# Patient Record
Sex: FEMALE | Race: WHITE | NOT HISPANIC OR LATINO | Employment: FULL TIME | URBAN - METROPOLITAN AREA
[De-identification: names, ages, dates, MRNs, and addresses within clinical notes are randomized per-mention and may not be internally consistent; named-entity substitution may affect disease eponyms.]

---

## 2019-02-06 ENCOUNTER — OFFICE VISIT (OUTPATIENT)
Dept: FAMILY MEDICINE | Facility: CLINIC | Age: 54
End: 2019-02-06
Payer: COMMERCIAL

## 2019-02-06 VITALS
TEMPERATURE: 98 F | OXYGEN SATURATION: 98 % | BODY MASS INDEX: 28.43 KG/M2 | HEIGHT: 65 IN | HEART RATE: 86 BPM | WEIGHT: 170.63 LBS | DIASTOLIC BLOOD PRESSURE: 78 MMHG | SYSTOLIC BLOOD PRESSURE: 142 MMHG | RESPIRATION RATE: 18 BRPM

## 2019-02-06 DIAGNOSIS — T14.8XXA BRUISING: ICD-10-CM

## 2019-02-06 DIAGNOSIS — J00 COMMON COLD: Primary | ICD-10-CM

## 2019-02-06 LAB
CTP QC/QA: YES
FLUAV AG NPH QL: NEGATIVE
FLUBV AG NPH QL: NEGATIVE

## 2019-02-06 PROCEDURE — 99203 PR OFFICE/OUTPT VISIT, NEW, LEVL III, 30-44 MIN: ICD-10-PCS | Mod: S$GLB,,, | Performed by: FAMILY MEDICINE

## 2019-02-06 PROCEDURE — 87804 POCT INFLUENZA A/B: ICD-10-PCS | Mod: 59,QW,S$GLB, | Performed by: FAMILY MEDICINE

## 2019-02-06 PROCEDURE — 99999 PR PBB SHADOW E&M-NEW PATIENT-LVL III: CPT | Mod: PBBFAC,,, | Performed by: FAMILY MEDICINE

## 2019-02-06 PROCEDURE — 99999 PR PBB SHADOW E&M-NEW PATIENT-LVL III: ICD-10-PCS | Mod: PBBFAC,,, | Performed by: FAMILY MEDICINE

## 2019-02-06 PROCEDURE — 99203 OFFICE O/P NEW LOW 30 MIN: CPT | Mod: S$GLB,,, | Performed by: FAMILY MEDICINE

## 2019-02-06 PROCEDURE — 87804 INFLUENZA ASSAY W/OPTIC: CPT | Mod: QW,S$GLB,, | Performed by: FAMILY MEDICINE

## 2019-02-06 RX ORDER — TOPIRAMATE 50 MG/1
50 CAPSULE, EXTENDED RELEASE ORAL DAILY PRN
Status: ON HOLD | COMMUNITY
End: 2019-12-24 | Stop reason: HOSPADM

## 2019-02-06 RX ORDER — LOPERAMIDE HYDROCHLORIDE 2 MG/1
2 CAPSULE ORAL 4 TIMES DAILY PRN
Status: ON HOLD | COMMUNITY
End: 2019-12-24 | Stop reason: HOSPADM

## 2019-02-06 RX ORDER — LISINOPRIL 20 MG/1
20 TABLET ORAL DAILY
Status: ON HOLD | COMMUNITY
End: 2019-12-24 | Stop reason: SDUPTHER

## 2019-02-06 RX ORDER — AMLODIPINE BESYLATE 5 MG/1
5 TABLET ORAL DAILY
Status: ON HOLD | COMMUNITY
End: 2019-12-24 | Stop reason: SDUPTHER

## 2019-02-06 RX ORDER — IPRATROPIUM BROMIDE 42 UG/1
2 SPRAY, METERED NASAL 4 TIMES DAILY
Qty: 15 ML | Refills: 0 | Status: SHIPPED | OUTPATIENT
Start: 2019-02-06 | End: 2019-08-08

## 2019-02-06 RX ORDER — MULTIVITAMIN
1 TABLET ORAL DAILY
Status: ON HOLD | COMMUNITY
End: 2019-12-24 | Stop reason: HOSPADM
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
## 2019-02-06 NOTE — LETTER
February 6, 2019      [facility]  [address]  [place] [address]  Phone: [phone]       Patient: Naomy Armstrong   YOB: 1965  Date of Visit: 02/06/2019    To Whom It May Concern:    Naomy Armstrong  was at [facility] on 02/06/2019. She may return to work/school on 2/09/2019. Please also excuse on 2/4/19 and 2/5/19.    If you have any questions or concerns, or if I can be of further assistance, please do not hesitate to contact me.      Sincerely,      Eric Hernandes Jr., MD

## 2019-02-08 ENCOUNTER — TELEPHONE (OUTPATIENT)
Dept: FAMILY MEDICINE | Facility: CLINIC | Age: 54
End: 2019-02-08

## 2019-02-08 NOTE — TELEPHONE ENCOUNTER
Bro[place]nn will be talking to Dr. Hernandes regarding [place] paperwork and will be getting back with Pt.

## 2019-02-08 NOTE — TELEPHONE ENCOUNTER
Voicemail left for patient to inform her that we received FMLA papers from [employer] and Dr Hernandes does not fill out any paperwork for FMLA

## 2019-06-19 DIAGNOSIS — M25.561 PAIN IN BOTH KNEES, UNSPECIFIED CHRONICITY: Primary | ICD-10-CM

## 2019-06-19 DIAGNOSIS — M25.562 PAIN IN BOTH KNEES, UNSPECIFIED CHRONICITY: Primary | ICD-10-CM

## 2019-06-20 ENCOUNTER — OFFICE VISIT (OUTPATIENT)
Dept: ORTHOPEDICS | Facility: CLINIC | Age: 54
End: 2019-06-20
Attending: ORTHOPAEDIC SURGERY
Payer: COMMERCIAL

## 2019-06-20 VITALS
SYSTOLIC BLOOD PRESSURE: 122 MMHG | WEIGHT: 168 LBS | HEART RATE: 74 BPM | DIASTOLIC BLOOD PRESSURE: 80 MMHG | HEIGHT: 65 IN | BODY MASS INDEX: 27.99 KG/M2

## 2019-06-20 DIAGNOSIS — M54.2 NECK PAIN: ICD-10-CM

## 2019-06-20 DIAGNOSIS — M17.11 PRIMARY OSTEOARTHRITIS OF RIGHT KNEE: Primary | ICD-10-CM

## 2019-06-20 PROCEDURE — 99999 PR PBB SHADOW E&M-EST. PATIENT-LVL IV: ICD-10-PCS | Mod: PBBFAC,,, | Performed by: ORTHOPAEDIC SURGERY

## 2019-06-20 PROCEDURE — 99999 PR PBB SHADOW E&M-EST. PATIENT-LVL IV: CPT | Mod: PBBFAC,,, | Performed by: ORTHOPAEDIC SURGERY

## 2019-06-20 PROCEDURE — 99204 PR OFFICE/OUTPT VISIT, NEW, LEVL IV, 45-59 MIN: ICD-10-PCS | Mod: 25,S$GLB,, | Performed by: ORTHOPAEDIC SURGERY

## 2019-06-20 PROCEDURE — 20610 LARGE JOINT ASPIRATION/INJECTION: R KNEE: ICD-10-PCS | Mod: RT,S$GLB,, | Performed by: ORTHOPAEDIC SURGERY

## 2019-06-20 PROCEDURE — 20610 DRAIN/INJ JOINT/BURSA W/O US: CPT | Mod: RT,S$GLB,, | Performed by: ORTHOPAEDIC SURGERY

## 2019-06-20 PROCEDURE — 99204 OFFICE O/P NEW MOD 45 MIN: CPT | Mod: 25,S$GLB,, | Performed by: ORTHOPAEDIC SURGERY

## 2019-06-20 RX ORDER — NAPROXEN 500 MG/1
TABLET ORAL
Refills: 1 | Status: ON HOLD | COMMUNITY
Start: 2019-04-05 | End: 2019-12-24 | Stop reason: HOSPADM

## 2019-06-20 RX ADMIN — TRIAMCINOLONE ACETONIDE 40 MG: 40 INJECTION, SUSPENSION INTRA-ARTICULAR; INTRAMUSCULAR at 11:06

## 2019-06-20 NOTE — PROGRESS NOTES
Chief Complaint   Patient presents with    Left Knee - Pain    Right Knee - Pain       This patient was seen in consultation at the request of Dr. Jessica Shay     HPI: Naomy Armstrong is a 54 y.o. female who presents today complaining of bilateral knee pain R>L    Duration of symptoms:   Several months   Trauma or new activity: no  Pain is intermittent  Aggravating factors: squatting, getting up from squatting, walking, any pressure on the knee/bumping it, deep flexion  Relieving factors: rest   Radicular symptoms: no back pain, numbness, paresthesias   Associated symptoms:  None  Prior treatment:  OTC NSAIDs  without improvement in pain.   Knee pads at work do not help. No PT or injections.  Pain does interfere with sleep and activities of daily living .    Has a history of neck pain -- underwent multiple procedures with Dr Barnes in 2013 with good relief of pain, has returned. She is interested in seeing pain management again    This is the extent of the patient's complaints at this time.     Occupation:  at [employer] - requires a lot of kneeling, lifting etc     Review of Systems   Constitutional: Negative.    HENT: Negative.    Eyes: Negative.    Respiratory: Negative.    Cardiovascular: Negative.    Gastrointestinal: Negative.    Genitourinary: Negative.    Skin: Negative.    Neurological: Negative.    Endo/Heme/Allergies: Negative.    Psychiatric/Behavioral: Negative.          Review of patient's allergies indicates:   Allergen Reactions    Effexor [venlafaxine]     Zoloft [sertraline]          Current Outpatient Medications:     amLODIPine (NORVASC) 5 MG tablet, Take 5 mg by mouth once daily., Disp: , Rfl:     dicyclomine (BENTYL) 20 mg tablet, Take 20 mg by mouth every 6 (six) hours., Disp: , Rfl:     hydrocodone-acetaminophen (VICODIN ES) 7.5-750 mg per tablet, Take 1 tablet by mouth every 6 (six) hours as needed., Disp: , Rfl:     ipratropium (ATROVENT) 42 mcg (0.06 %)  nasal spray, 2 sprays by Nasal route 4 (four) times daily., Disp: 15 mL, Rfl: 0    LIPOPEN PLUS Crea, , Disp: , Rfl:     lisinopril (PRINIVIL,ZESTRIL) 20 MG tablet, Take 20 mg by mouth once daily., Disp: , Rfl:     loperamide (IMODIUM) 2 mg capsule, Take 2 mg by mouth 4 (four) times daily as needed for Diarrhea., Disp: , Rfl:     methocarbamol (ROBAXIN) 750 MG Tab, , Disp: , Rfl:     multivitamin (ONE DAILY MULTIVITAMIN) per tablet, Take 1 tablet by mouth once daily., Disp: , Rfl:     naproxen (NAPROSYN) 500 MG tablet, , Disp: , Rfl: 1    omeprazole (PRILOSEC) 20 MG capsule, Take 20 mg by mouth once daily. , Disp: , Rfl:     ondansetron (ZOFRAN) 4 MG tablet, , Disp: , Rfl:     topiramate (TROKENDI XR) 50 mg Cp24, Take 50 mg by mouth daily as needed., Disp: , Rfl:     zolpidem (AMBIEN) 10 mg Tab, Take 5 mg by mouth nightly as needed., Disp: , Rfl:     Past Medical History:   Diagnosis Date    Anxiety     Depression        Patient Active Problem List   Diagnosis    Cervical radiculopathy    Disc disease, degenerative, cervical    Generalized headaches       Past Surgical History:   Procedure Laterality Date    BLOCK, NERVE, FACET JOINT, CERVICAL, MEDIAL BRANCH Left 9/10/2013    [facility]    INJECTION, STEROID, SPINE, CERVICAL, EPIDURAL N/A 2013    [facility] by [facility]    RADIOFREQUENCY THERMAL COAGULATION, NERVE, SPINAL, CERVICAL, POSTERIOR RAMUS, MEDIAL BRANCH Left 10/8/2013    Performed by Lis [facility] at [facility]       Social History     Tobacco Use    Smoking status: Former Smoker     Last attempt to quit: 2006     Years since quittin.1   Substance Use Topics    Alcohol use: No    Drug use: No       No family history on file.    Physical Exam:     Vitals:    19 1022   BP: 122/80   Pulse: 74           General: Weight: 76.2 kg (167 lb 15.9 oz) Body mass index is 27.96 kg/m².  Patient is  alert, awake and oriented to time, place and person. Mood and affect are appropriate.  Patient does not appear to be in any distress, denies any constitutional symptoms and appears stated age.   HEENT: Pupils are equal and round, sclera are not injected. External examination of ears and nose reveals no abnormalities. Cranial nerves II-X are grossly intact  Skin: no rashes, abrasions or open wounds on the affected extremity   Resp: No respiratory distress or audible wheezing   CV: 2+  pulses, all extremities warm and well perfused    Bilateral  Knees  Localizes pain over anterior joint line  No swelling , effusion, or erythema   Active ROM: 0 - 130  Passive ROM: 0 - 135  No varus/valgus deformity   Tender to palpation over patella and medial joint line   good  quadricep muscle tone and bulk; no atrophy compared to contralateral extremity   normal (0 - 2 mm) Anterior drawer   normal (0 - 2 mm) posterior drawer   negative valgus instability   negative varus instability   Normal patellar tracking   + pain with patellar compression   ltsi s/s/sp/dp/t   + ehl/fhl/ta/gs  2 + DP     Imaging:  3 views bilateral knees:  mild arthritic changes with joint space narrowing, osteophyte formation.  The changes are most pronounced at the patellofemoral joint     Assessment: 54 y.o. female with mild bilateral knee OA, neck pain    I explained my diagnostic impression and the reasoning behind it in detail, using layman's terms.  Models and/or pictures were used to help in the explanation.    Plan:   - Pain management referral placed   - injection to bilateral knees, please see procedure note for more details  - Home exercise program  - return to clinic as needed    All questions were answered in detail. The patient is in full agreement with the treatment plan and will proceed accordingly.    A note notifying Dr. Jessica Shay of my findings was sent via the electronic medical record     This note was created by combination of  typed  and M-Modal dictation. Transcription and phonetic errors may be present.  If there are any questions, please contact me.

## 2019-06-20 NOTE — PROCEDURES
Large Joint Aspiration/Injection: R knee  Date/Time: 6/20/2019 11:09 AM  Performed by: Jessica Shay MD  Authorized by: Jessica Shay MD     Indications:  Pain  Timeout: Prior to procedure the correct patient, procedure, and site was verified      Location:  Knee  Site:  R knee  Prep: Patient was prepped and draped in usual sterile fashion    Needle size:  22 G  Approach:  Anteromedial  Medications:  40 mg triamcinolone acetonide 40 mg/mL  Patient tolerance:  Patient tolerated the procedure well with no immediate complications

## 2019-06-24 PROBLEM — M54.2 NECK PAIN: Status: ACTIVE | Noted: 2019-06-24

## 2019-06-24 PROBLEM — M17.11 PRIMARY OSTEOARTHRITIS OF RIGHT KNEE: Status: ACTIVE | Noted: 2019-06-24

## 2019-06-24 RX ORDER — TRIAMCINOLONE ACETONIDE 40 MG/ML
40 INJECTION, SUSPENSION INTRA-ARTICULAR; INTRAMUSCULAR
Status: DISCONTINUED | OUTPATIENT
Start: 2019-06-20 | End: 2019-06-24 | Stop reason: HOSPADM

## 2019-07-09 ENCOUNTER — OFFICE VISIT (OUTPATIENT)
Dept: PAIN MEDICINE | Facility: CLINIC | Age: 54
End: 2019-07-09
Payer: COMMERCIAL

## 2019-07-09 VITALS
DIASTOLIC BLOOD PRESSURE: 56 MMHG | WEIGHT: 164.88 LBS | BODY MASS INDEX: 27.47 KG/M2 | SYSTOLIC BLOOD PRESSURE: 120 MMHG | HEART RATE: 99 BPM | OXYGEN SATURATION: 98 % | HEIGHT: 65 IN

## 2019-07-09 DIAGNOSIS — M47.812 SPONDYLOSIS OF CERVICAL REGION WITHOUT MYELOPATHY OR RADICULOPATHY: ICD-10-CM

## 2019-07-09 DIAGNOSIS — M50.30 DDD (DEGENERATIVE DISC DISEASE), CERVICAL: Primary | ICD-10-CM

## 2019-07-09 PROCEDURE — 99999 PR PBB SHADOW E&M-EST. PATIENT-LVL IV: ICD-10-PCS | Mod: PBBFAC,,, | Performed by: PAIN MEDICINE

## 2019-07-09 PROCEDURE — 99244 OFF/OP CNSLTJ NEW/EST MOD 40: CPT | Mod: S$GLB,,, | Performed by: PAIN MEDICINE

## 2019-07-09 PROCEDURE — 99244 PR OFFICE CONSULTATION,LEVEL IV: ICD-10-PCS | Mod: S$GLB,,, | Performed by: PAIN MEDICINE

## 2019-07-09 PROCEDURE — 99999 PR PBB SHADOW E&M-EST. PATIENT-LVL IV: CPT | Mod: PBBFAC,,, | Performed by: PAIN MEDICINE

## 2019-07-09 RX ORDER — PROMETHAZINE HYDROCHLORIDE 25 MG/1
TABLET ORAL
Refills: 1 | Status: ON HOLD | COMMUNITY
Start: 2019-04-25 | End: 2019-12-24 | Stop reason: HOSPADM

## 2019-07-09 RX ORDER — TIZANIDINE 4 MG/1
TABLET ORAL
Refills: 1 | Status: ON HOLD | COMMUNITY
Start: 2019-04-05 | End: 2019-12-24 | Stop reason: HOSPADM

## 2019-07-09 RX ORDER — ERGOCALCIFEROL 1.25 MG/1
50000 CAPSULE ORAL
Status: ON HOLD | COMMUNITY
End: 2019-12-24 | Stop reason: HOSPADM

## 2019-07-09 RX ORDER — SULFAMETHOXAZOLE AND TRIMETHOPRIM 800; 160 MG/1; MG/1
TABLET ORAL
Refills: 0 | COMMUNITY
Start: 2019-05-09 | End: 2019-08-08 | Stop reason: ALTCHOICE

## 2019-07-09 RX ORDER — AMITRIPTYLINE HYDROCHLORIDE 25 MG/1
25 TABLET, FILM COATED ORAL
COMMUNITY
Start: 2018-02-09 | End: 2019-08-08

## 2019-07-09 RX ORDER — SIMETHICONE 125 MG
125 TABLET,CHEWABLE ORAL
Status: ON HOLD | COMMUNITY
End: 2019-12-24 | Stop reason: HOSPADM

## 2019-07-09 RX ORDER — GABAPENTIN 600 MG/1
600 TABLET ORAL
COMMUNITY
End: 2019-08-08 | Stop reason: ALTCHOICE

## 2019-07-09 RX ORDER — PHENAZOPYRIDINE HYDROCHLORIDE 200 MG/1
TABLET, FILM COATED ORAL
Refills: 0 | COMMUNITY
Start: 2019-05-09 | End: 2019-08-08

## 2019-07-09 RX ORDER — NAPROXEN AND ESOMEPRAZOLE MAGNESIUM 20; 500 MG/1; MG/1
TABLET, DELAYED RELEASE ORAL
Status: ON HOLD | COMMUNITY
End: 2019-12-24 | Stop reason: HOSPADM

## 2019-07-09 NOTE — PROGRESS NOTES
Subjective:     Patient ID: Naomy Armstrong is a 54 y.o. female    Chief Complaint: Neck Pain (pt has taken medication in the past  , Pt and surgey in the past . pt states having multi head injuries in the past  )      Referred by: Jessica Shay MD      HPI:    Initial Encounter (7/9/19):  Naomy Armstrong is a 54 y.o. female who presents today with chronic neck pain. This pain has been present for years.  No specific inciting event or injury noted. She localizes the pain to the midline cervical region.  The pain does not radiate.  She denies any associated numbness, tingling, weakness, bowel bladder dysfunction.  She has had some radicular pain on the left side in the past this has resolved.  The pain is constant and worsened with activity.  She specifically states that the pain is worse when looking at screens.  She has been treated in the past by Dr. Bhavya Oneill at [facility].   This pain is described in detail below.    Physical Therapy:  Yes.  Tries to continue regular home exercise program.    Non-pharmacologic Treatment:  Rest helps         · TENS?  No    Pain Medications:         · Currently taking:  Tizanidine, naproxen, amitriptyline    · Has tried in the past:  Opioids, muscle relaxers, Tylenol, gabapentin    · Has not tried:  SNRIs, topical creams    Blood thinners:  None    Interventional Therapies:   10/8/13- Left RFA C5, C6, C7  9/10/13- Left C5, C6, C7 MBB   8/13/13- C7-T1 IL EMMA    Relevant Surgeries:  None    Affecting sleep?  Yes    Affecting daily activities? yes    Depressive symptoms? yes          · SI/HI? No    Work status: Employment     Pain Scores:    Best:       2/10  Worst:   9/10  Usually:   5/10  Today:   6/10    Review of Systems   Constitutional: Negative for activity change, appetite change, chills, fatigue, fever and unexpected weight change.   HENT: Negative for hearing loss.    Eyes: Negative for visual disturbance.   Respiratory: Negative for chest  tightness and shortness of breath.    Cardiovascular: Negative for chest pain.   Gastrointestinal: Negative for abdominal pain, constipation, diarrhea, nausea and vomiting.   Genitourinary: Negative for difficulty urinating.   Musculoskeletal: Positive for back pain, myalgias, neck pain and neck stiffness. Negative for gait problem.   Skin: Negative for rash.   Neurological: Negative for dizziness, weakness, light-headedness, numbness and headaches.   Psychiatric/Behavioral: Positive for sleep disturbance. Negative for hallucinations and suicidal ideas. The patient is not nervous/anxious.        Past Medical History:   Diagnosis Date    Anxiety     Depression        Past Surgical History:   Procedure Laterality Date    BLOCK, NERVE, FACET JOINT, CERVICAL, MEDIAL BRANCH Left 9/10/2013    Performed by [facility] at [facility]    INJECTION, STEROID, SPINE, CERVICAL, EPIDURAL N/A 2013    Performed by [facility] at [facility]    RADIOFREQUENCY THERMAL COAGULATION, NERVE, SPINAL, CERVICAL, POSTERIOR RAMUS, MEDIAL BRANCH Left 10/8/2013    Performed by [facility] at [facility]       Social History     Socioeconomic History    Marital status: Single     Spouse name: Not on file    Number of children: Not on file    Years of education: Not on file    Highest education level: Not on file   Occupational History    Not on file   Social Needs    Financial resource strain: Not on file    Food insecurity:     Worry: Not on file     Inability: Not on file    Transportation needs:     Medical: Not on file     Non-medical: Not on file   Tobacco Use    Smoking status: Former Smoker     Last attempt to quit: 2006     Years since quittin.2   Substance and Sexual Activity    Alcohol use: No    Drug use: No    Sexual activity: Not on file   Lifestyle    Physical activity:     Days per week: Not on file     Minutes per session: Not on file    Stress: Not on file    Relationships    Social connections:     Talks on phone: Not on file     Gets together: Not on file     Attends Religion service: Not on file     Active member of club or organization: Not on file     Attends meetings of clubs or organizations: Not on file     Relationship status: Not on file   Other Topics Concern    Not on file   Social History Narrative    Not on file       Review of patient's allergies indicates:   Allergen Reactions    Effexor [venlafaxine]     Zoloft [sertraline]     Paroxetine hcl        Current Outpatient Medications on File Prior to Visit   Medication Sig Dispense Refill    amitriptyline (ELAVIL) 25 MG tablet Take 25 mg by mouth.      amLODIPine (NORVASC) 5 MG tablet Take 5 mg by mouth once daily.      dicyclomine (BENTYL) 20 mg tablet Take 20 mg by mouth every 6 (six) hours.      ergocalciferol (ERGOCALCIFEROL) 50,000 unit Cap Take 50,000 Units by mouth.      gabapentin (NEURONTIN) 600 MG tablet Take 600 mg by mouth.      ipratropium (ATROVENT) 42 mcg (0.06 %) nasal spray 2 sprays by Nasal route 4 (four) times daily. 15 mL 0    LIPOPEN PLUS Crea       lisinopril (PRINIVIL,ZESTRIL) 20 MG tablet Take 20 mg by mouth once daily.      loperamide (IMODIUM) 2 mg capsule Take 2 mg by mouth 4 (four) times daily as needed for Diarrhea.      methocarbamol (ROBAXIN) 750 MG Tab       multivitamin (ONE DAILY MULTIVITAMIN) per tablet Take 1 tablet by mouth once daily.      naproxen (NAPROSYN) 500 MG tablet   1    naproxen-esomeprazole (VIMOVO) 500-20 mg TbID Take by mouth.      omeprazole (PRILOSEC) 20 MG capsule Take 20 mg by mouth once daily.       ondansetron (ZOFRAN) 4 MG tablet       phenazopyridine (PYRIDIUM) 200 MG tablet   0    promethazine (PHENERGAN) 25 MG tablet   1    simethicone (MYLICON) 125 MG chewable tablet Take 125 mg by mouth.      sulfamethoxazole-trimethoprim 800-160mg (BACTRIM DS) 800-160 mg Tab   0    tiZANidine (ZANAFLEX) 4 MG tablet   1    topiramate  "(TROKENDI XR) 50 mg Cp24 Take 50 mg by mouth daily as needed.      zolpidem (AMBIEN) 10 mg Tab Take 5 mg by mouth nightly as needed.      [DISCONTINUED] hydrocodone-acetaminophen (VICODIN ES) 7.5-750 mg per tablet Take 1 tablet by mouth every 6 (six) hours as needed.       No current facility-administered medications on file prior to visit.        Objective:      BP (!) 120/56   Pulse 99   Ht 5' 5" (1.651 m)   Wt 74.8 kg (164 lb 14.4 oz)   SpO2 98%   BMI 27.44 kg/m²     Exam:  GEN:  Well developed, well nourished.  No acute distress.  Normal pain behavior.  HEENT:  No trauma.  Mucous membranes moist.  Nares patent bilaterally.  PSYCH: Normal affect. Thought content appropriate.  CHEST:  Breathing symmetric.  No audible wheezing.  ABD: Soft, non-distended.  SKIN:  Warm, pink, dry.  No rash on exposed areas.    EXT:  No cyanosis, clubbing, or edema.  No color change or changes in nail or hair growth.  NEURO/MUSCULOSKELETAL:  Fully alert, oriented, and appropriate. Speech normal indio. No cranial nerve deficits.   Gait:   normal.  5/5 motor strength throughout upper extremities.   Sensory:   decreased sensation light touch in the left upper extremity in a nondermatomal pattern    Reflexes:   2 + and symmetric throughout.   absent  Matos's bilaterally.  C-Spine:   slightly limited  ROM without pain on  flexion or extension.  negative  facet loading bilaterally.   negative  Spurling's bilaterally.    No  TTP over cervical facet joints, cervical paraspinal muscles, shoulders, elbows or hands.          Imaging:  Narrative     No significant alignment abnormality.  Vertebral body heights are normally maintained, without compression deformity at any level.  There is disc narrowing at C5-6 and C6-7, and each of these levels demonstrates posterior marginal vertebral endplate   spurring and accompanying disc bulging.  The spurring is more pronounced to the right of midline at C5-6 and to the left of midline at C6-7. "  All other cervical and visualized upper thoracic levels have normal disc contour, and there is no evidence of a   significant focal disc herniation at any level.  AP diameter of the spinal canal is normally maintained at all levels with no canal stenosis/extrinsic cord compression observed.  The spinal cord is well delineated from the cervicomedullary junction to   the T3 level, and appears normal in size and configuration without focal enlargement or irregularity.  No Chiari malformation or cord cyst or syrinx.  No abnormal signal from the substance of the cord on any of the pulse sequences generated.  There is   some prominent neural foraminal stenosis at C5-6 on the right and C6-7 on the left.  No significant signal abnormality referable to the osseous structures.   Impression      Cervical spine MRI examination demonstrates degenerative changes as discussed above involving the C5-6 and C6-7 levels, with right-sided foraminal stenosis at C5-6 and left-sided foraminal stenosis at C6-7.  No evidence of a significant focal soft is   herniation, canal stenosis/extrinsic cord compression, or intramedullary cord lesion seen at any level.      Electronically signed by: Kaiser Preston MD  Date: 08/01/13  Time: 06:40          Assessment:       Encounter Diagnoses   Name Primary?    DDD (degenerative disc disease), cervical Yes    Spondylosis of cervical region without myelopathy or radiculopathy          Plan:       Naomy was seen today for neck pain.    Diagnoses and all orders for this visit:    DDD (degenerative disc disease), cervical  -     Ambulatory Referral to Physical/Occupational Therapy  -     X-Ray Cervical Spine AP And Lateral; Future    Spondylosis of cervical region without myelopathy or radiculopathy  -     Ambulatory Referral to Physical/Occupational Therapy  -     X-Ray Cervical Spine AP And Lateral; Future        Naomy Armstrong is a 54 y.o. female with chronic neck pain.  Exact etiology unclear  at this time but is likely multifactorial.  May have some facet mediated pain.  Low suspicion for radiculopathy at this time.    1.  Cervical x-rays to evaluate for spondylosis.  2.  Refer to PT for ROM, strengthening, stretching and HEP.  Patient states that her work schedule and financial resources may be a barrier to frequent therapy visits.  We discussed that she should attend is often as she can manage and that the goal therapies to establish home exercise program that she can continue on her own.  Patient expressed understanding.  3.  Patient was shown videos on Youtube that may help her establish a home exercise program in the event that she cannot attend physical therapy.  4.  We discussed that I do not feel opioid medications are appropriate for the management of this problem.  Patient expressed understanding in agreement.  5.  Return to clinic in 8 weeks or sooner if needed.  At that time we will discuss efficacy of physical therapy/home exercise program and may consider interventional procedures if appropriate.

## 2019-07-09 NOTE — LETTER
July 9, 2019      Jessica Shay MD  [address]  [place] [address]           [facility]  [address], [place] [address]  Phone: [phone]  Fax: [phone]          Patient: Naomy Armstrong   MR Number: [id number]   YOB: 1965   Date of Visit: 7/9/2019       Dear Dr. Jessica Shay:    Thank you for referring Naomy Armstrong to me for evaluation. Attached you will find relevant portions of my assessment and plan of care.    If you have questions, please do not hesitate to call me. I look forward to following Naomy Armstrong along with you.    Sincerely,    Jeremy Powell Jr., MD    Enclosure  CC:  No Recipients    If you would like to receive this communication electronically, please contact [email or web address] or [phone] to request more information on "[employer]" Link access.    For providers and/or their staff who would like to refer a patient to [facility], please contact us through our one-stop-shop provider referral line, [facility] [place] operated [facility], at [phone].    If you feel you have received this communication in error or would no longer like to receive these types of communications, please e-mail [email or web address]

## 2019-08-08 ENCOUNTER — LAB VISIT (OUTPATIENT)
Dept: LAB | Facility: HOSPITAL | Age: 54
End: 2019-08-08
Attending: NURSE PRACTITIONER
Payer: COMMERCIAL

## 2019-08-08 ENCOUNTER — OFFICE VISIT (OUTPATIENT)
Dept: PSYCHIATRY | Facility: CLINIC | Age: 54
End: 2019-08-08
Payer: COMMERCIAL

## 2019-08-08 VITALS
HEART RATE: 94 BPM | WEIGHT: 162.56 LBS | SYSTOLIC BLOOD PRESSURE: 106 MMHG | DIASTOLIC BLOOD PRESSURE: 62 MMHG | HEIGHT: 65 IN | BODY MASS INDEX: 27.09 KG/M2

## 2019-08-08 DIAGNOSIS — F33.3 SEVERE RECURRENT MAJOR DEPRESSIVE DISORDER WITH PSYCHOTIC FEATURES WITH ANXIOUS DISTRESS: ICD-10-CM

## 2019-08-08 DIAGNOSIS — F33.3 SEVERE RECURRENT MAJOR DEPRESSIVE DISORDER WITH PSYCHOTIC FEATURES WITH ANXIOUS DISTRESS: Primary | ICD-10-CM

## 2019-08-08 DIAGNOSIS — F39 MOOD INSOMNIA: ICD-10-CM

## 2019-08-08 DIAGNOSIS — Z79.899 PHARMACOLOGIC THERAPY: ICD-10-CM

## 2019-08-08 DIAGNOSIS — F51.05 MOOD INSOMNIA: ICD-10-CM

## 2019-08-08 LAB
ALBUMIN SERPL BCP-MCNC: 3.7 G/DL (ref 3.5–5.2)
ALP SERPL-CCNC: 122 U/L (ref 55–135)
ALT SERPL W/O P-5'-P-CCNC: 18 U/L (ref 10–44)
ANION GAP SERPL CALC-SCNC: 10 MMOL/L (ref 8–16)
AST SERPL-CCNC: 15 U/L (ref 10–40)
BASOPHILS # BLD AUTO: 0.01 K/UL (ref 0–0.2)
BASOPHILS NFR BLD: 0.2 % (ref 0–1.9)
BILIRUB SERPL-MCNC: 0.2 MG/DL (ref 0.1–1)
BUN SERPL-MCNC: 12 MG/DL (ref 6–20)
CALCIUM SERPL-MCNC: 10.2 MG/DL (ref 8.7–10.5)
CHLORIDE SERPL-SCNC: 108 MMOL/L (ref 95–110)
CHOLEST SERPL-MCNC: 241 MG/DL (ref 120–199)
CHOLEST/HDLC SERPL: 2.8 {RATIO} (ref 2–5)
CO2 SERPL-SCNC: 25 MMOL/L (ref 23–29)
CREAT SERPL-MCNC: 1 MG/DL (ref 0.5–1.4)
DIFFERENTIAL METHOD: ABNORMAL
EOSINOPHIL # BLD AUTO: 0 K/UL (ref 0–0.5)
EOSINOPHIL NFR BLD: 0.7 % (ref 0–8)
ERYTHROCYTE [DISTWIDTH] IN BLOOD BY AUTOMATED COUNT: 16 % (ref 11.5–14.5)
EST. GFR  (AFRICAN AMERICAN): >60 ML/MIN/1.73 M^2
EST. GFR  (NON AFRICAN AMERICAN): >60 ML/MIN/1.73 M^2
ESTIMATED AVG GLUCOSE: 108 MG/DL (ref 68–131)
GLUCOSE SERPL-MCNC: 109 MG/DL (ref 70–110)
HBA1C MFR BLD HPLC: 5.4 % (ref 4–5.6)
HCT VFR BLD AUTO: 41 % (ref 37–48.5)
HDLC SERPL-MCNC: 87 MG/DL (ref 40–75)
HDLC SERPL: 36.1 % (ref 20–50)
HGB BLD-MCNC: 12.6 G/DL (ref 12–16)
LDLC SERPL CALC-MCNC: 106.6 MG/DL (ref 63–159)
LYMPHOCYTES # BLD AUTO: 1.3 K/UL (ref 1–4.8)
LYMPHOCYTES NFR BLD: 22.7 % (ref 18–48)
MCH RBC QN AUTO: 26 PG (ref 27–31)
MCHC RBC AUTO-ENTMCNC: 30.7 G/DL (ref 32–36)
MCV RBC AUTO: 85 FL (ref 82–98)
MONOCYTES # BLD AUTO: 0.4 K/UL (ref 0.3–1)
MONOCYTES NFR BLD: 6.3 % (ref 4–15)
NEUTROPHILS # BLD AUTO: 4 K/UL (ref 1.8–7.7)
NEUTROPHILS NFR BLD: 70.3 % (ref 38–73)
NONHDLC SERPL-MCNC: 154 MG/DL
PLATELET # BLD AUTO: 334 K/UL (ref 150–350)
PMV BLD AUTO: 10 FL (ref 9.2–12.9)
POTASSIUM SERPL-SCNC: 3.9 MMOL/L (ref 3.5–5.1)
PROLACTIN SERPL IA-MCNC: 15.9 NG/ML (ref 5.2–26.5)
PROT SERPL-MCNC: 7.1 G/DL (ref 6–8.4)
RBC # BLD AUTO: 4.84 M/UL (ref 4–5.4)
SODIUM SERPL-SCNC: 143 MMOL/L (ref 136–145)
T3FREE SERPL-MCNC: 2.2 PG/ML (ref 2.3–4.2)
T4 FREE SERPL-MCNC: 0.78 NG/DL (ref 0.71–1.51)
TRIGL SERPL-MCNC: 237 MG/DL (ref 30–150)
TSH SERPL DL<=0.005 MIU/L-ACNC: 1.3 UIU/ML (ref 0.4–4)
WBC # BLD AUTO: 5.72 K/UL (ref 3.9–12.7)

## 2019-08-08 PROCEDURE — 84481 FREE ASSAY (FT-3): CPT

## 2019-08-08 PROCEDURE — 83036 HEMOGLOBIN GLYCOSYLATED A1C: CPT

## 2019-08-08 PROCEDURE — 80053 COMPREHEN METABOLIC PANEL: CPT

## 2019-08-08 PROCEDURE — 84443 ASSAY THYROID STIM HORMONE: CPT

## 2019-08-08 PROCEDURE — 84439 ASSAY OF FREE THYROXINE: CPT

## 2019-08-08 PROCEDURE — 99204 PR OFFICE/OUTPT VISIT, NEW, LEVL IV, 45-59 MIN: ICD-10-PCS | Mod: S$GLB,,, | Performed by: NURSE PRACTITIONER

## 2019-08-08 PROCEDURE — 80061 LIPID PANEL: CPT

## 2019-08-08 PROCEDURE — 36415 COLL VENOUS BLD VENIPUNCTURE: CPT

## 2019-08-08 PROCEDURE — 99999 PR PBB SHADOW E&M-EST. PATIENT-LVL III: CPT | Mod: PBBFAC,,, | Performed by: NURSE PRACTITIONER

## 2019-08-08 PROCEDURE — 84146 ASSAY OF PROLACTIN: CPT

## 2019-08-08 PROCEDURE — 99204 OFFICE O/P NEW MOD 45 MIN: CPT | Mod: S$GLB,,, | Performed by: NURSE PRACTITIONER

## 2019-08-08 PROCEDURE — 85025 COMPLETE CBC W/AUTO DIFF WBC: CPT

## 2019-08-08 PROCEDURE — 99999 PR PBB SHADOW E&M-EST. PATIENT-LVL III: ICD-10-PCS | Mod: PBBFAC,,, | Performed by: NURSE PRACTITIONER

## 2019-08-08 RX ORDER — HYDROCODONE BITARTRATE AND ACETAMINOPHEN 7.5; 325 MG/1; MG/1
1 TABLET ORAL EVERY 6 HOURS PRN
Status: ON HOLD | COMMUNITY
End: 2019-12-24 | Stop reason: HOSPADM

## 2019-08-08 RX ORDER — QUETIAPINE FUMARATE 50 MG/1
50 TABLET, FILM COATED ORAL NIGHTLY
COMMUNITY
End: 2019-09-06 | Stop reason: ALTCHOICE

## 2019-08-08 RX ORDER — FLUOXETINE HYDROCHLORIDE 20 MG/1
20 CAPSULE ORAL DAILY
COMMUNITY
End: 2019-09-06 | Stop reason: SDUPTHER

## 2019-08-08 RX ORDER — NITROFURANTOIN 25; 75 MG/1; MG/1
100 CAPSULE ORAL
Status: ON HOLD | COMMUNITY
End: 2019-12-24 | Stop reason: HOSPADM

## 2019-08-08 RX ORDER — CAFFEINE 200 MG
200 TABLET ORAL EVERY 4 HOURS PRN
COMMUNITY
End: 2019-08-08

## 2019-08-08 RX ORDER — CLONAZEPAM 0.5 MG/1
0.5 TABLET ORAL 2 TIMES DAILY PRN
COMMUNITY
End: 2019-08-08 | Stop reason: ALTCHOICE

## 2019-08-08 NOTE — PROGRESS NOTES
"Outpatient Psychiatry Initial Visit (MD/NP)    8/8/2019    Naomy Armstrong, a 54 y.o. female, presenting for initial evaluation visit. Met with patient.    Reason for Encounter: self-referral. Patient complains of   Chief Complaint   Patient presents with    New Patient     depression and anxiety      History of Present Illness: Naomy Armstrong is a 54 y.o. female that presents for an initial psychiatric evaluation. Naomy Armstrong states that she has depression and anxiety and her thoughts are often disorganized. Her memory is poor in the short-term. She has trouble studying and retaining things. She states that she is in school now and has one semester left and she has great grades. Her GPA is 3.6.   She also states that the concentration problems have been since high school. She does have an Associate's degree in criminal justice. She states that she has been treated for depression in the past and does not feel that her concentration improved when she was being treated. She states she was having a difficult time a couple of weeks ago and her general practitioner, Dr. Lee, put her back on Prozac 20 mgs qd, Seroquel 50 mg qhs and Klonopin.  She states that her mood was all over the place and she seems to be a little more stable now. She is asked if she brought in all of her medications and she denies this. When asked the dose of Seroquel being taken, she pulls out a bag that is in her purse and states, "they told me to bring them all, so I did." She has her medication bottles with her today. She endorses visual hallucinations. She becomes easily frustrated when she can't accomplish things in the amount of time that she thinks she should accomplish them. She denies ever having any drug addiction but the chart review shows a past history of alcohol and cocaine addiction. She brings up needing something for concentration and memory multiple times during the interview.     She has a bottle " labeled Ambien 10 mgs dated 2018. She states she does not take the ambien with her klonopin. She states she last took it a few weeks ago. She admits that it was last prescribed in 2018. She is advised that she should dispose of these tablets as they are . She verbalizes understanding. She then states that she does not take the Seroquel with the ambien and misspoke. She states that Dr. Lee gave her the Prozac, Klonopin and Seroquel at the same time. She states she does not take the Klonopin often. She has the klonopin bottle and has about 15 tablets left. Her Seroquel bottle has about 15 tablets left also.     A review of the [place]  shows that on 2019, Naomy Armstrong filled a prescription for clonazepam .5 mgs #30 for 15 days as prescribed by Dr. Miguel Lee. It further shows that Ambien was last prescribed on 2018 and was last filled on 2018.     A review of the chart shows that on 10/23/2013 she saw Dr. Kathrin Boo in Psychiatry with complaint of depression. On that visit she reported that she had previously been treated with individual therapy and medication without any side effects. She also reported a past history of smoking and alcohol problems on that visit, but stated that she had stopped about 10 years prior to that visit. She also reported that she was addicted to cocaine when she was in her 20's until she turned 38. She reported having taken Prozac, Wellbutrin, Effexor, Zoloft and Paxil.  She reported that when she was on Effexor she had developed hypertension and that she was allergic to Paxil. She was also on opioids for pain and given her addiction history there was some concern about this by Dr. Boo. She was continued on her current medications and started on Cymbalta 30 mgs qd. At that time she was diagnosed as follows:    Axis I: MOOD DISORDERS; Major Depressive Disorder, Recurrent: Mild (296.31).  Axis II: DIAGNOSIS DEFRRED ON AXIS II  (799.9)  Axis III: chronic pain.  Axis IV: other psychosocial or environmental problems.  Axis V: 71-80 If symptoms are present, they are transient and expectable reactions to psychosocial stressors (e.g. difficulty concentrating after family argument); no more than slight impairment in social, occupational or school functioning (e.g. temporarily falling behind in schoolwork).    The chart further shows that on 02/07/2018 she presented to [facility] [place], [place] for a medical clearance for psychiatric admission and was diagnosed with Persistent depressive disorder (Primary Dx);Depression with suicidal ideation;Medical clearance for psychiatric admission;Somatic symptom disorder, persistent, severe, with predominant pain;Dependent personality disorder;Posttraumatic stress disorder;Partner relationship problem.     In the past she has taken: Prozac which she stopped because she had regressed on it, Wellbutrin which she took to help stop smoking, which she did, Effexor, Zoloft and Paxil which gave her negative effects and , Cymbalta which she stopped because of the price, and Elavil which she states was not effective.     Her overall symptom complex includes: mood swings, feels depressed, feels anxious, disorganized thoughts reported, anhedonia, low energy level, poor sleep without Seroquel, easily irritated, easily frustrated, visual hallucinations, poor short term memory reported       Review Of Systems:     GENERAL:  Well developed   SKIN:  No rashes or lacerations  HEAD:  No headache  EYES:  No exophthalmos, jaundice or blindness  EARS:  No hearing loss  MOUTH & THROAT:  No dyskinetic movements or obvious goiter  CHEST:  No shortness of breath  CARDIOVASCULAR:  No chest pain  ABDOMEN:  No nausea, vomiting, pain, constipation or diarrhea  URINARY:  No dysuria or sexual dysfunction  MUSCULOSKELETAL:  No tremor, no tic  NEUROLOGIC:  No abnormal movements    Current Evaluation:     Nutritional Screening: Considering the patient's height  "and weight, medications, medical history and preferences, should a referral be made to the dietitian? no    Constitutional  Vitals:  Most recent vital signs, dated less than 90 days prior to this appointment, were reviewed.    Vitals:    08/08/19 0851   BP: 106/62   Pulse: 94   Weight: 73.8 kg (162 lb 9.4 oz)   Height: 5' 5" (1.651 m)        General:  unremarkable, age appropriate     Musculoskeletal  Muscle Strength/Tone:  no tremor, no tic   Gait & Station:  non-ataxic     Psychiatric  Speech:  no latency; no press   Mood & Affect:  "apthetic."  shallow   Thought Process:  normal and logical; appropriately abstract   Associations:  intact   Thought Content:  normal, no suicidality, no homicidality, delusions, or paranoia; endorses visual hallucinations   Insight:  has awareness of illness   Judgement: behavior is adequate to circumstances   Orientation:  grossly intact, person, place, situation   Memory: intact for content of interview; immediate memory is 3/3 objects, after 5 minutes is 3/3 objects   Language: grossly intact; able to repeat no ifs ands or buts   Attention Span & Concentration:  able to focus; able to spell WORLD forward and backward   Fund of Knowledge:  intact and appropriate to age and level of education; adequate (President, Obama, Quinton, current event: mass shootings).       Relevant Elements of Neurological Exam: normal gait    Functioning in Relationships:  Spouse/partner: stressful  Peers: good  Employers: good    Laboratory Data  No visits with results within 1 Month(s) from this visit.   Latest known visit with results is:   Office Visit on 02/06/2019   Component Date Value Ref Range Status    Rapid Influenza A Ag 02/06/2019 Negative  Negative Final    Rapid Influenza B Ag 02/06/2019 Negative  Negative Final     Acceptable 02/06/2019 Yes   Final         Medications  Outpatient Encounter Medications as of 8/8/2019   Medication Sig Dispense Refill    amitriptyline " (ELAVIL) 25 MG tablet Take 25 mg by mouth.      amLODIPine (NORVASC) 5 MG tablet Take 5 mg by mouth once daily.      dicyclomine (BENTYL) 20 mg tablet Take 20 mg by mouth every 6 (six) hours.      ergocalciferol (ERGOCALCIFEROL) 50,000 unit Cap Take 50,000 Units by mouth.      gabapentin (NEURONTIN) 600 MG tablet Take 600 mg by mouth.      ipratropium (ATROVENT) 42 mcg (0.06 %) nasal spray 2 sprays by Nasal route 4 (four) times daily. 15 mL 0    LIPOPEN PLUS Crea       lisinopril (PRINIVIL,ZESTRIL) 20 MG tablet Take 20 mg by mouth once daily.      loperamide (IMODIUM) 2 mg capsule Take 2 mg by mouth 4 (four) times daily as needed for Diarrhea.      methocarbamol (ROBAXIN) 750 MG Tab       multivitamin (ONE DAILY MULTIVITAMIN) per tablet Take 1 tablet by mouth once daily.      naproxen (NAPROSYN) 500 MG tablet   1    naproxen-esomeprazole (VIMOVO) 500-20 mg TbID Take by mouth.      omeprazole (PRILOSEC) 20 MG capsule Take 20 mg by mouth once daily.       ondansetron (ZOFRAN) 4 MG tablet       phenazopyridine (PYRIDIUM) 200 MG tablet   0    promethazine (PHENERGAN) 25 MG tablet   1    simethicone (MYLICON) 125 MG chewable tablet Take 125 mg by mouth.      sulfamethoxazole-trimethoprim 800-160mg (BACTRIM DS) 800-160 mg Tab   0    tiZANidine (ZANAFLEX) 4 MG tablet   1    topiramate (TROKENDI XR) 50 mg Cp24 Take 50 mg by mouth daily as needed.      zolpidem (AMBIEN) 10 mg Tab Take 5 mg by mouth nightly as needed.       No facility-administered encounter medications on file as of 8/8/2019.            Assessment - Diagnosis - Goals:     Impression: severe recurrent MDD with psychotic features with anxious distress, mood insomnia      ICD-10-CM ICD-9-CM   1. Severe recurrent major depressive disorder with psychotic features with anxious distress F33.3 296.34   2. Mood insomnia F51.05 [id number]    F39    3. Pharmacologic therapy Z79.899 V58.69       Strengths and Liabilities: Strength: Patient accepts  guidance/feedback, Liability: Patient lacks coping skills.    Treatment Goals:  Specify outcomes written in observable, behavioral terms:   Safety: Will call 911 or Crisis Line or go to ER for suicidal ideation, adverse effects of medication or any other emergency  Anxiety: reducing negative automatic thoughts, reducing physical symptoms of anxiety and reducing time spent worrying (<30 minutes/day)  Depression: increasing energy, increasing interest in usual activities, increasing motivation and reducing negative automatic thoughts   Insomnia: Will get a minimum of 7 restful hours of sleep a night  Psychosis: Will no longer have psychosis.    Treatment Plan/Recommendations:   · Medication Management: The risks and benefits of medication were discussed with the patient.  · The treatment plan and follow up plan were reviewed with the patient.   1. Safety: Call 911 or Crisis Line or go to ER for suicidal ideation, adverse effects of medication or any other emergency  2. Taper off caffeine (ice tea) slowly.  3. Continue Prozac 20 mg po qd.  4. Continue Seroquel 50 mg po qhs prn sleep.  5. D/C Klonopin.  6. D/C Ambien.  7. Labs: CMP, CBC, Lipid profile, TSH, Free T3, Free T4, Hemoglobin A1C, Prolactin level, Urine Drug Screening.  8. RTC in one month or sooner prn.     Patient agrees with POC.    INSTRUCTIONS  Instructed to call 911 or Crisis Line or go to ER for suicidal ideation, adverse effects of medication or any other emergency. Verbalizes understanding and plan to comply.    Instructed that tea is high in caffeine and contributes greatly to anxiety and should taper off. Verbalizes understanding and plan to comply.    Instructed on uses, effects, side effects, adverse reactions and benefits vs risks of Prozac with emphasis on risks for suicidality, alba, serotonin syndrome and delay in feeling effects of medication and instructed on when to seek 911/ER. Verbalizes understanding and plans to comply.    Instructed  on uses, effects, side effects, adverse reactions and benefits vs risks of Seroquel with emphasis on risks for NMS, movement problems (EPS), increase in appetite, and risk for metabolic syndrome and instructed on when to seek 911/ER. Verbalizes understanding and plans to comply    Return to Clinic: 1 month, as needed    Counseling time: 35 minutes  Total time: 65 minutes    Consulting clinician was informed of the encounter and consult note.

## 2019-08-08 NOTE — PATIENT INSTRUCTIONS
[facility] - DEPARTMENT OF PSYCHIATRY   NEW PATIENT ORIENTATION INFORMATION  OUTPATIENT SERVICES COUNSELING CONTRACT    We appreciate the opportunity to participate in your medical care and hope the following protocols will make it easier for you to receive quality treatment in our department.    1. PUNCTUALITY: Your appointment is scheduled for a fixed amount of time reserved especially for you.  To get the benefit of your appointment, please arrive early enough to allow time for parking and registration.  If you are late for your appointment, your clinician is not able to offer additional time.  Please make every effort to be on time.      2. PAYMENT FOR SERVICES:   Payments are expected at the time of service.  Please contact [phone] if you need to resolve issues involving your account at [facility] or to set up a payment plan.    3. CANCELLATION / MISSED APPOINTMENTS:   In order to receive quality care, all appointments must be kept.  Appointment may be cancelled, ONLY by talking with an  at phone number [phone], between 8:00 a.m. and 5:00 p.m., Monday through Friday, at least 24 hours before your appointment time.  Your clinician reserves this time specifically for you, and if you will be unable to use it, it is necessary that you cancel in a timely manner.  If you do not give at least 24-hour notice of cancellation a full fee will be assessed.  Please note that insurance does not cover no-show charges, so you will be billed directly.  If you are consistently late, cancel, or do not show for your appointments, our department reserves the right to terminate treatment    4. CALLING THE DEPARTMENT:   MESSAGES, SCHEDULE OR CANCEL APPOINTMENTS- In general you can reach the department by calling [phone], between 8:00 a.m. and 5:00 p.m., Monday through Friday, to schedule or cancel appointments or leave a message for your clinician.  It is advisable to schedule your visits  far in advance to obtain the most convenient times for your appointments.   AFTER HOURS, WEEKEND OR HOLIDAYS- For urgent questions after hours, weekends and holidays, calling the department number [phone] will connect you to the nursing staff on the Psychiatry Inpatient Unit.  The nursing staff will speak with you and contact the On Call psychiatrist if necessary.   EMERGENCY-  In case of a crisis when there is a concern of harm to self or others, call 911 or the office [phone] between 8:00 a.m. and 5:00 p.m., Monday through Friday.  After hours, weekends or holidays, please call 911 or go to the Emergency Department where you can be thoroughly evaluated by the On Call psychiatrist.  If possible, contact the psychiatrist on call at [phone] prior to leaving for the [facility] Emergency Department.    5. TEAM APPROACH:  Most patients receive therapy through our team system.  In the team system, your primary therapist will be a , psychologist, psychiatry resident or psychiatrist.  If your therapist is a , psychologist or psychiatry resident, please contact your primary therapist first in matters other than medications or acute medical problems.    6. PRESCRIPTION REFILLS:  Prescription refills must be done at your physician office visit.  You will be given a sufficient number of refills to last one extra month beyond your next appointment.  No additional refills will be approved beyond the original treatment plan.  After hours, sufficient medication may be approved to last until the next scheduled appointment. After hours requests for refills on controlled substances may be declined by the psychiatrist on call, as he or she may not be familiar with your case  Please work closely with your doctor so that you have sufficient medication until your next appointment.  Again, please note that no additional prescriptions will be approved per patient request over the phone.   No  "additional refills will be approved beyond the original treatment plan.   In certain exceptional situations, a phone consult appointment may be arranged, with appropriate charge, for the review and approval of prescription refills to last until the next scheduled appointment.    7. FOLLOW UP APPOINTMENTS:  Follow-up appointments can be made in person at the Psychiatry Appointment Desk, [place] [address], or by calling [phone], from 8am to 5pm, between Monday and Friday.  It is advisable to schedule your office visits far enough in advance to obtain the most convenient times for your appointments.    Revised Feb. 23, 2010 - F/OA1/Newpatient    You have been provided with a certain amount of medication with a specified number of refills.  Please follow up within an adequate time before you run out of medications.    REFILLS FOR CONTROLLED SUBSTANCES WILL NOT BE GIVEN WITHOUT AN APPOINTMENT.  I will not honor or fill automated refill requests from pharmacies.  You must come in for an appointment to get refills.    Please book your next appointment for myself or therapist by phone by calling our office at [phone].      PLEASE BE AT LEAST 15 MINUTES EARLY FOR YOUR NEXT APPOINTMENT.  PLEASE, DO NOT BE LATE OR YOU WILL BE TURNED AWAY AND ASKED TO RESCHEDULE.  YOU MUST COME EARLY TO ALLOW TIME FOR CHECK-IN AS WELL AS GET YOUR VITAL SIGNS AND GO OVER YOUR MEDICATIONS.  T[employer]iness is not fair to the patients who present after you and are on time for their appointments.  It causes a delay in the appointments for patients and staff.  IF YOU ARE LATE, THERE IS A POSSIBILITY THAT YOU WILL BE CHARGED FOR THE APPOINTMENT TIME PERSONALLY AND IT WILL NOT GO TO YOUR INSURANCE.  YOU MAY ALSO BE DISCHARGED FROM CLINIC with multiple "No Show" appointments.  -----------------------------------------------------------------------------------------------------------------  IF YOU FEEL SUICIDAL OR HAVING THOUGHTS OR PLANS TO HURT " YOURSELF OR OTHERS, CALL 911 OR REPORT TO THE NEAREST EMERGENCY ROOM.  YOU CAN ALSO ACCESS THE FOLLOWING HOTLINE(S):    National Suicide Hotline Number 8-172-187-TALK (7000)     ([facility] Mobile Crisis, 884.897.7679'   [facility], [phone]) 956-9826; [place], 24 hours / 7 days, (793) 634-DVNO (4532), 1-909-355-MWHT (1418))     TIPS FOR GETTING YOUR PRESCRIPTIONS:    You can always ask your pharmacist the cost of your medications without the use of your insurance. Sometimes the medication will be cheaper if you do not use your insurance.     If you decide you want to have your prescriptions filled at a different pharmacy, you can always go to the new pharmacy of your choice and have them call the pharmacy where your prescription was sent and they can have your prescription transferred to the new pharmacy.

## 2019-08-26 ENCOUNTER — OFFICE VISIT (OUTPATIENT)
Dept: FAMILY MEDICINE | Facility: CLINIC | Age: 54
End: 2019-08-26
Payer: COMMERCIAL

## 2019-08-26 ENCOUNTER — TELEPHONE (OUTPATIENT)
Dept: FAMILY MEDICINE | Facility: CLINIC | Age: 54
End: 2019-08-26

## 2019-08-26 VITALS
TEMPERATURE: 98 F | DIASTOLIC BLOOD PRESSURE: 84 MMHG | OXYGEN SATURATION: 97 % | HEART RATE: 90 BPM | SYSTOLIC BLOOD PRESSURE: 140 MMHG
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
## 2019-08-26 DIAGNOSIS — M79.671 RIGHT FOOT PAIN: Primary | ICD-10-CM

## 2019-08-26 PROCEDURE — 99214 PR OFFICE/OUTPT VISIT, EST, LEVL IV, 30-39 MIN: ICD-10-PCS | Mod: S$GLB,,, | Performed by: FAMILY MEDICINE

## 2019-08-26 PROCEDURE — 99214 OFFICE O/P EST MOD 30 MIN: CPT | Mod: S$GLB,,, | Performed by: FAMILY MEDICINE

## 2019-08-26 PROCEDURE — 99999 PR PBB SHADOW E&M-EST. PATIENT-LVL III: CPT | Mod: PBBFAC,,, | Performed by: FAMILY MEDICINE

## 2019-08-26 PROCEDURE — 99999 PR PBB SHADOW E&M-EST. PATIENT-LVL III: ICD-10-PCS | Mod: PBBFAC,,, | Performed by: FAMILY MEDICINE

## 2019-08-26 NOTE — TELEPHONE ENCOUNTER
----- Message from Dian bobmartin sent at 8/26/2019  4:33 PM CDT -----  Type:  Patient Returning Call    Who Called: pt     Who Left Message for Patient: unknown    Does the patient know what this is regarding?: xray results     Would the patient rather a call back or a response via [facility]? Call back     Best Call Back Number: [phone]    Additional Information:

## 2019-08-26 NOTE — LETTER
August 26, 2019      [facility]  [address], [address]  [place] [address]  Phone: [phone]       Patient: Naomy Armstrong   YOB: 1965  Date of Visit: 08/26/2019    To Whom It May Concern:    Ronald Armstrong  was at [facility] on 08/26/2019. She may return to work/school on 08/27/2019. If you have any questions or concerns, or if I can be of further assistance, please do not hesitate to contact me.        Sincerely,      Eric Hernandes Jr., MD

## 2019-08-26 NOTE — PROGRESS NOTES
Routine Office Visit    Patient Name: Naomy Armstrong    : 1965  MRN: [id number]    Subjective:  Naomy is a 54 y.o. female who presents today for:   Chief Complaint   Patient presents with    hurt right foot     54-year-old female comes in for evaluation of right foot pain. She states that yesterday, home, she fell down her steps.  She was walking down the steps and slipped forward.  Since then, she has been pain with bearing weight on her right foot.  She states that she has pain on the top outer part of her right foot.  She reports mild swelling.  She reports no redness.  She states that there was no bleeding.  She did not hear breaking sound.  She does report previous ankle fracture same side.    Past Medical History  Past Medical History:   Diagnosis Date    Alcohol abuse     per chart, but patient denies today and cocaine per chart    Anxiety     Depression     Hallucination     last couple of months started seeing someone standing in her room, saw boyfriend's reflection in glass    Headache     History of psychiatric hospitalization     age 13 [facility] for 2 months for acting out; 2018 for SI    Hx of psychiatric care     Hypertension     Psychiatric problem     Sleep difficulties     Suicide attempt     with knife by cutting stomach    Therapy        Past Surgical History  Past Surgical History:   Procedure Laterality Date    BLOCK, NERVE, FACET JOINT, CERVICAL, MEDIAL BRANCH Left 9/10/2013    Per[facility]    INJECTION, STEROID, SPINE, CERVICAL, EPIDURAL N/A 2013    [facility] at [facility]    RADIOFREQUENCY THERMAL COAGULATION, NERVE, SPINAL, CERVICAL, POSTERIOR RAMUS, MEDIAL BRANCH Left 10/8/2013    Performed by [facility] at [facility]        Family History  Family History   Problem Relation Age of Onset    Anxiety disorder Sister     Depression Sister     Alcohol abuse Maternal Uncle      Alcohol abuse Maternal Grandfather        Social History  Social History     Socioeconomic History    Marital status:      Spouse name: Not on file    Number of children: 0    Years of education: Not on file    Highest education level: Not on file   Occupational History    Occupation:      Employer: [employer]   Social Needs    Financial resource strain: Not on file    Food insecurity:     Worry: Not on file     Inability: Not on file    Transportation needs:     Medical: Not on file     Non-medical: Not on file   Tobacco Use    Smoking status: Former Smoker     Last attempt to quit: 2006     Years since quittin.3    Smokeless tobacco: Never Used   Substance and Sexual Activity    Alcohol use: Yes     Comment: occasional    Drug use: Not Currently     Types: Marijuana     Comment: tried at age 14    Sexual activity: Yes     Partners: Male     Comment: going through menopause   Lifestyle    Physical activity:     Days per week: Not on file     Minutes per session: Not on file    Stress: Not on file   Relationships    Social connections:     Talks on phone: Not on file     Gets together: Not on file     Attends [employer]ism service: Not on file     Active member of club or organization: Not on file     Attends meetings of clubs or organizations: Not on file     Relationship status: Not on file   Other Topics Concern    Patient feels they ought to cut down on drinking/drug use No    Patient annoyed by others criticizing their drinking/drug use No    Patient has felt bad or guilty about drinking/drug use No    Patient has had a drink/used drugs as an eye opener in the AM No   Social History Narrative     x 2.    Lives with boyfriend.    Works at [employer] as an .    Presently working on Bachelor's degree.    Has associates degree in criminal justice.       Current Medications  Current Outpatient Medications on File Prior to Visit   Medication Sig  Dispense Refill    amLODIPine (NORVASC) 5 MG tablet Take 5 mg by mouth once daily.      aspirin-acetaminophen-caffeine 250-250-65 mg (EXCEDRIN MIGRAINE) 250-250-65 mg per tablet Take 1 tablet by mouth every 6 (six) hours as needed for Pain.      dicyclomine (BENTYL) 20 mg tablet Take 20 mg by mouth every 6 (six) hours.      ergocalciferol (ERGOCALCIFEROL) 50,000 unit Cap Take 50,000 Units by mouth.      FLUoxetine 20 MG capsule Take 20 mg by mouth once daily.      HYDROcodone-acetaminophen (NORCO) 7.5-325 mg per tablet Take 1 tablet by mouth every 6 (six) hours as needed for Pain.      lisinopril (PRINIVIL,ZESTRIL) 20 MG tablet Take 20 mg by mouth once daily.      loperamide (IMODIUM) 2 mg capsule Take 2 mg by mouth 4 (four) times daily as needed for Diarrhea.      multivitamin (ONE DAILY MULTIVITAMIN) per tablet Take 1 tablet by mouth once daily.      naproxen (NAPROSYN) 500 MG tablet   1    naproxen-esomeprazole (VIMOVO) 500-20 mg TbID Take by mouth.      nitrofurantoin, macrocrystal-monohydrate, (MACROBID) 100 MG capsule Take 100 mg by mouth as needed.      omeprazole (PRILOSEC) 20 MG capsule Take 20 mg by mouth once daily.       ondansetron (ZOFRAN) 4 MG tablet       promethazine (PHENERGAN) 25 MG tablet   1    QUEtiapine (SEROQUEL) 50 MG tablet Take 50 mg by mouth every evening.      simethicone (MYLICON) 125 MG chewable tablet Take 125 mg by mouth.      tiZANidine (ZANAFLEX) 4 MG tablet   1    topiramate (TROKENDI XR) 50 mg Cp24 Take 50 mg by mouth daily as needed.       No current facility-administered medications on file prior to visit.        Allergies   Review of patient's allergies indicates:   Allergen Reactions    Effexor [venlafaxine]      Elevated her blood pressure    Zoloft [sertraline]     Paroxetine hcl      Made her feel drunk     Review of Systems   Respiratory: Negative for shortness of breath.    Cardiovascular: Negative for chest pain.   Musculoskeletal: Positive for  arthralgias and joint swelling.   Skin: Negative for color change and wound.       BP (!) 140/84 (BP Location: Right arm)   Pulse 90   Temp 98.4 °F (36.9 °C)   SpO2 97%     Physical Exam   Constitutional: She appears well-developed. No distress.   HENT:   Head: Normocephalic and atraumatic.   Eyes: Pupils are equal, round, and reactive to light. EOM are normal.   Musculoskeletal:        Feet:    Vitals reviewed.        Assessment/Plan:  Naomy was seen today for hurt right foot.    Diagnoses and all orders for this visit:    Right foot pain  -     X-Ray Foot Complete Right; Future    Fall down stairs, initial encounter  -     X-Ray Foot Complete Right; Future      Will get a foot x-ray based on Seeley foot rules- patient with difficulty ambulating, and pain at the base the fifth metatarsal.  Will call patient with results.  Advised patient to ice the area multiple times a day.          -Eric Hernandes Jr., MD, AAHIVS          This office note has been dictated.  This dictation has been generated using M-Modal Fluency Direct dictation; some phonetic errors may occur.

## 2019-09-01 ENCOUNTER — HOSPITAL ENCOUNTER (EMERGENCY)
Facility: HOSPITAL | Age: 54
Discharge: PSYCHIATRIC OR SUBSTANCE USE FACILITY | End: 2019-09-02
Attending: EMERGENCY MEDICINE
Payer: COMMERCIAL

## 2019-09-01 DIAGNOSIS — T50.901A DRUG OVERDOSE: Primary | ICD-10-CM

## 2019-09-01 DIAGNOSIS — T43.501A: ICD-10-CM

## 2019-09-01 DIAGNOSIS — R55 SYNCOPE: ICD-10-CM

## 2019-09-01 DIAGNOSIS — F10.920 ALCOHOLIC INTOXICATION WITHOUT COMPLICATION: ICD-10-CM

## 2019-09-01 LAB
ALBUMIN SERPL BCP-MCNC: 3.7 G/DL (ref 3.5–5.2)
ALP SERPL-CCNC: 122 U/L (ref 55–135)
ALT SERPL W/O P-5'-P-CCNC: 17 U/L (ref 10–44)
AMPHET+METHAMPHET UR QL: NORMAL
ANION GAP SERPL CALC-SCNC: 13 MMOL/L (ref 8–16)
APAP SERPL-MCNC: <3 UG/ML (ref 10–20)
AST SERPL-CCNC: 21 U/L (ref 10–40)
B-HCG UR QL: NEGATIVE
BARBITURATES UR QL SCN>200 NG/ML: NEGATIVE
BASOPHILS # BLD AUTO: 0.03 K/UL (ref 0–0.2)
BASOPHILS NFR BLD: 0.6 % (ref 0–1.9)
BENZODIAZ UR QL SCN>200 NG/ML: NEGATIVE
BILIRUB SERPL-MCNC: 0.3 MG/DL (ref 0.1–1)
BILIRUB UR QL STRIP: NEGATIVE
BUN SERPL-MCNC: 20 MG/DL (ref 6–20)
BZE UR QL SCN: NEGATIVE
CALCIUM SERPL-MCNC: 9.1 MG/DL (ref 8.7–10.5)
CANNABINOIDS UR QL SCN: NEGATIVE
CHLORIDE SERPL-SCNC: 108 MMOL/L (ref 95–110)
CLARITY UR: CLEAR
CO2 SERPL-SCNC: 20 MMOL/L (ref 23–29)
COLOR UR: COLORLESS
CREAT SERPL-MCNC: 1.2 MG/DL (ref 0.5–1.4)
CREAT UR-MCNC: 22.8 MG/DL (ref 15–325)
CTP QC/QA: YES
DIFFERENTIAL METHOD: ABNORMAL
EOSINOPHIL # BLD AUTO: 0.1 K/UL (ref 0–0.5)
EOSINOPHIL NFR BLD: 1.3 % (ref 0–8)
ERYTHROCYTE [DISTWIDTH] IN BLOOD BY AUTOMATED COUNT: 16 % (ref 11.5–14.5)
EST. GFR  (AFRICAN AMERICAN): 58 ML/MIN/1.73 M^2
EST. GFR  (NON AFRICAN AMERICAN): 51 ML/MIN/1.73 M^2
ETHANOL SERPL-MCNC: 163 MG/DL
ETHANOL SERPL-MCNC: <10 MG/DL
GLUCOSE SERPL-MCNC: 103 MG/DL (ref 70–110)
GLUCOSE UR QL STRIP: NEGATIVE
HCT VFR BLD AUTO: 35.9 % (ref 37–48.5)
HGB BLD-MCNC: 11.1 G/DL (ref 12–16)
HGB UR QL STRIP: ABNORMAL
KETONES UR QL STRIP: NEGATIVE
LEUKOCYTE ESTERASE UR QL STRIP: NEGATIVE
LITHIUM SERPL-SCNC: <0.1 MMOL/L (ref 0.6–1.2)
LYMPHOCYTES # BLD AUTO: 1.6 K/UL (ref 1–4.8)
LYMPHOCYTES NFR BLD: 28.6 % (ref 18–48)
MAGNESIUM SERPL-MCNC: 2.2 MG/DL (ref 1.6–2.6)
MCH RBC QN AUTO: 25.7 PG (ref 27–31)
MCHC RBC AUTO-ENTMCNC: 30.9 G/DL (ref 32–36)
MCV RBC AUTO: 83 FL (ref 82–98)
METHADONE UR QL SCN>300 NG/ML: NEGATIVE
MICROSCOPIC COMMENT: NORMAL
MONOCYTES # BLD AUTO: 0.3 K/UL (ref 0.3–1)
MONOCYTES NFR BLD: 6.1 % (ref 4–15)
NEUTROPHILS # BLD AUTO: 3.5 K/UL (ref 1.8–7.7)
NEUTROPHILS NFR BLD: 63.4 % (ref 38–73)
NITRITE UR QL STRIP: NEGATIVE
OPIATES UR QL SCN: NEGATIVE
PCP UR QL SCN>25 NG/ML: NEGATIVE
PH UR STRIP: 5 [PH] (ref 5–8)
PLATELET # BLD AUTO: 314 K/UL (ref 150–350)
PMV BLD AUTO: 9.3 FL (ref 9.2–12.9)
POCT GLUCOSE: 106 MG/DL (ref 70–110)
POTASSIUM SERPL-SCNC: 3.4 MMOL/L (ref 3.5–5.1)
PROT SERPL-MCNC: 6.6 G/DL (ref 6–8.4)
PROT UR QL STRIP: NEGATIVE
RBC # BLD AUTO: 4.32 M/UL (ref 4–5.4)
RBC #/AREA URNS HPF: 2 /HPF (ref 0–4)
SALICYLATES SERPL-MCNC: <5 MG/DL (ref 15–30)
SODIUM SERPL-SCNC: 141 MMOL/L (ref 136–145)
SP GR UR STRIP: 1 (ref 1–1.03)
SQUAMOUS #/AREA URNS HPF: 2 /HPF
TOXICOLOGY INFORMATION: NORMAL
TSH SERPL DL<=0.005 MIU/L-ACNC: 1.22 UIU/ML (ref 0.4–4)
URN SPEC COLLECT METH UR: ABNORMAL
UROBILINOGEN UR STRIP-ACNC: NEGATIVE EU/DL
VALPROATE SERPL-MCNC: <12.5 UG/ML (ref 50–100)
WBC # BLD AUTO: 5.45 K/UL (ref 3.9–12.7)

## 2019-09-01 PROCEDURE — 83735 ASSAY OF MAGNESIUM: CPT

## 2019-09-01 PROCEDURE — 96360 HYDRATION IV INFUSION INIT: CPT

## 2019-09-01 PROCEDURE — 80178 ASSAY OF LITHIUM: CPT

## 2019-09-01 PROCEDURE — 93010 EKG 12-LEAD: ICD-10-PCS | Mod: 76,,, | Performed by: INTERNAL MEDICINE

## 2019-09-01 PROCEDURE — 93005 ELECTROCARDIOGRAM TRACING: CPT

## 2019-09-01 PROCEDURE — 80164 ASSAY DIPROPYLACETIC ACD TOT: CPT

## 2019-09-01 PROCEDURE — 96361 HYDRATE IV INFUSION ADD-ON: CPT

## 2019-09-01 PROCEDURE — 84443 ASSAY THYROID STIM HORMONE: CPT

## 2019-09-01 PROCEDURE — 25000003 PHARM REV CODE 250: Performed by: EMERGENCY MEDICINE

## 2019-09-01 PROCEDURE — 85025 COMPLETE CBC W/AUTO DIFF WBC: CPT

## 2019-09-01 PROCEDURE — 81000 URINALYSIS NONAUTO W/SCOPE: CPT | Mod: 59

## 2019-09-01 PROCEDURE — 80320 DRUG SCREEN QUANTALCOHOLS: CPT | Mod: 91

## 2019-09-01 PROCEDURE — 81025 URINE PREGNANCY TEST: CPT | Performed by: EMERGENCY MEDICINE

## 2019-09-01 PROCEDURE — 80307 DRUG TEST PRSMV CHEM ANLYZR: CPT

## 2019-09-01 PROCEDURE — 80329 ANALGESICS NON-OPIOID 1 OR 2: CPT

## 2019-09-01 PROCEDURE — 80320 DRUG SCREEN QUANTALCOHOLS: CPT

## 2019-09-01 PROCEDURE — 80053 COMPREHEN METABOLIC PANEL: CPT

## 2019-09-01 PROCEDURE — 80337 TRICYCLIC & CYCLICALS 6/MORE: CPT

## 2019-09-01 PROCEDURE — 99285 EMERGENCY DEPT VISIT HI MDM: CPT | Mod: 25

## 2019-09-01 PROCEDURE — 82962 GLUCOSE BLOOD TEST: CPT

## 2019-09-01 PROCEDURE — 93010 ELECTROCARDIOGRAM REPORT: CPT | Mod: 76,,, | Performed by: INTERNAL MEDICINE

## 2019-09-01 PROCEDURE — 63600175 PHARM REV CODE 636 W HCPCS: Performed by: EMERGENCY MEDICINE

## 2019-09-01 RX ORDER — POTASSIUM CHLORIDE 20 MEQ/15ML
20 SOLUTION ORAL
Status: COMPLETED | OUTPATIENT
Start: 2019-09-01 | End: 2019-09-01

## 2019-09-01 RX ADMIN — SODIUM CHLORIDE 1000 ML: 0.9 INJECTION, SOLUTION INTRAVENOUS at 03:09

## 2019-09-01 RX ADMIN — POTASSIUM CHLORIDE 20 MEQ: 20 SOLUTION ORAL at 07:09

## 2019-09-01 NOTE — ED TRIAGE NOTES
"Pt arrived to the ED via EMS from home with c/o overdose. Per EMS staff, pt took 15-20 Seroquel and drank alcohol. On admit to ED bed, pt drowsy, awake and oriented x3. Pt states "I was tired" when asked reason for taking amount of medication. When ask if experiencing SI, pt states " I'm tired". Denies HI. Pt placed on cardiac monitor, pulse ox and BP cuff. /55, all other VSS. Denies previous SI attempt.  "

## 2019-09-01 NOTE — ED PROVIDER NOTES
"Encounter Date: 9/1/2019    SCRIBE #1 NOTE: I, Sheridan Arechiga, am scribing for, and in the presence of,  Sheridan Ramirez MD. I have scribed the following portions of the note - Other sections scribed: HPI, ROS, PE.       History     Chief Complaint   Patient presents with    Drug Overdose     Pt took 20-15, 50 mg seroquel with ETOH use. On scene pt hypotensive with systolic pressure of 70. On arrival she is awake and alert. Pt non responsive when questioned about suicidal ideation     CC: Drug overdose    HPI: This is a 54 y.o. female with a PMHx of HTN, anxiety, depression, and a suicide attempt who presents to the Emergency Department via EMS s/p drug overdose today. Patient was found by her boyfriend after taking 15- 20 mg of Seroquel with alcohol. When questioned about her motive she states she "is tired" and "fights everyday to get nowhere." Patient is a former smoker, drinks alcohol occasionally, and does not do drugs.           Review of patient's allergies indicates:   Allergen Reactions    Effexor [venlafaxine]      Elevated her blood pressure    Zoloft [sertraline]     Paroxetine hcl      Made her feel drunk     Past Medical History:   Diagnosis Date    Alcohol abuse     per chart, but patient denies today and cocaine per chart    Anxiety     Depression     Hallucination     last couple of months started seeing someone standing in her room, saw boyfriend's reflection in glass    Headache     History of psychiatric hospitalization     age 13 [facility] for 2 months for acting out; 2018 for SI    Hx of psychiatric care     Hypertension     Psychiatric problem     Sleep difficulties     Suicide attempt     with knife by cutting stomach    Therapy      Past Surgical History:   Procedure Laterality Date    BLOCK, NERVE, FACET JOINT, CERVICAL, MEDIAL BRANCH Left 9/10/2013    [facility]    INJECTION, STEROID, SPINE, CERVICAL, EPIDURAL N/A 8/13/2013    " Performed by [facility] at [facility]    RADIOFREQUENCY THERMAL COAGULATION, NERVE, SPINAL, CERVICAL, POSTERIOR RAMUS, MEDIAL BRANCH Left 10/8/2013    Performed by [facility] at [facility]     Family History   Problem Relation Age of Onset    Anxiety disorder Sister     Depression Sister     Alcohol abuse Maternal Uncle     Alcohol abuse Maternal Grandfather      Social History     Tobacco Use    Smoking status: Former Smoker     Last attempt to quit: 2006     Years since quittin.3    Smokeless tobacco: Never Used   Substance Use Topics    Alcohol use: Yes     Comment: occasional    Drug use: Not Currently     Types: Marijuana     Comment: tried at age 14     Review of Systems   Constitutional: Negative for fever.   HENT: Negative for sore throat.    Respiratory: Negative for shortness of breath.    Cardiovascular: Negative for chest pain.   Gastrointestinal: Negative for nausea.   Genitourinary: Negative for dysuria.   Musculoskeletal: Negative for back pain.   Skin: Negative for rash.   Neurological: Negative for weakness.   Hematological: Does not bruise/bleed easily.   Psychiatric/Behavioral: Positive for suicidal ideas.       Physical Exam     Initial Vitals [19 1427]   BP Pulse Resp Temp SpO2   (!) 89/54 85 15 -- 99 %      MAP       --         Physical Exam    Nursing note and vitals reviewed.  Constitutional: She appears well-developed.   HENT:   Head: Normocephalic.   Eyes: Conjunctivae are normal. No scleral icterus.   Neck: Normal range of motion. Neck supple.   Cardiovascular: Normal heart sounds.   No murmur heard.  Pulmonary/Chest: Breath sounds normal. No respiratory distress.   Abdominal: Soft. She exhibits no distension.   Musculoskeletal: Normal range of motion. She exhibits no edema.   Skin: Skin is warm and dry. Capillary refill takes less than 2 seconds.   Psychiatric:   Patient is depressed and slow. She arouses to voice. She is sleepy but  easy to stimulate.         ED Course   Procedures  Labs Reviewed - No data to display  EKG Readings: (Independently Interpreted)   14:35- Normal sinus rhythm, HR 89, QTC 84, no ST elevation or depression, normal MS interval, normal qrs morphology w/ good progression    15:25- Normal sinus rhythm, HR 80, , normal MS interval, normal qrs morphology w/ good progression    16:36- Normal sinus rhythm, ventricular rate 83, , normal MS interval, normal qrs morphology w/ good progression    18:12- Normal sinus rhythm, , normal MS interval, normal qrs morphology w/ good progression             Imaging Results    None         54-year-old female with a previous psychiatric history and hypertension presents with suicide attempt.  Patient is hesitant to talk and answer questions but states over and over that she is too tired.  On further probing she says that she is tired of life and she works really hard to get a head but can't seem to do so.  Differential includes intentional versus accidental overdose versus polypharmacy versus intoxication with alcohol and/or other.  She does not have any head trauma and does not state that she fell at any point in time.  She was found sitting up.  Patient was PEC'd and labs were sent per usual PEC protocol.  Labs were sent and patient was continued on the monitor with serial EKGs to watch her QTC.  As the day has gone on the QTC peaked at 495 and has been coming down slowly.  Last EKG had a QTC of 465.  Patient is waking up appropriately.  Her labs are reassuring.  I spoke with poison Control who recommended her Mag as well. Mag has been ordered and pending.  Will continue to monitor as we are waiting for the final labs to result  .PATIENCE Ramirez MD  6:59 PM        patient was able to sit up and drink a cup of orange juice with potassium in it.  She is still sleepy but able to arouse easily.  At this time I think she is medically cleared for transfer to a psychiatric  facility where she can continue her treatment.  PATIENCE Ramirez MD  8:13 PM              Scribe Attestation:   Scribe #1: I performed the above scribed service and the documentation accurately describes the services I performed. I attest to the accuracy of the note.               Clinical Impression:   No diagnosis found.           I, Sheridan Ramirez, personally performed the services described in this documentation. All medical record entries made by the scribe were at my direction and in my presence.  I have reviewed the chart and agree that the record reflects my personal performance and is accurate and complete.                    Sheridan Ramirez MD  09/01/19 2018

## 2019-09-02 VITALS
RESPIRATION RATE: 39 BRPM | BODY MASS INDEX: 27.31 KG/M2 | WEIGHT: 160 LBS | HEART RATE: 83 BPM | SYSTOLIC BLOOD PRESSURE: 139 MMHG | HEIGHT: 64 IN | TEMPERATURE: 98 F | DIASTOLIC BLOOD PRESSURE: 64 MMHG | OXYGEN SATURATION: 100 %

## 2019-09-02 NOTE — ED NOTES
Past Medical History:   Diagnosis Date    Alcohol abuse     per chart, but patient denies today and cocaine per chart    Anxiety     Depression     Hallucination     last couple of months started seeing someone standing in her room, saw boyfriend's reflection in glass    Headache     History of psychiatric hospitalization     age 13 [facility] for 2 months for acting out; 2018 for SI    Hx of psychiatric care     Hypertension     Psychiatric problem     Sleep difficulties     Suicide attempt     with knife by cutting stomach    Therapy

## 2019-09-02 NOTE — NURSING
Pt is aaox4 non labored breathing with physician at the bedside with iv to her left ac sl physical assessment done and documented with cm , c po in place, lung cta.  Pt denies pain, or discomfort at this time.  Pt report obtained from Johanny KEM[facility] .

## 2019-09-05 LAB
AMITRIP SERPL-MCNC: <10 NG/ML
AMITRIP+NOR SERPL-MCNC: ABNORMAL NG/ML (ref 95–250)
CLOMIPRAMINE SERPL-MCNC: <20 NG/ML
CLOMIPRAMINE+NOR SERPL-MCNC: ABNORMAL NG/ML (ref 220–500)
DESIPRAMINE SERPL-MCNC: <10 NG/ML (ref 100–300)
DOXEPIN SERPL-MCNC: <10 NG/ML
DOXEPIN+NOR SERPL-MCNC: ABNORMAL NG/ML (ref 100–300)
IMIPRAMINE SERPL-MCNC: <10 NG/ML
IMIPRAMINE+DESIPR SERPL-MCNC: ABNORMAL NG/ML (ref 150–300)
NORCLOMIPRAMINE SERPL-MCNC: <20 NG/ML
NORDOXEPIN SERPL-MCNC: <10 NG/ML
NORTRIP SERPL-MCNC: <10 NG/ML (ref 50–150)
PROTRIP SERPL-MCNC: <10 NG/ML (ref 70–240)

## 2019-09-06 ENCOUNTER — LAB VISIT (OUTPATIENT)
Dept: LAB | Facility: HOSPITAL | Age: 54
End: 2019-09-06
Attending: NURSE PRACTITIONER
Payer: COMMERCIAL

## 2019-09-06 ENCOUNTER — OFFICE VISIT (OUTPATIENT)
Dept: PSYCHIATRY | Facility: CLINIC | Age: 54
End: 2019-09-06
Payer: COMMERCIAL

## 2019-09-06 VITALS
DIASTOLIC BLOOD PRESSURE: 70 MMHG | WEIGHT: 163.13 LBS | HEIGHT: 64 IN | HEART RATE: 74 BPM | BODY MASS INDEX: 27.85 KG/M2 | SYSTOLIC BLOOD PRESSURE: 116 MMHG

## 2019-09-06 DIAGNOSIS — F51.05 MOOD INSOMNIA: ICD-10-CM

## 2019-09-06 DIAGNOSIS — F39 MOOD INSOMNIA: ICD-10-CM

## 2019-09-06 DIAGNOSIS — F19.10 POLYSUBSTANCE ABUSE: ICD-10-CM

## 2019-09-06 DIAGNOSIS — F33.1 MAJOR DEPRESSIVE DISORDER, RECURRENT EPISODE, MODERATE WITH ANXIOUS DISTRESS: ICD-10-CM

## 2019-09-06 DIAGNOSIS — F33.1 MAJOR DEPRESSIVE DISORDER, RECURRENT EPISODE, MODERATE WITH ANXIOUS DISTRESS: Primary | ICD-10-CM

## 2019-09-06 LAB
AMPHET+METHAMPHET UR QL: NEGATIVE
BARBITURATES UR QL SCN>200 NG/ML: NEGATIVE
BENZODIAZ UR QL SCN>200 NG/ML: NEGATIVE
BZE UR QL SCN: NEGATIVE
CANNABINOIDS UR QL SCN: NEGATIVE
CREAT UR-MCNC: 60 MG/DL (ref 15–325)
ETHANOL SERPL-MCNC: <10 MG/DL
ETHANOL UR-MCNC: <10 MG/DL
METHADONE UR QL SCN>300 NG/ML: NEGATIVE
OPIATES UR QL SCN: NEGATIVE
PCP UR QL SCN>25 NG/ML: NEGATIVE
TOXICOLOGY INFORMATION: NORMAL

## 2019-09-06 PROCEDURE — 36415 COLL VENOUS BLD VENIPUNCTURE: CPT

## 2019-09-06 PROCEDURE — 80320 DRUG SCREEN QUANTALCOHOLS: CPT

## 2019-09-06 PROCEDURE — 99214 OFFICE O/P EST MOD 30 MIN: CPT | Mod: S$GLB,,, | Performed by: NURSE PRACTITIONER

## 2019-09-06 PROCEDURE — 99999 PR PBB SHADOW E&M-EST. PATIENT-LVL III: ICD-10-PCS | Mod: PBBFAC,,, | Performed by: NURSE PRACTITIONER

## 2019-09-06 PROCEDURE — 99214 PR OFFICE/OUTPT VISIT, EST, LEVL IV, 30-39 MIN: ICD-10-PCS | Mod: S$GLB,,, | Performed by: NURSE PRACTITIONER

## 2019-09-06 PROCEDURE — 99999 PR PBB SHADOW E&M-EST. PATIENT-LVL III: CPT | Mod: PBBFAC,,, | Performed by: NURSE PRACTITIONER

## 2019-09-06 PROCEDURE — 80307 DRUG TEST PRSMV CHEM ANLYZR: CPT

## 2019-09-06 RX ORDER — HYDROXYZINE HYDROCHLORIDE 25 MG/1
25 TABLET, FILM COATED ORAL DAILY PRN
Qty: 30 TABLET | Refills: 0 | Status: SHIPPED | OUTPATIENT
Start: 2019-09-06 | End: 2019-09-17 | Stop reason: SDUPTHER

## 2019-09-06 RX ORDER — FLUOXETINE HYDROCHLORIDE 20 MG/1
20 CAPSULE ORAL DAILY
Qty: 30 CAPSULE | Refills: 0 | Status: SHIPPED | OUTPATIENT
Start: 2019-09-06 | End: 2019-09-17 | Stop reason: SDUPTHER

## 2019-09-06 RX ORDER — FLUOXETINE 10 MG/1
10 CAPSULE ORAL DAILY
Qty: 30 CAPSULE | Refills: 0 | Status: SHIPPED | OUTPATIENT
Start: 2019-09-06 | End: 2019-09-17 | Stop reason: SDUPTHER

## 2019-09-06 NOTE — LETTER
September 6, 2019    [address], [address]  [place] [address]  Phone: [phone]  Fax: [phone]         [facility]  [address] [place] [address]        [facility]    Was treated here from 09/01/2019 to 09/06/2019    May Return to work/school on 09/09/2019.    No Restrictions        Sincerely,          Virginia Dickson NP

## 2019-09-06 NOTE — PATIENT INSTRUCTIONS
"You have been provided with a certain amount of medication with a specified number of refills.  Please follow up within an adequate time before you run out of medications.    REFILLS FOR CONTROLLED SUBSTANCES WILL NOT BE GIVEN WITHOUT AN APPOINTMENT.  I will not honor or fill automated refill requests from pharmacies.  You must come in for an appointment to get refills.    Please book your next appointment for myself or therapist by phone by calling our office at [phone].      PLEASE BE AT LEAST 15 MINUTES EARLY FOR YOUR NEXT APPOINTMENT.  PLEASE, DO NOT BE LATE OR YOU WILL BE TURNED AWAY AND ASKED TO RESCHEDULE.  YOU MUST COME EARLY TO ALLOW TIME FOR CHECK-IN AS WELL AS GET YOUR VITAL SIGNS AND GO OVER YOUR MEDICATIONS.  [facility] is not fair to the patients who present after you and are on time for their appointments.  It causes a delay in the appointments for patients and staff.  IF YOU ARE LATE, THERE IS A POSSIBILITY THAT YOU WILL BE CHARGED FOR THE APPOINTMENT TIME PERSONALLY AND IT WILL NOT GO TO YOUR INSURANCE.  YOU MAY ALSO BE DISCHARGED FROM CLINIC with multiple "No Show" appointments.  -----------------------------------------------------------------------------------------------------------------  IF YOU FEEL SUICIDAL OR HAVING THOUGHTS OR PLANS TO HURT YOURSELF OR OTHERS, CALL 911 OR REPORT TO THE NEAREST EMERGENCY ROOM.  YOU CAN ALSO ACCESS THE FOLLOWING HOTLINE(S):    National Suicide Hotline Number 5-879-888-TALK (3532)     ([facility] Mobile Crisis, 626.396.8464'   [place] [facility], (601) 393-8466; [place], 24 hours / 7 days, (499) 793-COPE (2568), 7-175-015-COPE (6324))     TIPS FOR GETTING YOUR PRESCRIPTIONS:    You can always ask your pharmacist the cost of your medications without the use of your insurance. Sometimes the medication will be cheaper if you do not use your insurance.     If you decide you want to have your prescriptions filled at " a different pharmacy, you can always go to the new pharmacy of your choice and have them call the pharmacy where your prescription was sent and they can have your prescription transferred to the new pharmacy.

## 2019-09-06 NOTE — PROGRESS NOTES
"Outpatient Psychiatry Follow-Up Visit (MD/NP)    9/6/2019    Clinical Status of Patient:  Outpatient (Ambulatory)    Chief Complaint:  Naomy Armstrong is a 54 y.o. female who presents today for follow-up of post hospitalization, post-suicide attempt,  depression, anxiety, psychosis and poor sleep.  Met with patient.      Interval History and Content of Current Session:  Interim Events/Subjective Report/Content of Current Session: Naomy  was last seen by me on 08/08/2019 for an initial psychiatric evaluation. 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 Safety: Call 911 or Crisis Line or go to ER for suicidal ideation, adverse effects of medication or any other emergency  2. Taper off caffeine (ice tea) slowly.  3. Continue Prozac 20 mg po qd.  4. Continue Seroquel 50 mg po qhs prn sleep.  5. D/C Klonopin.  6. D/C Ambien.  7. Labs: CMP, CBC, Lipid profile, TSH, Free T3, Free T4, Hemoglobin A1C, Prolactin level, Urine Drug Screening.  8. RTC in one month or sooner prn.    Today Naomy is seen face to face. A review of the chart shows that since her last visit (09/01/2019) she went to the ED at [facility] stating that she had taken 15 - 20 tablets of Seroquel because she was "tired". She eventually admitted to this being a suicide attempt. She was sent impatient at [facility]. She was treated for depression and discharged on yesterday, 09/05/2019. She was discharged on Seroquel 50 mg po qhs prn and Fluoxetine 30 mg po qd. She states that she became suicidal because she was drinking and had been in an argument with her live-in boyfriend. Her alcohol level in the ED was noted to be 163 mg/dl and she had a presumptive positive for amphetamines. She admits that she had taken one of her boyfriend's Adderall tablets. She states that she has not drank since she returned " from the inpatient stay last night. She minimizes her drug and alcohol use. She states that she only drinks one day a week but does admit that she drinks excessively on that one day. She then states that she doesn't drink every week. She states that she takes her boyfriend's Adderall medication infrequently to help her stay awake and to study.     Today she also reports that she just started the Fluoxetine 30 mg dose last night. However, at the end of the interview, she hands the paper script for Fluoxetine 10 mg capsules that are needed to make 30 mgs. She also has a paper script for Quetiapine and she is instructed that this will not be further prescribed by this provider and it is being discontinued in light of it being her drug of choice for the suicide attempt. She voluntarily gives this script back to this provider. She is advised that an electronic script can be sent for the Fluoxetine 10 mgs if she would like in place of the paper script as it can be sent with her script for hydroxyzine hcl that is being sent today and she would prefer this. The paper scripts are obtained and placed in the shredder box by this provider.     Today, she states that her depression continues. She doesn't feel like there has been an improvement. Her psychotic features are however gone. Her anxiety is uncomfortable. Her sleep is poor. Her appetite is okay and her energy level is not as good as she would like. Her labs were done on 08/08/2019 as ordered by this practitioner with the exception of the urine drug screening. Those results are reviewed with her today. Additionally, on 09/01/2019 multiple labs were done at the hospital when she presented to the ED with a drug overdose, overdose of antipsychotic, alcohol intoxication and syncope. Of particular concern in the ED were the positive amphetamine results and serum alcohol level of 163 mg/dl. Her discharged papers are scanned into the chart today.     Admission on 09/01/2019,  Discharged on 09/02/2019   Component Date Value Ref Range Status    POCT Glucose 09/01/2019 106  70 - 110 mg/dL Final    WBC 09/01/2019 5.45  3.90 - 12.70 K/uL Final    RBC 09/01/2019 4.32  4.00 - 5.40 M/uL Final    Hemoglobin 09/01/2019 11.1* 12.0 - 16.0 g/dL Final    Hematocrit 09/01/2019 35.9* 37.0 - 48.5 % Final    Mean Corpuscular Volume 09/01/2019 83  82 - 98 fL Final    Mean Corpuscular Hemoglobin 09/01/2019 25.7* 27.0 - 31.0 pg Final    Mean Corpuscular Hemoglobin Conc 09/01/2019 30.9* 32.0 - 36.0 g/dL Final    RDW 09/01/2019 16.0* 11.5 - 14.5 % Final    Platelets 09/01/2019 314  150 - 350 K/uL Final    MPV 09/01/2019 9.3  9.2 - 12.9 fL Final    Gran # (ANC) 09/01/2019 3.5  1.8 - 7.7 K/uL Final    Lymph # 09/01/2019 1.6  1.0 - 4.8 K/uL Final    Mono # 09/01/2019 0.3  0.3 - 1.0 K/uL Final    Eos # 09/01/2019 0.1  0.0 - 0.5 K/uL Final    Baso # 09/01/2019 0.03  0.00 - 0.20 K/uL Final    Gran% 09/01/2019 63.4  38.0 - 73.0 % Final    Lymph% 09/01/2019 28.6  18.0 - 48.0 % Final    Mono% 09/01/2019 6.1  4.0 - 15.0 % Final    Eosinophil% 09/01/2019 1.3  0.0 - 8.0 % Final    Basophil% 09/01/2019 0.6  0.0 - 1.9 % Final    Differential Method 09/01/2019 Automated   Final    Sodium 09/01/2019 141  136 - 145 mmol/L Final    Potassium 09/01/2019 3.4* 3.5 - 5.1 mmol/L Final    Chloride 09/01/2019 108  95 - 110 mmol/L Final    CO2 09/01/2019 20* 23 - 29 mmol/L Final    Glucose 09/01/2019 103  70 - 110 mg/dL Final    BUN, Bld 09/01/2019 20  6 - 20 mg/dL Final    Creatinine 09/01/2019 1.2  0.5 - 1.4 mg/dL Final    Calcium 09/01/2019 9.1  8.7 - 10.5 mg/dL Final    Total Protein 09/01/2019 6.6  6.0 - 8.4 g/dL Final    Albumin 09/01/2019 3.7  3.5 - 5.2 g/dL Final    Total Bilirubin 09/01/2019 0.3  0.1 - 1.0 mg/dL Final    Comment: For infants and newborns, interpretation of results should be based  on gestational age, weight and in agreement with clinical  observations.  Premature Infant  recommended reference ranges:  Up to 24 hours.............<8.0 mg/dL  Up to 48 hours............<12.0 mg/dL  3-5 days..................<15.0 mg/dL  6-29 days.................<15.0 mg/dL      Alkaline Phosphatase 09/01/2019 122  55 - 135 U/L Final    AST 09/01/2019 21  10 - 40 U/L Final    ALT 09/01/2019 17  10 - 44 U/L Final    Anion Gap 09/01/2019 13  8 - 16 mmol/L Final    eGFR if African American 09/01/2019 58* >60 mL/min/1.73 m^2 Final    eGFR if non African American 09/01/2019 51* >60 mL/min/1.73 m^2 Final    Comment: Calculation used to obtain the estimated glomerular filtration  rate (eGFR) is the CKD-EPI equation.       TSH 09/01/2019 1.222  0.400 - 4.000 uIU/mL Final    Specimen UA 09/01/2019 Urine, Catheterized   Final    Color, UA 09/01/2019 Colorless* Yellow, Straw, Anna Final    Appearance, UA 09/01/2019 Clear  Clear Final    pH, UA 09/01/2019 5.0  5.0 - 8.0 Final    Specific Gravity, UA 09/01/2019 1.005  1.005 - 1.030 Final    Protein, UA 09/01/2019 Negative  Negative Final    Comment: Recommend a 24 hour urine protein or a urine   protein/creatinine ratio if globulin induced proteinuria is  clinically suspected.      Glucose, UA 09/01/2019 Negative  Negative Final    Ketones, UA 09/01/2019 Negative  Negative Final    Bilirubin (UA) 09/01/2019 Negative  Negative Final    Occult Blood UA 09/01/2019 1+* Negative Final    Nitrite, UA 09/01/2019 Negative  Negative Final    Urobilinogen, UA 09/01/2019 Negative  <2.0 EU/dL Final    Leukocytes, UA 09/01/2019 Negative  Negative Final    Benzodiazepines 09/01/2019 Negative   Final    Methadone metabolites 09/01/2019 Negative   Final    Cocaine (Metab.) 09/01/2019 Negative   Final    Opiate Scrn, Ur 09/01/2019 Negative   Final    Barbiturate Screen, Ur 09/01/2019 Negative   Final    Amphetamine Screen, Ur 09/01/2019 Presumptive Positive   Final    THC 09/01/2019 Negative   Final    Phencyclidine 09/01/2019 Negative   Final     Creatinine, Random Ur 09/01/2019 22.8  15.0 - 325.0 mg/dL Final    Comment: The random urine reference ranges provided were established   for 24 hour urine collections.  No reference ranges exist for  random urine specimens.  Correlate clinically.      Toxicology Information 09/01/2019 SEE COMMENT   Final    Comment: This screen includes the following classes of drugs at the   listed cut-off:  Benzodiazepines                  200 ng/ml  Methadone                        300 ng/ml  Cocaine metabolite               300 ng/ml  Opiates                          300 ng/ml  Barbiturates                     200 ng/ml  Amphetamines                    1000 ng/ml  Marijuana metabs (THC)            50 ng/ml  Phencyclidine (PCP)               25 ng/ml  High concentrations of Diphenhydramine may cross-react with  Phencyclidine PCP screening immunoassay giving a false   positive result.  High concentrations of Methylenedioxymethamphetamine (MDMA aka  Ectasy) and other structurally similar compounds may cross-   react with the Amphetamine/Methamphetamine screening   immunoassay giving a false positive result.  A metabolite of the anti-HIV drug Sustiva () may cause  false positive results in the Marijuana metabolite (THC)   screening assay.  Note: This exception list includes only more common   interferants i                           n toxicology screen testing.  Because of many   cross-reactantspositive results on toxicology drug screens   should be confirmed whenever results do not correlate with   clinical presentation.  This report is intended for use in clinical monitoring and  management of patients. It is not intended for use in   employment related drug testing.  Because of any cross-reactants, positive results on toxicology  drug screens should be confirmed whenever results do not  correlate with clinical presentation.  Presumptive positive results are unconfirmed and may be used   only for medical purposes.  Assay  Intended Use: This asasy provides only a preliminary analytical  test result. A more specific alternate chemical method must be used  to obtain a confirmed analytical result. Gas chromatography/mass  spectrometry (GS/MS)is the preferred confirmatory method. Clinical  consideration and professional judgement should be applied to any   drug of abuse test result, particularly when preliminary resul                           ts  are used.      Alcohol, Medical, Serum 09/01/2019 163* <10 mg/dL Final    Acetaminophen (Tylenol), Serum 09/01/2019 <3.0* 10.0 - 20.0 ug/mL Final    Comment: Toxic Levels:  Adults (4 hr post-ingestion).........>150 ug/mL  Adults (12 hr post-ingestion)........>40 ug/mL  Peds (2 hr post-ingestion, liquid)...>225 ug/mL      POC Preg Test, Ur 09/01/2019 Negative  Negative Final     Acceptable 09/01/2019 Yes   Final    Salicylate Lvl 09/01/2019 <5.0* 15.0 - 30.0 mg/dL Final    Comment: Toxic:  30.0 - 70.0 mg/dl  Lethal: >70.0 mg/dl      Lithium Level 09/01/2019 <0.1* 0.6 - 1.2 mmol/L Final    Amitriptyline 09/01/2019 <10  ng/mL Final    Nortriptyline Lvl 09/01/2019 <10* 50 - 150 ng/mL Final    Amitrip+Nortrip 09/01/2019 Not Applicable  95 - 250 ng/mL Final    Comment: INTERPRETIVE INFORMATION: Amitriptyline/Nortriptyline,   Total, SP  Therapeutic Range:  Total drug (Amitriptyline and Nortriptyline):  ng/mL  Toxic: Greater than 500 ng/mL  Toxic concentrations may cause anticholinergic effects,   cardiac abnormalities and seizures.  See Compliance Statement B: [place]      Imipramine Lvl 09/01/2019 <10  ng/mL Final    Desipramine 09/01/2019 <10* 100 - 300 ng/mL Final    Total (Imipramine and Desipramine) 09/01/2019 Not Applicable  150 - 300 ng/mL Final    Comment: INTERPRETIVE INFORMATION: Imipramine + Desipramine  Therapeutic Range:  Total (Imipramine and Desipramine): 150-300 ng/mL  Toxic: Greater than 500 ng/mL  Toxic concentrations may cause anticholinergic  effects,   drowsiness and cardiac abnormalities.  See Compliance Statement B: [email or web address]      Doxepin Lvl 09/01/2019 <10  ng/mL Final    Nordoxepin Lvl 09/01/2019 <10  ng/mL Final    Doxepin+Nordoxepin Lvl 09/01/2019 Not Applicable  100 - 300 ng/mL Final    Comment: INTERPRETIVE INFORMATION: Doxepin/Nordoxepin Total, SP  Therapeutic Range:  Total (Doxepin and Nordoxepin): 100-300 ng/mL  Toxic: Greater than 500 ng/mL  Toxic concentrations may cause anticholinergic effects and   cardiac abnormalities.  See Compliance Statement B: [email or web address]      Clomipramine Lvl 09/01/2019 <20  ng/mL Final    Clomipramine Metabolite 09/01/2019 <20  ng/mL Final    Clomipramine & Metabolites, Total 09/01/2019 Not Applicable  220 - 500 ng/mL Final    Comment: INTERPRETIVE INFORMATION:  Clomipramine/Norclomipramine,   Total, SP  Therapeutic Range:  Total (clomipramine and norclomipramine): 220-500 ng/mL  Toxic: Greater than 900 ng/mL  The therapeutic range listed relates to the antidepressant   characteristics of the drug. A therapeutic range for   treating obsessive compulsive disorder is not well   established. Toxic concentrations may cause anticholinergic   effects, CNS depression, cardiac abnormalities, seizures   and hypotension.  See Compliance Statement B: [employer]      Protriptyline (Vivactil) 09/01/2019 <10* 70 - 240 ng/mL Final    Comment: INTERPRETIVE INFORMATION: Protriptyline, SP  Therapeutic Range:  ng/mL  Toxic: Greater than 400 ng/mL  Toxic concentrations may cause hypotension, cardiac   abnormalities, seizures and coma.   See Compliance Statement B: [employer]  Performed by [facility],                              [address] [place] [id number] [phone]                    [email or web address], Po Robison MD - Lab. Director      Valproic Acid Level 09/01/2019 <12.5* 50.0 - 100.0 ug/mL Final    Comment: Valproic Acid (ug/ml)  Toxic:   >100.0 ug/ml      RBC, UA 09/01/2019 2   0 - 4 /hpf Final    Squam Epithel, UA 09/01/2019 2  /hpf Final    Microscopic Comment 09/01/2019 SEE COMMENT   Final    Comment: Other formed elements not mentioned in the report are not   present in the microscopic examination.       Magnesium 09/01/2019 2.2  1.6 - 2.6 mg/dL Final    Alcohol, Medical, Serum 09/01/2019 <10  <10 mg/dL Final   Lab Visit on 08/08/2019   Component Date Value Ref Range Status    Sodium 08/08/2019 143  136 - 145 mmol/L Final    Potassium 08/08/2019 3.9  3.5 - 5.1 mmol/L Final    Chloride 08/08/2019 108  95 - 110 mmol/L Final    CO2 08/08/2019 25  23 - 29 mmol/L Final    Glucose 08/08/2019 109  70 - 110 mg/dL Final    BUN, Bld 08/08/2019 12  6 - 20 mg/dL Final    Creatinine 08/08/2019 1.0  0.5 - 1.4 mg/dL Final    Calcium 08/08/2019 10.2  8.7 - 10.5 mg/dL Final    Total Protein 08/08/2019 7.1  6.0 - 8.4 g/dL Final    Albumin 08/08/2019 3.7  3.5 - 5.2 g/dL Final    Total Bilirubin 08/08/2019 0.2  0.1 - 1.0 mg/dL Final    Comment: For infants and newborns, interpretation of results should be based  on gestational age, weight and in agreement with clinical  observations.  Premature Infant recommended reference ranges:  Up to 24 hours.............<8.0 mg/dL  Up to 48 hours............<12.0 mg/dL  3-5 days..................<15.0 mg/dL  6-29 days.................<15.0 mg/dL      Alkaline Phosphatase 08/08/2019 122  55 - 135 U/L Final    AST 08/08/2019 15  10 - 40 U/L Final    ALT 08/08/2019 18  10 - 44 U/L Final    Anion Gap 08/08/2019 10  8 - 16 mmol/L Final    eGFR if  08/08/2019 >60  >60 mL/min/1.73 m^2 Final    eGFR if non African American 08/08/2019 >60  >60 mL/min/1.73 m^2 Final    Comment: Calculation used to obtain the estimated glomerular filtration  rate (eGFR) is the CKD-EPI equation.       WBC 08/08/2019 5.72  3.90 - 12.70 K/uL Final    RBC 08/08/2019 4.84  4.00 - 5.40 M/uL Final    Hemoglobin 08/08/2019 12.6  12.0 - 16.0 g/dL Final     Hematocrit 08/08/2019 41.0  37.0 - 48.5 % Final    Mean Corpuscular Volume 08/08/2019 85  82 - 98 fL Final    Mean Corpuscular Hemoglobin 08/08/2019 26.0* 27.0 - 31.0 pg Final    Mean Corpuscular Hemoglobin Conc 08/08/2019 30.7* 32.0 - 36.0 g/dL Final    RDW 08/08/2019 16.0* 11.5 - 14.5 % Final    Platelets 08/08/2019 334  150 - 350 K/uL Final    MPV 08/08/2019 10.0  9.2 - 12.9 fL Final    Gran # (ANC) 08/08/2019 4.0  1.8 - 7.7 K/uL Final    Lymph # 08/08/2019 1.3  1.0 - 4.8 K/uL Final    Mono # 08/08/2019 0.4  0.3 - 1.0 K/uL Final    Eos # 08/08/2019 0.0  0.0 - 0.5 K/uL Final    Baso # 08/08/2019 0.01  0.00 - 0.20 K/uL Final    Gran% 08/08/2019 70.3  38.0 - 73.0 % Final    Lymph% 08/08/2019 22.7  18.0 - 48.0 % Final    Mono% 08/08/2019 6.3  4.0 - 15.0 % Final    Eosinophil% 08/08/2019 0.7  0.0 - 8.0 % Final    Basophil% 08/08/2019 0.2  0.0 - 1.9 % Final    Differential Method 08/08/2019 Automated   Final    TSH 08/08/2019 1.304  0.400 - 4.000 uIU/mL Final    T3, Free 08/08/2019 2.2* 2.3 - 4.2 pg/mL Final    Free T4 08/08/2019 0.78  0.71 - 1.51 ng/dL Final    Hemoglobin A1C 08/08/2019 5.4  4.0 - 5.6 % Final    Comment: ADA Screening Guidelines:  5.7-6.4%  Consistent with prediabetes  >or=6.5%  Consistent with diabetes  High levels of fetal hemoglobin interfere with the HbA1C  assay. Heterozygous hemoglobin variants (HbS, HgC, etc)do  not significantly interfere with this assay.   However, presence of multiple variants may affect accuracy.      Estimated Avg Glucose 08/08/2019 108  68 - 131 mg/dL Final    Cholesterol 08/08/2019 241* 120 - 199 mg/dL Final    Comment: The National Cholesterol Education Program (NCEP) has set the  following guidelines (reference ranges) for Cholesterol:  Optimal.....................<200 mg/dL  Borderline High.............200-239 mg/dL  High........................> or = 240 mg/dL      Triglycerides 08/08/2019 237* 30 - 150 mg/dL Final    Comment: The National  Cholesterol Education Program (NCEP) has set the  following guidelines (reference values) for triglycerides:  Normal......................<150 mg/dL  Borderline High.............150-199 mg/dL  High........................200-499 mg/dL      HDL 08/08/2019 87* 40 - 75 mg/dL Final    Comment: The National Cholesterol Education Program (NCEP) has set the  following guidelines (reference values) for HDL Cholesterol:  Low...............<40 mg/dL  Optimal...........>60 mg/dL      LDL Cholesterol 08/08/2019 106.6  63.0 - 159.0 mg/dL Final    Comment: The National Cholesterol Education Program (NCEP) has set the  following guidelines (reference values) for LDL Cholesterol:  Optimal.......................<130 mg/dL  Borderline High...............130-159 mg/dL  High..........................160-189 mg/dL  Very High.....................>190 mg/dL      Hdl/Cholesterol Ratio 08/08/2019 36.1  20.0 - 50.0 % Final    Total Cholesterol/HDL Ratio 08/08/2019 2.8  2.0 - 5.0 Final    Non-HDL Cholesterol 08/08/2019 154  mg/dL Final    Comment: Risk category and Non-HDL cholesterol goals:  Coronary heart disease (CHD)or equivalent (10-year risk of CHD >20%):  Non-HDL cholesterol goal     <130 mg/dL  Two or more CHD risk factors and 10-year risk of CHD <= 20%:  Non-HDL cholesterol goal     <160 mg/dL  0 to 1 CHD risk factor:  Non-HDL cholesterol goal     <190 mg/dL      Prolactin 08/08/2019 15.9  5.2 - 26.5 ng/mL Final   ]    Psychotherapy:  · Target symptoms: depression, anxiety , poor sleep, substance abuse  · Why chosen therapy is appropriate versus another modality: relevant to diagnosis, evidence based practice  · Outcome monitoring methods: self-report, observation  · Therapeutic intervention type: supportive psychotherapy  · Topics discussed/themes: substance abuse, suicide attempt, medications, [facility]  · The patient's response to the intervention is accepting. The patient's progress toward treatment goals is limited.  "  · Duration of intervention: 24 minutes.    Review of Systems   · PSYCHIATRIC: Pertinant items are noted in the narrative.  GENERAL:  Well developed   SKIN:  No rashes or lacerations  HEAD:  No headache  EYES:  No exophthalmos, jaundice or blindness  EARS:  No hearing loss  MOUTH & THROAT:  No dyskinetic movements or obvious goiter  CHEST:  No shortness of breath  CARDIOVASCULAR:  No chest pain  ABDOMEN:  No nausea, vomiting, pain, constipation or diarrhea  URINARY:  No dysuria or sexual dysfunction  MUSCULOSKELETAL:  No tremor, no tic  NEUROLOGIC:  No abnormal movements    Past Medical, Family and Social History: The patient's past medical, family and social history have been reviewed and updated as appropriate within the electronic medical record - see encounter notes.    Compliance: states is compliant but this is questionable    Side effects: None    Risk Parameters:  Patient reports no suicidal ideation  Patient reports no homicidal ideation  Patient reports no self-injurious behavior  Patient reports no violent behavior    Exam (detailed: at least 9 elements; comprehensive: all 15 elements)   Constitutional  Vitals:  Most recent vital signs, dated less than 90 days prior to this appointment, were reviewed.   Vitals:    09/06/19 1049   BP: 116/70   Pulse: 74   Weight: 74 kg (163 lb 2.3 oz)   Height: 5' 4" (1.626 m)        General:  unremarkable, age appropriate     Musculoskeletal  Muscle Strength/Tone:  no tremor, no tic   Gait & Station:  non-ataxic     Psychiatric  Speech:  no latency; no press   Mood & Affect:  "I don't think the Prozac is really working"  full   Thought Process:  normal and logical   Associations:  intact   Thought Content:  normal, no suicidality, no homicidality, delusions, or paranoia   Insight:  has awareness of illness   Judgement: poor in relation to coping   Orientation:  grossly intact   Memory: intact for content of interview   Language: grossly intact   Attention Span & " Concentration:  able to focus   Fund of Knowledge:  intact and appropriate to age and level of education     Assessment and Diagnosis   Status/Progress: Based on the examination today, the patient's problem(s) is/are inadequately controlled.  New problems have been presented today.   Psychosocial issues are complicating management of the primary condition.  There are no active rule-out diagnoses for this patient at this time.     General Impression: Her symptoms are inadequately controlled and negatively impacted by polysubstance abuse.      ICD-10-CM ICD-9-CM   1. Major depressive disorder, recurrent episode, moderate with anxious distress F33.1 296.32   2. Polysubstance abuse F19.10 305.90   3. Mood insomnia F51.05 [id number]    F39        Intervention/Counseling/Treatment Plan   · Medication Management: The risks and benefits of medication were discussed with the patient.  · The treatment plan and follow up plan were reviewed with the patient.   1. Safety: Call 911 or Crisis Line or go to ER for suicidal ideation, adverse effects of medication or any other emergency  2. Continue Taper off caffeine (ice tea) slowly.  3. Continue Prozac 30 mg po qd as started inpatient at [facility].  4. D/C Seroquel 50 mg po qhs prn sleep. (Patient used to overdose).   5. Labs: Urine Drug Screening, alcohol level.  8. RTC in one month or sooner prn.   9. Start hydroxyzine hcl 25 mg po qd prn anxiety/sleep.   10. Start psychotherapy and consider [facility]. Patient to call and get information and check for insurance coverage.   11. Return to clinic in 2 weeks or sooner prn.     Patient agrees with POC.    INSTRUCTIONS  Instructed to call 911 or Crisis Line or go to ER for suicidal ideation, adverse effects of medication or any other emergency. Verbalizes understanding and plan to comply.    Re-Instructed that tea is high in caffeine and contributes greatly to anxiety and should taper off. Verbalizes understanding and plan to  comply.    Instructed on uses, effects, side effects, adverse reactions and benefits vs risks of hydroxyzine hcl with emphasis on sleepiness and avoidance of driving and heavy machinery until effects are known and instructed on when to seek 911/ER. Verbalizes understanding and plans to comply.    Return to Clinic: 2 weeks, as needed

## 2019-09-17 ENCOUNTER — OFFICE VISIT (OUTPATIENT)
Dept: PSYCHIATRY | Facility: CLINIC | Age: 54
End: 2019-09-17
Payer: COMMERCIAL

## 2019-09-17 VITALS
BODY MASS INDEX: 28.86 KG/M2 | SYSTOLIC BLOOD PRESSURE: 110 MMHG | WEIGHT: 169.06 LBS | HEART RATE: 87 BPM | DIASTOLIC BLOOD PRESSURE: 64 MMHG | HEIGHT: 64 IN

## 2019-09-17 DIAGNOSIS — F51.05 MOOD INSOMNIA: ICD-10-CM

## 2019-09-17 DIAGNOSIS — F33.1 MAJOR DEPRESSIVE DISORDER, RECURRENT EPISODE, MODERATE WITH ANXIOUS DISTRESS: Primary | ICD-10-CM

## 2019-09-17 DIAGNOSIS — F39 MOOD INSOMNIA: ICD-10-CM

## 2019-09-17 PROCEDURE — 99214 PR OFFICE/OUTPT VISIT, EST, LEVL IV, 30-39 MIN: ICD-10-PCS | Mod: S$GLB,,, | Performed by: NURSE PRACTITIONER

## 2019-09-17 PROCEDURE — 99999 PR PBB SHADOW E&M-EST. PATIENT-LVL III: CPT | Mod: PBBFAC,,, | Performed by: NURSE PRACTITIONER

## 2019-09-17 PROCEDURE — 99999 PR PBB SHADOW E&M-EST. PATIENT-LVL III: ICD-10-PCS | Mod: PBBFAC,,, | Performed by: NURSE PRACTITIONER

## 2019-09-17 PROCEDURE — 99214 OFFICE O/P EST MOD 30 MIN: CPT | Mod: S$GLB,,, | Performed by: NURSE PRACTITIONER

## 2019-09-17 RX ORDER — FLUOXETINE HYDROCHLORIDE 20 MG/1
20 CAPSULE ORAL DAILY
Qty: 30 CAPSULE | Refills: 0 | Status: SHIPPED | OUTPATIENT
Start: 2019-09-17 | End: 2019-10-16 | Stop reason: SDUPTHER

## 2019-09-17 RX ORDER — HYDROXYZINE HYDROCHLORIDE 50 MG/1
50 TABLET, FILM COATED ORAL DAILY PRN
Qty: 30 TABLET | Refills: 0 | Status: SHIPPED | OUTPATIENT
Start: 2019-09-17 | End: 2019-10-16 | Stop reason: SDUPTHER

## 2019-09-17 RX ORDER — FLUOXETINE 10 MG/1
10 CAPSULE ORAL DAILY
Qty: 30 CAPSULE | Refills: 0 | Status: SHIPPED | OUTPATIENT
Start: 2019-09-17 | End: 2019-10-16 | Stop reason: SDUPTHER

## 2019-09-17 NOTE — PROGRESS NOTES
"Outpatient Psychiatry Follow-Up Visit (MD/NP)    9/17/2019    Clinical Status of Patient:  Outpatient (Ambulatory)    Chief Complaint:  Naomy Armstrong is a 54 y.o. female who presents today for follow-up of depression, anxiety, psychosis and poor sleep and polysubstance abuse.  Met with patient.      Interval History and Content of Current Session:  Interim Events/Subjective Report/Content of Current Session: Naomy  was last seen by me on 09/06/2019 for a post-hospitalization follow up visit. 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 Her symptoms were inadequately controlled and a plan of care was devised to include:    1. Safety: Call 911 or Crisis Line or go to ER for suicidal ideation, adverse effects of medication or any other emergency  2. Continue Taper off caffeine (ice tea) slowly.  3. Continue Prozac 30 mg po qd as started inpatient at [facility].  4. D/C Seroquel 50 mg po qhs prn sleep. (Patient used to overdose).   5. Labs: Urine Drug Screening, alcohol level.  6. RTC in one month or sooner prn.   7. Start hydroxyzine hcl 25 mg po qd prn anxiety/sleep.   8. Start psychotherapy and consider [facility]. Patient to call and get information and check for insurance coverage.   10. Return to clinic in 2 weeks or sooner prn.     Today Naomy is seen face to face. She states she does feel better. 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 She does not know what changed but states, "I havent fraeked out about my online teacher not responding for 2 weeks".  She states her bank account is overdrafted $900 and she only has $800 in her savings and normally she would have freaked out but she was able to handle it and asked her dad about the money. She is not "sweating it" or "crying over it." She states she has not been using drugs or drinking. On Saturday she was not able to sleep and the " hydroxyzine hcl didn't make her sleep. She however didn't feel overly tired the next day after she took it. Her depression has lessened and is under control. Her psychotic features are gone. Her anxiety is under contorl. Her appetite is goood. Her energy level is good.     Lab Visit on 09/06/2019   Component Date Value Ref Range Status    Alcohol, Urine 09/06/2019 <10  <10 mg/dL Final    Benzodiazepines 09/06/2019 Negative   Final    Methadone metabolites 09/06/2019 Negative   Final    Cocaine (Metab.) 09/06/2019 Negative   Final    Opiate Scrn, Ur 09/06/2019 Negative   Final    Barbiturate Screen, Ur 09/06/2019 Negative   Final    Amphetamine Screen, Ur 09/06/2019 Negative   Final    THC 09/06/2019 Negative   Final    Phencyclidine 09/06/2019 Negative   Final    Creatinine, Random Ur 09/06/2019 60.0  15.0 - 325.0 mg/dL Final    Comment: The random urine reference ranges provided were established   for 24 hour urine collections.  No reference ranges exist for  random urine specimens.  Correlate clinically.      Toxicology Information 09/06/2019 SEE COMMENT   Final    Comment: This screen includes the following classes of drugs at the   listed cut-off:  Benzodiazepines                  200 ng/ml  Methadone                        300 ng/ml  Cocaine metabolite               300 ng/ml  Opiates                          300 ng/ml  Barbiturates                     200 ng/ml  Amphetamines                    1000 ng/ml  Marijuana metabs (THC)            50 ng/ml  Phencyclidine (PCP)               25 ng/ml  High concentrations of Diphenhydramine may cross-react with  Phencyclidine PCP screening immunoassay giving a false   positive result.  High concentrations of Methylenedioxymethamphetamine (MDMA aka  Ectasy) and other structurally similar compounds may cross-   react with the Amphetamine/Methamphetamine screening   immunoassay giving a false positive result.  A metabolite of the anti-HIV drug Sustiva ()  may cause  false positive results in the Marijuana metabolite (THC)   screening assay.  Note: This exception list includes only more common   interferants i                           n toxicology screen testing.  Because of many   cross-reactantspositive results on toxicology drug screens   should be confirmed whenever results do not correlate with   clinical presentation.  This report is intended for use in clinical monitoring and  management of patients. It is not intended for use in   employment related drug testing.  Because of any cross-reactants, positive results on toxicology  drug screens should be confirmed whenever results do not  correlate with clinical presentation.  Presumptive positive results are unconfirmed and may be used   only for medical purposes.  Assay Intended Use: This assay provides only a preliminary analytical  test result. A more specific alternate chemical method must be used  to obtain a confirmed analytical result. Gas chromatography/mass  spectrometry (GS/MS)is the preferred confirmatory method. Clinical  consideration and professional judgement should be applied to any   drug of abuse test result, particularly when preliminary resul                           ts  are used.      Alcohol, Medical, Serum 09/06/2019 <10  <10 mg/dL Final   Admission on 09/01/2019, Discharged on 09/02/2019   Component Date Value Ref Range Status    POCT Glucose 09/01/2019 106  70 - 110 mg/dL Final    WBC 09/01/2019 5.45  3.90 - 12.70 K/uL Final    RBC 09/01/2019 4.32  4.00 - 5.40 M/uL Final    Hemoglobin 09/01/2019 11.1* 12.0 - 16.0 g/dL Final    Hematocrit 09/01/2019 35.9* 37.0 - 48.5 % Final    Mean Corpuscular Volume 09/01/2019 83  82 - 98 fL Final    Mean Corpuscular Hemoglobin 09/01/2019 25.7* 27.0 - 31.0 pg Final    Mean Corpuscular Hemoglobin Conc 09/01/2019 30.9* 32.0 - 36.0 g/dL Final    RDW 09/01/2019 16.0* 11.5 - 14.5 % Final    Platelets 09/01/2019 314  150 - 350 K/uL Final    MPV  09/01/2019 9.3  9.2 - 12.9 fL Final    Gran # (ANC) 09/01/2019 3.5  1.8 - 7.7 K/uL Final    Lymph # 09/01/2019 1.6  1.0 - 4.8 K/uL Final    Mono # 09/01/2019 0.3  0.3 - 1.0 K/uL Final    Eos # 09/01/2019 0.1  0.0 - 0.5 K/uL Final    Baso # 09/01/2019 0.03  0.00 - 0.20 K/uL Final    Gran% 09/01/2019 63.4  38.0 - 73.0 % Final    Lymph% 09/01/2019 28.6  18.0 - 48.0 % Final    Mono% 09/01/2019 6.1  4.0 - 15.0 % Final    Eosinophil% 09/01/2019 1.3  0.0 - 8.0 % Final    Basophil% 09/01/2019 0.6  0.0 - 1.9 % Final    Differential Method 09/01/2019 Automated   Final    Sodium 09/01/2019 141  136 - 145 mmol/L Final    Potassium 09/01/2019 3.4* 3.5 - 5.1 mmol/L Final    Chloride 09/01/2019 108  95 - 110 mmol/L Final    CO2 09/01/2019 20* 23 - 29 mmol/L Final    Glucose 09/01/2019 103  70 - 110 mg/dL Final    BUN, Bld 09/01/2019 20  6 - 20 mg/dL Final    Creatinine 09/01/2019 1.2  0.5 - 1.4 mg/dL Final    Calcium 09/01/2019 9.1  8.7 - 10.5 mg/dL Final    Total Protein 09/01/2019 6.6  6.0 - 8.4 g/dL Final    Albumin 09/01/2019 3.7  3.5 - 5.2 g/dL Final    Total Bilirubin 09/01/2019 0.3  0.1 - 1.0 mg/dL Final    Comment: For infants and newborns, interpretation of results should be based  on gestational age, weight and in agreement with clinical  observations.  Premature Infant recommended reference ranges:  Up to 24 hours.............<8.0 mg/dL  Up to 48 hours............<12.0 mg/dL  3-5 days..................<15.0 mg/dL  6-29 days.................<15.0 mg/dL      Alkaline Phosphatase 09/01/2019 122  55 - 135 U/L Final    AST 09/01/2019 21  10 - 40 U/L Final    ALT 09/01/2019 17  10 - 44 U/L Final    Anion Gap 09/01/2019 13  8 - 16 mmol/L Final    eGFR if African American 09/01/2019 58* >60 mL/min/1.73 m^2 Final    eGFR if non African American 09/01/2019 51* >60 mL/min/1.73 m^2 Final    Comment: Calculation used to obtain the estimated glomerular filtration  rate (eGFR) is the CKD-EPI equation.        TSH 09/01/2019 1.222  0.400 - 4.000 uIU/mL Final    Specimen UA 09/01/2019 Urine, Catheterized   Final    Color, UA 09/01/2019 Colorless* Yellow, Straw, Anna Final    Appearance, UA 09/01/2019 Clear  Clear Final    pH, UA 09/01/2019 5.0  5.0 - 8.0 Final    Specific Gravity, UA 09/01/2019 1.005  1.005 - 1.030 Final    Protein, UA 09/01/2019 Negative  Negative Final    Comment: Recommend a 24 hour urine protein or a urine   protein/creatinine ratio if globulin induced proteinuria is  clinically suspected.      Glucose, UA 09/01/2019 Negative  Negative Final    Ketones, UA 09/01/2019 Negative  Negative Final    Bilirubin (UA) 09/01/2019 Negative  Negative Final    Occult Blood UA 09/01/2019 1+* Negative Final    Nitrite, UA 09/01/2019 Negative  Negative Final    Urobilinogen, UA 09/01/2019 Negative  <2.0 EU/dL Final    Leukocytes, UA 09/01/2019 Negative  Negative Final    Benzodiazepines 09/01/2019 Negative   Final    Methadone metabolites 09/01/2019 Negative   Final    Cocaine (Metab.) 09/01/2019 Negative   Final    Opiate Scrn, Ur 09/01/2019 Negative   Final    Barbiturate Screen, Ur 09/01/2019 Negative   Final    Amphetamine Screen, Ur 09/01/2019 Presumptive Positive   Final    THC 09/01/2019 Negative   Final    Phencyclidine 09/01/2019 Negative   Final    Creatinine, Random Ur 09/01/2019 22.8  15.0 - 325.0 mg/dL Final    Comment: The random urine reference ranges provided were established   for 24 hour urine collections.  No reference ranges exist for  random urine specimens.  Correlate clinically.      Toxicology Information 09/01/2019 SEE COMMENT   Final    Comment: This screen includes the following classes of drugs at the   listed cut-off:  Benzodiazepines                  200 ng/ml  Methadone                        300 ng/ml  Cocaine metabolite               300 ng/ml  Opiates                          300 ng/ml  Barbiturates                     200 ng/ml  Amphetamines                     1000 ng/ml  Marijuana metabs (THC)            50 ng/ml  Phencyclidine (PCP)               25 ng/ml  High concentrations of Diphenhydramine may cross-react with  Phencyclidine PCP screening immunoassay giving a false   positive result.  High concentrations of Methylenedioxymethamphetamine (MDMA aka  Ectasy) and other structurally similar compounds may cross-   react with the Amphetamine/Methamphetamine screening   immunoassay giving a false positive result.  A metabolite of the anti-HIV drug Sustiva () may cause  false positive results in the Marijuana metabolite (THC)   screening assay.  Note: This exception list includes only more common   interferants i                           n toxicology screen testing.  Because of many   cross-reactantspositive results on toxicology drug screens   should be confirmed whenever results do not correlate with   clinical presentation.  This report is intended for use in clinical monitoring and  management of patients. It is not intended for use in   employment related drug testing.  Because of any cross-reactants, positive results on toxicology  drug screens should be confirmed whenever results do not  correlate with clinical presentation.  Presumptive positive results are unconfirmed and may be used   only for medical purposes.  Assay Intended Use: This asasy provides only a preliminary analytical  test result. A more specific alternate chemical method must be used  to obtain a confirmed analytical result. Gas chromatography/mass  spectrometry (GS/MS)is the preferred confirmatory method. Clinical  consideration and professional judgement should be applied to any   drug of abuse test result, particularly when preliminary resul                           ts  are used.      Alcohol, Medical, Serum 09/01/2019 163* <10 mg/dL Final    Acetaminophen (Tylenol), Serum 09/01/2019 <3.0* 10.0 - 20.0 ug/mL Final    Comment: Toxic Levels:  Adults (4 hr post-ingestion).........>150  ug/mL  Adults (12 hr post-ingestion)........>40 ug/mL  Peds (2 hr post-ingestion, liquid)...>225 ug/mL      POC Preg Test, Ur 09/01/2019 Negative  Negative Final     Acceptable 09/01/2019 Yes   Final    Salicylate Lvl 09/01/2019 <5.0* 15.0 - 30.0 mg/dL Final    Comment: Toxic:  30.0 - 70.0 mg/dl  Lethal: >70.0 mg/dl      Lithium Level 09/01/2019 <0.1* 0.6 - 1.2 mmol/L Final    Amitriptyline 09/01/2019 <10  ng/mL Final    Nortriptyline Lvl 09/01/2019 <10* 50 - 150 ng/mL Final    Amitrip+Nortrip 09/01/2019 Not Applicable  95 - 250 ng/mL Final    Comment: INTERPRETIVE INFORMATION: Amitriptyline/Nortriptyline,   Total, SP  Therapeutic Range:  Total drug (Amitriptyline and Nortriptyline):  ng/mL  Toxic: Greater than 500 ng/mL  Toxic concentrations may cause anticholinergic effects,   cardiac abnormalities and seizures.  See Compliance Statement B: [email or web address]      Imipramine Lvl 09/01/2019 <10  ng/mL Final    Desipramine 09/01/2019 <10* 100 - 300 ng/mL Final    Total (Imipramine and Desipramine) 09/01/2019 Not Applicable  150 - 300 ng/mL Final    Comment: INTERPRETIVE INFORMATION: Imipramine + Desipramine  Therapeutic Range:  Total (Imipramine and Desipramine): 150-300 ng/mL  Toxic: Greater than 500 ng/mL  Toxic concentrations may cause anticholinergic effects,   drowsiness and cardiac abnormalities.  See Compliance Statement B: [email or web address]      Doxepin Lvl 09/01/2019 <10  ng/mL Final    Nordoxepin Lvl 09/01/2019 <10  ng/mL Final    Doxepin+Nordoxepin Lvl 09/01/2019 Not Applicable  100 - 300 ng/mL Final    Comment: INTERPRETIVE INFORMATION: Doxepin/Nordoxepin Total, SP  Therapeutic Range:  Total (Doxepin and Nordoxepin): 100-300 ng/mL  Toxic: Greater than 500 ng/mL  Toxic concentrations may cause anticholinergic effects and   cardiac abnormalities.  See Compliance Statement B: [email or web address]      Clomipramine Lvl 09/01/2019 <20  ng/mL Final    Clomipramine Metabolite  09/01/2019 <20  ng/mL Final    Clomipramine & Metabolites, Total 09/01/2019 Not Applicable  220 - 500 ng/mL Final    Comment: INTERPRETIVE INFORMATION:  Clomipramine/Norclomipramine,   Total, SP  Therapeutic Range:  Total (clomipramine and norclomipramine): 220-500 ng/mL  Toxic: Greater than 900 ng/mL  The therapeutic range listed relates to the antidepressant   characteristics of the drug. A therapeutic range for   treating obsessive compulsive disorder is not well   established. Toxic concentrations may cause anticholinergic   effects, CNS depression, cardiac abnormalities, seizures   and hypotension.  See Compliance Statement B: [employer]      Protriptyline (Vivactil) 09/01/2019 <10* 70 - 240 ng/mL Final    Comment: INTERPRETIVE INFORMATION: Protriptyline, SP  Therapeutic Range:  ng/mL  Toxic: Greater than 400 ng/mL  Toxic concentrations may cause hypotension, cardiac   abnormalities, seizures and coma.   See Compliance Statement B: [employer]  Performed by [facility],                              [address] [place] [id number] [phone]                    [email or web address], Po Robison MD - Lab. Director      Valproic Acid Level 09/01/2019 <12.5* 50.0 - 100.0 ug/mL Final    Comment: Valproic Acid (ug/ml)  Toxic:   >100.0 ug/ml      RBC, UA 09/01/2019 2  0 - 4 /hpf Final    Squam Epithel, UA 09/01/2019 2  /hpf Final    Microscopic Comment 09/01/2019 SEE COMMENT   Final    Comment: Other formed elements not mentioned in the report are not   present in the microscopic examination.       Magnesium 09/01/2019 2.2  1.6 - 2.6 mg/dL Final    Alcohol, Medical, Serum 09/01/2019 <10  <10 mg/dL Final   ]    Psychotherapy:  · Target symptoms: depression, anxiety , poor sleep, substance abuse  · Why chosen therapy is appropriate versus another modality: relevant to diagnosis, evidence based practice  · Outcome monitoring methods: self-report, observation  · Therapeutic intervention type:  "supportive psychotherapy  · Topics discussed/themes: symptom improvement, medications  · The patient's response to the intervention is accepting. The patient's progress toward treatment goals is good.   · Duration of intervention: 18 minutes.    Review of Systems   · PSYCHIATRIC: Pertinant items are noted in the narrative.  GENERAL:  Well developed   SKIN:  No rashes or lacerations  HEAD:  No headache  EYES:  No exophthalmos, jaundice or blindness  EARS:  No hearing loss  MOUTH & THROAT:  No dyskinetic movements or obvious goiter  CHEST:  No shortness of breath  CARDIOVASCULAR:  No chest pain  ABDOMEN:  No nausea, vomiting, pain, constipation or diarrhea  URINARY:  No dysuria or sexual dysfunction  MUSCULOSKELETAL:  No tremor, no tic  NEUROLOGIC:  No abnormal movements    Past Medical, Family and Social History: The patient's past medical, family and social history have been reviewed and updated as appropriate within the electronic medical record - see encounter notes.    Compliance: endorses compliance    Side effects: None    Risk Parameters:  Patient reports no suicidal ideation  Patient reports no homicidal ideation  Patient reports no self-injurious behavior  Patient reports no violent behavior    Exam (detailed: at least 9 elements; comprehensive: all 15 elements)   Constitutional  Vitals:  Most recent vital signs, dated less than 90 days prior to this appointment, were reviewed.   Vitals:    09/17/19 0959   BP: 110/64   Pulse: 87   Weight: 76.7 kg (169 lb 1.5 oz)   Height: 5' 4" (1.626 m)      General:  unremarkable, age appropriate     Musculoskeletal  Muscle Strength/Tone:  no tremor, no tic   Gait & Station:  non-ataxic     Psychiatric  Speech:  no latency; no press   Mood & Affect:  "Good."  congruent and appropriate   Thought Process:  normal and logical   Associations:  intact   Thought Content:  normal, no suicidality, no homicidality, delusions, or paranoia   Insight:  has awareness of illness "   Judgement: poor in relation to coping   Orientation:  grossly intact   Memory: intact for content of interview   Language: grossly intact   Attention Span & Concentration:  able to focus   Fund of Knowledge:  intact and appropriate to age and level of education     Assessment and Diagnosis   Status/Progress: Based on the examination today, the patient's problem(s) is/are improved.  New problems have not been presented today.   Psychosocial issues are complicating management of the primary condition.  There are no active rule-out diagnoses for this patient at this time.     General Impression: She has improved and is doing well.       ICD-10-CM ICD-9-CM   1. Major depressive disorder, recurrent episode, moderate with anxious distress F33.1 296.32   2. Mood insomnia F51.05 [id number]    F39        Intervention/Counseling/Treatment Plan   · Medication Management: The risks and benefits of medication were discussed with the patient.  · The treatment plan and follow up plan were reviewed with the patient.   1. Safety: Call 911 or Crisis Line or go to ER for suicidal ideation, adverse effects of medication or any other emergency  2. Continue Taper off caffeine (ice tea) slowly.   3. Prozac 30 mg po qd.  4. RTC in one months or sooner prn.   5. Increase hydroxyzine hcl to 50 mg po qd prn anxiety/sleep.   6. Start psychotherapy. Has appt. in October.   7. Return to clinic in one month or sooner prn.     Patient agrees with POC.    INSTRUCTIONS  Instructed to call 911 or Crisis Line or go to ER for suicidal ideation, adverse effects of medication or any other emergency. Verbalizes understanding and plan to comply.    Return to Clinic: 1 month, as needed

## 2019-09-17 NOTE — LETTER
September 17, 2019    [address], [address]  [place] [address]  Phone: [phone]  Fax: [phone]         Critical access hospital  [address]  [place] [address]        Critical access hospital    Was treated here on 09/17/2019    May Return to work/school on 09/18/2019    No Restrictions        Sincerely,          Virginia Dickson NP

## 2019-09-17 NOTE — PATIENT INSTRUCTIONS
"You have been provided with a certain amount of medication with a specified number of refills.  Please follow up within an adequate time before you run out of medications.    REFILLS FOR CONTROLLED SUBSTANCES WILL NOT BE GIVEN WITHOUT AN APPOINTMENT.  I will not honor or fill automated refill requests from pharmacies.  You must come in for an appointment to get refills.    Please book your next appointment for myself or therapist by phone by calling our office at [phone].      PLEASE BE AT LEAST 15 MINUTES EARLY FOR YOUR NEXT APPOINTMENT.  PLEASE, DO NOT BE LATE OR YOU WILL BE TURNED AWAY AND ASKED TO RESCHEDULE.  YOU MUST COME EARLY TO ALLOW TIME FOR CHECK-IN AS WELL AS GET YOUR VITAL SIGNS AND GO OVER YOUR MEDICATIONS.  [employer] is not fair to the patients who present after you and are on time for their appointments.  It causes a delay in the appointments for patients and staff.  IF YOU ARE LATE, THERE IS A POSSIBILITY THAT YOU WILL BE CHARGED FOR THE APPOINTMENT TIME PERSONALLY AND IT WILL NOT GO TO YOUR INSURANCE.  YOU MAY ALSO BE DISCHARGED FROM CLINIC with multiple "No Show" appointments.  -----------------------------------------------------------------------------------------------------------------  IF YOU FEEL SUICIDAL OR HAVING THOUGHTS OR PLANS TO HURT YOURSELF OR OTHERS, CALL 911 OR REPORT TO THE NEAREST EMERGENCY ROOM.  YOU CAN ALSO ACCESS THE FOLLOWING HOTLINE(S):    National Suicide Hotline Number 2-267-702-TALK (6874)     ([facility] Mobile Crisis, [phone]'   [facility] Crisis Line, [phone]; [facility], 24 hours / 7 days, [phone]-COPE ([phone]), [phone]-COPE ([phone]))     TIPS FOR GETTING YOUR PRESCRIPTIONS:    You can always ask your pharmacist the cost of your medications without the use of your insurance. Sometimes the medication will be cheaper if you do not use your insurance.     If you decide you want to have your prescriptions filled at " a different pharmacy, you can always go to the new pharmacy of your choice and have them call the pharmacy where your prescription was sent and they can have your prescription transferred to the new pharmacy.

## 2019-09-20 DIAGNOSIS — M25.551 PAIN OF RIGHT HIP JOINT: Primary | ICD-10-CM

## 2019-09-23 ENCOUNTER — OFFICE VISIT (OUTPATIENT)
Dept: ORTHOPEDICS | Facility: CLINIC | Age: 54
End: 2019-09-23
Attending: ORTHOPAEDIC SURGERY
Payer: COMMERCIAL

## 2019-09-23 VITALS
HEIGHT: 64 IN | SYSTOLIC BLOOD PRESSURE: 118 MMHG | RESPIRATION RATE: 16 BRPM | DIASTOLIC BLOOD PRESSURE: 72 MMHG | WEIGHT: 167.75 LBS | BODY MASS INDEX: 28.64 KG/M2 | HEART RATE: 89 BPM | OXYGEN SATURATION: 98 %

## 2019-09-23 DIAGNOSIS — M70.61 GREATER TROCHANTERIC BURSITIS OF RIGHT HIP: Primary | ICD-10-CM

## 2019-09-23 PROCEDURE — 99213 PR OFFICE/OUTPT VISIT, EST, LEVL III, 20-29 MIN: ICD-10-PCS | Mod: 25,S$GLB,, | Performed by: ORTHOPAEDIC SURGERY

## 2019-09-23 PROCEDURE — 99213 OFFICE O/P EST LOW 20 MIN: CPT | Mod: 25,S$GLB,, | Performed by: ORTHOPAEDIC SURGERY

## 2019-09-23 PROCEDURE — 20610 DRAIN/INJ JOINT/BURSA W/O US: CPT | Mod: RT,S$GLB,, | Performed by: ORTHOPAEDIC SURGERY

## 2019-09-23 PROCEDURE — 99999 PR PBB SHADOW E&M-EST. PATIENT-LVL IV: ICD-10-PCS | Mod: PBBFAC,,, | Performed by: ORTHOPAEDIC SURGERY

## 2019-09-23 PROCEDURE — 99999 PR PBB SHADOW E&M-EST. PATIENT-LVL IV: CPT | Mod: PBBFAC,,, | Performed by: ORTHOPAEDIC SURGERY

## 2019-09-23 PROCEDURE — 20610 LARGE JOINT ASPIRATION/INJECTION: R GREATER TROCHANTERIC BURSA: ICD-10-PCS | Mod: RT,S$GLB,, | Performed by: ORTHOPAEDIC SURGERY

## 2019-09-23 RX ORDER — TRIAMCINOLONE ACETONIDE 40 MG/ML
40 INJECTION, SUSPENSION INTRA-ARTICULAR; INTRAMUSCULAR
Status: DISCONTINUED | OUTPATIENT
Start: 2019-09-23 | End: 2019-09-23 | Stop reason: HOSPADM

## 2019-09-23 RX ADMIN — TRIAMCINOLONE ACETONIDE 40 MG: 40 INJECTION, SUSPENSION INTRA-ARTICULAR; INTRAMUSCULAR at 11:09

## 2019-09-23 NOTE — PROCEDURES
Large Joint Aspiration/Injection: R greater trochanteric bursa  Date/Time: 9/23/2019 11:00 AM  Performed by: Jessica Shay MD  Authorized by: Jessica Shay MD     Consent Done?:  Yes (Verbal)  Indications:  Pain  Timeout: Prior to procedure the correct patient, procedure, and site was verified    Anesthesia  Local anesthesia used  Anesthetic: topical anesthetic    Location:  Hip  Site:  R greater trochanteric bursa  Prep: Patient was prepped and draped in usual sterile fashion    Needle size:  22 G  Medications:  40 mg triamcinolone acetonide 40 mg/mL  Patient tolerance:  Patient tolerated the procedure well with no immediate complications

## 2019-09-23 NOTE — LETTER
September 23, 2019      [facility]  [address], [address]  [place] [address]  Phone: [phone]       Patient: Naomy Armstrong   YOB: 1965  Date of Visit: 09/23/2019    To Whom It May Concern:    Ronald Armstrong  was at [facility] on 09/23/2019. She may return to work/school on 9/23/19 with no restrictions. If you have any questions or concerns, or if I can be of further assistance, please do not hesitate to contact me.    Sincerely,    Jessica Shay MD

## 2019-09-23 NOTE — PROGRESS NOTES
Chief Complaint   Patient presents with    Right Hip - Pain       This patient was seen in consultation at the request of Dr. Jessica Shay     HPI: Naomy Armstrong is a 54 y.o. female who presents today complaining of Pain of the Right Hip     Duration of symptoms:  Several years, worse over last few months  Trauma or new activity: no  Pain is intermittent  Aggravating factors: going from sit to stand   Relieving factors: no known   Does not have pain if she lays on the right side  Pain localized to lateral proximal femur   Radicular symptoms: no numbness, paresthesias. + chronic LBP that is unchanged  Prior treatment:  prescription NSAIDs without improvement in pain.     Pain does interfere with activities of daily living .    This is the extent of the patient's complaints at this time.     Review of Systems   All other systems reviewed and are negative.        Review of patient's allergies indicates:   Allergen Reactions    Effexor [venlafaxine]      Elevated her blood pressure    Zoloft [sertraline]     Paroxetine hcl      Made her feel drunk         Current Outpatient Medications:     amLODIPine (NORVASC) 5 MG tablet, Take 5 mg by mouth once daily., Disp: , Rfl:     aspirin-acetaminophen-caffeine 250-250-65 mg (EXCEDRIN MIGRAINE) 250-250-65 mg per tablet, Take 1 tablet by mouth every 6 (six) hours as needed for Pain., Disp: , Rfl:     dicyclomine (BENTYL) 20 mg tablet, Take 20 mg by mouth every 6 (six) hours., Disp: , Rfl:     ergocalciferol (ERGOCALCIFEROL) 50,000 unit Cap, Take 50,000 Units by mouth., Disp: , Rfl:     FLUoxetine 10 MG capsule, Take 1 capsule (10 mg total) by mouth once daily., Disp: 30 capsule, Rfl: 0    FLUoxetine 20 MG capsule, Take 1 capsule (20 mg total) by mouth once daily., Disp: 30 capsule, Rfl: 0    HYDROcodone-acetaminophen (NORCO) 7.5-325 mg per tablet, Take 1 tablet by mouth every 6 (six) hours as needed for Pain., Disp: , Rfl:     hydrOXYzine HCl (ATARAX)  50 MG tablet, Take 1 tablet (50 mg total) by mouth daily as needed for Anxiety (sleep)., Disp: 30 tablet, Rfl: 0    lisinopril (PRINIVIL,ZESTRIL) 20 MG tablet, Take 20 mg by mouth once daily., Disp: , Rfl:     loperamide (IMODIUM) 2 mg capsule, Take 2 mg by mouth 4 (four) times daily as needed for Diarrhea., Disp: , Rfl:     multivitamin (ONE DAILY MULTIVITAMIN) per tablet, Take 1 tablet by mouth once daily., Disp: , Rfl:     naproxen (NAPROSYN) 500 MG tablet, , Disp: , Rfl: 1    naproxen-esomeprazole (VIMOVO) 500-20 mg TbID, Take by mouth., Disp: , Rfl:     nitrofurantoin, macrocrystal-monohydrate, (MACROBID) 100 MG capsule, Take 100 mg by mouth as needed., Disp: , Rfl:     omeprazole (PRILOSEC) 20 MG capsule, Take 20 mg by mouth once daily. , Disp: , Rfl:     ondansetron (ZOFRAN) 4 MG tablet, , Disp: , Rfl:     promethazine (PHENERGAN) 25 MG tablet, , Disp: , Rfl: 1    simethicone (MYLICON) 125 MG chewable tablet, Take 125 mg by mouth., Disp: , Rfl:     tiZANidine (ZANAFLEX) 4 MG tablet, , Disp: , Rfl: 1    topiramate (TROKENDI XR) 50 mg Cp24, Take 50 mg by mouth daily as needed., Disp: , Rfl:     Past Medical History:   Diagnosis Date    Alcohol abuse     per chart, but patient denies today and cocaine per chart    Anxiety     Depression     Hallucination     last couple of months started seeing someone standing in her room, saw boyfriend's reflection in glass    Headache     History of psychiatric hospitalization     age 13 [facility] for 2 months for acting out; 2018 for SI    Hx of psychiatric care     Hypertension     Psychiatric problem     Sleep difficulties     Suicide attempt     with knife by cutting stomach    Therapy        Patient Active Problem List   Diagnosis    Cervical radiculopathy    DDD (degenerative disc disease), cervical    Generalized headaches    Primary osteoarthritis of right knee    Neck pain    Spondylosis of cervical region without myelopathy or  "radiculopathy    Polysubstance abuse       Past Surgical History:   Procedure Laterality Date    BLOCK, NERVE, FACET JOINT, CERVICAL, MEDIAL BRANCH Left 9/10/2013    Performed [facility]    INJECTION, STEROID, SPINE, CERVICAL, EPIDURAL N/A 2013    [facility] by [facility] at [facility]    RADIOFREQUENCY THERMAL COAGULATION, NERVE, SPINAL, CERVICAL, POSTERIOR RAMUS, MEDIAL BRANCH Left 10/8/2013    Performed by [facility] at [facility]       Social History     Tobacco Use    Smoking status: Former Smoker     Last attempt to quit: 2006     Years since quittin.4    Smokeless tobacco: Never Used   Substance Use Topics    Alcohol use: Yes     Comment: occasional    Drug use: Not Currently     Types: Marijuana     Comment: tried at age 14       Family History   Problem Relation Age of Onset    Anxiety disorder Sister     Depression Sister     Alcohol abuse Maternal Uncle     Alcohol abuse Maternal Grandfather        Physical Exam:   Vitals:    19 1116   BP: 118/72   Pulse: 89   Resp: 16   SpO2: 98%   Weight: 76.1 kg (167 lb 12.3 oz)   Height: 5' 4" (1.626 m)   PainSc:   4   PainLoc: Hip       General: Weight: 76.1 kg (167 lb 12.3 oz) Body mass index is 28.8 kg/m².   Patient is alert, awake and oriented to time, place and person. Mood and affect are appropriate.  Patient does not appear to be in any distress, denies any constitutional symptoms and appears stated age.   Resp:  No respiratory distress or audible wheezing   CV: 2+  pulses, all extremities warm and well perfused   Right Hip   Negative [facility]  ER 50, IR 35 - painless  TTP over GT bursa  No swelling or tenderness of leg     Imagin views right hip: no degenerative changes, no evidence of fracture     I personally reviewed and interpreted the patient's imaging obtained today in clinic     Assessment: 54 y.o. female with right greater trochanteric bursitis     I " explained my diagnostic impression and the reasoning behind it in detail, using layman's terms.  Models and/or pictures were used to help in the explanation.  We discussed non operative and operative treatment modalities -- She would like to start with non-operative treatment in the form of injection.     Plan:   - Injection to right greater trochanteric bursa performed, please see procedure note for details.  We discussed the risks and benefits of injection including pain, infection, bleeding, skin color changes, fat atrophy at injection site, failure to improve pain, temporary increase in pain, steroid flare, and damage to surrounding structures including nerves, blood vessels, tendons and muscles.   - Return to clinic PRN if symptoms worsen or fail to improve.  - Ortho info HEP and info sheet given    All questions were answered in detail. The patient is in full agreement with the treatment plan and will proceed accordingly.    A note notifying Dr. Jessica Shay of my findings was sent via the electronic medical record     This note was created by combination of typed  and M-Modal dictation. Transcription and phonetic errors may be present.  If there are any questions, please contact me.

## 2019-10-10 ENCOUNTER — OFFICE VISIT (OUTPATIENT)
Dept: PSYCHIATRY | Facility: CLINIC | Age: 54
End: 2019-10-10
Payer: COMMERCIAL

## 2019-10-10 VITALS
SYSTOLIC BLOOD PRESSURE: 108 MMHG | DIASTOLIC BLOOD PRESSURE: 60 MMHG | WEIGHT: 163.69 LBS | BODY MASS INDEX: 27.95 KG/M2 | HEART RATE: 80 BPM | HEIGHT: 64 IN

## 2019-10-10 DIAGNOSIS — Z65.8 PSYCHOSOCIAL DISTRESS: ICD-10-CM

## 2019-10-10 DIAGNOSIS — F33.3 MAJOR DEPRESSIVE DISORDER, RECURRENT, SEVERE WITH PSYCHOTIC FEATURES: Primary | ICD-10-CM

## 2019-10-10 DIAGNOSIS — F51.05 MOOD INSOMNIA: ICD-10-CM

## 2019-10-10 DIAGNOSIS — F39 MOOD INSOMNIA: ICD-10-CM

## 2019-10-10 DIAGNOSIS — F41.1 GAD (GENERALIZED ANXIETY DISORDER): ICD-10-CM

## 2019-10-10 PROCEDURE — 99999 PR PBB SHADOW E&M-EST. PATIENT-LVL III: CPT | Mod: PBBFAC,,, | Performed by: NURSE PRACTITIONER

## 2019-10-10 PROCEDURE — 99214 OFFICE O/P EST MOD 30 MIN: CPT | Mod: S$GLB,,, | Performed by: NURSE PRACTITIONER

## 2019-10-10 PROCEDURE — 99214 PR OFFICE/OUTPT VISIT, EST, LEVL IV, 30-39 MIN: ICD-10-PCS | Mod: S$GLB,,, | Performed by: NURSE PRACTITIONER

## 2019-10-10 PROCEDURE — 90833 PSYTX W PT W E/M 30 MIN: CPT | Mod: S$GLB,,, | Performed by: NURSE PRACTITIONER

## 2019-10-10 PROCEDURE — 90833 PR PSYCHOTHERAPY W/PATIENT W/E&M, 30 MIN (ADD ON): ICD-10-PCS | Mod: S$GLB,,, | Performed by: NURSE PRACTITIONER

## 2019-10-10 PROCEDURE — 99999 PR PBB SHADOW E&M-EST. PATIENT-LVL III: ICD-10-PCS | Mod: PBBFAC,,, | Performed by: NURSE PRACTITIONER

## 2019-10-10 RX ORDER — RISPERIDONE 1 MG/1
1 TABLET ORAL NIGHTLY
Qty: 60 TABLET | Refills: 0 | Status: ON HOLD | OUTPATIENT
Start: 2019-10-10 | End: 2019-12-24 | Stop reason: HOSPADM

## 2019-10-10 RX ORDER — TRAZODONE HYDROCHLORIDE 50 MG/1
50 TABLET ORAL NIGHTLY
Qty: 30 TABLET | Refills: 0 | Status: ON HOLD | OUTPATIENT
Start: 2019-10-10 | End: 2019-12-24 | Stop reason: HOSPADM

## 2019-10-10 RX ORDER — CETIRIZINE HYDROCHLORIDE 10 MG/1
TABLET ORAL
Refills: 2 | Status: ON HOLD | COMMUNITY
Start: 2019-09-20 | End: 2019-12-24 | Stop reason: HOSPADM
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
## 2019-10-10 NOTE — LETTER
October 10, 2019    [address], [address]  [place] [address]  Phone: [phone]  Fax: [phone]         [facility]  [address]  [place] [address]        [facility]    Was treated here on 10/10/2019    May Return to work/school on 10/11/2019    No Restrictions        Sincerely,        Virginia Dickson NP

## 2019-10-10 NOTE — PATIENT INSTRUCTIONS
"You have been provided with a certain amount of medication with a specified number of refills.  Please follow up within an adequate time before you run out of medications.    REFILLS FOR CONTROLLED SUBSTANCES WILL NOT BE GIVEN WITHOUT AN APPOINTMENT.  I will not honor or fill automated refill requests from pharmacies.  You must come in for an appointment to get refills.    Please book your next appointment for myself or therapist by phone by calling our office at [phone].      PLEASE BE AT LEAST 15 MINUTES EARLY FOR YOUR NEXT APPOINTMENT.  PLEASE, DO NOT BE LATE OR YOU WILL BE TURNED AWAY AND ASKED TO RESCHEDULE.  YOU MUST COME EARLY TO ALLOW TIME FOR CHECK-IN AS WELL AS GET YOUR VITAL SIGNS AND GO OVER YOUR MEDICATIONS.  [facility] is not fair to the patients who present after you and are on time for their appointments.  It causes a delay in the appointments for patients and staff.  IF YOU ARE LATE, THERE IS A POSSIBILITY THAT YOU WILL BE CHARGED FOR THE APPOINTMENT TIME PERSONALLY AND IT WILL NOT GO TO YOUR INSURANCE.  YOU MAY ALSO BE DISCHARGED FROM CLINIC with multiple "No Show" appointments.  -----------------------------------------------------------------------------------------------------------------  IF YOU FEEL SUICIDAL OR HAVING THOUGHTS OR PLANS TO HURT YOURSELF OR OTHERS, CALL 911 OR REPORT TO THE NEAREST EMERGENCY ROOM.  YOU CAN ALSO ACCESS THE FOLLOWING HOTLINE(S):    National Suicide Hotline Number 8-788-048-TALK (1668)     ([facility] Mobile Crisis, [phone]'   [place] Crisis Line, [phone]; [place], 24 hours / 7 days, [phone]-COPE (1944), 9-574-782-COPE (7574))     TIPS FOR GETTING YOUR PRESCRIPTIONS:    You can always ask your pharmacist the cost of your medications without the use of your insurance. Sometimes the medication will be cheaper if you do not use your insurance.     If you decide you want to have your prescriptions filled at " a different pharmacy, you can always go to the new pharmacy of your choice and have them call the pharmacy where your prescription was sent and they can have your prescription transferred to the new pharmacy.

## 2019-10-16 ENCOUNTER — OFFICE VISIT (OUTPATIENT)
Dept: PSYCHIATRY | Facility: CLINIC | Age: 54
End: 2019-10-16
Payer: COMMERCIAL

## 2019-10-16 VITALS
DIASTOLIC BLOOD PRESSURE: 62 MMHG | HEIGHT: 64 IN | SYSTOLIC BLOOD PRESSURE: 128 MMHG | WEIGHT: 167.13 LBS | BODY MASS INDEX: 28.53 KG/M2 | HEART RATE: 86 BPM

## 2019-10-16 DIAGNOSIS — F33.3 SEVERE RECURRENT MAJOR DEPRESSIVE DISORDER WITH PSYCHOTIC FEATURES WITH ANXIOUS DISTRESS: Primary | ICD-10-CM

## 2019-10-16 DIAGNOSIS — F41.1 GAD (GENERALIZED ANXIETY DISORDER): ICD-10-CM

## 2019-10-16 DIAGNOSIS — Z91.148 POOR COMPLIANCE WITH MEDICATION: ICD-10-CM

## 2019-10-16 DIAGNOSIS — Z65.8 PSYCHOSOCIAL DISTRESS: ICD-10-CM

## 2019-10-16 DIAGNOSIS — F39 MOOD INSOMNIA: ICD-10-CM

## 2019-10-16 DIAGNOSIS — F19.10 POLYSUBSTANCE ABUSE: ICD-10-CM

## 2019-10-16 DIAGNOSIS — F51.05 MOOD INSOMNIA: ICD-10-CM

## 2019-10-16 PROCEDURE — 99214 PR OFFICE/OUTPT VISIT, EST, LEVL IV, 30-39 MIN: ICD-10-PCS | Mod: S$GLB,,, | Performed by: NURSE PRACTITIONER

## 2019-10-16 PROCEDURE — 90833 PR PSYCHOTHERAPY W/PATIENT W/E&M, 30 MIN (ADD ON): ICD-10-PCS | Mod: S$GLB,,, | Performed by: NURSE PRACTITIONER

## 2019-10-16 PROCEDURE — 90833 PSYTX W PT W E/M 30 MIN: CPT | Mod: S$GLB,,, | Performed by: NURSE PRACTITIONER

## 2019-10-16 PROCEDURE — 99999 PR PBB SHADOW E&M-EST. PATIENT-LVL III: CPT | Mod: PBBFAC,,, | Performed by: NURSE PRACTITIONER

## 2019-10-16 PROCEDURE — 99999 PR PBB SHADOW E&M-EST. PATIENT-LVL III: ICD-10-PCS | Mod: PBBFAC,,, | Performed by: NURSE PRACTITIONER

## 2019-10-16 PROCEDURE — 99214 OFFICE O/P EST MOD 30 MIN: CPT | Mod: S$GLB,,, | Performed by: NURSE PRACTITIONER

## 2019-10-16 RX ORDER — FLUOXETINE HYDROCHLORIDE 20 MG/1
20 CAPSULE ORAL DAILY
Qty: 30 CAPSULE | Refills: 0 | Status: ON HOLD | OUTPATIENT
Start: 2019-10-16 | End: 2019-12-24 | Stop reason: HOSPADM

## 2019-10-16 RX ORDER — FLUOXETINE 10 MG/1
10 CAPSULE ORAL DAILY
Qty: 30 CAPSULE | Refills: 0 | Status: ON HOLD | OUTPATIENT
Start: 2019-10-16 | End: 2019-12-24 | Stop reason: HOSPADM

## 2019-10-16 RX ORDER — HYDROXYZINE HYDROCHLORIDE 50 MG/1
50 TABLET, FILM COATED ORAL DAILY PRN
Qty: 30 TABLET | Refills: 0 | Status: ON HOLD | OUTPATIENT
Start: 2019-10-16 | End: 2019-12-24 | Stop reason: HOSPADM

## 2019-10-16 NOTE — PROGRESS NOTES
Outpatient Psychiatry Follow-Up Visit (MD/NP)    10/16/2019    Clinical Status of Patient:  Outpatient (Ambulatory)    Chief Complaint:  Naomy Armstrong is a 54 y.o. female who presents today for follow-up of depression, anxiety, psychosis and poor sleep and polysubstance abuse.  Met with patient.      Interval History and Content of Current Session:  Interim Events/Subjective Report/Content of Current Session: Naomy  was last seen by me on 10/10/2019 for a follow up visit. Noamy was diagnosed with severe recurrent MDD with psychotic features with anxious distress, polysubstance abuse and mood insomnia. Her symptoms were inadequately controlled and a plan of care was devised to include:    1. Safety: Call 911 or Crisis Line or go to ER for suicidal ideation, adverse effects of medication or any other emergency  2. Continue Taper off caffeine (ice tea) slowly.   3. Prozac 30 mg po qd.  4. RTC in one month or sooner prn.   5. continue hydroxyzine hcl  50 mg po qd prn anxiety/sleep.   6. Start psychotherapy when financially able.   7. Return to clinic in one week or sooner prn.   8. Start risperdal 1 mg po qhs prn psychotic features.  9. Start trazodone 50 mg po qhs prn sleep.        Today Naomy is seen face to face. Her depression is a little better. Her psychotic features continue. She heard Renan's (her boyfriend) voice yesterday and the day before when he was not talking to her. Once he was in the room with her and denied speaking and once he was downstairs and she was upstairs.  She is not taking the Risperdal cristina night as prescribed. She states she forgets to take it and she forgets to take the Trazodone. She is instructed to set a timer on her phone to help her to be compliant with her medications. She verbalizes understanding and plan to comply. She states that she went out yesterday looking for alcohol. She couldn't find the alcohol she wanted so she thought that was a sign so she didn't get  anyting. She states that she does know where she could have gone to get the drink she wanted, but she did not do this. She also did not get her chocolate milk. Her anxiety is under control. Her sleep is poor. She initially states she slept 2.5 hours but as she reviews the times she saw on the clock, she realizes she actually slept about 4 hours last night. It takes her a while to go to sleep. Her appetite is good. Her energy level is low.    Her schedule rotates. On some days she can go to bed at 9 pm or 10 pm and because of work she sometimes has to go to bed a 1 pm and then get up at 9 pm to work throughtout the night. Instructed on how this disrupts the sleep cycle. verbalizs understanding. She has started to exercise. She has started to cut down on junk food. She complains of leg pain/cramps today and is asking for flexeril. She is advised to follow up with her PCP regarding leg pain and medication for leg pain. Verbalizes understanding and plan to comply.     No visits with results within 1 Month(s) from this visit.   Latest known visit with results is:   Lab Visit on 09/06/2019   Component Date Value Ref Range Status    Alcohol, Urine 09/06/2019 <10  <10 mg/dL Final    Benzodiazepines 09/06/2019 Negative   Final    Methadone metabolites 09/06/2019 Negative   Final    Cocaine (Metab.) 09/06/2019 Negative   Final    Opiate Scrn, Ur 09/06/2019 Negative   Final    Barbiturate Screen, Ur 09/06/2019 Negative   Final    Amphetamine Screen, Ur 09/06/2019 Negative   Final    THC 09/06/2019 Negative   Final    Phencyclidine 09/06/2019 Negative   Final    Creatinine, Random Ur 09/06/2019 60.0  15.0 - 325.0 mg/dL Final    Comment: The random urine reference ranges provided were established   for 24 hour urine collections.  No reference ranges exist for  random urine specimens.  Correlate clinically.      Toxicology Information 09/06/2019 SEE COMMENT   Final    Comment: This screen includes the following classes  of drugs at the   listed cut-off:  Benzodiazepines                  200 ng/ml  Methadone                        300 ng/ml  Cocaine metabolite               300 ng/ml  Opiates                          300 ng/ml  Barbiturates                     200 ng/ml  Amphetamines                    1000 ng/ml  Marijuana metabs (THC)            50 ng/ml  Phencyclidine (PCP)               25 ng/ml  High concentrations of Diphenhydramine may cross-react with  Phencyclidine PCP screening immunoassay giving a false   positive result.  High concentrations of Methylenedioxymethamphetamine (MDMA aka  Ectasy) and other structurally similar compounds may cross-   react with the Amphetamine/Methamphetamine screening   immunoassay giving a false positive result.  A metabolite of the anti-HIV drug Sustiva () may cause  false positive results in the Marijuana metabolite (THC)   screening assay.  Note: This exception list includes only more common   interferants i                           n toxicology screen testing.  Because of many   cross-reactantspositive results on toxicology drug screens   should be confirmed whenever results do not correlate with   clinical presentation.  This report is intended for use in clinical monitoring and  management of patients. It is not intended for use in   employment related drug testing.  Because of any cross-reactants, positive results on toxicology  drug screens should be confirmed whenever results do not  correlate with clinical presentation.  Presumptive positive results are unconfirmed and may be used   only for medical purposes.  Assay Intended Use: This assay provides only a preliminary analytical  test result. A more specific alternate chemical method must be used  to obtain a confirmed analytical result. Gas chromatography/mass  spectrometry (GS/MS)is the preferred confirmatory method. Clinical  consideration and professional judgement should be applied to any   drug of abuse test result,  particularly when preliminary resul                           ts  are used.      Alcohol, Medical, Serum 09/06/2019 <10  <10 mg/dL Final   ]    What is scheduled as a 30 minute visit actually takes 45 minutes with greater than 50% of time spent in psychotherapy.     Psychotherapy:  · Target symptoms: depression, anxiety , poor sleep, substance abuse  · Why chosen therapy is appropriate versus another modality: relevant to diagnosis, evidence based practice  · Outcome monitoring methods: self-report, observation  · Therapeutic intervention type: supportive psychotherapy  · Topics discussed/themes: supportive relationship, stressors related to schedule, financial stressors, medications and importance of compliance  · The patient's response to the intervention is accepting. The patient's progress toward treatment goals is fair.   · Duration of intervention: 25 minutes.    Review of Systems   · PSYCHIATRIC: Pertinant items are noted in the narrative.  GENERAL:  Well developed   SKIN:  No rashes or lacerations  HEAD:  No headache  EYES:  No exophthalmos, jaundice or blindness  EARS:  No hearing loss  MOUTH & THROAT:  No dyskinetic movements or obvious goiter  CHEST:  No shortness of breath  CARDIOVASCULAR:  No chest pain  ABDOMEN:  No nausea, vomiting, pain, constipation or diarrhea  URINARY:  No dysuria or sexual dysfunction  MUSCULOSKELETAL:  No tremor, no tic  NEUROLOGIC:  No abnormal movements    Past Medical, Family and Social History: The patient's past medical, family and social history have been reviewed and updated as appropriate within the electronic medical record - see encounter notes.    Compliance: poor compliance    Side effects: None    Risk Parameters:  Patient reports no suicidal ideation  Patient reports no homicidal ideation  Patient reports no self-injurious behavior  Patient reports no violent behavior    Exam (detailed: at least 9 elements; comprehensive: all 15 elements)   Constitutional  Vitals:  " Most recent vital signs, dated less than 90 days prior to this appointment, were reviewed.   Vitals:    10/16/19 1003   BP: 128/62   Pulse: 86   Weight: 75.8 kg (167 lb 1.7 oz)   Height: 5' 4" (1.626 m)      General:  unremarkable, age appropriate     Musculoskeletal  Muscle Strength/Tone:  no tremor, no tic   Gait & Station:  non-ataxic     Psychiatric  Speech:  no latency; no press   Mood & Affect:  "Im just tired."   appropriate   Thought Process:  normal and logical   Associations:  intact   Thought Content:  normal, no suicidality, no homicidality, delusions, or paranoia   Insight:  has awareness of illness   Judgement: poor in relation to coping   Orientation:  grossly intact   Memory: intact for content of interview   Language: grossly intact   Attention Span & Concentration:  able to focus   Fund of Knowledge:  intact and appropriate to age and level of education     Assessment and Diagnosis   Status/Progress: Based on the examination today, the patient's problem(s) is/are improved and inadequately controlled.  New problems have not been presented today.   Psychosocial issues and poor compliance with medications are complicating management of the primary condition.  There are no active rule-out diagnoses for this patient at this time.     General Impression: She is only partially compliant with her medication and is having problems with sleep and auditory hallucinations.       ICD-10-CM ICD-9-CM   1. Severe recurrent major depressive disorder with psychotic features with anxious distress F33.3 296.34   2. Mood insomnia F51.05 [id number]    F39    3. LOR (generalized anxiety disorder) F41.1 300.02   4. Psychosocial distress Z65.8 V62.89   5. Polysubstance abuse F19.10 305.90   6. Poor compliance with medication Z91.14 V15.81     Intervention/Counseling/Treatment Plan   · Medication Management: The risks and benefits of medication were discussed with the patient.  · The treatment plan and follow up plan were " reviewed with the patient.   1. Safety: Call 911 or Crisis Line or go to ER for suicidal ideation, adverse effects of medication or any other emergency  2. Continue Taper off caffeine (ice tea) slowly.   3. Continue Prozac 30 mg po qd.  4. RTC in one month or sooner prn.   5. continue hydroxyzine hcl  50 mg po qd prn anxiety/sleep.   6. Start psychotherapy when financially able.   7. Continue risperdal 1 mg po qhs prn psychotic features.  8. Continue trazodone 50 mg po qhs prn sleep. May take a second tablet if first not effective.  9. Follow up with Dr. Hernandes regarding leg pain.     Patient agrees with POC.    INSTRUCTIONS  Instructed to call 911 or Crisis Line or go to ER for suicidal ideation, adverse effects of medication or any other emergency. Verbalizes understanding and plan to comply.    Return to Clinic: 1 month, as needed

## 2019-10-16 NOTE — LETTER
October 16, 2019    [address], [address]  [place] [address]  Phone: [phone]  Fax: [phone]         [facility]  [address]  [place] [address]        [facility]    Was treated here on 10/16/2019    May Return to work/school on 10/17/2019    No Restrictions        Sincerely,          Virginia Dickson NP

## 2019-10-16 NOTE — PATIENT INSTRUCTIONS
"You have been provided with a certain amount of medication with a specified number of refills.  Please follow up within an adequate time before you run out of medications.    REFILLS FOR CONTROLLED SUBSTANCES WILL NOT BE GIVEN WITHOUT AN APPOINTMENT.  I will not honor or fill automated refill requests from pharmacies.  You must come in for an appointment to get refills.    Please book your next appointment for myself or therapist by phone by calling our office at [phone].      PLEASE BE AT LEAST 15 MINUTES EARLY FOR YOUR NEXT APPOINTMENT.  PLEASE, DO NOT BE LATE OR YOU WILL BE TURNED AWAY AND ASKED TO RESCHEDULE.  YOU MUST COME EARLY TO ALLOW TIME FOR CHECK-IN AS WELL AS GET YOUR VITAL SIGNS AND GO OVER YOUR MEDICATIONS.  [employer] is not fair to the patients who present after you and are on time for their appointments.  It causes a delay in the appointments for patients and staff.  IF YOU ARE LATE, THERE IS A POSSIBILITY THAT YOU WILL BE CHARGED FOR THE APPOINTMENT TIME PERSONALLY AND IT WILL NOT GO TO YOUR INSURANCE.  YOU MAY ALSO BE DISCHARGED FROM CLINIC with multiple "No Show" appointments.  -----------------------------------------------------------------------------------------------------------------  IF YOU FEEL SUICIDAL OR HAVING THOUGHTS OR PLANS TO HURT YOURSELF OR OTHERS, CALL 911 OR REPORT TO THE NEAREST EMERGENCY ROOM.  YOU CAN ALSO ACCESS THE FOLLOWING HOTLINE(S):    National Suicide Hotline Number 8-555-785-TALK (3354)     ([facility] Mobile Crisis, [phone]'   [place] Crisis Line, [phone]) [phone]; [place], 24 hours / 7 days, [phone]) [phone]-COPE (4759), 0-622-450-COPE (4290))     TIPS FOR GETTING YOUR PRESCRIPTIONS:    You can always ask your pharmacist the cost of your medications without the use of your insurance. Sometimes the medication will be cheaper if you do not use your insurance.     If you decide you want to have your prescriptions filled at " a different pharmacy, you can always go to the new pharmacy of your choice and have them call the pharmacy where your prescription was sent and they can have your prescription transferred to the new pharmacy.

## 2019-10-30 ENCOUNTER — OFFICE VISIT (OUTPATIENT)
Dept: FAMILY MEDICINE | Facility: CLINIC | Age: 54
End: 2019-10-30
Payer: COMMERCIAL

## 2019-10-30 VITALS
OXYGEN SATURATION: 97 % | HEART RATE: 79 BPM | BODY MASS INDEX: 28.53 KG/M2 | HEIGHT: 64 IN | WEIGHT: 167.13 LBS | DIASTOLIC BLOOD PRESSURE: 92 MMHG | SYSTOLIC BLOOD PRESSURE: 150 MMHG | RESPIRATION RATE: 16 BRPM | TEMPERATURE: 98 F

## 2019-10-30 DIAGNOSIS — Z12.11 SCREEN FOR COLON CANCER: ICD-10-CM

## 2019-10-30 DIAGNOSIS — Z12.31 VISIT FOR SCREENING MAMMOGRAM: ICD-10-CM

## 2019-10-30 DIAGNOSIS — R25.2 LEG CRAMPS: ICD-10-CM

## 2019-10-30 DIAGNOSIS — Z11.59 NEED FOR HEPATITIS C SCREENING TEST: ICD-10-CM

## 2019-10-30 DIAGNOSIS — J00 COMMON COLD: Primary | ICD-10-CM

## 2019-10-30 LAB
CTP QC/QA: YES
POC MOLECULAR INFLUENZA A AGN: NEGATIVE
POC MOLECULAR INFLUENZA B AGN: NEGATIVE

## 2019-10-30 PROCEDURE — 99214 OFFICE O/P EST MOD 30 MIN: CPT | Mod: S$GLB,,, | Performed by: FAMILY MEDICINE

## 2019-10-30 PROCEDURE — 87502 POCT INFLUENZA A/B MOLECULAR: ICD-10-PCS | Mod: QW,S$GLB,, | Performed by: FAMILY MEDICINE

## 2019-10-30 PROCEDURE — 87502 INFLUENZA DNA AMP PROBE: CPT | Mod: QW,S$GLB,, | Performed by: FAMILY MEDICINE

## 2019-10-30 PROCEDURE — 99999 PR PBB SHADOW E&M-EST. PATIENT-LVL III: CPT | Mod: PBBFAC,,, | Performed by: FAMILY MEDICINE

## 2019-10-30 PROCEDURE — 99214 PR OFFICE/OUTPT VISIT, EST, LEVL IV, 30-39 MIN: ICD-10-PCS | Mod: S$GLB,,, | Performed by: FAMILY MEDICINE

## 2019-10-30 PROCEDURE — 99999 PR PBB SHADOW E&M-EST. PATIENT-LVL III: ICD-10-PCS | Mod: PBBFAC,,, | Performed by: FAMILY MEDICINE

## 2019-10-30 RX ORDER — ONDANSETRON 8 MG/1
8 TABLET, ORALLY DISINTEGRATING ORAL EVERY 8 HOURS PRN
Qty: 30 TABLET | Refills: 0 | Status: ON HOLD | OUTPATIENT
Start: 2019-10-30 | End: 2019-12-24 | Stop reason: HOSPADM

## 2019-10-30 RX ORDER — BROMPHENIRAMINE MALEATE, PSEUDOEPHEDRINE HYDROCHLORIDE, AND DEXTROMETHORPHAN HYDROBROMIDE 2; 30; 10 MG/5ML; MG/5ML; MG/5ML
10 SYRUP ORAL
Qty: 473 ML | Refills: 0 | Status: SHIPPED | OUTPATIENT
Start: 2019-10-30 | End: 2019-11-09

## 2019-10-30 NOTE — LETTER
October 30, 2019      [facility]  [address], [address]  [place] [address]  Phone: [phone]       Patient: Naomy Armstrong   YOB: 1965  Date of Visit: 10/30/2019    To Whom It May Concern:    Ronald Armstrong  was at [facility] on 10/30/2019. She may return to work on 10/31/2019. If you have any questions or concerns, or if I can be of further assistance, please do not hesitate to contact me.        Sincerely,      Eric Hernandes Jr., MD

## 2019-10-31 ENCOUNTER — TELEPHONE (OUTPATIENT)
Dept: FAMILY MEDICINE | Facility: CLINIC | Age: 54
End: 2019-10-31

## 2019-10-31 NOTE — TELEPHONE ENCOUNTER
----- Message from Tomeka Alanis sent at 10/31/2019 11:15 AM CDT -----  Contact: self  Type: Patient Call Back    Who called:self    What is the request in detail:pt needs to speak to nurse     Can the clinic reply by [facility]?no    Would the patient rather a call back or a response via [facility]? Call back    Best call back number

## 2019-10-31 NOTE — TELEPHONE ENCOUNTER
Rt pt iraj and instructed her that medication that she was prescribed to help with cold symptoms needs more than one day to process  . Informed her that 2-3 days if symptoms continue to f/u . If pain has worsen to please go to ED . Pt agreed to continue Rx and f/u with updates

## 2019-11-18 NOTE — PROGRESS NOTES
Routine Office Visit    Patient Name: Naomy Armstrong    : 1965  MRN: [id number]    Subjective:  Naomy is a 54 y.o. female who presents today for:   Chief Complaint   Patient presents with    Cough     started     Chest Pain    Sore Throat    Nasal Congestion       54-year-old female who is a former smoker, comes in with 3-4 days of coughing, chest pain with coughing, sore throat, nasal congestion, fatigue, but no wheezing or shortness of breath.  She is unsure of sick contacts.  She has not taken anything for symptoms.  She has not had anything that improved her symptoms.  Her symptoms have worsened since .  She denies fevers and chills.    While here, she is also complaining of leg cramps that happen mostly at nighttime.  She states that they are severe.  There is nothing that improves them.  They do disturb her sleep.    Past Medical History  Past Medical History:   Diagnosis Date    Alcohol abuse     per chart, but patient denies today and cocaine per chart    Anxiety     Depression     Hallucination     last couple of months started seeing someone standing in her room, saw boyfriend's reflection in glass    Headache     History of psychiatric hospitalization     age 13 [facility] for 2 months for acting out; 2018 for SI    Hx of psychiatric care     Hypertension     Psychiatric problem     Sleep difficulties     Suicide attempt     with knife by cutting stomach    Therapy        Past Surgical History  History reviewed. No pertinent surgical history.     Family History  Family History   Problem Relation Age of Onset    Anxiety disorder Sister     Depression Sister     Alcohol abuse Maternal Uncle     Alcohol abuse Maternal Grandfather        Social History  Social History     Socioeconomic History    Marital status:      Spouse name: Not on file    Number of children: 0    Years of education: Not on file    Highest education level: Not on file   Occupational  History    Occupation:      Employer: [employer]   Social Needs    Financial resource strain: Not on file    Food insecurity:     Worry: Not on file     Inability: Not on file    Transportation needs:     Medical: Not on file     Non-medical: Not on file   Tobacco Use    Smoking status: Former Smoker     Last attempt to quit: 2006     Years since quittin.5    Smokeless tobacco: Never Used   Substance and Sexual Activity    Alcohol use: Yes     Comment: occasional    Drug use: Not Currently     Types: Marijuana     Comment: tried at age 14    Sexual activity: Yes     Partners: Male     Comment: going through menopause   Lifestyle    Physical activity:     Days per week: Not on file     Minutes per session: Not on file    Stress: Not on file   Relationships    Social connections:     Talks on phone: Not on file     Gets together: Not on file     Attends Yazdanism service: Not on file     Active member of club or organization: Not on file     Attends meetings of clubs or organizations: Not on file     Relationship status: Not on file   Other Topics Concern    Patient feels they ought to cut down on drinking/drug use No    Patient annoyed by others criticizing their drinking/drug use No    Patient has felt bad or guilty about drinking/drug use No    Patient has had a drink/used drugs as an eye opener in the AM No   Social History Narrative     x 2.    Lives with boyfriend.    Works at [employer] as an .    Presently working on Bachelor's degree.    Has associates degree in criminal justice.       Current Medications  Current Outpatient Medications on File Prior to Visit   Medication Sig Dispense Refill    amLODIPine (NORVASC) 5 MG tablet Take 5 mg by mouth once daily.      aspirin-acetaminophen-caffeine 250-250-65 mg (EXCEDRIN MIGRAINE) 250-250-65 mg per tablet Take 1 tablet by mouth every 6 (six) hours as needed for Pain.      cetirizine (ZYRTEC) 10 MG  tablet TAKE 1 TABLET BY MOUTH ONCE DAILY AS NEEDED FOR CONGESTION OR ALLERGY  2    dicyclomine (BENTYL) 20 mg tablet Take 20 mg by mouth every 6 (six) hours.      ergocalciferol (ERGOCALCIFEROL) 50,000 unit Cap Take 50,000 Units by mouth.      FLUoxetine 10 MG capsule Take 1 capsule (10 mg total) by mouth once daily. 30 capsule 0    FLUoxetine 20 MG capsule Take 1 capsule (20 mg total) by mouth once daily. 30 capsule 0    HYDROcodone-acetaminophen (NORCO) 7.5-325 mg per tablet Take 1 tablet by mouth every 6 (six) hours as needed for Pain.      hydrOXYzine (ATARAX) 50 MG tablet Take 1 tablet (50 mg total) by mouth daily as needed for Anxiety (sleep). 30 tablet 0    lisinopril (PRINIVIL,ZESTRIL) 20 MG tablet Take 20 mg by mouth once daily.      loperamide (IMODIUM) 2 mg capsule Take 2 mg by mouth 4 (four) times daily as needed for Diarrhea.      multivitamin (ONE DAILY MULTIVITAMIN) per tablet Take 1 tablet by mouth once daily.      naproxen (NAPROSYN) 500 MG tablet   1    naproxen-esomeprazole (VIMOVO) 500-20 mg TbID Take by mouth.      nitrofurantoin, macrocrystal-monohydrate, (MACROBID) 100 MG capsule Take 100 mg by mouth as needed.      omeprazole (PRILOSEC) 20 MG capsule Take 20 mg by mouth once daily.       promethazine (PHENERGAN) 25 MG tablet   1    risperiDONE (RISPERDAL) 1 MG tablet Take 1 tablet (1 mg total) by mouth every evening. 60 tablet 0    simethicone (MYLICON) 125 MG chewable tablet Take 125 mg by mouth.      tiZANidine (ZANAFLEX) 4 MG tablet   1    topiramate (TROKENDI XR) 50 mg Cp24 Take 50 mg by mouth daily as needed.      traZODone (DESYREL) 50 MG tablet Take 1 tablet (50 mg total) by mouth every evening. 30 tablet 0     No current facility-administered medications on file prior to visit.        Allergies   Review of patient's allergies indicates:   Allergen Reactions    Effexor [venlafaxine]      Elevated her blood pressure    Zoloft [sertraline]     Paroxetine hcl       "Made her feel drunk       Review of Systems   Constitutional: Positive for chills, fatigue and fever.   HENT: Positive for congestion, postnasal drip, rhinorrhea, sinus pressure and sore throat. Negative for ear discharge.    Eyes: Negative for discharge.   Respiratory: Positive for cough (productive). Negative for shortness of breath and wheezing.    Cardiovascular: Negative for palpitations.   Gastrointestinal: Negative for abdominal pain, blood in stool, constipation and diarrhea.   Genitourinary: Negative for dysuria.   Musculoskeletal: Positive for myalgias.   Skin: Negative for rash.       BP (!) 150/92 (BP Location: Left arm)   Pulse 79   Temp 98.2 °F (36.8 °C) (Oral)   Resp 16   Ht 5' 4" (1.626 m)   Wt 75.8 kg (167 lb 1.7 oz)   SpO2 97%   BMI 28.68 kg/m²     Physical Exam   Constitutional: She appears well-developed. She appears ill. No distress.   HENT:   Head: Normocephalic and atraumatic.   Right Ear: Tympanic membrane, external ear and ear canal normal.   Left Ear: Tympanic membrane, external ear and ear canal normal.   Nose: Mucosal edema and rhinorrhea present.  No foreign bodies. Right sinus exhibits no maxillary sinus tenderness. Left sinus exhibits no maxillary sinus tenderness.   Mouth/Throat: Posterior oropharyngeal erythema present.   Eyes: Pupils are equal, round, and reactive to light. EOM and lids are normal.   Neck: Trachea normal and normal range of motion. No tracheal tenderness present. No thyroid mass present.   Cardiovascular: Normal rate, regular rhythm, normal heart sounds and intact distal pulses.   Pulmonary/Chest: Effort normal and breath sounds normal. No respiratory distress. She has no wheezes. She has no rales.   Lymphadenopathy:     She has no cervical adenopathy.   Psychiatric: She has a normal mood and affect. Her behavior is normal.   Vitals reviewed.        Assessment/Plan:  Naomy was seen today for cough, chest pain, sore throat and nasal congestion.    Diagnoses " and all orders for this visit:    Common cold  -     brompheniramine-pseudoeph-DM (BROMFED DM) 2-30-10 mg/5 mL Syrp; Take 10 mLs by mouth every 4 to 6 hours as needed (cough). Maximum of 4 doses in 24 hours  -     POCT Influenza A/B Molecular  Advised symptomatic care with plenty of fluids, honey and lemon, rest, and above regimen.  OTC Ibuprofen or Tylenol for pain.  Discussed course of a cold.   Explained that after cold is better, may continue to have a dry cough for a week or two.  Return to office if symptoms worsen or do not improve in a week.    Leg cramps  -     CK; Future  -     GAVIN Screen w/Reflex; Future  -     Rheumatoid factor; Future  Check CPK, and autoimmune markers.    Need for hepatitis C screening test  -     Hepatitis C antibody; Future  Screen for hepatitis-C as patient was born between 1947 and 1967.    Screen for colon cancer  -     Fecal Immunochemical Test (iFOBT); Future  FitKit was given to patient on 10/30/2019    Visit for screening mammogram  -     Mammo Digital Screening Bilat w/ Danny; Future  Screening mammogram ordered              -Eric Hernandes Jr., MD, AAHIVS          This office note has been dictated.  This dictation has been generated using M-Modal Fluency Direct dictation; some phonetic errors may occur.

## 2019-12-20 ENCOUNTER — HOSPITAL ENCOUNTER (EMERGENCY)
Facility: HOSPITAL | Age: 54
Discharge: PSYCHIATRIC OR SUBSTANCE USE FACILITY | End: 2019-12-21
Attending: EMERGENCY MEDICINE
Payer: COMMERCIAL

## 2019-12-20 DIAGNOSIS — F10.929: ICD-10-CM

## 2019-12-20 DIAGNOSIS — Z65.8 PSYCHOSOCIAL DISTRESS: ICD-10-CM

## 2019-12-20 DIAGNOSIS — R45.851 SUICIDAL IDEATION: Primary | ICD-10-CM

## 2019-12-20 DIAGNOSIS — Z91.89 AT RISK FOR DANGER TO OTHERS: ICD-10-CM

## 2019-12-20 DIAGNOSIS — F33.3 MAJOR DEPRESSIVE DISORDER, RECURRENT, SEVERE WITH PSYCHOTIC FEATURES: ICD-10-CM

## 2019-12-20 LAB
ALBUMIN SERPL BCP-MCNC: 4.1 G/DL (ref 3.5–5.2)
ALP SERPL-CCNC: 111 U/L (ref 55–135)
ALT SERPL W/O P-5'-P-CCNC: 18 U/L (ref 10–44)
AMPHET+METHAMPHET UR QL: NEGATIVE
ANION GAP SERPL CALC-SCNC: 9 MMOL/L (ref 8–16)
APAP SERPL-MCNC: <3 UG/ML (ref 10–20)
AST SERPL-CCNC: 23 U/L (ref 10–40)
BARBITURATES UR QL SCN>200 NG/ML: NEGATIVE
BASOPHILS # BLD AUTO: 0.02 K/UL (ref 0–0.2)
BASOPHILS NFR BLD: 0.4 % (ref 0–1.9)
BENZODIAZ UR QL SCN>200 NG/ML: NEGATIVE
BILIRUB SERPL-MCNC: 0.3 MG/DL (ref 0.1–1)
BILIRUB UR QL STRIP: NEGATIVE
BUN SERPL-MCNC: 9 MG/DL (ref 6–20)
BZE UR QL SCN: NEGATIVE
CALCIUM SERPL-MCNC: 10 MG/DL (ref 8.7–10.5)
CANNABINOIDS UR QL SCN: NEGATIVE
CHLORIDE SERPL-SCNC: 108 MMOL/L (ref 95–110)
CLARITY UR: CLEAR
CO2 SERPL-SCNC: 26 MMOL/L (ref 23–29)
COLOR UR: COLORLESS
CREAT SERPL-MCNC: 0.9 MG/DL (ref 0.5–1.4)
CREAT UR-MCNC: 24.5 MG/DL (ref 15–325)
DIFFERENTIAL METHOD: ABNORMAL
EOSINOPHIL # BLD AUTO: 0 K/UL (ref 0–0.5)
EOSINOPHIL NFR BLD: 0.2 % (ref 0–8)
ERYTHROCYTE [DISTWIDTH] IN BLOOD BY AUTOMATED COUNT: 14.6 % (ref 11.5–14.5)
EST. GFR  (AFRICAN AMERICAN): >60 ML/MIN/1.73 M^2
EST. GFR  (NON AFRICAN AMERICAN): >60 ML/MIN/1.73 M^2
ETHANOL SERPL-MCNC: 213 MG/DL
ETHANOL SERPL-MCNC: <10 MG/DL
GLUCOSE SERPL-MCNC: 105 MG/DL (ref 70–110)
GLUCOSE UR QL STRIP: NEGATIVE
HCT VFR BLD AUTO: 39.2 % (ref 37–48.5)
HGB BLD-MCNC: 11.9 G/DL (ref 12–16)
HGB UR QL STRIP: ABNORMAL
IMM GRANULOCYTES # BLD AUTO: 0.02 K/UL (ref 0–0.04)
IMM GRANULOCYTES NFR BLD AUTO: 0.4 % (ref 0–0.5)
KETONES UR QL STRIP: NEGATIVE
LEUKOCYTE ESTERASE UR QL STRIP: NEGATIVE
LYMPHOCYTES # BLD AUTO: 2 K/UL (ref 1–4.8)
LYMPHOCYTES NFR BLD: 37.9 % (ref 18–48)
MCH RBC QN AUTO: 25.5 PG (ref 27–31)
MCHC RBC AUTO-ENTMCNC: 30.4 G/DL (ref 32–36)
MCV RBC AUTO: 84 FL (ref 82–98)
METHADONE UR QL SCN>300 NG/ML: NEGATIVE
MICROSCOPIC COMMENT: NORMAL
MONOCYTES # BLD AUTO: 0.3 K/UL (ref 0.3–1)
MONOCYTES NFR BLD: 5.4 % (ref 4–15)
NEUTROPHILS # BLD AUTO: 3 K/UL (ref 1.8–7.7)
NEUTROPHILS NFR BLD: 55.7 % (ref 38–73)
NITRITE UR QL STRIP: NEGATIVE
NRBC BLD-RTO: 0 /100 WBC
OPIATES UR QL SCN: NEGATIVE
PCP UR QL SCN>25 NG/ML: NEGATIVE
PH UR STRIP: 6 [PH] (ref 5–8)
PLATELET # BLD AUTO: 359 K/UL (ref 150–350)
PMV BLD AUTO: 9.4 FL (ref 9.2–12.9)
POTASSIUM SERPL-SCNC: 3.3 MMOL/L (ref 3.5–5.1)
PROT SERPL-MCNC: 7.3 G/DL (ref 6–8.4)
PROT UR QL STRIP: NEGATIVE
RBC # BLD AUTO: 4.66 M/UL (ref 4–5.4)
RBC #/AREA URNS HPF: 2 /HPF (ref 0–4)
SODIUM SERPL-SCNC: 143 MMOL/L (ref 136–145)
SP GR UR STRIP: 1 (ref 1–1.03)
TOXICOLOGY INFORMATION: NORMAL
TSH SERPL DL<=0.005 MIU/L-ACNC: 1.45 UIU/ML (ref 0.4–4)
URN SPEC COLLECT METH UR: ABNORMAL
UROBILINOGEN UR STRIP-ACNC: NEGATIVE EU/DL
WBC # BLD AUTO: 5.36 K/UL (ref 3.9–12.7)

## 2019-12-20 PROCEDURE — 80307 DRUG TEST PRSMV CHEM ANLYZR: CPT

## 2019-12-20 PROCEDURE — 12002 RPR S/N/AX/GEN/TRNK2.6-7.5CM: CPT

## 2019-12-20 PROCEDURE — 80053 COMPREHEN METABOLIC PANEL: CPT

## 2019-12-20 PROCEDURE — 96372 THER/PROPH/DIAG INJ SC/IM: CPT

## 2019-12-20 PROCEDURE — 85025 COMPLETE CBC W/AUTO DIFF WBC: CPT

## 2019-12-20 PROCEDURE — 80329 ANALGESICS NON-OPIOID 1 OR 2: CPT

## 2019-12-20 PROCEDURE — 90715 TDAP VACCINE 7 YRS/> IM: CPT | Performed by: EMERGENCY MEDICINE

## 2019-12-20 PROCEDURE — 80320 DRUG SCREEN QUANTALCOHOLS: CPT

## 2019-12-20 PROCEDURE — 84443 ASSAY THYROID STIM HORMONE: CPT

## 2019-12-20 PROCEDURE — 63600175 PHARM REV CODE 636 W HCPCS: Performed by: EMERGENCY MEDICINE

## 2019-12-20 PROCEDURE — 81000 URINALYSIS NONAUTO W/SCOPE: CPT | Mod: 59

## 2019-12-20 PROCEDURE — 90471 IMMUNIZATION ADMIN: CPT | Performed by: EMERGENCY MEDICINE

## 2019-12-20 PROCEDURE — 90792 PR PSYCHIATRIC DIAGNOSTIC EVALUATION W/MEDICAL SERVICES: ICD-10-PCS | Mod: ,,, | Performed by: NURSE PRACTITIONER

## 2019-12-20 PROCEDURE — 90792 PSYCH DIAG EVAL W/MED SRVCS: CPT | Mod: ,,, | Performed by: NURSE PRACTITIONER

## 2019-12-20 PROCEDURE — 99285 EMERGENCY DEPT VISIT HI MDM: CPT | Mod: 25

## 2019-12-20 PROCEDURE — 25000003 PHARM REV CODE 250: Performed by: EMERGENCY MEDICINE

## 2019-12-20 RX ORDER — ZIPRASIDONE MESYLATE 20 MG/ML
20 INJECTION, POWDER, LYOPHILIZED, FOR SOLUTION INTRAMUSCULAR
Status: COMPLETED | OUTPATIENT
Start: 2019-12-20 | End: 2019-12-20

## 2019-12-20 RX ORDER — LIDOCAINE HYDROCHLORIDE AND EPINEPHRINE 10; 10 MG/ML; UG/ML
10 INJECTION, SOLUTION INFILTRATION; PERINEURAL ONCE
Status: DISCONTINUED | OUTPATIENT
Start: 2019-12-20 | End: 2019-12-20

## 2019-12-20 RX ORDER — LIDOCAINE HYDROCHLORIDE AND EPINEPHRINE 10; 10 MG/ML; UG/ML
10 INJECTION, SOLUTION INFILTRATION; PERINEURAL ONCE
Status: COMPLETED | OUTPATIENT
Start: 2019-12-20 | End: 2019-12-20

## 2019-12-20 RX ADMIN — CLOSTRIDIUM TETANI TOXOID ANTIGEN (FORMALDEHYDE INACTIVATED), CORYNEBACTERIUM DIPHTHERIAE TOXOID ANTIGEN (FORMALDEHYDE INACTIVATED), BORDETELLA PERTUSSIS TOXOID ANTIGEN (GLUTARALDEHYDE INACTIVATED), BORDETELLA PERTUSSIS FILAMENTOUS HEMAGGLUTININ ANTIGEN (FORMALDEHYDE INACTIVATED), BORDETELLA PERTUSSIS PERTACTIN ANTIGEN, AND BORDETELLA PERTUSSIS FIMBRIAE 2/3 ANTIGEN 0.5 ML: 5; 2; 2.5; 5; 3; 5 INJECTION, SUSPENSION INTRAMUSCULAR at 01:12

## 2019-12-20 RX ADMIN — ZIPRASIDONE MESYLATE 20 MG: 20 INJECTION, POWDER, LYOPHILIZED, FOR SOLUTION INTRAMUSCULAR at 11:12

## 2019-12-20 RX ADMIN — LIDOCAINE HYDROCHLORIDE,EPINEPHRINE BITARTRATE 10 ML: 10; .01 INJECTION, SOLUTION INFILTRATION; PERINEURAL at 01:12
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
## 2019-12-21 VITALS
HEIGHT: 63 IN | BODY MASS INDEX: 27.64 KG/M2 | HEART RATE: 75 BPM | TEMPERATURE: 98 F | RESPIRATION RATE: 18 BRPM | OXYGEN SATURATION: 100 % | WEIGHT: 156 LBS | DIASTOLIC BLOOD PRESSURE: 69 MMHG | SYSTOLIC BLOOD PRESSURE: 140 MMHG

## 2019-12-21 PROBLEM — F29 PSYCHOSIS: Status: ACTIVE | Noted: 2019-12-21

## 2019-12-27 ENCOUNTER — OFFICE VISIT (OUTPATIENT)
Dept: FAMILY MEDICINE | Facility: CLINIC | Age: 54
End: 2019-12-27
Payer: COMMERCIAL

## 2019-12-27 ENCOUNTER — PATIENT OUTREACH (OUTPATIENT)
Dept: ADMINISTRATIVE | Facility: CLINIC | Age: 54
End: 2019-12-27

## 2019-12-27 VITALS
HEIGHT: 64 IN | WEIGHT: 176.81 LBS | RESPIRATION RATE: 17 BRPM | BODY MASS INDEX: 30.19 KG/M2 | OXYGEN SATURATION: 97 % | HEART RATE: 89 BPM | DIASTOLIC BLOOD PRESSURE: 96 MMHG | SYSTOLIC BLOOD PRESSURE: 150 MMHG | TEMPERATURE: 98 F

## 2019-12-27 DIAGNOSIS — Z48.02 ENCOUNTER FOR REMOVAL OF SUTURES: ICD-10-CM

## 2019-12-27 DIAGNOSIS — F33.3 MAJOR DEPRESSIVE DISORDER, RECURRENT, SEVERE WITH PSYCHOTIC FEATURES: Primary | ICD-10-CM

## 2019-12-27 PROCEDURE — 99999 PR PBB SHADOW E&M-EST. PATIENT-LVL III: CPT | Mod: PBBFAC,,, | Performed by: INTERNAL MEDICINE

## 2019-12-27 PROCEDURE — 99999 PR PBB SHADOW E&M-EST. PATIENT-LVL III: ICD-10-PCS | Mod: PBBFAC,,, | Performed by: INTERNAL MEDICINE

## 2019-12-27 PROCEDURE — 99214 OFFICE O/P EST MOD 30 MIN: CPT | Mod: S$GLB,,, | Performed by: INTERNAL MEDICINE

## 2019-12-27 PROCEDURE — 99214 PR OFFICE/OUTPT VISIT, EST, LEVL IV, 30-39 MIN: ICD-10-PCS | Mod: S$GLB,,, | Performed by: INTERNAL MEDICINE

## 2019-12-27 RX ORDER — TRAMADOL HYDROCHLORIDE 50 MG/1
50 TABLET ORAL EVERY 8 HOURS PRN
Qty: 12 TABLET | Refills: 0 | Status: SHIPPED | OUTPATIENT
Start: 2019-12-27 | End: 2019-12-31

## 2019-12-27 NOTE — PROGRESS NOTES
"Subjective:       Patient ID: Naomy Armstrong is a 54 y.o. female.    Chief Complaint: Establish Care; Hand Pain (swelling R Side Hand  pinky finger); and Fussy (stress)    F/u ED    HPI: 53 y/o with MDD recently hospitalized for sever depression with psychotic featurers. Presents with her fiance for wound check. During her episode of psychosis one week ago she began cutting at furinture with a box knife and cut the medial aspect of right fifth finger. Had seven interrupted sutures place. Reports pain along lateral hand able to flex fingers no drainage no fevers/chills. She is very tearful today concerned about missing time from work due to her recent hospitalization. Reports sleeping well with nightly medication. Fiance reports no further psychotic type behavior    Review of Systems   Constitutional: Negative for activity change, appetite change, fatigue, fever and unexpected weight change.   HENT: Negative for ear pain, rhinorrhea and sore throat.    Eyes: Negative for discharge and visual disturbance.   Respiratory: Negative for chest tightness, shortness of breath and wheezing.    Cardiovascular: Negative for chest pain, palpitations and leg swelling.   Gastrointestinal: Negative for abdominal pain, constipation and diarrhea.   Endocrine: Negative for cold intolerance and heat intolerance.   Genitourinary: Negative for dysuria and hematuria.   Musculoskeletal: Negative for joint swelling and neck stiffness.   Skin: Negative for rash.   Neurological: Negative for dizziness, syncope, weakness and headaches.   Psychiatric/Behavioral: Negative for suicidal ideas.       Objective:     Vitals:    12/27/19 1358   BP: (!) 150/96   BP Location: Right arm   Patient Position: Sitting   Pulse: 89   Resp: 17   Temp: 98 °F (36.7 °C)   TempSrc: Oral   SpO2: 97%   Weight: 80.2 kg (176 lb 12.9 oz)   Height: 5' 4" (1.626 m)          Physical Exam   Constitutional: She is oriented to person, place, and time. She appears " well-developed and well-nourished.   HENT:   Head: Normocephalic and atraumatic.   Eyes: Conjunctivae are normal.   Neck: Normal range of motion.   Cardiovascular: Normal rate and regular rhythm. Exam reveals no gallop and no friction rub.   No murmur heard.  Pulmonary/Chest: Effort normal and breath sounds normal. She has no wheezes. She has no rales.   Abdominal: Soft. Bowel sounds are normal. There is no tenderness. There is no rebound and no guarding.   Musculoskeletal: Normal range of motion. She exhibits no edema or tenderness.   Neurological: She is alert and oriented to person, place, and time. No cranial nerve deficit.   Skin: Skin is warm and dry.   Two sepearate lacerations to medial fifth finger good skin approximation no significant erythema minimal swelling no fluctuance or expressible discharge. Seven sutures removed w/o incident and three 1/4 inch steristrips used to overlying both incisions   Psychiatric:   Very tearful throughout interview worried about missing work able to redirect not responding to internal stimuli.        Assessment and Plan   1. Major depressive disorder, recurrent, severe with psychotic features  Continue ssri nightly respirdone has follow upw ith psychiatric NP next week     2. Encounter for removal of sutures  Sutures removed daily vasaline to incision discussed signs and symptoms of infection including erythema moving down hand drainage or fever. Avoid submerging in water

## 2019-12-27 NOTE — PATIENT INSTRUCTIONS
Depression  Depression is one of the most common mental health problems today. It is not just a state of unhappiness or sadness. It is a true disease. The cause seems to be related to a decrease in chemicals that transmit signals in the brain. Having a family history of depression, alcoholism, or suicide increases the risk. Chronic illness, chronic pain, migraine headaches and high emotional stress also increase the risk.  Depression is something we tend to recognize in others, but may have a hard time seeing in ourselves. It can show in many physical and emotional ways:  · Loss of appetite  · Over-eating  · Not being able to sleep  · Sleeping too much  · Tiredness not related to physical exertion  · Restlessness or irritability  · Slowness of movement or speech  · Feeling depressed or withdrawn  · Loss of interest in things you once enjoyed  · Trouble concentrating, poor memory, trouble making decisions  · Thoughts of harming or killing oneself, or thoughts that life is not worth living  · Low self-esteem  The treatment for depression may include both medicine and psychotherapy. Antidepressants can reduce suffering and can improve the ability to function during the depressed period. Therapy can offer emotional support and help you understand emotional factors that may be causing the depression.  Home care  · On-going care and support helps people manage this disease.  Find a healthcare provider and therapist who meet your needs. Seek help when you feel like you may be getting ill.  · Be kind to yourself. Make it a point to do things that you enjoy (gardening, walking in nature, going to a movie, etc.). Reward yourself for small successes.  · Take care of your physical body. Eat a balanced diet (low in saturated fat and high in fruits and vegetables). Exercise at least 3 times a week for 30 minutes. Even mild-moderate exercise (like brisk walking) can make you feel better.  · Avoid alcohol, which can make  depression worse.  · Take medicine as prescribed.  · Tell each of your healthcare providers about all of the prescription drugs, over-the-counter medicines, vitamins, and supplements you take. Certain supplements interact with medicines and can result in dangerous side effects. Ask your pharmacist when you have questions about drug interactions.  · Talk with your family and trusted friends about your feelings and thoughts. Ask them to help you recognize behavior changes early so you can get help and, if needed, medicine can be adjusted.  Follow-up care  Follow up with your healthcare provider, or as advised.  Call 911  Call 911 if you:  · Have suicidal thoughts, a suicide plan, and the means to carry out the plan  · Have trouble breathing  · Are very confused  · Feel very drowsy or have trouble awakening  · Faint or lose consciousness  · Have new chest pain that becomes more severe, lasts longer, or spreads into your shoulder, arm, neck, jaw or back  When to seek medical advice  Call your healthcare provider right away if any of these occur:  · Feeling extreme depression, fear, anxiety, or anger toward yourself or others  · Feeling out of control  · Feeling that you may try to harm yourself or another  · Hearing voices that others do not hear  · Seeing things that others do not see  · Cant sleep or eat for 3 days in a row  · Friends or family express concern over your behavior and ask you to seek help  Date Last Reviewed: 9/29/2015  © 9893-4930 [employer]. [address] [place] [address], [place] [address]. All rights reserved. This information is not intended as a substitute for professional medical care. Always follow your healthcare professional's instructions.

## 2019-12-27 NOTE — LETTER
December 27, 2019    Naomy Armstrong  [address]  [place] [address]         [facility]  [address]  [place] [address]  Phone: [phone] December 27, 2019     Patient: Naomy Armstrong   YOB: 1965   Date of Visit: 12/27/2019       To Whom It May Concern:    It is my medical opinion that Naomy Armstrong may return to work on January 7 2020.    If you have any questions or concerns, please don't hesitate to call.    Sincerely,        Oneil Eugene MD

## 2019-12-30 ENCOUNTER — SSC ENCOUNTER (OUTPATIENT)
Dept: ADMINISTRATIVE | Facility: OTHER | Age: 54
End: 2019-12-30

## 2019-12-30 NOTE — PROGRESS NOTES
Please note, patient's information has been sent to Outpatient Care Management for high risk screening.    Reason for Referral: Major depressv disorder, recurrent severe w/o psych features    Please contact [facility] with any questions (ext. [phone]).    Thank you,    Virginia Zheng  Outpatient Care Management  [phone]

## 2019-12-31 ENCOUNTER — OUTPATIENT CASE MANAGEMENT (OUTPATIENT)
Dept: ADMINISTRATIVE | Facility: OTHER | Age: 54
End: 2019-12-31
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
## 2019-12-31 NOTE — LETTER
December 31, 2019    Naomy Armstrong  [address] [place] [address]             [facility] [address]  [place] [address] Dear Ms. Naomy Armstrong,      I have been assigned as your  with [facility] Outpatient Complex Case Management Department. We received a referral to call you to discuss your medical history. I have attempted to reach you by telephone, but I was unsuccessful. Please call our department so that we can go over some questions with you regarding your health.     The Outpatient Case Management department can be reached at [phone] from 8:00am to 4:30pm on Monday thru Friday. [facility] also has a program where a nurse is available 24/7 to answer questions or provider medical advice. Their number is [phone].     Thanks,      Aurora Soriano, RN  Outpatient Case Management

## 2019-12-31 NOTE — LETTER
January 2, 2020    Naomy Armstrong  [address] [place] [address]             [facility] [address]  [place] [address] Dear Ms. Naomy Armstrong,    Welcome to [facility]s Complex Care Management Program.  It was a pleasure talking with you today.  My name is Aurora Soriano,ROSAS and I look forward to being your Care Manager.  My goal is to help you function at the healthiest and highest level possible.  You can contact me directly at [phone].    As an [facility] patient, some of the services we may be able to provide include:      Development of an individualized care plan with a Registered Nurse    Connection with a    Connection with available resources and services     Coordinate communication among your care team members    Provide coaching and education    Help you understand your doctors treatment plan   Help you obtain information about your insurance coverage.     All services provided by [facility]s Complex Care Managers and other care team members are coordinated with and communicated to your primary care team.    As part of your enrollment, you will be receiving education materials and more information about these services in your [facility] account, by phone or through the mail.  If you do not wish to participate or receive information, please contact our office at [phone].      Sincerely,        Aurora Soriano, RN  [facility]   Out-patient RN Complex Care Manager

## 2020-01-02 NOTE — PROGRESS NOTES
Outpatient Care Management  Initial Patient Assessment    Patient: Naomy Armstrong  MRN: [id number]  Date of Service: 12/31/2019  Completed by: Aurora Soriano RN  Referral Date:   Program:     Reason for Visit   Patient presents with    Initial Assessment       Brief Summary: Pt reports that she was just discharged from the hospital on 12/25/19. Pt reports that she gives permission to speak with Renan sylvester. Pt reports that she considers herself to be in fair condition compared to others her age because she is independent for ADL'S and ambulation. Pt reports that she does not require a caregiver a this time. Pt reports that she forgets to take her medications. Pt reports that she has tried using a pillbox and would forget to take the medications. Pt reports that she would consider setting an alarm on the phone as a reminder per this cm's education presented to the pt. Pt reports that she tends to request the older medications from the doctors because the new medications have a larger co-pay. Pt reports that she does not follow any particular diet while at home. Pt reports that her BP is consistently in a good range when checked by care givers. Pt reports that depression affects her life everyday. Pt reports that she feels jumbled and disorganized.      Assessment Documentation     OPCM Initial Assessment    Involvement of Care  Do I have permission to speak with other family members about your care?:  Yes (Comment: Pt reports that she gives permission to speak with her fisonny Urrutia.)  Assessment completed by:  Patient  Patient Reported Insurance  Verified current insurance plan:  Other (see comment)  Current Health Status  Patient Health Rating  Compared to other people your age, how would you rate your health?:  Fair  Patient Reported Labs & Vitals  Any patient reported labs and/or vitals?:  No  Social Determinants  Advanced Care Planning  Do you have any of the following?:  None  If yes, do we  have a copy?:  No  If no, would the patient like Advance Directive resources?:  No  Advanced Care Planning resources provided?:  No  Is Advanced Care Planning an area of need?:  No  Support  Caregiver presence?:  No  Present activity level:  need help from person or special equipment to leave house  Who takes you to your medical appointments?:  patient, friend  Housing  Living arrangements:  significant other  Number of people in home:  2  Type of residence:  single family home  Own or rent?:  own  Permanent residence?:  yes  Does the patient's residence have any of the following?:  more than 2 stairs to enter the residence, handrails on the stairs, more than 1 story  Is housing an area of need?:  no  Access to [employer]  Does the patient have access to any of the following devices or technologies?:  SmartPhone, home computer, tablet  Clinical Assessment  Medication Adherence  How does the patient obtain their medications?:  patient drives  How many days a week do you miss medications?:  1  Do you use a pill box or medication chart to help you manage your medications?:  no  Do you sometimes have difficulty refilling your medications?:  no  Medication reconciliation completed?:  Yes  Is medication adherence an area of need?:  Yes (Comment: Pt reports that she forgets to take her medications sometimes.)  *Active medication list was reviewed and reconciled with patient and/or caregiver:    Nutritional Status  Diet:  Regular  Change in appetite?:  no  Dentition:  N/A  Is nutrition an area of need?:  no  Labs  Do you have regular lab work to monitor your medications?:  no  Where do you get your lab work done?:  n/a  PHQ Depression Screen  Does patient's PHQ Depression Screening indicate a barrier to meeting self-care needs?:  No  Cognitive/Behavioral Health  Alert and oriented?:  yes  Difficulty thinking?:  yes  Requires prompting?:  no  Requires assistance for routine expression?:  no  Is  Cognitive/Behavioral health an area of need?:  no  Culture/Gnosticism  Does patient have cultural or Scientology beliefs that may impact ability to access healthcare?:  no  Communication  Language preference:  English   needed?:  no  Hearing problems?:  no  Decreased vision?:  no  Legally blind?:  no  Vision assistance:  glasses  Is Communication an area of need?:  no  Health Literacy  Preferred learning method:  reading materials  How often do you need to have someone help you read instructions, pamphlets, or other written material from your doctor or pharmacy?:  never  Is there a Health Literacy need?:  no  Activities of Daily Living  Ambulation:  independent  Dressing:  independent  Bathing:  independent  Transfers:  independent  Toileting:  independent  Feeding:  independent  Cleaning home/chores:  independent  Telephone use:  independent  Shopping/attending doctors' appointments:  independent  Paying bills:  independent  Taking meds:  independent  Climbing stairs:  independent  Fall Risk  Patient mobility status:  Ambulatory  Equipment/Current Services  Equipment/supplies used in home:  none  Current services:  n/a  Is Equipment/Supplies/Services an area of need?:  no  Community & Government Programs  Support:  none  [facility]:  N/A  Community Resource Assessment  Based on the assessment of needs:  No community resources needed at this time  Completion of Initial Assessment  Is the Initial Assessment Complete at this time?:  yes         Problem List and History     Patient Active Problem List   Diagnosis    Cervical radiculopathy           Pt reports not active      DDD (degenerative disc disease), cervical              Pt reports not active      Generalized headaches                                            Pt reports not active      Primary osteoarthritis of right knee                           Pt reports not active        Neck pain                                                                    Pt reports not active      Spondylosis of cervical region without myelopathy or radiculopathy            Pt reports not active      Major depressive disorder, recurrent, severe with psychotic features          Pt reports active      Psychosocial distress                                                                                       Pt reports active      Alcohol intoxication in episodic drinker                                                                    Pt reports not active      At risk for danger to others                                                                                        Pt reports not active      Psychosis                                                                                                                   Pt reports not active              Reviewed Active Problem List with patient and/or Caregiver. The following were identified as areas of need: depression  Medical History:  Reviewed medical history with patient and/or caregiver    Social History:  Reviewed social history with patient and/or caregiver    Complex Care Plan    Care plan was discussed and completed today with input from patient and/or caregiver.    Goals      Patient/caregiver will accept life style changes to manage and improve Depression prior to discharge from OPCM. - Priority: High      Overall Time to Completion  2 months from 01/02/2020     OPCM Identified Patient Barriers:                  OPCM Identified Disease Education Barriers:  Depression:  Care plan created  HTN: Care plan created          Short Term Goals  Patient/caregiver will verbalize 2 risk factors of Hypertension within 3 weeks.  Interventions   · Collaborate with Physician as appropriate to meet patient needs.  · Recognize and provide educational material (ANURAG).     Status  · Partially met      Patient/caregiver will verbalize 2 signs and symptoms of Hypertension within 1 month.    Interventions   · Collaborate with Physician as appropriate to meet patient needs  · Recognize and provide educational material (KRAMES).  ·      Status  · Partially met      Patient/caregiver will verbalize 2 ways of preventing complications due to disease process within 1 month.  Interventions   · Collaborate with Physician as appropriate to meet patient needs.  · Encourage Dietary Compliance.  · Encourage Exercise.  · Encourage Medication Compliance.  · Recognize and provide educational material (KRAMES).     Status  · Partially met             Patient/caregiver will be able to name (Medication for any disease process), purpose of medication and will take medications as ordered by Physician prior to discharge from [facility]. - Priority: High      Overall Time to Completion  2 months from 01/02/2020     OPCM Identified Patient Barriers:                 OPCM Identified Disease Education Barriers:  Depression: Care plan created  Medication Adherence : Care plan created              Short Term Goals      Patient/caregiver will utilize a pill box organizer and/or other strategies to dispense medications daily within 3 weeks.  Interventions   · Collaborate with Physician as appropriate to meet patient needs.  · Complete medication reconciliation.  · Encourage Medication Compliance.  · Recognize and provide educational material (KRAMES).   Status  · Partially met      Patient/caregiver will verbalize/recognize/easily assess dosage/route/frequency/indication of All meds within 3 weeks.  Interventions   · Collaborate with Physician as appropriate to meet patient needs.  · Complete medication reconciliation.  · Recognize and provide educational material (KRAMES).   Status  · Partially met              Patient Instructions     Instructions were provided via the [employer] patient resources and are available for the patient to view on the patient portal, if active.    Next steps:Educate on medication compliance.    No follow-ups  on file.    Todays OPCM Self-Management Care Plan was developed with the patients/caregivers input and was based on identified barriers from todays assessment.  Goals were written today with the patient/caregiver and the patient has agreed to work towards these goals to improve his/her overall well-being. Patient verbalized understanding of the care plan, goals, and all of today's instructions. Encouraged patient/caregiver to communicate with his/her physician and health care team about health conditions and the treatment plan.  Provided my contact information today and encouraged patient/caregiver to call me with any questions as needed.

## 2020-01-02 NOTE — PATIENT INSTRUCTIONS
"  Know the Signs and Symptoms of Depression  Everyone feels down at times. The blues are a natural part of life. But an unhappy period thats intense or lasts for more than a couple of weeks can be a sign of depression.  Depression is a serious illness. It is not a sign of weakness or a "character flaw," and it is not something you can "snap out of." In fact, most people with depression need treatment to get better. Depression can disrupt the lives of family and friends. If you know someone you think may be depressed, find out what you can do to help.    Recognizing signs of depression  People who are depressed may:  · Feel unhappy, sad, blue, down, or miserable nearly every day  · Feel helpless, hopeless, or worthless  · Lose interest in hobbies, friends, and activities that used to give pleasure  · Not sleep well or sleep too much  · Gain or lose weight  · Feel low on energy or constantly tired  · Have a hard time concentrating or making decisions  · Lose interest in sex  · Have physical symptoms, such as stomachaches, headaches, or backaches  Know the serious signals  Never ignore a person's comments about suicide or behaviors that can lead to self-harm. Warning signals for suicide include:  · Threats or talk of suicide  · Statements such as I wont be a problem much longer or Nothing matters  · Giving away possessions or making a will or  arrangements  · Buying a gun or other weapon  · Sudden, unexplained cheerfulness or calm after a period of depression  If you notice any of these signs, get help right away. Call a healthcare professional, mental health clinic, or suicide hotline and ask what action to take. In an emergency, dont hesitate to call the police.  Resources:  · National Institutes of Mental Gcaqjp532-522-7708ghj.[facility] Hospitalh.nih.gov  · National [place] on Mental Lgvfcwm550-925-2086tqy.tiffanie.org   · Mental Health Iafkeiy442-613-6703ebq.nmha.org  · National Suicide Ntlbpmz756-700-1385 " (800-SUICIDE)  · National Suicide Prevention Mjwbtziu121-641-6003 (196-486-IPNW)www.suicidepreventionlifeline.org   Date Last Reviewed: 1/1/2017  © 4285-0653 [employer]. [address] [place] [address], [place] [address]. All rights reserved. This information is not intended as a substitute for professional medical care. Always follow your healthcare professional's instructions.        Depression: Tips to Help Yourself  As your healthcare providers help treat your depression, you can also help yourself. Keep in mind that your illness affects you emotionally, physically, mentally, and socially. So full recovery will take time. Take care of your body and your soul, and be patient with yourself as you get better.    Self-care  · Educate yourself. Read about treatment and medicine options. If you have the energy, attend local conferences or support groups. Keep a list of useful websites and helpful books and use them as needed. This illness is not your fault. Dont blame yourself for your depression.  · Manage early symptoms. If you notice symptoms returning, experience triggers, or identify other factors that may lead to a depressive episode, get help as soon as possible. Ask trusted friends and family to monitor your behavior and let you know if they see anything of concern.  · Work with your provider. Find a provider you can trust. Communicate honestly with that person and share information on your treatment for depression and your reaction to medicines.  · Be prepared for a crisis. Know what to do if you experience a crisis. Keep the phone number of a crisis hotline and know the location of your community's urgent care centers and the closest emergency department.  · Hold off on big decisions. Depression can cloud your judgment. So wait until you feel better before making major life decisions, such as changing jobs, moving, or getting  or .  · Be patient. Recovering from depression is a process.  Dont be discouraged if it takes some time to feel better.  · Keep it simple. Depression saps your energy and concentration. So you wont be able to do all the things you used to do. Set small goals and do what you can.  · Be with others. Dont isolate yourself--youll only feel worse. Try to be with other people. And take part in fun activities when you can. Go to a movie, ballgame, [employer] service, or social event. Talk openly with people you can trust. And accept help when its offered.  Take care of your body  People with depression often lose the desire to take care of themselves. That only makes their problems worse. During treatment and afterward, make a point to:  · Exercise. Its a great way to take care of your body. And studies have shown that exercise helps fight depression.  · Avoid drugs and alcohol. These may ease the pain in the short term. But theyll only make your problems worse in the long run.  · Get relief from stress. Ask your healthcare provider for relaxation exercises and techniques to help relieve stress.  · Eat right. A balanced and healthy diet helps keep your body healthy.  Date Last Reviewed: 1/1/2017  © 6927-6106 [employer]. [address] [place] [address], [place] [address]. All rights reserved. This information is not intended as a substitute for professional medical care. Always follow your healthcare professional's instructions.        Counseling for Depression  For some people, counseling, also called talk therapy, has been found to be as effective as medicine for mild to moderate depression. When done by a trained professional, this treatment is a powerful way to better understand your thoughts and feelings. Like medicine, it may take time before you notice how much counseling is helping.    Kinds of talk therapy  Different counselors use different methods for talk therapy. But all therapy aims to help change how you think about your problem. Most therapy for depression  is often done one-on-one. But it may also be done in a group setting. You and your healthcare provider can discuss the type of therapy you think would work best for you. You can also discuss who the best person is to provide the therapy.  How therapy helps  Talking about your problems can help them seem less overwhelming. It can help work through problems you have with your life and your relationships. It can also help you understand how depression is clouding your thinking, not letting you see the world the way it really is. Therapy can give you:  · Insight about your emotions  · New tools for dealing with your problems  · Emotional support for making progress  Getting better takes time  Talk therapy can help you feel better. But change doesnt happen right away. Depression takes away your energy and motivation. So it can be hard to feel like going to therapy and sticking with it. But therapy has been proven to be very valuable in the treatment of depression. Therapy for depression is often done for a set number of sessions. In other cases, you and your therapist decide together at what point you no longer need therapy.  Additional sources of help  In addition to a professional counselor, it may help to talk to other people in your life. You may find support and insight from:  · A close friend or family member  · A  trained in counseling  · A local support group or community group  · A 12-step program (such as Alcoholics Anonymous) for dealing with problems that can contribute to depression, such as alcohol or drug addiction  Date Last Reviewed: 1/1/2017  © 5275-7545 [employer]. [address] [place] [address], [place] [address]. All rights reserved. This information is not intended as a substitute for professional medical care. Always follow your healthcare professional's instructions.        Taking an Active Role in Your Medicines  Take the time to learn about your medicine. For instance, why  are you taking it? What does it do? Work with your healthcare providers to get the answers you need.    Ask questions about your medicine  Find out the following information:  · What is the name of the medicine?  · Why do I need to take it? When should I take it?  · How should I take it: with water? with food? on an empty stomach?  · How much do I take?  · What do I do if I miss a dose?  · What side effects could it cause and which ones should I call the healthcare provider about?  · Are there any foods or medicines I should avoid while taking this medicine?  · Will this medicine change how my other medicines work?  Take an active role  Actions to take include the following:  · Fill all your prescriptions at the same pharmacy. This keeps your medicine history in one place.  · Talk to the pharmacist. Make sure you understand how to take each medicine. Ask for a fact sheet about each one.  · Tell your healthcare provider and pharmacist about all the prescription and over-the-counter medicines you take. This includes vitamins, nutrition or health supplements, alcohol or other drugs, and herbal remedies.  · Tell your healthcare provider and pharmacist if you have any medical conditions or allergies to any medicine or food, or if you are pregnant or breastfeeding.  · Keep a list of all your medicines. Use the sample to the right as a guide for the type of information needed.  Name of medicine:    Taken for:    Dose:    Time(s) to take it:     Date Last Reviewed: 8/1/2016  © 0525-0179 [employer]. [address] [place] [address]. All rights reserved. This information is not intended as a substitute for professional medical care. Always follow your healthcare professional's instructions.        Taking Your Medications  You'll likely need to take several types of medicines after a heart attack. You may wonder: Do I really need to take so much medicine? The answer is yes. Medicine can be a vital part  of healing. They can also help prevent another heart attack in the future. Be sure to take all medicines as directed.  Getting started    · Keep a list of all your medicines. Know what they are, what they do, what happens if you don't take them, and how to take them. Keep that list with you at all times.  · Know what side effects to expect. If a side effect bothers you, doesn't go away, or gets worse, call your healthcare provider.  · Ask your provider or pharmacist about possible interactions between medicines. Check before taking any over-the-counter medicine, herbs, or supplements.  · Try to get all prescriptions filled at the same pharmacy. This will help reduce the risk of interactions between medicines.  · Talk to your provider if you have any concerns about the cost of medicines. It's important that you take all of them or find alternatives if you can't afford them.  Types of medicines  · Aspirin and other anticoagulants help prevent blood clots.  · ACE inhibitors help control blood pressure and reduce heart strain and weakening of the heart muscle. They also help with heart remodeling.  · Statins help reduce cholesterol levels.  · Beta blockers help slow the heart rate and lower blood pressure. They can also help the heart pump.  · Nitroglycerin helps reduce the heart's workload and improves blood flow through the heart.  Hints for taking medicines  · Use a pillbox to store all the pills you need for the week.  · To be sure you don't skip or repeat a dose, write down when you take your medicine.  · Don't stop taking your medicines. This can be dangerous to your heart.  · Be sure to refill a prescription before it runs out.  · Take your medicines at the same time every day.  Medications for related conditions  · Blood pressure medicines: High blood pressure is one of the most serious risks for heart attack. Medicine is likely needed to help manage this problem. It might take time to find the best medicine for  you. For best results, follow these tips:  ¨ Don't stop taking high blood pressure medicine suddenly. This can make your blood pressure shoot up quickly.  ¨ Keep in mind that medicine works best when you're also eating heart-healthy foods, limiting sodium (salt), and getting regular exercise.  · Diabetes or cholesterol medicine: If health eating and activity aren't enough to manage these conditions, your healthcare provider may prescribe medicines to treat them. Be sure to take them as directed.  Date Last Reviewed: 6/1/2016 © 2000-2017 [employer]. [address] [place] [address]. All rights reserved. This information is not intended as a substitute for professional medical care. Always follow your healthcare professional's instructions.        Tips for Taking Medication  Its easy to forget to take your medicine, especially when you take a lot of pills. But, to get the best  results from medicines, always take them as directed. The tips on these pages can help you keep track.  Staying on schedule     A pill organizer can help you keep track of the medicines you take each day.   Every medicine has a different purpose. So, each one needs to be taken as prescribed. Dont skip pills or stop taking a medicine, even when you feel fine. To stay on track try to:  · Take your medicine at set times. You could take it each morning with breakfast or right before you go to bed. Some medicines may need to be taken at certain times of the day, or with food. Ask your doctor if this is the case for any of your medicines.  · Find ways to remind yourself to take medicine. Use a pillbox or organize pills for the week. Set your watch or cell phone alarm to go off when youre supposed to take your medicine. Or, put a note on the bathroom mirror to remind yourself.  · Have your prescriptions refilled while you still have plenty of pills left. Keep in mind that certain suppliers, such as mail order pharmacies,  may take longer to fill prescriptions.  · When traveling, keep all medicines in your carry-on bag. This way youll have them in case you and your checked luggage get . Also, bring copies of each of your prescriptions when you travel.  Safety tips  Read the warning labels and usage instructions for each medicine you take. Also, keep these safety tips in mind:  · Get help organizing your pills if you need it. Taking more than one medicine can be confusing. A family member or friend can help prevent you from making a mistake that could be dangerous to your health.  · Fill all your prescriptions at the same drug store. This way, your records are all in one place.  · Ask your pharmacist or doctor for a fact sheet or other patient information when you start a new medicine.  · Tell your doctor and pharmacist if you have allergies to any medicine.  · Dont split your pills to save money. Talk to your doctor if youre having trouble paying for your medicine.  · Never share medicine with anyone.  · Ask your pharmacy how you should dispose of old or  medicine.  · Give a copy of your medicine list to a family member or close friend. Hold copies of each others lists in case of emergency.  · Store medicines in a cool, dry, dark place - not in a steamy bathroom.  · Make sure you tell your healthcare providers if you are taking any other supplements or drugs over-the-counter.  If you have side effects  Some medicines can cause side effects, such as nausea or dizziness. Tell your doctor if you have any side effects. He or she may change the dosage or schedule to reduce effects. Be sure to keep taking your medicine as directed, and always talk to your healthcare team about how you feel. Your feedback will help the doctor find the best medicine plan for you.  Date Last Reviewed: 2016  © 8346-6197 [employer]. [address] [place] [address], [place] [address]. All rights reserved. This information is not  intended as a substitute for professional medical care. Always follow your healthcare professional's instructions.

## 2020-01-06 ENCOUNTER — OFFICE VISIT (OUTPATIENT)
Dept: PSYCHIATRY | Facility: CLINIC | Age: 55
End: 2020-01-06
Payer: COMMERCIAL

## 2020-01-06 VITALS
HEIGHT: 64 IN | BODY MASS INDEX: 30.48 KG/M2 | DIASTOLIC BLOOD PRESSURE: 64 MMHG | SYSTOLIC BLOOD PRESSURE: 110 MMHG | HEART RATE: 76 BPM | WEIGHT: 178.56 LBS

## 2020-01-06 DIAGNOSIS — Z65.8 PSYCHOSOCIAL DISTRESS: ICD-10-CM

## 2020-01-06 DIAGNOSIS — F39 MOOD INSOMNIA: ICD-10-CM

## 2020-01-06 DIAGNOSIS — F33.3 SEVERE RECURRENT MAJOR DEPRESSIVE DISORDER WITH PSYCHOTIC FEATURES WITH ANXIOUS DISTRESS: Primary | ICD-10-CM

## 2020-01-06 DIAGNOSIS — F51.05 MOOD INSOMNIA: ICD-10-CM

## 2020-01-06 DIAGNOSIS — F41.1 GAD (GENERALIZED ANXIETY DISORDER): ICD-10-CM

## 2020-01-06 PROCEDURE — 99214 OFFICE O/P EST MOD 30 MIN: CPT | Mod: S$GLB,,, | Performed by: NURSE PRACTITIONER

## 2020-01-06 PROCEDURE — 90833 PSYTX W PT W E/M 30 MIN: CPT | Mod: S$GLB,,, | Performed by: NURSE PRACTITIONER

## 2020-01-06 PROCEDURE — 99999 PR PBB SHADOW E&M-EST. PATIENT-LVL III: ICD-10-PCS | Mod: PBBFAC,,, | Performed by: NURSE PRACTITIONER

## 2020-01-06 PROCEDURE — 90833 PR PSYCHOTHERAPY W/PATIENT W/E&M, 30 MIN (ADD ON): ICD-10-PCS | Mod: S$GLB,,, | Performed by: NURSE PRACTITIONER

## 2020-01-06 PROCEDURE — 99999 PR PBB SHADOW E&M-EST. PATIENT-LVL III: CPT | Mod: PBBFAC,,, | Performed by: NURSE PRACTITIONER

## 2020-01-06 PROCEDURE — 99214 PR OFFICE/OUTPT VISIT, EST, LEVL IV, 30-39 MIN: ICD-10-PCS | Mod: S$GLB,,, | Performed by: NURSE PRACTITIONER

## 2020-01-06 RX ORDER — METHOCARBAMOL 750 MG/1
500 TABLET, FILM COATED ORAL 2 TIMES DAILY PRN
COMMUNITY
End: 2021-06-03

## 2020-01-06 RX ORDER — RISPERIDONE 1 MG/1
1 TABLET ORAL NIGHTLY
Qty: 1 TABLET | Refills: 0
Start: 2020-01-06 | End: 2020-02-05

## 2020-01-06 RX ORDER — FLUOXETINE HYDROCHLORIDE 40 MG/1
40 CAPSULE ORAL DAILY
Qty: 30 CAPSULE | Refills: 0 | Status: SHIPPED | OUTPATIENT
Start: 2020-01-06 | End: 2020-02-05

## 2020-01-06 RX ORDER — OMEPRAZOLE 40 MG/1
40 CAPSULE, DELAYED RELEASE ORAL EVERY MORNING
COMMUNITY
End: 2020-04-13 | Stop reason: SDUPTHER

## 2020-01-06 RX ORDER — NITROFURANTOIN (MACROCRYSTALS) 100 MG/1
100 CAPSULE ORAL EVERY 12 HOURS
COMMUNITY
End: 2020-04-13

## 2020-01-06 RX ORDER — LOPERAMIDE HYDROCHLORIDE 2 MG/1
2 CAPSULE ORAL ONCE AS NEEDED
COMMUNITY
End: 2024-01-04 | Stop reason: ALTCHOICE

## 2020-01-06 RX ORDER — TRAMADOL HYDROCHLORIDE 50 MG/1
50 TABLET ORAL EVERY 8 HOURS PRN
COMMUNITY
End: 2021-02-19

## 2020-01-06 RX ORDER — NAPROXEN 500 MG/1
500 TABLET ORAL 2 TIMES DAILY WITH MEALS
COMMUNITY
End: 2020-06-02

## 2020-01-06 RX ORDER — PROMETHAZINE HYDROCHLORIDE 25 MG/1
25 TABLET ORAL EVERY 6 HOURS PRN
COMMUNITY
End: 2020-04-13 | Stop reason: SDUPTHER

## 2020-01-06 RX ORDER — CETIRIZINE HYDROCHLORIDE 10 MG/1
10 TABLET ORAL DAILY
COMMUNITY
End: 2020-04-13 | Stop reason: SDUPTHER

## 2020-01-06 RX ORDER — ACETAMINOPHEN, DIPHENHYDRAMINE HCL, PHENYLEPHRINE HCL 325; 25; 5 MG/1; MG/1; MG/1
10 TABLET ORAL NIGHTLY
COMMUNITY
Start: 2020-01-06 | End: 2020-02-05

## 2020-01-06 NOTE — LETTER
January 6, 2020    [address], [address]  [place] [address]  Phone: [phone]  Fax: [phone]         [address] [place] [address]        Naomy Armstrong    Was treated here on 01/06/2020    Her return to work/school date is pending as she will need to be seen on 01/23/2019 for further assessment to determine when it would be appropriate for her to return to work.      Sincerely,          Virginia Dickson NP

## 2020-01-06 NOTE — PATIENT INSTRUCTIONS
"You have been provided with a certain amount of medication with a specified number of refills.  Please follow up within an adequate time before you run out of medications.    REFILLS FOR CONTROLLED SUBSTANCES WILL NOT BE GIVEN WITHOUT AN APPOINTMENT.  I will not honor or fill automated refill requests from pharmacies.  You must come in for an appointment to get refills.    Please book your next appointment for myself or therapist by phone by calling our office at [phone].      PLEASE BE AT LEAST 15 MINUTES EARLY FOR YOUR NEXT APPOINTMENT.  PLEASE, DO NOT BE LATE OR YOU WILL BE TURNED AWAY AND ASKED TO RESCHEDULE.  YOU MUST COME EARLY TO ALLOW TIME FOR CHECK-IN AS WELL AS GET YOUR VITAL SIGNS AND GO OVER YOUR MEDICATIONS.  [employer] is not fair to the patients who present after you and are on time for their appointments.  It causes a delay in the appointments for patients and staff.  IF YOU ARE LATE, THERE IS A POSSIBILITY THAT YOU WILL BE CHARGED FOR THE APPOINTMENT TIME PERSONALLY AND IT WILL NOT GO TO YOUR INSURANCE.  YOU MAY ALSO BE DISCHARGED FROM CLINIC with multiple "No Show" appointments.  -----------------------------------------------------------------------------------------------------------------  IF YOU FEEL SUICIDAL OR HAVING THOUGHTS OR PLANS TO HURT YOURSELF OR OTHERS, CALL 911 OR REPORT TO THE NEAREST EMERGENCY ROOM.  YOU CAN ALSO ACCESS THE FOLLOWING HOTLINE(S):    National Suicide Hotline Number 1-591-990-TALK (0566)     ([facility] Mobile Crisis, [phone]'   [facility], [phone]; [place], 24 hours / 7 days, [phone]-COPE ([phone]), 1-728-658-COPE ([phone]))     TIPS FOR GETTING YOUR PRESCRIPTIONS:    You can always ask your pharmacist the cost of your medications without the use of your insurance. Sometimes the medication will be cheaper if you do not use your insurance.     If you decide you want to have your prescriptions filled at " a different pharmacy, you can always go to the new pharmacy of your choice and have them call the pharmacy where your prescription was sent and they can have your prescription transferred to the new pharmacy.

## 2020-01-08 ENCOUNTER — OUTPATIENT CASE MANAGEMENT (OUTPATIENT)
Dept: ADMINISTRATIVE | Facility: OTHER | Age: 55
End: 2020-01-08

## 2020-01-08 NOTE — LETTER
January 16, 2020    Naomy Armstrong  [address] [place] [address]             [facility] [address]  [place] [address] Dear MsHerb Armstrong,      I have been assigned as your  with [facility] Outpatient Complex Case Management Department. I have attempted to reach you by telephone, but I was unsuccessful. Please call our department so that we can go over some questions with you regarding your health. I will make another attempt to contact you by telephone before closing your case with case management.     The Outpatient Case Management department can be reached at [phone] from 8:00am to 4:30pm on Monday thru Friday. [facility] also has a program where a nurse is available 24/7 to answer questions or provider medical advice. Their number is [phone].     Thanks,      Aurora Soriano, RN  Outpatient Case Management  [phone]

## 2020-01-09 ENCOUNTER — PATIENT OUTREACH (OUTPATIENT)
Dept: ADMINISTRATIVE | Facility: OTHER | Age: 55
End: 2020-01-09

## 2020-01-09 ENCOUNTER — TELEPHONE (OUTPATIENT)
Dept: PSYCHIATRY | Facility: HOSPITAL | Age: 55
End: 2020-01-09

## 2020-01-09 NOTE — TELEPHONE ENCOUNTER
Call made to patient's preferred number (home) to discuss her request for FMLA and short term disability paperwork and to notify her that her medical records will have to be requested by her company from the medical records department. No answer. No voicemail. Call made to cell number and recording states that no voicemail has been set up at this number.

## 2020-01-13 ENCOUNTER — OFFICE VISIT (OUTPATIENT)
Dept: PAIN MEDICINE | Facility: CLINIC | Age: 55
End: 2020-01-13
Payer: COMMERCIAL

## 2020-01-13 VITALS
WEIGHT: 177.19 LBS | BODY MASS INDEX: 30.25 KG/M2 | HEART RATE: 81 BPM | HEIGHT: 64 IN | SYSTOLIC BLOOD PRESSURE: 111 MMHG | OXYGEN SATURATION: 99 % | DIASTOLIC BLOOD PRESSURE: 71 MMHG | TEMPERATURE: 99 F

## 2020-01-13 DIAGNOSIS — M47.816 LUMBAR SPONDYLOSIS: ICD-10-CM

## 2020-01-13 DIAGNOSIS — M51.36 DDD (DEGENERATIVE DISC DISEASE), LUMBAR: Primary | ICD-10-CM

## 2020-01-13 PROBLEM — M51.369 DDD (DEGENERATIVE DISC DISEASE), LUMBAR: Status: ACTIVE | Noted: 2020-01-13

## 2020-01-13 PROCEDURE — 99214 PR OFFICE/OUTPT VISIT, EST, LEVL IV, 30-39 MIN: ICD-10-PCS | Mod: S$GLB,,, | Performed by: PAIN MEDICINE

## 2020-01-13 PROCEDURE — 99999 PR PBB SHADOW E&M-EST. PATIENT-LVL III: CPT | Mod: PBBFAC,,, | Performed by: PAIN MEDICINE

## 2020-01-13 PROCEDURE — 99214 OFFICE O/P EST MOD 30 MIN: CPT | Mod: S$GLB,,, | Performed by: PAIN MEDICINE

## 2020-01-13 PROCEDURE — 99999 PR PBB SHADOW E&M-EST. PATIENT-LVL III: ICD-10-PCS | Mod: PBBFAC,,, | Performed by: PAIN MEDICINE

## 2020-01-13 NOTE — PROGRESS NOTES
Subjective:     Patient ID: Naomy Armstrong is a 54 y.o. female    Chief Complaint: Low-back Pain; Leg Pain; Hip Pain; and Knee Pain      Referred by: No ref. provider found      HPI:    Interval History (1/13/20):  She returns today for follow up.  She reports that her neck pain has improved with home exercise program.  She does not feel as though this needs any further attention.  She does state that her low back has been bothering her.  She localizes pain to the bilateral lower lumbar regions.  The pain does radiate to the bilateral hip regions.  She denies any associated numbness, tingling, weakness, bowel bladder dysfunction.  The pain is constant and worsened with activity.  She states that the pain is typically worse when initiating activity after rest.        Initial Encounter (7/9/19):  Naomy Armstrong is a 54 y.o. female who presents today with chronic neck pain. This pain has been present for years.  No specific inciting event or injury noted. She localizes the pain to the midline cervical region.  The pain does not radiate.  She denies any associated numbness, tingling, weakness, bowel bladder dysfunction.  She has had some radicular pain on the left side in the past this has resolved.  The pain is constant and worsened with activity.  She specifically states that the pain is worse when looking at screens.  She has been treated in the past by Dr. Bhavya Oneill at [facility].   This pain is described in detail below.    Physical Therapy:  No.    Non-pharmacologic Treatment:  Rest helps         · TENS?  No    Pain Medications:         · Currently taking:      · Has tried in the past:  Opioids, muscle relaxers, Tylenol, gabapentin, NSAIDs, Amitriptyline, tizanidine    · Has not tried:  SNRIs, topical creams    Blood thinners:  None    Interventional Therapies:   10/8/13- Left RFA C5, C6, C7  9/10/13- Left C5, C6, C7 MBB   8/13/13- C7-T1 IL EMMA    Relevant Surgeries:  None    Affecting  sleep?  Yes    Affecting daily activities? yes    Depressive symptoms? yes          · SI/HI? No    Work status: Employment     Pain Scores:    Best:       2/10  Worst:   9/10  Usually:   5/10  Today:   5/10    Review of Systems   Constitutional: Negative for activity change, appetite change, chills, fatigue, fever and unexpected weight change.   HENT: Negative for hearing loss.    Eyes: Negative for visual disturbance.   Respiratory: Negative for chest tightness and shortness of breath.    Cardiovascular: Negative for chest pain.   Gastrointestinal: Negative for abdominal pain, constipation, diarrhea, nausea and vomiting.   Genitourinary: Negative for difficulty urinating.   Musculoskeletal: Positive for arthralgias, back pain, gait problem, myalgias, neck pain and neck stiffness.   Skin: Negative for rash.   Neurological: Negative for dizziness, weakness, light-headedness, numbness and headaches.   Psychiatric/Behavioral: Positive for sleep disturbance. Negative for hallucinations and suicidal ideas. The patient is not nervous/anxious.        Past Medical History:   Diagnosis Date    Alcohol abuse     per chart, but patient denies today and cocaine per chart    Anxiety     Depression     Hallucination     last couple of months started seeing someone standing in her room, saw boyfriend's reflection in glass    Headache     History of psychiatric hospitalization     age 13 [facility] for 2 months for acting out; 2018 for SI    Hx of psychiatric care     Hypertension     Psychiatric problem     Sleep difficulties     Suicide attempt     with knife by cutting stomach    Therapy        History reviewed. No pertinent surgical history.    Social History     Socioeconomic History    Marital status:      Spouse name: Not on file    Number of children: 0    Years of education: Not on file    Highest education level: Not on file   Occupational History    Occupation:      Employer: [employer]    Social Needs    Financial resource strain: Not on file    Food insecurity:     Worry: Not on file     Inability: Not on file    Transportation needs:     Medical: Not on file     Non-medical: Not on file   Tobacco Use    Smoking status: Former Smoker     Last attempt to quit: 2006     Years since quittin.7    Smokeless tobacco: Never Used   Substance and Sexual Activity    Alcohol use: Yes     Comment: occasional    Drug use: Not Currently     Types: Marijuana     Comment: tried at age 14    Sexual activity: Yes     Partners: Male     Comment: going through menopause   Lifestyle    Physical activity:     Days per week: Not on file     Minutes per session: Not on file    Stress: Not on file   Relationships    Social connections:     Talks on phone: Not on file     Gets together: Not on file     Attends Quaker service: Not on file     Active member of club or organization: Not on file     Attends meetings of clubs or organizations: Not on file     Relationship status: Not on file   Other Topics Concern    Patient feels they ought to cut down on drinking/drug use No    Patient annoyed by others criticizing their drinking/drug use No    Patient has felt bad or guilty about drinking/drug use No    Patient has had a drink/used drugs as an eye opener in the AM No   Social History Narrative     x 2.    Lives with boyfriend.     Works at [employer] as an .    Presently working on Bachelor's degree.    Has associates degree in criminal justice.       Review of patient's allergies indicates:   Allergen Reactions    Effexor [venlafaxine]      Elevated her blood pressure    Zoloft [sertraline]     Paroxetine hcl      Made her feel drunk       Current Outpatient Medications on File Prior to Visit   Medication Sig Dispense Refill    amLODIPine (NORVASC) 5 MG tablet Take 1 tablet (5 mg total) by mouth once daily. 30 tablet 0    cetirizine (ZYRTEC) 10 MG tablet Take 10 mg by  "mouth once daily.      FLUoxetine 40 MG capsule Take 1 capsule (40 mg total) by mouth once daily. 30 capsule 0    lisinopril (PRINIVIL,ZESTRIL) 20 MG tablet Take 1 tablet (20 mg total) by mouth once daily. 30 tablet 0    loperamide (IMODIUM) 2 mg capsule Take 2 mg by mouth once as needed for Diarrhea.      melatonin 10 mg Tab Take 1 tablet (10 mg total) by mouth nightly.      methocarbamol (ROBAXIN) 750 MG Tab Take 500 mg by mouth 2 (two) times daily as needed.      multivitamin with minerals tablet Take 1 tablet by mouth once daily.      naproxen (NAPROSYN) 500 MG tablet Take 500 mg by mouth 2 (two) times daily with meals.      nitrofurantoin (MACRODANTIN) 100 MG capsule Take 100 mg by mouth every 12 (twelve) hours.      omeprazole (PRILOSEC) 40 MG capsule Take 40 mg by mouth every morning.      pantoprazole (PROTONIX) 40 MG tablet Take 1 tablet (40 mg total) by mouth once daily. (Patient not taking: Reported on 1/6/2020) 30 tablet 0    promethazine (PHENERGAN) 25 MG tablet Take 25 mg by mouth every 6 (six) hours as needed for Nausea.      risperiDONE (RISPERDAL) 1 MG tablet Take 1 tablet (1 mg total) by mouth every evening. 1 tablet 0    traMADol (ULTRAM) 50 mg tablet Take 50 mg by mouth every 8 (eight) hours as needed for Pain.       No current facility-administered medications on file prior to visit.        Objective:      /71   Pulse 81   Temp 98.5 °F (36.9 °C) (Oral)   Ht 5' 4" (1.626 m)   Wt 80.4 kg (177 lb 3.2 oz)   SpO2 99%   BMI 30.42 kg/m²     Exam:  GEN:  Well developed, well nourished.  No acute distress.  Normal pain behavior.  HEENT:  No trauma.  Mucous membranes moist.  Nares patent bilaterally.  PSYCH: Normal affect. Thought content appropriate.  CHEST:  Breathing symmetric.  No audible wheezing.  ABD: Soft, non-distended.  SKIN:  Warm, pink, dry.  No rash on exposed areas.    EXT:  No cyanosis, clubbing, or edema.  No color change or changes in nail or hair " growth.  NEURO/MUSCULOSKELETAL:  Fully alert, oriented, and appropriate. Speech normal indio. No cranial nerve deficits.   Gait:  Antalgic.  No trendelenburg sign bilaterally.   Motor Strength: 5/5 motor strength throughout lower extremities.   Sensory:  No sensory deficit in the lower extremities.   Reflexes:  1 + and symmetric throughout.  Downgoing Babinski's bilaterally.  No clonus or spasticity.  L-Spine:  Full ROM with pain on extension more than flexion.  Positive pain with axial/facet loading bilaterally.  Negative SLR bilaterally.    Positive TTP over lumbar paraspinals, bilateral SI joints          Imaging:  Narrative     No significant alignment abnormality.  Vertebral body heights are normally maintained, without compression deformity at any level.  There is disc narrowing at C5-6 and C6-7, and each of these levels demonstrates posterior marginal vertebral endplate   spurring and accompanying disc bulging.  The spurring is more pronounced to the right of midline at C5-6 and to the left of midline at C6-7.  All other cervical and visualized upper thoracic levels have normal disc contour, and there is no evidence of a   significant focal disc herniation at any level.  AP diameter of the spinal canal is normally maintained at all levels with no canal stenosis/extrinsic cord compression observed.  The spinal cord is well delineated from the cervicomedullary junction to   the T3 level, and appears normal in size and configuration without focal enlargement or irregularity.  No Chiari malformation or cord cyst or syrinx.  No abnormal signal from the substance of the cord on any of the pulse sequences generated.  There is   some prominent neural foraminal stenosis at C5-6 on the right and C6-7 on the left.  No significant signal abnormality referable to the osseous structures.   Impression      Cervical spine MRI examination demonstrates degenerative changes as discussed above involving the C5-6 and C6-7  levels, with right-sided foraminal stenosis at C5-6 and left-sided foraminal stenosis at C6-7.  No evidence of a significant focal soft is   herniation, canal stenosis/extrinsic cord compression, or intramedullary cord lesion seen at any level.      Electronically signed by: Kaiser Preston MD  Date: 08/01/13  Time: 06:40          Assessment:       Encounter Diagnoses   Name Primary?    DDD (degenerative disc disease), lumbar Yes    Lumbar spondylosis          Plan:       Naomy was seen today for low-back pain, leg pain, hip pain and knee pain.    Diagnoses and all orders for this visit:    DDD (degenerative disc disease), lumbar  -     Ambulatory Referral to Physical/Occupational Therapy  -     X-Ray Lumbar Spine Ap Lateral w/Flex Ext; Future    Lumbar spondylosis  -     Ambulatory Referral to Physical/Occupational Therapy  -     X-Ray Lumbar Spine Ap Lateral w/Flex Ext; Future        Naomy Ana Armstrong is a 54 y.o. female with chronic low back pain.  Pain appears to be axial and most likely related to lumbar facet joints.  May have some degree of sacroiliac joint pain as well. Low suspicion for radiculopathy at this time.    1.  Lumbar x-rays to evaluate integrity of bony structures and to rule out instability.  2.  Refer to PT for ROM, strengthening, stretching and HEP.  If physical therapy is too expensive she is to initiate a home exercise program.  3.  Return to clinic in 8 weeks.  That time we will discuss efficacy of physical therapy/home exercise program.  May consider lumbar medial branch block/RFA if appropriate.

## 2020-01-20 ENCOUNTER — TELEPHONE (OUTPATIENT)
Dept: PSYCHIATRY | Facility: CLINIC | Age: 55
End: 2020-01-20

## 2020-01-20 NOTE — TELEPHONE ENCOUNTER
Returned patients call about Dr Dickson filling out paperwork. Patient did not answer but left a voicemaill informing her the paper work was faxed. If she has any questions to call the office back

## 2020-01-22 ENCOUNTER — TELEPHONE (OUTPATIENT)
Dept: PSYCHIATRY | Facility: HOSPITAL | Age: 55
End: 2020-01-22

## 2020-01-22 NOTE — TELEPHONE ENCOUNTER
Tried call again to Conrad from [facility]. Voicemail picked up identifying Conrad and stating that it is a secure voicemail. Left message that this provider is returning call regarding a patient and to please call back at [phone].

## 2020-01-22 NOTE — TELEPHONE ENCOUNTER
"Number called twice. Each time a message comes on the phone stating that this number is not in service. Sent message to JONNATHAN Rowland to please check number if left on voice mail, otherwise, please notify me if/when this person calls back.    ----- Message from Taylor Rowland MA sent at 1/22/2020 10:24 AM CST -----  Contact: Conrad/ [employer]   Conrad from [employer] called and said he needs to speak with someone who can answer "specific questions and provide detailed information" regarding this patients disability paperwork. His direct phone number is [phone]    "

## 2020-01-27 ENCOUNTER — OFFICE VISIT (OUTPATIENT)
Dept: FAMILY MEDICINE | Facility: CLINIC | Age: 55
End: 2020-01-27
Payer: COMMERCIAL

## 2020-01-27 VITALS
HEIGHT: 64 IN | BODY MASS INDEX: 30.53 KG/M2 | WEIGHT: 178.81 LBS | HEART RATE: 96 BPM | DIASTOLIC BLOOD PRESSURE: 78 MMHG | OXYGEN SATURATION: 95 % | SYSTOLIC BLOOD PRESSURE: 132 MMHG | RESPIRATION RATE: 16 BRPM | TEMPERATURE: 99 F

## 2020-01-27 DIAGNOSIS — J10.1 INFLUENZA A: Primary | ICD-10-CM

## 2020-01-27 DIAGNOSIS — R06.2 WHEEZING: ICD-10-CM

## 2020-01-27 LAB
CTP QC/QA: YES
POC MOLECULAR INFLUENZA A AGN: POSITIVE
POC MOLECULAR INFLUENZA B AGN: NEGATIVE

## 2020-01-27 PROCEDURE — 87502 POCT INFLUENZA A/B MOLECULAR: ICD-10-PCS | Mod: QW,S$GLB,, | Performed by: FAMILY MEDICINE

## 2020-01-27 PROCEDURE — 99213 PR OFFICE/OUTPT VISIT, EST, LEVL III, 20-29 MIN: ICD-10-PCS | Mod: 25,S$GLB,, | Performed by: FAMILY MEDICINE

## 2020-01-27 PROCEDURE — 99213 OFFICE O/P EST LOW 20 MIN: CPT | Mod: 25,S$GLB,, | Performed by: FAMILY MEDICINE

## 2020-01-27 PROCEDURE — 94640 AIRWAY INHALATION TREATMENT: CPT | Mod: 59,S$GLB,, | Performed by: FAMILY MEDICINE

## 2020-01-27 PROCEDURE — 96372 THER/PROPH/DIAG INJ SC/IM: CPT | Mod: S$GLB,,, | Performed by: FAMILY MEDICINE

## 2020-01-27 PROCEDURE — 99999 PR PBB SHADOW E&M-EST. PATIENT-LVL IV: CPT | Mod: PBBFAC,,, | Performed by: FAMILY MEDICINE

## 2020-01-27 PROCEDURE — 94640 PR INHAL RX, AIRWAY OBST/DX SPUTUM INDUCT: ICD-10-PCS | Mod: 59,S$GLB,, | Performed by: FAMILY MEDICINE

## 2020-01-27 PROCEDURE — 99999 PR PBB SHADOW E&M-EST. PATIENT-LVL IV: ICD-10-PCS | Mod: PBBFAC,,, | Performed by: FAMILY MEDICINE

## 2020-01-27 PROCEDURE — 87502 INFLUENZA DNA AMP PROBE: CPT | Mod: QW,S$GLB,, | Performed by: FAMILY MEDICINE

## 2020-01-27 PROCEDURE — 96372 PR INJECTION,THERAP/PROPH/DIAG2ST, IM OR SUBCUT: ICD-10-PCS | Mod: S$GLB,,, | Performed by: FAMILY MEDICINE

## 2020-01-27 RX ORDER — METHYLPREDNISOLONE SOD SUCC 125 MG
125 VIAL (EA) INJECTION
Status: COMPLETED | OUTPATIENT
Start: 2020-01-27 | End: 2020-01-27

## 2020-01-27 RX ORDER — PROMETHAZINE HYDROCHLORIDE AND DEXTROMETHORPHAN HYDROBROMIDE 6.25; 15 MG/5ML; MG/5ML
5 SYRUP ORAL
Qty: 240 ML | Refills: 0 | Status: SHIPPED | OUTPATIENT
Start: 2020-01-27 | End: 2020-02-05

## 2020-01-27 RX ORDER — IPRATROPIUM BROMIDE 42 UG/1
2 SPRAY, METERED NASAL 4 TIMES DAILY
Qty: 15 ML | Refills: 2 | Status: SHIPPED | OUTPATIENT
Start: 2020-01-27 | End: 2021-07-01

## 2020-01-27 RX ORDER — ALBUTEROL SULFATE 90 UG/1
2 AEROSOL, METERED RESPIRATORY (INHALATION) EVERY 6 HOURS PRN
Qty: 18 G | Refills: 1 | Status: SHIPPED | OUTPATIENT
Start: 2020-01-27 | End: 2021-01-15

## 2020-01-27 RX ORDER — OSELTAMIVIR PHOSPHATE 75 MG/1
75 CAPSULE ORAL 2 TIMES DAILY
Qty: 10 CAPSULE | Refills: 0 | Status: SHIPPED | OUTPATIENT
Start: 2020-01-27 | End: 2020-02-01

## 2020-01-27 RX ORDER — IPRATROPIUM BROMIDE AND ALBUTEROL SULFATE 2.5; .5 MG/3ML; MG/3ML
3 SOLUTION RESPIRATORY (INHALATION)
Status: COMPLETED | OUTPATIENT
Start: 2020-01-27 | End: 2020-01-27

## 2020-01-27 RX ADMIN — Medication 125 MG: at 02:01

## 2020-01-27 RX ADMIN — IPRATROPIUM BROMIDE AND ALBUTEROL SULFATE 3 ML: 2.5; .5 SOLUTION RESPIRATORY (INHALATION) at 02:01

## 2020-01-27 NOTE — PROGRESS NOTES
Pt tolerated Solu-medrol injection well. Instructed to wait in the clinic for 15 minutes and report any adverse effects immediately to the nurse. Verbalized understanding.    Duo-neb administered via Nebulizer as ordered. Pt tolerating well.

## 2020-01-27 NOTE — PROGRESS NOTES
"Subjective:       Patient ID: Naomy Armstrong is a 54 y.o. female.    Chief Complaint: Cough (dry hard 3 day onset no BP meds in 3 days)    Cough   This is a new problem. The current episode started in the past 7 days. The problem has been gradually worsening. The problem occurs constantly. The cough is productive of brown sputum. Associated symptoms include chest pain (with coughing), chills, a fever, myalgias and postnasal drip.     Review of Systems   Constitutional: Positive for chills and fever.   HENT: Positive for postnasal drip.    Respiratory: Positive for cough.    Cardiovascular: Positive for chest pain (with coughing).   Musculoskeletal: Positive for myalgias.       Objective:     /78 (BP Location: Right arm, Patient Position: Sitting, BP Method: Medium (Manual))   Pulse 96   Temp 98.9 °F (37.2 °C) (Oral)   Resp 16   Ht 5' 4" (1.626 m)   Wt 81.1 kg (178 lb 12.7 oz)   SpO2 95%   BMI 30.69 kg/m²     Physical Exam   Constitutional: She appears well-developed. She appears ill. No distress.   HENT:   Head: Normocephalic and atraumatic.   Right Ear: Tympanic membrane, external ear and ear canal normal.   Left Ear: Tympanic membrane, external ear and ear canal normal.   Nose: Mucosal edema and rhinorrhea present.  No foreign bodies. Right sinus exhibits no maxillary sinus tenderness. Left sinus exhibits no maxillary sinus tenderness.   Mouth/Throat: Posterior oropharyngeal erythema present.   Eyes: Pupils are equal, round, and reactive to light. EOM and lids are normal.   Neck: Trachea normal and normal range of motion. No tracheal tenderness present. No thyroid mass present.   Cardiovascular: Normal rate, regular rhythm, normal heart sounds and intact distal pulses.   Pulmonary/Chest: Effort normal. No respiratory distress. She has wheezes. She has no rales.   Lymphadenopathy:     She has no cervical adenopathy.   Psychiatric: She has a normal mood and affect. Her behavior is normal. "   Vitals reviewed.      Assessment:       1. Influenza A    2. Wheezing        Plan:       Naomy was seen today for cough.    Diagnoses and all orders for this visit:    Influenza A  -     POCT Influenza A/B Molecular  -     oseltamivir (TAMIFLU) 75 MG capsule; Take 1 capsule (75 mg total) by mouth 2 (two) times daily. for 5 days  -     ipratropium (ATROVENT) 42 mcg (0.06 %) nasal spray; 2 sprays by Nasal route 4 (four) times daily.  -     albuterol (PROAIR HFA) 90 mcg/actuation inhaler; Inhale 2 puffs into the lungs every 6 (six) hours as needed for Wheezing. Rescue  -     promethazine-dextromethorphan (PROMETHAZINE-DM) 6.25-15 mg/5 mL Syrp; Take 5 mLs by mouth every 4 to 6 hours as needed.    Wheezing  -     X-Ray Chest PA And Lateral; Future  -     methylPREDNISolone sodium succinate injection 125 mg  -     albuterol-ipratropium 2.5 mg-0.5 mg/3 mL nebulizer solution 3 mL  -     albuterol (PROAIR HFA) 90 mcg/actuation inhaler; Inhale 2 puffs into the lungs every 6 (six) hours as needed for Wheezing. Rescue    Start Tamiflu, and ProAir PRN  Advised symptomatic care with plenty of fluids, honey and lemon, rest, and above regimen.  OTC Ibuprofen or Tylenol for pain.  Discussed course of flu.   Explained that after flu is better, may continue to have a dry cough for a week or two.  Return to office if symptoms worsen or do not improve in a week.

## 2020-01-27 NOTE — LETTER
January 27, 2020      [facility]  [address], [address]  [place] [address]  Phone: [phone]       Patient: Naomy Armstrong   YOB: 1965  Date of Visit: 01/27/2020    To Whom It May Concern:    Ronald Arsmtrong  was at [facility] on 01/27/2020. She may return to work/school on 1/30/2020. If you have any questions or concerns, or if I can be of further assistance, please do not hesitate to contact me.      Sincerely,      Eric Hernandes Jr., MD

## 2020-01-28 ENCOUNTER — TELEPHONE (OUTPATIENT)
Dept: FAMILY MEDICINE | Facility: CLINIC | Age: 55
End: 2020-01-28

## 2020-01-28 NOTE — TELEPHONE ENCOUNTER
----- Message from Maryann Montes sent at 1/28/2020  9:46 AM CST -----  Contact: [facility]  Type:  Pharmacy Calling to Clarify an RX    Name of Caller: [facility]    [facility]    Prescription Name:  albuterol (PROAIR HFA) 90 mcg/actuation inhaler    What do they need to clarify? Requesting an alternative (ventolin)    Can you be contacted via [facility]?no     Best Call Back Number: [phone]

## 2020-01-31 ENCOUNTER — TELEPHONE (OUTPATIENT)
Dept: FAMILY MEDICINE | Facility: CLINIC | Age: 55
End: 2020-01-31

## 2020-01-31 NOTE — LETTER
February 2, 2020      [facility]  [address], [address]  [place] [address]  Phone: [phone]       Patient: Naomy Armstrong   YOB: 1965  Date of Visit: 02/02/2020    To Whom It May Concern:    Ronald Armstrong  was at [facility] on 01/27/2020. She may return to work/school on 2/3/2020. If you have any questions or concerns, or if I can be of further assistance, please do not hesitate to contact me.      Sincerely,      Eric Hernandes Jr., MD

## 2020-01-31 NOTE — TELEPHONE ENCOUNTER
----- Message from Lelo Yan sent at 1/31/2020  3:17 PM CST -----  Contact: pt  Name of Who is Calling: pt     What is the request in detail: is requesting a extended letter to excuse her from school through Friday/today. Pt states she was suppose to return to school Wednesday yet she is still sick. thru Please contact to further discuss and advise      Can the clinic reply by [facility]: no    What Number to Call Back if not [facility]: [phone]
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
## 2020-02-01 NOTE — PATIENT INSTRUCTIONS
"        You have been provided with a certain amount of medication with a specified number of refills.  Please follow up within an adequate time before you run out of medications.    REFILLS FOR CONTROLLED SUBSTANCES WILL NOT BE GIVEN WITHOUT AN APPOINTMENT.  I will not honor or fill automated refill requests from pharmacies.  You must come in for an appointment to get refills.        Please book your next appointment for myself or therapist by phone by calling our office at [phone].        Note that follow up appointments are 10-15 minutes long.  It is important that we focus on medication management.  Should you need therapy, please get set up with our therapist or call your insurance company to find out which therapists are available in your area.      PLEASE BE AT LEAST 15 MINUTES EARLY FOR YOUR NEXT APPOINTMENT.  Late arrivals WILL BE TURNED AWAY AND ASKED TO RESCHEDULE.  YOU MUST COME EARLY TO ALLOW TIME FOR CHECK-IN AS WELL AS GET YOUR VITAL SIGNS AND GO OVER YOUR MEDICATIONS.  Tardiness is not fair to the patients who present after you and are on time for their appointments.  It causes a delay in the appointments for patients and staff.  YOU MAY ALSO BE DISCHARGED FROM CLINIC with multiple late arrivals or "No Show" appointments.       -----------------------------------------------------------------------------------------------------------------  IF YOU FEEL SUICIDAL OR HAVING THOUGHTS OR PLANS TO HURT YOURSELF OR OTHERS, CALL 911 OR REPORT TO THE NEAREST EMERGENCY ROOM.  YOU CAN ALSO ACCESS THE FOLLOWING HOTLINE:    National Suicide Hotline Number 5-942-587-RIQS (7440)                Stop risperidone.  Stop trazodone.  Increase fluoxetine (Prozac) to 60mg daily.  Take hydroxyzine as needed for anxiety.  Get me the [place] paperwork.  "

## 2020-02-05 ENCOUNTER — OFFICE VISIT (OUTPATIENT)
Dept: PSYCHIATRY | Facility: CLINIC | Age: 55
End: 2020-02-05
Payer: COMMERCIAL

## 2020-02-05 VITALS
HEART RATE: 104 BPM | WEIGHT: 180.88 LBS | DIASTOLIC BLOOD PRESSURE: 62 MMHG | HEIGHT: 64 IN | SYSTOLIC BLOOD PRESSURE: 116 MMHG | BODY MASS INDEX: 30.88 KG/M2

## 2020-02-05 DIAGNOSIS — F33.1 MAJOR DEPRESSIVE DISORDER, RECURRENT, MODERATE: Primary | ICD-10-CM

## 2020-02-05 DIAGNOSIS — F43.23 ADJUSTMENT DISORDER WITH MIXED ANXIETY AND DEPRESSED MOOD: ICD-10-CM

## 2020-02-05 DIAGNOSIS — F41.1 GAD (GENERALIZED ANXIETY DISORDER): ICD-10-CM

## 2020-02-05 PROCEDURE — 99214 OFFICE O/P EST MOD 30 MIN: CPT | Mod: S$GLB,,, | Performed by: PSYCHIATRY & NEUROLOGY

## 2020-02-05 PROCEDURE — 99999 PR PBB SHADOW E&M-EST. PATIENT-LVL III: CPT | Mod: PBBFAC,,, | Performed by: PSYCHIATRY & NEUROLOGY

## 2020-02-05 PROCEDURE — 99214 PR OFFICE/OUTPT VISIT, EST, LEVL IV, 30-39 MIN: ICD-10-PCS | Mod: S$GLB,,, | Performed by: PSYCHIATRY & NEUROLOGY

## 2020-02-05 PROCEDURE — 99999 PR PBB SHADOW E&M-EST. PATIENT-LVL III: ICD-10-PCS | Mod: PBBFAC,,, | Performed by: PSYCHIATRY & NEUROLOGY

## 2020-02-05 RX ORDER — FLUOXETINE HYDROCHLORIDE 20 MG/1
60 CAPSULE ORAL DAILY
Qty: 90 CAPSULE | Refills: 1 | Status: SHIPPED | OUTPATIENT
Start: 2020-02-05 | End: 2020-04-07 | Stop reason: SDUPTHER

## 2020-02-05 RX ORDER — HYDROXYZINE HYDROCHLORIDE 50 MG/1
50 TABLET, FILM COATED ORAL 2 TIMES DAILY PRN
Qty: 180 TABLET | Refills: 0 | Status: SHIPPED | OUTPATIENT
Start: 2020-02-05 | End: 2020-04-07 | Stop reason: SDUPTHER

## 2020-03-08 ENCOUNTER — PATIENT MESSAGE (OUTPATIENT)
Dept: PSYCHIATRY | Facility: CLINIC | Age: 55
End: 2020-03-08

## 2020-03-12 ENCOUNTER — PATIENT OUTREACH (OUTPATIENT)
Dept: ADMINISTRATIVE | Facility: OTHER | Age: 55
End: 2020-03-12

## 2020-03-12 ENCOUNTER — OFFICE VISIT (OUTPATIENT)
Dept: ORTHOPEDICS | Facility: CLINIC | Age: 55
End: 2020-03-12
Payer: COMMERCIAL

## 2020-03-12 VITALS
RESPIRATION RATE: 16 BRPM | HEIGHT: 64 IN | DIASTOLIC BLOOD PRESSURE: 72 MMHG | OXYGEN SATURATION: 98 % | WEIGHT: 183.88 LBS | HEART RATE: 89 BPM | SYSTOLIC BLOOD PRESSURE: 112 MMHG | BODY MASS INDEX: 31.39 KG/M2

## 2020-03-12 DIAGNOSIS — M17.0 BILATERAL PRIMARY OSTEOARTHRITIS OF KNEE: Primary | ICD-10-CM

## 2020-03-12 PROCEDURE — 99213 OFFICE O/P EST LOW 20 MIN: CPT | Mod: 25,S$GLB,, | Performed by: ORTHOPAEDIC SURGERY

## 2020-03-12 PROCEDURE — 20610 DRAIN/INJ JOINT/BURSA W/O US: CPT | Mod: 50,S$GLB,, | Performed by: ORTHOPAEDIC SURGERY

## 2020-03-12 PROCEDURE — 99999 PR PBB SHADOW E&M-EST. PATIENT-LVL IV: CPT | Mod: PBBFAC,,, | Performed by: ORTHOPAEDIC SURGERY

## 2020-03-12 PROCEDURE — 99999 PR PBB SHADOW E&M-EST. PATIENT-LVL IV: ICD-10-PCS | Mod: PBBFAC,,, | Performed by: ORTHOPAEDIC SURGERY

## 2020-03-12 PROCEDURE — 20610 LARGE JOINT ASPIRATION/INJECTION: BILATERAL KNEE: ICD-10-PCS | Mod: 50,S$GLB,, | Performed by: ORTHOPAEDIC SURGERY

## 2020-03-12 PROCEDURE — 99213 PR OFFICE/OUTPT VISIT, EST, LEVL III, 20-29 MIN: ICD-10-PCS | Mod: 25,S$GLB,, | Performed by: ORTHOPAEDIC SURGERY

## 2020-03-12 RX ORDER — TRIAMCINOLONE ACETONIDE 40 MG/ML
40 INJECTION, SUSPENSION INTRA-ARTICULAR; INTRAMUSCULAR
Status: DISCONTINUED | OUTPATIENT
Start: 2020-03-12 | End: 2020-03-12 | Stop reason: HOSPADM

## 2020-03-12 RX ADMIN — TRIAMCINOLONE ACETONIDE 40 MG: 40 INJECTION, SUSPENSION INTRA-ARTICULAR; INTRAMUSCULAR at 03:03

## 2020-03-12 NOTE — PROGRESS NOTES
Chart reviewed.   Requested updates from Care Everywhere.  Immunizations reconciled.   HM updated.    fitkit and mammogram orders already placed.

## 2020-03-12 NOTE — PROCEDURES
Large Joint Aspiration/Injection: bilateral knee  Performed by: Jessica Shay MD  Authorized by: Jessica Shay MD  Date/Time: 3/12/2020 3:45 PM    Consent Done?:  Yes (Verbal)  Indications:  pain  Timeout: Immediately prior to procedure a time out was called to verify the correct patient, procedure, equipment, support staff and site/side marked as required.  Prep:Patient was prepped and draped in the usual sterile fashion.        Anesthesia  Local anesthesia used  Anesthetic: topical anesthetic    Details:   Needle size: 22 G   Approach: anteromedial  Location:  Knee  Site:  Bilateral knee    Medications (Right): 40 mg triamcinolone acetonide 40 mg/mL  Medications (Left): 40 mg triamcinolone acetonide 40 mg/mL  Patient tolerance:  patient tolerated the procedure well with no immediate complications

## 2020-03-12 NOTE — PROGRESS NOTES
Follow up visit    History of Present Illness:   Naomy comes to the office for follow up evaluation of bilateral knee pain.  Recommended treatment at the last visit included injection to the right knee which relieved her pain for about 6 months.  Since the last visit both of her knees hurt.   Pain w WB activity.   Better with rest but then has stiffness  Stopped taking naprosyn and started taking excedrin without relief     ROS: unremarkable and no change since last visit    Physical Examination:    NAD  Left and Right knee  Localizes pain over anterior joint line  No swelling , effusion, or erythema   Active ROM: 0 - 130  Passive ROM: 0 - 135  No varus/valgus deformity   Tender to palpation over patella and medial joint line   good  quadricep muscle tone and bulk; no atrophy compared to contralateral extremity   normal (0 - 2 mm) Anterior drawer   normal (0 - 2 mm) posterior drawer   negative valgus instability   negative varus instability   Normal patellar tracking   + pain with patellar compression   ltsi s/s/sp/dp/t   + ehl/fhl/ta/gs  2 + DP    Radiographic imaging: no new images, previous XR show mild arthritis     Assessment/Plan:  54 y.o. female  with Bilateral knee arthritis     We discussed the etiology of persistent pain and further treatment options.  Injection of the bilateral Knees performed, please see procedure note for more details.  Prior to the injection risks and benefits of corticosteroid injection were discussed with the patient including pain, infection, bleeding, skin color changes, swelling, steroid flare. We discussed that over time injections can result in chondral damage, acceleration of arthritis formation, damage to tendons and damage to joints.  The patient consented for the procedure.  Post-injection instructions were given to the patient in writing.  Return to clinic as needed discuss other orthopedic issues    All questions were answered in detail. The patient  verbalized the  understanding of the treatment plan and is in full agreement with the treatment plan.

## 2020-03-13 ENCOUNTER — OFFICE VISIT (OUTPATIENT)
Dept: PSYCHIATRY | Facility: CLINIC | Age: 55
End: 2020-03-13
Payer: COMMERCIAL

## 2020-03-13 DIAGNOSIS — F41.1 GAD (GENERALIZED ANXIETY DISORDER): ICD-10-CM

## 2020-03-13 DIAGNOSIS — F33.1 MAJOR DEPRESSIVE DISORDER, RECURRENT, MODERATE: Primary | ICD-10-CM

## 2020-03-13 PROCEDURE — 90791 PR PSYCHIATRIC DIAGNOSTIC EVALUATION: ICD-10-PCS | Mod: S$GLB,,, | Performed by: SOCIAL WORKER

## 2020-03-13 PROCEDURE — 90791 PSYCH DIAGNOSTIC EVALUATION: CPT | Mod: S$GLB,,, | Performed by: SOCIAL WORKER

## 2020-03-13 NOTE — PATIENT INSTRUCTIONS
[facility] - DEPARTMENT OF PSYCHIATRY   NEW PATIENT ORIENTATION INFORMATION  OUTPATIENT SERVICES COUNSELING CONTRACT    We appreciate the opportunity to participate in your medical care and hope the following protocols will make it easier for you to receive quality treatment in our department.    1. PUNCTUALITY: Your appointment is scheduled for a fixed amount of time reserved especially for you.  To get the benefit of your appointment, please arrive early enough to allow time for parking and registration.  If you are late for your appointment, your clinician is not able to offer additional time.  Please make every effort to be on time.  You may be asked to reschedule.    2. PAYMENT FOR SERVICES:   Payments are expected at the time of service.  Please contact [phone] if you need to resolve issues involving your account at [facility] or to set up a payment plan.    3. CANCELLATION / MISSED APPOINTMENTS:   In order to receive quality care, all appointments must be kept.  Appointment may be cancelled, ONLY by talking with an  at phone number [phone], between 8:00 a.m. and 5:00 p.m., Monday through Friday, at least 24 hours before your appointment time.  Your clinician reserves this time specifically for you, and if you will be unable to use it, it is necessary that you cancel in a timely manner.  If you do not give at least 24-hour notice of cancellation a fee may be assessed.  Please note that insurance does not cover no-show charges, so you will be billed directly.  If you are consistently late, cancel, or do not show for your appointments, our department reserves the right to terminate treatment.    4. CALLING THE DEPARTMENT:   MESSAGES, SCHEDULE OR CANCEL APPOINTMENTS- In general you can reach the department by calling [phone], between 8:00 a.m. and 5:00 p.m., Monday through Friday, to schedule or cancel appointments or leave a message for your clinician.  It is advisable  to schedule your visits far in advance to obtain the most convenient times for your appointments.   AFTER HOURS, WEEKEND OR HOLIDAYS- For urgent questions after hours, weekends and holidays, calling the department number [phone] will connect you to the [facility] On [facility] nursing staff or the Psychiatry Inpatient Unit.  The [facility] On [facility] nursing staff will speak with you and direct your call/care as necessary.   EMERGENCY-  In case of a crisis when there is a concern of harm to self or others, call 911 or the office [phone] between 8:00 a.m. and 5:00 p.m., Monday through Friday.  After hours, weekends or holidays, please call 911 or go to the Emergency Department where you can be thoroughly evaluated by a physician.    5. TEAM APPROACH:  Most patients receive medication management through our team system.  In the team system, your primary physician will be a primary care physician, psychiatrist, nurse practitioner, or psychiatry resident. Please contact your primary physician first in matters regarding medications or acute medical problems.      6. FOLLOW UP APPOINTMENTS:  Follow-up appointments can be made in person at the [facility] Psychiatry office, or by calling [phone], from 8am to 5pm, between Monday and Friday. Appointment cannot be made for psychiatry appointments at the [facility] location due to confidentiality.  It is advisable to schedule your office visits far enough in advance to obtain the most convenient times for your appointments.    7. [facility] PORTAL: The fastest way to get in touch with your provider is through the [facility] Portal, if you have not signed up for it you can request an access code and sign up today. [facility] messages are answered typically answer within 24 hours but no later than 2 business day. Please be advised that the portal is for Non-Urgent messages. If you have something urgent arise please call the office at the number provided above. If you are  in crisis please call 911.     8. CONFIDENTIALITY:  As a therapy patient, all information you share about yourself will be kept confidential within the [facility] system. Information can only be shared with your written permission. Legal exceptions to confidentiality are clear and imminent danger to you or others, any child/elderly abuse or neglect, or a court order.      Revised December 5, 2018
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
## 2020-03-16 ENCOUNTER — PATIENT MESSAGE (OUTPATIENT)
Dept: ORTHOPEDICS | Facility: CLINIC | Age: 55
End: 2020-03-16

## 2020-03-17 ENCOUNTER — TELEPHONE (OUTPATIENT)
Dept: ORTHOPEDICS | Facility: CLINIC | Age: 55
End: 2020-03-17

## 2020-03-17 NOTE — TELEPHONE ENCOUNTER
----- Message from Gillian Guzman sent at 3/17/2020 11:52 AM CDT -----  Contact: BEBO COLLAZO [[id number]]  Type:  Patient Returning Call    Who Called: BEBO COLLAZO [[id number]]    Who Left Message for Patient: Unknown    Does the patient know what this is regarding?: unknown    Can the clinic reply in [facility]: No    Best Call Back Number: [phone] please leave a message    Additional Information: N/A    Spoken with patient about her appt with Dr. Shay and that she nee to see Dr. Powell for her back and hips. patient understand what was told to her.

## 2020-03-18 ENCOUNTER — PATIENT MESSAGE (OUTPATIENT)
Dept: PSYCHIATRY | Facility: CLINIC | Age: 55
End: 2020-03-18

## 2020-03-23 ENCOUNTER — OFFICE VISIT (OUTPATIENT)
Dept: PSYCHIATRY | Facility: CLINIC | Age: 55
End: 2020-03-23
Payer: COMMERCIAL

## 2020-03-23 DIAGNOSIS — F41.1 GAD (GENERALIZED ANXIETY DISORDER): ICD-10-CM

## 2020-03-23 DIAGNOSIS — F33.1 MAJOR DEPRESSIVE DISORDER, RECURRENT, MODERATE: Primary | ICD-10-CM

## 2020-03-23 PROCEDURE — 90834 PSYTX W PT 45 MINUTES: CPT | Mod: S$GLB,,, | Performed by: SOCIAL WORKER

## 2020-03-23 PROCEDURE — 90834 PR PSYCHOTHERAPY W/PATIENT, 45 MIN: ICD-10-PCS | Mod: S$GLB,,, | Performed by: SOCIAL WORKER

## 2020-03-23 NOTE — PROGRESS NOTES
"Individual Psychotherapy (PhD/LCSW)    3/23/2020    Site:  [facility]         Therapeutic Intervention: Met with patient.  Outpatient - Insight oriented psychotherapy 45 min - CPT code 82939 and Outpatient - Supportive psychotherapy 45 min - CPT Code 83551    Chief complaint/reason for encounter: depression, anxiety and sleep     Interval history and content of current session: Patient returned to clinic for follow up psychotherapy. Patient states that she has been "okay". States that she has been trying to get outside but has been struggling. Has no motivation to do anything except sleep. States that she tries to get school work done but mind is scattered. Discussed that sleep habits all not great and that sleep is "all over the place". School has gone completely online and this has been a struggle. This was one of the things that kept her on a routine. Also isn't working in her garden as much because she is tired. Discussed patient's current bedtime routine. States that she will get in bed around 10pm but doesn't fall asleep until 12-2am and at latest 4am. Report that she is okay at falling asleep but will wake up throughout the night. Wakes and doesn't feel rested but struggles to go back to sleep. States that when she finally gets to sleep it is the morning. Then she will be up for an hour then sleep for an hour or so then up again. Discussed that she does this throughout the day and doesn't feel "awake" until 4pm. States by that time she feels like she has wasted the day. Is taking medication for sleep which she generally takes around 8pm. Does sleep with the TV on but if she doesn't her mind will not stop racing. Discussed the importance of a bedtime routine. Discussed how hormones and routine work together for sleep. Discussed things that she could do to help her sleep. Goal for the next two weeks is to work on not being in bed all day and trying to get outside for a little bit of time. "     Treatment plan:  · Target symptoms: depression, anxiety   · Why chosen therapy is appropriate versus another modality: relevant to diagnosis, evidence based practice  · Outcome monitoring methods: self-report, observation  · Therapeutic intervention type: insight oriented psychotherapy, supportive psychotherapy    Risk parameters:  Patient reports no suicidal ideation  Patient reports no homicidal ideation  Patient reports no self-injurious behavior  Patient reports no violent behavior    Verbal deficits: None    Patient's response to intervention:  The patient's response to intervention is accepting.    Progress toward goals and other mental status changes:  The patient's progress toward goals is fair .    Diagnosis:     ICD-10-CM ICD-9-CM   1. Major depressive disorder, recurrent, moderate F33.1 296.32   2. LOR (generalized anxiety disorder) F41.1 300.02       Plan:  individual psychotherapy    Return to clinic: 2 weeks, 1 month    Length of Service (minutes): 45     Cassandra Rockweiler, LCSW-ELADIO

## 2020-03-28 ENCOUNTER — PATIENT MESSAGE (OUTPATIENT)
Dept: PSYCHIATRY | Facility: CLINIC | Age: 55
End: 2020-03-28

## 2020-03-31 ENCOUNTER — PATIENT OUTREACH (OUTPATIENT)
Dept: ADMINISTRATIVE | Facility: HOSPITAL | Age: 55
End: 2020-03-31

## 2020-04-02 ENCOUNTER — PATIENT MESSAGE (OUTPATIENT)
Dept: PSYCHIATRY | Facility: CLINIC | Age: 55
End: 2020-04-02

## 2020-04-03 ENCOUNTER — PATIENT MESSAGE (OUTPATIENT)
Dept: PSYCHIATRY | Facility: CLINIC | Age: 55
End: 2020-04-03

## 2020-04-07 ENCOUNTER — OFFICE VISIT (OUTPATIENT)
Dept: PSYCHIATRY | Facility: CLINIC | Age: 55
End: 2020-04-07
Payer: COMMERCIAL

## 2020-04-07 VITALS
HEART RATE: 76 BPM | HEIGHT: 64 IN | WEIGHT: 181.75 LBS | BODY MASS INDEX: 31.03 KG/M2 | SYSTOLIC BLOOD PRESSURE: 108 MMHG | DIASTOLIC BLOOD PRESSURE: 62 MMHG

## 2020-04-07 DIAGNOSIS — F33.0 MAJOR DEPRESSIVE DISORDER, RECURRENT EPISODE, MILD: Primary | ICD-10-CM

## 2020-04-07 DIAGNOSIS — F41.1 GAD (GENERALIZED ANXIETY DISORDER): ICD-10-CM

## 2020-04-07 DIAGNOSIS — F43.23 ADJUSTMENT DISORDER WITH MIXED ANXIETY AND DEPRESSED MOOD: ICD-10-CM

## 2020-04-07 PROCEDURE — 99214 PR OFFICE/OUTPT VISIT, EST, LEVL IV, 30-39 MIN: ICD-10-PCS | Mod: S$GLB,,, | Performed by: PSYCHIATRY & NEUROLOGY

## 2020-04-07 PROCEDURE — 99999 PR PBB SHADOW E&M-EST. PATIENT-LVL III: ICD-10-PCS | Mod: PBBFAC,,, | Performed by: PSYCHIATRY & NEUROLOGY

## 2020-04-07 PROCEDURE — 99999 PR PBB SHADOW E&M-EST. PATIENT-LVL III: CPT | Mod: PBBFAC,,, | Performed by: PSYCHIATRY & NEUROLOGY

## 2020-04-07 PROCEDURE — 99214 OFFICE O/P EST MOD 30 MIN: CPT | Mod: S$GLB,,, | Performed by: PSYCHIATRY & NEUROLOGY

## 2020-04-07 RX ORDER — HYDROXYZINE HYDROCHLORIDE 50 MG/1
50 TABLET, FILM COATED ORAL 2 TIMES DAILY PRN
Qty: 180 TABLET | Refills: 1 | Status: SHIPPED | OUTPATIENT
Start: 2020-04-07 | End: 2020-06-01 | Stop reason: SDUPTHER

## 2020-04-07 RX ORDER — DICYCLOMINE HYDROCHLORIDE 20 MG/1
20 TABLET ORAL
COMMUNITY
Start: 2020-01-21 | End: 2020-07-31 | Stop reason: SDUPTHER

## 2020-04-07 RX ORDER — BUSPIRONE HYDROCHLORIDE 5 MG/1
5 TABLET ORAL 2 TIMES DAILY
Qty: 60 TABLET | Refills: 1 | Status: SHIPPED | OUTPATIENT
Start: 2020-04-07 | End: 2020-06-01

## 2020-04-07 RX ORDER — QUETIAPINE FUMARATE 50 MG/1
50 TABLET, FILM COATED ORAL NIGHTLY
COMMUNITY
Start: 2020-03-31 | End: 2020-04-07

## 2020-04-07 RX ORDER — FLUOXETINE HYDROCHLORIDE 20 MG/1
60 CAPSULE ORAL DAILY
Qty: 270 CAPSULE | Refills: 1 | Status: SHIPPED | OUTPATIENT
Start: 2020-04-07 | End: 2020-06-01

## 2020-04-07 NOTE — PATIENT INSTRUCTIONS
"        You have been provided with a certain amount of medication with a specified number of refills.  Please follow up within an adequate time before you run out of medications.    REFILLS FOR CONTROLLED SUBSTANCES WILL NOT BE GIVEN WITHOUT AN APPOINTMENT.  I will not honor or fill automated refill requests from pharmacies.  You must come in for an appointment to get refills.        Please book your next appointment for myself or therapist by phone by calling our office at [phone].        Note that follow up appointments are 10-15 minutes long.  It is important that we focus on medication management.  Should you need therapy, please get set up with our therapist or call your insurance company to find out which therapists are available in your area.      PLEASE BE AT LEAST 15 MINUTES EARLY FOR YOUR NEXT APPOINTMENT.  Late arrivals WILL BE TURNED AWAY AND ASKED TO RESCHEDULE.  YOU MUST COME EARLY TO ALLOW TIME FOR CHECK-IN AS WELL AS GET YOUR VITAL SIGNS AND GO OVER YOUR MEDICATIONS.  Tardiness is not fair to the patients who present after you and are on time for their appointments.  It causes a delay in the appointments for patients and staff.  YOU MAY ALSO BE DISCHARGED FROM CLINIC with multiple late arrivals or "No Show" appointments.       -----------------------------------------------------------------------------------------------------------------  IF YOU FEEL SUICIDAL OR HAVING THOUGHTS OR PLANS TO HURT YOURSELF OR OTHERS, CALL 911 OR REPORT TO THE NEAREST EMERGENCY ROOM.  YOU CAN ALSO ACCESS THE FOLLOWING HOTLINE:    National Suicide Hotline Number 9-601-269-TALK (1280)                  "

## 2020-04-07 NOTE — PROGRESS NOTES
Outpatient Psychiatry Follow-Up Visit (MD/NP)    4/7/2020    Clinical Status of Patient:  Outpatient (Ambulatory)    Chief Complaint:  Naomy Armstrong is a 54 y.o. female who presents today for follow-up of depression and anxiety.  Met with patient.      Interval History and Content of Current Session:  Interim Events/Subjective Report/Content of Current Session: Patient Naomy Armstrong presents to clinic for follow up.  She got set up with a therapist and likes her a lot.  She feels that she is learning some techniques to help get through stressful situations.  She has also been following up with her pain doctor and Orthopedics.  A little upset because she says her insurance company did not approve anymore paid leave from work.  She says that work is willing to hold her job for her to return when she gets better.  Sleep has been difficult and has been falling asleep at to a.m. with waking at noon.  Not taking any naps during the daytime.  Taking melatonin 5 mg at night with hydroxyzine 50 mg.  Also taking some hydroxyzine during the daytime.  Biggest complaint still revolves around anxiety.  Constantly worrying about things.  The viral pandemic is adding to the stress.    Psychotherapy:  · Target symptoms: depression, anxiety   · Why chosen therapy is appropriate versus another modality: relevant to diagnosis  · Outcome monitoring methods: self-report, observation  · Therapeutic intervention type: supportive psychotherapy  · Topics discussed/themes: work stress, building skills sets for symptom management, symptom recognition  · The patient's response to the intervention is reluctant. The patient's progress toward treatment goals is limited.   · Duration of intervention: 15 minutes.    Review of Systems   · PSYCHIATRIC: Pertinant items are noted in the narrative.  · CONSTITUTIONAL: No weight gain or loss.   · MUSCULOSKELETAL: No pain or stiffness of the joints.  · NEUROLOGIC: No weakness, sensory  "changes, seizures, confusion, memory loss, tremor or other abnormal movements.  · RESPIRATORY: No shortness of breath.  · CARDIOVASCULAR: No tachycardia or chest pain.  · GASTROINTESTINAL: No nausea, vomiting, pain, constipation or diarrhea.    Past Medical, Family and Social History: The patient's past medical, family and social history have been reviewed and updated as appropriate within the electronic medical record - see encounter notes.    Compliance: yes    Side effects: None    Risk Parameters:  Patient reports no suicidal ideation  Patient reports no homicidal ideation  Patient reports no self-injurious behavior  Patient reports no violent behavior    Exam (detailed: at least 9 elements; comprehensive: all 15 elements)   Constitutional  Vitals:  Most recent vital signs, dated less than 90 days prior to this appointment, were reviewed.   Vitals:    04/07/20 0757   BP: 108/62   Pulse: 76   Weight: 82.5 kg (181 lb 12.3 oz)   Height: 5' 4" (1.626 m)        General:  unremarkable, age appropriate     Musculoskeletal  Muscle Strength/Tone:  no tremor, no tic   Gait & Station:  non-ataxic     Psychiatric  Speech:  no latency; no press   Mood & Affect:  anxious, dysthymic  anxious   Thought Process:  normal and logical   Associations:  intact   Thought Content:  normal, no suicidality, no homicidality, delusions, or paranoia   Insight:  limited awareness of illness   Judgement: limited   Orientation:  person, place, situation, time/date   Memory: able to remember recent events- yes, able to remember remote events- yes   Language: able to name, able to repeat   Attention Span & Concentration:  able to focus   Fund of Knowledge:  intact and appropriate to age and level of education     Assessment and Diagnosis   Status/Progress: Based on the examination today, the patient's problem(s) is/are adequately but not ideally controlled.  New problems have been presented today.   Co-morbidities are complicating management of " the primary condition.  There are no active rule-out diagnoses for this patient at this time.     General Impression: We will continue pharmacological intervention and adjunctive therapy.       ICD-10-CM ICD-9-CM   1. Major depressive disorder, recurrent episode, mild F33.0 296.31   2. LOR (generalized anxiety disorder) F41.1 300.02   3. Adjustment disorder with mixed anxiety and depressed mood F43.23 309.28       Intervention/Counseling/Treatment Plan   · Medication Management: Continue current medications. The risks and benefits of medication were discussed with the patient.  · Counseling provided with patient as follows: importance of compliance with chosen treatment options was emphasized, risks and benefits of treatment options, including medications, were discussed with the patient, risk factor reduction, prognosis, patient education, instructions for  management, treatment and follow-up were reviewed  1.  Continue Prozac 60 mg daily targeting depression and anxiety.  Warned of risk of alba, suicidality, serotonin syndrome.  2.  Continue hydroxyzine 50 mg p.o. b.i.d. p.r.n. anxiety or insomnia.  Warned of risk of over-sedation.  3.  Continue melatonin 5 mg p.o. nightly 2 hr before bed targeting insomnia.  Warned of risk of over-sedation.  4.  Start buspirone 5 mg p.o. b.i.d. targeting anxiety.  Warned of risk of alba, suicidality, serotonin syndrome.  5.  Told patient to get insurance paperwork to me and I will fill it out.  I do not feel that she will need long-term disability.  6.  I feel that she needs to disclose more about her drinking history but is not very open with it at this time.  7.  Continue with individual therapy.  May likely have to consider group therapy program in the near future.    Return to Clinic: 2 months, as needed

## 2020-04-08 ENCOUNTER — OFFICE VISIT (OUTPATIENT)
Dept: PSYCHIATRY | Facility: CLINIC | Age: 55
End: 2020-04-08
Payer: COMMERCIAL

## 2020-04-08 DIAGNOSIS — F41.1 GAD (GENERALIZED ANXIETY DISORDER): ICD-10-CM

## 2020-04-08 DIAGNOSIS — F33.0 MAJOR DEPRESSIVE DISORDER, RECURRENT EPISODE, MILD: Primary | ICD-10-CM

## 2020-04-08 PROCEDURE — 90834 PR PSYCHOTHERAPY W/PATIENT, 45 MIN: ICD-10-PCS | Mod: 95,,, | Performed by: SOCIAL WORKER

## 2020-04-08 PROCEDURE — 90834 PSYTX W PT 45 MINUTES: CPT | Mod: 95,,, | Performed by: SOCIAL WORKER

## 2020-04-08 NOTE — PROGRESS NOTES
"The patient location is: [place], [place]  The chief complaint leading to consultation is: depression, anxiety  Visit type: Virtual visit with synchronous audio and video  Total time spent with patient: 45 minutes  Each patient to whom he or she provides medical services by telemedicine is:  (1) informed of the relationship between the physician and patient and the respective role of any other health care provider with respect to management of the patient; and (2) notified that he or she may decline to receive medical services by telemedicine and may withdraw from such care at any time.    Notes: Returns for psychotherapy. Reports that she is doing "okay". Reports that she has made some changes to sleep hygiene and has noticed some good changes. Reports that she is trying to not have the TV on, has a bedtime routine, and not doing activities in bed anymore other than sleep. States that she noticed that she struggled sleep because she is use to the night shift. Set bedtime of 2-4am and wake up at 12pm. Noticed that she is sleep better and waking up rested. States that her anxiety has been elevated recently due to the virus. Started on new medication and hoping that she will get relief from that. Discussed past coping skills that have worked for her, medication and breathing. Went over thought process. Discussed common negative thought that she has, "I am useless". Went through defusion exercise with patient. Noticed the feelings of sadness and disappointment. Homework set to write down top five negative thoughts and using "I am having the thought that X" exericse.       Encounter Diagnoses   Name Primary?    Major depressive disorder, recurrent episode, mild Yes    LOR (generalized anxiety disorder)        "

## 2020-04-13 ENCOUNTER — OFFICE VISIT (OUTPATIENT)
Dept: FAMILY MEDICINE | Facility: CLINIC | Age: 55
End: 2020-04-13
Payer: COMMERCIAL

## 2020-04-13 VITALS
SYSTOLIC BLOOD PRESSURE: 120 MMHG | HEART RATE: 79 BPM | RESPIRATION RATE: 20 BRPM | TEMPERATURE: 98 F | DIASTOLIC BLOOD PRESSURE: 80 MMHG | OXYGEN SATURATION: 96 % | BODY MASS INDEX: 31.32 KG/M2 | WEIGHT: 183.44 LBS | HEIGHT: 64 IN

## 2020-04-13 DIAGNOSIS — H65.93 MIDDLE EAR EFFUSION, BILATERAL: Primary | ICD-10-CM

## 2020-04-13 DIAGNOSIS — N39.0 RECURRENT URINARY TRACT INFECTION: ICD-10-CM

## 2020-04-13 DIAGNOSIS — R11.0 NAUSEA: ICD-10-CM

## 2020-04-13 DIAGNOSIS — Z91.09 ENVIRONMENTAL ALLERGIES: ICD-10-CM

## 2020-04-13 DIAGNOSIS — K21.9 GASTROESOPHAGEAL REFLUX DISEASE, ESOPHAGITIS PRESENCE NOT SPECIFIED: ICD-10-CM

## 2020-04-13 DIAGNOSIS — H00.012 HORDEOLUM EXTERNUM OF RIGHT LOWER EYELID: ICD-10-CM

## 2020-04-13 DIAGNOSIS — I10 HYPERTENSION, BENIGN: ICD-10-CM

## 2020-04-13 PROCEDURE — 99999 PR PBB SHADOW E&M-EST. PATIENT-LVL III: CPT | Mod: PBBFAC,,, | Performed by: FAMILY MEDICINE

## 2020-04-13 PROCEDURE — 99999 PR PBB SHADOW E&M-EST. PATIENT-LVL III: ICD-10-PCS | Mod: PBBFAC,,, | Performed by: FAMILY MEDICINE

## 2020-04-13 PROCEDURE — 99214 PR OFFICE/OUTPT VISIT, EST, LEVL IV, 30-39 MIN: ICD-10-PCS | Mod: S$GLB,,, | Performed by: FAMILY MEDICINE

## 2020-04-13 PROCEDURE — 99214 OFFICE O/P EST MOD 30 MIN: CPT | Mod: S$GLB,,, | Performed by: FAMILY MEDICINE

## 2020-04-13 RX ORDER — METHYLPREDNISOLONE 4 MG/1
TABLET ORAL
Qty: 1 PACKAGE | Refills: 0 | Status: SHIPPED | OUTPATIENT
Start: 2020-04-13 | End: 2020-06-01 | Stop reason: ALTCHOICE

## 2020-04-13 RX ORDER — PROMETHAZINE HYDROCHLORIDE 25 MG/1
25 TABLET ORAL EVERY 8 HOURS PRN
Qty: 30 TABLET | Refills: 0 | Status: SHIPPED | OUTPATIENT
Start: 2020-04-13 | End: 2020-11-18

## 2020-04-13 RX ORDER — POLYMYXIN B SULFATE AND TRIMETHOPRIM 1; 10000 MG/ML; [USP'U]/ML
1 SOLUTION OPHTHALMIC EVERY 6 HOURS
Qty: 10 ML | Refills: 0 | Status: SHIPPED | OUTPATIENT
Start: 2020-04-13 | End: 2020-06-01 | Stop reason: ALTCHOICE

## 2020-04-13 RX ORDER — AMOXICILLIN 500 MG/1
500 TABLET, FILM COATED ORAL EVERY 8 HOURS
Qty: 21 TABLET | Refills: 0 | Status: SHIPPED | OUTPATIENT
Start: 2020-04-13 | End: 2020-04-20

## 2020-04-13 RX ORDER — CETIRIZINE HYDROCHLORIDE 10 MG/1
10 TABLET ORAL DAILY
Qty: 90 TABLET | Refills: 3 | Status: SHIPPED | OUTPATIENT
Start: 2020-04-13 | End: 2021-02-19

## 2020-04-13 RX ORDER — OMEPRAZOLE 20 MG/1
40 CAPSULE, DELAYED RELEASE ORAL EVERY MORNING
Qty: 90 CAPSULE | Refills: 3 | Status: SHIPPED | OUTPATIENT
Start: 2020-04-13 | End: 2020-08-04 | Stop reason: SDUPTHER

## 2020-04-13 RX ORDER — NITROFURANTOIN 25; 75 MG/1; MG/1
100 CAPSULE ORAL DAILY
Qty: 30 CAPSULE | Refills: 5 | Status: SHIPPED | OUTPATIENT
Start: 2020-04-13 | End: 2020-06-26

## 2020-04-13 RX ORDER — LISINOPRIL 20 MG/1
20 TABLET ORAL DAILY
Qty: 90 TABLET | Refills: 3 | Status: SHIPPED | OUTPATIENT
Start: 2020-04-13 | End: 2021-10-03 | Stop reason: SDUPTHER

## 2020-04-13 RX ORDER — FLUCONAZOLE 150 MG/1
TABLET ORAL
Qty: 2 TABLET | Refills: 0 | Status: SHIPPED | OUTPATIENT
Start: 2020-04-13 | End: 2021-01-27

## 2020-04-13 NOTE — PROGRESS NOTES
"Subjective:       Patient ID: Naomy Armstrong is a 54 y.o. female.    Chief Complaint: Sinus Problem; Headache; and Generalized Body Aches    Sinus Problem   This is a new problem. The current episode started in the past 7 days. The problem has been rapidly worsening since onset. There has been no fever. The pain is moderate. Associated symptoms include congestion and sneezing. Pertinent negatives include no chills, diaphoresis, hoarse voice or shortness of breath. Past treatments include nothing.     Review of Systems   Constitutional: Negative for activity change, chills, diaphoresis and unexpected weight change.   HENT: Positive for congestion and sneezing. Negative for hearing loss, hoarse voice, rhinorrhea and trouble swallowing.    Eyes: Negative for discharge and visual disturbance.   Respiratory: Negative for chest tightness, shortness of breath and wheezing.    Cardiovascular: Negative for chest pain and palpitations.   Gastrointestinal: Negative for blood in stool, constipation, diarrhea and vomiting.   Endocrine: Negative for polydipsia and polyuria.   Genitourinary: Negative for difficulty urinating, dysuria, hematuria and menstrual problem.   Musculoskeletal: Positive for arthralgias. Negative for joint swelling.   Neurological: Negative for weakness.   Psychiatric/Behavioral: Positive for dysphoric mood. Negative for confusion.       Objective:     /80 (BP Location: Right arm, Patient Position: Sitting, BP Method: Medium (Manual))   Pulse 79   Temp 98 °F (36.7 °C) (Oral)   Resp 20   Ht 5' 4" (1.626 m)   Wt 83.2 kg (183 lb 6.8 oz)   SpO2 96%   BMI 31.48 kg/m²     Physical Exam   Constitutional: She is oriented to person, place, and time. She appears well-developed and well-nourished.   HENT:   Head: Normocephalic and atraumatic.   Right Ear: External ear and ear canal normal. A middle ear effusion is present.   Left Ear: External ear and ear canal normal. A middle ear effusion is " present.   Nose: Mucosal edema and rhinorrhea present. No sinus tenderness. Right sinus exhibits no maxillary sinus tenderness and no frontal sinus tenderness. Left sinus exhibits no maxillary sinus tenderness and no frontal sinus tenderness.   Mouth/Throat: Oropharynx is clear and moist. No oropharyngeal exudate.   Eyes: Pupils are equal, round, and reactive to light. Conjunctivae and EOM are normal. Right eye exhibits hordeolum (right lower eyelid near corner of eye on temporal side). Right eye exhibits no discharge. Left eye exhibits no discharge.   Neck: Normal range of motion. Neck supple. No tracheal deviation present.   Cardiovascular: Normal rate, regular rhythm, normal heart sounds and intact distal pulses.   No murmur heard.  Pulmonary/Chest: Effort normal and breath sounds normal. She has no wheezes. She has no rales.   Abdominal: Soft. Bowel sounds are normal. She exhibits no mass. There is no tenderness. There is no rigidity, no guarding and no CVA tenderness.   Lymphadenopathy:     She has no cervical adenopathy.   Neurological: She is oriented to person, place, and time. She has normal strength. She displays no atrophy. No sensory deficit. Gait normal.   Psychiatric: She has a normal mood and affect.   Vitals reviewed.      Assessment:       1. Middle ear effusion, bilateral    2. Nausea    3. Recurrent urinary tract infection    4. Hordeolum externum of right lower eyelid    5. Environmental allergies    6. Hypertension, benign    7. Gastroesophageal reflux disease, esophagitis presence not specified        Plan:       Naomy was seen today for sinus problem, headache and generalized body aches.    Diagnoses and all orders for this visit:    Middle ear effusion, bilateral  -     amoxicillin (AMOXIL) 500 MG Tab; Take 1 tablet (500 mg total) by mouth every 8 (eight) hours. for 7 days  -     methylPREDNISolone (MEDROL DOSEPACK) 4 mg tablet; use as directed  -     fluconazole (DIFLUCAN) 150 MG Tab; 1  tablet orally at first sign of yeast infection and 1 tablet orally 3 days later  Given symptoms, will send antibiotic in case there is an infectious component.  Will also do a short course of an oral steroid.    Nausea  -     promethazine (PHENERGAN) 25 MG tablet; Take 1 tablet (25 mg total) by mouth every 8 (eight) hours as needed for Nausea.  Patient requested refill on promethazine. Discussed risk of tardive dyskenesia with long term use. Patient states she uses it only when needed and can go a few months without use.    Recurrent urinary tract infection  -     nitrofurantoin, macrocrystal-monohydrate, (MACROBID) 100 MG capsule; Take 1 capsule (100 mg total) by mouth once daily.  Patient states she is on Macrobid for UTI prophylaxis, once daily and needs a refill- sent    Hordeolum externum of right lower eyelid  -     polymyxin B sulf-trimethoprim (POLYTRIM) 10,000 unit- 1 mg/mL Drop; Place 1 drop into the right eye every 6 (six) hours.  Advised warm compresses and topical polytrim.    Environmental allergies  -     cetirizine (ZYRTEC) 10 MG tablet; Take 1 tablet (10 mg total) by mouth once daily.    Hypertension, benign  -     lisinopriL (PRINIVIL,ZESTRIL) 20 MG tablet; Take 1 tablet (20 mg total) by mouth once daily.  Continue lisinopril    Gastroesophageal reflux disease, esophagitis presence not specified  -     omeprazole (PRILOSEC) 20 MG capsule; Take 2 capsules (40 mg total) by mouth every morning.  Continue prilosec PRN

## 2020-04-15 ENCOUNTER — PATIENT MESSAGE (OUTPATIENT)
Dept: PSYCHIATRY | Facility: CLINIC | Age: 55
End: 2020-04-15

## 2020-04-15 ENCOUNTER — TELEPHONE (OUTPATIENT)
Dept: PSYCHIATRY | Facility: HOSPITAL | Age: 55
End: 2020-04-15

## 2020-04-15 NOTE — TELEPHONE ENCOUNTER
----- Message from Taylor Rowland MA sent at 4/15/2020  1:36 PM CDT -----  Contact: Dr Frankie David? called from [facility] to do a peer to peer regarding this patient's disability case. Dr David can be reached at [phone].

## 2020-04-15 NOTE — TELEPHONE ENCOUNTER
Did not call back.  Sending off a new FMLA form that patient mailed to me.  Will fax it to her FMLA tomorrow.  If she desires full disability, she will have to go for formal disability evaluation and psychiatric testing.  If needed, will refer her to [facility].

## 2020-04-17 ENCOUNTER — TELEPHONE (OUTPATIENT)
Dept: PSYCHIATRY | Facility: HOSPITAL | Age: 55
End: 2020-04-17

## 2020-04-17 NOTE — TELEPHONE ENCOUNTER
Again did not call this person back.  I sent off another FMLA form yesterday.  If patient needs a disability evaluation, she will have to get tested elsewhere.

## 2020-04-17 NOTE — TELEPHONE ENCOUNTER
----- Message from Taylor Rowland MA sent at 4/17/2020  2:43 PM CDT -----  Contact: Dr Frankie David called again and said she needs to speak with you. I told her that you sent additional paperwork, but she said she needs to speak with you and Humaira today or it will be too late.     ----- Message from Taylor Rowland MA sent at 4/15/2020  1:36 PM CDT -----  Contact: Dr Frankie David? called from [facility] to do a peer to peer regarding this patient's disability case. Dr David can be reached at [phone].

## 2020-04-22 ENCOUNTER — PATIENT MESSAGE (OUTPATIENT)
Dept: FAMILY MEDICINE | Facility: CLINIC | Age: 55
End: 2020-04-22

## 2020-04-22 ENCOUNTER — OFFICE VISIT (OUTPATIENT)
Dept: PSYCHIATRY | Facility: CLINIC | Age: 55
End: 2020-04-22
Payer: COMMERCIAL

## 2020-04-22 DIAGNOSIS — I10 HYPERTENSION, BENIGN: ICD-10-CM

## 2020-04-22 DIAGNOSIS — Z12.12 SCREENING FOR COLORECTAL CANCER: Primary | ICD-10-CM

## 2020-04-22 DIAGNOSIS — Z12.31 VISIT FOR SCREENING MAMMOGRAM: ICD-10-CM

## 2020-04-22 DIAGNOSIS — Z11.4 ENCOUNTER FOR SCREENING FOR HIV: ICD-10-CM

## 2020-04-22 DIAGNOSIS — F33.0 MAJOR DEPRESSIVE DISORDER, RECURRENT EPISODE, MILD: Primary | ICD-10-CM

## 2020-04-22 DIAGNOSIS — F41.1 GAD (GENERALIZED ANXIETY DISORDER): ICD-10-CM

## 2020-04-22 DIAGNOSIS — Z12.11 SCREENING FOR COLORECTAL CANCER: Primary | ICD-10-CM

## 2020-04-22 PROCEDURE — 90834 PR PSYCHOTHERAPY W/PATIENT, 45 MIN: ICD-10-PCS | Mod: 95,,, | Performed by: SOCIAL WORKER

## 2020-04-22 PROCEDURE — 90834 PSYTX W PT 45 MINUTES: CPT | Mod: 95,,, | Performed by: SOCIAL WORKER

## 2020-04-22 NOTE — PROGRESS NOTES
"The patient location is: [place], [place]  The chief complaint leading to consultation is: depression, anxiety  Visit type: Virtual visit with synchronous audio and video  Total time spent with patient: 45 minutes  Each patient to whom he or she provides medical services by telemedicine is:  (1) informed of the relationship between the physician and patient and the respective role of any other health care provider with respect to management of the patient; and (2) notified that he or she may decline to receive medical services by telemedicine and may withdraw from such care at any time.    Notes: Returns for psychotherapy. Reports that she is doing "okay". States that she wasn't feeling well last week and went to PCP. States that she was having horrible headaches. Thought that maybe she was getting a sinus infection. The PCP didn't see any sinus infection but saw some fluid. Gave her some medication and is feeling better. Reports that prior to not feeling well was working hard on sleep. States that she was sticking to a bed time and was only using bed for sleep. Trying to do it but spent more time in bed while she was sick. Discussed that at times she will have to do that when she doesn't feel well physically but that she can get back to it fairy easily. Discussed that she started on Buspar and hopeful that it will help her anxiety and depression. Also continues to go outside more and that helps her a lot. Discussed thought processing and ways to work with her thoughts in the future. Goals is to work on sleep and her thoughts.       Encounter Diagnoses   Name Primary?    Major depressive disorder, recurrent episode, mild Yes    LOR (generalized anxiety disorder)        "

## 2020-04-23 ENCOUNTER — TELEPHONE (OUTPATIENT)
Dept: PSYCHIATRY | Facility: CLINIC | Age: 55
End: 2020-04-23

## 2020-04-23 NOTE — TELEPHONE ENCOUNTER
Called to discuss peer to peer. Requirement was met with conversation from patient's psychiatrist.     Cassandra Rockweiler, [facility]

## 2020-04-23 NOTE — TELEPHONE ENCOUNTER
----- Message from Taylor Rowland MA sent at 4/15/2020  1:30 PM CDT -----  Dr David? called from [facility] to do a peer to peer regarding this patient's disability case. Dr David can be reached at [phone].

## 2020-05-07 ENCOUNTER — PATIENT MESSAGE (OUTPATIENT)
Dept: PSYCHIATRY | Facility: CLINIC | Age: 55
End: 2020-05-07

## 2020-05-15 ENCOUNTER — OFFICE VISIT (OUTPATIENT)
Dept: PSYCHIATRY | Facility: CLINIC | Age: 55
End: 2020-05-15
Payer: COMMERCIAL

## 2020-05-15 DIAGNOSIS — F33.0 MAJOR DEPRESSIVE DISORDER, RECURRENT EPISODE, MILD: Primary | ICD-10-CM

## 2020-05-15 DIAGNOSIS — F41.1 GAD (GENERALIZED ANXIETY DISORDER): ICD-10-CM

## 2020-05-15 PROCEDURE — 90834 PR PSYCHOTHERAPY W/PATIENT, 45 MIN: ICD-10-PCS | Mod: 95,,, | Performed by: SOCIAL WORKER

## 2020-05-15 PROCEDURE — 90834 PSYTX W PT 45 MINUTES: CPT | Mod: 95,,, | Performed by: SOCIAL WORKER

## 2020-05-15 NOTE — PROGRESS NOTES
"The patient location is: home  The chief complaint leading to consultation is: depression, anxiety  Visit type: Virtual visit with synchronous audio and video  Total time spent with patient: 45 minutes  Each patient to whom he or she provides medical services by telemedicine is:  (1) informed of the relationship between the physician and patient and the respective role of any other health care provider with respect to management of the patient; and (2) notified that he or she may decline to receive medical services by telemedicine and may withdraw from such care at any time.    Notes: Returns for psychotherapy. Reports that she is doing "not well". States that she has been having a "horrible headache" on and off for the last couple of weeks. Nothing has changed except that she started Buspar. Is going to email her doctor about this as a possible side effect. Discussed that she is also feeling fatigued more than normal. Discussed that it could be a result of increased stress. Discussed that she is frustrated that [facility] is so "compartimentalized". States that she feels like she has to go through so many different people because only one person deals with one thing. Not sure if her insurance is going to cover anything because no one treats her as a "whole". States that she occasionally has panic attacks but not as much as she use to. Got the email that she graduated. Was able to get license and is hoping that she can start to work soon. Is worried that she will have to go back to work at [employer] and isn't sure if she is going to be able to do that mentally. States that in the past she has tried to push things away and avoid issues. States that she uses avoidance as a coping mechanism but has little insight into poor nature of mechanism. Discussed that she doesn't know how to "get over" her trauma. Discussed history with  that made it so that she doesn't want to answer the phone. Discussed her sleep schedule. " "Discussed ways to manage her anxiety and not allow anxiety to "run" her life.     Encounter Diagnoses   Name Primary?    Major depressive disorder, recurrent episode, mild Yes    LOR (generalized anxiety disorder)        "

## 2020-05-16 ENCOUNTER — PATIENT MESSAGE (OUTPATIENT)
Dept: PSYCHIATRY | Facility: CLINIC | Age: 55
End: 2020-05-16

## 2020-05-18 ENCOUNTER — PATIENT OUTREACH (OUTPATIENT)
Dept: ADMINISTRATIVE | Facility: OTHER | Age: 55
End: 2020-05-18

## 2020-05-18 ENCOUNTER — OFFICE VISIT (OUTPATIENT)
Dept: ORTHOPEDICS | Facility: CLINIC | Age: 55
End: 2020-05-18
Payer: COMMERCIAL

## 2020-05-18 VITALS
DIASTOLIC BLOOD PRESSURE: 82 MMHG | WEIGHT: 186.75 LBS | OXYGEN SATURATION: 97 % | SYSTOLIC BLOOD PRESSURE: 122 MMHG | HEIGHT: 64 IN | HEART RATE: 84 BPM | RESPIRATION RATE: 16 BRPM | BODY MASS INDEX: 31.88 KG/M2

## 2020-05-18 DIAGNOSIS — M25.551 PAIN OF RIGHT HIP JOINT: Primary | ICD-10-CM

## 2020-05-18 DIAGNOSIS — M70.61 GREATER TROCHANTERIC BURSITIS OF RIGHT HIP: Primary | ICD-10-CM

## 2020-05-18 PROCEDURE — 99999 PR PBB SHADOW E&M-EST. PATIENT-LVL V: CPT | Mod: PBBFAC,,, | Performed by: ORTHOPAEDIC SURGERY

## 2020-05-18 PROCEDURE — 99999 PR PBB SHADOW E&M-EST. PATIENT-LVL V: ICD-10-PCS | Mod: PBBFAC,,, | Performed by: ORTHOPAEDIC SURGERY

## 2020-05-18 PROCEDURE — 99213 PR OFFICE/OUTPT VISIT, EST, LEVL III, 20-29 MIN: ICD-10-PCS | Mod: 25,S$GLB,, | Performed by: ORTHOPAEDIC SURGERY

## 2020-05-18 PROCEDURE — 20610 DRAIN/INJ JOINT/BURSA W/O US: CPT | Mod: RT,S$GLB,, | Performed by: ORTHOPAEDIC SURGERY

## 2020-05-18 PROCEDURE — 20610 LARGE JOINT ASPIRATION/INJECTION: R GREATER TROCHANTERIC BURSA: ICD-10-PCS | Mod: RT,S$GLB,, | Performed by: ORTHOPAEDIC SURGERY

## 2020-05-18 PROCEDURE — 99213 OFFICE O/P EST LOW 20 MIN: CPT | Mod: 25,S$GLB,, | Performed by: ORTHOPAEDIC SURGERY

## 2020-05-18 RX ORDER — LIDOCAINE HYDROCHLORIDE 10 MG/ML
5 INJECTION, SOLUTION EPIDURAL; INFILTRATION; INTRACAUDAL; PERINEURAL
Status: DISCONTINUED | OUTPATIENT
Start: 2020-05-18 | End: 2020-05-18 | Stop reason: HOSPADM

## 2020-05-18 RX ORDER — TRIAMCINOLONE ACETONIDE 40 MG/ML
40 INJECTION, SUSPENSION INTRA-ARTICULAR; INTRAMUSCULAR
Status: DISCONTINUED | OUTPATIENT
Start: 2020-05-18 | End: 2020-05-18 | Stop reason: HOSPADM

## 2020-05-18 RX ADMIN — LIDOCAINE HYDROCHLORIDE 5 ML: 10 INJECTION, SOLUTION EPIDURAL; INFILTRATION; INTRACAUDAL; PERINEURAL at 03:05

## 2020-05-18 RX ADMIN — TRIAMCINOLONE ACETONIDE 40 MG: 40 INJECTION, SUSPENSION INTRA-ARTICULAR; INTRAMUSCULAR at 03:05
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
## 2020-05-18 NOTE — PROCEDURES
Large Joint Aspiration/Injection: R greater trochanteric bursa  Date/Time: 5/18/2020 3:15 PM  Performed by: Jessica Shay MD  Authorized by: Jessica Shay MD     Consent Done?:  Yes (Verbal)  Indications:  Pain  Timeout: prior to procedure the correct patient, procedure, and site was verified    Prep: patient was prepped and draped in usual sterile fashion      Local anesthesia used?: Yes    Local anesthetic:  Topical anesthetic    Details:  Needle Size:  22 G  Location:  Hip  Site:  R greater trochanteric bursa  Medications:  5 mL lidocaine (PF) 10 mg/ml (1%) 10 mg/mL (1 %); 40 mg triamcinolone acetonide 40 mg/mL  Patient tolerance:  Patient tolerated the procedure well with no immediate complications

## 2020-06-01 ENCOUNTER — OFFICE VISIT (OUTPATIENT)
Dept: PSYCHIATRY | Facility: CLINIC | Age: 55
End: 2020-06-01
Payer: COMMERCIAL

## 2020-06-01 ENCOUNTER — CLINICAL SUPPORT (OUTPATIENT)
Dept: REHABILITATION | Facility: HOSPITAL | Age: 55
End: 2020-06-01
Attending: ORTHOPAEDIC SURGERY
Payer: COMMERCIAL

## 2020-06-01 VITALS
DIASTOLIC BLOOD PRESSURE: 82 MMHG | BODY MASS INDEX: 31.15 KG/M2 | SYSTOLIC BLOOD PRESSURE: 132 MMHG | HEIGHT: 64 IN | HEART RATE: 84 BPM | WEIGHT: 182.44 LBS | TEMPERATURE: 98 F

## 2020-06-01 DIAGNOSIS — M25.551 PAIN IN JOINT OF RIGHT HIP: ICD-10-CM

## 2020-06-01 DIAGNOSIS — R29.898 DECREASED STRENGTH OF TRUNK AND BACK: ICD-10-CM

## 2020-06-01 DIAGNOSIS — R29.898 DECREASED STRENGTH OF LOWER EXTREMITY: ICD-10-CM

## 2020-06-01 DIAGNOSIS — M70.61 GREATER TROCHANTERIC BURSITIS OF RIGHT HIP: ICD-10-CM

## 2020-06-01 DIAGNOSIS — F43.23 ADJUSTMENT DISORDER WITH MIXED ANXIETY AND DEPRESSED MOOD: ICD-10-CM

## 2020-06-01 DIAGNOSIS — M25.661 DECREASED RANGE OF MOTION OF RIGHT LOWER EXTREMITY: ICD-10-CM

## 2020-06-01 DIAGNOSIS — F41.1 GAD (GENERALIZED ANXIETY DISORDER): ICD-10-CM

## 2020-06-01 DIAGNOSIS — F33.41 MAJOR DEPRESSIVE DISORDER, RECURRENT EPISODE, IN PARTIAL REMISSION: Primary | ICD-10-CM

## 2020-06-01 PROCEDURE — 99214 PR OFFICE/OUTPT VISIT, EST, LEVL IV, 30-39 MIN: ICD-10-PCS | Mod: S$GLB,,, | Performed by: PSYCHIATRY & NEUROLOGY

## 2020-06-01 PROCEDURE — 97161 PT EVAL LOW COMPLEX 20 MIN: CPT | Mod: PN

## 2020-06-01 PROCEDURE — 97110 THERAPEUTIC EXERCISES: CPT | Mod: PN

## 2020-06-01 PROCEDURE — 99999 PR PBB SHADOW E&M-EST. PATIENT-LVL III: ICD-10-PCS | Mod: PBBFAC,,, | Performed by: PSYCHIATRY & NEUROLOGY

## 2020-06-01 PROCEDURE — 99999 PR PBB SHADOW E&M-EST. PATIENT-LVL III: CPT | Mod: PBBFAC,,, | Performed by: PSYCHIATRY & NEUROLOGY

## 2020-06-01 PROCEDURE — 99214 OFFICE O/P EST MOD 30 MIN: CPT | Mod: S$GLB,,, | Performed by: PSYCHIATRY & NEUROLOGY

## 2020-06-01 RX ORDER — FLUOXETINE HYDROCHLORIDE 40 MG/1
40 CAPSULE ORAL DAILY
Qty: 90 CAPSULE | Refills: 1 | Status: SHIPPED | OUTPATIENT
Start: 2020-06-01 | End: 2021-06-17 | Stop reason: SDUPTHER

## 2020-06-01 RX ORDER — HYDROXYZINE HYDROCHLORIDE 50 MG/1
50 TABLET, FILM COATED ORAL 2 TIMES DAILY PRN
Qty: 180 TABLET | Refills: 1 | Status: SHIPPED | OUTPATIENT
Start: 2020-06-01 | End: 2021-02-19

## 2020-06-01 RX ORDER — FLUOXETINE HYDROCHLORIDE 20 MG/1
20 CAPSULE ORAL DAILY
Qty: 90 CAPSULE | Refills: 1 | Status: SHIPPED | OUTPATIENT
Start: 2020-06-01 | End: 2021-06-17 | Stop reason: SDUPTHER
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
## 2020-06-01 NOTE — LETTER
June 1, 2020    Naomy Armstrong  [address]  [place] [address]             [facility]  [address], [address]  [facility] [place] [place] [address]  Phone: [phone]  Fax: [phone] To Whom It May Concern:      Naomy Armstrong will be cleared from leave/FMLA and may return to work on Monday June 8, 2020.              Rusty Franco M.D., [facility]  Section Head, Psychiatry,[facility]  Senior Lecturer, [employer]
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
## 2020-06-01 NOTE — PATIENT INSTRUCTIONS
"        You have been provided with a certain amount of medication with a specified number of refills.  Please follow up within an adequate time before you run out of medications.    REFILLS FOR CONTROLLED SUBSTANCES WILL NOT BE GIVEN WITHOUT AN APPOINTMENT.  I will not honor or fill automated refill requests from pharmacies.  You must come in for an appointment to get refills.        Please book your next appointment for myself or therapist by phone by calling our office at [phone].        Note that follow up appointments are 10-15 minutes long.  It is important that we focus on medication management.  Should you need therapy, please get set up with our therapist or call your insurance company to find out which therapists are available in your area.      PLEASE BE AT LEAST 15 MINUTES EARLY FOR YOUR NEXT APPOINTMENT.  Late arrivals WILL BE TURNED AWAY AND ASKED TO RESCHEDULE.  YOU MUST COME EARLY TO ALLOW TIME FOR CHECK-IN AS WELL AS GET YOUR VITAL SIGNS AND GO OVER YOUR MEDICATIONS.  [employer]iness is not fair to the patients who present after you and are on time for their appointments.  It causes a delay in the appointments for patients and staff.  YOU MAY ALSO BE DISCHARGED FROM CLINIC with multiple late arrivals or "No Show" appointments.       -----------------------------------------------------------------------------------------------------------------  IF YOU FEEL SUICIDAL OR HAVING THOUGHTS OR PLANS TO HURT YOURSELF OR OTHERS, CALL 911 OR REPORT TO THE NEAREST EMERGENCY ROOM.  YOU CAN ALSO ACCESS THE FOLLOWING HOTLINE:    National Suicide Hotline Number 5-626-454-TALK (2483)                  "

## 2020-06-02 ENCOUNTER — OFFICE VISIT (OUTPATIENT)
Dept: FAMILY MEDICINE | Facility: CLINIC | Age: 55
End: 2020-06-02
Payer: COMMERCIAL

## 2020-06-02 VITALS
BODY MASS INDEX: 29.51 KG/M2 | HEART RATE: 85 BPM | HEIGHT: 66 IN | SYSTOLIC BLOOD PRESSURE: 126 MMHG | OXYGEN SATURATION: 97 % | DIASTOLIC BLOOD PRESSURE: 80 MMHG | WEIGHT: 183.63 LBS | RESPIRATION RATE: 17 BRPM | TEMPERATURE: 98 F

## 2020-06-02 DIAGNOSIS — I10 HYPERTENSION, BENIGN: ICD-10-CM

## 2020-06-02 DIAGNOSIS — J02.9 PHARYNGITIS, UNSPECIFIED ETIOLOGY: Primary | ICD-10-CM

## 2020-06-02 DIAGNOSIS — F43.23 ADJUSTMENT DISORDER WITH MIXED ANXIETY AND DEPRESSED MOOD: ICD-10-CM

## 2020-06-02 DIAGNOSIS — G89.29 CHRONIC INTRACTABLE HEADACHE, UNSPECIFIED HEADACHE TYPE: ICD-10-CM

## 2020-06-02 DIAGNOSIS — F33.41 RECURRENT MAJOR DEPRESSIVE DISORDER, IN PARTIAL REMISSION: ICD-10-CM

## 2020-06-02 DIAGNOSIS — R51.9 CHRONIC INTRACTABLE HEADACHE, UNSPECIFIED HEADACHE TYPE: ICD-10-CM

## 2020-06-02 PROCEDURE — 99999 PR PBB SHADOW E&M-EST. PATIENT-LVL IV: CPT | Mod: PBBFAC,,, | Performed by: FAMILY MEDICINE

## 2020-06-02 PROCEDURE — 99214 OFFICE O/P EST MOD 30 MIN: CPT | Mod: S$GLB,,, | Performed by: FAMILY MEDICINE

## 2020-06-02 PROCEDURE — 87070 CULTURE OTHR SPECIMN AEROBIC: CPT

## 2020-06-02 PROCEDURE — 99999 PR PBB SHADOW E&M-EST. PATIENT-LVL IV: ICD-10-PCS | Mod: PBBFAC,,, | Performed by: FAMILY MEDICINE

## 2020-06-02 PROCEDURE — 99214 PR OFFICE/OUTPT VISIT, EST, LEVL IV, 30-39 MIN: ICD-10-PCS | Mod: S$GLB,,, | Performed by: FAMILY MEDICINE

## 2020-06-02 RX ORDER — AMOXICILLIN 500 MG/1
500 TABLET, FILM COATED ORAL EVERY 12 HOURS
Qty: 20 TABLET | Refills: 0 | Status: SHIPPED | OUTPATIENT
Start: 2020-06-02 | End: 2020-06-12

## 2020-06-02 RX ORDER — NAPROXEN 500 MG/1
500 TABLET ORAL 2 TIMES DAILY WITH MEALS
Qty: 60 TABLET | Refills: 0 | Status: SHIPPED | OUTPATIENT
Start: 2020-06-02 | End: 2021-02-09

## 2020-06-04 LAB — BACTERIA THROAT CULT: NORMAL

## 2020-06-05 DIAGNOSIS — J02.9 PHARYNGITIS, UNSPECIFIED ETIOLOGY: Primary | ICD-10-CM

## 2020-06-08 NOTE — PROGRESS NOTES
Subjective:       Patient ID: Naomy Armstrong is a 55 y.o. female.    Chief Complaint: Sore Throat and Headache    Sore Throat    This is a new problem. The current episode started in the past 7 days. The problem has been gradually worsening. Neither side of throat is experiencing more pain than the other. There has been no fever. The pain is at a severity of 5/10. The pain is moderate. Associated symptoms include headaches and trouble swallowing. Pertinent negatives include no abdominal pain, congestion, coughing, diarrhea, drooling, ear discharge, ear pain, hoarse voice, plugged ear sensation, neck pain, shortness of breath, stridor, swollen glands or vomiting. She has had no exposure to strep or mono. She has tried NSAIDs for the symptoms. The treatment provided no relief.   Headache    This is a recurrent problem. The current episode started more than 1 year ago. The problem occurs intermittently. The pain is located in the temporal and occipital region. Radiates to: back of neck. The quality of the pain is described as squeezing and throbbing. The pain is moderate. Associated symptoms include muscle aches and a sore throat. Pertinent negatives include no abdominal pain, blurred vision, coughing, dizziness, ear pain, eye pain, fever, hearing loss, loss of balance, nausea, neck pain, numbness, phonophobia, photophobia, rhinorrhea, scalp tenderness, seizures, sinus pressure, swollen glands, tinnitus, visual change, vomiting or weight loss. Nothing aggravates the symptoms. She has tried NSAIDs and cold packs for the symptoms. The treatment provided no relief. Her past medical history is significant for migraine headaches.          Review of Systems   Constitutional: Negative for chills, fever and weight loss.   HENT: Positive for sore throat and trouble swallowing. Negative for congestion, drooling, ear discharge, ear pain, hearing loss, hoarse voice, rhinorrhea, sinus pressure and tinnitus.    Eyes:  "Negative for blurred vision, photophobia and pain.   Respiratory: Negative for cough, shortness of breath and stridor.    Gastrointestinal: Negative for abdominal pain, diarrhea, nausea and vomiting.   Musculoskeletal: Negative for neck pain.   Neurological: Positive for headaches. Negative for dizziness, seizures, syncope, numbness and loss of balance.   Psychiatric/Behavioral: Negative for sleep disturbance and suicidal ideas.       Objective:     /80 (BP Location: Left arm, Patient Position: Sitting, BP Method: Medium (Manual))   Pulse 85   Temp 97.8 °F (36.6 °C) (Oral)   Resp 17   Ht 5' 5.5" (1.664 m) Comment: with shoes  Wt 83.3 kg (183 lb 10.3 oz)   SpO2 97%   BMI 30.10 kg/m²     Physical Exam   Constitutional: She is oriented to person, place, and time. She appears well-developed and well-nourished.   HENT:   Head: Normocephalic and atraumatic.   Right Ear: External ear normal.   Left Ear: External ear normal.   Nose: Nose normal.   Mouth/Throat: Oropharynx is clear and moist. No oropharyngeal exudate.   Eyes: Pupils are equal, round, and reactive to light. Conjunctivae and EOM are normal. Right eye exhibits no discharge. Left eye exhibits no discharge.   Neck: Normal range of motion. Neck supple. No tracheal deviation present.   Cardiovascular: Normal rate, regular rhythm, normal heart sounds and intact distal pulses.   No murmur heard.  Pulmonary/Chest: Effort normal and breath sounds normal. She has no wheezes. She has no rales.   Abdominal: Soft. Bowel sounds are normal. She exhibits no mass. There is no tenderness. There is no rigidity, no guarding and no CVA tenderness.   Lymphadenopathy:     She has no cervical adenopathy.   Neurological: She is oriented to person, place, and time. She has normal strength. She displays no atrophy. No sensory deficit. Gait normal.   Reflex Scores:       Patellar reflexes are 2+ on the right side and 2+ on the left side.  Psychiatric: She has a normal mood " and affect.   Vitals reviewed.      Assessment:       1. Pharyngitis, unspecified etiology    2. Chronic intractable headache, unspecified headache type    3. Hypertension, benign    4. Recurrent major depressive disorder, in partial remission    5. Adjustment disorder with mixed anxiety and depressed mood        Plan:       Naomy was seen today for sore throat and headache.    Diagnoses and all orders for this visit:    Pharyngitis, unspecified etiology  -     Throat culture  -     amoxicillin (AMOXIL) 500 MG Tab; Take 1 tablet (500 mg total) by mouth every 12 (twelve) hours. for 10 days  -     naproxen (NAPROSYN) 500 MG tablet; Take 1 tablet (500 mg total) by mouth 2 (two) times daily with meals.  Advised saltwater gargles  Culture pending    Chronic intractable headache, unspecified headache type  -     Ambulatory referral/consult to Neurology; Future  Will get neuro consult given chronicity  CT done less than a year ago shows no acute problems. Visualized sinuses were fine    Hypertension, benign  Continue current regimen    Recurrent major depressive disorder, in partial remission  Management as per psych    Adjustment disorder with mixed anxiety and depressed mood  Management as per psych

## 2020-06-09 ENCOUNTER — TELEPHONE (OUTPATIENT)
Dept: ADMINISTRATIVE | Facility: HOSPITAL | Age: 55
End: 2020-06-09

## 2020-06-22 ENCOUNTER — OFFICE VISIT (OUTPATIENT)
Dept: FAMILY MEDICINE | Facility: CLINIC | Age: 55
End: 2020-06-22
Payer: COMMERCIAL

## 2020-06-22 DIAGNOSIS — R10.32 ABDOMINAL PAIN, LLQ (LEFT LOWER QUADRANT): Primary | ICD-10-CM

## 2020-06-22 DIAGNOSIS — K62.5 RECTAL BLEEDING: ICD-10-CM

## 2020-06-22 PROCEDURE — 99213 PR OFFICE/OUTPT VISIT, EST, LEVL III, 20-29 MIN: ICD-10-PCS | Mod: 95,,, | Performed by: NURSE PRACTITIONER

## 2020-06-22 PROCEDURE — 99213 OFFICE O/P EST LOW 20 MIN: CPT | Mod: 95,,, | Performed by: NURSE PRACTITIONER

## 2020-06-22 RX ORDER — AMOXICILLIN AND CLAVULANATE POTASSIUM 875; 125 MG/1; MG/1
1 TABLET, FILM COATED ORAL EVERY 12 HOURS
Qty: 14 TABLET | Refills: 0 | Status: SHIPPED | OUTPATIENT
Start: 2020-06-22 | End: 2020-06-26

## 2020-06-22 NOTE — PROGRESS NOTES
Answers for HPI/ROS submitted by the patient on 2020   Abdominal pain  Chronicity: new  Onset: yesterday  Onset quality: sudden  Frequency: constantly  Episode duration: 2 days  Progression since onset: gradually improving  Pain location: LLQ, epigastric region  Pain - numeric: 9/10  Pain quality: aching, cramping  anorexia: Yes  arthralgias: No  belching: No  constipation: No  diarrhea: Yes  dysuria: No  fever: No  flatus: No  frequency: No  headaches: Yes  hematochezia: Yes  hematuria: No  melena: No  myalgias: No  nausea: Yes  weight loss: No  vomiting: Yes  Aggravated by: nothing  Relieved by: nothing  Pain severity: moderate  Improvement on treatment: mild  abdominal surgery: No  colon cancer: No  Crohn's disease: No  gallstones: No  GERD: Yes  irritable bowel syndrome: Yes  kidney stones: No  pancreatitis: No  PUD: No  UTI: Yes    The patient location is: Home. LATOYA Armijo  The chief complaint leading to consultation is: abdominal pain    Visit type: audiovisual    Face to Face time with patient: 20  30 minutes of total time spent on the encounter, which includes face to face time and non-face to face time preparing to see the patient (eg, review of tests), Obtaining and/or reviewing separately obtained history, Documenting clinical information in the electronic or other health record, Independently interpreting results (not separately reported) and communicating results to the patient/family/caregiver, or Care coordination (not separately reported).         Each patient to whom he or she provides medical services by telemedicine is:  (1) informed of the relationship between the physician and patient and the respective role of any other health care provider with respect to management of the patient; and (2) notified that he or she may decline to receive medical services by telemedicine and may withdraw from such care at any time.    Notes:       Patient Name: Naomy Armstrong    : 1965  MRN:  [id number]    Chief Complaint:  Abdominal pain    Subjective:  Naomy is a 55 y.o. female who presents as a virtual visit today for:    1.  Abdominal pain - patient presents today as a virtual visit for abdominal pain.  She states that yesterday morning at 3:00 a.m. she developed severe vomiting and diarrhea and today she has noticed BRBPR during bowel movements.  She also endorses left lower quadrant aching abdominal pain.  She reports a history of chronic headaches (for which she has an appointment with neurology in 1 month) and she reports taking multiple NSAID doses per day.  More specifically she takes naproxen 500 mg 1-2 times per day with or without frequent doses of Excedrin migraine and ibuprofen.  The left lower quadrant pain does not radiate anywhere.  She states that she does not feel well but she does not believe she has a fever.  She denies any chills or myalgias.  Denies any chest pain, wheezing, shortness of breath, or palpitations.  She states that she had a colonoscopy at least 10 years ago which she was told was normal.  She has also been taking Bentyl without significant relief.  She denies any dysuria or hematuria, however she states that when she does urinate the left lower quadrant pain acutely worsens.  She does not believe she is having a UTI.  She denies any straining have bowel movement.  She denies any dizziness or orthostatic symptoms.    Past Medical History  Past Medical History:   Diagnosis Date    Alcohol abuse     per chart, but patient denies today and cocaine per chart    Anxiety     Depression     Hallucination     last couple of months started seeing someone standing in her room, saw boyfriend's reflection in glass    Headache     History of psychiatric hospitalization     age 13 [facility] for 2 months for acting out; 2018 for SI    Hx of psychiatric care     Hypertension     Psychiatric problem     Sleep difficulties     Suicide attempt     with knife by cutting stomach     Therapy        Past Surgical History  No past surgical history on file.    Family History  Family History   Problem Relation Age of Onset    Anxiety disorder Sister     Depression Sister     Alcohol abuse Maternal Uncle     Alcohol abuse Maternal Grandfather        Social History  Social History     Socioeconomic History    Marital status:      Spouse name: Not on file    Number of children: 0    Years of education: Not on file    Highest education level: Not on file   Occupational History    Occupation:      Employer: [employer]   Social Needs    Financial resource strain: Very hard    Food insecurity     Worry: Often true     Inability: Often true    Transportation needs     Medical: No     Non-medical: No   Tobacco Use    Smoking status: Former Smoker     Quit date: 2006     Years since quittin.1    Smokeless tobacco: Never Used   Substance and Sexual Activity    Alcohol use: Yes     Frequency: Monthly or less     Drinks per session: Patient refused     Binge frequency: Less than monthly     Comment: occasional    Drug use: Not Currently     Types: Marijuana     Comment: tried at age 14    Sexual activity: Yes     Partners: Male     Comment: going through menopause   Lifestyle    Physical activity     Days per week: 0 days     Minutes per session: 0 min    Stress: Very much   Relationships    Social connections     Talks on phone: Patient refused     Gets together: Patient refused     Attends Religion service: Not on file     Active member of club or organization: No     Attends meetings of clubs or organizations: Patient refused     Relationship status:    Other Topics Concern    Patient feels they ought to cut down on drinking/drug use No    Patient annoyed by others criticizing their drinking/drug use No    Patient has felt bad or guilty about drinking/drug use No    Patient has had a drink/used drugs as an eye opener in the AM No   Social  History Narrative     x 2.    Lives with boyfriend.     Works at [employer] as an .    Presently working on Bachelor's degree.    Has associates degree in criminal justice.       Current Medications  Current Outpatient Medications on File Prior to Visit   Medication Sig Dispense Refill    albuterol (PROAIR HFA) 90 mcg/actuation inhaler Inhale 2 puffs into the lungs every 6 (six) hours as needed for Wheezing. Rescue 18 g 1    amLODIPine (NORVASC) 5 MG tablet Take 1 tablet (5 mg total) by mouth once daily. 30 tablet 0    cetirizine (ZYRTEC) 10 MG tablet Take 1 tablet (10 mg total) by mouth once daily. 90 tablet 3    dicyclomine (BENTYL) 20 mg tablet Take 20 mg by mouth.      FLUoxetine 20 MG capsule Take 1 capsule (20 mg total) by mouth once daily. Take in addition to 40 mg for a total of 60mg daily. 90 capsule 1    FLUoxetine 40 MG capsule Take 1 capsule (40 mg total) by mouth once daily. Take in addition to 20 mg for a total of 60mg daily. 90 capsule 1    ipratropium (ATROVENT) 42 mcg (0.06 %) nasal spray 2 sprays by Nasal route 4 (four) times daily. 15 mL 2    lisinopriL (PRINIVIL,ZESTRIL) 20 MG tablet Take 1 tablet (20 mg total) by mouth once daily. 90 tablet 3    loperamide (IMODIUM) 2 mg capsule Take 2 mg by mouth once as needed for Diarrhea.      methocarbamol (ROBAXIN) 750 MG Tab Take 500 mg by mouth 2 (two) times daily as needed.      multivitamin with minerals tablet Take 1 tablet by mouth once daily.      naproxen (NAPROSYN) 500 MG tablet Take 1 tablet (500 mg total) by mouth 2 (two) times daily with meals. 60 tablet 0    omeprazole (PRILOSEC) 20 MG capsule Take 2 capsules (40 mg total) by mouth every morning. 90 capsule 3    promethazine (PHENERGAN) 25 MG tablet Take 1 tablet (25 mg total) by mouth every 8 (eight) hours as needed for Nausea. 30 tablet 0    fluconazole (DIFLUCAN) 150 MG Tab 1 tablet orally at first sign of yeast infection and 1 tablet orally 3 days later  2 tablet 0    hydrOXYzine (ATARAX) 50 MG tablet Take 1 tablet (50 mg total) by mouth 2 (two) times daily as needed for Anxiety. 180 tablet 1    nitrofurantoin, macrocrystal-monohydrate, (MACROBID) 100 MG capsule Take 1 capsule (100 mg total) by mouth once daily. (Patient not taking: Reported on 6/22/2020) 30 capsule 5    traMADol (ULTRAM) 50 mg tablet Take 50 mg by mouth every 8 (eight) hours as needed for Pain.       No current facility-administered medications on file prior to visit.        Allergies   Review of patient's allergies indicates:   Allergen Reactions    Effexor [venlafaxine]      Elevated her blood pressure    Zoloft [sertraline]     Paroxetine hcl      Made her feel drunk       Review of Systems (Pertinent positives)  Review of Systems   Constitutional: Positive for malaise/fatigue. Negative for chills, diaphoresis, fever and weight loss.   HENT: Negative.    Eyes: Negative.  Negative for blurred vision, double vision and photophobia.   Respiratory: Negative for cough, hemoptysis, sputum production, shortness of breath and wheezing.    Cardiovascular: Negative for chest pain, palpitations, orthopnea, claudication, leg swelling and PND.   Gastrointestinal: Positive for abdominal pain, blood in stool, diarrhea, nausea and vomiting. Negative for constipation, heartburn and melena.   Genitourinary: Negative for dysuria, frequency and hematuria.   Musculoskeletal: Negative for back pain, falls, joint pain, myalgias and neck pain.   Skin: Negative.    Neurological: Positive for headaches. Negative for dizziness, tingling, tremors, sensory change, speech change, focal weakness, seizures, loss of consciousness and weakness.   Psychiatric/Behavioral: Negative.        There were no vitals taken for this visit.    Physical Exam  Constitutional:       General: She is not in acute distress.     Appearance: She is ill-appearing. She is not toxic-appearing or diaphoretic.      Comments: Patient appears to be  not feeling well via video conference.  She however is in no acute distress   Pulmonary:      Effort: Pulmonary effort is normal. No respiratory distress.      Breath sounds: No wheezing.      Comments: No audible wheezing noted  Abdominal:      Comments: Patient reports no TTP of left lower quadrant when asked to palpate left lower quadrant; patient reports no TTP of epigastric area of abdomen when asked to palpate   Neurological:      Mental Status: She is alert.          Assessment/Plan:  Naomy Armstrong is a 55 y.o. female who presents today for :    Diagnoses and all orders for this visit:    Abdominal pain, LLQ (left lower quadrant)  -     amoxicillin-clavulanate 875-125mg (AUGMENTIN) 875-125 mg per tablet; Take 1 tablet by mouth every 12 (twelve) hours.  -     Ambulatory referral/consult to Gastroenterology; Future  -     CBC auto differential; Future  -     Comprehensive metabolic panel; Future  -     URINALYSIS  -     Urine culture  -     CT Abdomen Pelvis With Contrast; Future    Rectal bleeding  -     CBC auto differential; Future  -     Comprehensive metabolic panel; Future  -     URINALYSIS  -     Urine culture  -     CT Abdomen Pelvis With Contrast; Future     Will treat for potential diverticulitis with Augmentin.  Will get urine studies as well as labs and CT scan tomorrow to evaluate.  Recommended patient to begin antibiotics immediately.  Drink plenty of water.  Recommended patient to decrease frequent use of NSAIDs as this may cause gastrointestinal ulceration and bleeding.  I instructed the patient to monitor her symptoms closely overnight and if her symptoms worsen in any way (documented fevers, chills, worsening pain, worsening bleeding, dizziness, or orthostatic symptoms) she should seek emergency care immediately.  Will place referral to GI in case this is needed in the future.  Patient verbalized understanding of instructions.        This office note has been dictated.  This  dictation has been generated using M-Modal Fluency Direct dictation; some phonetic errors may occur.   My collaborating physician is Dr. Joseph Garcia.

## 2020-06-23 ENCOUNTER — TELEPHONE (OUTPATIENT)
Dept: FAMILY MEDICINE | Facility: CLINIC | Age: 55
End: 2020-06-23

## 2020-06-23 ENCOUNTER — HOSPITAL ENCOUNTER (OUTPATIENT)
Dept: RADIOLOGY | Facility: HOSPITAL | Age: 55
Discharge: HOME | End: 2020-06-23
Attending: NURSE PRACTITIONER
Payer: COMMERCIAL

## 2020-06-23 DIAGNOSIS — R10.32 ABDOMINAL PAIN, LLQ (LEFT LOWER QUADRANT): ICD-10-CM

## 2020-06-23 DIAGNOSIS — K62.5 RECTAL BLEEDING: ICD-10-CM

## 2020-06-23 PROCEDURE — 74177 CT ABDOMEN PELVIS WITH CONTRAST: ICD-10-PCS | Mod: 26,,, | Performed by: RADIOLOGY

## 2020-06-23 PROCEDURE — 74177 CT ABD & PELVIS W/CONTRAST: CPT | Mod: 26,,, | Performed by: RADIOLOGY

## 2020-06-23 PROCEDURE — 25500020 PHARM REV CODE 255: Performed by: NURSE PRACTITIONER

## 2020-06-23 PROCEDURE — 74177 CT ABD & PELVIS W/CONTRAST: CPT | Mod: TC

## 2020-06-23 RX ADMIN — IOHEXOL 75 ML: 350 INJECTION, SOLUTION INTRAVENOUS at 11:06

## 2020-06-23 NOTE — TELEPHONE ENCOUNTER
Patient called and identification verified by name date of birth.  Notified patient of lab/CT results.  Patient states that she is feeling better.  She has not had a bowel movement but is not passing blood.  She says the pain has improved.  Will continue Augmentin and have patient follow-up next Tuesday in office.  She was instructed to go to the emergency room for any acutely worsening symptoms or seek care in the meantime if symptoms do not improve or resolve.  Patient verbalized understanding of instructions

## 2020-06-26 ENCOUNTER — PATIENT MESSAGE (OUTPATIENT)
Dept: FAMILY MEDICINE | Facility: CLINIC | Age: 55
End: 2020-06-26

## 2020-06-26 DIAGNOSIS — K57.92 DIVERTICULITIS: Primary | ICD-10-CM

## 2020-06-26 RX ORDER — CIPROFLOXACIN 500 MG/1
500 TABLET ORAL EVERY 12 HOURS
Qty: 14 TABLET | Refills: 0 | Status: SHIPPED | OUTPATIENT
Start: 2020-06-26 | End: 2020-07-03

## 2020-06-26 RX ORDER — METRONIDAZOLE 500 MG/1
500 TABLET ORAL EVERY 8 HOURS
Qty: 21 TABLET | Refills: 0 | Status: SHIPPED | OUTPATIENT
Start: 2020-06-26 | End: 2020-07-03

## 2020-06-26 NOTE — TELEPHONE ENCOUNTER
I called and left a message for the pt to call the office and I sent another [employer] message.

## 2020-06-26 NOTE — TELEPHONE ENCOUNTER
Pt returned my call and was advised of medication changes and when to seek emergent treatment. She verbalizes understanding and will keep appointment on Tuesday with NP

## 2020-06-26 NOTE — TELEPHONE ENCOUNTER
Please call patient Stop Augmentin. Stop Dicyclomine.  Changing antibiotic to twice daily Cipro and 3 times daily Metronidazole. We can give her an excuse for days missed.   Also I placed a referral for a colorectal surgeon  See if Ms. Rivera can schedule her.    If at any point develops fever >100.4 that persists, or bowel movements stop completely, go to ED

## 2020-06-29 ENCOUNTER — OFFICE VISIT (OUTPATIENT)
Dept: PSYCHIATRY | Facility: CLINIC | Age: 55
End: 2020-06-29
Payer: COMMERCIAL

## 2020-06-29 DIAGNOSIS — F41.1 GAD (GENERALIZED ANXIETY DISORDER): ICD-10-CM

## 2020-06-29 DIAGNOSIS — F33.41 MAJOR DEPRESSIVE DISORDER, RECURRENT EPISODE, IN PARTIAL REMISSION: Primary | ICD-10-CM

## 2020-06-29 DIAGNOSIS — Z65.8 PSYCHOSOCIAL DISTRESS: ICD-10-CM

## 2020-06-29 PROCEDURE — 90834 PSYTX W PT 45 MINUTES: CPT | Mod: S$GLB,,, | Performed by: SOCIAL WORKER

## 2020-06-29 PROCEDURE — 90834 PR PSYCHOTHERAPY W/PATIENT, 45 MIN: ICD-10-PCS | Mod: S$GLB,,, | Performed by: SOCIAL WORKER

## 2020-06-29 NOTE — PROGRESS NOTES
"Individual Psychotherapy (PhD/LCSW)    6/29/2020    Site:  [facility]         Therapeutic Intervention: Met with patient.  Outpatient - Insight oriented psychotherapy 45 min - CPT code 30463 and Outpatient - Supportive psychotherapy 45 min - CPT Code 32800    Chief complaint/reason for encounter: depression, anxiety and sleep     Interval history and content of current session: Patient returned to clinic for follow up psychotherapy. Patient states that she has been having a hard time. States that she has been sick for the last couple of weeks. Not sure what is going on but has had bad diarrhea and vomiting. Unable to keep any solid food down. Had labs done the other day and it appears to have some swelling in her abdomin. Reports that prior to this episode had gone back to work for about a week. States that she was excited to go back. Missed having something to do and enjoys her coworkers. States that she doesn't like the work aspect of work and is hoping to get onboarded with [employer] soon. States that coworker told her that they are setting stuff up for patient but hasn't gotten a call about paperwork yet. Going to see doctor tomorrow and hoping to get some answers. Discussed continued frustration with the "piece meal" care that she feels she gets at [facility]. Discussed talking with doctor about what she can consolidate as far as care and maybe see less doctors. Hoping to be back to work on Friday. Discussed that not much else has been going on since she has just been home sick. Reports that she did answer the phone a couple times and that is a big deal for her.     Treatment plan:  · Target symptoms: depression, anxiety   · Why chosen therapy is appropriate versus another modality: relevant to diagnosis, evidence based practice  · Outcome monitoring methods: self-report, observation  · Therapeutic intervention type: insight oriented psychotherapy, supportive psychotherapy    Risk " parameters:  Patient reports no suicidal ideation  Patient reports no homicidal ideation  Patient reports no self-injurious behavior  Patient reports no violent behavior    Verbal deficits: None    Patient's response to intervention:  The patient's response to intervention is accepting.    Progress toward goals and other mental status changes:  The patient's progress toward goals is fair .    Diagnosis:     ICD-10-CM ICD-9-CM   1. Major depressive disorder, recurrent episode, in partial remission  F33.41 296.35   2. LOR (generalized anxiety disorder)  F41.1 300.02   3. Psychosocial distress  Z65.8 V62.89       Plan:  individual psychotherapy    Return to clinic: 2 weeks, 1 month    Length of Service (minutes): 45     Cassandra Rockweiler, LCS-BACS

## 2020-06-30 ENCOUNTER — TELEPHONE (OUTPATIENT)
Dept: SURGERY | Facility: CLINIC | Age: 55
End: 2020-06-30

## 2020-06-30 ENCOUNTER — OFFICE VISIT (OUTPATIENT)
Dept: FAMILY MEDICINE | Facility: CLINIC | Age: 55
End: 2020-06-30
Payer: COMMERCIAL

## 2020-06-30 VITALS
DIASTOLIC BLOOD PRESSURE: 96 MMHG | RESPIRATION RATE: 17 BRPM | BODY MASS INDEX: 29.87 KG/M2 | WEIGHT: 185.88 LBS | HEIGHT: 66 IN | HEART RATE: 84 BPM | SYSTOLIC BLOOD PRESSURE: 144 MMHG | OXYGEN SATURATION: 97 % | TEMPERATURE: 99 F

## 2020-06-30 DIAGNOSIS — K52.9 COLITIS: Primary | ICD-10-CM

## 2020-06-30 PROCEDURE — 99213 OFFICE O/P EST LOW 20 MIN: CPT | Mod: S$GLB,,, | Performed by: NURSE PRACTITIONER

## 2020-06-30 PROCEDURE — 99999 PR PBB SHADOW E&M-EST. PATIENT-LVL V: CPT | Mod: PBBFAC,,, | Performed by: NURSE PRACTITIONER

## 2020-06-30 PROCEDURE — 99213 PR OFFICE/OUTPT VISIT, EST, LEVL III, 20-29 MIN: ICD-10-PCS | Mod: S$GLB,,, | Performed by: NURSE PRACTITIONER

## 2020-06-30 PROCEDURE — 99999 PR PBB SHADOW E&M-EST. PATIENT-LVL V: ICD-10-PCS | Mod: PBBFAC,,, | Performed by: NURSE PRACTITIONER

## 2020-06-30 NOTE — PROGRESS NOTES
Routine Office Visit    Patient Name: Naomy Armstrong    : 1965  MRN: [id number]    Chief Complaint:  Follow up    Subjective:  Naomy is a 55 y.o. female who presents today for:    1. Follow up - Patient presents today for a follow up. She reports that she is feeling much better. She is still having abd pain but states that it only happens when she is active and moves around, and she denies any pain at rest. When she does have pain it is located in the LLQ. Still having 2-3 episodes of diarrhea daily but the diarrhea is no longer bloody. She denies any fevers, chills, CP, shortness of breath, or wheezing. She was switched to cipro and metronidazole per her PCP and has about 3-4 days left of each medication. She has been able to drink fluids but she has been working her way up to a normal diet. No vomiting. She does report that her energy levels are low but overall she is feeling much better. She has not set up an appt with colorectal surgery per her PCP's request yet.     Past Medical History  Past Medical History:   Diagnosis Date    Alcohol abuse     per chart, but patient denies today and cocaine per chart    Anxiety     Depression     Hallucination     last couple of months started seeing someone standing in her room, saw boyfriend's reflection in glass    Headache     History of psychiatric hospitalization     age 13 [facility] for 2 months for acting out; 2018 for SI    Hx of psychiatric care     Hypertension     Psychiatric problem     Sleep difficulties     Suicide attempt     with knife by cutting stomach    Therapy        Past Surgical History  No past surgical history on file.    Family History  Family History   Problem Relation Age of Onset    Anxiety disorder Sister     Depression Sister     Alcohol abuse Maternal Uncle     Alcohol abuse Maternal Grandfather        Social History  Social History     Socioeconomic History    Marital status:      Spouse name: Not on  file    Number of children: 0    Years of education: Not on file    Highest education level: Not on file   Occupational History    Occupation:      Employer: [employer]   Social Needs    Financial resource strain: Very hard    Food insecurity     Worry: Often true     Inability: Often true    Transportation needs     Medical: No     Non-medical: No   Tobacco Use    Smoking status: Former Smoker     Quit date: 2006     Years since quittin.1    Smokeless tobacco: Never Used   Substance and Sexual Activity    Alcohol use: Yes     Frequency: Monthly or less     Drinks per session: Patient refused     Binge frequency: Less than monthly     Comment: occasional    Drug use: Not Currently     Types: Marijuana     Comment: tried at age 14    Sexual activity: Yes     Partners: Male     Comment: going through menopause   Lifestyle    Physical activity     Days per week: 0 days     Minutes per session: 0 min    Stress: Very much   Relationships    Social connections     Talks on phone: Patient refused     Gets together: Patient refused     Attends Muslim service: Not on file     Active member of club or organization: No     Attends meetings of clubs or organizations: Patient refused     Relationship status:    Other Topics Concern    Patient feels they ought to cut down on drinking/drug use No    Patient annoyed by others criticizing their drinking/drug use No    Patient has felt bad or guilty about drinking/drug use No    Patient has had a drink/used drugs as an eye opener in the AM No   Social History Narrative     x 2.    Lives with boyfriend.     Works at [employer] as an .    Presently working on Bachelor's degree.    Has associates degree in criminal justice.       Current Medications  Current Outpatient Medications on File Prior to Visit   Medication Sig Dispense Refill    albuterol (PROAIR HFA) 90 mcg/actuation inhaler Inhale 2 puffs into the  lungs every 6 (six) hours as needed for Wheezing. Rescue 18 g 1    amLODIPine (NORVASC) 5 MG tablet Take 1 tablet (5 mg total) by mouth once daily. 30 tablet 0    cetirizine (ZYRTEC) 10 MG tablet Take 1 tablet (10 mg total) by mouth once daily. 90 tablet 3    ciprofloxacin HCl (CIPRO) 500 MG tablet Take 1 tablet (500 mg total) by mouth every 12 (twelve) hours. for 7 days 14 tablet 0    FLUoxetine 20 MG capsule Take 1 capsule (20 mg total) by mouth once daily. Take in addition to 40 mg for a total of 60mg daily. 90 capsule 1    FLUoxetine 40 MG capsule Take 1 capsule (40 mg total) by mouth once daily. Take in addition to 20 mg for a total of 60mg daily. 90 capsule 1    hydrOXYzine (ATARAX) 50 MG tablet Take 1 tablet (50 mg total) by mouth 2 (two) times daily as needed for Anxiety. 180 tablet 1    ipratropium (ATROVENT) 42 mcg (0.06 %) nasal spray 2 sprays by Nasal route 4 (four) times daily. 15 mL 2    lisinopriL (PRINIVIL,ZESTRIL) 20 MG tablet Take 1 tablet (20 mg total) by mouth once daily. 90 tablet 3    loperamide (IMODIUM) 2 mg capsule Take 2 mg by mouth once as needed for Diarrhea.      methocarbamol (ROBAXIN) 750 MG Tab Take 500 mg by mouth 2 (two) times daily as needed.      metroNIDAZOLE (FLAGYL) 500 MG tablet Take 1 tablet (500 mg total) by mouth every 8 (eight) hours. for 7 days 21 tablet 0    multivitamin with minerals tablet Take 1 tablet by mouth once daily.      naproxen (NAPROSYN) 500 MG tablet Take 1 tablet (500 mg total) by mouth 2 (two) times daily with meals. 60 tablet 0    omeprazole (PRILOSEC) 20 MG capsule Take 2 capsules (40 mg total) by mouth every morning. 90 capsule 3    promethazine (PHENERGAN) 25 MG tablet Take 1 tablet (25 mg total) by mouth every 8 (eight) hours as needed for Nausea. 30 tablet 0    dicyclomine (BENTYL) 20 mg tablet Take 20 mg by mouth.      fluconazole (DIFLUCAN) 150 MG Tab 1 tablet orally at first sign of yeast infection and 1 tablet orally 3 days  "later (Patient not taking: Reported on 6/30/2020) 2 tablet 0    traMADol (ULTRAM) 50 mg tablet Take 50 mg by mouth every 8 (eight) hours as needed for Pain.       No current facility-administered medications on file prior to visit.        Allergies   Review of patient's allergies indicates:   Allergen Reactions    Effexor [venlafaxine]      Elevated her blood pressure    Zoloft [sertraline]     Paroxetine hcl      Made her feel drunk       Review of Systems (Pertinent positives)  Review of Systems   Constitutional: Negative for fever and weight loss.   HENT: Negative.    Eyes: Negative.    Respiratory: Negative.    Cardiovascular: Negative for chest pain, palpitations, orthopnea, claudication, leg swelling and PND.   Gastrointestinal: Positive for diarrhea and nausea. Negative for abdominal pain, blood in stool, constipation, heartburn, melena and vomiting.   Genitourinary: Negative for dysuria, frequency and hematuria.   Musculoskeletal: Negative for myalgias.   Skin: Negative.    Neurological: Positive for headaches. Negative for dizziness, tingling, tremors, sensory change, speech change, focal weakness, seizures, loss of consciousness and weakness.   Endo/Heme/Allergies: Negative.    Psychiatric/Behavioral: Negative.        BP (!) 144/96 (BP Location: Left arm, Patient Position: Sitting, BP Method: Large (Manual))   Pulse 84   Temp 98.8 °F (37.1 °C) (Oral)   Resp 17   Ht 5' 5.5" (1.664 m)   Wt 84.3 kg (185 lb 13.6 oz)   SpO2 97%   BMI 30.46 kg/m²     Physical Exam  Constitutional:       General: She is not in acute distress.     Appearance: Normal appearance. She is not ill-appearing, toxic-appearing or diaphoretic.      Comments: Resting comfortablky in exam room in no acute distress   Eyes:      Conjunctiva/sclera: Conjunctivae normal.      Pupils: Pupils are equal, round, and reactive to light.   Neck:      Musculoskeletal: Normal range of motion.   Cardiovascular:      Rate and Rhythm: Normal rate " and regular rhythm.      Pulses: Normal pulses.      Heart sounds: Normal heart sounds. No murmur. No friction rub. No gallop.    Pulmonary:      Effort: Pulmonary effort is normal. No respiratory distress.      Breath sounds: Normal breath sounds. No stridor. No wheezing, rhonchi or rales.   Chest:      Chest wall: No tenderness.   Abdominal:      General: Abdomen is flat. Bowel sounds are normal. There is no distension.      Palpations: Abdomen is soft. There is no mass.      Tenderness: There is no abdominal tenderness. There is no right CVA tenderness, left CVA tenderness, guarding or rebound.      Hernia: No hernia is present.   Musculoskeletal:      Right lower leg: No edema.      Left lower leg: No edema.   Skin:     General: Skin is warm and dry.      Capillary Refill: Capillary refill takes less than 2 seconds.   Neurological:      Mental Status: She is alert.          Assessment/Plan:  Naomy Armstrong is a 55 y.o. female who presents today for :    Naomy was seen today for abdominal pain and diarrhea.    Diagnoses and all orders for this visit:    Colitis    Improving, no need for new meds or stool studies at this time.  Encouraged patient to contact referral's office to set up appt with colonoscopy as she will more than likely need one.  Encouraged patient to rest and drink plenty of fluids. Escalate diet based on tolerability. She should take antibiotics fully to completion.  F/U as needed.  Patient verbalized understanding.        This office note has been dictated.  This dictation has been generated using M-Modal Fluency Direct dictation; some phonetic errors may occur.   My collaborating physician is Dr. Joseph Garcia. Answers for HPI/ROS submitted by the patient on 6/30/2020   Abdominal pain  Chronicity: new  Onset: 1 to 4 weeks ago  Onset quality: sudden  Frequency: rarely  Episode duration: 2 hours  Progression since onset: gradually improving  Pain location: LLQ, epigastric region  Pain -  numeric: 5/10  Pain quality: aching  anorexia: Yes  arthralgias: Yes  belching: No  constipation: No  diarrhea: Yes  dysuria: No  fever: No  flatus: No  frequency: No  headaches: Yes  hematochezia: Yes  hematuria: No  melena: No  myalgias: No  nausea: Yes  weight loss: No  vomiting: Yes  Aggravated by: certain positions  Relieved by: being still  Diagnostic workup: CT scan  Pain severity: moderate  Treatments tried: antibiotics, proton pump inhibitors  Improvement on treatment: mild  abdominal surgery: No  colon cancer: No  Crohn's disease: No  gallstones: No  GERD: Yes  irritable bowel syndrome: Yes  kidney stones: No  pancreatitis: No  PUD: No  ulcerative colitis: No  UTI: Yes

## 2020-06-30 NOTE — TELEPHONE ENCOUNTER
Left message for patient in regards to scheduling an appointment with a provider for a diagnosis of diverticulitis.  Need to check for recent colonoscopy.

## 2020-06-30 NOTE — TELEPHONE ENCOUNTER
----- Message from Devora Newton sent at 6/30/2020  8:17 AM CDT -----  Good Morning    Patient is being referred to Colorectal department for diagnosis Diverticulitis. She can be reached at [phone] for scheduling. Thank you!

## 2020-07-02 ENCOUNTER — CLINICAL SUPPORT (OUTPATIENT)
Dept: REHABILITATION | Facility: HOSPITAL | Age: 55
End: 2020-07-02
Attending: ORTHOPAEDIC SURGERY
Payer: COMMERCIAL

## 2020-07-02 DIAGNOSIS — R29.898 DECREASED STRENGTH OF TRUNK AND BACK: ICD-10-CM

## 2020-07-02 DIAGNOSIS — R29.898 DECREASED STRENGTH OF LOWER EXTREMITY: Primary | ICD-10-CM

## 2020-07-02 DIAGNOSIS — M25.661 DECREASED RANGE OF MOTION OF RIGHT LOWER EXTREMITY: ICD-10-CM

## 2020-07-02 DIAGNOSIS — M25.551 PAIN IN JOINT OF RIGHT HIP: ICD-10-CM

## 2020-07-02 PROCEDURE — 97110 THERAPEUTIC EXERCISES: CPT | Mod: PN

## 2020-07-02 NOTE — PROGRESS NOTES
Physical Therapy Daily Treatment Note     Name: Naomy Perez [place]  Clinic Number: [id number]    Therapy Diagnosis:   Encounter Diagnoses   Name Primary?    Decreased strength of lower extremity Yes    Decreased strength of trunk and back     Pain in joint of right hip     Decreased range of motion of right lower extremity      Physician: Jessica Shay MD    Visit Date: 7/2/2020    Physician Orders: PT Eval and Treat   Medical Diagnosis from Referral: M70.61 (ICD-10-CM) - Greater trochanteric bursitis of right hip  Evaluation Date: 6/1/2020  Authorization Period Expiration: 12/31/2020  Plan of Care Expiration: 9/1/2020  Visit # / Visits authorized: 2/20  PN Due:  7/1/2020     Time In: 11:05 am   Time Out: 11:50 am  Total Billable Time: 45 minutes    Precautions: Standard    Subjective     Pt reports: hip felt great after leaving first session. However, she has been sick for 2 weeks and states that she had increased pain in R hip. States that she had slight increase in hip pain with stretches.     She was partially compliant with home exercise program.  Response to previous treatment: none  Functional change: ongoing    Pain: 6/10  Location: right hip      Objective     Naomy received therapeutic exercises to develop strength, endurance, ROM and flexibility for 45 minutes including:    ITB stretch R LE 3x30  Bridges 2x10  Sidelying hip abduction R LE 2x10  +Sidelying hip adduction R LE 2x10  +Prone hip extension B LE 2x10  +Windsheild wipers prone R LE 2x10  +Hip IR/ER RTB 2x10  +Lateral stepping YTB at knees x 2 laps pole to pole  +Sit to stands from 18 in plyo x 15 (1 set 10, 1 set 5)  +Knee Flexion Matrix 30# 2x15    Naomy received the following manual therapy techniques: Soft tissue Mobilization were applied to the: R hip for 00 minutes, including:      Home Exercises Provided and Patient Education Provided     Education provided:   Cont to perform HEP as provided.     Written Home Exercises  Provided: yes.  Exercises were reviewed and Naomy was able to demonstrate them prior to the end of the session.  Naomy demonstrated good  understanding of the education provided.     See EMR under Patient Instructions for exercises provided 7/2/2020.    Assessment     Pt tolerated initial treatment session well with no adverse response noted. Pt tolerated addition of all exercises with minimal reported increase in R hip pain. Most increased pain/difficulty this session completing R hip IR in prone and seated against TB. Pt with some reproduction of B knee pain this session, only able to tolerate 1.5 set of sit to stands. Pt continues with decreased strength of R hip, decreased flexibility, muscle imbalance, decreased functional mobility, and poor tolerance to activities. Pt remains appropriate for therapy at this time.     Naomy is progressing well towards her goals.   Pt prognosis is Good.     Pt will continue to benefit from skilled outpatient physical therapy to address the deficits listed in the problem list box on initial evaluation, provide pt/family education and to maximize pt's level of independence in the home and community environment.     Pt's spiritual, cultural and educational needs considered and pt agreeable to plan of care and goals.    Anticipated barriers to physical therapy: mental health hx as documented in chart, work schedule    GOALS: Short Term Goals:  2-3 weeks  1.Report decreased in pain at worse less than <   / =  7 /10  to increase tolerance for functional activities. On going  2. Pt to improve R hip IR range of motion by 25% to allow for improved functional mobility to allow for improvement in IADLs. On going  3. Increased B posterior chain and hip girdle musculature  MMT 1/2  to increase tolerance for ADL and work activities.On going  4. Pt to demonstrate ability to squat x 5 with correct form and pain < / = 3/10 in order to improve tolerance to work activities.   5. Pt to  tolerate HEP to improve ROM and independence with ADL's.On going     Long Term Goals:  6-8 weeks  1.Report decreased in pain at worse less than  <   / =  3  /10  to increase tolerance for functional mobility  2.Pt to improve range of motion by 75% to allow for improved functional mobility to allow for improvement in IADLs.   3.Increased B posterior chain and hip girdle musculature MMT 1 grade  to increase tolerance for ADL and work activities.  4. Pt will scores report at CJ level (20-40% impaired) on LEFS  to demonstrate increase in LE function with every day tasks.   5. Pt to be Independent with HEP to improve ROM and independence with ADL's  6. Pt to demonstrate ability to squat x 10 with pain < / = 1/10 in order to improve tolerance to work activities    Plan     Certification date: 6/1/2020 - 9/1/2020    Cont skilled PT session towards PT and patient's goals.    Saundra Salgado, PT, DPT  07/02/2020

## 2020-07-06 ENCOUNTER — OFFICE VISIT (OUTPATIENT)
Dept: PAIN MEDICINE | Facility: CLINIC | Age: 55
End: 2020-07-06
Payer: COMMERCIAL

## 2020-07-06 VITALS
WEIGHT: 184 LBS | OXYGEN SATURATION: 95 % | TEMPERATURE: 99 F | HEIGHT: 65 IN | SYSTOLIC BLOOD PRESSURE: 132 MMHG | HEART RATE: 105 BPM | BODY MASS INDEX: 30.66 KG/M2 | DIASTOLIC BLOOD PRESSURE: 80 MMHG

## 2020-07-06 DIAGNOSIS — M47.816 LUMBAR SPONDYLOSIS: ICD-10-CM

## 2020-07-06 DIAGNOSIS — M51.36 DDD (DEGENERATIVE DISC DISEASE), LUMBAR: Primary | ICD-10-CM

## 2020-07-06 DIAGNOSIS — M53.3 SACROILIAC JOINT PAIN: ICD-10-CM

## 2020-07-06 DIAGNOSIS — M47.897 OTHER OSTEOARTHRITIS OF SPINE, LUMBOSACRAL REGION: ICD-10-CM

## 2020-07-06 PROCEDURE — 99214 PR OFFICE/OUTPT VISIT, EST, LEVL IV, 30-39 MIN: ICD-10-PCS | Mod: S$GLB,,, | Performed by: PAIN MEDICINE

## 2020-07-06 PROCEDURE — 99999 PR PBB SHADOW E&M-EST. PATIENT-LVL V: ICD-10-PCS | Mod: PBBFAC,,, | Performed by: PAIN MEDICINE

## 2020-07-06 PROCEDURE — 99214 OFFICE O/P EST MOD 30 MIN: CPT | Mod: S$GLB,,, | Performed by: PAIN MEDICINE

## 2020-07-06 PROCEDURE — 99999 PR PBB SHADOW E&M-EST. PATIENT-LVL V: CPT | Mod: PBBFAC,,, | Performed by: PAIN MEDICINE

## 2020-07-06 RX ORDER — GABAPENTIN 300 MG/1
600 CAPSULE ORAL 3 TIMES DAILY
Qty: 180 CAPSULE | Refills: 11 | Status: SHIPPED | OUTPATIENT
Start: 2020-07-06 | End: 2021-07-06

## 2020-07-06 NOTE — PROGRESS NOTES
Subjective:     Patient ID: Naomy Armstrong is a 55 y.o. female    Chief Complaint: Headache, Hip Pain, and Low-back Pain      Referred by: No ref. provider found      HPI:    Interval History (7/6/20):  She returns today for follow up.  She reports that she continues to have low back/hip pain.  This pain is worse with bending and lifting.  These are movements that are often required as a part of her job.  Overall she denies any changes in the quality or location of the pain since last encounter.  She denies any new symptoms.       Interval History (1/13/20):  She returns today for follow up.  She reports that her neck pain has improved with home exercise program.  She does not feel as though this needs any further attention.  She does state that her low back has been bothering her.  She localizes pain to the bilateral lower lumbar regions.  The pain does radiate to the bilateral hip regions.  She denies any associated numbness, tingling, weakness, bowel bladder dysfunction.  The pain is constant and worsened with activity.  She states that the pain is typically worse when initiating activity after rest.        Initial Encounter (7/9/19):  Naomy Armstrong is a 55 y.o. female who presents today with chronic neck pain. This pain has been present for years.  No specific inciting event or injury noted. She localizes the pain to the midline cervical region.  The pain does not radiate.  She denies any associated numbness, tingling, weakness, bowel bladder dysfunction.  She has had some radicular pain on the left side in the past this has resolved.  The pain is constant and worsened with activity.  She specifically states that the pain is worse when looking at screens.  She has been treated in the past by Dr. Bhavya Oneill at [facility].   This pain is described in detail below.    Physical Therapy:  No.    Non-pharmacologic Treatment:  Rest helps         · TENS?  No    Pain Medications:          · Currently taking:  Robaxin, naproxen    · Has tried in the past:  Opioids, muscle relaxers, Tylenol, gabapentin, NSAIDs, Amitriptyline, tizanidine    · Has not tried:  SNRIs, topical creams    Blood thinners:  None    Interventional Therapies:   10/8/13- Left RFA C5, C6, C7  9/10/13- Left C5, C6, C7 MBB   8/13/13- C7-T1 IL EMMA    Relevant Surgeries:  None    Affecting sleep?  Yes    Affecting daily activities? yes    Depressive symptoms? yes          · SI/HI? No    Work status: Employment     Pain Scores:    Best:       2/10  Worst:   9/10  Usually:   5/10  Today:   4/10    Review of Systems   Constitutional: Negative for activity change, appetite change, chills, fatigue, fever and unexpected weight change.   HENT: Negative for hearing loss.    Eyes: Negative for visual disturbance.   Respiratory: Negative for chest tightness and shortness of breath.    Cardiovascular: Negative for chest pain.   Gastrointestinal: Negative for abdominal pain, constipation, diarrhea, nausea and vomiting.   Genitourinary: Negative for difficulty urinating.   Musculoskeletal: Positive for arthralgias, back pain, gait problem, myalgias, neck pain and neck stiffness.   Skin: Negative for rash.   Neurological: Negative for dizziness, weakness, light-headedness, numbness and headaches.   Psychiatric/Behavioral: Positive for sleep disturbance. Negative for hallucinations and suicidal ideas. The patient is not nervous/anxious.        Past Medical History:   Diagnosis Date    Alcohol abuse     per chart, but patient denies today and cocaine per chart    Anxiety     Depression     Hallucination     last couple of months started seeing someone standing in her room, saw boyfriend's reflection in glass    Headache     History of psychiatric hospitalization     age 13 [facility] for 2 months for acting out; 2018 for SI    Hx of psychiatric care     Hypertension     Psychiatric problem     Sleep difficulties     Suicide attempt     with  knife by cutting stomach    Therapy        History reviewed. No pertinent surgical history.    Social History     Socioeconomic History    Marital status:      Spouse name: Not on file    Number of children: 0    Years of education: Not on file    Highest education level: Not on file   Occupational History    Occupation:      Employer: [employer]   Social Needs    Financial resource strain: Very hard    Food insecurity     Worry: Often true     Inability: Often true    Transportation needs     Medical: No     Non-medical: No   Tobacco Use    Smoking status: Former Smoker     Quit date: 2006     Years since quittin.2    Smokeless tobacco: Never Used   Substance and Sexual Activity    Alcohol use: Yes     Frequency: Monthly or less     Drinks per session: Patient refused     Binge frequency: Less than monthly     Comment: occasional    Drug use: Not Currently     Types: Marijuana     Comment: tried at age 14    Sexual activity: Yes     Partners: Male     Comment: going through menopause   Lifestyle    Physical activity     Days per week: 0 days     Minutes per session: 0 min    Stress: Very much   Relationships    Social connections     Talks on phone: Patient refused     Gets together: Patient refused     Attends Zoroastrian service: Not on file     Active member of club or organization: No     Attends meetings of clubs or organizations: Patient refused     Relationship status:    Other Topics Concern    Patient feels they ought to cut down on drinking/drug use No    Patient annoyed by others criticizing their drinking/drug use No    Patient has felt bad or guilty about drinking/drug use No    Patient has had a drink/used drugs as an eye opener in the AM No   Social History Narrative     x 2.    Lives with boyfriend.     Works at [employer] as an .    Presently working on Bachelor's degree.    Has associates degree in criminal justice.        Review of patient's allergies indicates:   Allergen Reactions    Effexor [venlafaxine]      Elevated her blood pressure    Zoloft [sertraline]     Paroxetine hcl      Made her feel drunk       Current Outpatient Medications on File Prior to Visit   Medication Sig Dispense Refill    albuterol (PROAIR HFA) 90 mcg/actuation inhaler Inhale 2 puffs into the lungs every 6 (six) hours as needed for Wheezing. Rescue 18 g 1    amLODIPine (NORVASC) 5 MG tablet Take 1 tablet (5 mg total) by mouth once daily. 30 tablet 0    cetirizine (ZYRTEC) 10 MG tablet Take 1 tablet (10 mg total) by mouth once daily. 90 tablet 3    dicyclomine (BENTYL) 20 mg tablet Take 20 mg by mouth.      fluconazole (DIFLUCAN) 150 MG Tab 1 tablet orally at first sign of yeast infection and 1 tablet orally 3 days later 2 tablet 0    FLUoxetine 20 MG capsule Take 1 capsule (20 mg total) by mouth once daily. Take in addition to 40 mg for a total of 60mg daily. 90 capsule 1    FLUoxetine 40 MG capsule Take 1 capsule (40 mg total) by mouth once daily. Take in addition to 20 mg for a total of 60mg daily. 90 capsule 1    hydrOXYzine (ATARAX) 50 MG tablet Take 1 tablet (50 mg total) by mouth 2 (two) times daily as needed for Anxiety. 180 tablet 1    ipratropium (ATROVENT) 42 mcg (0.06 %) nasal spray 2 sprays by Nasal route 4 (four) times daily. 15 mL 2    lisinopriL (PRINIVIL,ZESTRIL) 20 MG tablet Take 1 tablet (20 mg total) by mouth once daily. 90 tablet 3    loperamide (IMODIUM) 2 mg capsule Take 2 mg by mouth once as needed for Diarrhea.      methocarbamol (ROBAXIN) 750 MG Tab Take 500 mg by mouth 2 (two) times daily as needed.      multivitamin with minerals tablet Take 1 tablet by mouth once daily.      naproxen (NAPROSYN) 500 MG tablet Take 1 tablet (500 mg total) by mouth 2 (two) times daily with meals. 60 tablet 0    omeprazole (PRILOSEC) 20 MG capsule Take 2 capsules (40 mg total) by mouth every morning. 90 capsule 3     "promethazine (PHENERGAN) 25 MG tablet Take 1 tablet (25 mg total) by mouth every 8 (eight) hours as needed for Nausea. 30 tablet 0    traMADol (ULTRAM) 50 mg tablet Take 50 mg by mouth every 8 (eight) hours as needed for Pain.       No current facility-administered medications on file prior to visit.        Objective:      /80 (BP Location: Left arm, Patient Position: Sitting, BP Method: Medium (Automatic))   Pulse 105   Temp 98.8 °F (37.1 °C) (Oral)   Ht 5' 5" (1.651 m)   Wt 83.5 kg (184 lb)   SpO2 95%   BMI 30.62 kg/m²     Exam:  GEN:  Well developed, well nourished.  No acute distress.  Normal pain behavior.  HEENT:  No trauma.  Mucous membranes moist.  Nares patent bilaterally.  PSYCH: Normal affect. Thought content appropriate.  CHEST:  Breathing symmetric.  No audible wheezing.  ABD: Soft, non-distended.  SKIN:  Warm, pink, dry.  No rash on exposed areas.    EXT:  No cyanosis, clubbing, or edema.  No color change or changes in nail or hair growth.  NEURO/MUSCULOSKELETAL:  Fully alert, oriented, and appropriate. Speech normal indio. No cranial nerve deficits.   Gait:  Antalgic.  No trendelenburg sign bilaterally.   Motor Strength: 5/5 motor strength throughout lower extremities.   Sensory:  No sensory deficit in the lower extremities.   Reflexes:  1 + and symmetric throughout.  Downgoing Babinski's bilaterally.  No clonus or spasticity.  L-Spine:  Full ROM with pain on extension more than flexion.  Positive pain with axial/facet loading bilaterally.  Negative SLR bilaterally.    Positive TTP over lumbar paraspinals, bilateral SI joints          Imaging:    Narrative & Impression    EXAMINATION:  XR LUMBAR SPINE AP AND LAT WITH FLEX/EXT     CLINICAL HISTORY:  Other intervertebral disc degeneration, lumbar region     TECHNIQUE:  AP and lateral views as well as lateral flexion and extension images are performed through the lumbar spine.     COMPARISON:  None     FINDINGS:  Mild DJD and lumbar " scoliosis.  No significant disc space narrowing identified.  No fracture, spondylolisthesis or bone destruction noted.  Spina bifida occulta involving the S1 vertebral segment noted.     Impression:     See above        Electronically signed by: Kaiser Ruiz MD  Date:                                            01/13/2020  Time:                                           09:22    Encounter    View Encounter                     Narrative     No significant alignment abnormality.  Vertebral body heights are normally maintained, without compression deformity at any level.  There is disc narrowing at C5-6 and C6-7, and each of these levels demonstrates posterior marginal vertebral endplate   spurring and accompanying disc bulging.  The spurring is more pronounced to the right of midline at C5-6 and to the left of midline at C6-7.  All other cervical and visualized upper thoracic levels have normal disc contour, and there is no evidence of a   significant focal disc herniation at any level.  AP diameter of the spinal canal is normally maintained at all levels with no canal stenosis/extrinsic cord compression observed.  The spinal cord is well delineated from the cervicomedullary junction to   the T3 level, and appears normal in size and configuration without focal enlargement or irregularity.  No Chiari malformation or cord cyst or syrinx.  No abnormal signal from the substance of the cord on any of the pulse sequences generated.  There is   some prominent neural foraminal stenosis at C5-6 on the right and C6-7 on the left.  No significant signal abnormality referable to the osseous structures.   Impression      Cervical spine MRI examination demonstrates degenerative changes as discussed above involving the C5-6 and C6-7 levels, with right-sided foraminal stenosis at C5-6 and left-sided foraminal stenosis at C6-7.  No evidence of a significant focal soft is   herniation, canal stenosis/extrinsic cord compression, or  intramedullary cord lesion seen at any level.      Electronically signed by: Kaiser Preston MD  Date: 08/01/13  Time: 06:40          Assessment:       Encounter Diagnoses   Name Primary?    DDD (degenerative disc disease), lumbar Yes    Lumbar spondylosis     Other osteoarthritis of spine, lumbosacral region     Sacroiliac joint pain          Plan:       Naomy was seen today for headache, hip pain and low-back pain.    Diagnoses and all orders for this visit:    DDD (degenerative disc disease), lumbar  -     gabapentin (NEURONTIN) 300 MG capsule; Take 2 capsules (600 mg total) by mouth 3 (three) times daily.  -     MRI Lumbar Spine Without Contrast; Future    Lumbar spondylosis  -     gabapentin (NEURONTIN) 300 MG capsule; Take 2 capsules (600 mg total) by mouth 3 (three) times daily.  -     MRI Lumbar Spine Without Contrast; Future    Other osteoarthritis of spine, lumbosacral region    Sacroiliac joint pain        Naomy Armstrong is a 55 y.o. female with chronic low back pain.  Pain appears to be axial and most likely related to lumbar facet joints.  May have some degree of sacroiliac joint pain as well. Low suspicion for radiculopathy at this time.    1.  Pertinent imaging studies reviewed by me. Imaging results were discussed with patient.  2.  Lumbar MRI to investigate for intraspinal process causing the pain.  Spina bifida occulta also noted on x-rays.  MRI would help to characterize this further.  3.  Start gabapentin 300 mg q.h.s..  Gradually increase to 600 mg t.i.d. as tolerated/needed.  Patient was given instructions on how to do this.  4.  Return to clinic after MRI to review results.  May consider lumbar medial branch blocks versus lumbar epidural steroid injection depending on MRI results.

## 2020-07-06 NOTE — LETTER
July 6, 2020      [facility]  [address], [address]  [place] [address]  Phone: [phone]  Fax: [phone]       Patient: Naomy Armstrong   YOB: 1965  Date of Visit: 07/06/2020    To Whom It May Concern:    Please excuse Ronald Armstrong from 07/04/2020 through 07/06/2020.  She was at [facility] on 07/06/2020. She may return to work/school on 07/07/2020 with no restrictions. If you have any questions or concerns, or if I can be of further assistance, please do not hesitate to contact me.    Sincerely,    Carlene Narayan RN

## 2020-07-06 NOTE — H&P (VIEW-ONLY)
Subjective:     Patient ID: Naomy Armstrong is a 55 y.o. female    Chief Complaint: Headache, Hip Pain, and Low-back Pain      Referred by: No ref. provider found      HPI:    Interval History (7/6/20):  She returns today for follow up.  She reports that she continues to have low back/hip pain.  This pain is worse with bending and lifting.  These are movements that are often required as a part of her job.  Overall she denies any changes in the quality or location of the pain since last encounter.  She denies any new symptoms.       Interval History (1/13/20):  She returns today for follow up.  She reports that her neck pain has improved with home exercise program.  She does not feel as though this needs any further attention.  She does state that her low back has been bothering her.  She localizes pain to the bilateral lower lumbar regions.  The pain does radiate to the bilateral hip regions.  She denies any associated numbness, tingling, weakness, bowel bladder dysfunction.  The pain is constant and worsened with activity.  She states that the pain is typically worse when initiating activity after rest.        Initial Encounter (7/9/19):  Naomy Armstrong is a 55 y.o. female who presents today with chronic neck pain. This pain has been present for years.  No specific inciting event or injury noted. She localizes the pain to the midline cervical region.  The pain does not radiate.  She denies any associated numbness, tingling, weakness, bowel bladder dysfunction.  She has had some radicular pain on the left side in the past this has resolved.  The pain is constant and worsened with activity.  She specifically states that the pain is worse when looking at screens.  She has been treated in the past by Dr. Bhavya Oneill at [facility].   This pain is described in detail below.    Physical Therapy:  No.    Non-pharmacologic Treatment:  Rest helps         · TENS?  No    Pain Medications:          · Currently taking:  Robaxin, naproxen    · Has tried in the past:  Opioids, muscle relaxers, Tylenol, gabapentin, NSAIDs, Amitriptyline, tizanidine    · Has not tried:  SNRIs, topical creams    Blood thinners:  None    Interventional Therapies:   10/8/13- Left RFA C5, C6, C7  9/10/13- Left C5, C6, C7 MBB   8/13/13- C7-T1 IL EMMA    Relevant Surgeries:  None    Affecting sleep?  Yes    Affecting daily activities? yes    Depressive symptoms? yes          · SI/HI? No    Work status: Employment     Pain Scores:    Best:       2/10  Worst:   9/10  Usually:   5/10  Today:   4/10    Review of Systems   Constitutional: Negative for activity change, appetite change, chills, fatigue, fever and unexpected weight change.   HENT: Negative for hearing loss.    Eyes: Negative for visual disturbance.   Respiratory: Negative for chest tightness and shortness of breath.    Cardiovascular: Negative for chest pain.   Gastrointestinal: Negative for abdominal pain, constipation, diarrhea, nausea and vomiting.   Genitourinary: Negative for difficulty urinating.   Musculoskeletal: Positive for arthralgias, back pain, gait problem, myalgias, neck pain and neck stiffness.   Skin: Negative for rash.   Neurological: Negative for dizziness, weakness, light-headedness, numbness and headaches.   Psychiatric/Behavioral: Positive for sleep disturbance. Negative for hallucinations and suicidal ideas. The patient is not nervous/anxious.        Past Medical History:   Diagnosis Date    Alcohol abuse     per chart, but patient denies today and cocaine per chart    Anxiety     Depression     Hallucination     last couple of months started seeing someone standing in her room, saw boyfriend's reflection in glass    Headache     History of psychiatric hospitalization     age 13 [facility] for 2 months for acting out; 2018 for SI    Hx of psychiatric care     Hypertension     Psychiatric problem     Sleep difficulties     Suicide attempt     with  knife by cutting stomach    Therapy        History reviewed. No pertinent surgical history.    Social History     Socioeconomic History    Marital status:      Spouse name: Not on file    Number of children: 0    Years of education: Not on file    Highest education level: Not on file   Occupational History    Occupation:      Employer: [employer]   Social Needs    Financial resource strain: Very hard    Food insecurity     Worry: Often true     Inability: Often true    Transportation needs     Medical: No     Non-medical: No   Tobacco Use    Smoking status: Former Smoker     Quit date: 2006     Years since quittin.2    Smokeless tobacco: Never Used   Substance and Sexual Activity    Alcohol use: Yes     Frequency: Monthly or less     Drinks per session: Patient refused     Binge frequency: Less than monthly     Comment: occasional    Drug use: Not Currently     Types: Marijuana     Comment: tried at age 14    Sexual activity: Yes     Partners: Male     Comment: going through menopause   Lifestyle    Physical activity     Days per week: 0 days     Minutes per session: 0 min    Stress: Very much   Relationships    Social connections     Talks on phone: Patient refused     Gets together: Patient refused     Attends [employer] service: Not on file     Active member of club or organization: No     Attends meetings of clubs or organizations: Patient refused     Relationship status:    Other Topics Concern    Patient feels they ought to cut down on drinking/drug use No    Patient annoyed by others criticizing their drinking/drug use No    Patient has felt bad or guilty about drinking/drug use No    Patient has had a drink/used drugs as an eye opener in the AM No   Social History Narrative     x 2.    Lives with boyfriend.     Works at [employer] as an .    Presently working on Bachelor's degree.    Has associates degree in criminal justice.        Review of patient's allergies indicates:   Allergen Reactions    Effexor [venlafaxine]      Elevated her blood pressure    Zoloft [sertraline]     Paroxetine hcl      Made her feel drunk       Current Outpatient Medications on File Prior to Visit   Medication Sig Dispense Refill    albuterol (PROAIR HFA) 90 mcg/actuation inhaler Inhale 2 puffs into the lungs every 6 (six) hours as needed for Wheezing. Rescue 18 g 1    amLODIPine (NORVASC) 5 MG tablet Take 1 tablet (5 mg total) by mouth once daily. 30 tablet 0    cetirizine (ZYRTEC) 10 MG tablet Take 1 tablet (10 mg total) by mouth once daily. 90 tablet 3    dicyclomine (BENTYL) 20 mg tablet Take 20 mg by mouth.      fluconazole (DIFLUCAN) 150 MG Tab 1 tablet orally at first sign of yeast infection and 1 tablet orally 3 days later 2 tablet 0    FLUoxetine 20 MG capsule Take 1 capsule (20 mg total) by mouth once daily. Take in addition to 40 mg for a total of 60mg daily. 90 capsule 1    FLUoxetine 40 MG capsule Take 1 capsule (40 mg total) by mouth once daily. Take in addition to 20 mg for a total of 60mg daily. 90 capsule 1    hydrOXYzine (ATARAX) 50 MG tablet Take 1 tablet (50 mg total) by mouth 2 (two) times daily as needed for Anxiety. 180 tablet 1    ipratropium (ATROVENT) 42 mcg (0.06 %) nasal spray 2 sprays by Nasal route 4 (four) times daily. 15 mL 2    lisinopriL (PRINIVIL,ZESTRIL) 20 MG tablet Take 1 tablet (20 mg total) by mouth once daily. 90 tablet 3    loperamide (IMODIUM) 2 mg capsule Take 2 mg by mouth once as needed for Diarrhea.      methocarbamol (ROBAXIN) 750 MG Tab Take 500 mg by mouth 2 (two) times daily as needed.      multivitamin with minerals tablet Take 1 tablet by mouth once daily.      naproxen (NAPROSYN) 500 MG tablet Take 1 tablet (500 mg total) by mouth 2 (two) times daily with meals. 60 tablet 0    omeprazole (PRILOSEC) 20 MG capsule Take 2 capsules (40 mg total) by mouth every morning. 90 capsule 3     "promethazine (PHENERGAN) 25 MG tablet Take 1 tablet (25 mg total) by mouth every 8 (eight) hours as needed for Nausea. 30 tablet 0    traMADol (ULTRAM) 50 mg tablet Take 50 mg by mouth every 8 (eight) hours as needed for Pain.       No current facility-administered medications on file prior to visit.        Objective:      /80 (BP Location: Left arm, Patient Position: Sitting, BP Method: Medium (Automatic))   Pulse 105   Temp 98.8 °F (37.1 °C) (Oral)   Ht 5' 5" (1.651 m)   Wt 83.5 kg (184 lb)   SpO2 95%   BMI 30.62 kg/m²     Exam:  GEN:  Well developed, well nourished.  No acute distress.  Normal pain behavior.  HEENT:  No trauma.  Mucous membranes moist.  Nares patent bilaterally.  PSYCH: Normal affect. Thought content appropriate.  CHEST:  Breathing symmetric.  No audible wheezing.  ABD: Soft, non-distended.  SKIN:  Warm, pink, dry.  No rash on exposed areas.    EXT:  No cyanosis, clubbing, or edema.  No color change or changes in nail or hair growth.  NEURO/MUSCULOSKELETAL:  Fully alert, oriented, and appropriate. Speech normal indio. No cranial nerve deficits.   Gait:  Antalgic.  No trendelenburg sign bilaterally.   Motor Strength: 5/5 motor strength throughout lower extremities.   Sensory:  No sensory deficit in the lower extremities.   Reflexes:  1 + and symmetric throughout.  Downgoing Babinski's bilaterally.  No clonus or spasticity.  L-Spine:  Full ROM with pain on extension more than flexion.  Positive pain with axial/facet loading bilaterally.  Negative SLR bilaterally.    Positive TTP over lumbar paraspinals, bilateral SI joints          Imaging:    Narrative & Impression    EXAMINATION:  XR LUMBAR SPINE AP AND LAT WITH FLEX/EXT     CLINICAL HISTORY:  Other intervertebral disc degeneration, lumbar region     TECHNIQUE:  AP and lateral views as well as lateral flexion and extension images are performed through the lumbar spine.     COMPARISON:  None     FINDINGS:  Mild DJD and lumbar " scoliosis.  No significant disc space narrowing identified.  No fracture, spondylolisthesis or bone destruction noted.  Spina bifida occulta involving the S1 vertebral segment noted.     Impression:     See above        Electronically signed by: Kaiser Ruiz MD  Date:                                            01/13/2020  Time:                                           09:22    Encounter    View Encounter                     Narrative     No significant alignment abnormality.  Vertebral body heights are normally maintained, without compression deformity at any level.  There is disc narrowing at C5-6 and C6-7, and each of these levels demonstrates posterior marginal vertebral endplate   spurring and accompanying disc bulging.  The spurring is more pronounced to the right of midline at C5-6 and to the left of midline at C6-7.  All other cervical and visualized upper thoracic levels have normal disc contour, and there is no evidence of a   significant focal disc herniation at any level.  AP diameter of the spinal canal is normally maintained at all levels with no canal stenosis/extrinsic cord compression observed.  The spinal cord is well delineated from the cervicomedullary junction to   the T3 level, and appears normal in size and configuration without focal enlargement or irregularity.  No Chiari malformation or cord cyst or syrinx.  No abnormal signal from the substance of the cord on any of the pulse sequences generated.  There is   some prominent neural foraminal stenosis at C5-6 on the right and C6-7 on the left.  No significant signal abnormality referable to the osseous structures.   Impression      Cervical spine MRI examination demonstrates degenerative changes as discussed above involving the C5-6 and C6-7 levels, with right-sided foraminal stenosis at C5-6 and left-sided foraminal stenosis at C6-7.  No evidence of a significant focal soft is   herniation, canal stenosis/extrinsic cord compression, or  intramedullary cord lesion seen at any level.      Electronically signed by: Kaiser Preston MD  Date: 08/01/13  Time: 06:40          Assessment:       Encounter Diagnoses   Name Primary?    DDD (degenerative disc disease), lumbar Yes    Lumbar spondylosis     Other osteoarthritis of spine, lumbosacral region     Sacroiliac joint pain          Plan:       Naomy was seen today for headache, hip pain and low-back pain.    Diagnoses and all orders for this visit:    DDD (degenerative disc disease), lumbar  -     gabapentin (NEURONTIN) 300 MG capsule; Take 2 capsules (600 mg total) by mouth 3 (three) times daily.  -     MRI Lumbar Spine Without Contrast; Future    Lumbar spondylosis  -     gabapentin (NEURONTIN) 300 MG capsule; Take 2 capsules (600 mg total) by mouth 3 (three) times daily.  -     MRI Lumbar Spine Without Contrast; Future    Other osteoarthritis of spine, lumbosacral region    Sacroiliac joint pain        Naomy Armstrong is a 55 y.o. female with chronic low back pain.  Pain appears to be axial and most likely related to lumbar facet joints.  May have some degree of sacroiliac joint pain as well. Low suspicion for radiculopathy at this time.    1.  Pertinent imaging studies reviewed by me. Imaging results were discussed with patient.  2.  Lumbar MRI to investigate for intraspinal process causing the pain.  Spina bifida occulta also noted on x-rays.  MRI would help to characterize this further.  3.  Start gabapentin 300 mg q.h.s..  Gradually increase to 600 mg t.i.d. as tolerated/needed.  Patient was given instructions on how to do this.  4.  Return to clinic after MRI to review results.  May consider lumbar medial branch blocks versus lumbar epidural steroid injection depending on MRI results.

## 2020-07-07 ENCOUNTER — PATIENT OUTREACH (OUTPATIENT)
Dept: ADMINISTRATIVE | Facility: HOSPITAL | Age: 55
End: 2020-07-07

## 2020-07-13 ENCOUNTER — TELEPHONE (OUTPATIENT)
Dept: PAIN MEDICINE | Facility: CLINIC | Age: 55
End: 2020-07-13

## 2020-07-13 NOTE — TELEPHONE ENCOUNTER
Called pt and informed her that gabapentin may take a while to be affective . Also that Dr. Zambrano advises she completes MRI to help determine how to treat her pain .Pt agreed to complete imaging and attend f/u apt .

## 2020-07-13 NOTE — TELEPHONE ENCOUNTER
----- Message from Jeremy Powell Jr., MD sent at 7/13/2020  7:27 AM CDT -----  Regarding: RE: Please advise  At this time it would be best to get the MRI and review the results to see if a specific source of your pain can be identified. If a specific source of pain is identified it will be easier to treat it. The gabapentin can take some time to start helping.   ----- Message -----  From: Stacy Hart MA  Sent: 7/13/2020   6:53 AM CDT  To: Jeremy Powell Jr., MD  Subject: Please advise                                    [facility]    [place], Naomy Powell, Jeremy RHODES Jr., MD 17 hours ago (1:12 PM)      Nu Powell, I worked Friday night and got through that okay even though it made my back hurt. Saturday night I was in a lot of pain when I got to work. It slowed me down so badly that I was counseled by my manager for lagging behind. Now that I am home being still my pain is about a 6. When I attempt to move, it shoots up to a 9/10. I have been using the Gabapentinas you have instructed, but the pain is so horrible that I have to miss work tonight. What else can I do to help my situation? I can't afford to keep missing work. I am am going to soak in a hot tub with Epsom salt for a while, and maybe apply ice packs for a while afterwards. Please help me. I have my MRI scheduled for 1:30 tomarrow afternoon (1:30pm Monday). I can't live like this anymore this is too much to bare. Please help me.     Naomy Moralez

## 2020-07-14 ENCOUNTER — OFFICE VISIT (OUTPATIENT)
Dept: ALLERGY | Facility: CLINIC | Age: 55
End: 2020-07-14
Payer: COMMERCIAL

## 2020-07-14 ENCOUNTER — HOSPITAL ENCOUNTER (OUTPATIENT)
Dept: RADIOLOGY | Facility: HOSPITAL | Age: 55
Discharge: HOME | End: 2020-07-14
Attending: PAIN MEDICINE
Payer: COMMERCIAL

## 2020-07-14 VITALS — BODY MASS INDEX: 30.45 KG/M2 | WEIGHT: 182.75 LBS | HEIGHT: 65 IN

## 2020-07-14 DIAGNOSIS — M17.11 PRIMARY OSTEOARTHRITIS OF RIGHT KNEE: ICD-10-CM

## 2020-07-14 DIAGNOSIS — M51.36 DDD (DEGENERATIVE DISC DISEASE), LUMBAR: ICD-10-CM

## 2020-07-14 DIAGNOSIS — J31.0 CHRONIC RHINITIS: Primary | ICD-10-CM

## 2020-07-14 DIAGNOSIS — M47.816 LUMBAR SPONDYLOSIS: ICD-10-CM

## 2020-07-14 DIAGNOSIS — R29.898 DECREASED STRENGTH OF LOWER EXTREMITY: ICD-10-CM

## 2020-07-14 DIAGNOSIS — R51.9 GENERALIZED HEADACHES: ICD-10-CM

## 2020-07-14 DIAGNOSIS — Z01.84 ENCOUNTER FOR ANTIBODY RESPONSE EXAMINATION: ICD-10-CM

## 2020-07-14 DIAGNOSIS — M54.12 CERVICAL RADICULOPATHY: ICD-10-CM

## 2020-07-14 PROCEDURE — 99999 PR PBB SHADOW E&M-EST. PATIENT-LVL IV: CPT | Mod: PBBFAC,,, | Performed by: ALLERGY & IMMUNOLOGY

## 2020-07-14 PROCEDURE — 72148 MRI LUMBAR SPINE W/O DYE: CPT | Mod: TC

## 2020-07-14 PROCEDURE — 99999 PR PBB SHADOW E&M-EST. PATIENT-LVL IV: ICD-10-PCS | Mod: PBBFAC,,, | Performed by: ALLERGY & IMMUNOLOGY

## 2020-07-14 PROCEDURE — 72148 MRI LUMBAR SPINE WITHOUT CONTRAST: ICD-10-PCS | Mod: 26,,, | Performed by: RADIOLOGY

## 2020-07-14 PROCEDURE — 72148 MRI LUMBAR SPINE W/O DYE: CPT | Mod: 26,,, | Performed by: RADIOLOGY

## 2020-07-14 PROCEDURE — 99244 OFF/OP CNSLTJ NEW/EST MOD 40: CPT | Mod: S$GLB,,, | Performed by: ALLERGY & IMMUNOLOGY

## 2020-07-14 PROCEDURE — 99244 PR OFFICE CONSULTATION,LEVEL IV: ICD-10-PCS | Mod: S$GLB,,, | Performed by: ALLERGY & IMMUNOLOGY

## 2020-07-14 NOTE — PROGRESS NOTES
Naomy Armstrong is referred by Dr. Eric Hernandes for a consult regarding chronic rhinitis and headaches.  She is here alone.    She grew up in [place], [place].  Her father was in the  and she lived in numerous places including [place], [place], [place], and [place].    She had allergy injections starting when she was 3 years old.  She does not remember much about this.    She had a severe concussion when she was 5 years old.  She then developed headaches with nausea, vomiting, photophobia, and visual disturbances.  She was initially diagnosed with epilepsy and treated with tegretol and phenobarbital.      She saw an ophthalmologist in her 20s and was diagnosed with migraine.  She was prescribed Cafergot.  She has not had headache which she would call a migraine for several years.    She continues to have pressure over the top of her head and base of her skull.  She also has pain across her frontal and maxillary areas that she calls sinus.    She will also have rhinitis with ear pressure, clear rhinorrhea, postnasal drip, hoarseness, sore throats, frequent throat clearing, a sensation that something is in the back of the throat, difficulty swallowing, and cough that is usually nonproductive.    She takes Zyrtec every day.  She does not taking any topical nasal steroids.    She does have GERD and takes omeprazole 40 milligrams a day.  If she does not she will have significant symptoms with burning in her chest.    She has hypertension and takes amlodipine and lisinopril.    She has had chronic diarrhea and takes Lomotil.    She takes hydroxyzine 50 milligrams at night for sleep and anxiety.  She also takes Prozac.    She has pain in multiple joints particularly her neck and spine.  She has DDD and osteoarthritis.  She takes Neurontin, Naprosyn, and Robaxin.  She is given MRI tomorrow.    [facility] ALLERGY QUESTIONNAIRE LONG 7/12/2020   Head or facial pain: Headaches   Please describe your  head and/or facial pain.  Top of my head over both ears. Base of the back of my head. Behind my eyes and in temples   Eyes: Sensitivity to light   Do you have difficulty wearing contacts, if applicable?  No   Ears: Pressure, Difficulty hearing   Do you have ear infections? No   Do you have ear tubes? No   Did you have ear surgery? No   Nose: Post nasal drip, Sniffling, Snoring, Loss of taste   Did you have a blocked nose? No   Did you have nasal surgery? No   Has your nose ever been broken? No   Throat: Reflux/heartburn   Sinuses: Sinus pressure or pain, Sinus infections   Have you had x-rays done for your sinuses? No   Have you had a CT scan done for your sinuses? No   Lungs: No symptoms   When was your last chest x-ray, if known and applicable? I do not recall   Was your last chest x-ray normal or abnormal, if applicable? Normal   Have you ever has a tuberculosis skin test?  Yes   When did you have a tuberculosis skin test? When i was little   Have you ever had a lung-function test? Yes   When was your lung-function test? Year ago   Have you had a flu shot this year? No   Have you had the pneumonia vaccine?  Yes   When did you have the pneumonia vaccine? Few years ago   Do you have any known problems with your immune system? No   Do you suspect you may have problems with your immune system? No   Do you have frequent infections? No   Skin: Bruising   When did these symptoms first occur? I have been having headaches since I was 9yrs old.   Are they getting worse or better? Worse   How often do these symptoms occur? Frequently.   When do these symptoms occur? It can happen at any time.   Do they occur year round? Yes   If there is any seasonal variation in your symptoms, when are they worse? I don't recall   Is there a particular time of the day or night when the symptoms are worse? No heat, strong odors   Is there anything you have identified, which can cause symptoms or make them worse? (such as dust, grass, plant  or animal products, mold, heat, cold, strong odors, exercise) Heat, strong odors, movement   Is there anything you have identified, which can make symptoms better?  Laying still in a dark room, quiet room with ice pack on the back of my neck or forehead sometimes helps   What medications have you tried in the past to help control these symptoms?  Sinus medication, pain medication, muscle relaxers   Please list all the vitamins or herbal medications you are taking. A multivitamin a few days a week   Have you ever seen an allergist for these symptoms? No   Have you ever had skin tests? No   Have you ever had any other type of allergy testing? No   Have you ever had allergy shots? Yes   How long ago did you receive allergy shots? When i was about 4 years old   Were the allergy shots helpful? No   Do you have food allergies? No   Do you have insect allergies? No   Do you have latex allergies? No   Constitution No symptoms   Cardiovascular: No symptoms   Gastrointestinal: Nausea, vomiting, Diarrhea, Heartburn/ indigestion/ reflux   Genital/ urinary: No symptoms   Musculoskeletal: Muscle pain, Back pain, Joint pain, Neck pain   Endocrine: Headaches, Light-headedness   Hematologic: Bruises/ bleeds easily   Please note which family members have allergies or asthma and specify which they have. Dad has allergies to tree pollen.   How long have you lived at your current address? 6 yrs   Has your residence ever had water or flood damage? No   Is there any evidence of mold in the house? No   Does your house have: Central air conditioning, Gas heat   Does your bedroom have: Carpeting, Ceiling fan   What type of pillow do you have, for example feather, foam and fiberfill?  Foam   Do you have pets? Yes   Please list the type of pet(s), how many, how long you have had the pet(s), whether or not the pet(s) are living inside or outside, and whether the pet(s) aggravate your symptoms.  Dogs, rat. I have had pet dogs and pet rats since  my early 20s. They have never made my problems worse. I have decided not to let the dogs sleep in my room a few years ago. I can't tell that it helps.   Does anyone in the house smoke? No   What is your occupation?  at [employer]   Do any of the symptoms increase at school or work? Please specify which symptoms, if applicable.  At work when i am bending over a lot to do certain activites at work   Do you suspect anything at work or school, which may be causing your symptoms? If so, please elaborate.  Headaches come frequently when sitting an extended amount of time at a desk. Also when a lot of activity that causes me to bend a lot   Is there anything else that might be important for the doctor to know?  I want to rule out allergies as a reason. I have had a few bouts with the flu more than I used too. My G.P. asked me to come see you to rule out allergies   Did you find this questionnaire helpful in addressing your symptoms?  No     Physical Examination:  General: Well-developed, well-nourished, no acute distress.  Head: No sinus tenderness.  Eyes: Conjunctivae:  No bulbar or palpebral conjunctival injection.  Ears: EAC's clear.  TM's clear.  No pre-auricular nodes.  Nose: Nasal Mucosa:  Pink.  Septum: No apparent deviation.  Turbinates:  No significant edema.  Polyps/Mass:  None visible.  Teeth/Gums:  No bleeding noted.  Oropharynx: No exudates.  Neck: Supple without thyromegaly. No cervical lymphadenopathy.    Respiratory/Chest: Effort: Good.  Auscultation:  Clear bilaterally.  Cardiovascular:  No murmur, rubs, or gallop heard.   GI:  Non-tender.  No masses.  No organomegaly.  Extremities:  No cyanosis, clubbing, or edema.  Skin: Good turgor.  No urticaria or angioedema.  Neuro/Psych: Oriented x 3.    Assessment:  1.  Chronic rhinitis, consider allergic.  2.  Chronic headaches, probably migraine.  3.  GERD.  4.  Consider LPR.  5.  DJD.  6.  DDD.  7.  Anxiety on Atarax.  8.  Chronic diarrhea on  Lomotil.    Recommendations:  1.  Laboratory as ordered.  2.  Consider skin testing off Zyrtec and Atarax if needed.  3.  Neurology evaluation for possible migraine.  This was discussed.  4.  Consider ENT evaluation for LPR.  5.  Return to clinic in one week.

## 2020-07-15 ENCOUNTER — PATIENT OUTREACH (OUTPATIENT)
Dept: ADMINISTRATIVE | Facility: OTHER | Age: 55
End: 2020-07-15

## 2020-07-15 NOTE — PROGRESS NOTES
Requested updates within Care Everywhere.  Patient's chart was reviewed for overdue CRISSY topics.  Immunizations reconciled.

## 2020-07-16 ENCOUNTER — OFFICE VISIT (OUTPATIENT)
Dept: PAIN MEDICINE | Facility: CLINIC | Age: 55
End: 2020-07-16
Payer: COMMERCIAL

## 2020-07-16 VITALS
SYSTOLIC BLOOD PRESSURE: 144 MMHG | HEART RATE: 88 BPM | HEIGHT: 65 IN | BODY MASS INDEX: 30.52 KG/M2 | OXYGEN SATURATION: 99 % | DIASTOLIC BLOOD PRESSURE: 81 MMHG | TEMPERATURE: 99 F | WEIGHT: 183.19 LBS

## 2020-07-16 DIAGNOSIS — M51.36 DDD (DEGENERATIVE DISC DISEASE), LUMBAR: Primary | ICD-10-CM

## 2020-07-16 DIAGNOSIS — M54.9 CHRONIC BACK PAIN GREATER THAN 3 MONTHS DURATION: ICD-10-CM

## 2020-07-16 DIAGNOSIS — G89.29 CHRONIC BACK PAIN GREATER THAN 3 MONTHS DURATION: ICD-10-CM

## 2020-07-16 PROCEDURE — 99214 PR OFFICE/OUTPT VISIT, EST, LEVL IV, 30-39 MIN: ICD-10-PCS | Mod: S$GLB,,, | Performed by: REGISTERED NURSE

## 2020-07-16 PROCEDURE — 99999 PR PBB SHADOW E&M-EST. PATIENT-LVL IV: CPT | Mod: PBBFAC,,, | Performed by: REGISTERED NURSE

## 2020-07-16 PROCEDURE — 99214 OFFICE O/P EST MOD 30 MIN: CPT | Mod: S$GLB,,, | Performed by: REGISTERED NURSE

## 2020-07-16 PROCEDURE — 99999 PR PBB SHADOW E&M-EST. PATIENT-LVL IV: ICD-10-PCS | Mod: PBBFAC,,, | Performed by: REGISTERED NURSE

## 2020-07-16 NOTE — PROGRESS NOTES
"Subjective:     Patient ID: Naomy Armstrong is a 55 y.o. female    Chief Complaint: Results      Referred by: No ref. provider found      HPI:    Interval History NP (7/16/20):    Pt returns today for follow up and MRI review. She states that her pain in unchanged in quality or location. Denies any new symptoms. No bladder or bowel dysfunction. She reports "missing more days of work than she attends". She is an  at [employer] which includes heavy manual lifting and moving boxes. Her pain is worsened with activity, especially after a shift at work or when she is home cleaning. She reports that gabapentin has helped, but takes 2 pills at night and 1 pill every morning because it makes her too drowsy during the day. She also takes 500mg Naproxen PRN for the pain, maybe every few days. She would like something else for the pain. States she "took valium in the past which helped her relax and get through the day". Pt is also requesting to get on STD through [employer] for her pain.     Interval History (7/6/20):  She returns today for follow up.  She reports that she continues to have low back/hip pain.  This pain is worse with bending and lifting.  These are movements that are often required as a part of her job.  Overall she denies any changes in the quality or location of the pain since last encounter.  She denies any new symptoms.       Interval History (1/13/20):  She returns today for follow up.  She reports that her neck pain has improved with home exercise program.  She does not feel as though this needs any further attention.  She does state that her low back has been bothering her.  She localizes pain to the bilateral lower lumbar regions.  The pain does radiate to the bilateral hip regions.  She denies any associated numbness, tingling, weakness, bowel bladder dysfunction.  The pain is constant and worsened with activity.  She states that the pain is typically worse when initiating activity " after rest.        Initial Encounter (7/9/19):  Naomy Armstrong is a 55 y.o. female who presents today with chronic neck pain. This pain has been present for years.  No specific inciting event or injury noted. She localizes the pain to the midline cervical region.  The pain does not radiate.  She denies any associated numbness, tingling, weakness, bowel bladder dysfunction.  She has had some radicular pain on the left side in the past this has resolved.  The pain is constant and worsened with activity.  She specifically states that the pain is worse when looking at screens.  She has been treated in the past by Dr. Bhavya Oneill at [facility].   This pain is described in detail below.    Physical Therapy:  Yes, for her left hip.     Non-pharmacologic Treatment:  Rest helps         · TENS?  No    Pain Medications:         · Currently taking:  Robaxin, naproxen    · Has tried in the past:  Opioids, muscle relaxers, Tylenol, gabapentin, NSAIDs, Amitriptyline, tizanidine    · Has not tried:  SNRIs, topical creams    Blood thinners:  None    Interventional Therapies:   10/8/13- Left RFA C5, C6, C7  9/10/13- Left C5, C6, C7 MBB   8/13/13- C7-T1 IL EMMA    Relevant Surgeries:  None    Affecting sleep?  Yes    Affecting daily activities? yes    Depressive symptoms? yes          · SI/HI? No    Work status: Employment     Pain Scores:    Best:       2/10  Worst:   9/10  Usually:   5/10  Today:   5/10    Review of Systems   Constitutional: Negative for activity change, appetite change, chills, fatigue, fever and unexpected weight change.   HENT: Negative for hearing loss.    Eyes: Negative for visual disturbance.   Respiratory: Negative for chest tightness and shortness of breath.    Cardiovascular: Negative for chest pain.   Gastrointestinal: Negative for abdominal pain, constipation, diarrhea, nausea and vomiting.   Genitourinary: Negative for difficulty urinating.   Musculoskeletal: Positive for arthralgias, back  pain, gait problem, myalgias, neck pain and neck stiffness.   Skin: Negative for rash.   Neurological: Negative for dizziness, weakness, light-headedness, numbness and headaches.   Psychiatric/Behavioral: Positive for sleep disturbance. Negative for hallucinations and suicidal ideas. The patient is not nervous/anxious.        Past Medical History:   Diagnosis Date    Alcohol abuse     per chart, but patient denies today and cocaine per chart    Anxiety     Depression     Hallucination     last couple of months started seeing someone standing in her room, saw boyfriend's reflection in glass    Headache     History of psychiatric hospitalization     age 13 [facility] for 2 months for acting out; 2018 for SI    Hx of psychiatric care     Hypertension     Psychiatric problem     Sleep difficulties     Suicide attempt     with knife by cutting stomach    Therapy        No past surgical history on file.    Social History     Socioeconomic History    Marital status:      Spouse name: Not on file    Number of children: 0    Years of education: Not on file    Highest education level: Not on file   Occupational History    Occupation:      Employer: [employer]   Social Needs    Financial resource strain: Very hard    Food insecurity     Worry: Often true     Inability: Often true    Transportation needs     Medical: No     Non-medical: No   Tobacco Use    Smoking status: Former Smoker     Quit date: 2006     Years since quittin.2    Smokeless tobacco: Never Used   Substance and Sexual Activity    Alcohol use: Yes     Frequency: Monthly or less     Drinks per session: Patient refused     Binge frequency: Less than monthly     Comment: occasional    Drug use: Not Currently     Types: Marijuana     Comment: tried at age 14    Sexual activity: Yes     Partners: Male     Comment: going through menopause   Lifestyle    Physical activity     Days per week: 0 days     Minutes per  session: 0 min    Stress: Very much   Relationships    Social connections     Talks on phone: Patient refused     Gets together: Patient refused     Attends Scientology service: Not on file     Active member of club or organization: No     Attends meetings of clubs or organizations: Patient refused     Relationship status:    Other Topics Concern    Patient feels they ought to cut down on drinking/drug use No    Patient annoyed by others criticizing their drinking/drug use No    Patient has felt bad or guilty about drinking/drug use No    Patient has had a drink/used drugs as an eye opener in the AM No   Social History Narrative     x 2.    Lives with boyfriend.     Works at [employer] as an .    Presently working on Bachelor's degree.    Has associates degree in criminal justice.       Review of patient's allergies indicates:   Allergen Reactions    Effexor [venlafaxine]      Elevated her blood pressure    Zoloft [sertraline]     Paroxetine hcl      Made her feel drunk       Current Outpatient Medications on File Prior to Visit   Medication Sig Dispense Refill    albuterol (PROAIR HFA) 90 mcg/actuation inhaler Inhale 2 puffs into the lungs every 6 (six) hours as needed for Wheezing. Rescue 18 g 1    amLODIPine (NORVASC) 5 MG tablet Take 1 tablet (5 mg total) by mouth once daily. 30 tablet 0    cetirizine (ZYRTEC) 10 MG tablet Take 1 tablet (10 mg total) by mouth once daily. 90 tablet 3    dicyclomine (BENTYL) 20 mg tablet Take 20 mg by mouth.      fluconazole (DIFLUCAN) 150 MG Tab 1 tablet orally at first sign of yeast infection and 1 tablet orally 3 days later 2 tablet 0    FLUoxetine 20 MG capsule Take 1 capsule (20 mg total) by mouth once daily. Take in addition to 40 mg for a total of 60mg daily. 90 capsule 1    FLUoxetine 40 MG capsule Take 1 capsule (40 mg total) by mouth once daily. Take in addition to 20 mg for a total of 60mg daily. 90 capsule 1    gabapentin  "(NEURONTIN) 300 MG capsule Take 2 capsules (600 mg total) by mouth 3 (three) times daily. 180 capsule 11    hydrOXYzine (ATARAX) 50 MG tablet Take 1 tablet (50 mg total) by mouth 2 (two) times daily as needed for Anxiety. 180 tablet 1    ipratropium (ATROVENT) 42 mcg (0.06 %) nasal spray 2 sprays by Nasal route 4 (four) times daily. 15 mL 2    lisinopriL (PRINIVIL,ZESTRIL) 20 MG tablet Take 1 tablet (20 mg total) by mouth once daily. 90 tablet 3    loperamide (IMODIUM) 2 mg capsule Take 2 mg by mouth once as needed for Diarrhea.      methocarbamol (ROBAXIN) 750 MG Tab Take 500 mg by mouth 2 (two) times daily as needed.      multivitamin with minerals tablet Take 1 tablet by mouth once daily.      naproxen (NAPROSYN) 500 MG tablet Take 1 tablet (500 mg total) by mouth 2 (two) times daily with meals. 60 tablet 0    omeprazole (PRILOSEC) 20 MG capsule Take 2 capsules (40 mg total) by mouth every morning. 90 capsule 3    promethazine (PHENERGAN) 25 MG tablet Take 1 tablet (25 mg total) by mouth every 8 (eight) hours as needed for Nausea. 30 tablet 0    traMADol (ULTRAM) 50 mg tablet Take 50 mg by mouth every 8 (eight) hours as needed for Pain.       No current facility-administered medications on file prior to visit.        Objective:      BP (!) 144/81 (BP Location: Left arm, Patient Position: Sitting, BP Method: Medium (Automatic))   Pulse 88   Temp 98.5 °F (36.9 °C) (Oral)   Ht 5' 5" (1.651 m)   Wt 83.1 kg (183 lb 3.2 oz)   SpO2 99%   BMI 30.49 kg/m²     Exam:  GEN:  Well developed, well nourished.  No acute distress.  Normal pain behavior.  HEENT:  No trauma.  Mucous membranes moist.  Nares patent bilaterally.  PSYCH: Normal affect. Thought content appropriate.  CHEST:  Breathing symmetric.  No audible wheezing.  ABD: Soft, non-distended.  SKIN:  Warm, pink, dry.  No rash on exposed areas.    EXT:  No cyanosis, clubbing, or edema.  No color change or changes in nail or hair " growth.  NEURO/MUSCULOSKELETAL:  Fully alert, oriented, and appropriate. Speech normal indio. No cranial nerve deficits.   Gait:  Antalgic.    Positive TTP over lumbar paraspinals.      Imaging:    Narrative & Impression     EXAMINATION:  MRI LUMBAR SPINE WITHOUT CONTRAST     CLINICAL HISTORY:  lumbar spinal stenosis; Other intervertebral disc degeneration, lumbar region     TECHNIQUE:  Multiplanar, multisequence MR images were acquired from the thoracolumbar junction to the sacrum without the administration of contrast.     COMPARISON:  Non plain film examination 01/13/2020 e.     FINDINGS:  Lumbar spine alignment is within normal limits. The vertebral body heights are well maintained, with no fracture.  No marrow signal abnormality suspicious for an infiltrative process.     The conus is normal in appearance, and terminates at the L1-L2 level.  The adjacent soft tissue structures show no significant abnormalities.     L1-L2: There is no focal disc herniation. No significant central canal or neural foraminal narrowing.     L2-L3: There is no focal disc herniation. No significant central canal or neural foraminal narrowing.     L3-L4: There is no focal disc herniation. No significant central canal or neural foraminal narrowing.     L4-L5: There is no focal disc herniation. No significant central canal or neural foraminal narrowing.     L5-S1: There is no focal disc herniation. No significant central canal or neural foraminal narrowing.     Impression:     No significant central canal stenosis, focal protrusion of disc material or neural foraminal encroachment.        Electronically signed by: Jerzy Rose MD  Date:                                            07/14/2020  Time:                                           14:15         Narrative & Impression    EXAMINATION:  XR LUMBAR SPINE AP AND LAT WITH FLEX/EXT     CLINICAL HISTORY:  Other intervertebral disc degeneration, lumbar region     TECHNIQUE:  AP and  lateral views as well as lateral flexion and extension images are performed through the lumbar spine.     COMPARISON:  None     FINDINGS:  Mild DJD and lumbar scoliosis.  No significant disc space narrowing identified.  No fracture, spondylolisthesis or bone destruction noted.  Spina bifida occulta involving the S1 vertebral segment noted.     Impression:     See above        Electronically signed by: Kaiser Ruiz MD  Date:                                            01/13/2020  Time:                                           09:22    Encounter    View Encounter                     Narrative     No significant alignment abnormality.  Vertebral body heights are normally maintained, without compression deformity at any level.  There is disc narrowing at C5-6 and C6-7, and each of these levels demonstrates posterior marginal vertebral endplate   spurring and accompanying disc bulging.  The spurring is more pronounced to the right of midline at C5-6 and to the left of midline at C6-7.  All other cervical and visualized upper thoracic levels have normal disc contour, and there is no evidence of a   significant focal disc herniation at any level.  AP diameter of the spinal canal is normally maintained at all levels with no canal stenosis/extrinsic cord compression observed.  The spinal cord is well delineated from the cervicomedullary junction to   the T3 level, and appears normal in size and configuration without focal enlargement or irregularity.  No Chiari malformation or cord cyst or syrinx.  No abnormal signal from the substance of the cord on any of the pulse sequences generated.  There is   some prominent neural foraminal stenosis at C5-6 on the right and C6-7 on the left.  No significant signal abnormality referable to the osseous structures.   Impression      Cervical spine MRI examination demonstrates degenerative changes as discussed above involving the C5-6 and C6-7 levels, with right-sided foraminal stenosis  at C5-6 and left-sided foraminal stenosis at C6-7.  No evidence of a significant focal soft is   herniation, canal stenosis/extrinsic cord compression, or intramedullary cord lesion seen at any level.      Electronically signed by: Kaiser Preston MD  Date: 08/01/13  Time: 06:40          Assessment:       Encounter Diagnoses   Name Primary?    DDD (degenerative disc disease), lumbar Yes    Chronic back pain greater than 3 months duration          Plan:       Naomy was seen today for results.    Diagnoses and all orders for this visit:    DDD (degenerative disc disease), lumbar    Chronic back pain greater than 3 months duration        Naomy Armstrong is a 55 y.o. female with chronic low back pain.  Pain appears to be axial and most likely related to lumbar facet joints.  May have some degree of sacroiliac joint pain as well. Low suspicion for radiculopathy at this time. MRI results reviewed with pt. No abnormalities noted: No significant central canal stenosis, focal protrusion of disc material or neural foraminal encroachment.  DJD and lumbar scoliosis noted from prior xray of lumbar spine, which could be causing some of her axial back pain. Pt would like to try MBBs as discussed with Dr. Powell at [facility]. Will consent and schedule this today and further discuss plan with him. Explained to pt that we will not be treating this type of pain with opioids or valium at this time. There are other treatment options and additonal therapy we would try prior to these medications. Pt can continue PT at this time as they are addressing her low back pain with strengthening exercises as well.     1.  MRI reviewed by me. Imaging results were discussed with patient.  2.  Continue gabapentin 600 mg q.h.s. and 300 mg in am (900mg daily) as this is all she can tolerate at this time.   3.  Schedule bilateral lumbar MBB @ L3, L4, L5.   4.  Return to clinic 2 weeks post procedure to evaluate results.     GERMAINE Howell,  FNP-C  [facility]  Interventional Pain Management

## 2020-07-23 ENCOUNTER — TELEPHONE (OUTPATIENT)
Dept: PAIN MEDICINE | Facility: CLINIC | Age: 55
End: 2020-07-23

## 2020-07-23 DIAGNOSIS — Z01.818 PRE-OP TESTING: Primary | ICD-10-CM

## 2020-07-23 NOTE — TELEPHONE ENCOUNTER
Message left with review of pre-procedure instructions, as well as provided arrival time of 1100a.  COVID test scheduled on 7/26/2020 at 1110a at the [facility] on [facility].  Information was also sent via "[employer]  Asked that patient call clinic to confirm- phone number included.

## 2020-07-24 ENCOUNTER — TELEPHONE (OUTPATIENT)
Dept: PAIN MEDICINE | Facility: CLINIC | Age: 55
End: 2020-07-24

## 2020-07-24 NOTE — TELEPHONE ENCOUNTER
Message left with review of pre-procedure instructions, as well as provided arrival time of 1100.  COVID test scheduled on 7/26/2020 at 1110a at the [facility] on [facility].  Asked that patient call clinic to confirm- phone number included.

## 2020-07-26 ENCOUNTER — CLINICAL SUPPORT (OUTPATIENT)
Dept: URGENT CARE | Facility: CLINIC | Age: 55
End: 2020-07-26
Payer: COMMERCIAL

## 2020-07-26 DIAGNOSIS — Z01.818 PRE-OP TESTING: ICD-10-CM

## 2020-07-26 PROCEDURE — U0003 INFECTIOUS AGENT DETECTION BY NUCLEIC ACID (DNA OR RNA); SEVERE ACUTE RESPIRATORY SYNDROME CORONAVIRUS 2 (SARS-COV-2) (CORONAVIRUS DISEASE [COVID-19]), AMPLIFIED PROBE TECHNIQUE, MAKING USE OF HIGH THROUGHPUT TECHNOLOGIES AS DESCRIBED BY CMS-2020-01-R: HCPCS

## 2020-07-27 LAB — SARS-COV-2 RNA RESP QL NAA+PROBE: NOT DETECTED

## 2020-07-28 ENCOUNTER — CLINICAL SUPPORT (OUTPATIENT)
Dept: REHABILITATION | Facility: HOSPITAL | Age: 55
End: 2020-07-28
Attending: ORTHOPAEDIC SURGERY
Payer: COMMERCIAL

## 2020-07-28 DIAGNOSIS — R29.898 DECREASED STRENGTH OF LOWER EXTREMITY: ICD-10-CM

## 2020-07-28 DIAGNOSIS — R29.898 DECREASED STRENGTH OF TRUNK AND BACK: ICD-10-CM

## 2020-07-28 DIAGNOSIS — M25.661 DECREASED RANGE OF MOTION OF RIGHT LOWER EXTREMITY: ICD-10-CM

## 2020-07-28 DIAGNOSIS — M25.551 PAIN IN JOINT OF RIGHT HIP: ICD-10-CM

## 2020-07-28 PROCEDURE — 97110 THERAPEUTIC EXERCISES: CPT | Mod: PN,CQ

## 2020-07-28 RX ORDER — LIDOCAINE HYDROCHLORIDE 20 MG/ML
10 INJECTION, SOLUTION INFILTRATION; PERINEURAL ONCE
Status: CANCELLED | OUTPATIENT
Start: 2020-07-28 | End: 2020-07-28

## 2020-07-28 RX ORDER — BUPIVACAINE HYDROCHLORIDE 2.5 MG/ML
10 INJECTION, SOLUTION EPIDURAL; INFILTRATION; INTRACAUDAL ONCE
Status: CANCELLED | OUTPATIENT
Start: 2020-07-28 | End: 2020-07-28

## 2020-07-28 NOTE — PROGRESS NOTES
Physical Therapy Daily Treatment Note     Name: Naomy Perez Lyons  Clinic Number: [id number]    Therapy Diagnosis:   Encounter Diagnoses   Name Primary?    Decreased strength of lower extremity     Decreased strength of trunk and back     Pain in joint of right hip     Decreased range of motion of right lower extremity      Physician: Jessica Shay MD    Visit Date: 7/28/2020    Physician Orders: PT Eval and Treat   Medical Diagnosis from Referral: M70.61 (ICD-10-CM) - Greater trochanteric bursitis of right hip  Evaluation Date: 6/1/2020  Authorization Period Expiration: 12/31/2020  Plan of Care Expiration: 9/1/2020  Visit # / Visits authorized: 3/20  PN Due:  7/1/2020     Time In: 10:33 am   Time Out: 11:15 am  Total Billable Time: 40 minutes    Precautions: Standard    Subjective     Pt reports: increased pain after last visit and was unable to get comfortable.      She was partially compliant with home exercise program.  Response to previous treatment: none  Functional change: ongoing    Pain: 6/10  Location: right hip      Objective     Naomy received therapeutic exercises to develop strength, endurance, ROM and flexibility for 40 minutes including:    +Bike x8'  ITB stretch R LE 3x30  +Supine Hip Abduction x 2'  Bridges 2x10  +Clams 3x12  +Side Kick Level 1 3x12  Sidelying hip abduction R LE 2x10  +Sidelying hip adduction R LE 2x10  Prone hip extension B LE 3x12  +Leg Pull in Supine 3x12  +Windsheild wipers prone R LE 2x10  +Hip IR/ER RTB 2x10  +Lateral stepping YTB at knees x 2 laps pole to pole  +Sit to stands from 18 in plyo x 15 (1 set 10, 1 set 5)  +Knee Flexion Matrix 30# 2x15    Naomy received the following manual therapy techniques: Soft tissue Mobilization were applied to the: R hip for 00 minutes, including:      Home Exercises Provided and Patient Education Provided     Education provided:   Cont to perform HEP as provided.     Written Home Exercises Provided: yes.  Exercises were  reviewed and Naomy was able to demonstrate them prior to the end of the session.  Naomy demonstrated good  understanding of the education provided.     See EMR under Patient Instructions for exercises provided 7/2/2020.    Assessment     Pt tolerated addition of all exercises with minimal reported increase in R hip pain. Most increased pain/difficulty this session completing long lever movements of lower extremity leading to knee flexion in all abduction moments.  Pt continues with decreased strength of R hip.  Pt asked about planking program and initiating planks for improved core and BUE strength.  Developed endurance strength in hip in all planes with carryover to occupational activities.  Pt remains appropriate for therapy at this time.     Naomy is progressing well towards her goals.   Pt prognosis is Good.     Pt will continue to benefit from skilled outpatient physical therapy to address the deficits listed in the problem list box on initial evaluation, provide pt/family education and to maximize pt's level of independence in the home and community environment.     Pt's spiritual, cultural and educational needs considered and pt agreeable to plan of care and goals.    Anticipated barriers to physical therapy: mental health hx as documented in chart, work schedule    GOALS: Short Term Goals:  2-3 weeks  1.Report decreased in pain at worse less than <   / =  7 /10  to increase tolerance for functional activities. On going  2. Pt to improve R hip IR range of motion by 25% to allow for improved functional mobility to allow for improvement in IADLs. On going  3. Increased B posterior chain and hip girdle musculature  MMT 1/2  to increase tolerance for ADL and work activities.On going  4. Pt to demonstrate ability to squat x 5 with correct form and pain < / = 3/10 in order to improve tolerance to work activities.   5. Pt to tolerate HEP to improve ROM and independence with ADL's.On going     Long Term Goals:   6-8 weeks  1.Report decreased in pain at worse less than  <   / =  3  /10  to increase tolerance for functional mobility  2.Pt to improve range of motion by 75% to allow for improved functional mobility to allow for improvement in IADLs.   3.Increased B posterior chain and hip girdle musculature MMT 1 grade  to increase tolerance for ADL and work activities.  4. Pt will scores report at CJ level (20-40% impaired) on LEFS  to demonstrate increase in LE function with every day tasks.   5. Pt to be Independent with HEP to improve ROM and independence with ADL's  6. Pt to demonstrate ability to squat x 10 with pain < / = 1/10 in order to improve tolerance to work activities    Plan     Certification date: 6/1/2020 - 9/1/2020    Cont skilled PT session towards PT and patient's goals.    Nitish Marsh, PTA  07/28/2020

## 2020-07-29 ENCOUNTER — OFFICE VISIT (OUTPATIENT)
Dept: ALLERGY | Facility: CLINIC | Age: 55
End: 2020-07-29
Payer: COMMERCIAL

## 2020-07-29 ENCOUNTER — TELEPHONE (OUTPATIENT)
Dept: OTOLARYNGOLOGY | Facility: CLINIC | Age: 55
End: 2020-07-29

## 2020-07-29 ENCOUNTER — TELEPHONE (OUTPATIENT)
Dept: PAIN MEDICINE | Facility: CLINIC | Age: 55
End: 2020-07-29

## 2020-07-29 VITALS — BODY MASS INDEX: 30.16 KG/M2 | HEIGHT: 65 IN | WEIGHT: 181 LBS

## 2020-07-29 DIAGNOSIS — R51.9 GENERALIZED HEADACHES: ICD-10-CM

## 2020-07-29 DIAGNOSIS — M51.36 DDD (DEGENERATIVE DISC DISEASE), LUMBAR: ICD-10-CM

## 2020-07-29 DIAGNOSIS — K21.9 LARYNGOPHARYNGEAL REFLUX: ICD-10-CM

## 2020-07-29 DIAGNOSIS — K21.9 GASTROESOPHAGEAL REFLUX DISEASE, ESOPHAGITIS PRESENCE NOT SPECIFIED: ICD-10-CM

## 2020-07-29 DIAGNOSIS — J31.0 CHRONIC RHINITIS: Primary | ICD-10-CM

## 2020-07-29 PROCEDURE — 95004 PR ALLERGY SKIN TESTS,ALLERGENS: ICD-10-PCS | Mod: S$GLB,,, | Performed by: ALLERGY & IMMUNOLOGY

## 2020-07-29 PROCEDURE — 99214 PR OFFICE/OUTPT VISIT, EST, LEVL IV, 30-39 MIN: ICD-10-PCS | Mod: 25,S$GLB,, | Performed by: ALLERGY & IMMUNOLOGY

## 2020-07-29 PROCEDURE — 99999 PR PBB SHADOW E&M-EST. PATIENT-LVL III: ICD-10-PCS | Mod: PBBFAC,,, | Performed by: ALLERGY & IMMUNOLOGY

## 2020-07-29 PROCEDURE — 99999 PR PBB SHADOW E&M-EST. PATIENT-LVL III: CPT | Mod: PBBFAC,,, | Performed by: ALLERGY & IMMUNOLOGY

## 2020-07-29 PROCEDURE — 95004 PERQ TESTS W/ALRGNC XTRCS: CPT | Mod: S$GLB,,, | Performed by: ALLERGY & IMMUNOLOGY

## 2020-07-29 PROCEDURE — 99214 OFFICE O/P EST MOD 30 MIN: CPT | Mod: 25,S$GLB,, | Performed by: ALLERGY & IMMUNOLOGY

## 2020-07-29 NOTE — TELEPHONE ENCOUNTER
Patient states she received a message via [employer] that her procedure was for 58:00pm this evening.  She is not in close proximity to the hospital at this time; therefore, she will need to be r/s.  Dr. Powell aware.

## 2020-07-29 NOTE — PROGRESS NOTES
Naomy Armstrong returns to clinic today for continued evaluation of chronic rhinitis and headaches.  She is here alone.  She was last seen July 14, 2020.      Since her last visit, she has continued to have symptoms with headaches.  She does think that exposure to oak pollen and other environmental substances makes them worse.    She also continues to have rhinitis with ear pressure, clear rhinorrhea, postnasal drip, hoarseness, sore throats, throat clearing, a sensation that something is in the back of throat, difficulty swallowing, and a nonproductive cough.    She has not had any wheezing or shortness of breath.      She has been able to stop her antihistamines including Zyrtec and hydroxyzine.    Her reflux has been controlled with omeprazole 40 milligrams a day.    [facility] PEQ ALLERGY QUESTIONNAIRE SHORT 7/12/2020   Hearing loss? -   Trouble swallowing? -   Eye discharge? -   Lungs: No symptoms   Wheezing? -   Chest tightness? -     Physical Examination:  General: Well-developed, well-nourished, no acute distress.  Head: No sinus tenderness.  Eyes: Conjunctivae:  No bulbar or palpebral conjunctival injection.  Ears: EAC's clear.  TM's clear.  No pre-auricular nodes.  Nose: Nasal Mucosa:  Pink.  Septum: No apparent deviation.  Turbinates:  No significant edema.  Polyps/Mass:  None visible.  Teeth/Gums:  No bleeding noted.  Oropharynx: No exudates.  Neck: Supple without thyromegaly. No cervical lymphadenopathy.    Respiratory/Chest: Effort: Good.  Auscultation:  Clear bilaterally.  Skin: Good turgor.  No urticaria or angioedema.  Neuro/Psych: Oriented x 3.    Laboratory 07/14/2020:    IgE level:  Less than 35.  ImmunoCAP:  Negative.    COVID-19 antibody:  Negative.    Inhalant skin tests 7/29/220:  3+ histamine control.  All tests were.    Assessment:  1.  Chronic rhinitis, not allergic.  2.  Chronic headaches, probably migraine.  3.  GERD.  4.  Consider LPR.  5.  DJD.  6.  DDD.  7.  Anxiety on Atarax.  8.  Chronic  diarrhea on Lomotil.    Recommendations:  1.  ENT evaluation for LPR.  2.  Neurology evaluation for possible migraine has been scheduled.  3.  Return to clinic as needed.    Patient education July 29,2020:  LPR and web site.

## 2020-07-30 ENCOUNTER — CLINICAL SUPPORT (OUTPATIENT)
Dept: REHABILITATION | Facility: HOSPITAL | Age: 55
End: 2020-07-30
Attending: ORTHOPAEDIC SURGERY
Payer: COMMERCIAL

## 2020-07-30 ENCOUNTER — OFFICE VISIT (OUTPATIENT)
Dept: PSYCHIATRY | Facility: CLINIC | Age: 55
End: 2020-07-30
Payer: COMMERCIAL

## 2020-07-30 ENCOUNTER — TELEPHONE (OUTPATIENT)
Dept: OTOLARYNGOLOGY | Facility: CLINIC | Age: 55
End: 2020-07-30

## 2020-07-30 ENCOUNTER — PATIENT OUTREACH (OUTPATIENT)
Dept: ADMINISTRATIVE | Facility: OTHER | Age: 55
End: 2020-07-30

## 2020-07-30 ENCOUNTER — TELEPHONE (OUTPATIENT)
Dept: PAIN MEDICINE | Facility: CLINIC | Age: 55
End: 2020-07-30

## 2020-07-30 DIAGNOSIS — M25.551 PAIN IN JOINT OF RIGHT HIP: ICD-10-CM

## 2020-07-30 DIAGNOSIS — M25.661 DECREASED RANGE OF MOTION OF RIGHT LOWER EXTREMITY: ICD-10-CM

## 2020-07-30 DIAGNOSIS — R29.898 DECREASED STRENGTH OF LOWER EXTREMITY: ICD-10-CM

## 2020-07-30 DIAGNOSIS — R29.898 DECREASED STRENGTH OF TRUNK AND BACK: ICD-10-CM

## 2020-07-30 DIAGNOSIS — Z65.8 PSYCHOSOCIAL DISTRESS: ICD-10-CM

## 2020-07-30 DIAGNOSIS — F41.1 GAD (GENERALIZED ANXIETY DISORDER): ICD-10-CM

## 2020-07-30 DIAGNOSIS — F33.41 MAJOR DEPRESSIVE DISORDER, RECURRENT EPISODE, IN PARTIAL REMISSION: Primary | ICD-10-CM

## 2020-07-30 PROCEDURE — 90832 PR PSYCHOTHERAPY W/PATIENT, 30 MIN: ICD-10-PCS | Mod: S$GLB,,, | Performed by: SOCIAL WORKER

## 2020-07-30 PROCEDURE — 90832 PSYTX W PT 30 MINUTES: CPT | Mod: S$GLB,,, | Performed by: SOCIAL WORKER

## 2020-07-30 PROCEDURE — 97110 THERAPEUTIC EXERCISES: CPT | Mod: PN,CQ

## 2020-07-30 NOTE — TELEPHONE ENCOUNTER
Attempted to call patient to schedule covid test 72hrs prior to appointment with Dr Escamilla. No answer. Left voicemail to return my call

## 2020-07-30 NOTE — TELEPHONE ENCOUNTER
Attempted to contact patient re: r/s missed procedure yesterday.  No answer or option to leave message.  Message sent via [employer].

## 2020-07-30 NOTE — PROGRESS NOTES
Physical Therapy Daily Treatment Note     Name: Naomy Perez [place]  Clinic Number: [id number]    Therapy Diagnosis:   Encounter Diagnoses   Name Primary?    Decreased strength of lower extremity     Decreased strength of trunk and back     Pain in joint of right hip     Decreased range of motion of right lower extremity      Physician: Jessica Shay MD    Visit Date: 7/30/2020    Physician Orders: PT Eval and Treat   Medical Diagnosis from Referral: M70.61 (ICD-10-CM) - Greater trochanteric bursitis of right hip  Evaluation Date: 6/1/2020  Authorization Period Expiration: 12/31/2020  Plan of Care Expiration: 9/1/2020  Visit # / Visits authorized: 3/20  PN Due:  7/1/2020     Time In: 1250 pm   Time Out: 135 pm  Total Billable Time: 40 minutes    Precautions: Standard    Subjective     Pt reports: increased pain after last visit and was unable to get comfortable.      She was partially compliant with home exercise program.  Response to previous treatment: none  Functional change: ongoing    Pain: 6/10  Location: right hip      Objective     Naomy received therapeutic exercises to develop strength, endurance, ROM and flexibility for 40 minutes including:    Bike x8'  ITB stretch R LE 3x30  Supine Hip Abduction x 2'  Bridges 1x12  +Bridges + Knee Ext 1x12  +Bridges + Hip Flex + Knee Ext 1x12  +Clams 3x12  +Clams + Knee Ext 3x12  +Side Kick Level 1 3x12  +Straight Arm Pull Down + Marching GTB x30  Sidelying hip abduction R LE 2x10  +Sidelying hip adduction R LE 2x10  Prone hip extension B LE 3x12  +Leg Pull in Supine 3x12  +Windsheild wipers prone R LE 2x10  +Hip IR/ER RTB 2x10  +Lateral stepping YTB at knees x 2 laps pole to pole  +Sit to stands from 18 in plyo x 15 (1 set 10, 1 set 5)  +Knee Flexion Matrix 30# 2x15    Naomy received the following manual therapy techniques: Soft tissue Mobilization were applied to the: R hip for 00 minutes, including:      Home Exercises Provided and Patient Education  Provided     Education provided:   Cont to perform HEP as provided.     Written Home Exercises Provided: yes.  Exercises were reviewed and Naomy was able to demonstrate them prior to the end of the session.  Naomy demonstrated good  understanding of the education provided.     See EMR under Patient Instructions for exercises provided 7/2/2020.    Assessment     Pt tolerated addition of all exercises with minimal reported increase in R hip pain. Most increased pain/difficulty this session completing long lever movements of lower extremity leading to knee flexion in all abduction moments.  Pt continues with decreased strength of R hip.  Developed endurance strength in hip in all planes with carryover to occupational activities.  Pt remains appropriate for therapy at this time.     Naomy is progressing well towards her goals.   Pt prognosis is Good.     Pt will continue to benefit from skilled outpatient physical therapy to address the deficits listed in the problem list box on initial evaluation, provide pt/family education and to maximize pt's level of independence in the home and community environment.     Pt's spiritual, cultural and educational needs considered and pt agreeable to plan of care and goals.    Anticipated barriers to physical therapy: mental health hx as documented in chart, work schedule    GOALS: Short Term Goals:  2-3 weeks  1.Report decreased in pain at worse less than <   / =  7 /10  to increase tolerance for functional activities. On going  2. Pt to improve R hip IR range of motion by 25% to allow for improved functional mobility to allow for improvement in IADLs. On going  3. Increased B posterior chain and hip girdle musculature  MMT 1/2  to increase tolerance for ADL and work activities.On going  4. Pt to demonstrate ability to squat x 5 with correct form and pain < / = 3/10 in order to improve tolerance to work activities.   5. Pt to tolerate HEP to improve ROM and independence with  ADL's.On going     Long Term Goals:  6-8 weeks  1.Report decreased in pain at worse less than  <   / =  3  /10  to increase tolerance for functional mobility  2.Pt to improve range of motion by 75% to allow for improved functional mobility to allow for improvement in IADLs.   3.Increased B posterior chain and hip girdle musculature MMT 1 grade  to increase tolerance for ADL and work activities.  4. Pt will scores report at CJ level (20-40% impaired) on LEFS  to demonstrate increase in LE function with every day tasks.   5. Pt to be Independent with HEP to improve ROM and independence with ADL's  6. Pt to demonstrate ability to squat x 10 with pain < / = 1/10 in order to improve tolerance to work activities    Plan     Certification date: 6/1/2020 - 9/1/2020    Cont skilled PT session towards PT and patient's goals.    Nitish Marsh, PTA  07/30/2020

## 2020-07-30 NOTE — PROGRESS NOTES
"Individual Psychotherapy (PhD/LCSW)    7/30/2020    Site:  [facility]         Therapeutic Intervention: Met with patient.  Outpatient - Insight oriented psychotherapy 30 min - CPT code 09331 and Outpatient - Supportive psychotherapy 30 min - CPT Code 37127    Chief complaint/reason for encounter: depression, anxiety and sleep     Interval history and content of current session: Patient returned to clinic for follow up psychotherapy. Patient states that she is "okay" but "falling apart physically". States that she did get the call from [facility] to start working on some of her tasks for her job. States that she went in to give a urine sample and is hoping that she will get the order to go get finger prints soon. States that she was started on new medication for pain relief and it does help some. States that she has to take it at specific times or she gets very sleepy and can't take it. Reports that she is also doing PT for the pain. States that pain gets so bad that she isn't able to finish all the days she is suppose to work. Will only work for 2-3 days and then it gets unbearable. Reports that she is getting more sleep due to the medicine. Continues to answer her phone more regularly. Doesn't answer for unknown numbers but does try to  for people that she knows. Continues to have mood fluctuation but is better at managing. States that she had a panic attack this morning but is continuing to work on coping skills. Looking forward to her new job and feeling better overall mentally.     Treatment plan:  · Target symptoms: depression, anxiety   · Why chosen therapy is appropriate versus another modality: relevant to diagnosis, evidence based practice  · Outcome monitoring methods: self-report, observation  · Therapeutic intervention type: insight oriented psychotherapy, supportive psychotherapy    Risk parameters:  Patient reports no suicidal ideation  Patient reports no homicidal " ideation  Patient reports no self-injurious behavior  Patient reports no violent behavior    Verbal deficits: None    Patient's response to intervention:  The patient's response to intervention is accepting.    Progress toward goals and other mental status changes:  The patient's progress toward goals is fair .    Diagnosis:     ICD-10-CM ICD-9-CM   1. Major depressive disorder, recurrent episode, in partial remission  F33.41 296.35   2. LOR (generalized anxiety disorder)  F41.1 300.02   3. Psychosocial distress  Z65.8 V62.89       Plan:  individual psychotherapy    Return to clinic: 2 weeks, 1 month    Length of Service (minutes): 30     Cassandra Rockweiler, LCSW-[facility]

## 2020-07-30 NOTE — PROGRESS NOTES
Care Everywhere: updated  Immunization: updated  Health Maintenance: updated  Media Review: reviewed for outside mammogram and colon cancer report  Legacy Review:   Order placed:   Upcoming appts:  Mammogram scheduling ticket sent to patient's portal   Ambulatory referral to Gastroenterology 6/29/2020

## 2020-07-31 ENCOUNTER — LAB VISIT (OUTPATIENT)
Dept: LAB | Facility: HOSPITAL | Age: 55
End: 2020-07-31
Attending: INTERNAL MEDICINE
Payer: COMMERCIAL

## 2020-07-31 ENCOUNTER — TELEPHONE (OUTPATIENT)
Dept: OTOLARYNGOLOGY | Facility: CLINIC | Age: 55
End: 2020-07-31

## 2020-07-31 ENCOUNTER — OFFICE VISIT (OUTPATIENT)
Dept: GASTROENTEROLOGY | Facility: CLINIC | Age: 55
End: 2020-07-31
Attending: INTERNAL MEDICINE
Payer: COMMERCIAL

## 2020-07-31 ENCOUNTER — PATIENT MESSAGE (OUTPATIENT)
Dept: PAIN MEDICINE | Facility: CLINIC | Age: 55
End: 2020-07-31

## 2020-07-31 VITALS
BODY MASS INDEX: 29.87 KG/M2 | WEIGHT: 179.25 LBS | SYSTOLIC BLOOD PRESSURE: 130 MMHG | DIASTOLIC BLOOD PRESSURE: 80 MMHG | HEIGHT: 65 IN

## 2020-07-31 DIAGNOSIS — R10.9 LEFT SIDED ABDOMINAL PAIN: Primary | ICD-10-CM

## 2020-07-31 DIAGNOSIS — R93.3 ABNORMAL CT SCAN, COLON: ICD-10-CM

## 2020-07-31 DIAGNOSIS — K52.9 COLITIS: ICD-10-CM

## 2020-07-31 DIAGNOSIS — Z01.818 PRE-OP TESTING: Primary | ICD-10-CM

## 2020-07-31 DIAGNOSIS — Z79.1 NSAID LONG-TERM USE: ICD-10-CM

## 2020-07-31 DIAGNOSIS — K21.9 GASTROESOPHAGEAL REFLUX DISEASE, ESOPHAGITIS PRESENCE NOT SPECIFIED: ICD-10-CM

## 2020-07-31 DIAGNOSIS — R13.19 INTERMITTENT DYSPHAGIA: ICD-10-CM

## 2020-07-31 DIAGNOSIS — R10.9 LEFT SIDED ABDOMINAL PAIN: ICD-10-CM

## 2020-07-31 DIAGNOSIS — D22.9 ATYPICAL MOLE: ICD-10-CM

## 2020-07-31 DIAGNOSIS — K52.9 CHRONIC DIARRHEA: ICD-10-CM

## 2020-07-31 DIAGNOSIS — K92.1 HEMATOCHEZIA: ICD-10-CM

## 2020-07-31 DIAGNOSIS — Z01.812 PRE-PROCEDURE LAB EXAM: ICD-10-CM

## 2020-07-31 DIAGNOSIS — L98.9 SKIN LESION: ICD-10-CM

## 2020-07-31 LAB
BASOPHILS # BLD AUTO: 0.03 K/UL (ref 0–0.2)
BASOPHILS NFR BLD: 0.6 % (ref 0–1.9)
CRP SERPL-MCNC: 8 MG/L (ref 0–8.2)
DIFFERENTIAL METHOD: ABNORMAL
EOSINOPHIL # BLD AUTO: 0.1 K/UL (ref 0–0.5)
EOSINOPHIL NFR BLD: 2.3 % (ref 0–8)
ERYTHROCYTE [DISTWIDTH] IN BLOOD BY AUTOMATED COUNT: 14.6 % (ref 11.5–14.5)
HCT VFR BLD AUTO: 42.1 % (ref 37–48.5)
HCV AB SERPL QL IA: NEGATIVE
HGB BLD-MCNC: 12.4 G/DL (ref 12–16)
IMM GRANULOCYTES # BLD AUTO: 0.01 K/UL (ref 0–0.04)
IMM GRANULOCYTES NFR BLD AUTO: 0.2 % (ref 0–0.5)
LYMPHOCYTES # BLD AUTO: 1.5 K/UL (ref 1–4.8)
LYMPHOCYTES NFR BLD: 31.4 % (ref 18–48)
MCH RBC QN AUTO: 26.1 PG (ref 27–31)
MCHC RBC AUTO-ENTMCNC: 29.5 G/DL (ref 32–36)
MCV RBC AUTO: 89 FL (ref 82–98)
MONOCYTES # BLD AUTO: 0.5 K/UL (ref 0.3–1)
MONOCYTES NFR BLD: 10.3 % (ref 4–15)
NEUTROPHILS # BLD AUTO: 2.6 K/UL (ref 1.8–7.7)
NEUTROPHILS NFR BLD: 55.2 % (ref 38–73)
NRBC BLD-RTO: 0 /100 WBC
PLATELET # BLD AUTO: 305 K/UL (ref 150–350)
PMV BLD AUTO: 10.3 FL (ref 9.2–12.9)
RBC # BLD AUTO: 4.75 M/UL (ref 4–5.4)
WBC # BLD AUTO: 4.77 K/UL (ref 3.9–12.7)

## 2020-07-31 PROCEDURE — 99205 OFFICE O/P NEW HI 60 MIN: CPT | Mod: S$GLB,,, | Performed by: INTERNAL MEDICINE

## 2020-07-31 PROCEDURE — 99205 PR OFFICE/OUTPT VISIT, NEW, LEVL V, 60-74 MIN: ICD-10-PCS | Mod: S$GLB,,, | Performed by: INTERNAL MEDICINE

## 2020-07-31 PROCEDURE — 86803 HEPATITIS C AB TEST: CPT

## 2020-07-31 PROCEDURE — 85025 COMPLETE CBC W/AUTO DIFF WBC: CPT

## 2020-07-31 PROCEDURE — 86677 HELICOBACTER PYLORI ANTIBODY: CPT

## 2020-07-31 PROCEDURE — 99999 PR PBB SHADOW E&M-EST. PATIENT-LVL V: ICD-10-PCS | Mod: PBBFAC,,, | Performed by: INTERNAL MEDICINE

## 2020-07-31 PROCEDURE — 86140 C-REACTIVE PROTEIN: CPT

## 2020-07-31 PROCEDURE — 99999 PR PBB SHADOW E&M-EST. PATIENT-LVL V: CPT | Mod: PBBFAC,,, | Performed by: INTERNAL MEDICINE

## 2020-07-31 PROCEDURE — 83516 IMMUNOASSAY NONANTIBODY: CPT | Mod: 59

## 2020-07-31 PROCEDURE — 36415 COLL VENOUS BLD VENIPUNCTURE: CPT | Mod: PN

## 2020-07-31 RX ORDER — DICYCLOMINE HYDROCHLORIDE 20 MG/1
20 TABLET ORAL 3 TIMES DAILY PRN
Qty: 90 TABLET | Refills: 0 | Status: SHIPPED | OUTPATIENT
Start: 2020-07-31 | End: 2021-01-05

## 2020-07-31 NOTE — PROGRESS NOTES
[facility] Consultation Note    Reason for Consult:    Chief Complaint   Patient presents with    Abdominal Pain    Diarrhea    Gastroesophageal Reflux    Nausea       PCP:   Eric Hernandes Jr    Referring MD:  Norberto Self  No address on file    HPI:  Naomy Armstrong is a 55 y.o. female here for evaluation of several GI issues that include GERD, left lower quadrant abdominal pain and diarrhea.  She is new to our clinic.  She has had diarrhea since her 20s.  She believe she has IBS.  She uses Imodium frequently and on regular basis, which improves her diarrhea.  She may have a bowel movement every couple of hours without this.  She uses 5 6 pills per day, sometimes more.  She denies nocturnal bowel movements.  She occasionally sees red blood in the stool.  In the last few weeks she developed left lower quadrant abdominal pain and tenderness.  There was associated nausea and vomiting, and she thought she had food poisoning.  She was given Cipro, Flagyl, and Augmentin recently without improvement in symptoms.  She also reports increased acid reflux or regurgitation that is worse with bending over and lying flat.  She takes omeprazole 40 mg daily which helps to burning sensation.  Symptoms are severe if she stops the medication.  She occasionally has dysphagia to solids, but this is rare, maybe 1 time every other month.  She uses NSAIDs on a daily basis for back pain and headaches.    She reports having had a colonoscopy many years ago, maybe about 20 years ago.  She was total dose okay at the time.        ROS:  Constitutional: No fevers, chills, No weight loss, normal appetite  ENT: No congestion, rhinorrhea, or chronic sinus problems  CV: No chest pain or palpitations  Pulm: No cough, No shortness of breath  Ophtho: No vision changes or pain  GI: see HPI  Derm: No rash or lesions  Heme: No lymphadenopathy, No bruising  MSK:  She has pain in multiple joints, but denies joint  swelling  : No dysuria, No frequent urination  Endo: No hot or cold intolerance, but admits to intermittent hot flashes  Neuro: No syncope, No seizure  Psych: No anxiety, No depression      Medical History:  has a past medical history of Alcohol abuse, Anxiety, Depression, Hallucination, Headache, History of psychiatric hospitalization, psychiatric care, Hypertension, Psychiatric problem, Sleep difficulties, Suicide attempt, and Therapy.    Surgical History:  has no past surgical history on file.    Family History: family history includes Alcohol abuse in her maternal grandfather and maternal uncle; Anxiety disorder in her sister; Depression in her sister.    Social History:  reports that she quit smoking about 14 years ago. She has never used smokeless tobacco. She reports current alcohol use. She reports previous drug use. Drug: Marijuana.    Review of patient's allergies indicates:   Allergen Reactions    Effexor [venlafaxine]      Elevated her blood pressure    Zoloft [sertraline]     Paroxetine hcl      Made her feel drunk       Prior to Admission medications    Medication Sig Start Date End Date Taking? Authorizing Provider   albuterol (PROAIR HFA) 90 mcg/actuation inhaler Inhale 2 puffs into the lungs every 6 (six) hours as needed for Wheezing. Rescue 1/27/20 1/26/21 Yes Eric Hernandes Jr., MD   amLODIPine (NORVASC) 5 MG tablet Take 1 tablet (5 mg total) by mouth once daily. 12/24/19  Yes Lalo Brown NP   dicyclomine (BENTYL) 20 mg tablet Take 20 mg by mouth. 1/21/20  Yes Historical Provider, MD   fluconazole (DIFLUCAN) 150 MG Tab 1 tablet orally at first sign of yeast infection and 1 tablet orally 3 days later 4/13/20  Yes Eric Hernandes Jr., MD   FLUoxetine 20 MG capsule Take 1 capsule (20 mg total) by mouth once daily. Take in addition to 40 mg for a total of 60mg daily. 6/1/20  Yes Rusty Franco MD   cetirizine (ZYRTEC) 10 MG tablet Take 1 tablet (10 mg total) by mouth once  "daily.  Patient not taking: Reported on 7/31/2020 4/13/20   Eric Hernandes Jr., MD   FLUoxetine 40 MG capsule Take 1 capsule (40 mg total) by mouth once daily. Take in addition to 20 mg for a total of 60mg daily. 6/1/20   Rusty Franco MD   gabapentin (NEURONTIN) 300 MG capsule Take 2 capsules (600 mg total) by mouth 3 (three) times daily. 7/6/20 7/6/21  Jeremy Powell Jr., MD   hydrOXYzine (ATARAX) 50 MG tablet Take 1 tablet (50 mg total) by mouth 2 (two) times daily as needed for Anxiety.  Patient not taking: Reported on 7/29/2020 6/1/20   Rusty Franco MD   ipratropium (ATROVENT) 42 mcg (0.06 %) nasal spray 2 sprays by Nasal route 4 (four) times daily. 1/27/20   Eric Hernandes Jr., MD   lisinopriL (PRINIVIL,ZESTRIL) 20 MG tablet Take 1 tablet (20 mg total) by mouth once daily. 4/13/20   Eric Hernandes Jr., MD   loperamide (IMODIUM) 2 mg capsule Take 2 mg by mouth once as needed for Diarrhea.    Historical Provider, MD   methocarbamol (ROBAXIN) 750 MG Tab Take 500 mg by mouth 2 (two) times daily as needed.    Historical Provider, MD   multivitamin with minerals tablet Take 1 tablet by mouth once daily.    Historical Provider, MD   naproxen (NAPROSYN) 500 MG tablet Take 1 tablet (500 mg total) by mouth 2 (two) times daily with meals. 6/2/20   Eric Hernandes Jr., MD   omeprazole (PRILOSEC) 20 MG capsule Take 2 capsules (40 mg total) by mouth every morning. 4/13/20   Eric Hernandes Jr., MD   promethazine (PHENERGAN) 25 MG tablet Take 1 tablet (25 mg total) by mouth every 8 (eight) hours as needed for Nausea. 4/13/20   Eric Hernandes Jr., MD   traMADol (ULTRAM) 50 mg tablet Take 50 mg by mouth every 8 (eight) hours as needed for Pain.    Historical Provider, MD       Objective Findings:  Vital Signs:  /80   Ht 5' 5" (1.651 m)   Wt 81.3 kg (179 lb 3.7 oz)   BMI 29.83 kg/m²   Body mass index is 29.83 kg/m².    Physical Exam:  General Appearance:  Well appearing in no acute distress, " appears stated age  Head:  Normocephalic, atraumatic  Eyes:  No scleral icterus or pallor, EOMI  ENT:  Neck supple, moist mucosa; OP clear  Lungs:  CTA bilaterally in anterior and posterior fields, no wheezes, no crackles; no dullness  Heart:  Regular rate and rhythm, S1, S2 normal, no murmurs heard  Abdomen:  Soft, tenderness to palpation of the left side, non distended with positive bowel sounds in all four quadrants. No hepatosplenomegaly, ascites, or mass  Extremities:  No clubbing, cyanosis, or edema  Skin:  No rash, she has several abnormal appearing moles on her abdomen            Labs:  Lab Results   Component Value Date    WBC 12.94 (H) 06/23/2020    HGB 14.1 06/23/2020    HCT 45.1 06/23/2020    MCV 87 06/23/2020    RDW 15.8 (H) 06/23/2020     (H) 06/23/2020    GRAN 9.6 (H) 06/23/2020    GRAN 73.8 (H) 06/23/2020    LYMPH 2.3 06/23/2020    LYMPH 17.6 (L) 06/23/2020    MONO 1.0 06/23/2020    MONO 7.5 06/23/2020    EOS 0.1 06/23/2020    BASO 0.03 06/23/2020     Lab Results   Component Value Date     06/23/2020    K 4.2 06/23/2020     06/23/2020    CO2 24 06/23/2020     06/23/2020    BUN 8 06/23/2020    CREATININE 1.0 06/23/2020    CALCIUM 9.9 06/23/2020    PROT 7.1 06/23/2020    ALBUMIN 3.6 06/23/2020    BILITOT 0.3 06/23/2020    ALKPHOS 120 06/23/2020    AST 14 06/23/2020    ALT 20 06/23/2020           Imaging:  CT of the abdomen and pelvis with IV contrast only 06/23/2020:  Thickening in the distal transverse colon to the proximal descending colon.            Assessment:  Naomy Armstrong is a 55 y.o. female with:  1. Left sided abdominal pain    2. Colitis    3. Abnormal CT scan, colon    4. Chronic diarrhea    5. Hematochezia    6. Gastroesophageal reflux disease, esophagitis presence not specified    7. Intermittent dysphagia    8. NSAID long-term use    9. Skin lesion    10. Atypical mole      Multiple GI complaints.  Most recently with a complaint of left-sided abdominal  pain and has left-sided abdominal tenderness.  CT scan done in June indicated a left-sided colitis in the distal transverse colon and proximal descending colon.  Not resolved with antibiotics.  CBC indicated mild leukocytosis and thrombocytosis.  She reports diarrhea for many years more consistent with irritable bowel syndrome, but these findings raise the possibility of IBD or undiagnosed infectious etiology.  Ischemic colitis is another consideration.  She has intermittent hematochezia.  Imodium helps bowel movements.  There is no mention of diverticulosis on the CT scan.  She has not had a colonoscopy in many years.  Malignancy must be ruled out as well.    Reflux symptoms have worsened recently.  Unclear if related to her diarrhea.  She responds to daily omeprazole use, but still has problems when bending over lying flat.  There is a component of regurgitation.    She has several atypical moles and has not seen a dermatologist.        Recommendations/Plan:  1.  Blood in stool test as noted below.  Will screen for celiac disease and H pylori.  I will also screen for signs of IBD.  2.  Routine hepatitis C screening  3.  I will arrange for EGD and colonoscopy  4.  Continue omeprazole as is  5.  Referral to Dermatology    Follow-up pending above      Order summary:  Orders Placed This Encounter    Stool culture    Clostridium difficile EIA    Stool Exam-Ova,Cysts,Parasites    Giardia / Cryptosporidum, EIA    Calprotectin    Hepatitis C Antibody    Celiac Disease Panel    H. PYLORI ANTIBODY, IGG    C-reactive protein    CBC auto differential    Ambulatory referral/consult to Dermatology    Case request GI: EGD (ESOPHAGOGASTRODUODENOSCOPY), COLONOSCOPY         Thank you so much for allowing me to participate in the care of Naomy Armstrong    Fab Shepherd MD

## 2020-08-01 ENCOUNTER — LAB VISIT (OUTPATIENT)
Dept: URGENT CARE | Facility: CLINIC | Age: 55
End: 2020-08-01
Payer: COMMERCIAL

## 2020-08-01 VITALS — TEMPERATURE: 97 F

## 2020-08-01 DIAGNOSIS — Z01.812 PRE-PROCEDURE LAB EXAM: ICD-10-CM

## 2020-08-01 PROCEDURE — U0003 INFECTIOUS AGENT DETECTION BY NUCLEIC ACID (DNA OR RNA); SEVERE ACUTE RESPIRATORY SYNDROME CORONAVIRUS 2 (SARS-COV-2) (CORONAVIRUS DISEASE [COVID-19]), AMPLIFIED PROBE TECHNIQUE, MAKING USE OF HIGH THROUGHPUT TECHNOLOGIES AS DESCRIBED BY CMS-2020-01-R: HCPCS

## 2020-08-02 ENCOUNTER — LAB VISIT (OUTPATIENT)
Dept: URGENT CARE | Facility: CLINIC | Age: 55
End: 2020-08-02
Payer: COMMERCIAL

## 2020-08-02 VITALS — TEMPERATURE: 97 F

## 2020-08-02 DIAGNOSIS — Z01.818 PRE-OP TESTING: ICD-10-CM

## 2020-08-02 LAB — SARS-COV-2 RNA RESP QL NAA+PROBE: NOT DETECTED

## 2020-08-02 PROCEDURE — U0003 INFECTIOUS AGENT DETECTION BY NUCLEIC ACID (DNA OR RNA); SEVERE ACUTE RESPIRATORY SYNDROME CORONAVIRUS 2 (SARS-COV-2) (CORONAVIRUS DISEASE [COVID-19]), AMPLIFIED PROBE TECHNIQUE, MAKING USE OF HIGH THROUGHPUT TECHNOLOGIES AS DESCRIBED BY CMS-2020-01-R: HCPCS

## 2020-08-02 PROCEDURE — 99000 SPECIMEN HANDLING OFFICE-LAB: CPT | Mod: S$GLB,,, | Performed by: PHYSICIAN ASSISTANT

## 2020-08-02 PROCEDURE — 99000 PR SPECIMEN HANDLING,DR OFF->LAB: ICD-10-PCS | Mod: S$GLB,,, | Performed by: PHYSICIAN ASSISTANT

## 2020-08-03 LAB
GLIADIN PEPTIDE IGA SER-ACNC: 4 UNITS
GLIADIN PEPTIDE IGG SER-ACNC: 3 UNITS
H PYLORI IGG SERPL QL IA: NEGATIVE
IGA SERPL-MCNC: 120 MG/DL (ref 70–400)
SARS-COV-2 RNA RESP QL NAA+PROBE: NOT DETECTED
TTG IGA SER-ACNC: 5 UNITS
TTG IGG SER-ACNC: 4 UNITS

## 2020-08-04 ENCOUNTER — TELEPHONE (OUTPATIENT)
Dept: OTOLARYNGOLOGY | Facility: CLINIC | Age: 55
End: 2020-08-04

## 2020-08-04 ENCOUNTER — TELEPHONE (OUTPATIENT)
Dept: GASTROENTEROLOGY | Facility: CLINIC | Age: 55
End: 2020-08-04

## 2020-08-04 ENCOUNTER — OFFICE VISIT (OUTPATIENT)
Dept: OTOLARYNGOLOGY | Facility: CLINIC | Age: 55
End: 2020-08-04
Payer: COMMERCIAL

## 2020-08-04 VITALS
BODY MASS INDEX: 30.82 KG/M2 | DIASTOLIC BLOOD PRESSURE: 85 MMHG | HEART RATE: 89 BPM | SYSTOLIC BLOOD PRESSURE: 129 MMHG | WEIGHT: 185.19 LBS

## 2020-08-04 DIAGNOSIS — K21.9 LARYNGOPHARYNGEAL REFLUX: ICD-10-CM

## 2020-08-04 DIAGNOSIS — J31.0 CHRONIC RHINITIS: ICD-10-CM

## 2020-08-04 DIAGNOSIS — K52.9 CHRONIC DIARRHEA: Primary | ICD-10-CM

## 2020-08-04 DIAGNOSIS — K52.9 COLITIS: ICD-10-CM

## 2020-08-04 DIAGNOSIS — Z01.812 PRE-PROCEDURE LAB EXAM: ICD-10-CM

## 2020-08-04 DIAGNOSIS — K21.9 GASTROESOPHAGEAL REFLUX DISEASE, ESOPHAGITIS PRESENCE NOT SPECIFIED: ICD-10-CM

## 2020-08-04 DIAGNOSIS — R09.A2 GLOBUS SENSATION: Primary | ICD-10-CM

## 2020-08-04 PROCEDURE — 99999 PR PBB SHADOW E&M-EST. PATIENT-LVL IV: ICD-10-PCS | Mod: PBBFAC,,, | Performed by: OTOLARYNGOLOGY

## 2020-08-04 PROCEDURE — 31575 DIAGNOSTIC LARYNGOSCOPY: CPT | Mod: S$GLB,,, | Performed by: OTOLARYNGOLOGY

## 2020-08-04 PROCEDURE — 99243 PR OFFICE CONSULTATION,LEVEL III: ICD-10-PCS | Mod: 25,S$GLB,, | Performed by: OTOLARYNGOLOGY

## 2020-08-04 PROCEDURE — 99999 PR PBB SHADOW E&M-EST. PATIENT-LVL IV: CPT | Mod: PBBFAC,,, | Performed by: OTOLARYNGOLOGY

## 2020-08-04 PROCEDURE — 99243 OFF/OP CNSLTJ NEW/EST LOW 30: CPT | Mod: 25,S$GLB,, | Performed by: OTOLARYNGOLOGY

## 2020-08-04 PROCEDURE — 31575 PR LARYNGOSCOPY, FLEXIBLE; DIAGNOSTIC: ICD-10-PCS | Mod: S$GLB,,, | Performed by: OTOLARYNGOLOGY

## 2020-08-04 RX ORDER — FLUTICASONE PROPIONATE 50 MCG
2 SPRAY, SUSPENSION (ML) NASAL DAILY
Qty: 9.9 ML | Refills: 11 | Status: SHIPPED | OUTPATIENT
Start: 2020-08-04 | End: 2021-01-15

## 2020-08-04 RX ORDER — OMEPRAZOLE 20 MG/1
40 CAPSULE, DELAYED RELEASE ORAL 2 TIMES DAILY
Qty: 120 CAPSULE | Refills: 1 | Status: SHIPPED | OUTPATIENT
Start: 2020-08-04 | End: 2020-12-14

## 2020-08-04 NOTE — PROCEDURES
Procedures     Due to indication in patient's history, presentation or risk factors,  a fiber optic exam was performed. Risks, benefits and alternatives were discussed, patient had no further questions, or all questions answered and patient stated understanding.    SEPARATE PROCEDURE NOTE:    ANESTHESIA:  Topical xylocaine with rosaline-synephrine    FINDINGS:  Moderate inaterarytenoid erythema and edema    PROCEDURE:  After verbal consent was obtained, the flexible scope was passed through the patient's nasal cavity without difficulty.  The nasopharynx (adenoid pad) and eustachian tube orifices were first visualized and were found to be normal, without masses or irregularity.  The posterior pharyngeal wall and base of tongue were then examined and no mass or irregular tissue was seen.  The scope was then advanced to the larynx, and the epiglottis, valleculae, and piriform sinuses were normal, without masses or mucosal irregularity.  The false vocal folds and true vocal folds were then examined and were found to have normal mobility (full abduction and adduction) and no masses or mucosal irregularity was seen.  The interartyenoid area had moderate edema and erythema consistent with reflux.

## 2020-08-04 NOTE — LETTER
August 4, 2020      REMY Ren III, MD  [address] [facility]  [place] [address]           [place] - Otorhinolaryngology  [address] [address]  Phone: [phone]  Fax: [phone]          Patient: Naomy Armstrong   MR Number: [id number]   YOB: 1965   Date of Visit: 8/4/2020       Dear Dr. REMY Ren III:    Thank you for referring Naomy Armstrong to me for evaluation. Attached you will find relevant portions of my assessment and plan of care.    If you have questions, please do not hesitate to call me. I look forward to following Naomy Armstrong along with you.    Sincerely,    Veronica Oliveira MD    Enclosure  CC:  No Recipients    If you would like to receive this communication electronically, please contact [email or web address] or [phone] to request more information on [employer] access.    For providers and/or their staff who would like to refer a patient to [facility], please contact us through our one-stop-shop provider referral line, [facility], at [phone].    If you feel you have received this communication in error or would no longer like to receive these types of communications, please e-mail [email or web address]

## 2020-08-04 NOTE — PATIENT INSTRUCTIONS
Tips to Control Acid Reflux    To control acid reflux, youll need to make some basic diet and lifestyle changes. The simple steps outlined below may be all youll need to ease discomfort.  Watch what you eat  · Avoid fatty foods and spicy foods.  · Eat fewer acidic foods, such as citrus and tomato-based foods. These can increase symptoms.  · Limit drinking alcohol, caffeine, and fizzy beverages. All increase acid reflux.  · Try limiting chocolate, peppermint, and spearmint. These can worsen acid reflux in some people.  Watch when you eat  · Avoid lying down for 3 hours after eating.  · Do not snack before going to bed.  Raise your head  Raising your head and upper body by 4 to 6 inches helps limit reflux when youre lying down. Put blocks under the head of your bed frame to raise it.  Other changes  · Lose weight, if you need to  · Dont exercise near bedtime  · Avoid tight-fitting clothes  · Limit aspirin and ibuprofen  · Stop smoking   Date Last Reviewed: 7/1/2016  © 8452-5047 [employer]. [address] [place] [address], [place] [address]. All rights reserved. This information is not intended as a substitute for professional medical care. Always follow your healthcare professional's instructions.

## 2020-08-04 NOTE — PROGRESS NOTES
Chief Complaint   Patient presents with    Cough     worse at night, post nasal drip, clearing the throat   .     HPI:Naomy Armstrong is a 55 y.o. female who has been referred by Dr. REMY Ren MD for a several year history of globus sensation and feeling of post-nasal drip.  She admits to occasional history of hoarseness. Her voice is not progressively worsening over this time. There are pitch breaks or cracks. There is not vocal fatigue. She denies dysphagia, odynophagia, throat pain, and otalgia.  There is no hemoptysis or hematemesis. She is breathing well. She denies nasal congestion, facial pain/pressure, or changes in sense of smell or taste.     She admits to throat clearing and cough. She admits to heartburn and reflux. She states that if she will notice reflux when she is lying flat. She has to sleep on several pillows. She is on omeprazole 40mg PO daily. She has seen GI and has EGD planned soon.   She notes that she has had headaches that she localizes to the top of her head and at the occiput. They are moderate and have been frequent in nature. She feels that she gets some relief from NSAIDs. She has been referred to neurology to evaluate for Migraine headaches.    RSI=14      Past Medical History:   Diagnosis Date    Alcohol abuse     per chart, but patient denies today and cocaine per chart    Anxiety     Depression     Hallucination     last couple of months started seeing someone standing in her room, saw boyfriend's reflection in glass    Headache     History of psychiatric hospitalization     age 13 [facility] for 2 months for acting out; 2018 for SI    Hx of psychiatric care     Hypertension     Psychiatric problem     Sleep difficulties     Suicide attempt     with knife by cutting stomach    Therapy      Social History     Socioeconomic History    Marital status:      Spouse name: Not on file    Number of children: 0    Years of education: Not on file     Highest education level: Not on file   Occupational History    Occupation:      Employer: [employer]   Social Needs    Financial resource strain: Very hard    Food insecurity     Worry: Often true     Inability: Often true    Transportation needs     Medical: No     Non-medical: No   Tobacco Use    Smoking status: Former Smoker     Quit date: 2006     Years since quittin.2    Smokeless tobacco: Never Used   Substance and Sexual Activity    Alcohol use: Yes     Frequency: Monthly or less     Drinks per session: Patient refused     Binge frequency: Less than monthly     Comment: occasional    Drug use: Not Currently     Types: Marijuana     Comment: tried at age 14    Sexual activity: Yes     Partners: Male     Comment: going through menopause   Lifestyle    Physical activity     Days per week: 0 days     Minutes per session: 0 min    Stress: Very much   Relationships    Social connections     Talks on phone: Patient refused     Gets together: Patient refused     Attends Jew service: Not on file     Active member of club or organization: No     Attends meetings of clubs or organizations: Patient refused     Relationship status:    Other Topics Concern    Patient feels they ought to cut down on drinking/drug use No    Patient annoyed by others criticizing their drinking/drug use No    Patient has felt bad or guilty about drinking/drug use No    Patient has had a drink/used drugs as an eye opener in the AM No   Social History Narrative     x 2.    Lives with boyfriend.     Works at [employer] as an .    Presently working on Bachelor's degree.    Has associates degree in criminal justice.     History reviewed. No pertinent surgical history.  Family History   Problem Relation Age of Onset    Anxiety disorder Sister     Depression Sister     Alcohol abuse Maternal Uncle     Alcohol abuse Maternal Grandfather            Review of  Systems  General: negative for chills, fever or weight loss  Psychological: negative for mood changes or depression  Ophthalmic: negative for blurry vision, photophobia or eye pain  ENT: see HPI  Respiratory: no cough, shortness of breath, or wheezing  Cardiovascular: no chest pain or dyspnea on exertion  Gastrointestinal: no abdominal pain, change in bowel habits, or black/ bloody stools  Musculoskeletal: negative for gait disturbance or muscular weakness  Neurological: no syncope or seizures; no ataxia  Dermatological: negative for puritis,  rash and jaundice  Hematologic/lymphatic: no easy bruising, no new lumps or bumps      Physical Exam:    Vitals:    08/04/20 1312   BP: 129/85   Pulse: 89       Constitutional: Well appearing / communicating without difficutly.  NAD.  Eyes: EOM I Bilaterally  Head/Face: Normocephalic.  Negative paranasal sinus pressure/tenderness.  Salivary glands WNL.  House Brackmann I Bilaterally.    Right Ear: Auricle normal appearance. External Auditory Canal within normal limits no lesions or masses,TM w/o masses/lesions/perforations. TM mobility noted.   Left Ear: Auricle normal appearance. External Auditory Canal within normal limits no lesions or masses,TM w/o masses/lesions/perforations. TM mobility noted.  Nose: No gross nasal septal deviation. Inferior Turbinates 3+ bilaterally. No septal perforation. No masses/lesions. External nasal skin appears normal without masses/lesions.  Oral Cavity: Gingiva/lips within normal limits.  Dentition/gingiva healthy appearing. Mucus membranes moist. Floor of mouth soft, no masses palpated. Oral Tongue mobile. Hard Palate appears normal.    Oropharynx: Base of tongue appears normal. No masses/lesions noted. Tonsillar fossa/pharyngeal wall without lesions. Posterior oropharynx WNL.  Soft palate without masses. Midline uvula.   Neck/Lymphatic: No LAD I-VI bilaterally.  No thyromegaly.  No masses noted on exam.    Mirror  laryngoscopy/nasopharyngoscopy: Active gag reflex.  Unable to perform.    Neuro/Psychiatric: AOx3.  Normal mood and affect.   Cardiovascular: Normal carotid pulses bilaterally, no increasing jugular venous distention noted at cervical region bilaterally.    Respiratory: Normal respiratory effort, no stridor, no retractions noted.      See separate procedure note for FFL.     Diagnostic studies reviewed:   ImmunoCAP testing: negaitve  Inhalent skin tests: negative      Assessment:    ICD-10-CM ICD-9-CM    1. Globus sensation  R09.89 306.4    2. Laryngopharyngeal reflux  K21.9 478.79 Ambulatory referral/consult to ENT   3. Chronic rhinitis  J31.0 472.0      The primary encounter diagnosis was Globus sensation. Diagnoses of Laryngopharyngeal reflux and Chronic rhinitis were also pertinent to this visit.      Plan:  No orders of the defined types were placed in this encounter.    Start Flonase 2 sprays per nostril.     I recommended that the patient start taking Protonix 40mg PO BID* and provided a prescription.Keep GI follow up for EGD.      I also recommended that the patient refrain from eating within 3 hours of going to bed, o elevate the head of bed very subtly and optimize the impact of gravity on the potential reflux, and to  avoid alcohol, caffeine, tobacco, tomato sauce, spicy foods, fried food, and chocolate.     Keep neurology referral for evaluation of headaches as the headaches she describes seem atypical for sinus related headaches and I see no evidence of chronic sinusitis on exam today.      Follow up in 6-8 weeks to reassess progress with treatment regimen.     Thank you kindly for allowing me to participate in the patient's care.       Veronica Oliveira MD

## 2020-08-05 ENCOUNTER — HOSPITAL ENCOUNTER (OUTPATIENT)
Facility: HOSPITAL | Age: 55
Discharge: HOME | End: 2020-08-05
Attending: PAIN MEDICINE | Admitting: PAIN MEDICINE
Payer: COMMERCIAL

## 2020-08-05 VITALS
HEART RATE: 65 BPM | RESPIRATION RATE: 18 BRPM | DIASTOLIC BLOOD PRESSURE: 83 MMHG | SYSTOLIC BLOOD PRESSURE: 134 MMHG | TEMPERATURE: 98 F | OXYGEN SATURATION: 99 %

## 2020-08-05 DIAGNOSIS — M47.816 LUMBAR SPONDYLOSIS: Primary | ICD-10-CM

## 2020-08-05 PROCEDURE — 64494 INJ PARAVERT F JNT L/S 2 LEV: CPT | Mod: 50 | Performed by: PAIN MEDICINE

## 2020-08-05 PROCEDURE — 25000003 PHARM REV CODE 250: Performed by: PAIN MEDICINE

## 2020-08-05 PROCEDURE — 64494 PR INJ DX/THER AGNT PARAVERT FACET JOINT,IMG GUIDE,LUMBAR/SAC, 2ND LEVEL: ICD-10-PCS | Mod: LT,,, | Performed by: PAIN MEDICINE

## 2020-08-05 PROCEDURE — 64494 INJ PARAVERT F JNT L/S 2 LEV: CPT | Mod: LT,,, | Performed by: PAIN MEDICINE

## 2020-08-05 PROCEDURE — 64493 INJ PARAVERT F JNT L/S 1 LEV: CPT | Mod: 50 | Performed by: PAIN MEDICINE

## 2020-08-05 PROCEDURE — 64493 PR INJ DX/THER AGNT PARAVERT FACET JOINT,IMG GUIDE,LUMBAR/SAC,1ST LVL: ICD-10-PCS | Mod: 50,,, | Performed by: PAIN MEDICINE

## 2020-08-05 PROCEDURE — 64493 INJ PARAVERT F JNT L/S 1 LEV: CPT | Mod: 50,,, | Performed by: PAIN MEDICINE

## 2020-08-05 PROCEDURE — 64495 INJ PARAVERT F JNT L/S 3 LEV: CPT | Mod: 50 | Performed by: PAIN MEDICINE

## 2020-08-05 RX ORDER — LIDOCAINE HYDROCHLORIDE 20 MG/ML
10 INJECTION, SOLUTION INFILTRATION; PERINEURAL ONCE
Status: COMPLETED | OUTPATIENT
Start: 2020-08-05 | End: 2020-08-05

## 2020-08-05 RX ORDER — BUPIVACAINE HYDROCHLORIDE 2.5 MG/ML
10 INJECTION, SOLUTION EPIDURAL; INFILTRATION; INTRACAUDAL ONCE
Status: COMPLETED | OUTPATIENT
Start: 2020-08-05 | End: 2020-08-05

## 2020-08-05 RX ORDER — LIDOCAINE HYDROCHLORIDE 20 MG/ML
INJECTION, SOLUTION INFILTRATION; PERINEURAL
Status: DISCONTINUED
Start: 2020-08-05 | End: 2020-08-05 | Stop reason: HOSPADM

## 2020-08-05 RX ORDER — BUPIVACAINE HYDROCHLORIDE 2.5 MG/ML
INJECTION, SOLUTION EPIDURAL; INFILTRATION; INTRACAUDAL
Status: DISCONTINUED
Start: 2020-08-05 | End: 2020-08-05 | Stop reason: HOSPADM

## 2020-08-05 NOTE — PLAN OF CARE
Pt given discharge instructions. AVS given. No s/s of distress or SOB. Pt left unit via WC to ride.

## 2020-08-05 NOTE — DISCHARGE INSTRUCTIONS
1. DIET:   You may resume your normal diet today.   2. BATHING:   You may shower with luke warm water.  3. DRESSING:   You may remove your bandage today.   4. ACTIVITY LEVEL:   You may resume your normal activities today. This procedure was a test. Do all of the normal activities that you would do, even if it causes you pain.     Keep a diary of your pain score.     5. MEDICATIONS:   You may resume your normal medications today.     6. SPECIAL INSTRUCTIONS:   No heat to the injection site for 24 hrs including, bath or shower, heating pad, moist heat, or hot tubs.    Use ice pack to injection site for any pain or discomfort.  Apply ice packs for 20 minute intervals as needed.   If you have received any sedatives by mouth today you may not drive for 12 hours.    If you have received any sedation through your IV, you may not drive for 24 hrs.     PLEASE CALL YOUR DOCTOR IF:  1. Redness or swelling around the injection site.  2. Fever of 101 degrees  3. Drainage (pus) from the injection site.  4. For any continuous bleeding (some dried blood over the incision is normal.)    FOR EMERGENCIES:   If any unusual problems or difficulties occur during clinic hours, call [phone] or [phone].     Fall Prevention  Millions of people fall every year and injure themselves. You may have had anesthesia or sedation which may increase your risk of falling. You may have health issues that put you at an increased risk of falling.     Here are ways to reduce your risk of falling.  ·   · Make your home safe by keeping walkways clear of objects you may trip over.  · Use non-slip pads under rugs. Do not use area rugs or small throw rugs.  · Use non-slip mats in bathtubs and showers.  · Install handrails and lights on staircases.  · Do not walk in poorly lit areas.  · Do not stand on chairs or wobbly ladders.  · Use caution when reaching overhead or looking upward. This position can cause a loss of balance.  · Be sure your shoes fit  properly, have non-slip bottoms and are in good condition.   · Wear shoes both inside and out. Avoid going barefoot or wearing slippers.  · Be cautious when going up and down stairs, curbs, and when walking on uneven sidewalks.  · If your balance is poor, consider using a cane or walker.  · If your fall was related to alcohol use, stop or limit alcohol intake.   · If your fall was related to use of sleeping medicines, talk to your doctor about this. You may need to reduce your dosage at bedtime if you awaken during the night to go to the bathroom.    · To reduce the need for nighttime bathroom trips:  ¨ Avoid drinking fluids for several hours before going to bed  ¨ Empty your bladder before going to bed  ¨ Men can keep a urinal at the bedside  · Stay as active as you can. Balance, flexibility, strength, and endurance all come from exercise. They all play a role in preventing falls. Ask your healthcare provider which types of activity are right for you.  · Get your vision checked on a regular basis.  · If you have pets, know where they are before you stand up or walk so you don't trip over them.  · Use night lights.

## 2020-08-05 NOTE — DISCHARGE SUMMARY
Discharge Diagnosis:DDD (degenerative disc disease), lumbar [M51.36]  Chronic back pain greater than 3 months duration [M54.9, G89.29]  Condition on Discharge: Stable.  Diet on Discharge: Same as before.  Activity: as per instruction sheet.  Discharge to: Home with a responsible adult.  Follow up: as per Discharge instructions

## 2020-08-05 NOTE — INTERVAL H&P NOTE
The patient has been examined and the H&P has been reviewed:    I concur with the findings and no changes have occurred since H&P was written.    Surgery risks, benefits and alternative options discussed and understood by patient/family.    The procedure that I am ordering/performing has been deemed time sensitive given patient's degree of pain and limitations that this pain composed this upon his/her daily life.        Active Hospital Problems    Diagnosis  POA    Lumbar spondylosis [M47.816]  Yes      Resolved Hospital Problems   No resolved problems to display.

## 2020-08-05 NOTE — OP NOTE
LUMBAR Medial Branch Block Under Fluoroscopy  Time-out taken to identify patient and procedure side prior to starting the procedure.   I attest that I have reviewed the patient's home medications prior to the procedure and no contraindication have been identified. I  re-evaluated the patient after the patient was positioned for the procedure in the procedure room immediately before the procedural time-out. The vital signs are current and represent the current state of the patient which has not significantly changed since the preprocedure assessment.            Date of Service: 08/05/2020    PCP: Eric Hernandes Jr, MD    Referring Physician:                                                           PROCEDURE:  Bilateral  L3, L4 and L5 medial branch block    REASON FOR PROCEDURE: DDD (degenerative disc disease), lumbar [M51.36]  Chronic back pain greater than 3 months duration [M54.9, G89.29]    PHYSICIAN: Jeremy Powell MD  ASSISTANTS: None    MEDICATIONS INJECTED: Bupivicaine 0.25% 1.5ml at each level      LOCAL ANESTHETIC USED: Xylocaine 2% 3ml each site.    SEDATION MEDICATIONS: None    ESTIMATED BLOOD LOSS:  None.    COMPLICATIONS:  None.    TECHNIQUE: Laying in a prone position, the patient was prepped and draped in the usual sterile fashion using ChloraPrep and fenestrated drape.  The level was determined under fluoroscopic guidance.  Local anesthetic was given by going down to the hub of the 27-gauge 1.25in needle and raising a wheel.  A 25-gauge 3.5inch needle was introduced to the anatomic local of the medial branch at the lateral mass utilizing live fluoroscopy. Medication was then injected slowly at each level as stated above. The patient tolerated the procedure well.       The patient was monitored after the procedure.  Patient was given post procedure and discharge instructions to follow at home.  We will see the patient back in two weeks or the patient may call to inform of status. The patient was  discharged in a stable condition

## 2020-08-06 ENCOUNTER — TELEPHONE (OUTPATIENT)
Dept: PAIN MEDICINE | Facility: CLINIC | Age: 55
End: 2020-08-06

## 2020-08-06 DIAGNOSIS — M47.816 LUMBAR SPONDYLOSIS: Primary | ICD-10-CM

## 2020-08-06 RX ORDER — AMLODIPINE BESYLATE 5 MG/1
5 TABLET ORAL DAILY
Qty: 90 TABLET | Refills: 1 | Status: SHIPPED | OUTPATIENT
Start: 2020-08-06 | End: 2021-05-24

## 2020-08-06 NOTE — TELEPHONE ENCOUNTER
MA spoke with patient and explained specimen container was labeled incorrectly. She is aware she will need to submit another specimen.     Patient verbalized understanding and is aware a kit will be mailed to the address on file via [employer]. Patient thanked MA for information.

## 2020-08-06 NOTE — TELEPHONE ENCOUNTER
Ms. Moralez reports near 100% reduction in pain following the MBB performed yesterday.  She was able to perform her housework and personal errands without any difficulty.  She would like to schedule the right L3-5 RFA on 8/19/2020- Dr. Powell updated.

## 2020-08-06 NOTE — TELEPHONE ENCOUNTER
Last Office Visit Info:   The patient's last visit with Eric Hernandes Jr, MD was on 6/2/2020.    The patient's last visit in current department was on 6/22/2020.        Last CBC Results:   Lab Results   Component Value Date    WBC 4.77 07/31/2020    HGB 12.4 07/31/2020    HCT 42.1 07/31/2020     07/31/2020       Last CMP Results  Lab Results   Component Value Date     06/23/2020    K 4.2 06/23/2020     06/23/2020    CO2 24 06/23/2020    BUN 8 06/23/2020    CREATININE 1.0 06/23/2020    CALCIUM 9.9 06/23/2020    ALBUMIN 3.6 06/23/2020    AST 14 06/23/2020    ALT 20 06/23/2020       Last Lipids  Lab Results   Component Value Date    CHOL 241 (H) 08/08/2019    TRIG 237 (H) 08/08/2019    HDL 87 (H) 08/08/2019    LDLCALC 106.6 08/08/2019       Last A1C  Lab Results   Component Value Date    HGBA1C 5.5 12/21/2019       Last TSH  Lab Results   Component Value Date    TSH 1.450 12/20/2019         Current Med Refills  Medication List with Changes/Refills   Current Medications    ALBUTEROL (PROAIR HFA) 90 MCG/ACTUATION INHALER    Inhale 2 puffs into the lungs every 6 (six) hours as needed for Wheezing. Rescue       Start Date: 1/27/2020 End Date: 1/26/2021    AMLODIPINE (NORVASC) 5 MG TABLET    Take 1 tablet (5 mg total) by mouth once daily.       Start Date: 12/24/2019End Date: --    CETIRIZINE (ZYRTEC) 10 MG TABLET    Take 1 tablet (10 mg total) by mouth once daily.       Start Date: 4/13/2020 End Date: --    DICYCLOMINE (BENTYL) 20 MG TABLET    Take 1 tablet (20 mg total) by mouth 3 (three) times daily as needed (abdominal pain).       Start Date: 7/31/2020 End Date: 8/30/2020    FLUCONAZOLE (DIFLUCAN) 150 MG TAB    1 tablet orally at first sign of yeast infection and 1 tablet orally 3 days later       Start Date: 4/13/2020 End Date: --    FLUOXETINE 20 MG CAPSULE    Take 1 capsule (20 mg total) by mouth once daily. Take in addition to 40 mg for a total of 60mg daily.       Start Date: 6/1/2020  End Date:  --    FLUOXETINE 40 MG CAPSULE    Take 1 capsule (40 mg total) by mouth once daily. Take in addition to 20 mg for a total of 60mg daily.       Start Date: 6/1/2020  End Date: --    FLUTICASONE PROPIONATE (FLONASE) 50 MCG/ACTUATION NASAL SPRAY    2 sprays (100 mcg total) by Each Nostril route once daily.       Start Date: 8/4/2020  End Date: --    GABAPENTIN (NEURONTIN) 300 MG CAPSULE    Take 2 capsules (600 mg total) by mouth 3 (three) times daily.       Start Date: 7/6/2020  End Date: 7/6/2021    HYDROXYZINE (ATARAX) 50 MG TABLET    Take 1 tablet (50 mg total) by mouth 2 (two) times daily as needed for Anxiety.       Start Date: 6/1/2020  End Date: --    IPRATROPIUM (ATROVENT) 42 MCG (0.06 %) NASAL SPRAY    2 sprays by Nasal route 4 (four) times daily.       Start Date: 1/27/2020 End Date: --    LISINOPRIL (PRINIVIL,ZESTRIL) 20 MG TABLET    Take 1 tablet (20 mg total) by mouth once daily.       Start Date: 4/13/2020 End Date: --    LOPERAMIDE (IMODIUM) 2 MG CAPSULE    Take 2 mg by mouth once as needed for Diarrhea.       Start Date: --        End Date: --    METHOCARBAMOL (ROBAXIN) 750 MG TAB    Take 500 mg by mouth 2 (two) times daily as needed.       Start Date: --        End Date: --    MULTIVITAMIN WITH MINERALS TABLET    Take 1 tablet by mouth once daily.       Start Date: --        End Date: --    NAPROXEN (NAPROSYN) 500 MG TABLET    Take 1 tablet (500 mg total) by mouth 2 (two) times daily with meals.       Start Date: 6/2/2020  End Date: --    OMEPRAZOLE (PRILOSEC) 20 MG CAPSULE    Take 2 capsules (40 mg total) by mouth 2 (two) times a day.       Start Date: 8/4/2020  End Date: --    PROMETHAZINE (PHENERGAN) 25 MG TABLET    Take 1 tablet (25 mg total) by mouth every 8 (eight) hours as needed for Nausea.       Start Date: 4/13/2020 End Date: --    TRAMADOL (ULTRAM) 50 MG TABLET    Take 50 mg by mouth every 8 (eight) hours as needed for Pain.       Start Date: --        End Date: --       Order(s) placed per  written order guidelines:    Please advise.

## 2020-08-11 ENCOUNTER — TELEPHONE (OUTPATIENT)
Dept: PAIN MEDICINE | Facility: CLINIC | Age: 55
End: 2020-08-11

## 2020-08-11 ENCOUNTER — OFFICE VISIT (OUTPATIENT)
Dept: ORTHOPEDICS | Facility: CLINIC | Age: 55
End: 2020-08-11
Payer: COMMERCIAL

## 2020-08-11 VITALS
HEART RATE: 86 BPM | HEIGHT: 65 IN | DIASTOLIC BLOOD PRESSURE: 82 MMHG | RESPIRATION RATE: 18 BRPM | TEMPERATURE: 98 F | SYSTOLIC BLOOD PRESSURE: 124 MMHG | BODY MASS INDEX: 29.57 KG/M2 | OXYGEN SATURATION: 97 % | WEIGHT: 177.5 LBS

## 2020-08-11 DIAGNOSIS — Z01.818 PRE-OP TESTING: Primary | ICD-10-CM

## 2020-08-11 DIAGNOSIS — M70.61 GREATER TROCHANTERIC BURSITIS OF RIGHT HIP: Primary | ICD-10-CM

## 2020-08-11 PROCEDURE — 99213 PR OFFICE/OUTPT VISIT, EST, LEVL III, 20-29 MIN: ICD-10-PCS | Mod: S$GLB,,, | Performed by: ORTHOPAEDIC SURGERY

## 2020-08-11 PROCEDURE — 99999 PR PBB SHADOW E&M-EST. PATIENT-LVL V: CPT | Mod: PBBFAC,,, | Performed by: ORTHOPAEDIC SURGERY

## 2020-08-11 PROCEDURE — 99213 OFFICE O/P EST LOW 20 MIN: CPT | Mod: S$GLB,,, | Performed by: ORTHOPAEDIC SURGERY

## 2020-08-11 PROCEDURE — 99999 PR PBB SHADOW E&M-EST. PATIENT-LVL V: ICD-10-PCS | Mod: PBBFAC,,, | Performed by: ORTHOPAEDIC SURGERY

## 2020-08-11 NOTE — PROGRESS NOTES
Follow up visit    Interval history (08/11/2020): Here for follow up R hip pain. Last injection into GT in May. Went to 4 sessions of PT, has been doing HEP every other day. Pain worse with PT sometimes. Reports that nothing helps her pain.  Improvement in pain with injection until about 2 weeks ago. Take naproxen occasionally. Has been having some GI issues and has an EGD and colonoscopy scheduled. Pain is now sharper and feels a little bit different than the pain she was having before. When pain is worse it does radiate down the lateral leg but does not go past the knee.  No n/t. Has had cervical radiculopathy but this does not feel similar.  Has some procedures scheduled with Dr ortiz next week      Interval history (05/18/2020):  Returns to discuss R hip pain - last seen for GT bursitis in September and was injected - relief for about 6 months  Did not go HEP because was too painful   Pain returned in March   Wants another injection      HPI (9/2019): Naomy Armstrong is a 54 y.o. female who presents today complaining of Pain of the Right Hip     Duration of symptoms:  Several years, worse over last few months  Trauma or new activity: no  Pain is intermittent  Aggravating factors: going from sit to stand   Relieving factors: no known   Does not have pain if she lays on the right side  Pain localized to lateral proximal femur   Radicular symptoms: no numbness, paresthesias. + chronic LBP that is unchanged  Prior treatment:  prescription NSAIDs without improvement in pain.     Pain does interfere with activities of daily living .     This is the extent of the patient's complaints at this time.     Review of Systems   All other systems reviewed and are negative.                  Review of patient's allergies indicates:   Allergen Reactions    Effexor [venlafaxine]         Elevated her blood pressure    Zoloft [sertraline]      Paroxetine hcl         Made her feel drunk            Current Outpatient  Medications:     amLODIPine (NORVASC) 5 MG tablet, Take 5 mg by mouth once daily., Disp: , Rfl:     aspirin-acetaminophen-caffeine 250-250-65 mg (EXCEDRIN MIGRAINE) 250-250-65 mg per tablet, Take 1 tablet by mouth every 6 (six) hours as needed for Pain., Disp: , Rfl:     dicyclomine (BENTYL) 20 mg tablet, Take 20 mg by mouth every 6 (six) hours., Disp: , Rfl:     ergocalciferol (ERGOCALCIFEROL) 50,000 unit Cap, Take 50,000 Units by mouth., Disp: , Rfl:     FLUoxetine 10 MG capsule, Take 1 capsule (10 mg total) by mouth once daily., Disp: 30 capsule, Rfl: 0    FLUoxetine 20 MG capsule, Take 1 capsule (20 mg total) by mouth once daily., Disp: 30 capsule, Rfl: 0    HYDROcodone-acetaminophen (NORCO) 7.5-325 mg per tablet, Take 1 tablet by mouth every 6 (six) hours as needed for Pain., Disp: , Rfl:     hydrOXYzine HCl (ATARAX) 50 MG tablet, Take 1 tablet (50 mg total) by mouth daily as needed for Anxiety (sleep)., Disp: 30 tablet, Rfl: 0    lisinopril (PRINIVIL,ZESTRIL) 20 MG tablet, Take 20 mg by mouth once daily., Disp: , Rfl:     loperamide (IMODIUM) 2 mg capsule, Take 2 mg by mouth 4 (four) times daily as needed for Diarrhea., Disp: , Rfl:     multivitamin (ONE DAILY MULTIVITAMIN) per tablet, Take 1 tablet by mouth once daily., Disp: , Rfl:     naproxen (NAPROSYN) 500 MG tablet, , Disp: , Rfl: 1    naproxen-esomeprazole (VIMOVO) 500-20 mg TbID, Take by mouth., Disp: , Rfl:     nitrofurantoin, macrocrystal-monohydrate, (MACROBID) 100 MG capsule, Take 100 mg by mouth as needed., Disp: , Rfl:     omeprazole (PRILOSEC) 20 MG capsule, Take 20 mg by mouth once daily. , Disp: , Rfl:     ondansetron (ZOFRAN) 4 MG tablet, , Disp: , Rfl:     promethazine (PHENERGAN) 25 MG tablet, , Disp: , Rfl: 1    simethicone (MYLICON) 125 MG chewable tablet, Take 125 mg by mouth., Disp: , Rfl:     tiZANidine (ZANAFLEX) 4 MG tablet, , Disp: , Rfl: 1    topiramate (TROKENDI XR) 50 mg Cp24, Take 50 mg by mouth daily as  needed., Disp: , Rfl:           Past Medical History:   Diagnosis Date    Alcohol abuse       per chart, but patient denies today and cocaine per chart    Anxiety      Depression      Hallucination       last couple of months started seeing someone standing in her room, saw boyfriend's reflection in glass    Headache      [facility] of [facility]       [facility] 13 [facility]; 2018 for SI    Hx of psychiatric care      Hypertension      Psychiatric problem      Sleep difficulties      Suicide attempt       with knife by cutting stomach    Therapy                 Patient Active Problem List   Diagnosis    Cervical radiculopathy    DDD (degenerative disc disease), cervical    Generalized headaches    Primary osteoarthritis of right knee    Neck pain    Spondylosis of cervical region without myelopathy or radiculopathy    Polysubstance abuse                   Past Surgical History:   Procedure Laterality Date    BLOCK, NERVE, FACET JOINT, CERVICAL, MEDIAL BRANCH Left 9/10/2013     Performed by [facility] at [facility]    INJECTION, STEROID, SPINE, CERVICAL, EPIDURAL N/A 2013     Performed by [facility] at [facility]    RADIOFREQUENCY THERMAL COAGULATION, NERVE, SPINAL, CERVICAL, POSTERIOR RAMUS, MEDIAL BRANCH Left 10/8/2013     Performed by Lis [facility] at [facility]         Social History                Tobacco Use    Smoking status: Former Smoker       Last attempt to quit: 2006       Years since quittin.4    Smokeless tobacco: Never Used   Substance Use Topics    Alcohol use: Yes       Comment: occasional    Drug use: Not Currently       Types: Marijuana       Comment: tried at age 14                   Family History   Problem Relation Age of Onset    Anxiety disorder Sister      Depression Sister      Alcohol abuse Maternal Uncle      Alcohol abuse Maternal Grandfather           Physical  "Exam:   Vitals:    20 0920   BP: 124/82   Pulse: 86   Resp: 18   Temp: 97.7 °F (36.5 °C)   SpO2: 97%   Weight: 80.5 kg (177 lb 7.5 oz)   Height: 5' 5" (1.651 m)   PainSc:   5   PainLoc: Hip       General: Weight: 76.1 kg (167 lb 12.3 oz) Body mass index is 28.8 kg/m².   Patient is alert, awake and oriented to time, place and person. Mood and affect are appropriate.  Patient does not appear to be in any distress, denies any constitutional symptoms and appears stated age.   Resp:  No respiratory distress or audible wheezing   CV: 2+  pulses, all extremities warm and well perfused   Right Hip   [facility]  ER [facility], IR 35 - painless  NTTP over GT bursa  TTP over ER tendons  Pain with flexion and adduction, IR of hip   No swelling or tenderness of leg     Imagin views right hip: no degenerative changes, no evidence of fracture      I personally reviewed and interpreted the patient's imaging obtained today in clinic     Assessment: 54 y.o. female with right greater trochanteric bursitis      I explained my diagnostic impression and the reasoning behind it in detail, using layman's terms.  Models and/or pictures were used to help in the explanation.  We discussed non operative and operative treatment modalities -- She would like to start with non-operative treatment in the form of injection.      Plan:   - MRI R hip  - RTC once complete  - Continue complete     "

## 2020-08-11 NOTE — TELEPHONE ENCOUNTER
Reviewed pre-procedure instructions with Ms. Moralez, as well as provided arrival time of 1045a.  COVID test scheduled on 08/16/2020 at 0940a at the [facility] on [facility].  All questions answered- patient verbalized understanding.

## 2020-08-12 ENCOUNTER — HOSPITAL ENCOUNTER (OUTPATIENT)
Dept: RADIOLOGY | Facility: HOSPITAL | Age: 55
Discharge: HOME | End: 2020-08-12
Attending: ORTHOPAEDIC SURGERY
Payer: COMMERCIAL

## 2020-08-12 DIAGNOSIS — M70.61 GREATER TROCHANTERIC BURSITIS OF RIGHT HIP: ICD-10-CM

## 2020-08-12 PROCEDURE — 73721 MRI JNT OF LWR EXTRE W/O DYE: CPT | Mod: 26,RT,, | Performed by: RADIOLOGY

## 2020-08-12 PROCEDURE — 73721 MRI JNT OF LWR EXTRE W/O DYE: CPT | Mod: TC,RT

## 2020-08-12 PROCEDURE — 73721 MRI HIP WITHOUT CONTRAST RIGHT: ICD-10-PCS | Mod: 26,RT,, | Performed by: RADIOLOGY

## 2020-08-14 ENCOUNTER — CLINICAL SUPPORT (OUTPATIENT)
Dept: REHABILITATION | Facility: HOSPITAL | Age: 55
End: 2020-08-14
Attending: ORTHOPAEDIC SURGERY
Payer: COMMERCIAL

## 2020-08-14 DIAGNOSIS — M25.661 DECREASED RANGE OF MOTION OF RIGHT LOWER EXTREMITY: ICD-10-CM

## 2020-08-14 DIAGNOSIS — M25.551 PAIN IN JOINT OF RIGHT HIP: ICD-10-CM

## 2020-08-14 DIAGNOSIS — R29.898 DECREASED STRENGTH OF TRUNK AND BACK: ICD-10-CM

## 2020-08-14 DIAGNOSIS — R29.898 DECREASED STRENGTH OF LOWER EXTREMITY: ICD-10-CM

## 2020-08-14 PROCEDURE — 97140 MANUAL THERAPY 1/> REGIONS: CPT | Mod: PN

## 2020-08-14 PROCEDURE — 97110 THERAPEUTIC EXERCISES: CPT | Mod: PN

## 2020-08-14 NOTE — PROGRESS NOTES
"  Physical Therapy Daily Treatment Note     Name: Naomy Perez Bagley  Clinic Number: [id number]    Therapy Diagnosis:   Encounter Diagnoses   Name Primary?    Decreased strength of lower extremity     Decreased strength of trunk and back     Pain in joint of right hip     Decreased range of motion of right lower extremity      Physician: Jessica Shay MD    Visit Date: 8/14/2020    Physician Orders: PT Eval and Treat   Medical Diagnosis from Referral: M70.61 (ICD-10-CM) - Greater trochanteric bursitis of right hip  Evaluation Date: 6/1/2020  Authorization Period Expiration: 12/31/2020  Plan of Care Expiration: 9/1/2020  Visit # / Visits authorized: 4/20  PN Due:  9/14/2020     Time In: 918  Time Out: 1000  Total Billable Time: 42 minutes    Precautions: Standard    Subjective     Pt reports: her R hip is sore and achey today, 5/10 pain. She has an appointment to burn some nerves in her back and her other doctor wanted to see if this would help with her hip pain.      Objective   PROGRESS NOTE    Hip Range of Motion:    L flexion: 120  R flexion: 95 degrees, pain     Lower Extremity Strength  Right LE   Left LE     Knee extension: 5/5 Knee extension: 4+/5   Knee flexion: 5/5 Knee flexion: 4+/5   Hip flexion: 5/5 Hip flexion: 4+/5   Hip Internal Rotation:  5/5 Hip Internal Rotation: 5/5/   Hip External Rotation: 5/5 Hip External Rotation: 5/5   Hip extension:  5/5 Hip extension: 4/5   Hip abduction: 4+/5 Hip abduction: 4+/5   Hip adduction: 4/5 Hip adduction: 4/5   Ankle dorsiflexion: 5/5 Ankle dorsiflexion: 5/5   Ankle plantarflexion: 5/5 Ankle plantarflexion: 5/5           Naomy received therapeutic exercises to develop strength, endurance, ROM and flexibility for 34 minutes including:    Bike x5'  Bridges with adduction 2x10  S/l hip abduction 2x10 R  Piriformis st figure 4 2x30" R  Standing ITB st R x1 min  LTR to L hold x1 min  Side steps YTB x2 laps  Sit to stands from 18 in box step with foam " 2x10  Steamboats on foam 2x10 B    Not performed today:  +Bridges + Knee Ext 1x12  +Bridges + Hip Flex + Knee Ext 1x12  +Clams 3x12  +Clams + Knee Ext 3x12  +Side Kick Level 1 3x12  +Straight Arm Pull Down + Marching GTB x30  +Sidelying hip adduction R LE 2x10  Prone hip extension B LE 3x12  +Leg Pull in Supine 3x12  +Windsheild wipers prone R LE 2x10  +Hip IR/ER RTB 2x10  +Knee Flexion Matrix 30# 2x15    Naomy received the following manual therapy techniques: Soft tissue Mobilization were applied to the: R hip for 08 minutes, including:    STM R piriformis, TFL, glut med      Home Exercises Provided and Patient Education Provided     Education provided:   Cont to perform HEP as provided.     Written Home Exercises Provided: yes.  Exercises were reviewed and Naomy was able to demonstrate them prior to the end of the session.  Naomy demonstrated good  understanding of the education provided.     See EMR under Patient Instructions for exercises provided 7/2/2020.    Assessment     Pt performed reassessment today. Patient with improved RLE strength, now slightly stronger than unaffected side. ROM R side still limited secondary to pain. Manual therapy performed prior to standing exercises today; during sit to stands pt very excited that this is the first time she hasn't had any pain since beginning therapy. Assess response to manual therapy next visit.    Naomy is progressing well towards her goals.   Pt prognosis is Good.     Pt will continue to benefit from skilled outpatient physical therapy to address the deficits listed in the problem list box on initial evaluation, provide pt/family education and to maximize pt's level of independence in the home and community environment.     Pt's spiritual, cultural and educational needs considered and pt agreeable to plan of care and goals.    Anticipated barriers to physical therapy: mental health hx as documented in chart, work schedule    GOALS: Short Term Goals:   2-3 weeks  1.Report decreased in pain at worse less than <   / =  7 /10  to increase tolerance for functional activities. On going  2. Pt to improve R hip IR range of motion by 25% to allow for improved functional mobility to allow for improvement in IADLs. On going  3. Increased B posterior chain and hip girdle musculature  MMT 1/2  to increase tolerance for ADL and work activities. *GOAL MET 8/14/20  4. Pt to demonstrate ability to squat x 5 with correct form and pain < / = 3/10 in order to improve tolerance to work activities. *GOAL MET 8/14/20  5. Pt to tolerate HEP to improve ROM and independence with ADL's. *GOAL MET 8/14/20     Long Term Goals:  6-8 weeks  1.Report decreased in pain at worse less than  <   / =  3  /10  to increase tolerance for functional mobility  2.Pt to improve range of motion by 75% to allow for improved functional mobility to allow for improvement in IADLs.   3.Increased B posterior chain and hip girdle musculature MMT 1 grade  to increase tolerance for ADL and work activities. *GOAL MET 8/14/20  4. Pt will scores report at CJ level (20-40% impaired) on LEFS  to demonstrate increase in LE function with every day tasks.   5. Pt to be Independent with HEP to improve ROM and independence with ADL's  6. Pt to demonstrate ability to squat x 10 with pain < / = 1/10 in order to improve tolerance to work activities *GOAL MET 8/14/20    Plan     Certification date: 6/1/2020 - 9/1/2020    Cont skilled PT session towards PT and patient's goals.    Dianne Fitzpatrick, PT  08/14/2020

## 2020-08-16 ENCOUNTER — LAB VISIT (OUTPATIENT)
Dept: URGENT CARE | Facility: CLINIC | Age: 55
End: 2020-08-16
Payer: COMMERCIAL

## 2020-08-16 DIAGNOSIS — Z01.818 PRE-OP TESTING: ICD-10-CM

## 2020-08-16 PROCEDURE — U0003 INFECTIOUS AGENT DETECTION BY NUCLEIC ACID (DNA OR RNA); SEVERE ACUTE RESPIRATORY SYNDROME CORONAVIRUS 2 (SARS-COV-2) (CORONAVIRUS DISEASE [COVID-19]), AMPLIFIED PROBE TECHNIQUE, MAKING USE OF HIGH THROUGHPUT TECHNOLOGIES AS DESCRIBED BY CMS-2020-01-R: HCPCS

## 2020-08-17 LAB — SARS-COV-2 RNA RESP QL NAA+PROBE: NOT DETECTED

## 2020-08-19 ENCOUNTER — HOSPITAL ENCOUNTER (OUTPATIENT)
Facility: HOSPITAL | Age: 55
Discharge: HOME | End: 2020-08-19
Attending: PAIN MEDICINE | Admitting: PAIN MEDICINE
Payer: COMMERCIAL

## 2020-08-19 VITALS
RESPIRATION RATE: 18 BRPM | DIASTOLIC BLOOD PRESSURE: 62 MMHG | TEMPERATURE: 98 F | HEART RATE: 70 BPM | OXYGEN SATURATION: 97 % | SYSTOLIC BLOOD PRESSURE: 124 MMHG

## 2020-08-19 DIAGNOSIS — M47.816 LUMBAR SPONDYLOSIS: Primary | ICD-10-CM

## 2020-08-19 PROCEDURE — 64635 PR DESTROY LUMB/SAC FACET JNT: ICD-10-PCS | Mod: RT,,, | Performed by: PAIN MEDICINE

## 2020-08-19 PROCEDURE — 64636 PR DESTROY L/S FACET JNT ADDL: ICD-10-PCS | Mod: RT,,, | Performed by: PAIN MEDICINE

## 2020-08-19 PROCEDURE — 99152 MOD SED SAME PHYS/QHP 5/>YRS: CPT | Performed by: PAIN MEDICINE

## 2020-08-19 PROCEDURE — 25000003 PHARM REV CODE 250: Performed by: PAIN MEDICINE

## 2020-08-19 PROCEDURE — 64635 DESTROY LUMB/SAC FACET JNT: CPT | Mod: RT,,, | Performed by: PAIN MEDICINE

## 2020-08-19 PROCEDURE — 64636 DESTROY L/S FACET JNT ADDL: CPT | Mod: RT,,, | Performed by: PAIN MEDICINE

## 2020-08-19 PROCEDURE — 64636 DESTROY L/S FACET JNT ADDL: CPT | Performed by: PAIN MEDICINE

## 2020-08-19 PROCEDURE — 64635 DESTROY LUMB/SAC FACET JNT: CPT | Performed by: PAIN MEDICINE

## 2020-08-19 PROCEDURE — 63600175 PHARM REV CODE 636 W HCPCS: Performed by: PAIN MEDICINE

## 2020-08-19 RX ORDER — FENTANYL CITRATE 50 UG/ML
100 INJECTION, SOLUTION INTRAMUSCULAR; INTRAVENOUS
Status: DISCONTINUED | OUTPATIENT
Start: 2020-08-19 | End: 2020-08-19 | Stop reason: HOSPADM

## 2020-08-19 RX ORDER — TRIAMCINOLONE ACETONIDE 40 MG/ML
INJECTION, SUSPENSION INTRA-ARTICULAR; INTRAMUSCULAR
Status: DISCONTINUED
Start: 2020-08-19 | End: 2020-08-19 | Stop reason: HOSPADM

## 2020-08-19 RX ORDER — LIDOCAINE HYDROCHLORIDE 20 MG/ML
INJECTION, SOLUTION INFILTRATION; PERINEURAL
Status: DISCONTINUED
Start: 2020-08-19 | End: 2020-08-19 | Stop reason: HOSPADM

## 2020-08-19 RX ORDER — BUPIVACAINE HYDROCHLORIDE 2.5 MG/ML
INJECTION, SOLUTION EPIDURAL; INFILTRATION; INTRACAUDAL
Status: DISCONTINUED
Start: 2020-08-19 | End: 2020-08-19 | Stop reason: HOSPADM

## 2020-08-19 RX ORDER — TRIAMCINOLONE ACETONIDE 40 MG/ML
40 INJECTION, SUSPENSION INTRA-ARTICULAR; INTRAMUSCULAR ONCE
Status: COMPLETED | OUTPATIENT
Start: 2020-08-19 | End: 2020-08-19

## 2020-08-19 RX ORDER — SODIUM CHLORIDE 9 MG/ML
INJECTION, SOLUTION INTRAVENOUS CONTINUOUS
Status: DISCONTINUED | OUTPATIENT
Start: 2020-08-19 | End: 2020-08-19 | Stop reason: HOSPADM

## 2020-08-19 RX ORDER — MIDAZOLAM HYDROCHLORIDE 1 MG/ML
INJECTION INTRAMUSCULAR; INTRAVENOUS
Status: DISCONTINUED
Start: 2020-08-19 | End: 2020-08-19 | Stop reason: HOSPADM

## 2020-08-19 RX ORDER — LIDOCAINE HYDROCHLORIDE 20 MG/ML
10 INJECTION, SOLUTION INFILTRATION; PERINEURAL ONCE
Status: COMPLETED | OUTPATIENT
Start: 2020-08-19 | End: 2020-08-19

## 2020-08-19 RX ORDER — FENTANYL CITRATE 50 UG/ML
INJECTION, SOLUTION INTRAMUSCULAR; INTRAVENOUS
Status: DISCONTINUED
Start: 2020-08-19 | End: 2020-08-19 | Stop reason: HOSPADM

## 2020-08-19 RX ORDER — BUPIVACAINE HYDROCHLORIDE 2.5 MG/ML
10 INJECTION, SOLUTION EPIDURAL; INFILTRATION; INTRACAUDAL ONCE
Status: COMPLETED | OUTPATIENT
Start: 2020-08-19 | End: 2020-08-19

## 2020-08-19 RX ORDER — MIDAZOLAM HYDROCHLORIDE 1 MG/ML
5 INJECTION INTRAMUSCULAR; INTRAVENOUS
Status: DISCONTINUED | OUTPATIENT
Start: 2020-08-19 | End: 2020-08-19 | Stop reason: HOSPADM

## 2020-08-19 NOTE — H&P
Subjective:     Patient ID: Naomy Armstrong is a 55 y.o. female    Chief Complaint: Low back pain    Referred by: Jeremy Powell      HPI:    Naomy Armstrong is a 55 y.o. female who presents today for right L3, L4 and L5 RFA. She denies any changes in the quality or location of the pain.        Review of Systems   Constitutional: Negative for activity change, appetite change, chills, fatigue, fever and unexpected weight change.   HENT: Negative for hearing loss.    Eyes: Negative for visual disturbance.   Respiratory: Negative for chest tightness and shortness of breath.    Cardiovascular: Negative for chest pain.   Gastrointestinal: Negative for abdominal pain, constipation, diarrhea, nausea and vomiting.   Genitourinary: Negative for difficulty urinating.   Musculoskeletal: Positive for back pain, gait problem and myalgias. Negative for neck pain.   Skin: Negative for rash.   Neurological: Negative for dizziness, weakness, light-headedness, numbness and headaches.   Psychiatric/Behavioral: Positive for sleep disturbance. Negative for hallucinations and suicidal ideas. The patient is not nervous/anxious.        Past Medical History:   Diagnosis Date    Alcohol abuse     per chart, but patient denies today and cocaine per chart    Anxiety     Depression     Hallucination     last couple of months started seeing someone standing in her room, saw boyfriend's reflection in glass    Headache     History of psychiatric hospitalization     age 13 [facility] for 2 months for acting out; 2018 for SI    Hx of psychiatric care     Hypertension     Psychiatric problem     Sleep difficulties     Suicide attempt     with knife by cutting stomach    Therapy        Past Surgical History:   Procedure Laterality Date    EPIDURAL STEROID INJECTION Bilateral 8/5/2020    Procedure: Bilateral MBB @ L3, L4, L5;  Surgeon: Jeremy Powell Jr., MD;  Location: [facility];  Service: Pain Management;   Laterality: Bilateral;  Bilat L3-5 MBB  Arrive @ 1315; No ATC or DM; Needs MD signature       Social History     Socioeconomic History    Marital status:      Spouse name: Not on file    Number of children: 0    Years of education: Not on file    Highest education level: Not on file   Occupational History    Occupation:      Employer: [employer]   Social Needs    Financial resource strain: Very hard    Food insecurity     Worry: Often true     Inability: Often true    Transportation needs     Medical: No     Non-medical: No   Tobacco Use    Smoking status: Former Smoker     Quit date: 2006     Years since quittin.3    Smokeless tobacco: Never Used   Substance and Sexual Activity    Alcohol use: Yes     Frequency: Monthly or less     Drinks per session: Patient refused     Binge frequency: Less than monthly     Comment: occasional    Drug use: Not Currently     Types: Marijuana     Comment: tried at age 14    Sexual activity: Yes     Partners: Male     Comment: going through menopause   Lifestyle    Physical activity     Days per week: 0 days     Minutes per session: 0 min    Stress: Very much   Relationships    Social connections     Talks on phone: Patient refused     Gets together: Patient refused     Attends [employer] service: Not on file     Active member of club or organization: No     Attends meetings of clubs or organizations: Patient refused     Relationship status:    Other Topics Concern    Patient feels they ought to cut down on drinking/drug use No    Patient annoyed by others criticizing their drinking/drug use No    Patient has felt bad or guilty about drinking/drug use No    Patient has had a drink/used drugs as an eye opener in the AM No   Social History Narrative     x 2.    Lives with boyfriend.     Works at [employer] as an .    Presently working on Bachelor's degree.    Has associates degree in criminal justice.        Review of patient's allergies indicates:   Allergen Reactions    Effexor [venlafaxine]      Elevated her blood pressure    Zoloft [sertraline]     Paroxetine hcl      Made her feel drunk       No current facility-administered medications on file prior to encounter.      Current Outpatient Medications on File Prior to Encounter   Medication Sig Dispense Refill    albuterol (PROAIR HFA) 90 mcg/actuation inhaler Inhale 2 puffs into the lungs every 6 (six) hours as needed for Wheezing. Rescue 18 g 1    amLODIPine (NORVASC) 5 MG tablet Take 1 tablet (5 mg total) by mouth once daily. 90 tablet 1    cetirizine (ZYRTEC) 10 MG tablet Take 1 tablet (10 mg total) by mouth once daily. 90 tablet 3    dicyclomine (BENTYL) 20 mg tablet Take 1 tablet (20 mg total) by mouth 3 (three) times daily as needed (abdominal pain). 90 tablet 0    fluconazole (DIFLUCAN) 150 MG Tab 1 tablet orally at first sign of yeast infection and 1 tablet orally 3 days later 2 tablet 0    FLUoxetine 20 MG capsule Take 1 capsule (20 mg total) by mouth once daily. Take in addition to 40 mg for a total of 60mg daily. 90 capsule 1    FLUoxetine 40 MG capsule Take 1 capsule (40 mg total) by mouth once daily. Take in addition to 20 mg for a total of 60mg daily. 90 capsule 1    fluticasone propionate (FLONASE) 50 mcg/actuation nasal spray 2 sprays (100 mcg total) by Each Nostril route once daily. 9.9 mL 11    gabapentin (NEURONTIN) 300 MG capsule Take 2 capsules (600 mg total) by mouth 3 (three) times daily. 180 capsule 11    hydrOXYzine (ATARAX) 50 MG tablet Take 1 tablet (50 mg total) by mouth 2 (two) times daily as needed for Anxiety. 180 tablet 1    ipratropium (ATROVENT) 42 mcg (0.06 %) nasal spray 2 sprays by Nasal route 4 (four) times daily. 15 mL 2    lisinopriL (PRINIVIL,ZESTRIL) 20 MG tablet Take 1 tablet (20 mg total) by mouth once daily. 90 tablet 3    loperamide (IMODIUM) 2 mg capsule Take 2 mg by mouth once as needed for  Diarrhea.      methocarbamol (ROBAXIN) 750 MG Tab Take 500 mg by mouth 2 (two) times daily as needed.      multivitamin with minerals tablet Take 1 tablet by mouth once daily.      naproxen (NAPROSYN) 500 MG tablet Take 1 tablet (500 mg total) by mouth 2 (two) times daily with meals. 60 tablet 0    omeprazole (PRILOSEC) 20 MG capsule Take 2 capsules (40 mg total) by mouth 2 (two) times a day. 120 capsule 1    promethazine (PHENERGAN) 25 MG tablet Take 1 tablet (25 mg total) by mouth every 8 (eight) hours as needed for Nausea. 30 tablet 0    traMADol (ULTRAM) 50 mg tablet Take 50 mg by mouth every 8 (eight) hours as needed for Pain.         Objective:      There were no vitals taken for this visit.    Exam:  AAOx3 NAD  Mood and affect normal  Memory and language intact  RRR  CTAB        Assessment:       Lumbar spondylosis      Plan:         Naomy Armstrong is a 55 y.o. female with lumbar facet pain.    Proceed with RFA as planned    The procedure that I am ordering/performing has been deemed time sensitive given patient's degree of pain and limitations that this pain composed this upon his/her daily life.

## 2020-08-19 NOTE — H&P (VIEW-ONLY)
Subjective:     Patient ID: Naomy Armstrong is a 55 y.o. female    Chief Complaint: Low back pain    Referred by: Jeremy Powell      HPI:    Naomy Armstrong is a 55 y.o. female who presents today for right L3, L4 and L5 RFA. She denies any changes in the quality or location of the pain.        Review of Systems   Constitutional: Negative for activity change, appetite change, chills, fatigue, fever and unexpected weight change.   HENT: Negative for hearing loss.    Eyes: Negative for visual disturbance.   Respiratory: Negative for chest tightness and shortness of breath.    Cardiovascular: Negative for chest pain.   Gastrointestinal: Negative for abdominal pain, constipation, diarrhea, nausea and vomiting.   Genitourinary: Negative for difficulty urinating.   Musculoskeletal: Positive for back pain, gait problem and myalgias. Negative for neck pain.   Skin: Negative for rash.   Neurological: Negative for dizziness, weakness, light-headedness, numbness and headaches.   Psychiatric/Behavioral: Positive for sleep disturbance. Negative for hallucinations and suicidal ideas. The patient is not nervous/anxious.        Past Medical History:   Diagnosis Date    Alcohol abuse     per chart, but patient denies today and cocaine per chart    Anxiety     Depression     Hallucination     last couple of months started seeing someone standing in her room, saw boyfriend's reflection in glass    Headache     History of psychiatric hospitalization     age 13 [facility] for 2 months for acting out; 2018 for SI    Hx of psychiatric care     Hypertension     Psychiatric problem     Sleep difficulties     Suicide attempt     with knife by cutting stomach    Therapy        Past Surgical History:   Procedure Laterality Date    EPIDURAL STEROID INJECTION Bilateral 8/5/2020    Procedure: Bilateral MBB @ L3, L4, L5;  Surgeon: Jeremy Powell Jr., MD;  Location: [facility];  Service: Pain Management;   Laterality: Bilateral;  Bilat L3-5 MBB  Arrive @ 1315; No ATC or DM; Needs MD signature       Social History     Socioeconomic History    Marital status:      Spouse name: Not on file    Number of children: 0    Years of education: Not on file    Highest education level: Not on file   Occupational History    Occupation:      Employer: [employer]   Social Needs    Financial resource strain: Very hard    Food insecurity     Worry: Often true     Inability: Often true    Transportation needs     Medical: No     Non-medical: No   Tobacco Use    Smoking status: Former Smoker     Quit date: 2006     Years since quittin.3    Smokeless tobacco: Never Used   Substance and Sexual Activity    Alcohol use: Yes     Frequency: Monthly or less     Drinks per session: Patient refused     Binge frequency: Less than monthly     Comment: occasional    Drug use: Not Currently     Types: Marijuana     Comment: tried at age 14    Sexual activity: Yes     Partners: Male     Comment: going through menopause   Lifestyle    Physical activity     Days per week: 0 days     Minutes per session: 0 min    Stress: Very much   Relationships    Social connections     Talks on phone: Patient refused     Gets together: Patient refused     Attends Restoration service: Not on file     Active member of club or organization: No     Attends meetings of clubs or organizations: Patient refused     Relationship status:    Other Topics Concern    Patient feels they ought to cut down on drinking/drug use No    Patient annoyed by others criticizing their drinking/drug use No    Patient has felt bad or guilty about drinking/drug use No    Patient has had a drink/used drugs as an eye opener in the AM No   Social History Narrative     x 2.    Lives with boyfriend.     Works at [employer] as an .    Presently working on Bachelor's degree.    Has associates degree in criminal justice.        Review of patient's allergies indicates:   Allergen Reactions    Effexor [venlafaxine]      Elevated her blood pressure    Zoloft [sertraline]     Paroxetine hcl      Made her feel drunk       No current facility-administered medications on file prior to encounter.      Current Outpatient Medications on File Prior to Encounter   Medication Sig Dispense Refill    albuterol (PROAIR HFA) 90 mcg/actuation inhaler Inhale 2 puffs into the lungs every 6 (six) hours as needed for Wheezing. Rescue 18 g 1    amLODIPine (NORVASC) 5 MG tablet Take 1 tablet (5 mg total) by mouth once daily. 90 tablet 1    cetirizine (ZYRTEC) 10 MG tablet Take 1 tablet (10 mg total) by mouth once daily. 90 tablet 3    dicyclomine (BENTYL) 20 mg tablet Take 1 tablet (20 mg total) by mouth 3 (three) times daily as needed (abdominal pain). 90 tablet 0    fluconazole (DIFLUCAN) 150 MG Tab 1 tablet orally at first sign of yeast infection and 1 tablet orally 3 days later 2 tablet 0    FLUoxetine 20 MG capsule Take 1 capsule (20 mg total) by mouth once daily. Take in addition to 40 mg for a total of 60mg daily. 90 capsule 1    FLUoxetine 40 MG capsule Take 1 capsule (40 mg total) by mouth once daily. Take in addition to 20 mg for a total of 60mg daily. 90 capsule 1    fluticasone propionate (FLONASE) 50 mcg/actuation nasal spray 2 sprays (100 mcg total) by Each Nostril route once daily. 9.9 mL 11    gabapentin (NEURONTIN) 300 MG capsule Take 2 capsules (600 mg total) by mouth 3 (three) times daily. 180 capsule 11    hydrOXYzine (ATARAX) 50 MG tablet Take 1 tablet (50 mg total) by mouth 2 (two) times daily as needed for Anxiety. 180 tablet 1    ipratropium (ATROVENT) 42 mcg (0.06 %) nasal spray 2 sprays by Nasal route 4 (four) times daily. 15 mL 2    lisinopriL (PRINIVIL,ZESTRIL) 20 MG tablet Take 1 tablet (20 mg total) by mouth once daily. 90 tablet 3    loperamide (IMODIUM) 2 mg capsule Take 2 mg by mouth once as needed for  Diarrhea.      methocarbamol (ROBAXIN) 750 MG Tab Take 500 mg by mouth 2 (two) times daily as needed.      multivitamin with minerals tablet Take 1 tablet by mouth once daily.      naproxen (NAPROSYN) 500 MG tablet Take 1 tablet (500 mg total) by mouth 2 (two) times daily with meals. 60 tablet 0    omeprazole (PRILOSEC) 20 MG capsule Take 2 capsules (40 mg total) by mouth 2 (two) times a day. 120 capsule 1    promethazine (PHENERGAN) 25 MG tablet Take 1 tablet (25 mg total) by mouth every 8 (eight) hours as needed for Nausea. 30 tablet 0    traMADol (ULTRAM) 50 mg tablet Take 50 mg by mouth every 8 (eight) hours as needed for Pain.         Objective:      There were no vitals taken for this visit.    Exam:  AAOx3 NAD  Mood and affect normal  Memory and language intact  RRR  CTAB        Assessment:       Lumbar spondylosis      Plan:         Naomy Armstrong is a 55 y.o. female with lumbar facet pain.    Proceed with RFA as planned    The procedure that I am ordering/performing has been deemed time sensitive given patient's degree of pain and limitations that this pain composed this upon his/her daily life.

## 2020-08-19 NOTE — OP NOTE
LUMBAR Medial Branch Radiofrequency Ablation Under Fluoroscopy  Time-out taken to identify patient and procedure side prior to starting the procedure.   I attest that I have reviewed the patient's home medications prior to the procedure and no contraindication have been identified. I  re-evaluated the patient after the patient was positioned for the procedure in the procedure room immediately before the procedural time-out. The vital signs are current and represent the current state of the patient which has not significantly changed since the preprocedure assessment.                   Date of Service: 08/19/2020    PCP: Eric Hernandes Jr, MD    Referring Physician:                                                PROCEDURE:  right L3, L4 and L5 medial branch radiofrequency ablation    REASON FOR PROCEDURE: Lumbar spondylosis [M47.816]    PHYSICIAN: Jeremy Powell MD  ASSISTANTS: None    MEDICATIONS INJECTED:  0.5 ml of Kenalog 40mg/ml, and Bupivacaine 0.25% 10ml. Of that, 1.5-3ml injected per level before & after the ablation.    LOCAL ANESTHETIC USED: Xylocaine 2% 3ml each site.  SEDATION MEDICATIONS: Versed 1 mg and fentanyl 50 mcg IV.  Conscious sedation provided by MD and monitored by RN.   (See nurse documentation and case log for sedation time)      ESTIMATED BLOOD LOSS:  None.    COMPLICATIONS:  None.    TECHNIQUE:   Laying in a prone position, the patient was prepped and draped in the usual sterile fashion using ChloraPrep and fenestrated drape.  The level was determined under fluoroscopic guidance.  Local anesthetic was given by going down to the hub of the 27-gauge 1.25in needle and raising a wheel.  The 20-gauge needle was introduced to the anatomic local of the median branch at the lateral mass utilizing live fluoroscopy at each level stated above. Motor stimulation done to confirm no motor nerve ablation takes place up to 2 Volt 2Hz..  Sensory stimulation done to detect similarities in pain location 1.5  Volts 50Hz.. Medication was then injected slowly.  Ablation then done per level utilizing [place] radiofrequency generator 80°C for 90 seconds. The patient tolerated the procedure well.         The patient was monitored after the procedure.  Patient was given post procedure and discharge instructions to follow at home.  We will see the patient back in two weeks or the patient may call to inform of status. The patient was discharged in a stable condition

## 2020-08-19 NOTE — DISCHARGE INSTRUCTIONS
Home Care Instructions Pain Management:    1. DIET:   You may resume your normal diet today.   2. BATHING:   You may shower with luke warm water.  3. DRESSING:   You may remove your bandage today.   4. ACTIVITY LEVEL:   You may resume your normal activities 24 hrs after your procedure.  5. MEDICATIONS:   You may resume your normal medications today.   6. SPECIAL INSTRUCTIONS:   No heat to the injection site for 24 hrs including, bath or shower, heating pad, moist heat, or hot tubs.    Use ice pack to injection site for any pain or discomfort.  Apply ice packs for 20 minute intervals as needed.   If you have received any sedatives by mouth today you may not drive for 12 hours.    If you have received any sedation through your IV, you may not drive for 24 hrs.     PLEASE CALL YOUR DOCTOR IF:  1. Redness or swelling around the injection site.  2. Fever of 101 degrees  3. Drainage (pus) from the injection site.  4. For any continuous bleeding (some dried blood over the incision is normal.)    FOR EMERGENCIES:   If any unusual problems or difficulties occur during clinic hours, call [phone] or 135.   Medial Branch Neurotomy  Back or neck pain may be due to problems with certain nerves near your spine. If so, a medial branch neurotomy can help relieve your pain.  In some cases, your doctor may give you a nerve block to predict your response to neurotomy. The treatment uses heat, cold, radiofrequency, or chemicals to destroy the nerves near a problem joint. This keeps some pain messages from traveling to your brain, and helps relieve your symptoms.    Medial branch nerves  Each vertebra in your spine has facets (flat surfaces). They touch where the vertebrae fit together. This forms a facet joint. Each facet joint has at least two medial branch nerves. They are part of the nerve pathway to and from each facet joint. A facet joint in your back or neck can become inflamed (swollen and irritated). Pain messages may then  travel along the nerve pathway from the facet joint to your brain.    Blocking pain messages  Medial branch nerves in each facet joint send and carry messages about back or neck pain. Destroying a few of these nerves can keep certain pain messages from reaching your brain. This can help bring you relief. The relief typically lasts for months to years.  Risks and complications  Risks and complications are rare, but can include:  · Infection  · Increased pain, numbness, or weakness  · Nerve damage  · Bleeding  · Failure to relieve pain    © 3237-4102 [employer]. [address] [place] [address]. All rights reserved. This information is not intended as a substitute for professional medical care. Always follow your healthcare professional's instructions.      Medial Branch Neurotomy: Your Experience  Back or neck pain may be due to problems with certain nerves near your spine. If so, a medial branch neurotomy can help relieve your pain. The treatment uses heat, cold, radiofrequency, or chemicals to destroy the nerves near a problem joint. This keeps some pain messages from traveling to your brain, and helps relieve your symptoms.  The treatment is done in a hospital or outpatient department. Your healthcare provider will ask you to sign a consent form. He or she will examine you and may give you an IV (intravenous) line for fluids and medicines.      Relax at home for the rest of the day after your treatment, even if you feel good.    Getting ready for your treatment  · Ask your healthcare provider whether you should stop taking any medicines before treatment.  · Tell your provider if you are pregnant or allergic to any medicines.  · You may be asked to stop eating or drinking for several hours before you check in for your treatment.  During the procedure  You will lie on an exam table, most likely on your stomach depending on where the problem joint is.  · Your healthcare provider will clean the  skin over the treatment site and then numb it with medicine.  · Your provider uses X-ray imaging (fluoroscopy) to help see your spine and guide the treatment. Your provider may inject a contrast dye into the affected region to help get a better image. If you are allergic to iodine or had a reaction to a dye, tell your healthcare provider.  · Your healthcare provider uses heat, cold, or chemicals to destroy part of the nerve near the inflamed facet joint. Nearby nerves may also be treated.  After the procedure  Most often, you can go home shortly after the procedure, generally in about an hour. Have an adult friend or relative drive you. The treated spot may be swollen and may feel more sore than usual. This is normal and may last for a day or so. It will be a few days before you feel relief from your symptoms. Your provider may prescribe pain medicines for you during that time. Ask him or her when its OK for you to go back to work.  Call your healthcare provider if you have a fever over 100.4°F (38°C), chills, or redness or drainage at the treatment site.    © 7184-2925 [employer]. [address] [place] [address]. All rights reserved. This information is not intended as a substitute for professional medical care. Always follow your healthcare professional's instructions.            Medial Branch Neurotomy  Back or neck pain may be due to problems with certain nerves near your spine. If so, a medial branch neurotomy can help relieve your pain.  In some cases, your doctor may give you a nerve block to predict your response to neurotomy. The treatment uses heat, cold, radiofrequency, or chemicals to destroy the nerves near a problem joint. This keeps some pain messages from traveling to your brain, and helps relieve your symptoms.    Medial branch nerves  Each vertebra in your spine has facets (flat surfaces). They touch where the vertebrae fit together. This forms a facet joint. Each facet  joint has at least two medial branch nerves. They are part of the nerve pathway to and from each facet joint. A facet joint in your back or neck can become inflamed (swollen and irritated). Pain messages may then travel along the nerve pathway from the facet joint to your brain.    Blocking pain messages  Medial branch nerves in each facet joint send and carry messages about back or neck pain. Destroying a few of these nerves can keep certain pain messages from reaching your brain. This can help bring you relief. The relief typically lasts for months to years.  Risks and complications  Risks and complications are rare, but can include:  · Infection  · Increased pain, numbness, or weakness  · Nerve damage  · Bleeding  · Failure to relieve pain    © 7393-3740 [employer]. [address] [place] [address], [place] [address]. All rights reserved. This information is not intended as a substitute for professional medical care. Always follow your healthcare professional's instructions.      Medial Branch Neurotomy: Your Experience  Back or neck pain may be due to problems with certain nerves near your spine. If so, a medial branch neurotomy can help relieve your pain. The treatment uses heat, cold, radiofrequency, or chemicals to destroy the nerves near a problem joint. This keeps some pain messages from traveling to your brain, and helps relieve your symptoms.  The treatment is done in a hospital or outpatient department. Your healthcare provider will ask you to sign a consent form. He or she will examine you and may give you an IV (intravenous) line for fluids and medicines.      Relax at home for the rest of the day after your treatment, even if you feel good.    Getting ready for your treatment  · Ask your healthcare provider whether you should stop taking any medicines before treatment.  · Tell your provider if you are pregnant or allergic to any medicines.  · You may be asked to stop eating or drinking for  several hours before you check in for your treatment.  During the procedure  You will lie on an exam table, most likely on your stomach depending on where the problem joint is.  · Your healthcare provider will clean the skin over the treatment site and then numb it with medicine.  · Your provider uses X-ray imaging (fluoroscopy) to help see your spine and guide the treatment. Your provider may inject a contrast dye into the affected region to help get a better image. If you are allergic to iodine or had a reaction to a dye, tell your healthcare provider.  · Your healthcare provider uses heat, cold, or chemicals to destroy part of the nerve near the inflamed facet joint. Nearby nerves may also be treated.  After the procedure  Most often, you can go home shortly after the procedure, generally in about an hour. Have an adult friend or relative drive you. The treated spot may be swollen and may feel more sore than usual. This is normal and may last for a day or so. It will be a few days before you feel relief from your symptoms. Your provider may prescribe pain medicines for you during that time. Ask him or her when its OK for you to go back to work.  Call your healthcare provider if you have a fever over 100.4°F (38°C), chills, or redness or drainage at the treatment site.    © 7495-2835 [employer]. [address] [place] [address], [place] [address]. All rights reserved. This information is not intended as a substitute for professional medical care. Always follow your healthcare professional's instructions.            Medial Branch Neurotomy  Back or neck pain may be due to problems with certain nerves near your spine. If so, a medial branch neurotomy can help relieve your pain.  In some cases, your doctor may give you a nerve block to predict your response to neurotomy. The treatment uses heat, cold, radiofrequency, or chemicals to destroy the nerves near a problem joint. This keeps some pain messages from  traveling to your brain, and helps relieve your symptoms.    Medial branch nerves  Each vertebra in your spine has facets (flat surfaces). They touch where the vertebrae fit together. This forms a facet joint. Each facet joint has at least two medial branch nerves. They are part of the nerve pathway to and from each facet joint. A facet joint in your back or neck can become inflamed (swollen and irritated). Pain messages may then travel along the nerve pathway from the facet joint to your brain.    Blocking pain messages  Medial branch nerves in each facet joint send and carry messages about back or neck pain. Destroying a few of these nerves can keep certain pain messages from reaching your brain. This can help bring you relief. The relief typically lasts for months to years.  Risks and complications  Risks and complications are rare, but can include:  · Infection  · Increased pain, numbness, or weakness  · Nerve damage  · Bleeding  · Failure to relieve pain    © 2643-7769 [employer]. [address] [place] [address]. All rights reserved. This information is not intended as a substitute for professional medical care. Always follow your healthcare professional's instructions.      Medial Branch Neurotomy: Your Experience  Back or neck pain may be due to problems with certain nerves near your spine. If so, a medial branch neurotomy can help relieve your pain. The treatment uses heat, cold, radiofrequency, or chemicals to destroy the nerves near a problem joint. This keeps some pain messages from traveling to your brain, and helps relieve your symptoms.  The treatment is done in a hospital or outpatient department. Your healthcare provider will ask you to sign a consent form. He or she will examine you and may give you an IV (intravenous) line for fluids and medicines.      Relax at home for the rest of the day after your treatment, even if you feel good.    Getting ready for your  treatment  · Ask your healthcare provider whether you should stop taking any medicines before treatment.  · Tell your provider if you are pregnant or allergic to any medicines.  · You may be asked to stop eating or drinking for several hours before you check in for your treatment.  During the procedure  You will lie on an exam table, most likely on your stomach depending on where the problem joint is.  · Your healthcare provider will clean the skin over the treatment site and then numb it with medicine.  · Your provider uses X-ray imaging (fluoroscopy) to help see your spine and guide the treatment. Your provider may inject a contrast dye into the affected region to help get a better image. If you are allergic to iodine or had a reaction to a dye, tell your healthcare provider.  · Your healthcare provider uses heat, cold, or chemicals to destroy part of the nerve near the inflamed facet joint. Nearby nerves may also be treated.  After the procedure  Most often, you can go home shortly after the procedure, generally in about an hour. Have an adult friend or relative drive you. The treated spot may be swollen and may feel more sore than usual. This is normal and may last for a day or so. It will be a few days before you feel relief from your symptoms. Your provider may prescribe pain medicines for you during that time. Ask him or her when its OK for you to go back to work.  Call your healthcare provider if you have a fever over 100.4°F (38°C), chills, or redness or drainage at the treatment site.    © 0653-7214 [employer]. [address] [place] [address], [place] [address]. All rights reserved. This information is not intended as a substitute for professional medical care. Always follow your healthcare professional's instructions.                Understanding Radiofrequency Denervation  Radiofrequency denervation is a treatment choice for some kinds of lower back and neck pain. It uses an electrical current  created by radio waves. The radio waves make heat that destroys nerves along the spine that are causing pain. Its also known as radiofrequency lesioning, ablation, neurotomy, or rhizomotomy.  How to say it  [id number]   Why radiofrequency denervation is done  This treatment may be an option to reduce or stop lower back or neck pain that has lasted for a month or more. It can help treat pain caused by arthritis, normal wear and tear, disk disease, injury, and other problems. Its used when other treatment hasnt worked, such as medicine and physical therapy. It may be done after an injection of medicine into the nerve area to confirm that the nerve will respond to treatment.  How radiofrequency denervation is done  The procedure is done in a hospital or medical clinic. During the treatment:  · You lie on your stomach. The area in your back or neck to be treated may be numbed. You may be given some medicine to help you relax.  · The healthcare provider inserts a thin tube through the skin over the spine in your lower back or neck. He or she uses moving X-ray machine called a fluoroscope to help make sure the thin tube is in the right place. You may feel some discomfort.  · When the tube is in place, the provider puts a wire through the tube. The wire is connected to a machine that sends radio waves through the wire. The radio waves heat the nerve and destroy it.  · More than one nerve may need to be treated. You can go home 1 to 2 hours after the procedure.   You may feel more pain right after the treatment. The pain should then go away over 1 to 3 weeks. The pain relief may last for less than a month, or for 6 months or more. This depends on what is causing your pain, and how well this treatment can work for it. It may help you be able to take less medicine for pain. The nerve will likely grow back. But it may not cause pain, or as much pain as before.  Risks of radiofrequency  denervation  · More pain after the procedure for a period of time  · Mild burning feeling that may last up to 3 weeks  · Numbness in the area that may last up to 4 months  Date Last Reviewed: 6/1/2016  © 8854-5411 [employer]. [address] [place] [address], [place] [address]. All rights reserved. This information is not intended as a substitute for professional medical care. Always follow your healthcare professional's instructions.

## 2020-08-20 ENCOUNTER — DOCUMENTATION ONLY (OUTPATIENT)
Dept: REHABILITATION | Facility: HOSPITAL | Age: 55
End: 2020-08-20

## 2020-08-20 ENCOUNTER — TELEPHONE (OUTPATIENT)
Dept: PAIN MEDICINE | Facility: CLINIC | Age: 55
End: 2020-08-20

## 2020-08-20 DIAGNOSIS — M47.816 LUMBAR SPONDYLOSIS: Primary | ICD-10-CM

## 2020-08-20 NOTE — PROGRESS NOTES
Pt arrived to clinic today, however was > 20 mins late. She did not complete therapy session today per pt request and due to late arrival. She expressed she wanted to discuss the results of her recent MRI imaging to see if she should still continue with her same POC. PTA, in collaboration with evaluating PT, Saundra Salgado informed patient that current POC remains appropriate. Pt verbalized understanding of education/information given and scheduled more PT appointments prior to leaving clinic. Pt's next scheduled appointment is with Saundra Salgado on 8-28-20. Plan to perform a formal reassessment post MRI results on pt's next scheduled appointment.     Jacki Crawley, PTA

## 2020-08-20 NOTE — TELEPHONE ENCOUNTER
Message left encouraging patient to contact clinic to discuss scheduling left L3-5 RFA- phone number included.

## 2020-08-20 NOTE — TELEPHONE ENCOUNTER
Ms. Moralez would like to schedule the left L3-5 RFRA on 9/4/2020, requesting arrival time of ~0900a- Dr. Powell updated.

## 2020-08-27 ENCOUNTER — OFFICE VISIT (OUTPATIENT)
Dept: DERMATOLOGY | Facility: CLINIC | Age: 55
End: 2020-08-27
Payer: COMMERCIAL

## 2020-08-27 ENCOUNTER — HOSPITAL ENCOUNTER (OUTPATIENT)
Dept: RADIOLOGY | Facility: HOSPITAL | Age: 55
Discharge: HOME | End: 2020-08-27
Attending: NEUROLOGICAL SURGERY
Payer: COMMERCIAL

## 2020-08-27 ENCOUNTER — TELEPHONE (OUTPATIENT)
Dept: PAIN MEDICINE | Facility: CLINIC | Age: 55
End: 2020-08-27

## 2020-08-27 ENCOUNTER — OFFICE VISIT (OUTPATIENT)
Dept: NEUROLOGY | Facility: CLINIC | Age: 55
End: 2020-08-27
Payer: COMMERCIAL

## 2020-08-27 VITALS
HEART RATE: 106 BPM | SYSTOLIC BLOOD PRESSURE: 122 MMHG | HEIGHT: 65 IN | BODY MASS INDEX: 29.61 KG/M2 | DIASTOLIC BLOOD PRESSURE: 78 MMHG | WEIGHT: 177.69 LBS

## 2020-08-27 DIAGNOSIS — R51.9 CHRONIC INTRACTABLE HEADACHE, UNSPECIFIED HEADACHE TYPE: Primary | ICD-10-CM

## 2020-08-27 DIAGNOSIS — L98.9 SKIN LESION: ICD-10-CM

## 2020-08-27 DIAGNOSIS — D22.9 MULTIPLE BENIGN NEVI: ICD-10-CM

## 2020-08-27 DIAGNOSIS — G89.29 CHRONIC INTRACTABLE HEADACHE, UNSPECIFIED HEADACHE TYPE: ICD-10-CM

## 2020-08-27 DIAGNOSIS — D22.9 ATYPICAL MOLE: ICD-10-CM

## 2020-08-27 DIAGNOSIS — R51.9 CHRONIC INTRACTABLE HEADACHE, UNSPECIFIED HEADACHE TYPE: ICD-10-CM

## 2020-08-27 DIAGNOSIS — G89.29 CHRONIC INTRACTABLE HEADACHE, UNSPECIFIED HEADACHE TYPE: Primary | ICD-10-CM

## 2020-08-27 DIAGNOSIS — D18.01 CHERRY ANGIOMA: ICD-10-CM

## 2020-08-27 DIAGNOSIS — Z01.818 PRE-OP TESTING: Primary | ICD-10-CM

## 2020-08-27 DIAGNOSIS — M54.12 CERVICAL RADICULOPATHY: ICD-10-CM

## 2020-08-27 DIAGNOSIS — Z12.83 SCREENING EXAM FOR SKIN CANCER: ICD-10-CM

## 2020-08-27 DIAGNOSIS — G44.86 CERVICOGENIC HEADACHE: ICD-10-CM

## 2020-08-27 DIAGNOSIS — L82.1 SK (SEBORRHEIC KERATOSIS): Primary | ICD-10-CM

## 2020-08-27 PROCEDURE — 99204 PR OFFICE/OUTPT VISIT, NEW, LEVL IV, 45-59 MIN: ICD-10-PCS | Mod: S$GLB,,, | Performed by: NEUROLOGICAL SURGERY

## 2020-08-27 PROCEDURE — 99204 OFFICE O/P NEW MOD 45 MIN: CPT | Mod: S$GLB,,, | Performed by: NEUROLOGICAL SURGERY

## 2020-08-27 PROCEDURE — 99999 PR PBB SHADOW E&M-EST. PATIENT-LVL IV: CPT | Mod: PBBFAC,,, | Performed by: NEUROLOGICAL SURGERY

## 2020-08-27 PROCEDURE — 99203 PR OFFICE/OUTPT VISIT, NEW, LEVL III, 30-44 MIN: ICD-10-PCS | Mod: S$GLB,,, | Performed by: PATHOLOGY

## 2020-08-27 PROCEDURE — 72052 X-RAY EXAM NECK SPINE 6/>VWS: CPT | Mod: TC,FY

## 2020-08-27 PROCEDURE — 72052 XR CERVICAL SPINE 5 VIEW WITH FLEX AND EXT: ICD-10-PCS | Mod: 26,,, | Performed by: RADIOLOGY

## 2020-08-27 PROCEDURE — 72052 X-RAY EXAM NECK SPINE 6/>VWS: CPT | Mod: 26,,, | Performed by: RADIOLOGY

## 2020-08-27 PROCEDURE — 99999 PR PBB SHADOW E&M-EST. PATIENT-LVL IV: ICD-10-PCS | Mod: PBBFAC,,, | Performed by: NEUROLOGICAL SURGERY

## 2020-08-27 PROCEDURE — 99999 PR PBB SHADOW E&M-EST. PATIENT-LVL III: ICD-10-PCS | Mod: PBBFAC,,, | Performed by: PATHOLOGY

## 2020-08-27 PROCEDURE — 99203 OFFICE O/P NEW LOW 30 MIN: CPT | Mod: S$GLB,,, | Performed by: PATHOLOGY

## 2020-08-27 PROCEDURE — 99999 PR PBB SHADOW E&M-EST. PATIENT-LVL III: CPT | Mod: PBBFAC,,, | Performed by: PATHOLOGY

## 2020-08-27 RX ORDER — TIZANIDINE 4 MG/1
4 TABLET ORAL EVERY 6 HOURS PRN
Qty: 60 TABLET | Refills: 3 | Status: SHIPPED | OUTPATIENT
Start: 2020-08-27 | End: 2020-09-06

## 2020-08-27 NOTE — TELEPHONE ENCOUNTER
Message left asking patient to contact clinic to review instructions, confirm arrival time, and scheduled a pre-procedure COVID test-  Phone number included.
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
## 2020-08-27 NOTE — LETTER
August 27, 2020      Eric Hernandes Jr., MD  [address] [facility] [address]           [facility]  [address]  [facility] [address]  Phone: [phone]  Fax: [phone]          Patient: Naomy Armstrong   MR Number: [id number]   YOB: 1965   Date of Visit: 8/27/2020       Dear Dr. Eric Hernandes Jr.:    Thank you for referring Naomy Armstrong to me for evaluation. Attached you will find relevant portions of my assessment and plan of care.    If you have questions, please do not hesitate to call me. I look forward to following Naomy Armstrong along with you.    Sincerely,    Familia Boateng MD    Enclosure  CC:  No Recipients    If you would like to receive this communication electronically, please contact [email or web address] or [phone] to request more information on [employer] Link access.    For providers and/or their staff who would like to refer a patient to [facility], please contact us through our one-stop-shop provider referral line, [facility], at [phone].    If you feel you have received this communication in error or would no longer like to receive these types of communications, please e-mail [email or web address]

## 2020-08-27 NOTE — LETTER
August 28, 2020      Fab Shepherd MD  [address] [facility] [address]           [facility] [address]th [address]  [address]  [place] [address]  Phone: [phone]  Fax: [phone]          Patient: Naomy Armstrong   MR Number: [id number]   YOB: 1965   Date of Visit: 8/27/2020       Dear Dr. Fab Shepherd:    Thank you for referring Naomy Armstrong to me for evaluation. Attached you will find relevant portions of my assessment and plan of care.    If you have questions, please do not hesitate to call me. I look forward to following Naomy Armstrong along with you.    Sincerely,    Margaret Salazar MD    Enclosure  CC:  No Recipients    If you would like to receive this communication electronically, please contact [email or web address] or [phone] to request more information on [employer] Link access.    For providers and/or their staff who would like to refer a patient to [facility], please contact us through our one-stop-shop provider referral line, [facility], at [phone].    If you feel you have received this communication in error or would no longer like to receive these types of communications, please e-mail [email or web address]

## 2020-08-27 NOTE — TELEPHONE ENCOUNTER
Patient requested that I send the procedure information through [facility]- she understands the pre-procedure instructions.

## 2020-08-27 NOTE — Clinical Note
Dr. Shepherd     Do you think she would be able to use an intranasal NSAID to address her headaches?    Familia

## 2020-08-28 ENCOUNTER — CLINICAL SUPPORT (OUTPATIENT)
Dept: REHABILITATION | Facility: HOSPITAL | Age: 55
End: 2020-08-28
Attending: ORTHOPAEDIC SURGERY
Payer: COMMERCIAL

## 2020-08-28 DIAGNOSIS — R29.898 DECREASED STRENGTH OF LOWER EXTREMITY: ICD-10-CM

## 2020-08-28 DIAGNOSIS — M25.661 DECREASED RANGE OF MOTION OF RIGHT LOWER EXTREMITY: ICD-10-CM

## 2020-08-28 DIAGNOSIS — M25.551 PAIN IN JOINT OF RIGHT HIP: ICD-10-CM

## 2020-08-28 DIAGNOSIS — R29.898 DECREASED STRENGTH OF TRUNK AND BACK: ICD-10-CM

## 2020-08-28 PROCEDURE — 97110 THERAPEUTIC EXERCISES: CPT | Mod: PN

## 2020-08-28 NOTE — PROGRESS NOTES
"  Physical Therapy Daily Treatment Note     Name: Naomy Salcidobanks  Clinic Number: [id number]    Therapy Diagnosis:   Encounter Diagnoses   Name Primary?    Decreased strength of lower extremity     Decreased strength of trunk and back     Pain in joint of right hip     Decreased range of motion of right lower extremity      Physician: Jessica Shay MD    Visit Date: 8/28/2020    Physician Orders: PT Eval and Treat   Medical Diagnosis from Referral: M70.61 (ICD-10-CM) - Greater trochanteric bursitis of right hip  Evaluation Date: 6/1/2020  Authorization Period Expiration: 12/31/2020  Plan of Care Expiration: 9/1/2020  Visit # / Visits authorized: 5/20  PN Due:  9/14/2020     Time In: 10:00 am   Time Out: 10:45 am   Total Billable Time: 45 minutes    Precautions: Standard    Subjective     Pt reports: She has still been having pain in the hip, highest about 8/10. States that happens when she is laying on that side. Pt reports some improvement in pain since beginning therapy, no where near as bad as it used to be. States she is able to go up the steps a lot better. No pain this AM.       Objective     Naomy received therapeutic exercises to develop strength, endurance, ROM and flexibility for 45 minutes including:    Bike x5'  Bridges with +abduction YTB 2x10  S/l hip abduction 2x10 R  Piriformis st figure 4 2x30" R  Standing ITB st R x1 min  LTR to L hold x1 min  Side steps YTB x2 laps  Squat taps from 18 in box step with foam 2x10  Steamboats on foam 2x10 B  Clams 3x10  Clams + Knee Ext 3x10  Prone hip extension B LE +YTB 3x12  +SL RDL 2x10   +Hip Abduction Matrix 35# 3x10  +Hip Adduction Matrix 55# 3x10  Knee Flexion Matrix 30# 2x15    Not performed today:  +Bridges + Knee Ext 1x12  +Bridges + Hip Flex + Knee Ext 1x12  +Straight Arm Pull Down + Marching GTB x30    +Leg Pull in Supine 3x12  +Windsheild wipers prone R LE 2x10  +Hip IR/ER RTB 2x10      Naomy received the following manual therapy " techniques: Soft tissue Mobilization were applied to the: R hip for 00 minutes, including:    STM R piriformis, TFL, glut med      Home Exercises Provided and Patient Education Provided     Education provided:   Cont to perform HEP as provided.     Written Home Exercises Provided: yes.  Exercises were reviewed and Naomy was able to demonstrate them prior to the end of the session.  Naomy demonstrated good  understanding of the education provided.     See EMR under Patient Instructions for exercises provided 7/2/2020.    Assessment     Pt tolerated session well today with no adverse response noted. Pt progressing well with therapy at this time, reporting improved pain and mobility. Addition of SL RDL this session with good performance and no reported increase in R hip pain. Pt also performed matrix machines with good musculoskeletal fatigue noted. Pt remains appropriate for therapy at this time, progress CK strengthening as tolerated.     Naomy is progressing well towards her goals.   Pt prognosis is Good.     Pt will continue to benefit from skilled outpatient physical therapy to address the deficits listed in the problem list box on initial evaluation, provide pt/family education and to maximize pt's level of independence in the home and community environment.     Pt's spiritual, cultural and educational needs considered and pt agreeable to plan of care and goals.    Anticipated barriers to physical therapy: mental health hx as documented in chart, work schedule    GOALS: Short Term Goals:  2-3 weeks  1.Report decreased in pain at worse less than <   / =  7 /10  to increase tolerance for functional activities. On going  2. Pt to improve R hip IR range of motion by 25% to allow for improved functional mobility to allow for improvement in IADLs. On going  3. Increased B posterior chain and hip girdle musculature  MMT 1/2  to increase tolerance for ADL and work activities. *GOAL MET 8/14/20  4. Pt to  demonstrate ability to squat x 5 with correct form and pain < / = 3/10 in order to improve tolerance to work activities. *GOAL MET 8/14/20  5. Pt to tolerate HEP to improve ROM and independence with ADL's. *GOAL MET 8/14/20     Long Term Goals:  6-8 weeks  1.Report decreased in pain at worse less than  <   / =  3  /10  to increase tolerance for functional mobility  2.Pt to improve range of motion by 75% to allow for improved functional mobility to allow for improvement in IADLs.   3.Increased B posterior chain and hip girdle musculature MMT 1 grade  to increase tolerance for ADL and work activities. *GOAL MET 8/14/20  4. Pt will scores report at CJ level (20-40% impaired) on LEFS  to demonstrate increase in LE function with every day tasks.   5. Pt to be Independent with HEP to improve ROM and independence with ADL's  6. Pt to demonstrate ability to squat x 10 with pain < / = 1/10 in order to improve tolerance to work activities *GOAL MET 8/14/20    Plan     Certification date: 6/1/2020 - 9/1/2020    Cont skilled PT session towards PT and patient's goals.    Saundra Salgado, PT  08/28/2020

## 2020-09-01 ENCOUNTER — LAB VISIT (OUTPATIENT)
Dept: FAMILY MEDICINE | Facility: CLINIC | Age: 55
End: 2020-09-01
Payer: COMMERCIAL

## 2020-09-01 DIAGNOSIS — Z01.818 PRE-OP TESTING: ICD-10-CM

## 2020-09-01 PROCEDURE — U0003 INFECTIOUS AGENT DETECTION BY NUCLEIC ACID (DNA OR RNA); SEVERE ACUTE RESPIRATORY SYNDROME CORONAVIRUS 2 (SARS-COV-2) (CORONAVIRUS DISEASE [COVID-19]), AMPLIFIED PROBE TECHNIQUE, MAKING USE OF HIGH THROUGHPUT TECHNOLOGIES AS DESCRIBED BY CMS-2020-01-R: HCPCS

## 2020-09-02 ENCOUNTER — OFFICE VISIT (OUTPATIENT)
Dept: PSYCHIATRY | Facility: CLINIC | Age: 55
End: 2020-09-02
Payer: COMMERCIAL

## 2020-09-02 DIAGNOSIS — Z65.8 PSYCHOSOCIAL DISTRESS: ICD-10-CM

## 2020-09-02 DIAGNOSIS — F41.1 GAD (GENERALIZED ANXIETY DISORDER): ICD-10-CM

## 2020-09-02 DIAGNOSIS — F33.41 MAJOR DEPRESSIVE DISORDER, RECURRENT EPISODE, IN PARTIAL REMISSION: Primary | ICD-10-CM

## 2020-09-02 LAB — SARS-COV-2 RNA RESP QL NAA+PROBE: NOT DETECTED

## 2020-09-02 PROCEDURE — 90834 PR PSYCHOTHERAPY W/PATIENT, 45 MIN: ICD-10-PCS | Mod: S$GLB,,, | Performed by: SOCIAL WORKER

## 2020-09-02 PROCEDURE — 90834 PSYTX W PT 45 MINUTES: CPT | Mod: S$GLB,,, | Performed by: SOCIAL WORKER
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
## 2020-09-04 ENCOUNTER — HOSPITAL ENCOUNTER (OUTPATIENT)
Facility: HOSPITAL | Age: 55
Discharge: HOME | End: 2020-09-04
Attending: PAIN MEDICINE | Admitting: PAIN MEDICINE
Payer: COMMERCIAL

## 2020-09-04 VITALS
HEART RATE: 74 BPM | TEMPERATURE: 98 F | DIASTOLIC BLOOD PRESSURE: 77 MMHG | WEIGHT: 182 LBS | HEIGHT: 65 IN | SYSTOLIC BLOOD PRESSURE: 123 MMHG | RESPIRATION RATE: 18 BRPM | OXYGEN SATURATION: 98 % | BODY MASS INDEX: 30.32 KG/M2

## 2020-09-04 DIAGNOSIS — M47.816 LUMBAR SPONDYLOSIS: Primary | ICD-10-CM

## 2020-09-04 PROCEDURE — 64635 PR DESTROY LUMB/SAC FACET JNT: ICD-10-PCS | Mod: LT,,, | Performed by: PAIN MEDICINE

## 2020-09-04 PROCEDURE — 64636 DESTROY L/S FACET JNT ADDL: CPT | Mod: LT,,, | Performed by: PAIN MEDICINE

## 2020-09-04 PROCEDURE — 64635 DESTROY LUMB/SAC FACET JNT: CPT | Performed by: PAIN MEDICINE

## 2020-09-04 PROCEDURE — 64635 DESTROY LUMB/SAC FACET JNT: CPT | Mod: LT,,, | Performed by: PAIN MEDICINE

## 2020-09-04 PROCEDURE — 64636 PR DESTROY L/S FACET JNT ADDL: ICD-10-PCS | Mod: LT,,, | Performed by: PAIN MEDICINE

## 2020-09-04 PROCEDURE — 64636 DESTROY L/S FACET JNT ADDL: CPT | Performed by: PAIN MEDICINE

## 2020-09-04 PROCEDURE — 63600175 PHARM REV CODE 636 W HCPCS: Performed by: PAIN MEDICINE

## 2020-09-04 PROCEDURE — 25000003 PHARM REV CODE 250: Performed by: PAIN MEDICINE

## 2020-09-04 PROCEDURE — 99152 MOD SED SAME PHYS/QHP 5/>YRS: CPT | Performed by: PAIN MEDICINE

## 2020-09-04 RX ORDER — TRIAMCINOLONE ACETONIDE 40 MG/ML
INJECTION, SUSPENSION INTRA-ARTICULAR; INTRAMUSCULAR
Status: DISCONTINUED
Start: 2020-09-04 | End: 2020-09-04 | Stop reason: HOSPADM

## 2020-09-04 RX ORDER — LIDOCAINE HYDROCHLORIDE 20 MG/ML
10 INJECTION, SOLUTION INFILTRATION; PERINEURAL ONCE
Status: COMPLETED | OUTPATIENT
Start: 2020-09-04 | End: 2020-09-04

## 2020-09-04 RX ORDER — MIDAZOLAM HYDROCHLORIDE 1 MG/ML
INJECTION INTRAMUSCULAR; INTRAVENOUS
Status: DISCONTINUED
Start: 2020-09-04 | End: 2020-09-04 | Stop reason: HOSPADM

## 2020-09-04 RX ORDER — LIDOCAINE HYDROCHLORIDE 20 MG/ML
INJECTION, SOLUTION INFILTRATION; PERINEURAL
Status: DISCONTINUED
Start: 2020-09-04 | End: 2020-09-04 | Stop reason: HOSPADM

## 2020-09-04 RX ORDER — MIDAZOLAM HYDROCHLORIDE 1 MG/ML
5 INJECTION INTRAMUSCULAR; INTRAVENOUS
Status: DISCONTINUED | OUTPATIENT
Start: 2020-09-04 | End: 2020-09-04 | Stop reason: HOSPADM

## 2020-09-04 RX ORDER — BUPIVACAINE HYDROCHLORIDE 2.5 MG/ML
INJECTION, SOLUTION EPIDURAL; INFILTRATION; INTRACAUDAL
Status: DISCONTINUED
Start: 2020-09-04 | End: 2020-09-04 | Stop reason: HOSPADM

## 2020-09-04 RX ORDER — SODIUM CHLORIDE 9 MG/ML
INJECTION, SOLUTION INTRAVENOUS CONTINUOUS
Status: DISCONTINUED | OUTPATIENT
Start: 2020-09-04 | End: 2020-09-04 | Stop reason: HOSPADM

## 2020-09-04 RX ORDER — FENTANYL CITRATE 50 UG/ML
100 INJECTION, SOLUTION INTRAMUSCULAR; INTRAVENOUS
Status: DISCONTINUED | OUTPATIENT
Start: 2020-09-04 | End: 2020-09-04 | Stop reason: HOSPADM

## 2020-09-04 RX ORDER — TRIAMCINOLONE ACETONIDE 40 MG/ML
40 INJECTION, SUSPENSION INTRA-ARTICULAR; INTRAMUSCULAR ONCE
Status: COMPLETED | OUTPATIENT
Start: 2020-09-04 | End: 2020-09-04

## 2020-09-04 RX ORDER — BUPIVACAINE HYDROCHLORIDE 2.5 MG/ML
10 INJECTION, SOLUTION EPIDURAL; INFILTRATION; INTRACAUDAL ONCE
Status: COMPLETED | OUTPATIENT
Start: 2020-09-04 | End: 2020-09-04

## 2020-09-04 RX ORDER — FENTANYL CITRATE 50 UG/ML
INJECTION, SOLUTION INTRAMUSCULAR; INTRAVENOUS
Status: DISCONTINUED
Start: 2020-09-04 | End: 2020-09-04 | Stop reason: HOSPADM

## 2020-09-04 RX ADMIN — SODIUM CHLORIDE: 0.9 INJECTION, SOLUTION INTRAVENOUS at 09:09

## 2020-09-04 NOTE — INTERVAL H&P NOTE
The patient has been examined and the H&P has been reviewed:    I concur with the findings and no changes have occurred since H&P was written.    Surgery risks, benefits and alternative options discussed and understood by patient/family.    The procedure that I am ordering/performing has been deemed time sensitive given patient's degree of pain and limitations that this pain composed this upon his/her daily life.    AAOx3 NAD  Mood and affect normal  Memory and language intact  RRR  CTAB        Active Hospital Problems    Diagnosis  POA    Lumbar spondylosis [M47.816]  Yes      Resolved Hospital Problems   No resolved problems to display.

## 2020-09-04 NOTE — OP NOTE
LUMBAR Medial Branch Radiofrequency Ablation Under Fluoroscopy  Time-out taken to identify patient and procedure side prior to starting the procedure.   I attest that I have reviewed the patient's home medications prior to the procedure and no contraindication have been identified. I  re-evaluated the patient after the patient was positioned for the procedure in the procedure room immediately before the procedural time-out. The vital signs are current and represent the current state of the patient which has not significantly changed since the preprocedure assessment.                   Date of Service: 09/04/2020    PCP: Eric Hernandes Jr, MD    Referring Physician:                                                PROCEDURE:  left L3, L4 and L5 medial branch radiofrequency ablation    REASON FOR PROCEDURE: Lumbar spondylosis [M47.816]    PHYSICIAN: Jeremy Powell MD  ASSISTANTS: None    MEDICATIONS INJECTED:  0.5 ml of Kenalog 40mg/ml, and Bupivacaine 0.25% 10ml. Of that, 1.5-3ml injected per level before & after the ablation.    LOCAL ANESTHETIC USED: Xylocaine 2% 3ml each site.  SEDATION MEDICATIONS: Versed 1 mg and fentanyl 75 mcg IV.  Conscious sedation provided by MD and monitored by RN.   (See nurse documentation and case log for sedation time)      ESTIMATED BLOOD LOSS:  None.    COMPLICATIONS:  None.    TECHNIQUE:   Laying in a prone position, the patient was prepped and draped in the usual sterile fashion using ChloraPrep and fenestrated drape.  The level was determined under fluoroscopic guidance.  Local anesthetic was given by going down to the hub of the 27-gauge 1.25in needle and raising a wheel.  The 20-gauge needle was introduced to the anatomic local of the median branch at the lateral mass utilizing live fluoroscopy at each level stated above. Motor stimulation done to confirm no motor nerve ablation takes place up to 2 Volt 2Hz..  Sensory stimulation done to detect similarities in pain location 1.5  Volts 50Hz.. Medication was then injected slowly.  Ablation then done per level utilizing [place]ard radiofrequency generator 80°C for 90 seconds. The patient tolerated the procedure well.         The patient was monitored after the procedure.  Patient was given post procedure and discharge instructions to follow at home.  We will see the patient back in two weeks or the patient may call to inform of status. The patient was discharged in a stable condition

## 2020-09-04 NOTE — DISCHARGE INSTRUCTIONS
Home Care Instructions Pain Management:    1. DIET:   You may resume your normal diet today.   2. BATHING:   You may shower with luke warm water.  3. DRESSING:   You may remove your bandage today.   4. ACTIVITY LEVEL:   You may resume your normal activities 24 hrs after your procedure.  5. MEDICATIONS:   You may resume your normal medications today.   6. SPECIAL INSTRUCTIONS:   No heat to the injection site for 24 hrs including, bath or shower, heating pad, moist heat, or hot tubs.    Use ice pack to injection site for any pain or discomfort.  Apply ice packs for 20 minute intervals as needed.   If you have received any sedatives by mouth today you may not drive for 12 hours.    If you have received any sedation through your IV, you may not drive for 24 hrs.     PLEASE CALL YOUR DOCTOR IF:  1. Redness or swelling around the injection site.  2. Fever of 101 degrees  3. Drainage (pus) from the injection site.  4. For any continuous bleeding (some dried blood over the incision is normal.)    FOR EMERGENCIES:   If any unusual problems or difficulties occur during clinic hours, call [phone] or [phone].       Fall Prevention  Millions of people fall every year and injure themselves. You may have had anesthesia or sedation which may increase your risk of falling. You may have health issues that put you at an increased risk of falling.     Here are ways to reduce your risk of falling.  ·   · Make your home safe by keeping walkways clear of objects you may trip over.  · Use non-slip pads under rugs. Do not use area rugs or small throw rugs.  · Use non-slip mats in bathtubs and showers.  · Install handrails and lights on staircases.  · Do not walk in poorly lit areas.  · Do not stand on chairs or wobbly ladders.  · Use caution when reaching overhead or looking upward. This position can cause a loss of balance.  · Be sure your shoes fit properly, have non-slip bottoms and are in good condition.   · Wear shoes both inside and  out. Avoid going barefoot or wearing slippers.  · Be cautious when going up and down stairs, curbs, and when walking on uneven sidewalks.  · If your balance is poor, consider using a cane or walker.  · If your fall was related to alcohol use, stop or limit alcohol intake.   · If your fall was related to use of sleeping medicines, talk to your doctor about this. You may need to reduce your dosage at bedtime if you awaken during the night to go to the bathroom.    · To reduce the need for nighttime bathroom trips:  ¨ Avoid drinking fluids for several hours before going to bed  ¨ Empty your bladder before going to bed  ¨ Men can keep a urinal at the bedside  · Stay as active as you can. Balance, flexibility, strength, and endurance all come from exercise. They all play a role in preventing falls. Ask your healthcare provider which types of activity are right for you.  · Get your vision checked on a regular basis.  · If you have pets, know where they are before you stand up or walk so you don't trip over them.  · Use night lights.

## 2020-09-08 ENCOUNTER — TELEPHONE (OUTPATIENT)
Dept: PAIN MEDICINE | Facility: CLINIC | Age: 55
End: 2020-09-08

## 2020-09-16 ENCOUNTER — PATIENT MESSAGE (OUTPATIENT)
Dept: GASTROENTEROLOGY | Facility: CLINIC | Age: 55
End: 2020-09-16

## 2020-09-18 ENCOUNTER — TELEPHONE (OUTPATIENT)
Dept: ENDOSCOPY | Facility: HOSPITAL | Age: 55
End: 2020-09-18

## 2020-09-18 NOTE — TELEPHONE ENCOUNTER
"Patient contacted to schedule EGD and colonoscopy .  She is currently being tested for C-diff.  Instructed patient that per Endoscopy protocol-we need to have the results of her lab work prior to scheduling procedure.  She stated that she "has been meaning to get those done". She informed me she would get them done soon.  Informed her that after she has it completed , she can contact the Endoscopy Scheduling department to proceed with scheduling. Main line phone number given to call back.  Stated understanding.       "

## 2020-09-18 NOTE — TELEPHONE ENCOUNTER
----- Message from Fab Shepherd MD sent at 9/17/2020  6:05 PM CDT -----  Regarding: procedures need scheduling  This patient sent me a mychart message that she never heard from anyone regarding scheduling procedures I ordered at the end of July.  These need to be done ASAP.  Can we get them scheduled?

## 2020-09-23 ENCOUNTER — LAB VISIT (OUTPATIENT)
Dept: LAB | Facility: HOSPITAL | Age: 55
End: 2020-09-23
Attending: INTERNAL MEDICINE
Payer: COMMERCIAL

## 2020-09-23 DIAGNOSIS — K52.9 CHRONIC DIARRHEA: ICD-10-CM

## 2020-09-23 DIAGNOSIS — K52.9 COLITIS: ICD-10-CM

## 2020-09-23 PROCEDURE — 87427 SHIGA-LIKE TOXIN AG IA: CPT

## 2020-09-23 PROCEDURE — 87329 GIARDIA AG IA: CPT

## 2020-09-23 PROCEDURE — 87186 SC STD MICRODIL/AGAR DIL: CPT

## 2020-09-23 PROCEDURE — 87077 CULTURE AEROBIC IDENTIFY: CPT

## 2020-09-23 PROCEDURE — 83993 ASSAY FOR CALPROTECTIN FECAL: CPT

## 2020-09-23 PROCEDURE — 87449 NOS EACH ORGANISM AG IA: CPT

## 2020-09-23 PROCEDURE — 87324 CLOSTRIDIUM AG IA: CPT

## 2020-09-23 PROCEDURE — 87209 SMEAR COMPLEX STAIN: CPT

## 2020-09-23 PROCEDURE — 87045 FECES CULTURE AEROBIC BACT: CPT

## 2020-09-23 PROCEDURE — 87046 STOOL CULTR AEROBIC BACT EA: CPT | Mod: 59

## 2020-09-24 LAB
CRYPTOSP AG STL QL IA: NEGATIVE
E COLI SXT1 STL QL IA: NEGATIVE
E COLI SXT2 STL QL IA: NEGATIVE
G LAMBLIA AG STL QL IA: NEGATIVE

## 2020-09-26 LAB
BACTERIA STL CULT: ABNORMAL
BACTERIA STL CULT: ABNORMAL
O+P STL MICRO: NORMAL

## 2020-09-28 NOTE — TELEPHONE ENCOUNTER
Contacted patient to schedule EGD and colonoscopy.  Informed her that since her stool tests had been resulted, we could schedule the procedures.  She informed me that she is working 12 hours, 7 days a week and cannot schedule at this time.  Direct line phone number provided to return call when ready.

## 2020-10-01 LAB — CALPROTECTIN STL-MCNT: 44.6 MCG/G

## 2020-10-05 ENCOUNTER — PATIENT MESSAGE (OUTPATIENT)
Dept: ADMINISTRATIVE | Facility: HOSPITAL | Age: 55
End: 2020-10-05

## 2020-10-19 ENCOUNTER — PATIENT MESSAGE (OUTPATIENT)
Dept: OTOLARYNGOLOGY | Facility: CLINIC | Age: 55
End: 2020-10-19

## 2020-10-21 ENCOUNTER — TELEPHONE (OUTPATIENT)
Dept: OTOLARYNGOLOGY | Facility: CLINIC | Age: 55
End: 2020-10-21

## 2020-10-22 ENCOUNTER — TELEPHONE (OUTPATIENT)
Dept: OTOLARYNGOLOGY | Facility: CLINIC | Age: 55
End: 2020-10-22

## 2020-10-23 ENCOUNTER — TELEPHONE (OUTPATIENT)
Dept: OTOLARYNGOLOGY | Facility: CLINIC | Age: 55
End: 2020-10-23

## 2020-10-23 NOTE — TELEPHONE ENCOUNTER
Attempted to contact the pt in regards to scheduling covid test 72 hours prior to apt on 10/26. No answer, left voicemail to return my call.

## 2020-10-26 ENCOUNTER — PATIENT OUTREACH (OUTPATIENT)
Dept: ADMINISTRATIVE | Facility: OTHER | Age: 55
End: 2020-10-26

## 2020-12-04 ENCOUNTER — PATIENT MESSAGE (OUTPATIENT)
Dept: ADMINISTRATIVE | Facility: HOSPITAL | Age: 55
End: 2020-12-04

## 2021-01-04 ENCOUNTER — PATIENT MESSAGE (OUTPATIENT)
Dept: FAMILY MEDICINE | Facility: CLINIC | Age: 56
End: 2021-01-04

## 2021-01-04 ENCOUNTER — PATIENT MESSAGE (OUTPATIENT)
Dept: ADMINISTRATIVE | Facility: HOSPITAL | Age: 56
End: 2021-01-04

## 2021-01-04 DIAGNOSIS — Z11.1 SCREENING FOR TUBERCULOSIS: Primary | ICD-10-CM

## 2021-01-05 ENCOUNTER — PATIENT MESSAGE (OUTPATIENT)
Dept: FAMILY MEDICINE | Facility: CLINIC | Age: 56
End: 2021-01-05

## 2021-01-06 ENCOUNTER — PATIENT MESSAGE (OUTPATIENT)
Dept: FAMILY MEDICINE | Facility: CLINIC | Age: 56
End: 2021-01-06

## 2021-01-12 ENCOUNTER — LAB VISIT (OUTPATIENT)
Dept: LAB | Facility: HOSPITAL | Age: 56
End: 2021-01-12
Attending: FAMILY MEDICINE
Payer: COMMERCIAL

## 2021-01-12 DIAGNOSIS — Z11.1 SCREENING FOR TUBERCULOSIS: ICD-10-CM

## 2021-01-12 PROCEDURE — 86480 TB TEST CELL IMMUN MEASURE: CPT

## 2021-01-12 PROCEDURE — 36415 COLL VENOUS BLD VENIPUNCTURE: CPT | Mod: PN

## 2021-01-14 LAB
GAMMA INTERFERON BACKGROUND BLD IA-ACNC: 0.03 IU/ML
M TB IFN-G CD4+ BCKGRND COR BLD-ACNC: 0.01 IU/ML
MITOGEN IGNF BCKGRD COR BLD-ACNC: 8.65 IU/ML
TB GOLD PLUS: NEGATIVE
TB2 - NIL: 0.01 IU/ML

## 2021-01-15 ENCOUNTER — TELEPHONE (OUTPATIENT)
Dept: FAMILY MEDICINE | Facility: CLINIC | Age: 56
End: 2021-01-15

## 2021-01-15 ENCOUNTER — PATIENT MESSAGE (OUTPATIENT)
Dept: FAMILY MEDICINE | Facility: CLINIC | Age: 56
End: 2021-01-15

## 2021-01-15 ENCOUNTER — OFFICE VISIT (OUTPATIENT)
Dept: FAMILY MEDICINE | Facility: CLINIC | Age: 56
End: 2021-01-15
Payer: COMMERCIAL

## 2021-01-15 DIAGNOSIS — M79.10 MUSCLE PAIN: ICD-10-CM

## 2021-01-15 DIAGNOSIS — J06.9 VIRAL URI WITH COUGH: Primary | ICD-10-CM

## 2021-01-15 DIAGNOSIS — R05.9 COUGH: ICD-10-CM

## 2021-01-15 PROCEDURE — 99214 OFFICE O/P EST MOD 30 MIN: CPT | Mod: 95,,, | Performed by: FAMILY MEDICINE

## 2021-01-15 PROCEDURE — U0003 INFECTIOUS AGENT DETECTION BY NUCLEIC ACID (DNA OR RNA); SEVERE ACUTE RESPIRATORY SYNDROME CORONAVIRUS 2 (SARS-COV-2) (CORONAVIRUS DISEASE [COVID-19]), AMPLIFIED PROBE TECHNIQUE, MAKING USE OF HIGH THROUGHPUT TECHNOLOGIES AS DESCRIBED BY CMS-2020-01-R: HCPCS

## 2021-01-15 PROCEDURE — 99214 PR OFFICE/OUTPT VISIT, EST, LEVL IV, 30-39 MIN: ICD-10-PCS | Mod: 95,,, | Performed by: FAMILY MEDICINE

## 2021-01-15 RX ORDER — PROMETHAZINE HYDROCHLORIDE AND DEXTROMETHORPHAN HYDROBROMIDE 6.25; 15 MG/5ML; MG/5ML
5 SYRUP ORAL
Qty: 240 ML | Refills: 0 | Status: SHIPPED | OUTPATIENT
Start: 2021-01-15 | End: 2021-01-15 | Stop reason: SDUPTHER

## 2021-01-15 RX ORDER — FLUTICASONE PROPIONATE 50 MCG
2 SPRAY, SUSPENSION (ML) NASAL DAILY
Qty: 16 ML | Refills: 0 | Status: SHIPPED | OUTPATIENT
Start: 2021-01-15 | End: 2021-01-15 | Stop reason: SDUPTHER

## 2021-01-15 RX ORDER — FLUTICASONE PROPIONATE 50 MCG
2 SPRAY, SUSPENSION (ML) NASAL DAILY
Qty: 16 ML | Refills: 0 | Status: SHIPPED | OUTPATIENT
Start: 2021-01-15 | End: 2021-03-31

## 2021-01-15 RX ORDER — PROMETHAZINE HYDROCHLORIDE AND DEXTROMETHORPHAN HYDROBROMIDE 6.25; 15 MG/5ML; MG/5ML
5 SYRUP ORAL
Qty: 240 ML | Refills: 0 | Status: SHIPPED | OUTPATIENT
Start: 2021-01-15 | End: 2021-03-31

## 2021-01-15 RX ORDER — ALBUTEROL SULFATE 90 UG/1
2 AEROSOL, METERED RESPIRATORY (INHALATION) EVERY 6 HOURS PRN
Qty: 8 G | Refills: 0 | Status: SHIPPED | OUTPATIENT
Start: 2021-01-15 | End: 2021-01-15 | Stop reason: SDUPTHER

## 2021-01-15 RX ORDER — ALBUTEROL SULFATE 90 UG/1
2 AEROSOL, METERED RESPIRATORY (INHALATION) EVERY 6 HOURS PRN
Qty: 8 G | Refills: 0 | Status: SHIPPED | OUTPATIENT
Start: 2021-01-15 | End: 2021-07-01

## 2021-01-17 ENCOUNTER — PATIENT MESSAGE (OUTPATIENT)
Dept: FAMILY MEDICINE | Facility: CLINIC | Age: 56
End: 2021-01-17

## 2021-01-17 LAB — SARS-COV-2 RNA RESP QL NAA+PROBE: NOT DETECTED

## 2021-01-19 ENCOUNTER — PATIENT MESSAGE (OUTPATIENT)
Dept: FAMILY MEDICINE | Facility: CLINIC | Age: 56
End: 2021-01-19

## 2021-01-21 ENCOUNTER — PATIENT MESSAGE (OUTPATIENT)
Dept: FAMILY MEDICINE | Facility: CLINIC | Age: 56
End: 2021-01-21

## 2021-01-21 ENCOUNTER — OFFICE VISIT (OUTPATIENT)
Dept: FAMILY MEDICINE | Facility: CLINIC | Age: 56
End: 2021-01-21
Payer: COMMERCIAL

## 2021-01-21 DIAGNOSIS — R06.02 SOB (SHORTNESS OF BREATH): Primary | ICD-10-CM

## 2021-01-21 PROCEDURE — 99214 OFFICE O/P EST MOD 30 MIN: CPT | Mod: 95,,, | Performed by: FAMILY MEDICINE

## 2021-01-21 PROCEDURE — 99214 PR OFFICE/OUTPT VISIT, EST, LEVL IV, 30-39 MIN: ICD-10-PCS | Mod: 95,,, | Performed by: FAMILY MEDICINE

## 2021-01-21 RX ORDER — PREDNISONE 20 MG/1
20 TABLET ORAL DAILY
Qty: 3 TABLET | Refills: 0 | Status: SHIPPED | OUTPATIENT
Start: 2021-01-21 | End: 2021-01-24

## 2021-01-21 RX ORDER — AZITHROMYCIN 250 MG/1
TABLET, FILM COATED ORAL
Qty: 6 TABLET | Refills: 0 | Status: SHIPPED | OUTPATIENT
Start: 2021-01-21 | End: 2021-01-26

## 2021-01-22 ENCOUNTER — PATIENT MESSAGE (OUTPATIENT)
Dept: FAMILY MEDICINE | Facility: CLINIC | Age: 56
End: 2021-01-22

## 2021-01-22 ENCOUNTER — TELEPHONE (OUTPATIENT)
Dept: FAMILY MEDICINE | Facility: CLINIC | Age: 56
End: 2021-01-22

## 2021-01-22 DIAGNOSIS — Z20.822 CONTACT WITH AND (SUSPECTED) EXPOSURE TO COVID-19: Primary | ICD-10-CM

## 2021-01-24 ENCOUNTER — PATIENT MESSAGE (OUTPATIENT)
Dept: FAMILY MEDICINE | Facility: CLINIC | Age: 56
End: 2021-01-24

## 2021-01-24 ENCOUNTER — PATIENT MESSAGE (OUTPATIENT)
Dept: GASTROENTEROLOGY | Facility: CLINIC | Age: 56
End: 2021-01-24

## 2021-01-25 ENCOUNTER — PATIENT MESSAGE (OUTPATIENT)
Dept: GASTROENTEROLOGY | Facility: CLINIC | Age: 56
End: 2021-01-25

## 2021-01-25 RX ORDER — AMOXICILLIN AND CLAVULANATE POTASSIUM 875; 125 MG/1; MG/1
1 TABLET, FILM COATED ORAL EVERY 12 HOURS
Qty: 14 TABLET | Refills: 0 | Status: SHIPPED | OUTPATIENT
Start: 2021-01-25 | End: 2021-02-01

## 2021-01-25 RX ORDER — BROMPHENIRAMINE MALEATE, PSEUDOEPHEDRINE HYDROCHLORIDE, AND DEXTROMETHORPHAN HYDROBROMIDE 2; 30; 10 MG/5ML; MG/5ML; MG/5ML
10 SYRUP ORAL
Qty: 473 ML | Refills: 0 | Status: SHIPPED | OUTPATIENT
Start: 2021-01-25 | End: 2021-02-04

## 2021-01-26 ENCOUNTER — PATIENT OUTREACH (OUTPATIENT)
Dept: ADMINISTRATIVE | Facility: OTHER | Age: 56
End: 2021-01-26

## 2021-01-26 ENCOUNTER — PATIENT MESSAGE (OUTPATIENT)
Dept: FAMILY MEDICINE | Facility: CLINIC | Age: 56
End: 2021-01-26

## 2021-01-27 ENCOUNTER — PATIENT MESSAGE (OUTPATIENT)
Dept: FAMILY MEDICINE | Facility: CLINIC | Age: 56
End: 2021-01-27

## 2021-01-27 RX ORDER — NITROFURANTOIN 25; 75 MG/1; MG/1
100 CAPSULE ORAL DAILY PRN
Qty: 30 CAPSULE | Refills: 1 | Status: SHIPPED | OUTPATIENT
Start: 2021-01-27 | End: 2021-08-11

## 2021-01-27 RX ORDER — FLUCONAZOLE 150 MG/1
TABLET ORAL
Qty: 2 TABLET | Refills: 0 | Status: SHIPPED | OUTPATIENT
Start: 2021-01-27 | End: 2021-08-20

## 2021-01-28 ENCOUNTER — OFFICE VISIT (OUTPATIENT)
Dept: GASTROENTEROLOGY | Facility: CLINIC | Age: 56
End: 2021-01-28
Payer: COMMERCIAL

## 2021-01-28 ENCOUNTER — PATIENT MESSAGE (OUTPATIENT)
Dept: FAMILY MEDICINE | Facility: CLINIC | Age: 56
End: 2021-01-28

## 2021-01-28 DIAGNOSIS — R19.7 DIARRHEA, UNSPECIFIED TYPE: ICD-10-CM

## 2021-01-28 DIAGNOSIS — R10.32 LEFT LOWER QUADRANT ABDOMINAL PAIN: ICD-10-CM

## 2021-01-28 DIAGNOSIS — K21.9 GASTROESOPHAGEAL REFLUX DISEASE, UNSPECIFIED WHETHER ESOPHAGITIS PRESENT: Primary | ICD-10-CM

## 2021-01-28 DIAGNOSIS — A02.9 SALMONELLA INFECTION: ICD-10-CM

## 2021-01-28 PROCEDURE — 99214 PR OFFICE/OUTPT VISIT, EST, LEVL IV, 30-39 MIN: ICD-10-PCS | Mod: 95,,, | Performed by: INTERNAL MEDICINE

## 2021-01-28 PROCEDURE — 99214 OFFICE O/P EST MOD 30 MIN: CPT | Mod: 95,,, | Performed by: INTERNAL MEDICINE

## 2021-01-29 ENCOUNTER — PATIENT MESSAGE (OUTPATIENT)
Dept: ADMINISTRATIVE | Facility: HOSPITAL | Age: 56
End: 2021-01-29

## 2021-02-03 ENCOUNTER — PATIENT MESSAGE (OUTPATIENT)
Dept: FAMILY MEDICINE | Facility: CLINIC | Age: 56
End: 2021-02-03

## 2021-02-03 DIAGNOSIS — R06.02 SHORTNESS OF BREATH: Primary | ICD-10-CM

## 2021-02-04 ENCOUNTER — PATIENT MESSAGE (OUTPATIENT)
Dept: FAMILY MEDICINE | Facility: CLINIC | Age: 56
End: 2021-02-04

## 2021-02-04 RX ORDER — FLUTICASONE PROPIONATE 110 UG/1
1 AEROSOL, METERED RESPIRATORY (INHALATION) 2 TIMES DAILY
Qty: 12 G | Refills: 0 | Status: SHIPPED | OUTPATIENT
Start: 2021-02-04 | End: 2021-02-08

## 2021-02-05 ENCOUNTER — PATIENT MESSAGE (OUTPATIENT)
Dept: FAMILY MEDICINE | Facility: CLINIC | Age: 56
End: 2021-02-05

## 2021-02-07 ENCOUNTER — PATIENT MESSAGE (OUTPATIENT)
Dept: FAMILY MEDICINE | Facility: CLINIC | Age: 56
End: 2021-02-07

## 2021-02-08 ENCOUNTER — APPOINTMENT (OUTPATIENT)
Dept: RADIOLOGY | Facility: HOSPITAL | Age: 56
End: 2021-02-08
Attending: FAMILY MEDICINE
Payer: COMMERCIAL

## 2021-02-08 ENCOUNTER — PATIENT MESSAGE (OUTPATIENT)
Dept: FAMILY MEDICINE | Facility: CLINIC | Age: 56
End: 2021-02-08

## 2021-02-08 DIAGNOSIS — R06.02 SHORTNESS OF BREATH: ICD-10-CM

## 2021-02-08 DIAGNOSIS — M25.561 PAIN IN BOTH KNEES, UNSPECIFIED CHRONICITY: Primary | ICD-10-CM

## 2021-02-08 DIAGNOSIS — M25.562 PAIN IN BOTH KNEES, UNSPECIFIED CHRONICITY: Primary | ICD-10-CM

## 2021-02-08 PROCEDURE — 71046 X-RAY EXAM CHEST 2 VIEWS: CPT | Mod: 26,,, | Performed by: RADIOLOGY

## 2021-02-08 PROCEDURE — 71046 X-RAY EXAM CHEST 2 VIEWS: CPT | Mod: TC,FY,PN

## 2021-02-08 PROCEDURE — 71046 XR CHEST PA AND LATERAL: ICD-10-PCS | Mod: 26,,, | Performed by: RADIOLOGY

## 2021-02-09 ENCOUNTER — OFFICE VISIT (OUTPATIENT)
Dept: ORTHOPEDICS | Facility: CLINIC | Age: 56
End: 2021-02-09
Payer: COMMERCIAL

## 2021-02-09 ENCOUNTER — PATIENT MESSAGE (OUTPATIENT)
Dept: FAMILY MEDICINE | Facility: CLINIC | Age: 56
End: 2021-02-09

## 2021-02-09 VITALS
HEART RATE: 99 BPM | HEIGHT: 65 IN | DIASTOLIC BLOOD PRESSURE: 86 MMHG | OXYGEN SATURATION: 95 % | RESPIRATION RATE: 18 BRPM | BODY MASS INDEX: 31.63 KG/M2 | WEIGHT: 189.81 LBS | SYSTOLIC BLOOD PRESSURE: 130 MMHG

## 2021-02-09 DIAGNOSIS — M17.0 PRIMARY OSTEOARTHRITIS OF BOTH KNEES: Primary | ICD-10-CM

## 2021-02-09 PROCEDURE — 3079F DIAST BP 80-89 MM HG: CPT | Mod: CPTII,S$GLB,, | Performed by: ORTHOPAEDIC SURGERY

## 2021-02-09 PROCEDURE — 3008F BODY MASS INDEX DOCD: CPT | Mod: CPTII,S$GLB,, | Performed by: ORTHOPAEDIC SURGERY

## 2021-02-09 PROCEDURE — 99999 PR PBB SHADOW E&M-EST. PATIENT-LVL V: ICD-10-PCS | Mod: PBBFAC,,, | Performed by: ORTHOPAEDIC SURGERY

## 2021-02-09 PROCEDURE — 99213 OFFICE O/P EST LOW 20 MIN: CPT | Mod: S$GLB,,, | Performed by: ORTHOPAEDIC SURGERY

## 2021-02-09 PROCEDURE — 3075F PR MOST RECENT SYSTOLIC BLOOD PRESS GE 130-139MM HG: ICD-10-PCS | Mod: CPTII,S$GLB,, | Performed by: ORTHOPAEDIC SURGERY

## 2021-02-09 PROCEDURE — 1125F AMNT PAIN NOTED PAIN PRSNT: CPT | Mod: S$GLB,,, | Performed by: ORTHOPAEDIC SURGERY

## 2021-02-09 PROCEDURE — 3075F SYST BP GE 130 - 139MM HG: CPT | Mod: CPTII,S$GLB,, | Performed by: ORTHOPAEDIC SURGERY

## 2021-02-09 PROCEDURE — 3008F PR BODY MASS INDEX (BMI) DOCUMENTED: ICD-10-PCS | Mod: CPTII,S$GLB,, | Performed by: ORTHOPAEDIC SURGERY

## 2021-02-09 PROCEDURE — 3079F PR MOST RECENT DIASTOLIC BLOOD PRESSURE 80-89 MM HG: ICD-10-PCS | Mod: CPTII,S$GLB,, | Performed by: ORTHOPAEDIC SURGERY

## 2021-02-09 PROCEDURE — 99999 PR PBB SHADOW E&M-EST. PATIENT-LVL V: CPT | Mod: PBBFAC,,, | Performed by: ORTHOPAEDIC SURGERY

## 2021-02-09 PROCEDURE — 99213 PR OFFICE/OUTPT VISIT, EST, LEVL III, 20-29 MIN: ICD-10-PCS | Mod: S$GLB,,, | Performed by: ORTHOPAEDIC SURGERY

## 2021-02-09 PROCEDURE — 1125F PR PAIN SEVERITY QUANTIFIED, PAIN PRESENT: ICD-10-PCS | Mod: S$GLB,,, | Performed by: ORTHOPAEDIC SURGERY

## 2021-02-09 RX ORDER — DICLOFENAC SODIUM 75 MG/1
75 TABLET, DELAYED RELEASE ORAL 2 TIMES DAILY
Qty: 60 TABLET | Refills: 0 | Status: SHIPPED | OUTPATIENT
Start: 2021-02-09 | End: 2021-03-31

## 2021-02-11 ENCOUNTER — PATIENT MESSAGE (OUTPATIENT)
Dept: FAMILY MEDICINE | Facility: CLINIC | Age: 56
End: 2021-02-11

## 2021-02-19 ENCOUNTER — TELEPHONE (OUTPATIENT)
Dept: ENDOSCOPY | Facility: HOSPITAL | Age: 56
End: 2021-02-19

## 2021-02-21 ENCOUNTER — PATIENT MESSAGE (OUTPATIENT)
Dept: ORTHOPEDICS | Facility: CLINIC | Age: 56
End: 2021-02-21

## 2021-02-21 ENCOUNTER — PATIENT MESSAGE (OUTPATIENT)
Dept: ENDOSCOPY | Facility: HOSPITAL | Age: 56
End: 2021-02-21

## 2021-02-24 ENCOUNTER — OFFICE VISIT (OUTPATIENT)
Dept: ORTHOPEDICS | Facility: CLINIC | Age: 56
End: 2021-02-24
Payer: COMMERCIAL

## 2021-02-24 VITALS
RESPIRATION RATE: 18 BRPM | WEIGHT: 195.75 LBS | BODY MASS INDEX: 32.61 KG/M2 | HEIGHT: 65 IN | HEART RATE: 97 BPM | OXYGEN SATURATION: 99 % | DIASTOLIC BLOOD PRESSURE: 80 MMHG | SYSTOLIC BLOOD PRESSURE: 122 MMHG

## 2021-02-24 DIAGNOSIS — M17.0 PRIMARY OSTEOARTHRITIS OF BOTH KNEES: Primary | ICD-10-CM

## 2021-02-24 PROCEDURE — 99999 PR PBB SHADOW E&M-EST. PATIENT-LVL V: ICD-10-PCS | Mod: PBBFAC,,, | Performed by: ORTHOPAEDIC SURGERY

## 2021-02-24 PROCEDURE — 99213 PR OFFICE/OUTPT VISIT, EST, LEVL III, 20-29 MIN: ICD-10-PCS | Mod: 25,S$GLB,, | Performed by: ORTHOPAEDIC SURGERY

## 2021-02-24 PROCEDURE — 3079F PR MOST RECENT DIASTOLIC BLOOD PRESSURE 80-89 MM HG: ICD-10-PCS | Mod: CPTII,S$GLB,, | Performed by: ORTHOPAEDIC SURGERY

## 2021-02-24 PROCEDURE — 3074F SYST BP LT 130 MM HG: CPT | Mod: CPTII,S$GLB,, | Performed by: ORTHOPAEDIC SURGERY

## 2021-02-24 PROCEDURE — 1125F PR PAIN SEVERITY QUANTIFIED, PAIN PRESENT: ICD-10-PCS | Mod: S$GLB,,, | Performed by: ORTHOPAEDIC SURGERY

## 2021-02-24 PROCEDURE — 99999 PR PBB SHADOW E&M-EST. PATIENT-LVL V: CPT | Mod: PBBFAC,,, | Performed by: ORTHOPAEDIC SURGERY

## 2021-02-24 PROCEDURE — 20610 DRAIN/INJ JOINT/BURSA W/O US: CPT | Mod: 50,S$GLB,, | Performed by: ORTHOPAEDIC SURGERY

## 2021-02-24 PROCEDURE — 3074F PR MOST RECENT SYSTOLIC BLOOD PRESSURE < 130 MM HG: ICD-10-PCS | Mod: CPTII,S$GLB,, | Performed by: ORTHOPAEDIC SURGERY

## 2021-02-24 PROCEDURE — 3008F PR BODY MASS INDEX (BMI) DOCUMENTED: ICD-10-PCS | Mod: CPTII,S$GLB,, | Performed by: ORTHOPAEDIC SURGERY

## 2021-02-24 PROCEDURE — 1125F AMNT PAIN NOTED PAIN PRSNT: CPT | Mod: S$GLB,,, | Performed by: ORTHOPAEDIC SURGERY

## 2021-02-24 PROCEDURE — 3008F BODY MASS INDEX DOCD: CPT | Mod: CPTII,S$GLB,, | Performed by: ORTHOPAEDIC SURGERY

## 2021-02-24 PROCEDURE — 20610 LARGE JOINT ASPIRATION/INJECTION: BILATERAL KNEE: ICD-10-PCS | Mod: 50,S$GLB,, | Performed by: ORTHOPAEDIC SURGERY

## 2021-02-24 PROCEDURE — 99213 OFFICE O/P EST LOW 20 MIN: CPT | Mod: 25,S$GLB,, | Performed by: ORTHOPAEDIC SURGERY

## 2021-02-24 PROCEDURE — 3079F DIAST BP 80-89 MM HG: CPT | Mod: CPTII,S$GLB,, | Performed by: ORTHOPAEDIC SURGERY

## 2021-02-24 RX ORDER — TRIAMCINOLONE ACETONIDE 40 MG/ML
80 INJECTION, SUSPENSION INTRA-ARTICULAR; INTRAMUSCULAR
Status: DISCONTINUED | OUTPATIENT
Start: 2021-02-24 | End: 2021-02-24 | Stop reason: HOSPADM

## 2021-02-24 RX ADMIN — TRIAMCINOLONE ACETONIDE 80 MG: 40 INJECTION, SUSPENSION INTRA-ARTICULAR; INTRAMUSCULAR at 08:02

## 2021-02-25 ENCOUNTER — PATIENT MESSAGE (OUTPATIENT)
Dept: GASTROENTEROLOGY | Facility: CLINIC | Age: 56
End: 2021-02-25

## 2021-02-25 ENCOUNTER — PATIENT MESSAGE (OUTPATIENT)
Dept: NEUROLOGY | Facility: CLINIC | Age: 56
End: 2021-02-25

## 2021-03-03 ENCOUNTER — LAB VISIT (OUTPATIENT)
Dept: LAB | Facility: HOSPITAL | Age: 56
End: 2021-03-03
Attending: INTERNAL MEDICINE
Payer: COMMERCIAL

## 2021-03-03 DIAGNOSIS — A02.9 SALMONELLA INFECTION: ICD-10-CM

## 2021-03-03 DIAGNOSIS — R19.7 DIARRHEA, UNSPECIFIED TYPE: ICD-10-CM

## 2021-03-03 PROCEDURE — 87045 FECES CULTURE AEROBIC BACT: CPT | Performed by: INTERNAL MEDICINE

## 2021-03-03 PROCEDURE — 87046 STOOL CULTR AEROBIC BACT EA: CPT | Mod: 59 | Performed by: INTERNAL MEDICINE

## 2021-03-03 PROCEDURE — 87427 SHIGA-LIKE TOXIN AG IA: CPT | Mod: 59 | Performed by: INTERNAL MEDICINE

## 2021-03-04 LAB
E COLI SXT1 STL QL IA: NEGATIVE
E COLI SXT2 STL QL IA: NEGATIVE

## 2021-03-06 LAB — BACTERIA STL CULT: NORMAL

## 2021-03-16 ENCOUNTER — LAB VISIT (OUTPATIENT)
Dept: LAB | Facility: HOSPITAL | Age: 56
End: 2021-03-16
Attending: INTERNAL MEDICINE
Payer: COMMERCIAL

## 2021-03-16 ENCOUNTER — OFFICE VISIT (OUTPATIENT)
Dept: RHEUMATOLOGY | Facility: CLINIC | Age: 56
End: 2021-03-16
Payer: COMMERCIAL

## 2021-03-16 VITALS
HEIGHT: 65 IN | RESPIRATION RATE: 18 BRPM | BODY MASS INDEX: 32.03 KG/M2 | SYSTOLIC BLOOD PRESSURE: 132 MMHG | WEIGHT: 192.25 LBS | DIASTOLIC BLOOD PRESSURE: 81 MMHG | TEMPERATURE: 98 F | OXYGEN SATURATION: 97 % | HEART RATE: 91 BPM

## 2021-03-16 DIAGNOSIS — M17.0 PRIMARY OSTEOARTHRITIS OF BOTH KNEES: ICD-10-CM

## 2021-03-16 DIAGNOSIS — E66.9 CLASS 1 OBESITY WITH BODY MASS INDEX (BMI) OF 31.0 TO 31.9 IN ADULT, UNSPECIFIED OBESITY TYPE, UNSPECIFIED WHETHER SERIOUS COMORBIDITY PRESENT: ICD-10-CM

## 2021-03-16 DIAGNOSIS — Z71.89 COUNSELING AND COORDINATION OF CARE: ICD-10-CM

## 2021-03-16 DIAGNOSIS — M67.921 TENDINOPATHY OF RIGHT BICEPS TENDON: ICD-10-CM

## 2021-03-16 DIAGNOSIS — M17.0 PRIMARY OSTEOARTHRITIS OF BOTH KNEES: Primary | ICD-10-CM

## 2021-03-16 DIAGNOSIS — Z79.1 NSAID LONG-TERM USE: ICD-10-CM

## 2021-03-16 DIAGNOSIS — M70.61 TROCHANTERIC BURSITIS OF RIGHT HIP: ICD-10-CM

## 2021-03-16 LAB
ALBUMIN SERPL BCP-MCNC: 3.4 G/DL (ref 3.5–5.2)
ALP SERPL-CCNC: 125 U/L (ref 55–135)
ALT SERPL W/O P-5'-P-CCNC: 17 U/L (ref 10–44)
ANION GAP SERPL CALC-SCNC: 9 MMOL/L (ref 8–16)
AST SERPL-CCNC: 12 U/L (ref 10–40)
BASOPHILS # BLD AUTO: 0.04 K/UL (ref 0–0.2)
BASOPHILS NFR BLD: 0.5 % (ref 0–1.9)
BILIRUB SERPL-MCNC: 0.3 MG/DL (ref 0.1–1)
BUN SERPL-MCNC: 10 MG/DL (ref 6–20)
CALCIUM SERPL-MCNC: 9.6 MG/DL (ref 8.7–10.5)
CHLORIDE SERPL-SCNC: 108 MMOL/L (ref 95–110)
CO2 SERPL-SCNC: 26 MMOL/L (ref 23–29)
CREAT SERPL-MCNC: 0.8 MG/DL (ref 0.5–1.4)
CRP SERPL-MCNC: 6.3 MG/L (ref 0–8.2)
DIFFERENTIAL METHOD: ABNORMAL
EOSINOPHIL # BLD AUTO: 0.1 K/UL (ref 0–0.5)
EOSINOPHIL NFR BLD: 1.2 % (ref 0–8)
ERYTHROCYTE [DISTWIDTH] IN BLOOD BY AUTOMATED COUNT: 15.6 % (ref 11.5–14.5)
ERYTHROCYTE [SEDIMENTATION RATE] IN BLOOD BY WESTERGREN METHOD: 12 MM/HR (ref 0–20)
EST. GFR  (AFRICAN AMERICAN): >60 ML/MIN/1.73 M^2
EST. GFR  (NON AFRICAN AMERICAN): >60 ML/MIN/1.73 M^2
GLUCOSE SERPL-MCNC: 97 MG/DL (ref 70–110)
HCT VFR BLD AUTO: 38.9 % (ref 37–48.5)
HGB BLD-MCNC: 12 G/DL (ref 12–16)
IMM GRANULOCYTES # BLD AUTO: 0.01 K/UL (ref 0–0.04)
IMM GRANULOCYTES NFR BLD AUTO: 0.1 % (ref 0–0.5)
LYMPHOCYTES # BLD AUTO: 1.6 K/UL (ref 1–4.8)
LYMPHOCYTES NFR BLD: 22.1 % (ref 18–48)
MCH RBC QN AUTO: 26 PG (ref 27–31)
MCHC RBC AUTO-ENTMCNC: 30.8 G/DL (ref 32–36)
MCV RBC AUTO: 84 FL (ref 82–98)
MONOCYTES # BLD AUTO: 0.6 K/UL (ref 0.3–1)
MONOCYTES NFR BLD: 7.8 % (ref 4–15)
NEUTROPHILS # BLD AUTO: 5 K/UL (ref 1.8–7.7)
NEUTROPHILS NFR BLD: 68.3 % (ref 38–73)
NRBC BLD-RTO: 0 /100 WBC
PLATELET # BLD AUTO: 312 K/UL (ref 150–350)
PMV BLD AUTO: 9.3 FL (ref 9.2–12.9)
POTASSIUM SERPL-SCNC: 4.4 MMOL/L (ref 3.5–5.1)
PROT SERPL-MCNC: 6.5 G/DL (ref 6–8.4)
RBC # BLD AUTO: 4.62 M/UL (ref 4–5.4)
SODIUM SERPL-SCNC: 143 MMOL/L (ref 136–145)
WBC # BLD AUTO: 7.3 K/UL (ref 3.9–12.7)

## 2021-03-16 PROCEDURE — 20610 PR DRAIN/INJECT LARGE JOINT/BURSA: ICD-10-PCS | Mod: RT,S$GLB,, | Performed by: INTERNAL MEDICINE

## 2021-03-16 PROCEDURE — 3008F BODY MASS INDEX DOCD: CPT | Mod: CPTII,S$GLB,, | Performed by: INTERNAL MEDICINE

## 2021-03-16 PROCEDURE — 99204 OFFICE O/P NEW MOD 45 MIN: CPT | Mod: 25,S$GLB,, | Performed by: INTERNAL MEDICINE

## 2021-03-16 PROCEDURE — 3075F SYST BP GE 130 - 139MM HG: CPT | Mod: CPTII,S$GLB,, | Performed by: INTERNAL MEDICINE

## 2021-03-16 PROCEDURE — 1125F AMNT PAIN NOTED PAIN PRSNT: CPT | Mod: S$GLB,,, | Performed by: INTERNAL MEDICINE

## 2021-03-16 PROCEDURE — 20550 PR INJECT TENDON SHEATH/LIGAMENT: ICD-10-PCS | Mod: 59,RT,S$GLB, | Performed by: INTERNAL MEDICINE

## 2021-03-16 PROCEDURE — 36415 COLL VENOUS BLD VENIPUNCTURE: CPT | Performed by: INTERNAL MEDICINE

## 2021-03-16 PROCEDURE — 85652 RBC SED RATE AUTOMATED: CPT | Performed by: INTERNAL MEDICINE

## 2021-03-16 PROCEDURE — 3008F PR BODY MASS INDEX (BMI) DOCUMENTED: ICD-10-PCS | Mod: CPTII,S$GLB,, | Performed by: INTERNAL MEDICINE

## 2021-03-16 PROCEDURE — 80053 COMPREHEN METABOLIC PANEL: CPT | Performed by: INTERNAL MEDICINE

## 2021-03-16 PROCEDURE — 99999 PR PBB SHADOW E&M-EST. PATIENT-LVL V: ICD-10-PCS | Mod: PBBFAC,,, | Performed by: INTERNAL MEDICINE

## 2021-03-16 PROCEDURE — 20550 NJX 1 TENDON SHEATH/LIGAMENT: CPT | Mod: 59,RT,S$GLB, | Performed by: INTERNAL MEDICINE

## 2021-03-16 PROCEDURE — 3075F PR MOST RECENT SYSTOLIC BLOOD PRESS GE 130-139MM HG: ICD-10-PCS | Mod: CPTII,S$GLB,, | Performed by: INTERNAL MEDICINE

## 2021-03-16 PROCEDURE — 3079F PR MOST RECENT DIASTOLIC BLOOD PRESSURE 80-89 MM HG: ICD-10-PCS | Mod: CPTII,S$GLB,, | Performed by: INTERNAL MEDICINE

## 2021-03-16 PROCEDURE — 3079F DIAST BP 80-89 MM HG: CPT | Mod: CPTII,S$GLB,, | Performed by: INTERNAL MEDICINE

## 2021-03-16 PROCEDURE — 99999 PR PBB SHADOW E&M-EST. PATIENT-LVL V: CPT | Mod: PBBFAC,,, | Performed by: INTERNAL MEDICINE

## 2021-03-16 PROCEDURE — 1125F PR PAIN SEVERITY QUANTIFIED, PAIN PRESENT: ICD-10-PCS | Mod: S$GLB,,, | Performed by: INTERNAL MEDICINE

## 2021-03-16 PROCEDURE — 82306 VITAMIN D 25 HYDROXY: CPT | Performed by: INTERNAL MEDICINE

## 2021-03-16 PROCEDURE — 85025 COMPLETE CBC W/AUTO DIFF WBC: CPT | Performed by: INTERNAL MEDICINE

## 2021-03-16 PROCEDURE — 99204 PR OFFICE/OUTPT VISIT, NEW, LEVL IV, 45-59 MIN: ICD-10-PCS | Mod: 25,S$GLB,, | Performed by: INTERNAL MEDICINE

## 2021-03-16 PROCEDURE — 86140 C-REACTIVE PROTEIN: CPT | Performed by: INTERNAL MEDICINE

## 2021-03-16 PROCEDURE — 83520 IMMUNOASSAY QUANT NOS NONAB: CPT | Performed by: INTERNAL MEDICINE

## 2021-03-16 PROCEDURE — 20610 DRAIN/INJ JOINT/BURSA W/O US: CPT | Mod: RT,S$GLB,, | Performed by: INTERNAL MEDICINE

## 2021-03-16 RX ORDER — CELECOXIB 100 MG/1
100 CAPSULE ORAL 2 TIMES DAILY
Qty: 60 CAPSULE | Refills: 3 | Status: SHIPPED | OUTPATIENT
Start: 2021-03-16 | End: 2021-04-30

## 2021-03-16 RX ORDER — LIDOCAINE HYDROCHLORIDE 10 MG/ML
1 INJECTION INFILTRATION; PERINEURAL
Status: COMPLETED | OUTPATIENT
Start: 2021-03-16 | End: 2021-03-16

## 2021-03-16 RX ORDER — TIZANIDINE 4 MG/1
4 TABLET ORAL EVERY 6 HOURS PRN
COMMUNITY
Start: 2021-02-27 | End: 2021-06-03

## 2021-03-16 RX ORDER — TRIAMCINOLONE ACETONIDE 40 MG/ML
40 INJECTION, SUSPENSION INTRA-ARTICULAR; INTRAMUSCULAR
Status: COMPLETED | OUTPATIENT
Start: 2021-03-16 | End: 2021-03-16

## 2021-03-16 RX ADMIN — LIDOCAINE HYDROCHLORIDE 1 ML: 10 INJECTION INFILTRATION; PERINEURAL at 10:03

## 2021-03-16 RX ADMIN — TRIAMCINOLONE ACETONIDE 40 MG: 40 INJECTION, SUSPENSION INTRA-ARTICULAR; INTRAMUSCULAR at 10:03

## 2021-03-17 ENCOUNTER — PATIENT MESSAGE (OUTPATIENT)
Dept: RHEUMATOLOGY | Facility: CLINIC | Age: 56
End: 2021-03-17

## 2021-03-17 DIAGNOSIS — E55.9 VITAMIN D DEFICIENCY: Primary | ICD-10-CM

## 2021-03-17 LAB — 25(OH)D3+25(OH)D2 SERPL-MCNC: 21 NG/ML (ref 30–96)

## 2021-03-17 RX ORDER — ERGOCALCIFEROL 1.25 MG/1
50000 CAPSULE ORAL
Qty: 12 CAPSULE | Refills: 0 | Status: SHIPPED | OUTPATIENT
Start: 2021-03-17 | End: 2021-10-03 | Stop reason: SDUPTHER

## 2021-03-19 ENCOUNTER — PATIENT MESSAGE (OUTPATIENT)
Dept: RHEUMATOLOGY | Facility: CLINIC | Age: 56
End: 2021-03-19

## 2021-03-19 LAB — IL6 SERPL-MCNC: 2.7 PG/ML

## 2021-03-26 ENCOUNTER — PATIENT MESSAGE (OUTPATIENT)
Dept: GASTROENTEROLOGY | Facility: CLINIC | Age: 56
End: 2021-03-26

## 2021-03-26 ENCOUNTER — LAB VISIT (OUTPATIENT)
Dept: LAB | Facility: HOSPITAL | Age: 56
End: 2021-03-26
Attending: FAMILY MEDICINE
Payer: COMMERCIAL

## 2021-03-26 ENCOUNTER — OFFICE VISIT (OUTPATIENT)
Dept: FAMILY MEDICINE | Facility: CLINIC | Age: 56
End: 2021-03-26
Payer: COMMERCIAL

## 2021-03-26 VITALS
RESPIRATION RATE: 18 BRPM | TEMPERATURE: 98 F | HEART RATE: 77 BPM | OXYGEN SATURATION: 99 % | WEIGHT: 194 LBS | HEIGHT: 65 IN | SYSTOLIC BLOOD PRESSURE: 126 MMHG | BODY MASS INDEX: 32.32 KG/M2 | DIASTOLIC BLOOD PRESSURE: 81 MMHG

## 2021-03-26 DIAGNOSIS — R53.83 FATIGUE, UNSPECIFIED TYPE: Primary | ICD-10-CM

## 2021-03-26 DIAGNOSIS — R53.83 FATIGUE, UNSPECIFIED TYPE: ICD-10-CM

## 2021-03-26 DIAGNOSIS — R07.9 CHEST PAIN, UNSPECIFIED TYPE: ICD-10-CM

## 2021-03-26 LAB — TSH SERPL DL<=0.005 MIU/L-ACNC: 1.19 UIU/ML (ref 0.4–4)

## 2021-03-26 PROCEDURE — 99999 PR PBB SHADOW E&M-EST. PATIENT-LVL V: CPT | Mod: PBBFAC,,, | Performed by: FAMILY MEDICINE

## 2021-03-26 PROCEDURE — 3074F SYST BP LT 130 MM HG: CPT | Mod: CPTII,S$GLB,, | Performed by: FAMILY MEDICINE

## 2021-03-26 PROCEDURE — 93005 EKG 12-LEAD: ICD-10-PCS | Mod: S$GLB,,, | Performed by: FAMILY MEDICINE

## 2021-03-26 PROCEDURE — 3079F PR MOST RECENT DIASTOLIC BLOOD PRESSURE 80-89 MM HG: ICD-10-PCS | Mod: CPTII,S$GLB,, | Performed by: FAMILY MEDICINE

## 2021-03-26 PROCEDURE — 99213 OFFICE O/P EST LOW 20 MIN: CPT | Mod: S$GLB,,, | Performed by: FAMILY MEDICINE

## 2021-03-26 PROCEDURE — 93010 EKG 12-LEAD: ICD-10-PCS | Mod: S$GLB,,, | Performed by: INTERNAL MEDICINE

## 2021-03-26 PROCEDURE — 3074F PR MOST RECENT SYSTOLIC BLOOD PRESSURE < 130 MM HG: ICD-10-PCS | Mod: CPTII,S$GLB,, | Performed by: FAMILY MEDICINE

## 2021-03-26 PROCEDURE — 93010 ELECTROCARDIOGRAM REPORT: CPT | Mod: S$GLB,,, | Performed by: INTERNAL MEDICINE

## 2021-03-26 PROCEDURE — 3008F BODY MASS INDEX DOCD: CPT | Mod: CPTII,S$GLB,, | Performed by: FAMILY MEDICINE

## 2021-03-26 PROCEDURE — 99213 PR OFFICE/OUTPT VISIT, EST, LEVL III, 20-29 MIN: ICD-10-PCS | Mod: S$GLB,,, | Performed by: FAMILY MEDICINE

## 2021-03-26 PROCEDURE — 3079F DIAST BP 80-89 MM HG: CPT | Mod: CPTII,S$GLB,, | Performed by: FAMILY MEDICINE

## 2021-03-26 PROCEDURE — 1126F PR PAIN SEVERITY QUANTIFIED, NO PAIN PRESENT: ICD-10-PCS | Mod: S$GLB,,, | Performed by: FAMILY MEDICINE

## 2021-03-26 PROCEDURE — 1126F AMNT PAIN NOTED NONE PRSNT: CPT | Mod: S$GLB,,, | Performed by: FAMILY MEDICINE

## 2021-03-26 PROCEDURE — 84443 ASSAY THYROID STIM HORMONE: CPT | Performed by: FAMILY MEDICINE

## 2021-03-26 PROCEDURE — 3008F PR BODY MASS INDEX (BMI) DOCUMENTED: ICD-10-PCS | Mod: CPTII,S$GLB,, | Performed by: FAMILY MEDICINE

## 2021-03-26 PROCEDURE — 99999 PR PBB SHADOW E&M-EST. PATIENT-LVL V: ICD-10-PCS | Mod: PBBFAC,,, | Performed by: FAMILY MEDICINE

## 2021-03-26 PROCEDURE — 93005 ELECTROCARDIOGRAM TRACING: CPT | Mod: S$GLB,,, | Performed by: FAMILY MEDICINE

## 2021-03-26 RX ORDER — COVID-19 MOLECULAR TEST ASSAY
KIT MISCELLANEOUS
COMMUNITY
Start: 2021-01-23 | End: 2021-07-01

## 2021-03-29 ENCOUNTER — TELEPHONE (OUTPATIENT)
Dept: FAMILY MEDICINE | Facility: CLINIC | Age: 56
End: 2021-03-29

## 2021-03-29 DIAGNOSIS — Z12.11 SCREENING FOR COLORECTAL CANCER: Primary | ICD-10-CM

## 2021-03-29 DIAGNOSIS — Z12.12 SCREENING FOR COLORECTAL CANCER: Primary | ICD-10-CM

## 2021-03-30 ENCOUNTER — PATIENT MESSAGE (OUTPATIENT)
Dept: FAMILY MEDICINE | Facility: CLINIC | Age: 56
End: 2021-03-30

## 2021-03-30 ENCOUNTER — PATIENT OUTREACH (OUTPATIENT)
Dept: ADMINISTRATIVE | Facility: OTHER | Age: 56
End: 2021-03-30

## 2021-03-31 ENCOUNTER — OFFICE VISIT (OUTPATIENT)
Dept: PAIN MEDICINE | Facility: CLINIC | Age: 56
End: 2021-03-31
Attending: FAMILY MEDICINE
Payer: COMMERCIAL

## 2021-03-31 ENCOUNTER — OFFICE VISIT (OUTPATIENT)
Dept: SLEEP MEDICINE | Facility: CLINIC | Age: 56
End: 2021-03-31
Payer: COMMERCIAL

## 2021-03-31 ENCOUNTER — OFFICE VISIT (OUTPATIENT)
Dept: FAMILY MEDICINE | Facility: CLINIC | Age: 56
End: 2021-03-31
Payer: COMMERCIAL

## 2021-03-31 ENCOUNTER — PATIENT MESSAGE (OUTPATIENT)
Dept: FAMILY MEDICINE | Facility: CLINIC | Age: 56
End: 2021-03-31

## 2021-03-31 VITALS
OXYGEN SATURATION: 99 % | HEART RATE: 84 BPM | WEIGHT: 193.69 LBS | DIASTOLIC BLOOD PRESSURE: 85 MMHG | SYSTOLIC BLOOD PRESSURE: 122 MMHG | HEIGHT: 65 IN | BODY MASS INDEX: 32.27 KG/M2

## 2021-03-31 DIAGNOSIS — Z01.818 PRE-OP TESTING: Primary | ICD-10-CM

## 2021-03-31 DIAGNOSIS — M53.3 SACROILIAC JOINT PAIN: ICD-10-CM

## 2021-03-31 DIAGNOSIS — M46.1 SACROILIITIS: ICD-10-CM

## 2021-03-31 DIAGNOSIS — M47.816 LUMBAR SPONDYLOSIS: ICD-10-CM

## 2021-03-31 DIAGNOSIS — R06.02 SOB (SHORTNESS OF BREATH): Primary | ICD-10-CM

## 2021-03-31 DIAGNOSIS — G89.29 CHRONIC NONINTRACTABLE HEADACHE, UNSPECIFIED HEADACHE TYPE: ICD-10-CM

## 2021-03-31 DIAGNOSIS — M51.36 DDD (DEGENERATIVE DISC DISEASE), LUMBAR: ICD-10-CM

## 2021-03-31 DIAGNOSIS — R35.1 NOCTURIA: ICD-10-CM

## 2021-03-31 DIAGNOSIS — R53.83 FATIGUE, UNSPECIFIED TYPE: ICD-10-CM

## 2021-03-31 DIAGNOSIS — R51.9 CHRONIC NONINTRACTABLE HEADACHE, UNSPECIFIED HEADACHE TYPE: ICD-10-CM

## 2021-03-31 DIAGNOSIS — M47.897 OTHER OSTEOARTHRITIS OF SPINE, LUMBOSACRAL REGION: ICD-10-CM

## 2021-03-31 DIAGNOSIS — R06.83 SNORING: Primary | ICD-10-CM

## 2021-03-31 PROBLEM — M47.817 SPONDYLOSIS OF LUMBOSACRAL REGION: Status: ACTIVE | Noted: 2021-03-31

## 2021-03-31 PROCEDURE — 3074F SYST BP LT 130 MM HG: CPT | Mod: CPTII,,, | Performed by: INTERNAL MEDICINE

## 2021-03-31 PROCEDURE — 99214 PR OFFICE/OUTPT VISIT, EST, LEVL IV, 30-39 MIN: ICD-10-PCS | Mod: S$GLB,,, | Performed by: PAIN MEDICINE

## 2021-03-31 PROCEDURE — 99999 PR PBB SHADOW E&M-EST. PATIENT-LVL V: CPT | Mod: PBBFAC,,, | Performed by: PAIN MEDICINE

## 2021-03-31 PROCEDURE — 3008F BODY MASS INDEX DOCD: CPT | Mod: CPTII,S$GLB,, | Performed by: PAIN MEDICINE

## 2021-03-31 PROCEDURE — 1125F PR PAIN SEVERITY QUANTIFIED, PAIN PRESENT: ICD-10-PCS | Mod: S$GLB,,, | Performed by: PAIN MEDICINE

## 2021-03-31 PROCEDURE — 3074F SYST BP LT 130 MM HG: CPT | Mod: CPTII,,, | Performed by: FAMILY MEDICINE

## 2021-03-31 PROCEDURE — 99999 PR PBB SHADOW E&M-EST. PATIENT-LVL V: ICD-10-PCS | Mod: PBBFAC,,, | Performed by: PAIN MEDICINE

## 2021-03-31 PROCEDURE — 3078F PR MOST RECENT DIASTOLIC BLOOD PRESSURE < 80 MM HG: ICD-10-PCS | Mod: CPTII,,, | Performed by: FAMILY MEDICINE

## 2021-03-31 PROCEDURE — 99204 PR OFFICE/OUTPT VISIT, NEW, LEVL IV, 45-59 MIN: ICD-10-PCS | Mod: 95,,, | Performed by: INTERNAL MEDICINE

## 2021-03-31 PROCEDURE — 3079F PR MOST RECENT DIASTOLIC BLOOD PRESSURE 80-89 MM HG: ICD-10-PCS | Mod: CPTII,S$GLB,, | Performed by: PAIN MEDICINE

## 2021-03-31 PROCEDURE — 99214 OFFICE O/P EST MOD 30 MIN: CPT | Mod: S$GLB,,, | Performed by: PAIN MEDICINE

## 2021-03-31 PROCEDURE — 3078F DIAST BP <80 MM HG: CPT | Mod: CPTII,,, | Performed by: INTERNAL MEDICINE

## 2021-03-31 PROCEDURE — 1125F AMNT PAIN NOTED PAIN PRSNT: CPT | Mod: S$GLB,,, | Performed by: PAIN MEDICINE

## 2021-03-31 PROCEDURE — 3074F PR MOST RECENT SYSTOLIC BLOOD PRESSURE < 130 MM HG: ICD-10-PCS | Mod: CPTII,,, | Performed by: FAMILY MEDICINE

## 2021-03-31 PROCEDURE — 99213 OFFICE O/P EST LOW 20 MIN: CPT | Mod: 95,,, | Performed by: FAMILY MEDICINE

## 2021-03-31 PROCEDURE — 3074F SYST BP LT 130 MM HG: CPT | Mod: CPTII,S$GLB,, | Performed by: PAIN MEDICINE

## 2021-03-31 PROCEDURE — 3074F PR MOST RECENT SYSTOLIC BLOOD PRESSURE < 130 MM HG: ICD-10-PCS | Mod: CPTII,,, | Performed by: INTERNAL MEDICINE

## 2021-03-31 PROCEDURE — 99204 OFFICE O/P NEW MOD 45 MIN: CPT | Mod: 95,,, | Performed by: INTERNAL MEDICINE

## 2021-03-31 PROCEDURE — 3008F PR BODY MASS INDEX (BMI) DOCUMENTED: ICD-10-PCS | Mod: CPTII,S$GLB,, | Performed by: PAIN MEDICINE

## 2021-03-31 PROCEDURE — 3078F DIAST BP <80 MM HG: CPT | Mod: CPTII,,, | Performed by: FAMILY MEDICINE

## 2021-03-31 PROCEDURE — 3079F DIAST BP 80-89 MM HG: CPT | Mod: CPTII,S$GLB,, | Performed by: PAIN MEDICINE

## 2021-03-31 PROCEDURE — 99213 PR OFFICE/OUTPT VISIT, EST, LEVL III, 20-29 MIN: ICD-10-PCS | Mod: 95,,, | Performed by: FAMILY MEDICINE

## 2021-03-31 PROCEDURE — 3078F PR MOST RECENT DIASTOLIC BLOOD PRESSURE < 80 MM HG: ICD-10-PCS | Mod: CPTII,,, | Performed by: INTERNAL MEDICINE

## 2021-03-31 PROCEDURE — 3074F PR MOST RECENT SYSTOLIC BLOOD PRESSURE < 130 MM HG: ICD-10-PCS | Mod: CPTII,S$GLB,, | Performed by: PAIN MEDICINE

## 2021-03-31 RX ORDER — FLUTICASONE PROPIONATE AND SALMETEROL 100; 50 UG/1; UG/1
1 POWDER RESPIRATORY (INHALATION) 2 TIMES DAILY
Qty: 60 EACH | Refills: 0 | Status: SHIPPED | OUTPATIENT
Start: 2021-03-31 | End: 2021-07-01

## 2021-04-01 ENCOUNTER — PATIENT MESSAGE (OUTPATIENT)
Dept: ENDOSCOPY | Facility: HOSPITAL | Age: 56
End: 2021-04-01

## 2021-04-01 ENCOUNTER — TELEPHONE (OUTPATIENT)
Dept: ENDOSCOPY | Facility: HOSPITAL | Age: 56
End: 2021-04-01

## 2021-04-01 DIAGNOSIS — Z12.11 SPECIAL SCREENING FOR MALIGNANT NEOPLASMS, COLON: Primary | ICD-10-CM

## 2021-04-01 RX ORDER — SODIUM, POTASSIUM,MAG SULFATES 17.5-3.13G
SOLUTION, RECONSTITUTED, ORAL ORAL
Qty: 1 BOTTLE | Refills: 0 | Status: SHIPPED | OUTPATIENT
Start: 2021-04-01 | End: 2021-07-01

## 2021-04-05 ENCOUNTER — TELEPHONE (OUTPATIENT)
Dept: ENDOSCOPY | Facility: HOSPITAL | Age: 56
End: 2021-04-05

## 2021-04-06 ENCOUNTER — PATIENT MESSAGE (OUTPATIENT)
Dept: GASTROENTEROLOGY | Facility: CLINIC | Age: 56
End: 2021-04-06

## 2021-04-06 ENCOUNTER — PATIENT MESSAGE (OUTPATIENT)
Dept: ADMINISTRATIVE | Facility: HOSPITAL | Age: 56
End: 2021-04-06

## 2021-04-06 ENCOUNTER — PATIENT MESSAGE (OUTPATIENT)
Dept: ENDOSCOPY | Facility: HOSPITAL | Age: 56
End: 2021-04-06

## 2021-04-13 ENCOUNTER — LAB VISIT (OUTPATIENT)
Dept: FAMILY MEDICINE | Facility: CLINIC | Age: 56
End: 2021-04-13
Payer: COMMERCIAL

## 2021-04-13 DIAGNOSIS — Z01.818 PRE-OP TESTING: ICD-10-CM

## 2021-04-13 PROCEDURE — U0003 INFECTIOUS AGENT DETECTION BY NUCLEIC ACID (DNA OR RNA); SEVERE ACUTE RESPIRATORY SYNDROME CORONAVIRUS 2 (SARS-COV-2) (CORONAVIRUS DISEASE [COVID-19]), AMPLIFIED PROBE TECHNIQUE, MAKING USE OF HIGH THROUGHPUT TECHNOLOGIES AS DESCRIBED BY CMS-2020-01-R: HCPCS | Performed by: PAIN MEDICINE

## 2021-04-13 PROCEDURE — U0005 INFEC AGEN DETEC AMPLI PROBE: HCPCS | Performed by: PAIN MEDICINE

## 2021-04-14 LAB — SARS-COV-2 RNA RESP QL NAA+PROBE: NOT DETECTED

## 2021-04-15 ENCOUNTER — PATIENT MESSAGE (OUTPATIENT)
Dept: RESEARCH | Facility: HOSPITAL | Age: 56
End: 2021-04-15

## 2021-04-16 ENCOUNTER — HOSPITAL ENCOUNTER (OUTPATIENT)
Facility: HOSPITAL | Age: 56
Discharge: HOME | End: 2021-04-16
Attending: PAIN MEDICINE | Admitting: PAIN MEDICINE
Payer: COMMERCIAL

## 2021-04-16 VITALS
OXYGEN SATURATION: 98 % | DIASTOLIC BLOOD PRESSURE: 74 MMHG | SYSTOLIC BLOOD PRESSURE: 130 MMHG | HEART RATE: 64 BPM | BODY MASS INDEX: 32.15 KG/M2 | WEIGHT: 193 LBS | TEMPERATURE: 99 F | HEIGHT: 65 IN | RESPIRATION RATE: 15 BRPM

## 2021-04-16 DIAGNOSIS — M47.817 LUMBOSACRAL SPONDYLOSIS: ICD-10-CM

## 2021-04-16 DIAGNOSIS — M47.897 OTHER OSTEOARTHRITIS OF SPINE, LUMBOSACRAL REGION: Primary | ICD-10-CM

## 2021-04-16 PROCEDURE — 25500020 PHARM REV CODE 255: Performed by: PAIN MEDICINE

## 2021-04-16 PROCEDURE — 27096 INJECT SACROILIAC JOINT: CPT | Mod: 50,,, | Performed by: PAIN MEDICINE

## 2021-04-16 PROCEDURE — 27096 INJECT SACROILIAC JOINT: CPT | Mod: 50 | Performed by: PAIN MEDICINE

## 2021-04-16 PROCEDURE — 77002 NEEDLE LOCALIZATION BY XRAY: CPT | Performed by: PAIN MEDICINE

## 2021-04-16 PROCEDURE — 25000003 PHARM REV CODE 250: Performed by: PAIN MEDICINE

## 2021-04-16 PROCEDURE — 27096 PR INJECTION,SACROILIAC JOINT: ICD-10-PCS | Mod: 50,,, | Performed by: PAIN MEDICINE

## 2021-04-16 PROCEDURE — 63600175 PHARM REV CODE 636 W HCPCS: Performed by: PAIN MEDICINE

## 2021-04-16 RX ORDER — TRIAMCINOLONE ACETONIDE 40 MG/ML
40 INJECTION, SUSPENSION INTRA-ARTICULAR; INTRAMUSCULAR ONCE
Status: COMPLETED | OUTPATIENT
Start: 2021-04-16 | End: 2021-04-16

## 2021-04-16 RX ORDER — LIDOCAINE HYDROCHLORIDE 20 MG/ML
10 INJECTION, SOLUTION INFILTRATION; PERINEURAL ONCE
Status: COMPLETED | OUTPATIENT
Start: 2021-04-16 | End: 2021-04-16

## 2021-04-16 RX ORDER — BUPIVACAINE HYDROCHLORIDE 2.5 MG/ML
INJECTION, SOLUTION EPIDURAL; INFILTRATION; INTRACAUDAL
Status: DISCONTINUED
Start: 2021-04-16 | End: 2021-04-16 | Stop reason: HOSPADM

## 2021-04-16 RX ORDER — BUPIVACAINE HYDROCHLORIDE 2.5 MG/ML
10 INJECTION, SOLUTION EPIDURAL; INFILTRATION; INTRACAUDAL ONCE
Status: COMPLETED | OUTPATIENT
Start: 2021-04-16 | End: 2021-04-16

## 2021-04-16 RX ORDER — ALPRAZOLAM 0.5 MG/1
1 TABLET, ORALLY DISINTEGRATING ORAL ONCE AS NEEDED
Status: DISCONTINUED | OUTPATIENT
Start: 2021-04-16 | End: 2021-04-16 | Stop reason: HOSPADM

## 2021-04-16 RX ORDER — TRIAMCINOLONE ACETONIDE 40 MG/ML
INJECTION, SUSPENSION INTRA-ARTICULAR; INTRAMUSCULAR
Status: DISCONTINUED
Start: 2021-04-16 | End: 2021-04-16 | Stop reason: HOSPADM

## 2021-04-16 RX ORDER — LIDOCAINE HYDROCHLORIDE 20 MG/ML
INJECTION, SOLUTION INFILTRATION; PERINEURAL
Status: DISCONTINUED
Start: 2021-04-16 | End: 2021-04-16 | Stop reason: HOSPADM

## 2021-04-20 ENCOUNTER — TELEPHONE (OUTPATIENT)
Dept: SLEEP MEDICINE | Facility: HOSPITAL | Age: 56
End: 2021-04-20

## 2021-04-26 ENCOUNTER — OFFICE VISIT (OUTPATIENT)
Dept: FAMILY MEDICINE | Facility: CLINIC | Age: 56
End: 2021-04-26
Payer: COMMERCIAL

## 2021-04-26 VITALS
TEMPERATURE: 98 F | BODY MASS INDEX: 32.73 KG/M2 | DIASTOLIC BLOOD PRESSURE: 80 MMHG | WEIGHT: 196.44 LBS | SYSTOLIC BLOOD PRESSURE: 120 MMHG | HEART RATE: 90 BPM | HEIGHT: 65 IN | RESPIRATION RATE: 17 BRPM | OXYGEN SATURATION: 98 %

## 2021-04-26 DIAGNOSIS — R51.9 CHRONIC DAILY HEADACHE: Primary | ICD-10-CM

## 2021-04-26 PROCEDURE — 3008F PR BODY MASS INDEX (BMI) DOCUMENTED: ICD-10-PCS | Mod: CPTII,S$GLB,, | Performed by: NURSE PRACTITIONER

## 2021-04-26 PROCEDURE — 1125F AMNT PAIN NOTED PAIN PRSNT: CPT | Mod: S$GLB,,, | Performed by: NURSE PRACTITIONER

## 2021-04-26 PROCEDURE — 99999 PR PBB SHADOW E&M-EST. PATIENT-LVL V: ICD-10-PCS | Mod: PBBFAC,,, | Performed by: NURSE PRACTITIONER

## 2021-04-26 PROCEDURE — 99999 PR PBB SHADOW E&M-EST. PATIENT-LVL V: CPT | Mod: PBBFAC,,, | Performed by: NURSE PRACTITIONER

## 2021-04-26 PROCEDURE — 99214 OFFICE O/P EST MOD 30 MIN: CPT | Mod: S$GLB,,, | Performed by: NURSE PRACTITIONER

## 2021-04-26 PROCEDURE — 99214 PR OFFICE/OUTPT VISIT, EST, LEVL IV, 30-39 MIN: ICD-10-PCS | Mod: S$GLB,,, | Performed by: NURSE PRACTITIONER

## 2021-04-26 PROCEDURE — 3008F BODY MASS INDEX DOCD: CPT | Mod: CPTII,S$GLB,, | Performed by: NURSE PRACTITIONER

## 2021-04-26 PROCEDURE — 1125F PR PAIN SEVERITY QUANTIFIED, PAIN PRESENT: ICD-10-PCS | Mod: S$GLB,,, | Performed by: NURSE PRACTITIONER

## 2021-04-26 RX ORDER — BUTALBITAL, ACETAMINOPHEN AND CAFFEINE 50; 325; 40 MG/1; MG/1; MG/1
1 TABLET ORAL EVERY 6 HOURS PRN
Qty: 60 TABLET | Refills: 0 | Status: SHIPPED | OUTPATIENT
Start: 2021-04-26 | End: 2021-05-26

## 2021-04-29 ENCOUNTER — OFFICE VISIT (OUTPATIENT)
Dept: FAMILY MEDICINE | Facility: CLINIC | Age: 56
End: 2021-04-29
Payer: COMMERCIAL

## 2021-04-29 DIAGNOSIS — J01.90 ACUTE RHINOSINUSITIS: Primary | ICD-10-CM

## 2021-04-29 PROCEDURE — 99214 OFFICE O/P EST MOD 30 MIN: CPT | Mod: 95,,, | Performed by: FAMILY MEDICINE

## 2021-04-29 PROCEDURE — 99214 PR OFFICE/OUTPT VISIT, EST, LEVL IV, 30-39 MIN: ICD-10-PCS | Mod: 95,,, | Performed by: FAMILY MEDICINE

## 2021-04-29 RX ORDER — LORATADINE 10 MG/1
10 TABLET ORAL DAILY PRN
Qty: 30 TABLET | Refills: 0 | Status: SHIPPED | OUTPATIENT
Start: 2021-04-29 | End: 2021-07-01

## 2021-04-29 RX ORDER — AMOXICILLIN AND CLAVULANATE POTASSIUM 500; 125 MG/1; MG/1
1 TABLET, FILM COATED ORAL 2 TIMES DAILY
Qty: 14 TABLET | Refills: 0 | Status: SHIPPED | OUTPATIENT
Start: 2021-04-29 | End: 2021-05-06

## 2021-04-30 ENCOUNTER — OFFICE VISIT (OUTPATIENT)
Dept: RHEUMATOLOGY | Facility: CLINIC | Age: 56
End: 2021-04-30
Payer: COMMERCIAL

## 2021-04-30 ENCOUNTER — PATIENT OUTREACH (OUTPATIENT)
Dept: ADMINISTRATIVE | Facility: OTHER | Age: 56
End: 2021-04-30

## 2021-04-30 VITALS
SYSTOLIC BLOOD PRESSURE: 131 MMHG | RESPIRATION RATE: 18 BRPM | OXYGEN SATURATION: 97 % | HEART RATE: 75 BPM | TEMPERATURE: 99 F | DIASTOLIC BLOOD PRESSURE: 86 MMHG

## 2021-04-30 DIAGNOSIS — Z12.11 COLON CANCER SCREENING: Primary | ICD-10-CM

## 2021-04-30 DIAGNOSIS — Z71.89 COUNSELING AND COORDINATION OF CARE: ICD-10-CM

## 2021-04-30 DIAGNOSIS — Z12.31 BREAST CANCER SCREENING BY MAMMOGRAM: ICD-10-CM

## 2021-04-30 DIAGNOSIS — E66.9 CLASS 1 OBESITY WITH BODY MASS INDEX (BMI) OF 32.0 TO 32.9 IN ADULT, UNSPECIFIED OBESITY TYPE, UNSPECIFIED WHETHER SERIOUS COMORBIDITY PRESENT: ICD-10-CM

## 2021-04-30 DIAGNOSIS — M15.9 PRIMARY OSTEOARTHRITIS INVOLVING MULTIPLE JOINTS: Primary | ICD-10-CM

## 2021-04-30 DIAGNOSIS — Z79.1 NSAID LONG-TERM USE: ICD-10-CM

## 2021-04-30 PROCEDURE — 99214 OFFICE O/P EST MOD 30 MIN: CPT | Mod: S$GLB,,, | Performed by: INTERNAL MEDICINE

## 2021-04-30 PROCEDURE — 1125F AMNT PAIN NOTED PAIN PRSNT: CPT | Mod: S$GLB,,, | Performed by: INTERNAL MEDICINE

## 2021-04-30 PROCEDURE — 99214 PR OFFICE/OUTPT VISIT, EST, LEVL IV, 30-39 MIN: ICD-10-PCS | Mod: S$GLB,,, | Performed by: INTERNAL MEDICINE

## 2021-04-30 PROCEDURE — 99999 PR PBB SHADOW E&M-EST. PATIENT-LVL II: ICD-10-PCS | Mod: PBBFAC,,, | Performed by: INTERNAL MEDICINE

## 2021-04-30 PROCEDURE — 99999 PR PBB SHADOW E&M-EST. PATIENT-LVL II: CPT | Mod: PBBFAC,,, | Performed by: INTERNAL MEDICINE

## 2021-04-30 PROCEDURE — 1125F PR PAIN SEVERITY QUANTIFIED, PAIN PRESENT: ICD-10-PCS | Mod: S$GLB,,, | Performed by: INTERNAL MEDICINE

## 2021-04-30 RX ORDER — NABUMETONE 750 MG/1
750 TABLET, FILM COATED ORAL 2 TIMES DAILY
Qty: 60 TABLET | Refills: 6 | Status: SHIPPED | OUTPATIENT
Start: 2021-04-30 | End: 2021-07-01

## 2021-04-30 RX ORDER — PANTOPRAZOLE SODIUM 40 MG/1
40 TABLET, DELAYED RELEASE ORAL DAILY
Qty: 30 TABLET | Refills: 11 | Status: SHIPPED | OUTPATIENT
Start: 2021-04-30 | End: 2021-07-09

## 2021-05-03 ENCOUNTER — TELEPHONE (OUTPATIENT)
Dept: PAIN MEDICINE | Facility: CLINIC | Age: 56
End: 2021-05-03

## 2021-05-04 ENCOUNTER — LAB VISIT (OUTPATIENT)
Dept: FAMILY MEDICINE | Facility: CLINIC | Age: 56
End: 2021-05-04
Payer: COMMERCIAL

## 2021-05-04 DIAGNOSIS — Z12.11 SPECIAL SCREENING FOR MALIGNANT NEOPLASMS, COLON: ICD-10-CM

## 2021-05-04 PROCEDURE — U0005 INFEC AGEN DETEC AMPLI PROBE: HCPCS | Performed by: FAMILY MEDICINE

## 2021-05-04 PROCEDURE — U0003 INFECTIOUS AGENT DETECTION BY NUCLEIC ACID (DNA OR RNA); SEVERE ACUTE RESPIRATORY SYNDROME CORONAVIRUS 2 (SARS-COV-2) (CORONAVIRUS DISEASE [COVID-19]), AMPLIFIED PROBE TECHNIQUE, MAKING USE OF HIGH THROUGHPUT TECHNOLOGIES AS DESCRIBED BY CMS-2020-01-R: HCPCS | Performed by: FAMILY MEDICINE

## 2021-05-05 ENCOUNTER — OFFICE VISIT (OUTPATIENT)
Dept: PAIN MEDICINE | Facility: CLINIC | Age: 56
End: 2021-05-05
Payer: COMMERCIAL

## 2021-05-05 ENCOUNTER — PATIENT MESSAGE (OUTPATIENT)
Dept: ENDOSCOPY | Facility: HOSPITAL | Age: 56
End: 2021-05-05

## 2021-05-05 ENCOUNTER — TELEPHONE (OUTPATIENT)
Dept: PSYCHIATRY | Facility: CLINIC | Age: 56
End: 2021-05-05

## 2021-05-05 VITALS
DIASTOLIC BLOOD PRESSURE: 76 MMHG | SYSTOLIC BLOOD PRESSURE: 140 MMHG | BODY MASS INDEX: 32.47 KG/M2 | TEMPERATURE: 99 F | HEIGHT: 65 IN | WEIGHT: 194.88 LBS | OXYGEN SATURATION: 96 % | HEART RATE: 79 BPM

## 2021-05-05 DIAGNOSIS — M47.816 LUMBAR SPONDYLOSIS: ICD-10-CM

## 2021-05-05 DIAGNOSIS — M53.3 SACROILIAC JOINT PAIN: Primary | ICD-10-CM

## 2021-05-05 LAB — SARS-COV-2 RNA RESP QL NAA+PROBE: NOT DETECTED

## 2021-05-05 PROCEDURE — 1125F PR PAIN SEVERITY QUANTIFIED, PAIN PRESENT: ICD-10-PCS | Mod: S$GLB,,, | Performed by: REGISTERED NURSE

## 2021-05-05 PROCEDURE — 99213 PR OFFICE/OUTPT VISIT, EST, LEVL III, 20-29 MIN: ICD-10-PCS | Mod: S$GLB,,, | Performed by: REGISTERED NURSE

## 2021-05-05 PROCEDURE — 99999 PR PBB SHADOW E&M-EST. PATIENT-LVL IV: CPT | Mod: PBBFAC,,, | Performed by: REGISTERED NURSE

## 2021-05-05 PROCEDURE — 3008F BODY MASS INDEX DOCD: CPT | Mod: CPTII,S$GLB,, | Performed by: REGISTERED NURSE

## 2021-05-05 PROCEDURE — 1125F AMNT PAIN NOTED PAIN PRSNT: CPT | Mod: S$GLB,,, | Performed by: REGISTERED NURSE

## 2021-05-05 PROCEDURE — 99213 OFFICE O/P EST LOW 20 MIN: CPT | Mod: S$GLB,,, | Performed by: REGISTERED NURSE

## 2021-05-05 PROCEDURE — 3008F PR BODY MASS INDEX (BMI) DOCUMENTED: ICD-10-PCS | Mod: CPTII,S$GLB,, | Performed by: REGISTERED NURSE

## 2021-05-05 PROCEDURE — 99999 PR PBB SHADOW E&M-EST. PATIENT-LVL IV: ICD-10-PCS | Mod: PBBFAC,,, | Performed by: REGISTERED NURSE

## 2021-05-06 ENCOUNTER — ANESTHESIA EVENT (OUTPATIENT)
Dept: ENDOSCOPY | Facility: HOSPITAL | Age: 56
End: 2021-05-06
Payer: COMMERCIAL

## 2021-05-07 ENCOUNTER — ANESTHESIA (OUTPATIENT)
Dept: ENDOSCOPY | Facility: HOSPITAL | Age: 56
End: 2021-05-07
Payer: COMMERCIAL

## 2021-05-07 ENCOUNTER — HOSPITAL ENCOUNTER (OUTPATIENT)
Facility: HOSPITAL | Age: 56
Discharge: HOME | End: 2021-05-07
Attending: INTERNAL MEDICINE | Admitting: INTERNAL MEDICINE
Payer: COMMERCIAL

## 2021-05-07 VITALS
RESPIRATION RATE: 18 BRPM | OXYGEN SATURATION: 100 % | SYSTOLIC BLOOD PRESSURE: 123 MMHG | DIASTOLIC BLOOD PRESSURE: 77 MMHG | HEART RATE: 64 BPM | TEMPERATURE: 98 F

## 2021-05-07 DIAGNOSIS — K21.9 GASTROESOPHAGEAL REFLUX DISEASE, UNSPECIFIED WHETHER ESOPHAGITIS PRESENT: Primary | ICD-10-CM

## 2021-05-07 PROCEDURE — 43239 EGD BIOPSY SINGLE/MULTIPLE: CPT | Mod: 51,,, | Performed by: INTERNAL MEDICINE

## 2021-05-07 PROCEDURE — 27201012 HC FORCEPS, HOT/COLD, DISP: Performed by: INTERNAL MEDICINE

## 2021-05-07 PROCEDURE — 37000008 HC ANESTHESIA 1ST 15 MINUTES: Performed by: INTERNAL MEDICINE

## 2021-05-07 PROCEDURE — 43239 EGD BIOPSY SINGLE/MULTIPLE: CPT | Performed by: INTERNAL MEDICINE

## 2021-05-07 PROCEDURE — 25000003 PHARM REV CODE 250: Performed by: REGISTERED NURSE

## 2021-05-07 PROCEDURE — D9220A PRA ANESTHESIA: Mod: 33,,, | Performed by: REGISTERED NURSE

## 2021-05-07 PROCEDURE — 88342 CHG IMMUNOCYTOCHEMISTRY: ICD-10-PCS | Mod: 26,,, | Performed by: PATHOLOGY

## 2021-05-07 PROCEDURE — 88305 TISSUE EXAM BY PATHOLOGIST: CPT | Mod: 59 | Performed by: PATHOLOGY

## 2021-05-07 PROCEDURE — 27201089 HC SNARE, DISP (ANY): Performed by: INTERNAL MEDICINE

## 2021-05-07 PROCEDURE — 37000009 HC ANESTHESIA EA ADD 15 MINS: Performed by: INTERNAL MEDICINE

## 2021-05-07 PROCEDURE — 88305 TISSUE EXAM BY PATHOLOGIST: ICD-10-PCS | Mod: 26,,, | Performed by: PATHOLOGY

## 2021-05-07 PROCEDURE — 45385 PR COLONOSCOPY,REMV LESN,SNARE: ICD-10-PCS | Mod: 33,,, | Performed by: INTERNAL MEDICINE

## 2021-05-07 PROCEDURE — 45385 COLONOSCOPY W/LESION REMOVAL: CPT | Performed by: INTERNAL MEDICINE

## 2021-05-07 PROCEDURE — D9220A PRA ANESTHESIA: Mod: 33,,, | Performed by: ANESTHESIOLOGY

## 2021-05-07 PROCEDURE — D9220A PRA ANESTHESIA: ICD-10-PCS | Mod: 33,,, | Performed by: REGISTERED NURSE

## 2021-05-07 PROCEDURE — 43239 PR EGD, FLEX, W/BIOPSY, SGL/MULTI: ICD-10-PCS | Mod: 51,,, | Performed by: INTERNAL MEDICINE

## 2021-05-07 PROCEDURE — 88342 IMHCHEM/IMCYTCHM 1ST ANTB: CPT | Mod: 26,,, | Performed by: PATHOLOGY

## 2021-05-07 PROCEDURE — 88305 TISSUE EXAM BY PATHOLOGIST: CPT | Mod: 26,,, | Performed by: PATHOLOGY

## 2021-05-07 PROCEDURE — 25000003 PHARM REV CODE 250: Performed by: INTERNAL MEDICINE

## 2021-05-07 PROCEDURE — D9220A PRA ANESTHESIA: ICD-10-PCS | Mod: 33,,, | Performed by: ANESTHESIOLOGY

## 2021-05-07 PROCEDURE — 45385 COLONOSCOPY W/LESION REMOVAL: CPT | Mod: 33,,, | Performed by: INTERNAL MEDICINE

## 2021-05-07 PROCEDURE — 88342 IMHCHEM/IMCYTCHM 1ST ANTB: CPT | Performed by: PATHOLOGY

## 2021-05-07 PROCEDURE — 63600175 PHARM REV CODE 636 W HCPCS: Performed by: REGISTERED NURSE

## 2021-05-07 RX ORDER — PROPOFOL 10 MG/ML
INJECTION, EMULSION INTRAVENOUS
Status: DISCONTINUED
Start: 2021-05-07 | End: 2021-05-07 | Stop reason: HOSPADM

## 2021-05-07 RX ORDER — LIDOCAINE HYDROCHLORIDE 20 MG/ML
INJECTION INTRAVENOUS
Status: DISCONTINUED | OUTPATIENT
Start: 2021-05-07 | End: 2021-05-07

## 2021-05-07 RX ORDER — PROPOFOL 10 MG/ML
VIAL (ML) INTRAVENOUS
Status: DISCONTINUED | OUTPATIENT
Start: 2021-05-07 | End: 2021-05-07

## 2021-05-07 RX ORDER — LIDOCAINE HYDROCHLORIDE 20 MG/ML
INJECTION, SOLUTION EPIDURAL; INFILTRATION; INTRACAUDAL; PERINEURAL
Status: DISCONTINUED
Start: 2021-05-07 | End: 2021-05-07 | Stop reason: HOSPADM

## 2021-05-07 RX ORDER — SODIUM CHLORIDE 9 MG/ML
INJECTION, SOLUTION INTRAVENOUS CONTINUOUS
Status: DISCONTINUED | OUTPATIENT
Start: 2021-05-07 | End: 2021-05-07 | Stop reason: HOSPADM

## 2021-05-07 RX ADMIN — SODIUM CHLORIDE: 0.9 INJECTION, SOLUTION INTRAVENOUS at 10:05

## 2021-05-07 RX ADMIN — PROPOFOL 50 MG: 10 INJECTION, EMULSION INTRAVENOUS at 10:05

## 2021-05-07 RX ADMIN — PROPOFOL 30 MG: 10 INJECTION, EMULSION INTRAVENOUS at 10:05

## 2021-05-07 RX ADMIN — PROPOFOL 70 MG: 10 INJECTION, EMULSION INTRAVENOUS at 10:05

## 2021-05-07 RX ADMIN — Medication 100 MG: at 10:05

## 2021-05-07 RX ADMIN — PROPOFOL 20 MG: 10 INJECTION, EMULSION INTRAVENOUS at 10:05

## 2021-05-11 ENCOUNTER — TELEPHONE (OUTPATIENT)
Dept: GASTROENTEROLOGY | Facility: CLINIC | Age: 56
End: 2021-05-11

## 2021-05-13 LAB
FINAL PATHOLOGIC DIAGNOSIS: NORMAL
GROSS: NORMAL
Lab: NORMAL
SUPPLEMENTAL DIAGNOSIS: NORMAL

## 2021-05-17 ENCOUNTER — OFFICE VISIT (OUTPATIENT)
Dept: RHEUMATOLOGY | Facility: CLINIC | Age: 56
End: 2021-05-17
Payer: COMMERCIAL

## 2021-05-17 VITALS
HEIGHT: 65 IN | WEIGHT: 194.88 LBS | RESPIRATION RATE: 18 BRPM | TEMPERATURE: 98 F | BODY MASS INDEX: 32.47 KG/M2 | DIASTOLIC BLOOD PRESSURE: 75 MMHG | SYSTOLIC BLOOD PRESSURE: 109 MMHG | OXYGEN SATURATION: 99 % | HEART RATE: 77 BPM

## 2021-05-17 DIAGNOSIS — Z71.89 COUNSELING AND COORDINATION OF CARE: ICD-10-CM

## 2021-05-17 DIAGNOSIS — M15.9 PRIMARY OSTEOARTHRITIS INVOLVING MULTIPLE JOINTS: Primary | ICD-10-CM

## 2021-05-17 DIAGNOSIS — E66.9 CLASS 1 OBESITY WITH BODY MASS INDEX (BMI) OF 32.0 TO 32.9 IN ADULT, UNSPECIFIED OBESITY TYPE, UNSPECIFIED WHETHER SERIOUS COMORBIDITY PRESENT: ICD-10-CM

## 2021-05-17 DIAGNOSIS — Z79.1 NSAID LONG-TERM USE: ICD-10-CM

## 2021-05-17 PROCEDURE — 20610 PR DRAIN/INJECT LARGE JOINT/BURSA: ICD-10-PCS | Mod: LT,S$GLB,, | Performed by: INTERNAL MEDICINE

## 2021-05-17 PROCEDURE — 3008F BODY MASS INDEX DOCD: CPT | Mod: CPTII,S$GLB,, | Performed by: INTERNAL MEDICINE

## 2021-05-17 PROCEDURE — 1125F PR PAIN SEVERITY QUANTIFIED, PAIN PRESENT: ICD-10-PCS | Mod: S$GLB,,, | Performed by: INTERNAL MEDICINE

## 2021-05-17 PROCEDURE — 99999 PR PBB SHADOW E&M-EST. PATIENT-LVL V: ICD-10-PCS | Mod: PBBFAC,,, | Performed by: INTERNAL MEDICINE

## 2021-05-17 PROCEDURE — 99999 PR PBB SHADOW E&M-EST. PATIENT-LVL V: CPT | Mod: PBBFAC,,, | Performed by: INTERNAL MEDICINE

## 2021-05-17 PROCEDURE — 1125F AMNT PAIN NOTED PAIN PRSNT: CPT | Mod: S$GLB,,, | Performed by: INTERNAL MEDICINE

## 2021-05-17 PROCEDURE — 99214 OFFICE O/P EST MOD 30 MIN: CPT | Mod: 25,S$GLB,, | Performed by: INTERNAL MEDICINE

## 2021-05-17 PROCEDURE — 99214 PR OFFICE/OUTPT VISIT, EST, LEVL IV, 30-39 MIN: ICD-10-PCS | Mod: 25,S$GLB,, | Performed by: INTERNAL MEDICINE

## 2021-05-17 PROCEDURE — 20610 DRAIN/INJ JOINT/BURSA W/O US: CPT | Mod: LT,S$GLB,, | Performed by: INTERNAL MEDICINE

## 2021-05-17 PROCEDURE — 3008F PR BODY MASS INDEX (BMI) DOCUMENTED: ICD-10-PCS | Mod: CPTII,S$GLB,, | Performed by: INTERNAL MEDICINE

## 2021-05-17 RX ORDER — TRIAMCINOLONE ACETONIDE 40 MG/ML
40 INJECTION, SUSPENSION INTRA-ARTICULAR; INTRAMUSCULAR
Status: COMPLETED | OUTPATIENT
Start: 2021-05-17 | End: 2021-05-17

## 2021-05-17 RX ORDER — LIDOCAINE HYDROCHLORIDE 10 MG/ML
1 INJECTION INFILTRATION; PERINEURAL
Status: COMPLETED | OUTPATIENT
Start: 2021-05-17 | End: 2021-05-17

## 2021-05-17 RX ADMIN — TRIAMCINOLONE ACETONIDE 40 MG: 40 INJECTION, SUSPENSION INTRA-ARTICULAR; INTRAMUSCULAR at 08:05

## 2021-05-17 RX ADMIN — LIDOCAINE HYDROCHLORIDE 1 ML: 10 INJECTION INFILTRATION; PERINEURAL at 08:05

## 2021-05-18 ENCOUNTER — PATIENT MESSAGE (OUTPATIENT)
Dept: RHEUMATOLOGY | Facility: CLINIC | Age: 56
End: 2021-05-18

## 2021-05-25 ENCOUNTER — OFFICE VISIT (OUTPATIENT)
Dept: FAMILY MEDICINE | Facility: CLINIC | Age: 56
End: 2021-05-25
Payer: COMMERCIAL

## 2021-05-25 VITALS
OXYGEN SATURATION: 98 % | WEIGHT: 196 LBS | HEIGHT: 65 IN | TEMPERATURE: 99 F | DIASTOLIC BLOOD PRESSURE: 80 MMHG | BODY MASS INDEX: 32.65 KG/M2 | RESPIRATION RATE: 18 BRPM | HEART RATE: 98 BPM | SYSTOLIC BLOOD PRESSURE: 122 MMHG

## 2021-05-25 DIAGNOSIS — I10 HYPERTENSION, BENIGN: ICD-10-CM

## 2021-05-25 DIAGNOSIS — F33.41 RECURRENT MAJOR DEPRESSIVE DISORDER, IN PARTIAL REMISSION: ICD-10-CM

## 2021-05-25 DIAGNOSIS — R06.02 SHORTNESS OF BREATH: Primary | ICD-10-CM

## 2021-05-25 PROCEDURE — 1125F AMNT PAIN NOTED PAIN PRSNT: CPT | Mod: S$GLB,,, | Performed by: FAMILY MEDICINE

## 2021-05-25 PROCEDURE — 99214 OFFICE O/P EST MOD 30 MIN: CPT | Mod: S$GLB,,, | Performed by: FAMILY MEDICINE

## 2021-05-25 PROCEDURE — 99999 PR PBB SHADOW E&M-EST. PATIENT-LVL V: CPT | Mod: PBBFAC,,, | Performed by: FAMILY MEDICINE

## 2021-05-25 PROCEDURE — 99214 PR OFFICE/OUTPT VISIT, EST, LEVL IV, 30-39 MIN: ICD-10-PCS | Mod: S$GLB,,, | Performed by: FAMILY MEDICINE

## 2021-05-25 PROCEDURE — 1125F PR PAIN SEVERITY QUANTIFIED, PAIN PRESENT: ICD-10-PCS | Mod: S$GLB,,, | Performed by: FAMILY MEDICINE

## 2021-05-25 PROCEDURE — 3008F BODY MASS INDEX DOCD: CPT | Mod: CPTII,S$GLB,, | Performed by: FAMILY MEDICINE

## 2021-05-25 PROCEDURE — 3008F PR BODY MASS INDEX (BMI) DOCUMENTED: ICD-10-PCS | Mod: CPTII,S$GLB,, | Performed by: FAMILY MEDICINE

## 2021-05-25 PROCEDURE — 99999 PR PBB SHADOW E&M-EST. PATIENT-LVL V: ICD-10-PCS | Mod: PBBFAC,,, | Performed by: FAMILY MEDICINE

## 2021-05-25 RX ORDER — PREDNISONE 10 MG/1
TABLET ORAL
Qty: 14 TABLET | Refills: 0 | Status: SHIPPED | OUTPATIENT
Start: 2021-05-25 | End: 2021-07-01

## 2021-05-27 ENCOUNTER — TELEPHONE (OUTPATIENT)
Dept: SLEEP MEDICINE | Facility: HOSPITAL | Age: 56
End: 2021-05-27

## 2021-05-27 DIAGNOSIS — R06.02 SOB (SHORTNESS OF BREATH): Primary | ICD-10-CM

## 2021-05-28 ENCOUNTER — HOSPITAL ENCOUNTER (OUTPATIENT)
Dept: RADIOLOGY | Facility: HOSPITAL | Age: 56
Discharge: HOME | End: 2021-05-28
Attending: FAMILY MEDICINE
Payer: COMMERCIAL

## 2021-05-28 DIAGNOSIS — R06.02 SHORTNESS OF BREATH: ICD-10-CM

## 2021-05-28 PROCEDURE — 71250 CT CHEST WITHOUT CONTRAST: ICD-10-PCS | Mod: 26,,, | Performed by: RADIOLOGY

## 2021-05-28 PROCEDURE — 71250 CT THORAX DX C-: CPT | Mod: 26,,, | Performed by: RADIOLOGY

## 2021-05-28 PROCEDURE — 71250 CT THORAX DX C-: CPT | Mod: TC

## 2021-05-31 ENCOUNTER — PATIENT OUTREACH (OUTPATIENT)
Dept: ADMINISTRATIVE | Facility: OTHER | Age: 56
End: 2021-05-31

## 2021-06-01 ENCOUNTER — HOSPITAL ENCOUNTER (OUTPATIENT)
Dept: PULMONOLOGY | Facility: CLINIC | Age: 56
Discharge: HOME | End: 2021-06-01
Payer: COMMERCIAL

## 2021-06-01 ENCOUNTER — OFFICE VISIT (OUTPATIENT)
Dept: PULMONOLOGY | Facility: CLINIC | Age: 56
End: 2021-06-01
Payer: COMMERCIAL

## 2021-06-01 ENCOUNTER — OFFICE VISIT (OUTPATIENT)
Dept: CARDIOLOGY | Facility: CLINIC | Age: 56
End: 2021-06-01
Payer: COMMERCIAL

## 2021-06-01 VITALS
DIASTOLIC BLOOD PRESSURE: 70 MMHG | SYSTOLIC BLOOD PRESSURE: 147 MMHG | HEART RATE: 66 BPM | WEIGHT: 196.75 LBS | HEIGHT: 65 IN | BODY MASS INDEX: 32.78 KG/M2 | OXYGEN SATURATION: 99 %

## 2021-06-01 VITALS
DIASTOLIC BLOOD PRESSURE: 75 MMHG | BODY MASS INDEX: 33.06 KG/M2 | WEIGHT: 198.44 LBS | WEIGHT: 198.44 LBS | HEIGHT: 65 IN | SYSTOLIC BLOOD PRESSURE: 138 MMHG | OXYGEN SATURATION: 97 % | HEIGHT: 65 IN | BODY MASS INDEX: 33.06 KG/M2 | HEART RATE: 84 BPM

## 2021-06-01 DIAGNOSIS — I10 ESSENTIAL HYPERTENSION: Primary | ICD-10-CM

## 2021-06-01 DIAGNOSIS — K76.0 HEPATIC STEATOSIS: ICD-10-CM

## 2021-06-01 DIAGNOSIS — I25.10 CORONARY ARTERY CALCIFICATION SEEN ON CAT SCAN: ICD-10-CM

## 2021-06-01 DIAGNOSIS — M50.30 DDD (DEGENERATIVE DISC DISEASE), CERVICAL: ICD-10-CM

## 2021-06-01 DIAGNOSIS — E66.09 CLASS 1 OBESITY DUE TO EXCESS CALORIES WITH SERIOUS COMORBIDITY AND BODY MASS INDEX (BMI) OF 33.0 TO 33.9 IN ADULT: ICD-10-CM

## 2021-06-01 DIAGNOSIS — R06.02 SHORTNESS OF BREATH: ICD-10-CM

## 2021-06-01 DIAGNOSIS — R06.02 SOB (SHORTNESS OF BREATH): ICD-10-CM

## 2021-06-01 DIAGNOSIS — R07.89 OTHER CHEST PAIN: ICD-10-CM

## 2021-06-01 DIAGNOSIS — M51.36 DDD (DEGENERATIVE DISC DISEASE), LUMBAR: ICD-10-CM

## 2021-06-01 DIAGNOSIS — R07.9 CHEST PAIN, UNSPECIFIED TYPE: Primary | ICD-10-CM

## 2021-06-01 DIAGNOSIS — E78.1 HYPERTRIGLYCERIDEMIA: ICD-10-CM

## 2021-06-01 PROCEDURE — 94727 GAS DIL/WSHOT DETER LNG VOL: CPT | Mod: S$GLB,,, | Performed by: INTERNAL MEDICINE

## 2021-06-01 PROCEDURE — 99999 PR PBB SHADOW E&M-EST. PATIENT-LVL V: ICD-10-PCS | Mod: PBBFAC,,, | Performed by: INTERNAL MEDICINE

## 2021-06-01 PROCEDURE — 94618 PULMONARY STRESS TESTING: CPT | Mod: S$GLB,,, | Performed by: INTERNAL MEDICINE

## 2021-06-01 PROCEDURE — 94010 BREATHING CAPACITY TEST: CPT | Mod: S$GLB,,, | Performed by: INTERNAL MEDICINE

## 2021-06-01 PROCEDURE — 93000 EKG 12-LEAD: ICD-10-PCS | Mod: S$GLB,,, | Performed by: INTERNAL MEDICINE

## 2021-06-01 PROCEDURE — 3008F BODY MASS INDEX DOCD: CPT | Mod: CPTII,S$GLB,, | Performed by: NURSE PRACTITIONER

## 2021-06-01 PROCEDURE — 3008F PR BODY MASS INDEX (BMI) DOCUMENTED: ICD-10-PCS | Mod: CPTII,S$GLB,, | Performed by: INTERNAL MEDICINE

## 2021-06-01 PROCEDURE — 94010 BREATHING CAPACITY TEST: ICD-10-PCS | Mod: S$GLB,,, | Performed by: INTERNAL MEDICINE

## 2021-06-01 PROCEDURE — 99999 PR PBB SHADOW E&M-EST. PATIENT-LVL V: CPT | Mod: PBBFAC,,, | Performed by: INTERNAL MEDICINE

## 2021-06-01 PROCEDURE — 99204 PR OFFICE/OUTPT VISIT, NEW, LEVL IV, 45-59 MIN: ICD-10-PCS | Mod: S$GLB,,, | Performed by: NURSE PRACTITIONER

## 2021-06-01 PROCEDURE — 1126F AMNT PAIN NOTED NONE PRSNT: CPT | Mod: S$GLB,,, | Performed by: NURSE PRACTITIONER

## 2021-06-01 PROCEDURE — 99999 PR PBB SHADOW E&M-EST. PATIENT-LVL V: CPT | Mod: PBBFAC,,, | Performed by: NURSE PRACTITIONER

## 2021-06-01 PROCEDURE — 93000 ELECTROCARDIOGRAM COMPLETE: CPT | Mod: S$GLB,,, | Performed by: INTERNAL MEDICINE

## 2021-06-01 PROCEDURE — 94618 PULMONARY STRESS TESTING: ICD-10-PCS | Mod: S$GLB,,, | Performed by: INTERNAL MEDICINE

## 2021-06-01 PROCEDURE — 1126F PR PAIN SEVERITY QUANTIFIED, NO PAIN PRESENT: ICD-10-PCS | Mod: S$GLB,,, | Performed by: INTERNAL MEDICINE

## 2021-06-01 PROCEDURE — 1126F PR PAIN SEVERITY QUANTIFIED, NO PAIN PRESENT: ICD-10-PCS | Mod: S$GLB,,, | Performed by: NURSE PRACTITIONER

## 2021-06-01 PROCEDURE — 94729 DIFFUSING CAPACITY: CPT | Mod: S$GLB,,, | Performed by: INTERNAL MEDICINE

## 2021-06-01 PROCEDURE — 3008F PR BODY MASS INDEX (BMI) DOCUMENTED: ICD-10-PCS | Mod: CPTII,S$GLB,, | Performed by: NURSE PRACTITIONER

## 2021-06-01 PROCEDURE — 3008F BODY MASS INDEX DOCD: CPT | Mod: CPTII,S$GLB,, | Performed by: INTERNAL MEDICINE

## 2021-06-01 PROCEDURE — 99204 OFFICE O/P NEW MOD 45 MIN: CPT | Mod: S$GLB,,, | Performed by: INTERNAL MEDICINE

## 2021-06-01 PROCEDURE — 1126F AMNT PAIN NOTED NONE PRSNT: CPT | Mod: S$GLB,,, | Performed by: INTERNAL MEDICINE

## 2021-06-01 PROCEDURE — 94727 PR PULM FUNCTION TEST BY GAS: ICD-10-PCS | Mod: S$GLB,,, | Performed by: INTERNAL MEDICINE

## 2021-06-01 PROCEDURE — 99999 PR PBB SHADOW E&M-EST. PATIENT-LVL V: ICD-10-PCS | Mod: PBBFAC,,, | Performed by: NURSE PRACTITIONER

## 2021-06-01 PROCEDURE — 94729 PR C02/MEMBANE DIFFUSE CAPACITY: ICD-10-PCS | Mod: S$GLB,,, | Performed by: INTERNAL MEDICINE

## 2021-06-01 PROCEDURE — 99204 PR OFFICE/OUTPT VISIT, NEW, LEVL IV, 45-59 MIN: ICD-10-PCS | Mod: S$GLB,,, | Performed by: INTERNAL MEDICINE

## 2021-06-01 PROCEDURE — 99204 OFFICE O/P NEW MOD 45 MIN: CPT | Mod: S$GLB,,, | Performed by: NURSE PRACTITIONER

## 2021-06-02 ENCOUNTER — PATIENT MESSAGE (OUTPATIENT)
Dept: CARDIOLOGY | Facility: CLINIC | Age: 56
End: 2021-06-02

## 2021-06-03 ENCOUNTER — OFFICE VISIT (OUTPATIENT)
Dept: NEUROLOGY | Facility: CLINIC | Age: 56
End: 2021-06-03
Payer: COMMERCIAL

## 2021-06-03 VITALS
WEIGHT: 197.56 LBS | HEART RATE: 75 BPM | BODY MASS INDEX: 32.91 KG/M2 | DIASTOLIC BLOOD PRESSURE: 76 MMHG | HEIGHT: 65 IN | SYSTOLIC BLOOD PRESSURE: 158 MMHG

## 2021-06-03 DIAGNOSIS — R41.3 MEMORY LOSS: ICD-10-CM

## 2021-06-03 DIAGNOSIS — R51.9 CHRONIC INTRACTABLE HEADACHE, UNSPECIFIED HEADACHE TYPE: ICD-10-CM

## 2021-06-03 DIAGNOSIS — M54.12 CERVICAL RADICULOPATHY: ICD-10-CM

## 2021-06-03 DIAGNOSIS — G89.29 CHRONIC INTRACTABLE HEADACHE, UNSPECIFIED HEADACHE TYPE: ICD-10-CM

## 2021-06-03 DIAGNOSIS — G44.86 CERVICOGENIC HEADACHE: Primary | ICD-10-CM

## 2021-06-03 PROCEDURE — 3008F PR BODY MASS INDEX (BMI) DOCUMENTED: ICD-10-PCS | Mod: CPTII,S$GLB,, | Performed by: NEUROLOGICAL SURGERY

## 2021-06-03 PROCEDURE — 1125F AMNT PAIN NOTED PAIN PRSNT: CPT | Mod: S$GLB,,, | Performed by: NEUROLOGICAL SURGERY

## 2021-06-03 PROCEDURE — 99214 OFFICE O/P EST MOD 30 MIN: CPT | Mod: S$GLB,,, | Performed by: NEUROLOGICAL SURGERY

## 2021-06-03 PROCEDURE — 99214 PR OFFICE/OUTPT VISIT, EST, LEVL IV, 30-39 MIN: ICD-10-PCS | Mod: S$GLB,,, | Performed by: NEUROLOGICAL SURGERY

## 2021-06-03 PROCEDURE — 99999 PR PBB SHADOW E&M-EST. PATIENT-LVL IV: CPT | Mod: PBBFAC,,, | Performed by: NEUROLOGICAL SURGERY

## 2021-06-03 PROCEDURE — 1125F PR PAIN SEVERITY QUANTIFIED, PAIN PRESENT: ICD-10-PCS | Mod: S$GLB,,, | Performed by: NEUROLOGICAL SURGERY

## 2021-06-03 PROCEDURE — 3008F BODY MASS INDEX DOCD: CPT | Mod: CPTII,S$GLB,, | Performed by: NEUROLOGICAL SURGERY

## 2021-06-03 PROCEDURE — 99999 PR PBB SHADOW E&M-EST. PATIENT-LVL IV: ICD-10-PCS | Mod: PBBFAC,,, | Performed by: NEUROLOGICAL SURGERY

## 2021-06-03 RX ORDER — CYCLOBENZAPRINE HCL 5 MG
5 TABLET ORAL 3 TIMES DAILY PRN
Qty: 60 TABLET | Refills: 3 | Status: SHIPPED | OUTPATIENT
Start: 2021-06-03 | End: 2022-08-17

## 2021-06-14 ENCOUNTER — HOSPITAL ENCOUNTER (OUTPATIENT)
Dept: RADIOLOGY | Facility: HOSPITAL | Age: 56
Discharge: HOME | End: 2021-06-14
Attending: INTERNAL MEDICINE
Payer: COMMERCIAL

## 2021-06-14 ENCOUNTER — HOSPITAL ENCOUNTER (OUTPATIENT)
Dept: CARDIOLOGY | Facility: HOSPITAL | Age: 56
Discharge: HOME | End: 2021-06-14
Attending: INTERNAL MEDICINE
Payer: COMMERCIAL

## 2021-06-14 ENCOUNTER — HOSPITAL ENCOUNTER (OUTPATIENT)
Dept: RADIOLOGY | Facility: HOSPITAL | Age: 56
Discharge: HOME | End: 2021-06-14
Attending: NEUROLOGICAL SURGERY
Payer: COMMERCIAL

## 2021-06-14 DIAGNOSIS — R07.89 OTHER CHEST PAIN: ICD-10-CM

## 2021-06-14 DIAGNOSIS — I10 ESSENTIAL HYPERTENSION: ICD-10-CM

## 2021-06-14 DIAGNOSIS — R41.3 MEMORY LOSS: ICD-10-CM

## 2021-06-14 DIAGNOSIS — R06.02 SHORTNESS OF BREATH: ICD-10-CM

## 2021-06-14 PROCEDURE — 70551 MRI BRAIN STEM W/O DYE: CPT | Mod: TC

## 2021-06-14 PROCEDURE — 70551 MRI BRAIN WITHOUT CONTRAST: ICD-10-PCS | Mod: 26,,, | Performed by: RADIOLOGY

## 2021-06-14 PROCEDURE — 70551 MRI BRAIN STEM W/O DYE: CPT | Mod: 26,,, | Performed by: RADIOLOGY

## 2021-06-17 ENCOUNTER — OFFICE VISIT (OUTPATIENT)
Dept: PSYCHIATRY | Facility: CLINIC | Age: 56
End: 2021-06-17
Payer: COMMERCIAL

## 2021-06-17 VITALS
SYSTOLIC BLOOD PRESSURE: 130 MMHG | WEIGHT: 194.44 LBS | HEIGHT: 65 IN | HEART RATE: 85 BPM | OXYGEN SATURATION: 97 % | BODY MASS INDEX: 32.4 KG/M2 | DIASTOLIC BLOOD PRESSURE: 80 MMHG

## 2021-06-17 DIAGNOSIS — F41.1 GAD (GENERALIZED ANXIETY DISORDER): ICD-10-CM

## 2021-06-17 DIAGNOSIS — F33.41 MAJOR DEPRESSIVE DISORDER, RECURRENT EPISODE, IN PARTIAL REMISSION: Primary | ICD-10-CM

## 2021-06-17 PROCEDURE — 1125F PR PAIN SEVERITY QUANTIFIED, PAIN PRESENT: ICD-10-PCS | Mod: S$GLB,,, | Performed by: PSYCHIATRY & NEUROLOGY

## 2021-06-17 PROCEDURE — 1125F AMNT PAIN NOTED PAIN PRSNT: CPT | Mod: S$GLB,,, | Performed by: PSYCHIATRY & NEUROLOGY

## 2021-06-17 PROCEDURE — 99213 OFFICE O/P EST LOW 20 MIN: CPT | Mod: S$GLB,,, | Performed by: PSYCHIATRY & NEUROLOGY

## 2021-06-17 PROCEDURE — 3008F BODY MASS INDEX DOCD: CPT | Mod: CPTII,S$GLB,, | Performed by: PSYCHIATRY & NEUROLOGY

## 2021-06-17 PROCEDURE — 99999 PR PBB SHADOW E&M-EST. PATIENT-LVL V: CPT | Mod: PBBFAC,,, | Performed by: PSYCHIATRY & NEUROLOGY

## 2021-06-17 PROCEDURE — 3008F PR BODY MASS INDEX (BMI) DOCUMENTED: ICD-10-PCS | Mod: CPTII,S$GLB,, | Performed by: PSYCHIATRY & NEUROLOGY

## 2021-06-17 PROCEDURE — 99999 PR PBB SHADOW E&M-EST. PATIENT-LVL V: ICD-10-PCS | Mod: PBBFAC,,, | Performed by: PSYCHIATRY & NEUROLOGY

## 2021-06-17 PROCEDURE — 99213 PR OFFICE/OUTPT VISIT, EST, LEVL III, 20-29 MIN: ICD-10-PCS | Mod: S$GLB,,, | Performed by: PSYCHIATRY & NEUROLOGY

## 2021-06-17 RX ORDER — FLUOXETINE HYDROCHLORIDE 40 MG/1
40 CAPSULE ORAL DAILY
Qty: 90 CAPSULE | Refills: 3 | Status: SHIPPED | OUTPATIENT
Start: 2021-06-17 | End: 2022-08-10

## 2021-06-17 RX ORDER — FLUOXETINE HYDROCHLORIDE 20 MG/1
20 CAPSULE ORAL DAILY
Qty: 90 CAPSULE | Refills: 3 | Status: SHIPPED | OUTPATIENT
Start: 2021-06-17 | End: 2022-08-10

## 2021-06-17 RX ORDER — HYDROXYZINE HYDROCHLORIDE 10 MG/1
10 TABLET, FILM COATED ORAL 2 TIMES DAILY PRN
Qty: 60 TABLET | Refills: 3 | Status: SHIPPED | OUTPATIENT
Start: 2021-06-17

## 2021-06-21 ENCOUNTER — HOSPITAL ENCOUNTER (OUTPATIENT)
Dept: CARDIOLOGY | Facility: HOSPITAL | Age: 56
Discharge: HOME | End: 2021-06-21
Attending: INTERNAL MEDICINE
Payer: COMMERCIAL

## 2021-06-21 ENCOUNTER — HOSPITAL ENCOUNTER (OUTPATIENT)
Dept: RADIOLOGY | Facility: HOSPITAL | Age: 56
Discharge: HOME | End: 2021-06-21
Attending: INTERNAL MEDICINE
Payer: COMMERCIAL

## 2021-06-21 ENCOUNTER — TELEPHONE (OUTPATIENT)
Dept: FAMILY MEDICINE | Facility: CLINIC | Age: 56
End: 2021-06-21

## 2021-06-21 VITALS — WEIGHT: 194 LBS | HEIGHT: 65 IN | BODY MASS INDEX: 32.32 KG/M2

## 2021-06-21 LAB
ASCENDING AORTA: 3.46 CM
AV INDEX (PROSTH): 0.76
AV MEAN GRADIENT: 7 MMHG
AV PEAK GRADIENT: 14 MMHG
AV VALVE AREA: 2.92 CM2
AV VELOCITY RATIO: 0.79
BSA FOR ECHO PROCEDURE: 2.01 M2
CV ECHO LV RWT: 0.39 CM
CV STRESS BASE HR: 77 BPM
DIASTOLIC BLOOD PRESSURE: 75 MMHG
DOP CALC AO PEAK VEL: 1.89 M/S
DOP CALC AO VTI: 34.54 CM
DOP CALC LVOT AREA: 3.8 CM2
DOP CALC LVOT DIAMETER: 2.21 CM
DOP CALC LVOT PEAK VEL: 1.5 M/S
DOP CALC LVOT STROKE VOLUME: 100.72 CM3
DOP CALC RVOT PEAK VEL: 0.66 M/S
DOP CALCLVOT PEAK VEL VTI: 26.27 CM
E WAVE DECELERATION TIME: 214.96 MSEC
E/A RATIO: 0.97
E/E' RATIO: 8.35 M/S
ECHO LV POSTERIOR WALL: 0.93 CM (ref 0.6–1.1)
EJECTION FRACTION: 65 %
FRACTIONAL SHORTENING: 52 % (ref 28–44)
INTERVENTRICULAR SEPTUM: 0.81 CM (ref 0.6–1.1)
IVRT: 140.82 MSEC
LA MAJOR: 5.18 CM
LA MINOR: 4.43 CM
LA WIDTH: 3.97 CM
LEFT ATRIUM SIZE: 3.8 CM
LEFT ATRIUM VOLUME INDEX: 31.4 ML/M2
LEFT ATRIUM VOLUME: 61.24 CM3
LEFT INTERNAL DIMENSION IN SYSTOLE: 2.27 CM (ref 2.1–4)
LEFT VENTRICLE DIASTOLIC VOLUME INDEX: 53.73 ML/M2
LEFT VENTRICLE DIASTOLIC VOLUME: 104.78 ML
LEFT VENTRICLE MASS INDEX: 71 G/M2
LEFT VENTRICLE SYSTOLIC VOLUME INDEX: 8.9 ML/M2
LEFT VENTRICLE SYSTOLIC VOLUME: 17.44 ML
LEFT VENTRICULAR INTERNAL DIMENSION IN DIASTOLE: 4.75 CM (ref 3.5–6)
LEFT VENTRICULAR MASS: 138.87 G
LV LATERAL E/E' RATIO: 6.45 M/S
LV SEPTAL E/E' RATIO: 11.83 M/S
MV MEAN GRADIENT: 0 MMHG
MV PEAK A VEL: 0.73 M/S
MV PEAK E VEL: 0.71 M/S
MV PEAK GRADIENT: 2 MMHG
MV STENOSIS PRESSURE HALF TIME: 62.34 MS
MV VALVE AREA P 1/2 METHOD: 3.53 CM2
OHS CV CPX 85 PERCENT MAX PREDICTED HEART RATE MALE: 132
OHS CV CPX MAX PREDICTED HEART RATE: 155
OHS CV CPX PATIENT IS FEMALE: 1
OHS CV CPX PATIENT IS MALE: 0
OHS CV CPX PEAK DIASTOLIC BLOOD PRESSURE: 56 MMHG
OHS CV CPX PEAK HEAR RATE: 106 BPM
OHS CV CPX PEAK RATE PRESSURE PRODUCT: 8480
OHS CV CPX PEAK SYSTOLIC BLOOD PRESSURE: 80 MMHG
OHS CV CPX PERCENT MAX PREDICTED HEART RATE ACHIEVED: 68
OHS CV CPX RATE PRESSURE PRODUCT PRESENTING: 7854
PISA TR MAX VEL: 1.5 M/S
PULM VEIN S/D RATIO: 1.41
PV PEAK D VEL: 0.32 M/S
PV PEAK S VEL: 0.45 M/S
PV PEAK VELOCITY: 0.91 CM/S
RA MAJOR: 3.8 CM
RA PRESSURE: 3 MMHG
RA WIDTH: 3.11 CM
RIGHT VENTRICULAR END-DIASTOLIC DIMENSION: 3.36 CM
RV TISSUE DOPPLER FREE WALL SYSTOLIC VELOCITY 1 (APICAL 4 CHAMBER VIEW): 18.17 CM/S
SINUS: 3.1 CM
STJ: 2.89 CM
SYSTOLIC BLOOD PRESSURE: 102 MMHG
TDI LATERAL: 0.11 M/S
TDI SEPTAL: 0.06 M/S
TDI: 0.09 M/S
TR MAX PG: 9 MMHG
TRICUSPID ANNULAR PLANE SYSTOLIC EXCURSION: 2.27 CM
TV REST PULMONARY ARTERY PRESSURE: 12 MMHG

## 2021-06-21 PROCEDURE — 78452 HT MUSCLE IMAGE SPECT MULT: CPT

## 2021-06-21 PROCEDURE — 93017 CV STRESS TEST TRACING ONLY: CPT

## 2021-06-21 PROCEDURE — 63600175 PHARM REV CODE 636 W HCPCS: Performed by: INTERNAL MEDICINE

## 2021-06-21 PROCEDURE — 93306 ECHO (CUPID ONLY): ICD-10-PCS | Mod: 26,,, | Performed by: INTERNAL MEDICINE

## 2021-06-21 PROCEDURE — 78452 HT MUSCLE IMAGE SPECT MULT: CPT | Mod: 26,,, | Performed by: INTERNAL MEDICINE

## 2021-06-21 PROCEDURE — 93306 TTE W/DOPPLER COMPLETE: CPT

## 2021-06-21 PROCEDURE — 78452 STRESS TEST WITH MYOCARDIAL PERFUSION (CUPID ONLY): ICD-10-PCS | Mod: 26,,, | Performed by: INTERNAL MEDICINE

## 2021-06-21 PROCEDURE — 93016 CV STRESS TEST SUPVJ ONLY: CPT | Mod: ,,, | Performed by: INTERNAL MEDICINE

## 2021-06-21 PROCEDURE — 93018 CV STRESS TEST I&R ONLY: CPT | Mod: ,,, | Performed by: INTERNAL MEDICINE

## 2021-06-21 PROCEDURE — 93306 TTE W/DOPPLER COMPLETE: CPT | Mod: 26,,, | Performed by: INTERNAL MEDICINE

## 2021-06-21 PROCEDURE — A9502 TC99M TETROFOSMIN: HCPCS

## 2021-06-21 PROCEDURE — 93016 STRESS TEST WITH MYOCARDIAL PERFUSION (CUPID ONLY): ICD-10-PCS | Mod: ,,, | Performed by: INTERNAL MEDICINE

## 2021-06-21 PROCEDURE — 93018 STRESS TEST WITH MYOCARDIAL PERFUSION (CUPID ONLY): ICD-10-PCS | Mod: ,,, | Performed by: INTERNAL MEDICINE

## 2021-06-21 RX ORDER — REGADENOSON 0.08 MG/ML
0.4 INJECTION, SOLUTION INTRAVENOUS ONCE
Status: COMPLETED | OUTPATIENT
Start: 2021-06-21 | End: 2021-06-21

## 2021-06-21 RX ADMIN — REGADENOSON 0.4 MG: 0.08 INJECTION, SOLUTION INTRAVENOUS at 08:06

## 2021-07-01 ENCOUNTER — PATIENT OUTREACH (OUTPATIENT)
Dept: ADMINISTRATIVE | Facility: OTHER | Age: 56
End: 2021-07-01

## 2021-07-01 ENCOUNTER — OFFICE VISIT (OUTPATIENT)
Dept: CARDIOLOGY | Facility: CLINIC | Age: 56
End: 2021-07-01
Payer: COMMERCIAL

## 2021-07-01 VITALS
OXYGEN SATURATION: 95 % | HEART RATE: 90 BPM | BODY MASS INDEX: 33.07 KG/M2 | WEIGHT: 198.5 LBS | SYSTOLIC BLOOD PRESSURE: 153 MMHG | HEIGHT: 65 IN | DIASTOLIC BLOOD PRESSURE: 89 MMHG

## 2021-07-01 DIAGNOSIS — R07.89 OTHER CHEST PAIN: ICD-10-CM

## 2021-07-01 DIAGNOSIS — I10 ESSENTIAL HYPERTENSION: ICD-10-CM

## 2021-07-01 DIAGNOSIS — R06.02 SHORTNESS OF BREATH: Primary | ICD-10-CM

## 2021-07-01 DIAGNOSIS — M51.36 DDD (DEGENERATIVE DISC DISEASE), LUMBAR: ICD-10-CM

## 2021-07-01 DIAGNOSIS — E78.1 HYPERTRIGLYCERIDEMIA: ICD-10-CM

## 2021-07-01 DIAGNOSIS — M50.30 DDD (DEGENERATIVE DISC DISEASE), CERVICAL: ICD-10-CM

## 2021-07-01 DIAGNOSIS — K76.0 HEPATIC STEATOSIS: ICD-10-CM

## 2021-07-01 DIAGNOSIS — I25.10 CORONARY ARTERY CALCIFICATION SEEN ON CAT SCAN: ICD-10-CM

## 2021-07-01 DIAGNOSIS — E66.09 CLASS 1 OBESITY DUE TO EXCESS CALORIES WITH SERIOUS COMORBIDITY AND BODY MASS INDEX (BMI) OF 33.0 TO 33.9 IN ADULT: ICD-10-CM

## 2021-07-01 PROCEDURE — 99214 OFFICE O/P EST MOD 30 MIN: CPT | Mod: S$GLB,,, | Performed by: INTERNAL MEDICINE

## 2021-07-01 PROCEDURE — 3008F PR BODY MASS INDEX (BMI) DOCUMENTED: ICD-10-PCS | Mod: CPTII,S$GLB,, | Performed by: INTERNAL MEDICINE

## 2021-07-01 PROCEDURE — 1126F AMNT PAIN NOTED NONE PRSNT: CPT | Mod: S$GLB,,, | Performed by: INTERNAL MEDICINE

## 2021-07-01 PROCEDURE — 99214 PR OFFICE/OUTPT VISIT, EST, LEVL IV, 30-39 MIN: ICD-10-PCS | Mod: S$GLB,,, | Performed by: INTERNAL MEDICINE

## 2021-07-01 PROCEDURE — 3008F BODY MASS INDEX DOCD: CPT | Mod: CPTII,S$GLB,, | Performed by: INTERNAL MEDICINE

## 2021-07-01 PROCEDURE — 1126F PR PAIN SEVERITY QUANTIFIED, NO PAIN PRESENT: ICD-10-PCS | Mod: S$GLB,,, | Performed by: INTERNAL MEDICINE

## 2021-07-01 PROCEDURE — 99999 PR PBB SHADOW E&M-EST. PATIENT-LVL IV: CPT | Mod: PBBFAC,,, | Performed by: INTERNAL MEDICINE

## 2021-07-01 PROCEDURE — 99999 PR PBB SHADOW E&M-EST. PATIENT-LVL IV: ICD-10-PCS | Mod: PBBFAC,,, | Performed by: INTERNAL MEDICINE

## 2021-07-06 ENCOUNTER — PATIENT MESSAGE (OUTPATIENT)
Dept: ADMINISTRATIVE | Facility: HOSPITAL | Age: 56
End: 2021-07-06

## 2021-07-09 ENCOUNTER — PATIENT MESSAGE (OUTPATIENT)
Dept: GASTROENTEROLOGY | Facility: CLINIC | Age: 56
End: 2021-07-09

## 2021-07-09 ENCOUNTER — OFFICE VISIT (OUTPATIENT)
Dept: GASTROENTEROLOGY | Facility: CLINIC | Age: 56
End: 2021-07-09
Payer: COMMERCIAL

## 2021-07-09 DIAGNOSIS — K58.0 IRRITABLE BOWEL SYNDROME WITH DIARRHEA: ICD-10-CM

## 2021-07-09 DIAGNOSIS — K21.9 GASTROESOPHAGEAL REFLUX DISEASE: ICD-10-CM

## 2021-07-09 DIAGNOSIS — K21.9 GASTROESOPHAGEAL REFLUX DISEASE WITHOUT ESOPHAGITIS: Primary | ICD-10-CM

## 2021-07-09 DIAGNOSIS — Z86.010 HISTORY OF COLON POLYPS: ICD-10-CM

## 2021-07-09 DIAGNOSIS — K44.9 HIATAL HERNIA: ICD-10-CM

## 2021-07-09 PROCEDURE — 99214 PR OFFICE/OUTPT VISIT, EST, LEVL IV, 30-39 MIN: ICD-10-PCS | Mod: 95,,, | Performed by: INTERNAL MEDICINE

## 2021-07-09 PROCEDURE — 99214 OFFICE O/P EST MOD 30 MIN: CPT | Mod: 95,,, | Performed by: INTERNAL MEDICINE

## 2021-07-09 RX ORDER — ELUXADOLINE 100 MG/1
100 TABLET, FILM COATED ORAL 2 TIMES DAILY
Qty: 60 TABLET | Refills: 2 | Status: SHIPPED | OUTPATIENT
Start: 2021-07-09 | End: 2021-10-07

## 2021-07-09 RX ORDER — OMEPRAZOLE 40 MG/1
40 CAPSULE, DELAYED RELEASE ORAL EVERY MORNING
Qty: 90 CAPSULE | Refills: 3 | Status: SHIPPED | OUTPATIENT
Start: 2021-07-09 | End: 2021-07-13 | Stop reason: SDUPTHER

## 2021-07-13 RX ORDER — OMEPRAZOLE 40 MG/1
40 CAPSULE, DELAYED RELEASE ORAL EVERY MORNING
Qty: 90 CAPSULE | Refills: 3 | Status: SHIPPED | OUTPATIENT
Start: 2021-07-13 | End: 2021-10-03 | Stop reason: SDUPTHER

## 2021-07-13 RX ORDER — OMEPRAZOLE 20 MG/1
CAPSULE, DELAYED RELEASE ORAL
Qty: 90 CAPSULE | Refills: 0 | OUTPATIENT
Start: 2021-07-13

## 2021-07-27 ENCOUNTER — OFFICE VISIT (OUTPATIENT)
Dept: SURGERY | Facility: CLINIC | Age: 56
End: 2021-07-27
Payer: COMMERCIAL

## 2021-07-27 DIAGNOSIS — K44.9 HIATAL HERNIA: ICD-10-CM

## 2021-07-27 DIAGNOSIS — K21.9 GASTROESOPHAGEAL REFLUX DISEASE WITHOUT ESOPHAGITIS: Primary | ICD-10-CM

## 2021-07-27 PROCEDURE — 1160F RVW MEDS BY RX/DR IN RCRD: CPT | Mod: CPTII,,, | Performed by: SURGERY

## 2021-07-27 PROCEDURE — 99204 PR OFFICE/OUTPT VISIT, NEW, LEVL IV, 45-59 MIN: ICD-10-PCS | Mod: 95,,, | Performed by: SURGERY

## 2021-07-27 PROCEDURE — 1159F PR MEDICATION LIST DOCUMENTED IN MEDICAL RECORD: ICD-10-PCS | Mod: CPTII,,, | Performed by: SURGERY

## 2021-07-27 PROCEDURE — 1160F PR REVIEW ALL MEDS BY PRESCRIBER/CLIN PHARMACIST DOCUMENTED: ICD-10-PCS | Mod: CPTII,,, | Performed by: SURGERY

## 2021-07-27 PROCEDURE — 99204 OFFICE O/P NEW MOD 45 MIN: CPT | Mod: 95,,, | Performed by: SURGERY

## 2021-07-27 PROCEDURE — 1159F MED LIST DOCD IN RCRD: CPT | Mod: CPTII,,, | Performed by: SURGERY

## 2021-08-02 ENCOUNTER — PATIENT MESSAGE (OUTPATIENT)
Dept: SURGERY | Facility: CLINIC | Age: 56
End: 2021-08-02

## 2021-08-02 ENCOUNTER — TELEPHONE (OUTPATIENT)
Dept: SURGERY | Facility: CLINIC | Age: 56
End: 2021-08-02

## 2021-08-02 DIAGNOSIS — K21.9 GASTROESOPHAGEAL REFLUX DISEASE WITHOUT ESOPHAGITIS: ICD-10-CM

## 2021-08-02 DIAGNOSIS — K44.9 HIATAL HERNIA: Primary | ICD-10-CM

## 2021-08-05 ENCOUNTER — PATIENT OUTREACH (OUTPATIENT)
Dept: ADMINISTRATIVE | Facility: OTHER | Age: 56
End: 2021-08-05

## 2021-08-10 ENCOUNTER — TELEPHONE (OUTPATIENT)
Dept: PAIN MEDICINE | Facility: CLINIC | Age: 56
End: 2021-08-10

## 2021-08-11 ENCOUNTER — PATIENT MESSAGE (OUTPATIENT)
Dept: FAMILY MEDICINE | Facility: CLINIC | Age: 56
End: 2021-08-11

## 2021-08-11 ENCOUNTER — OFFICE VISIT (OUTPATIENT)
Dept: PAIN MEDICINE | Facility: CLINIC | Age: 56
End: 2021-08-11
Payer: COMMERCIAL

## 2021-08-11 DIAGNOSIS — M47.812 CERVICAL SPONDYLOSIS: ICD-10-CM

## 2021-08-11 DIAGNOSIS — R30.0 DYSURIA: Primary | ICD-10-CM

## 2021-08-11 DIAGNOSIS — M53.3 SACROILIAC JOINT PAIN: ICD-10-CM

## 2021-08-11 DIAGNOSIS — M47.897 OTHER OSTEOARTHRITIS OF SPINE, LUMBOSACRAL REGION: ICD-10-CM

## 2021-08-11 DIAGNOSIS — M50.30 DDD (DEGENERATIVE DISC DISEASE), CERVICAL: Primary | ICD-10-CM

## 2021-08-11 PROCEDURE — 1160F RVW MEDS BY RX/DR IN RCRD: CPT | Mod: CPTII,,, | Performed by: PAIN MEDICINE

## 2021-08-11 PROCEDURE — 1160F PR REVIEW ALL MEDS BY PRESCRIBER/CLIN PHARMACIST DOCUMENTED: ICD-10-PCS | Mod: CPTII,,, | Performed by: PAIN MEDICINE

## 2021-08-11 PROCEDURE — 99214 PR OFFICE/OUTPT VISIT, EST, LEVL IV, 30-39 MIN: ICD-10-PCS | Mod: 95,,, | Performed by: PAIN MEDICINE

## 2021-08-11 PROCEDURE — 1159F PR MEDICATION LIST DOCUMENTED IN MEDICAL RECORD: ICD-10-PCS | Mod: CPTII,,, | Performed by: PAIN MEDICINE

## 2021-08-11 PROCEDURE — 1159F MED LIST DOCD IN RCRD: CPT | Mod: CPTII,,, | Performed by: PAIN MEDICINE

## 2021-08-11 PROCEDURE — 99214 OFFICE O/P EST MOD 30 MIN: CPT | Mod: 95,,, | Performed by: PAIN MEDICINE

## 2021-08-11 RX ORDER — NITROFURANTOIN 25; 75 MG/1; MG/1
100 CAPSULE ORAL 2 TIMES DAILY
Qty: 10 CAPSULE | Refills: 0 | Status: SHIPPED | OUTPATIENT
Start: 2021-08-11 | End: 2021-08-16

## 2021-08-11 RX ORDER — PHENAZOPYRIDINE HYDROCHLORIDE 200 MG/1
200 TABLET, FILM COATED ORAL 3 TIMES DAILY PRN
Qty: 6 TABLET | Refills: 0 | Status: SHIPPED | OUTPATIENT
Start: 2021-08-11 | End: 2021-08-13

## 2021-08-13 ENCOUNTER — HOSPITAL ENCOUNTER (OUTPATIENT)
Dept: RADIOLOGY | Facility: HOSPITAL | Age: 56
Discharge: HOME | End: 2021-08-13
Attending: SURGERY
Payer: COMMERCIAL

## 2021-08-13 DIAGNOSIS — K44.9 HIATAL HERNIA: ICD-10-CM

## 2021-08-13 DIAGNOSIS — K21.9 GASTROESOPHAGEAL REFLUX DISEASE WITHOUT ESOPHAGITIS: ICD-10-CM

## 2021-08-13 PROCEDURE — 78264 GASTRIC EMPTYING IMG STUDY: CPT | Mod: TC

## 2021-08-13 PROCEDURE — 78264 GASTRIC EMPTYING IMG STUDY: CPT | Mod: 26,,, | Performed by: RADIOLOGY

## 2021-08-13 PROCEDURE — 78264 NM GASTRIC EMPTYING: ICD-10-PCS | Mod: 26,,, | Performed by: RADIOLOGY

## 2021-08-16 ENCOUNTER — TELEPHONE (OUTPATIENT)
Dept: PAIN MEDICINE | Facility: CLINIC | Age: 56
End: 2021-08-16

## 2021-08-20 ENCOUNTER — LAB VISIT (OUTPATIENT)
Dept: PRIMARY CARE CLINIC | Facility: OTHER | Age: 56
End: 2021-08-20
Attending: INTERNAL MEDICINE
Payer: COMMERCIAL

## 2021-08-20 ENCOUNTER — OFFICE VISIT (OUTPATIENT)
Dept: FAMILY MEDICINE | Facility: CLINIC | Age: 56
End: 2021-08-20
Payer: COMMERCIAL

## 2021-08-20 DIAGNOSIS — J00 ACUTE NASOPHARYNGITIS (COMMON COLD): Primary | ICD-10-CM

## 2021-08-20 DIAGNOSIS — T36.95XA ANTIBIOTIC-INDUCED YEAST INFECTION: ICD-10-CM

## 2021-08-20 DIAGNOSIS — B37.9 ANTIBIOTIC-INDUCED YEAST INFECTION: ICD-10-CM

## 2021-08-20 DIAGNOSIS — Z20.822 ENCOUNTER FOR LABORATORY TESTING FOR COVID-19 VIRUS: ICD-10-CM

## 2021-08-20 PROCEDURE — 99213 OFFICE O/P EST LOW 20 MIN: CPT | Mod: 95,,, | Performed by: FAMILY MEDICINE

## 2021-08-20 PROCEDURE — 1159F MED LIST DOCD IN RCRD: CPT | Mod: CPTII,,, | Performed by: FAMILY MEDICINE

## 2021-08-20 PROCEDURE — 1160F RVW MEDS BY RX/DR IN RCRD: CPT | Mod: CPTII,,, | Performed by: FAMILY MEDICINE

## 2021-08-20 PROCEDURE — U0003 INFECTIOUS AGENT DETECTION BY NUCLEIC ACID (DNA OR RNA); SEVERE ACUTE RESPIRATORY SYNDROME CORONAVIRUS 2 (SARS-COV-2) (CORONAVIRUS DISEASE [COVID-19]), AMPLIFIED PROBE TECHNIQUE, MAKING USE OF HIGH THROUGHPUT TECHNOLOGIES AS DESCRIBED BY CMS-2020-01-R: HCPCS | Performed by: INTERNAL MEDICINE

## 2021-08-20 PROCEDURE — 1160F PR REVIEW ALL MEDS BY PRESCRIBER/CLIN PHARMACIST DOCUMENTED: ICD-10-PCS | Mod: CPTII,,, | Performed by: FAMILY MEDICINE

## 2021-08-20 PROCEDURE — 1159F PR MEDICATION LIST DOCUMENTED IN MEDICAL RECORD: ICD-10-PCS | Mod: CPTII,,, | Performed by: FAMILY MEDICINE

## 2021-08-20 PROCEDURE — 99213 PR OFFICE/OUTPT VISIT, EST, LEVL III, 20-29 MIN: ICD-10-PCS | Mod: 95,,, | Performed by: FAMILY MEDICINE

## 2021-08-20 RX ORDER — FLUCONAZOLE 150 MG/1
150 TABLET ORAL DAILY
Qty: 1 TABLET | Refills: 0 | Status: SHIPPED | OUTPATIENT
Start: 2021-08-20 | End: 2021-08-21

## 2021-08-20 RX ORDER — METHYLPREDNISOLONE 4 MG/1
TABLET ORAL
Qty: 1 PACKAGE | Refills: 0 | Status: SHIPPED | OUTPATIENT
Start: 2021-08-20 | End: 2022-04-12

## 2021-08-23 ENCOUNTER — PATIENT MESSAGE (OUTPATIENT)
Dept: PAIN MEDICINE | Facility: CLINIC | Age: 56
End: 2021-08-23

## 2021-08-23 ENCOUNTER — TELEPHONE (OUTPATIENT)
Dept: PAIN MEDICINE | Facility: CLINIC | Age: 56
End: 2021-08-23

## 2021-08-23 ENCOUNTER — PATIENT MESSAGE (OUTPATIENT)
Dept: SURGERY | Facility: CLINIC | Age: 56
End: 2021-08-23

## 2021-08-23 ENCOUNTER — PATIENT MESSAGE (OUTPATIENT)
Dept: FAMILY MEDICINE | Facility: CLINIC | Age: 56
End: 2021-08-23

## 2021-08-23 DIAGNOSIS — M47.812 CERVICAL SPONDYLOSIS: Primary | ICD-10-CM

## 2021-08-23 LAB
SARS-COV-2 RNA RESP QL NAA+PROBE: NOT DETECTED
SARS-COV-2- CYCLE NUMBER: NORMAL

## 2021-08-26 ENCOUNTER — OFFICE VISIT (OUTPATIENT)
Dept: FAMILY MEDICINE | Facility: CLINIC | Age: 56
End: 2021-08-26
Payer: COMMERCIAL

## 2021-08-26 DIAGNOSIS — B96.89 ACUTE BACTERIAL SINUSITIS: Primary | ICD-10-CM

## 2021-08-26 DIAGNOSIS — J01.90 ACUTE BACTERIAL SINUSITIS: Primary | ICD-10-CM

## 2021-08-26 DIAGNOSIS — B37.31 YEAST VAGINITIS: ICD-10-CM

## 2021-08-26 PROCEDURE — 99213 PR OFFICE/OUTPT VISIT, EST, LEVL III, 20-29 MIN: ICD-10-PCS | Mod: 95,,, | Performed by: NURSE PRACTITIONER

## 2021-08-26 PROCEDURE — 99213 OFFICE O/P EST LOW 20 MIN: CPT | Mod: 95,,, | Performed by: NURSE PRACTITIONER

## 2021-08-26 PROCEDURE — 1159F MED LIST DOCD IN RCRD: CPT | Mod: CPTII,,, | Performed by: NURSE PRACTITIONER

## 2021-08-26 PROCEDURE — 1159F PR MEDICATION LIST DOCUMENTED IN MEDICAL RECORD: ICD-10-PCS | Mod: CPTII,,, | Performed by: NURSE PRACTITIONER

## 2021-08-26 PROCEDURE — 1160F RVW MEDS BY RX/DR IN RCRD: CPT | Mod: CPTII,,, | Performed by: NURSE PRACTITIONER

## 2021-08-26 PROCEDURE — 1160F PR REVIEW ALL MEDS BY PRESCRIBER/CLIN PHARMACIST DOCUMENTED: ICD-10-PCS | Mod: CPTII,,, | Performed by: NURSE PRACTITIONER

## 2021-08-26 RX ORDER — FLUCONAZOLE 150 MG/1
TABLET ORAL
Qty: 2 TABLET | Refills: 0 | Status: SHIPPED | OUTPATIENT
Start: 2021-08-26 | End: 2022-04-12

## 2021-08-26 RX ORDER — AMOXICILLIN 875 MG/1
875 TABLET, FILM COATED ORAL EVERY 12 HOURS
Qty: 14 TABLET | Refills: 0 | Status: SHIPPED | OUTPATIENT
Start: 2021-08-26 | End: 2021-09-02

## 2021-09-10 ENCOUNTER — TELEPHONE (OUTPATIENT)
Dept: ENDOSCOPY | Facility: HOSPITAL | Age: 56
End: 2021-09-10

## 2021-09-13 ENCOUNTER — TELEPHONE (OUTPATIENT)
Dept: SURGERY | Facility: CLINIC | Age: 56
End: 2021-09-13

## 2021-09-14 ENCOUNTER — PATIENT OUTREACH (OUTPATIENT)
Dept: ADMINISTRATIVE | Facility: OTHER | Age: 56
End: 2021-09-14

## 2021-10-03 ENCOUNTER — PATIENT MESSAGE (OUTPATIENT)
Dept: PAIN MEDICINE | Facility: CLINIC | Age: 56
End: 2021-10-03

## 2021-10-03 DIAGNOSIS — E55.9 VITAMIN D DEFICIENCY: ICD-10-CM

## 2021-10-04 ENCOUNTER — PATIENT MESSAGE (OUTPATIENT)
Dept: ADMINISTRATIVE | Facility: HOSPITAL | Age: 56
End: 2021-10-04

## 2021-10-04 RX ORDER — ERGOCALCIFEROL 1.25 MG/1
50000 CAPSULE ORAL
Qty: 12 CAPSULE | Refills: 0 | Status: SHIPPED | OUTPATIENT
Start: 2021-10-04 | End: 2022-02-11 | Stop reason: CLARIF

## 2021-10-05 ENCOUNTER — PATIENT MESSAGE (OUTPATIENT)
Dept: MEDSURG UNIT | Facility: HOSPITAL | Age: 56
End: 2021-10-05

## 2021-10-05 ENCOUNTER — TELEPHONE (OUTPATIENT)
Dept: GASTROENTEROLOGY | Facility: CLINIC | Age: 56
End: 2021-10-05

## 2021-10-05 ENCOUNTER — OFFICE VISIT (OUTPATIENT)
Dept: GASTROENTEROLOGY | Facility: CLINIC | Age: 56
End: 2021-10-05
Payer: COMMERCIAL

## 2021-10-05 DIAGNOSIS — K21.9 GASTROESOPHAGEAL REFLUX DISEASE WITHOUT ESOPHAGITIS: Primary | ICD-10-CM

## 2021-10-05 DIAGNOSIS — K58.0 IRRITABLE BOWEL SYNDROME WITH DIARRHEA: ICD-10-CM

## 2021-10-05 DIAGNOSIS — R11.0 NAUSEA: ICD-10-CM

## 2021-10-05 DIAGNOSIS — R11.10 REGURGITATION OF FOOD: ICD-10-CM

## 2021-10-05 DIAGNOSIS — J34.89 RHINORRHEA: ICD-10-CM

## 2021-10-05 DIAGNOSIS — K44.9 HIATAL HERNIA: ICD-10-CM

## 2021-10-05 PROCEDURE — 4010F PR ACE/ARB THEARPY RXD/TAKEN: ICD-10-PCS | Mod: CPTII,95,, | Performed by: INTERNAL MEDICINE

## 2021-10-05 PROCEDURE — 99213 OFFICE O/P EST LOW 20 MIN: CPT | Mod: 95,,, | Performed by: INTERNAL MEDICINE

## 2021-10-05 PROCEDURE — 4010F ACE/ARB THERAPY RXD/TAKEN: CPT | Mod: CPTII,95,, | Performed by: INTERNAL MEDICINE

## 2021-10-05 PROCEDURE — 99213 PR OFFICE/OUTPT VISIT, EST, LEVL III, 20-29 MIN: ICD-10-PCS | Mod: 95,,, | Performed by: INTERNAL MEDICINE

## 2021-10-06 ENCOUNTER — OFFICE VISIT (OUTPATIENT)
Dept: FAMILY MEDICINE | Facility: CLINIC | Age: 56
End: 2021-10-06
Payer: COMMERCIAL

## 2021-10-06 ENCOUNTER — TELEPHONE (OUTPATIENT)
Dept: FAMILY MEDICINE | Facility: CLINIC | Age: 56
End: 2021-10-06

## 2021-10-06 ENCOUNTER — PATIENT MESSAGE (OUTPATIENT)
Dept: FAMILY MEDICINE | Facility: CLINIC | Age: 56
End: 2021-10-06

## 2021-10-06 DIAGNOSIS — R11.0 NAUSEA: ICD-10-CM

## 2021-10-06 DIAGNOSIS — J06.9 VIRAL UPPER RESPIRATORY TRACT INFECTION: Primary | ICD-10-CM

## 2021-10-06 DIAGNOSIS — J32.9 SINUSITIS, UNSPECIFIED CHRONICITY, UNSPECIFIED LOCATION: ICD-10-CM

## 2021-10-06 DIAGNOSIS — Z53.20 PROCEDURE NOT CARRIED OUT BECAUSE OF PATIENT'S DECISION: Primary | ICD-10-CM

## 2021-10-06 PROCEDURE — 4010F ACE/ARB THERAPY RXD/TAKEN: CPT | Mod: CPTII,95,, | Performed by: STUDENT IN AN ORGANIZED HEALTH CARE EDUCATION/TRAINING PROGRAM

## 2021-10-06 PROCEDURE — 4010F ACE/ARB THERAPY RXD/TAKEN: CPT | Mod: CPTII,95,, | Performed by: INTERNAL MEDICINE

## 2021-10-06 PROCEDURE — 99214 OFFICE O/P EST MOD 30 MIN: CPT | Mod: 95,,, | Performed by: STUDENT IN AN ORGANIZED HEALTH CARE EDUCATION/TRAINING PROGRAM

## 2021-10-06 PROCEDURE — 1159F MED LIST DOCD IN RCRD: CPT | Mod: CPTII,95,, | Performed by: STUDENT IN AN ORGANIZED HEALTH CARE EDUCATION/TRAINING PROGRAM

## 2021-10-06 PROCEDURE — 4010F PR ACE/ARB THEARPY RXD/TAKEN: ICD-10-PCS | Mod: CPTII,95,, | Performed by: STUDENT IN AN ORGANIZED HEALTH CARE EDUCATION/TRAINING PROGRAM

## 2021-10-06 PROCEDURE — 99499 NO LOS: ICD-10-PCS | Mod: 95,,, | Performed by: INTERNAL MEDICINE

## 2021-10-06 PROCEDURE — 99214 PR OFFICE/OUTPT VISIT, EST, LEVL IV, 30-39 MIN: ICD-10-PCS | Mod: 95,,, | Performed by: STUDENT IN AN ORGANIZED HEALTH CARE EDUCATION/TRAINING PROGRAM

## 2021-10-06 PROCEDURE — 1160F PR REVIEW ALL MEDS BY PRESCRIBER/CLIN PHARMACIST DOCUMENTED: ICD-10-PCS | Mod: CPTII,95,, | Performed by: STUDENT IN AN ORGANIZED HEALTH CARE EDUCATION/TRAINING PROGRAM

## 2021-10-06 PROCEDURE — 4010F PR ACE/ARB THEARPY RXD/TAKEN: ICD-10-PCS | Mod: CPTII,95,, | Performed by: INTERNAL MEDICINE

## 2021-10-06 PROCEDURE — 1160F RVW MEDS BY RX/DR IN RCRD: CPT | Mod: CPTII,95,, | Performed by: STUDENT IN AN ORGANIZED HEALTH CARE EDUCATION/TRAINING PROGRAM

## 2021-10-06 PROCEDURE — 1159F PR MEDICATION LIST DOCUMENTED IN MEDICAL RECORD: ICD-10-PCS | Mod: CPTII,95,, | Performed by: STUDENT IN AN ORGANIZED HEALTH CARE EDUCATION/TRAINING PROGRAM

## 2021-10-06 PROCEDURE — 99499 UNLISTED E&M SERVICE: CPT | Mod: 95,,, | Performed by: INTERNAL MEDICINE

## 2021-10-06 RX ORDER — PROMETHAZINE HYDROCHLORIDE 25 MG/1
25 TABLET ORAL EVERY 6 HOURS PRN
Qty: 30 TABLET | Refills: 0 | Status: SHIPPED | OUTPATIENT
Start: 2021-10-06 | End: 2022-05-18 | Stop reason: SDUPTHER

## 2021-10-07 ENCOUNTER — PATIENT MESSAGE (OUTPATIENT)
Dept: FAMILY MEDICINE | Facility: CLINIC | Age: 56
End: 2021-10-07

## 2021-10-07 ENCOUNTER — PATIENT MESSAGE (OUTPATIENT)
Dept: PAIN MEDICINE | Facility: CLINIC | Age: 56
End: 2021-10-07

## 2021-10-07 DIAGNOSIS — M47.812 CERVICAL SPONDYLOSIS: Primary | ICD-10-CM

## 2021-10-07 DIAGNOSIS — M50.30 DDD (DEGENERATIVE DISC DISEASE), CERVICAL: ICD-10-CM

## 2021-10-10 ENCOUNTER — PATIENT MESSAGE (OUTPATIENT)
Dept: FAMILY MEDICINE | Facility: CLINIC | Age: 56
End: 2021-10-10

## 2021-10-11 ENCOUNTER — OFFICE VISIT (OUTPATIENT)
Dept: URGENT CARE | Facility: CLINIC | Age: 56
End: 2021-10-11
Payer: COMMERCIAL

## 2021-10-11 VITALS
WEIGHT: 198 LBS | DIASTOLIC BLOOD PRESSURE: 100 MMHG | RESPIRATION RATE: 18 BRPM | BODY MASS INDEX: 32.99 KG/M2 | HEIGHT: 65 IN | TEMPERATURE: 97 F | SYSTOLIC BLOOD PRESSURE: 158 MMHG | HEART RATE: 95 BPM | OXYGEN SATURATION: 96 %

## 2021-10-11 DIAGNOSIS — R51.9 FRONTAL HEADACHE: Primary | ICD-10-CM

## 2021-10-11 DIAGNOSIS — J01.10 ACUTE NON-RECURRENT FRONTAL SINUSITIS: ICD-10-CM

## 2021-10-11 DIAGNOSIS — R11.2 NON-INTRACTABLE VOMITING WITH NAUSEA, UNSPECIFIED VOMITING TYPE: ICD-10-CM

## 2021-10-11 LAB
CTP QC/QA: YES
SARS-COV-2 RDRP RESP QL NAA+PROBE: NEGATIVE

## 2021-10-11 PROCEDURE — 1160F RVW MEDS BY RX/DR IN RCRD: CPT | Mod: CPTII,S$GLB,, | Performed by: NURSE PRACTITIONER

## 2021-10-11 PROCEDURE — 99214 PR OFFICE/OUTPT VISIT, EST, LEVL IV, 30-39 MIN: ICD-10-PCS | Mod: 25,S$GLB,, | Performed by: NURSE PRACTITIONER

## 2021-10-11 PROCEDURE — 1160F PR REVIEW ALL MEDS BY PRESCRIBER/CLIN PHARMACIST DOCUMENTED: ICD-10-PCS | Mod: CPTII,S$GLB,, | Performed by: NURSE PRACTITIONER

## 2021-10-11 PROCEDURE — 3077F SYST BP >= 140 MM HG: CPT | Mod: CPTII,S$GLB,, | Performed by: NURSE PRACTITIONER

## 2021-10-11 PROCEDURE — S0119 ONDANSETRON 4 MG: HCPCS | Mod: S$GLB,,, | Performed by: NURSE PRACTITIONER

## 2021-10-11 PROCEDURE — 96372 THER/PROPH/DIAG INJ SC/IM: CPT | Mod: S$GLB,,, | Performed by: NURSE PRACTITIONER

## 2021-10-11 PROCEDURE — 3008F BODY MASS INDEX DOCD: CPT | Mod: CPTII,S$GLB,, | Performed by: NURSE PRACTITIONER

## 2021-10-11 PROCEDURE — 3080F DIAST BP >= 90 MM HG: CPT | Mod: CPTII,S$GLB,, | Performed by: NURSE PRACTITIONER

## 2021-10-11 PROCEDURE — 96372 PR INJECTION,THERAP/PROPH/DIAG2ST, IM OR SUBCUT: ICD-10-PCS | Mod: S$GLB,,, | Performed by: NURSE PRACTITIONER

## 2021-10-11 PROCEDURE — 3080F PR MOST RECENT DIASTOLIC BLOOD PRESSURE >= 90 MM HG: ICD-10-PCS | Mod: CPTII,S$GLB,, | Performed by: NURSE PRACTITIONER

## 2021-10-11 PROCEDURE — 3008F PR BODY MASS INDEX (BMI) DOCUMENTED: ICD-10-PCS | Mod: CPTII,S$GLB,, | Performed by: NURSE PRACTITIONER

## 2021-10-11 PROCEDURE — 4010F PR ACE/ARB THEARPY RXD/TAKEN: ICD-10-PCS | Mod: CPTII,S$GLB,, | Performed by: NURSE PRACTITIONER

## 2021-10-11 PROCEDURE — 1159F PR MEDICATION LIST DOCUMENTED IN MEDICAL RECORD: ICD-10-PCS | Mod: CPTII,S$GLB,, | Performed by: NURSE PRACTITIONER

## 2021-10-11 PROCEDURE — 1159F MED LIST DOCD IN RCRD: CPT | Mod: CPTII,S$GLB,, | Performed by: NURSE PRACTITIONER

## 2021-10-11 PROCEDURE — 4010F ACE/ARB THERAPY RXD/TAKEN: CPT | Mod: CPTII,S$GLB,, | Performed by: NURSE PRACTITIONER

## 2021-10-11 PROCEDURE — 99214 OFFICE O/P EST MOD 30 MIN: CPT | Mod: 25,S$GLB,, | Performed by: NURSE PRACTITIONER

## 2021-10-11 PROCEDURE — S0119 PR ONDANSETRON, ORAL, 4MG: ICD-10-PCS | Mod: S$GLB,,, | Performed by: NURSE PRACTITIONER

## 2021-10-11 PROCEDURE — 3077F PR MOST RECENT SYSTOLIC BLOOD PRESSURE >= 140 MM HG: ICD-10-PCS | Mod: CPTII,S$GLB,, | Performed by: NURSE PRACTITIONER

## 2021-10-11 RX ORDER — ONDANSETRON 4 MG/1
4 TABLET, FILM COATED ORAL EVERY 12 HOURS PRN
Qty: 20 TABLET | Refills: 0 | Status: SHIPPED | OUTPATIENT
Start: 2021-10-11 | End: 2022-05-18

## 2021-10-11 RX ORDER — AMOXICILLIN AND CLAVULANATE POTASSIUM 875; 125 MG/1; MG/1
1 TABLET, FILM COATED ORAL EVERY 12 HOURS
Qty: 14 TABLET | Refills: 0 | Status: SHIPPED | OUTPATIENT
Start: 2021-10-11 | End: 2021-10-18

## 2021-10-11 RX ORDER — NAPROXEN 375 MG/1
375 TABLET ORAL 2 TIMES DAILY PRN
Qty: 28 TABLET | Refills: 0 | Status: SHIPPED | OUTPATIENT
Start: 2021-10-12 | End: 2021-10-26

## 2021-10-11 RX ORDER — KETOROLAC TROMETHAMINE 30 MG/ML
30 INJECTION, SOLUTION INTRAMUSCULAR; INTRAVENOUS
Status: COMPLETED | OUTPATIENT
Start: 2021-10-11 | End: 2021-10-11

## 2021-10-11 RX ORDER — ONDANSETRON 4 MG/1
4 TABLET, ORALLY DISINTEGRATING ORAL
Status: COMPLETED | OUTPATIENT
Start: 2021-10-11 | End: 2021-10-11

## 2021-10-11 RX ADMIN — KETOROLAC TROMETHAMINE 30 MG: 30 INJECTION, SOLUTION INTRAMUSCULAR; INTRAVENOUS at 07:10

## 2021-10-11 RX ADMIN — ONDANSETRON 4 MG: 4 TABLET, ORALLY DISINTEGRATING ORAL at 07:10

## 2021-10-12 DIAGNOSIS — Z01.818 PRE-OP TESTING: ICD-10-CM

## 2021-10-17 ENCOUNTER — LAB VISIT (OUTPATIENT)
Dept: SPORTS MEDICINE | Facility: CLINIC | Age: 56
End: 2021-10-17
Payer: COMMERCIAL

## 2021-10-17 DIAGNOSIS — Z01.818 PRE-OP TESTING: ICD-10-CM

## 2021-10-17 LAB
SARS-COV-2 RNA RESP QL NAA+PROBE: NOT DETECTED
SARS-COV-2- CYCLE NUMBER: NORMAL

## 2021-10-17 PROCEDURE — U0005 INFEC AGEN DETEC AMPLI PROBE: HCPCS | Performed by: FAMILY MEDICINE

## 2021-10-17 PROCEDURE — U0003 INFECTIOUS AGENT DETECTION BY NUCLEIC ACID (DNA OR RNA); SEVERE ACUTE RESPIRATORY SYNDROME CORONAVIRUS 2 (SARS-COV-2) (CORONAVIRUS DISEASE [COVID-19]), AMPLIFIED PROBE TECHNIQUE, MAKING USE OF HIGH THROUGHPUT TECHNOLOGIES AS DESCRIBED BY CMS-2020-01-R: HCPCS | Performed by: FAMILY MEDICINE

## 2021-10-18 ENCOUNTER — PATIENT MESSAGE (OUTPATIENT)
Dept: PAIN MEDICINE | Facility: CLINIC | Age: 56
End: 2021-10-18
Payer: COMMERCIAL

## 2021-10-20 ENCOUNTER — HOSPITAL ENCOUNTER (OUTPATIENT)
Facility: HOSPITAL | Age: 56
Discharge: HOME | End: 2021-10-20
Attending: INTERNAL MEDICINE | Admitting: INTERNAL MEDICINE
Payer: COMMERCIAL

## 2021-10-20 VITALS
OXYGEN SATURATION: 99 % | RESPIRATION RATE: 16 BRPM | WEIGHT: 198 LBS | BODY MASS INDEX: 32.99 KG/M2 | DIASTOLIC BLOOD PRESSURE: 81 MMHG | TEMPERATURE: 98 F | SYSTOLIC BLOOD PRESSURE: 142 MMHG | HEIGHT: 65 IN | HEART RATE: 75 BPM

## 2021-10-20 DIAGNOSIS — K44.9 HIATAL HERNIA WITH GERD: ICD-10-CM

## 2021-10-20 DIAGNOSIS — K21.9 HIATAL HERNIA WITH GERD: ICD-10-CM

## 2021-10-20 PROCEDURE — 25000003 PHARM REV CODE 250: Performed by: INTERNAL MEDICINE

## 2021-10-20 PROCEDURE — 91037 ESOPH IMPED FUNCTION TEST: CPT | Performed by: INTERNAL MEDICINE

## 2021-10-20 PROCEDURE — 91010 ESOPHAGUS MOTILITY STUDY: CPT | Performed by: INTERNAL MEDICINE

## 2021-10-20 RX ORDER — SODIUM CHLORIDE 9 MG/ML
INJECTION, SOLUTION INTRAVENOUS CONTINUOUS
Status: DISCONTINUED | OUTPATIENT
Start: 2021-10-20 | End: 2021-10-20 | Stop reason: HOSPADM

## 2021-10-20 RX ORDER — LIDOCAINE HYDROCHLORIDE 20 MG/ML
JELLY TOPICAL ONCE
Status: COMPLETED | OUTPATIENT
Start: 2021-10-20 | End: 2021-10-20

## 2021-10-20 RX ADMIN — SODIUM CHLORIDE: 0.9 INJECTION, SOLUTION INTRAVENOUS at 07:10

## 2021-10-20 RX ADMIN — LIDOCAINE HYDROCHLORIDE 10 ML: 20 JELLY TOPICAL at 08:10

## 2021-10-21 PROCEDURE — 91037 PR GERD TST W/ NASAL IMPEDENCE ELECTROD: ICD-10-PCS | Mod: 26,,, | Performed by: INTERNAL MEDICINE

## 2021-10-21 PROCEDURE — 91010 PR ESOPHAGEAL MOTILITY STUDY, MA2METRY: ICD-10-PCS | Mod: 26,,, | Performed by: INTERNAL MEDICINE

## 2021-10-21 PROCEDURE — 91010 ESOPHAGUS MOTILITY STUDY: CPT | Mod: 26,,, | Performed by: INTERNAL MEDICINE

## 2021-10-21 PROCEDURE — 91037 ESOPH IMPED FUNCTION TEST: CPT | Mod: 26,,, | Performed by: INTERNAL MEDICINE

## 2021-10-25 ENCOUNTER — TELEPHONE (OUTPATIENT)
Dept: SURGERY | Facility: CLINIC | Age: 56
End: 2021-10-25
Payer: COMMERCIAL

## 2021-10-27 ENCOUNTER — HOSPITAL ENCOUNTER (OUTPATIENT)
Dept: RADIOLOGY | Facility: HOSPITAL | Age: 56
Discharge: HOME | End: 2021-10-27
Attending: PAIN MEDICINE
Payer: COMMERCIAL

## 2021-10-27 DIAGNOSIS — M50.30 DDD (DEGENERATIVE DISC DISEASE), CERVICAL: ICD-10-CM

## 2021-10-27 DIAGNOSIS — M47.812 CERVICAL SPONDYLOSIS: ICD-10-CM

## 2021-10-27 PROCEDURE — 72141 MRI CERVICAL SPINE WITHOUT CONTRAST: ICD-10-PCS | Mod: 26,,, | Performed by: RADIOLOGY

## 2021-10-27 PROCEDURE — 72141 MRI NECK SPINE W/O DYE: CPT | Mod: 26,,, | Performed by: RADIOLOGY

## 2021-10-27 PROCEDURE — 72141 MRI NECK SPINE W/O DYE: CPT | Mod: TC

## 2021-11-08 ENCOUNTER — PATIENT MESSAGE (OUTPATIENT)
Dept: FAMILY MEDICINE | Facility: CLINIC | Age: 56
End: 2021-11-08
Payer: COMMERCIAL

## 2021-11-09 RX ORDER — BUTALBITAL, ASPIRIN, CAFFEINE AND CODEINE PHOSPHATE 50; 325; 40; 30 MG/1; MG/1; MG/1; MG/1
1 CAPSULE ORAL EVERY 6 HOURS PRN
Qty: 28 CAPSULE | Refills: 0 | Status: SHIPPED | OUTPATIENT
Start: 2021-11-09 | End: 2021-11-16

## 2021-11-11 ENCOUNTER — PATIENT OUTREACH (OUTPATIENT)
Dept: ADMINISTRATIVE | Facility: OTHER | Age: 56
End: 2021-11-11
Payer: COMMERCIAL

## 2021-12-02 ENCOUNTER — PATIENT MESSAGE (OUTPATIENT)
Dept: GASTROENTEROLOGY | Facility: CLINIC | Age: 56
End: 2021-12-02
Payer: COMMERCIAL

## 2021-12-04 ENCOUNTER — PATIENT MESSAGE (OUTPATIENT)
Dept: SURGERY | Facility: CLINIC | Age: 56
End: 2021-12-04
Payer: COMMERCIAL

## 2021-12-06 ENCOUNTER — OFFICE VISIT (OUTPATIENT)
Dept: RHEUMATOLOGY | Facility: CLINIC | Age: 56
End: 2021-12-06
Payer: COMMERCIAL

## 2021-12-06 VITALS
DIASTOLIC BLOOD PRESSURE: 83 MMHG | SYSTOLIC BLOOD PRESSURE: 123 MMHG | OXYGEN SATURATION: 97 % | RESPIRATION RATE: 18 BRPM | TEMPERATURE: 98 F | HEART RATE: 92 BPM

## 2021-12-06 DIAGNOSIS — E66.9 CLASS 1 OBESITY IN ADULT, UNSPECIFIED BMI, UNSPECIFIED OBESITY TYPE, UNSPECIFIED WHETHER SERIOUS COMORBIDITY PRESENT: ICD-10-CM

## 2021-12-06 DIAGNOSIS — M70.61 TROCHANTERIC BURSITIS OF RIGHT HIP: ICD-10-CM

## 2021-12-06 DIAGNOSIS — M15.9 PRIMARY OSTEOARTHRITIS INVOLVING MULTIPLE JOINTS: Primary | ICD-10-CM

## 2021-12-06 DIAGNOSIS — Z79.1 NSAID LONG-TERM USE: ICD-10-CM

## 2021-12-06 DIAGNOSIS — Z71.89 COUNSELING AND COORDINATION OF CARE: ICD-10-CM

## 2021-12-06 PROCEDURE — 20610 DRAIN/INJ JOINT/BURSA W/O US: CPT | Mod: XS,50,S$GLB, | Performed by: INTERNAL MEDICINE

## 2021-12-06 PROCEDURE — 99999 PR PBB SHADOW E&M-EST. PATIENT-LVL III: CPT | Mod: PBBFAC,,, | Performed by: INTERNAL MEDICINE

## 2021-12-06 PROCEDURE — 99214 OFFICE O/P EST MOD 30 MIN: CPT | Mod: 25,S$GLB,, | Performed by: INTERNAL MEDICINE

## 2021-12-06 PROCEDURE — 4010F PR ACE/ARB THEARPY RXD/TAKEN: ICD-10-PCS | Mod: CPTII,S$GLB,, | Performed by: INTERNAL MEDICINE

## 2021-12-06 PROCEDURE — 20610 LARGE JOINT ASPIRATION/INJECTION: L KNEE: ICD-10-PCS | Mod: XS,50,S$GLB, | Performed by: INTERNAL MEDICINE

## 2021-12-06 PROCEDURE — 4010F ACE/ARB THERAPY RXD/TAKEN: CPT | Mod: CPTII,S$GLB,, | Performed by: INTERNAL MEDICINE

## 2021-12-06 PROCEDURE — 99999 PR PBB SHADOW E&M-EST. PATIENT-LVL III: ICD-10-PCS | Mod: PBBFAC,,, | Performed by: INTERNAL MEDICINE

## 2021-12-06 PROCEDURE — 99214 PR OFFICE/OUTPT VISIT, EST, LEVL IV, 30-39 MIN: ICD-10-PCS | Mod: 25,S$GLB,, | Performed by: INTERNAL MEDICINE

## 2021-12-06 RX ORDER — LIDOCAINE HYDROCHLORIDE 10 MG/ML
2 INJECTION INFILTRATION; PERINEURAL
Status: DISCONTINUED | OUTPATIENT
Start: 2021-12-06 | End: 2021-12-06 | Stop reason: HOSPADM

## 2021-12-06 RX ORDER — TRIAMCINOLONE ACETONIDE 40 MG/ML
40 INJECTION, SUSPENSION INTRA-ARTICULAR; INTRAMUSCULAR
Status: DISCONTINUED | OUTPATIENT
Start: 2021-12-06 | End: 2021-12-06 | Stop reason: HOSPADM

## 2021-12-06 RX ADMIN — TRIAMCINOLONE ACETONIDE 40 MG: 40 INJECTION, SUSPENSION INTRA-ARTICULAR; INTRAMUSCULAR at 07:12

## 2021-12-06 RX ADMIN — LIDOCAINE HYDROCHLORIDE 2 ML: 10 INJECTION INFILTRATION; PERINEURAL at 07:12

## 2021-12-08 ENCOUNTER — PATIENT MESSAGE (OUTPATIENT)
Dept: ADMINISTRATIVE | Facility: HOSPITAL | Age: 56
End: 2021-12-08
Payer: COMMERCIAL

## 2021-12-13 ENCOUNTER — OFFICE VISIT (OUTPATIENT)
Dept: FAMILY MEDICINE | Facility: CLINIC | Age: 56
End: 2021-12-13
Payer: COMMERCIAL

## 2021-12-13 DIAGNOSIS — K44.9 HIATAL HERNIA: ICD-10-CM

## 2021-12-13 DIAGNOSIS — K52.9 ACUTE GASTROENTERITIS: Primary | ICD-10-CM

## 2021-12-13 DIAGNOSIS — K21.9 GASTROESOPHAGEAL REFLUX DISEASE WITHOUT ESOPHAGITIS: ICD-10-CM

## 2021-12-13 DIAGNOSIS — K58.0 IRRITABLE BOWEL SYNDROME WITH DIARRHEA: ICD-10-CM

## 2021-12-13 PROCEDURE — 4010F PR ACE/ARB THEARPY RXD/TAKEN: ICD-10-PCS | Mod: CPTII,95,, | Performed by: NURSE PRACTITIONER

## 2021-12-13 PROCEDURE — 99213 PR OFFICE/OUTPT VISIT, EST, LEVL III, 20-29 MIN: ICD-10-PCS | Mod: 95,,, | Performed by: NURSE PRACTITIONER

## 2021-12-13 PROCEDURE — 99213 OFFICE O/P EST LOW 20 MIN: CPT | Mod: 95,,, | Performed by: NURSE PRACTITIONER

## 2021-12-13 PROCEDURE — 4010F ACE/ARB THERAPY RXD/TAKEN: CPT | Mod: CPTII,95,, | Performed by: NURSE PRACTITIONER

## 2021-12-13 RX ORDER — DICYCLOMINE HYDROCHLORIDE 20 MG/1
20 TABLET ORAL 4 TIMES DAILY
Qty: 120 TABLET | Refills: 1 | Status: SHIPPED | OUTPATIENT
Start: 2021-12-13 | End: 2021-12-13 | Stop reason: SDUPTHER

## 2021-12-13 RX ORDER — DICYCLOMINE HYDROCHLORIDE 20 MG/1
20 TABLET ORAL 4 TIMES DAILY
Qty: 120 TABLET | Refills: 1 | Status: SHIPPED | OUTPATIENT
Start: 2021-12-13 | End: 2022-02-11 | Stop reason: CLARIF

## 2021-12-15 ENCOUNTER — PATIENT MESSAGE (OUTPATIENT)
Dept: ADMINISTRATIVE | Facility: HOSPITAL | Age: 56
End: 2021-12-15
Payer: COMMERCIAL

## 2021-12-20 ENCOUNTER — PATIENT OUTREACH (OUTPATIENT)
Dept: ADMINISTRATIVE | Facility: HOSPITAL | Age: 56
End: 2021-12-20
Payer: COMMERCIAL

## 2021-12-22 ENCOUNTER — PATIENT OUTREACH (OUTPATIENT)
Dept: ADMINISTRATIVE | Facility: OTHER | Age: 56
End: 2021-12-22
Payer: COMMERCIAL

## 2021-12-28 ENCOUNTER — PATIENT MESSAGE (OUTPATIENT)
Dept: PAIN MEDICINE | Facility: CLINIC | Age: 56
End: 2021-12-28
Payer: COMMERCIAL

## 2021-12-29 ENCOUNTER — OFFICE VISIT (OUTPATIENT)
Dept: PAIN MEDICINE | Facility: CLINIC | Age: 56
End: 2021-12-29
Payer: COMMERCIAL

## 2021-12-29 DIAGNOSIS — M51.36 DDD (DEGENERATIVE DISC DISEASE), LUMBAR: Primary | ICD-10-CM

## 2021-12-29 DIAGNOSIS — M47.897 OTHER OSTEOARTHRITIS OF SPINE, LUMBOSACRAL REGION: ICD-10-CM

## 2021-12-29 DIAGNOSIS — M47.812 CERVICAL SPONDYLOSIS: ICD-10-CM

## 2021-12-29 DIAGNOSIS — M50.30 DDD (DEGENERATIVE DISC DISEASE), CERVICAL: ICD-10-CM

## 2021-12-29 DIAGNOSIS — M53.3 SACROILIAC JOINT PAIN: ICD-10-CM

## 2021-12-29 DIAGNOSIS — M47.816 LUMBAR SPONDYLOSIS: ICD-10-CM

## 2021-12-29 PROCEDURE — 4010F ACE/ARB THERAPY RXD/TAKEN: CPT | Mod: CPTII,95,, | Performed by: PAIN MEDICINE

## 2021-12-29 PROCEDURE — 4010F PR ACE/ARB THEARPY RXD/TAKEN: ICD-10-PCS | Mod: CPTII,95,, | Performed by: PAIN MEDICINE

## 2021-12-29 PROCEDURE — 99213 PR OFFICE/OUTPT VISIT, EST, LEVL III, 20-29 MIN: ICD-10-PCS | Mod: 95,,, | Performed by: PAIN MEDICINE

## 2021-12-29 PROCEDURE — 1160F RVW MEDS BY RX/DR IN RCRD: CPT | Mod: CPTII,95,, | Performed by: PAIN MEDICINE

## 2021-12-29 PROCEDURE — 99213 OFFICE O/P EST LOW 20 MIN: CPT | Mod: 95,,, | Performed by: PAIN MEDICINE

## 2021-12-29 PROCEDURE — 1159F PR MEDICATION LIST DOCUMENTED IN MEDICAL RECORD: ICD-10-PCS | Mod: CPTII,95,, | Performed by: PAIN MEDICINE

## 2021-12-29 PROCEDURE — 1159F MED LIST DOCD IN RCRD: CPT | Mod: CPTII,95,, | Performed by: PAIN MEDICINE

## 2021-12-29 PROCEDURE — 1160F PR REVIEW ALL MEDS BY PRESCRIBER/CLIN PHARMACIST DOCUMENTED: ICD-10-PCS | Mod: CPTII,95,, | Performed by: PAIN MEDICINE

## 2021-12-29 NOTE — H&P (VIEW-ONLY)
Subjective:     Patient ID: Naomy Armstrong is a 56 y.o. female    Chief Complaint: Follow-up      Referred by: No ref. provider found      HPI:    Interval History (12/29/21):    The patient location is:  home  The chief complaint leading to consultation is:  Follow-up  Visit type: Virtual visit with synchronous audio and video  Total time spent with patient:  8 minutes  Each patient to whom he or she provides medical services by telemedicine is:  (1) informed of the relationship between the physician and patient and the respective role of any other health care provider with respect to management of the patient; and (2) notified that he or she may decline to receive medical services by telemedicine and may withdraw from such care at any time.      She returns today for follow up and MRI review.  She reports that she would like to schedule bilateral sacroiliac joint steroid injections discussed at last visit.  She is still having neck pain and headaches as well and would like to undergo cervical medial branch blocks/RFA, but her back pain is worse at this time.  Otherwise, she denies any changes in the quality or location of her pain.  She denies any new worsening symptoms.        Interval History (8/11/21):    The patient location is:  home  The chief complaint leading to consultation is:  Follow-up  Visit type: Virtual visit with synchronous audio and video  Total time spent with patient:  15 minutes  Each patient to whom he or she provides medical services by telemedicine is:  (1) informed of the relationship between the physician and patient and the respective role of any other health care provider with respect to management of the patient; and (2) notified that he or she may decline to receive medical services by telemedicine and may withdraw from such care at any time.        She returns today for follow up.  She reports that relief from bilateral sacroiliac joint steroid injections has worn off.  She  "would like repeat sacroiliac joint steroid injections if appropriate.  Patient also has been having headaches chronically.  She has been evaluated thoroughly by Neurology without any specific source for of headache identified.  Suspicion for cervicogenic headaches.  Patient does have pain in the upper cervical region.  The pain will radiate to the occipital and parietal regions of the head.  She denies any pain radiating to the upper extremities.  She denies any associated bowel bladder dysfunction.      Interval History NP (5/5/21):    Pt returns today for follow up of bilateral SIJ injections. She reports at least 75% relief of low back pain. She can now stand for long periods of time without pain or having to sit for 30 minutes. She is able to cook and complete her ADL's with much improvement. Does report continued pain while bending over but it is now manageable. Denies new or worsening symptoms.      Interval History (3/31/21):  She returns today for follow up.  She reports that bilateral L3, L4, L5 radiofrequency ablation provided roughly 75% relief but for only 1 month.  She states that this.  Time her pain returned.  Today, she localizes pain across the lower lumbar/lumbosacral region.  The pain does not radiate.  The pain is equal bilaterally.  The pain is worse with activities such as mopping and cleaning.  She denies any associated numbness, tingling, weakness, bowel bladder dysfunction.      Interval History NP (7/16/20):  Pt returns today for follow up and MRI review. She states that her pain in unchanged in quality or location. Denies any new symptoms. No bladder or bowel dysfunction. She reports "missing more days of work than she attends". She is an  at [employer] which includes heavy manual lifting and moving boxes. Her pain is worsened with activity, especially after a shift at work or when she is home cleaning. She reports that gabapentin has helped, but takes 2 pills at night and 1 " "pill every morning because it makes her too drowsy during the day. She also takes 500mg Naproxen PRN for the pain, maybe every few days. She would like something else for the pain. States she "took valium in the past which helped her relax and get through the day".     Interval History (7/6/20):  She returns today for follow up.  She reports that she continues to have low back/hip pain.  This pain is worse with bending and lifting.  These are movements that are often required as a part of her job.  Overall she denies any changes in the quality or location of the pain since last encounter.  She denies any new symptoms.       Interval History (1/13/20):  She returns today for follow up.  She reports that her neck pain has improved with home exercise program.  She does not feel as though this needs any further attention.  She does state that her low back has been bothering her.  She localizes pain to the bilateral lower lumbar regions.  The pain does radiate to the bilateral hip regions.  She denies any associated numbness, tingling, weakness, bowel bladder dysfunction.  The pain is constant and worsened with activity.  She states that the pain is typically worse when initiating activity after rest.        Initial Encounter (7/9/19):  Naomy Armstrong is a 56 y.o. female who presents today with chronic neck pain. This pain has been present for years.  No specific inciting event or injury noted. She localizes the pain to the midline cervical region.  The pain does not radiate.  She denies any associated numbness, tingling, weakness, bowel bladder dysfunction.  She has had some radicular pain on the left side in the past this has resolved.  The pain is constant and worsened with activity.  She specifically states that the pain is worse when looking at screens.  She has been treated in the past by Dr. Bhavya Oneill at [facility].   This pain is described in detail below.    Physical Therapy:  Yes, for her left " hip.     Non-pharmacologic Treatment:  Rest helps         · TENS?  No    Pain Medications:         · Currently taking:  gabapentin    · Has tried in the past:  Opioids, muscle relaxers, Tylenol, NSAIDs, Amitriptyline, tizanidine, Celebrex, Robaxin    · Has not tried:  SNRIs, topical creams    Blood thinners:  None    Interventional Therapies:   10/8/13- Left RFA C5, C6, C7  9/10/13- Left C5, C6, C7 MBB   8/13/13- C7-T1 IL EMMA  8/19/20 - right L3, L4, L5 radiofrequency ablation - 75% relief for 1 month  9/4/20 - left L3, L4, L5 radiofrequency ablation - 75% relief for 1 month  4/16/21 - bilateral SIJ injections - 75% relief    Relevant Surgeries:  None    Affecting sleep?  Yes    Affecting daily activities? yes    Depressive symptoms? yes          · SI/HI? No    Work status: Employed    Pain Scores:    Best:       1/10  Worst:   10/10  Usually:   10/10  Today:   1/10    Review of Systems   Constitutional: Negative for activity change, appetite change, chills, fatigue, fever and unexpected weight change.   HENT: Negative for hearing loss.    Eyes: Negative for visual disturbance.   Respiratory: Negative for chest tightness and shortness of breath.    Cardiovascular: Negative for chest pain.   Gastrointestinal: Negative for abdominal pain, constipation, diarrhea, nausea and vomiting.   Genitourinary: Negative for difficulty urinating.   Musculoskeletal: Positive for arthralgias, back pain, gait problem, myalgias, neck pain and neck stiffness.   Skin: Negative for rash.   Neurological: Positive for headaches. Negative for dizziness, weakness, light-headedness and numbness.   Psychiatric/Behavioral: Positive for sleep disturbance. Negative for hallucinations and suicidal ideas. The patient is not nervous/anxious.        Past Medical History:   Diagnosis Date    Alcohol abuse     per chart, but patient denies today and cocaine per chart    Anxiety     Colon polyp     Depression     Hallucination     last couple of  months started seeing someone standing in her room, saw boyfriend's reflection in glass    Headache     History of psychiatric hospitalization     age 13 [facility] for 2 months for acting out; 2018 for SI    Hx of psychiatric care     Hypertension     Psychiatric problem     Sleep difficulties     Suicide attempt     with knife by cutting stomach    Therapy        Past Surgical History:   Procedure Laterality Date    COLONOSCOPY N/A 5/7/2021    Procedure: COLONOSCOPY;  Surgeon: Prem Montana MD;  Location: [facility];  Service: Endoscopy;  Laterality: N/A;  COVID screening 5/4 LAPC-instr portal -ml    EPIDURAL STEROID INJECTION Bilateral 8/5/2020    Procedure: Bilateral MBB @ L3, L4, L5;  Surgeon: Jeremy Powell Jr., MD;  Location: [facility];  Service: Pain Management;  Laterality: Bilateral;  Bilat L3-5 MBB  Arrive @ 1315; No ATC or DM; Needs MD signature    EPIDURAL STEROID INJECTION Right 8/19/2020    Procedure: Lumbar Radiofrequency Thermocoagulation of Medial Branches;  Surgeon: Jeremy Powell Jr., MD;  Location: [facility];  Service: Pain Management;  Laterality: Right;  Right L3-5 RFA  Arrive @ 1045; No ATC or DM; Needs MD Signature    EPIDURAL STEROID INJECTION Left 9/4/2020    Procedure: Lumbar Radiofrequency Thermocoagulation of Medial Branches;  Surgeon: Jeremy Powell Jr., MD;  Location: [facility];  Service: Pain Management;  Laterality: Left;  Left L3-5 RFA  Arrive @ 0900 (requested); No ATC or DM; Needs MD signature    EPIDURAL STEROID INJECTION Bilateral 4/16/2021    Procedure: Sacroiliac Joint Steroid Injections @ Bilateral;  Surgeon: Jeremy Powell Jr., MD;  Location: [facility];  Service: Pain Management;  Laterality: Bilateral;  Arrive @ 1300; No ATC or DM    ESOPHAGEAL MANOMETRY WITH MEASUREMENT OF IMPEDANCE N/A 10/20/2021    Procedure: MANOMETRY, ESOPHAGUS, WITH IMPEDANCE MEASUREMENT;  Surgeon: Anastacia Odonnell MD;  Location: [facility] ([address] [place] [address]);  Service:  Endoscopy;  Laterality: N/A;  Covid test 10/17 [place], instructions sent to [email or web address]    ESOPHAGOGASTRODUODENOSCOPY N/A 5/7/2021    Procedure: EGD (ESOPHAGOGASTRODUODENOSCOPY);  Surgeon: Prem Montana MD;  Location: [facility];  Service: Endoscopy;  Laterality: N/A;  added on per Dr. NOEL Shepherd       Social History     Socioeconomic History    Marital status:     Number of children: 0   Occupational History    Occupation:      Employer: [employer]   Tobacco Use    Smoking status: Former Smoker     Packs/day: 1.00     Quit date: 4/26/2006     Years since quitting: 15.6    Smokeless tobacco: Never Used   Substance and Sexual Activity    Alcohol use: Yes     Comment: occasional    Drug use: Not Currently     Types: Marijuana     Comment: tried at age 14    Sexual activity: Yes     Partners: Male     Comment: going through menopause   Other Topics Concern    Patient feels they ought to cut down on drinking/drug use No    Patient annoyed by others criticizing their drinking/drug use No    Patient has felt bad or guilty about drinking/drug use No    Patient has had a drink/used drugs as an eye opener in the AM No   Social History Narrative     x 2.    Lives with boyfriend.     Works at [employer] as an .    Presently working on Bachelor's degree.    Has associates degree in criminal justice.     Social Determinants of Health     Financial Resource Strain: High Risk    Difficulty of Paying Living Expenses: Hard   Food Insecurity: Unknown    Worried About Running Out of Food in the Last Year: Patient refused    Ran Out of Food in the Last Year: Never true   Transportation Needs: No Transportation Needs    Lack of Transportation (Medical): No    Lack of Transportation (Non-Medical): No   Physical Activity: Inactive    Days of Exercise per Week: 0 days    Minutes of Exercise per Session: 0 min   Stress: No Stress Concern Present    Feeling of Stress : Only  a little   Social Connections: Unknown    Frequency of Communication with Friends and Family: More than three times a week    Frequency of Social Gatherings with Friends and Family: Patient refused    Active Member of Clubs or Organizations: Yes    Attends Club or Organization Meetings: 1 to 4 times per year    Marital Status:    Housing Stability: High Risk    Unable to Pay for Housing in the Last Year: Yes    Number of Places Lived in the Last Year: 1    Unstable Housing in the Last Year: No       Review of patient's allergies indicates:   Allergen Reactions    Effexor [venlafaxine]      Elevated her blood pressure    Zoloft [sertraline]     Paroxetine hcl      Made her feel drunk       Current Outpatient Medications on File Prior to Visit   Medication Sig Dispense Refill    amLODIPine (NORVASC) 5 MG tablet Take 1 tablet (5 mg total) by mouth once daily. 90 tablet 0    dicyclomine (BENTYL) 20 mg tablet Take 1 tablet (20 mg total) by mouth 4 (four) times daily. 120 tablet 1    ergocalciferol (ERGOCALCIFEROL) 50,000 unit Cap Take 1 capsule (50,000 Units total) by mouth every 7 days. 12 capsule 0    fluconazole (DIFLUCAN) 150 MG Tab 1 tablet at symptom onset.  Repeat if not better in 3 days. 2 tablet 0    FLUoxetine 20 MG capsule Take 1 capsule (20 mg total) by mouth once daily. Take in addition to 40 mg for a total of 60mg daily. 90 capsule 3    FLUoxetine 40 MG capsule Take 1 capsule (40 mg total) by mouth once daily. Take in addition to 20 mg for a total of 60mg daily. 90 capsule 3    gabapentin (NEURONTIN) 300 MG capsule Take 2 capsules (600 mg total) by mouth 3 (three) times daily. 180 capsule 11    hydrOXYzine HCL (ATARAX) 10 MG Tab Take 1 tablet (10 mg total) by mouth 2 (two) times daily as needed (anxiety). 60 tablet 3    lisinopriL (PRINIVIL,ZESTRIL) 20 MG tablet Take 1 tablet (20 mg total) by mouth once daily. 90 tablet 3    loperamide (IMODIUM) 2 mg capsule Take 2 mg by mouth  once as needed for Diarrhea.      methylPREDNISolone (MEDROL DOSEPACK) 4 mg tablet use as directed 1 Package 0    multivitamin with minerals tablet Take 1 tablet by mouth once daily.      omeprazole (PRILOSEC) 40 MG capsule Take 1 capsule (40 mg total) by mouth every morning. 90 capsule 3    ondansetron (ZOFRAN) 4 MG tablet Take 1 tablet (4 mg total) by mouth every 12 (twelve) hours as needed for Nausea. 20 tablet 0    promethazine (PHENERGAN) 25 MG tablet Take 1 tablet (25 mg total) by mouth every 6 (six) hours as needed for Nausea. 30 tablet 0     No current facility-administered medications on file prior to visit.       Objective:      There were no vitals taken for this visit.    Exam:  GEN:  Well developed, well nourished.  No acute distress.  Normal pain behavior.  HEENT:  No trauma.  Mucous membranes moist.  Nares patent bilaterally.  PSYCH: Normal affect. Thought content appropriate.  CHEST:  Breathing symmetric.  No audible wheezing.  ABD: Soft, non-distended.  SKIN:  Warm, pink, dry.  No rash on exposed areas.    EXT:  No cyanosis, clubbing, or edema.  No color change or changes in nail or hair growth.  NEURO/MUSCULOSKELETAL:  Fully alert, oriented, and appropriate. Speech normal indio. No cranial nerve deficits.   C-Spine:   full  ROM with pain on  bilateral rotation.  positive  facet loading bilaterally.   negative  Spurling's bilaterally.                Imaging:        Narrative & Impression    EXAMINATION:  MRI CERVICAL SPINE WITHOUT CONTRAST     CLINICAL HISTORY:  cervical DDD; Spondylosis without myelopathy or radiculopathy, cervical region     TECHNIQUE:  Multiplanar, multisequence MR images of the cervical spine were performed without the administration of contrast.     COMPARISON:  Radiograph 08/27/2020.     FINDINGS:  Alignment: Straightening of cervical spine with slight reversal at the level of C5 through C7.  Minimal degenerative anterolisthesis C4 on C5..     Vertebrae: Normal marrow  signal. No fracture.     Discs: Disc space narrowing C5-C6-C7 level.     Cord: At C3 through C6 level, STIR images, the cervical cord appears slightly indistinct and of minimally increased signal intensity.  This may be artifactual in nature is a cannot be confirmed on the axial or sagittal T2 weighted sequences with certainty.  Postcontrast repeat examination may be indicated to exclude subtle intrinsic cord finding at this level..     Skull base and craniocervical junction: Normal.     Degenerative findings:     C2-C3: There is no focal disc herniation.No significant central canal narrowing.  No significant neural foraminal narrowing.     C3-C4: There is no focal disc herniation.No significant central canal narrowing.  No significant neural foraminal narrowing.     C4-C5: There is no focal disc herniation.No significant central canal narrowing.  Moderate RIGHT neural foraminal narrowing.     C5-C6: There is no focal disc herniation.No significant central canal narrowing.  Moderate RIGHT and mild LEFT neural foraminal narrowing.     C6-C7: There is no focal disc herniation.No significant central canal narrowing.  Moderate LEFT neural foraminal narrowing.     C7-T1: There is no focal disc herniation.No significant central canal narrowing.  No significant neural foraminal narrowing.     T1-T2:     Paraspinal muscles & soft tissues: Unremarkable.     Impression:     Degenerative spondylosis as above predominantly C4-C5 through C6-C7.     Likely artifact identified level of the cord C3 through C6 STIR images only although at of abundance of caution, repeat examination with postcontrast MRI may be helpful to exclude underlying myelitis.     This report was flagged in Epic as abnormal.        Electronically signed by: Jerzy Rose MD  Date:                                            10/28/2021  Time:                                           06:44           Narrative & Impression    EXAMINATION:  XR CERVICAL SPINE 5  VIEW WITH FLEX AND EXT     CLINICAL HISTORY:  Headache     TECHNIQUE:  Five views of the cervical spine plus flexion and extension views were performed.     COMPARISON:  None 07/09/2019.     FINDINGS:  Mild DJD.  The disc spaces are narrowed between C5 and C7 vertebral segments.  Encroachment of the neural foramina identified at the C5-C6 level on the right and the C6/C7 level on the left.  There is a 2 mm C4/C5 anterolisthesis.  No fracture or dislocation.  No bone destruction identified.     Impression:     See above        Electronically signed by: Kaiser Ruiz MD  Date:                                            08/27/2020  Time:                                           14:12               Narrative & Impression     EXAMINATION:  MRI LUMBAR SPINE WITHOUT CONTRAST     CLINICAL HISTORY:  lumbar spinal stenosis; Other intervertebral disc degeneration, lumbar region     TECHNIQUE:  Multiplanar, multisequence MR images were acquired from the thoracolumbar junction to the sacrum without the administration of contrast.     COMPARISON:  Non plain film examination 01/13/2020 e.     FINDINGS:  Lumbar spine alignment is within normal limits. The vertebral body heights are well maintained, with no fracture.  No marrow signal abnormality suspicious for an infiltrative process.     The conus is normal in appearance, and terminates at the L1-L2 level.  The adjacent soft tissue structures show no significant abnormalities.     L1-L2: There is no focal disc herniation. No significant central canal or neural foraminal narrowing.     L2-L3: There is no focal disc herniation. No significant central canal or neural foraminal narrowing.     L3-L4: There is no focal disc herniation. No significant central canal or neural foraminal narrowing.     L4-L5: There is no focal disc herniation. No significant central canal or neural foraminal narrowing.     L5-S1: There is no focal disc herniation. No significant central canal or neural  foraminal narrowing.     Impression:     No significant central canal stenosis, focal protrusion of disc material or neural foraminal encroachment.        Electronically signed by: Jerzy Rose MD  Date:                                            07/14/2020  Time:                                           14:15         Narrative & Impression    EXAMINATION:  XR LUMBAR SPINE AP AND LAT WITH FLEX/EXT     CLINICAL HISTORY:  Other intervertebral disc degeneration, lumbar region     TECHNIQUE:  AP and lateral views as well as lateral flexion and extension images are performed through the lumbar spine.     COMPARISON:  None     FINDINGS:  Mild DJD and lumbar scoliosis.  No significant disc space narrowing identified.  No fracture, spondylolisthesis or bone destruction noted.  Spina bifida occulta involving the S1 vertebral segment noted.     Impression:     See above        Electronically signed by: Kaiser Ruiz MD  Date:                                            01/13/2020  Time:                                           09:22    Encounter    View Encounter                     Narrative     No significant alignment abnormality.  Vertebral body heights are normally maintained, without compression deformity at any level.  There is disc narrowing at C5-6 and C6-7, and each of these levels demonstrates posterior marginal vertebral endplate   spurring and accompanying disc bulging.  The spurring is more pronounced to the right of midline at C5-6 and to the left of midline at C6-7.  All other cervical and visualized upper thoracic levels have normal disc contour, and there is no evidence of a   significant focal disc herniation at any level.  AP diameter of the spinal canal is normally maintained at all levels with no canal stenosis/extrinsic cord compression observed.  The spinal cord is well delineated from the cervicomedullary junction to   the T3 level, and appears normal in size and configuration without focal  enlargement or irregularity.  No Chiari malformation or cord cyst or syrinx.  No abnormal signal from the substance of the cord on any of the pulse sequences generated.  There is   some prominent neural foraminal stenosis at C5-6 on the right and C6-7 on the left.  No significant signal abnormality referable to the osseous structures.   Impression      Cervical spine MRI examination demonstrates degenerative changes as discussed above involving the C5-6 and C6-7 levels, with right-sided foraminal stenosis at C5-6 and left-sided foraminal stenosis at C6-7.  No evidence of a significant focal soft is   herniation, canal stenosis/extrinsic cord compression, or intramedullary cord lesion seen at any level.      Electronically signed by: Kaiser Preston MD  Date: 08/01/13  Time: 06:40          Assessment:       Encounter Diagnoses   Name Primary?    DDD (degenerative disc disease), lumbar Yes    Lumbar spondylosis     Other osteoarthritis of spine, lumbosacral region     Sacroiliac joint pain     Cervical spondylosis     DDD (degenerative disc disease), cervical          Plan:       Naomy was seen today for follow-up.    Diagnoses and all orders for this visit:    DDD (degenerative disc disease), lumbar    Lumbar spondylosis    Other osteoarthritis of spine, lumbosacral region    Sacroiliac joint pain    Cervical spondylosis    DDD (degenerative disc disease), cervical        Naomy Armstrong is a 56 y.o. female with chronic low back pain.  Some relief with lumbar radiofrequency ablations but for only 1 month.  I suspect that a secondary etiology  is causing her current pain.  Patient does have transitional lumbosacral anatomy with what appears to be complete fusion on the right side and partial fusion with pseudoarthrosis on the left side.  Physical examination indicates sacroiliac joint pain. Excellent relief from bilateral SIJ injections.  Also with what appears to be cervicogenic headaches.    1.  Schedule  for bilateral sacroiliac joint steroid injections.  2.  Cervical MRI with and without contrast to rule out myelitis as per radiology recommendations.  3.  Return to clinic after sacroiliac joint steroid injections.  At that time we will discuss efficacy of injections and may consider cervical medial branch blocks/RFA if needed.

## 2022-01-03 ENCOUNTER — TELEPHONE (OUTPATIENT)
Dept: PAIN MEDICINE | Facility: CLINIC | Age: 57
End: 2022-01-03
Payer: COMMERCIAL

## 2022-01-03 DIAGNOSIS — M46.1 SACROILIITIS: Primary | ICD-10-CM

## 2022-01-03 NOTE — TELEPHONE ENCOUNTER
RAJENDRAM and sent Correlor msg asking pt to contact clinic to discuss scheduling bilat SIJ- phone number included.

## 2022-01-03 NOTE — TELEPHONE ENCOUNTER
Ms. Moralez would like to schedule the bilat SIJ on 1/7/22.  Reviewed pre-procedure instructions with her, as well as provided arrival time of 1400p.  All questions answered- verbalized understanding.

## 2022-01-03 NOTE — TELEPHONE ENCOUNTER
----- Message from Jeremy Powell Jr., MD sent at 12/29/2021  7:32 AM CST -----  Please schedule her for bilateral sacroiliac joint steroid injections.  Follow up in clinic after these procedures to discuss results.  May consider cervical medial branch blocks/RFA afterwards.  Also, please help to schedule cervical MRI with and without contrast.  Thank you.

## 2022-01-04 ENCOUNTER — PATIENT MESSAGE (OUTPATIENT)
Dept: PAIN MEDICINE | Facility: CLINIC | Age: 57
End: 2022-01-04
Payer: COMMERCIAL

## 2022-01-06 ENCOUNTER — TELEPHONE (OUTPATIENT)
Dept: PAIN MEDICINE | Facility: CLINIC | Age: 57
End: 2022-01-06
Payer: COMMERCIAL

## 2022-01-07 ENCOUNTER — TELEPHONE (OUTPATIENT)
Dept: PAIN MEDICINE | Facility: CLINIC | Age: 57
End: 2022-01-07
Payer: COMMERCIAL

## 2022-01-07 NOTE — TELEPHONE ENCOUNTER
Pt's phone rang with no Vm option.  Called Renan, pt's significant other, and let him know that her procedure for today has been canceled and someone will be calling her next week to r/s. He verbalized understanding and nothing further discussed.

## 2022-01-11 ENCOUNTER — PATIENT MESSAGE (OUTPATIENT)
Dept: PAIN MEDICINE | Facility: CLINIC | Age: 57
End: 2022-01-11
Payer: COMMERCIAL

## 2022-01-11 ENCOUNTER — PATIENT MESSAGE (OUTPATIENT)
Dept: SURGERY | Facility: CLINIC | Age: 57
End: 2022-01-11
Payer: COMMERCIAL

## 2022-01-12 ENCOUNTER — PATIENT MESSAGE (OUTPATIENT)
Dept: SURGERY | Facility: CLINIC | Age: 57
End: 2022-01-12
Payer: COMMERCIAL

## 2022-01-13 ENCOUNTER — TELEPHONE (OUTPATIENT)
Dept: PAIN MEDICINE | Facility: CLINIC | Age: 57
End: 2022-01-13
Payer: COMMERCIAL

## 2022-01-13 NOTE — TELEPHONE ENCOUNTER
Offered MsHerb Naomy date of 1/19/2022 for procedure r/s from 1/7/22- she accepted.  Arrival time of 1315 given and reviewed instructions with her.

## 2022-01-16 ENCOUNTER — LAB VISIT (OUTPATIENT)
Dept: PRIMARY CARE CLINIC | Facility: CLINIC | Age: 57
End: 2022-01-16
Payer: COMMERCIAL

## 2022-01-16 DIAGNOSIS — Z01.818 PRE-OP TESTING: ICD-10-CM

## 2022-01-16 PROCEDURE — U0005 INFEC AGEN DETEC AMPLI PROBE: HCPCS | Performed by: NURSE PRACTITIONER

## 2022-01-16 PROCEDURE — U0003 INFECTIOUS AGENT DETECTION BY NUCLEIC ACID (DNA OR RNA); SEVERE ACUTE RESPIRATORY SYNDROME CORONAVIRUS 2 (SARS-COV-2) (CORONAVIRUS DISEASE [COVID-19]), AMPLIFIED PROBE TECHNIQUE, MAKING USE OF HIGH THROUGHPUT TECHNOLOGIES AS DESCRIBED BY CMS-2020-01-R: HCPCS | Performed by: NURSE PRACTITIONER

## 2022-01-17 LAB
SARS-COV-2 RNA RESP QL NAA+PROBE: NOT DETECTED
SARS-COV-2- CYCLE NUMBER: NORMAL

## 2022-01-18 RX ORDER — ALPRAZOLAM 0.5 MG/1
1 TABLET, ORALLY DISINTEGRATING ORAL ONCE AS NEEDED
Status: CANCELLED | OUTPATIENT
Start: 2022-01-18 | End: 2033-06-16

## 2022-01-19 ENCOUNTER — HOSPITAL ENCOUNTER (OUTPATIENT)
Facility: HOSPITAL | Age: 57
Discharge: HOME | End: 2022-01-19
Attending: PAIN MEDICINE | Admitting: PAIN MEDICINE
Payer: COMMERCIAL

## 2022-01-19 VITALS
HEART RATE: 76 BPM | TEMPERATURE: 98 F | SYSTOLIC BLOOD PRESSURE: 124 MMHG | OXYGEN SATURATION: 96 % | DIASTOLIC BLOOD PRESSURE: 72 MMHG | RESPIRATION RATE: 20 BRPM

## 2022-01-19 DIAGNOSIS — M47.897 OTHER OSTEOARTHRITIS OF SPINE, LUMBOSACRAL REGION: Primary | ICD-10-CM

## 2022-01-19 DIAGNOSIS — M47.817 LUMBOSACRAL SPONDYLOSIS: ICD-10-CM

## 2022-01-19 PROCEDURE — 25500020 PHARM REV CODE 255: Performed by: PAIN MEDICINE

## 2022-01-19 PROCEDURE — 27096 INJECT SACROILIAC JOINT: CPT | Mod: 50,,, | Performed by: PAIN MEDICINE

## 2022-01-19 PROCEDURE — 77002 NEEDLE LOCALIZATION BY XRAY: CPT | Performed by: PAIN MEDICINE

## 2022-01-19 PROCEDURE — 25000003 PHARM REV CODE 250: Performed by: PAIN MEDICINE

## 2022-01-19 PROCEDURE — 63600175 PHARM REV CODE 636 W HCPCS: Performed by: PAIN MEDICINE

## 2022-01-19 PROCEDURE — 27096 INJECT SACROILIAC JOINT: CPT | Mod: 50 | Performed by: PAIN MEDICINE

## 2022-01-19 PROCEDURE — 27096 PR INJECTION,SACROILIAC JOINT: ICD-10-PCS | Mod: 50,,, | Performed by: PAIN MEDICINE

## 2022-01-19 RX ORDER — TRIAMCINOLONE ACETONIDE 40 MG/ML
40 INJECTION, SUSPENSION INTRA-ARTICULAR; INTRAMUSCULAR ONCE
Status: DISCONTINUED | OUTPATIENT
Start: 2022-01-19 | End: 2022-01-19 | Stop reason: SDUPTHER

## 2022-01-19 RX ORDER — BUPIVACAINE HYDROCHLORIDE 2.5 MG/ML
10 INJECTION, SOLUTION EPIDURAL; INFILTRATION; INTRACAUDAL ONCE
Status: COMPLETED | OUTPATIENT
Start: 2022-01-19 | End: 2022-01-19

## 2022-01-19 RX ORDER — TRIAMCINOLONE ACETONIDE 40 MG/ML
40 INJECTION, SUSPENSION INTRA-ARTICULAR; INTRAMUSCULAR ONCE
Status: COMPLETED | OUTPATIENT
Start: 2022-01-19 | End: 2022-01-19

## 2022-01-19 RX ORDER — LIDOCAINE HYDROCHLORIDE 20 MG/ML
10 INJECTION, SOLUTION INFILTRATION; PERINEURAL ONCE
Status: COMPLETED | OUTPATIENT
Start: 2022-01-19 | End: 2022-01-19

## 2022-01-19 RX ORDER — TRIAMCINOLONE ACETONIDE 40 MG/ML
INJECTION, SUSPENSION INTRA-ARTICULAR; INTRAMUSCULAR
Status: DISCONTINUED
Start: 2022-01-19 | End: 2022-01-19 | Stop reason: HOSPADM

## 2022-01-19 RX ORDER — LIDOCAINE HYDROCHLORIDE 20 MG/ML
10 INJECTION, SOLUTION INFILTRATION; PERINEURAL ONCE
Status: DISCONTINUED | OUTPATIENT
Start: 2022-01-19 | End: 2022-01-19 | Stop reason: SDUPTHER

## 2022-01-19 RX ORDER — ALPRAZOLAM 0.5 MG/1
1 TABLET, ORALLY DISINTEGRATING ORAL ONCE AS NEEDED
Status: COMPLETED | OUTPATIENT
Start: 2022-01-19 | End: 2022-01-19

## 2022-01-19 RX ORDER — BUPIVACAINE HYDROCHLORIDE 2.5 MG/ML
10 INJECTION, SOLUTION EPIDURAL; INFILTRATION; INTRACAUDAL ONCE
Status: DISCONTINUED | OUTPATIENT
Start: 2022-01-19 | End: 2022-01-19 | Stop reason: SDUPTHER

## 2022-01-19 RX ORDER — BUPIVACAINE HYDROCHLORIDE 2.5 MG/ML
INJECTION, SOLUTION EPIDURAL; INFILTRATION; INTRACAUDAL
Status: DISCONTINUED
Start: 2022-01-19 | End: 2022-01-19 | Stop reason: HOSPADM

## 2022-01-19 RX ORDER — LIDOCAINE HYDROCHLORIDE 20 MG/ML
INJECTION, SOLUTION INFILTRATION; PERINEURAL
Status: DISCONTINUED
Start: 2022-01-19 | End: 2022-01-19 | Stop reason: HOSPADM

## 2022-01-19 RX ADMIN — ALPRAZOLAM 1 MG: 0.5 TABLET, ORALLY DISINTEGRATING ORAL at 02:01

## 2022-01-19 NOTE — OP NOTE
Sacroiliac Joint Injection under Fluoroscopy  Time-out taken to identify patient and procedure side prior to starting the procedure.   I attest that I have reviewed the patient's home medications prior to the procedure and no contraindication have been identified. I  re-evaluated the patient after the patient was positioned for the procedure in the procedure room immediately before the procedural time-out. The vital signs are current and represent the current state of the patient which has not significantly changed since the preprocedure assessment.               Date of Service: 01/19/2022    PCP: Eric Hernandes Jr, MD    Referring Physician:                                                   PROCEDURE:  bilateral sacroiliac joint injection under fluoroscopy.    REASON FOR PROCEDURE: bilateral sacroiliac joint Sacroiliitis [M46.1]    PHYSICIAN: Jeremy Powell MD  ASSISTANTS: None    MEDICATIONS INJECTED:  Kenalog 20mg and Bupivacaine 0.25% (1.5ml of bupivicaine per side).    LOCAL ANESTHETIC USED: Xylocaine 2% 3ml per site.  SEDATION MEDICATIONS: None    ESTIMATED BLOOD LOSS:  None.    COMPLICATIONS:  None.    TECHNIQUE:   Laying in the prone position, the patient was prepped and draped in the usual sterile fashion using ChloraPrep and fenestrated drape.  The area was determined under fluoroscopy.  Local Xylocaine was injected by raising a wheel and going down to the periosteum using a 27-gauge hypodermic needle.  The 3.5 inch 26-gauge spinal needle was introduce into the bilateral sacroiliac joint.  Negative pressure applied to confirm no intravascular placement.  Omnipaque was injected to confirm placement and to confirm that there was no vascular runoff.  The medication was then injected slowly.  The patient tolerated the procedure well.    PAIN BEFORE THE PROCEDURE:  3/10.    PAIN AFTER THE PROCEDURE: 8/10.    The patient was monitored for approximately 30 minutes after the procedure.  Patient was given post  procedure and discharge instructions to follow at home.  We will see the patient back in two weeks or the patient may call to inform of status. The patient was discharged in a stable condition

## 2022-01-19 NOTE — DISCHARGE SUMMARY
[facility] [place]  Short Stay    Procedure(s) (LRB):  Bilateral Sacroiliac Joint Injections (Bilateral)    OUTCOME: Patient tolerated treatment/procedure well without complication and is now ready for discharge.    DISPOSITION: Home or Self Care    FINAL DIAGNOSIS:  <principal problem not specified>    FOLLOWUP: In clinic    DISCHARGE INSTRUCTIONS:  No discharge procedures on file.       Discharge Diagnosis:Sacroiliitis [M46.1]  Condition on Discharge: Stable.  Diet on Discharge: Same as before.  Activity: as per instruction sheet.  Discharge to: Home with a responsible adult.  Follow up: as per Discharge instructions

## 2022-01-19 NOTE — DISCHARGE INSTRUCTIONS
Home Care Instructions Pain Management:    1. DIET:   You may resume your normal diet today.   2. BATHING:   You may shower with luke warm water.  3. DRESSING:   You may remove your bandage today.   4. ACTIVITY LEVEL:   You may resume your normal activities 24 hrs after your procedure.  5. MEDICATIONS:   You may resume your normal medications today.   6. SPECIAL INSTRUCTIONS:   No heat to the injection site for 24 hrs including, bath or shower, heating pad, moist heat, or hot tubs.    Use ice pack to injection site for any pain or discomfort.  Apply ice packs for 20 minute intervals as needed.   If you have received any sedatives by mouth today you may not drive for 12 hours.    If you have received any sedation through your IV, you may not drive for 24 hrs.     PLEASE CALL YOUR DOCTOR IF:  1. Redness or swelling around the injection site.  2. Fever of 101 degrees  3. Drainage (pus) from the injection site.  4. For any continuous bleeding (some dried blood over the incision is normal.)    FOR EMERGENCIES:   If any unusual problems or difficulties occur during clinic hours, call [phone] or [phone].   Fall Prevention  Millions of people fall every year and injure themselves. You may have had anesthesia or sedation which may increase your risk of falling. You may have health issues that put you at an increased risk of falling.     Here are ways to reduce your risk of falling.  ·   · Make your home safe by keeping walkways clear of objects you may trip over.  · Use non-slip pads under rugs. Do not use area rugs or small throw rugs.  · Use non-slip mats in bathtubs and showers.  · Install handrails and lights on staircases.  · Do not walk in poorly lit areas.  · Do not stand on chairs or wobbly ladders.  · Use caution when reaching overhead or looking upward. This position can cause a loss of balance.  · Be sure your shoes fit properly, have non-slip bottoms and are in good condition.   · Wear shoes both inside and  out. Avoid going barefoot or wearing slippers.  · Be cautious when going up and down stairs, curbs, and when walking on uneven sidewalks.  · If your balance is poor, consider using a cane or walker.  · If your fall was related to alcohol use, stop or limit alcohol intake.   · If your fall was related to use of sleeping medicines, talk to your doctor about this. You may need to reduce your dosage at bedtime if you awaken during the night to go to the bathroom.    · To reduce the need for nighttime bathroom trips:  ¨ Avoid drinking fluids for several hours before going to bed  ¨ Empty your bladder before going to bed  ¨ Men can keep a urinal at the bedside  · Stay as active as you can. Balance, flexibility, strength, and endurance all come from exercise. They all play a role in preventing falls. Ask your healthcare provider which types of activity are right for you.  · Get your vision checked on a regular basis.  · If you have pets, know where they are before you stand up or walk so you don't trip over them.  · Use night lights.

## 2022-01-20 ENCOUNTER — TELEPHONE (OUTPATIENT)
Dept: PAIN MEDICINE | Facility: CLINIC | Age: 57
End: 2022-01-20
Payer: COMMERCIAL

## 2022-01-20 NOTE — TELEPHONE ENCOUNTER
The patient was contacted to schedule a follow up appointment for S/P Bilat SIJ Inj (1/19). The pt requested a virtual visit. The pt also notes that she is having no pain.

## 2022-01-27 ENCOUNTER — PATIENT OUTREACH (OUTPATIENT)
Dept: ADMINISTRATIVE | Facility: OTHER | Age: 57
End: 2022-01-27
Payer: COMMERCIAL

## 2022-01-29 ENCOUNTER — TELEPHONE (OUTPATIENT)
Dept: FAMILY MEDICINE | Facility: CLINIC | Age: 57
End: 2022-01-29
Payer: COMMERCIAL

## 2022-01-31 ENCOUNTER — TELEPHONE (OUTPATIENT)
Dept: FAMILY MEDICINE | Facility: CLINIC | Age: 57
End: 2022-01-31
Payer: COMMERCIAL

## 2022-01-31 NOTE — TELEPHONE ENCOUNTER
Lvm for patient to schedule a appointment with pcp. pcp stated pt needs a office visit. Has been seen since may 2021.

## 2022-02-03 ENCOUNTER — OFFICE VISIT (OUTPATIENT)
Dept: PAIN MEDICINE | Facility: CLINIC | Age: 57
End: 2022-02-03
Payer: COMMERCIAL

## 2022-02-03 DIAGNOSIS — M47.816 LUMBAR SPONDYLOSIS: ICD-10-CM

## 2022-02-03 DIAGNOSIS — M51.36 DDD (DEGENERATIVE DISC DISEASE), LUMBAR: ICD-10-CM

## 2022-02-03 DIAGNOSIS — G44.86 CERVICOGENIC HEADACHE: Primary | ICD-10-CM

## 2022-02-03 PROCEDURE — 99213 OFFICE O/P EST LOW 20 MIN: CPT | Mod: 95,,, | Performed by: REGISTERED NURSE

## 2022-02-03 PROCEDURE — 99213 PR OFFICE/OUTPT VISIT, EST, LEVL III, 20-29 MIN: ICD-10-PCS | Mod: 95,,, | Performed by: REGISTERED NURSE

## 2022-02-03 NOTE — PROGRESS NOTES
Subjective:     Patient ID: Naomy Armstrong is a 56 y.o. female    Chief Complaint: No chief complaint on file.      Referred by: No ref. provider found      HPI:    Disclaimer: This note was generated using voice recognition software.  There may be typographical errors that were missed during proofreading.    The patient location is: work  The chief complaint leading to consultation is: follow-up  Visit type: Virtual visit with audio.  Patient verbally consented for audio visit.  Total time spent with patient: 10 minutes  Each patient to whom he or she provides medical services by telemedicine is:  (1) informed of the relationship between the physician and patient and the respective role of any other health care provider with respect to management of the patient; and (2) notified that he or she may decline to receive medical services by telemedicine and may withdraw from such care at any time.      Interval History NP (02/03/22):    Pt returns today for follow up of bilateral SIJ injections. She reports 100% relief of low back pain. She denies any new or worsening symptoms. She would like to discuss chronic headaches at next visit with Dr. oPwell. She is otherwise well today.     Interval History (12/29/21):    The patient location is:  home  The chief complaint leading to consultation is:  Follow-up  Visit type: Virtual visit with synchronous audio and video  Total time spent with patient:  8 minutes  Each patient to whom he or she provides medical services by telemedicine is:  (1) informed of the relationship between the physician and patient and the respective role of any other health care provider with respect to management of the patient; and (2) notified that he or she may decline to receive medical services by telemedicine and may withdraw from such care at any time.      She returns today for follow up and MRI review.  She reports that she would like to schedule bilateral sacroiliac joint steroid  injections discussed at last visit.  She is still having neck pain and headaches as well and would like to undergo cervical medial branch blocks/RFA, but her back pain is worse at this time.  Otherwise, she denies any changes in the quality or location of her pain.  She denies any new worsening symptoms.        Interval History (8/11/21):    The patient location is:  home  The chief complaint leading to consultation is:  Follow-up  Visit type: Virtual visit with synchronous audio and video  Total time spent with patient:  15 minutes  Each patient to whom he or she provides medical services by telemedicine is:  (1) informed of the relationship between the physician and patient and the respective role of any other health care provider with respect to management of the patient; and (2) notified that he or she may decline to receive medical services by telemedicine and may withdraw from such care at any time.        She returns today for follow up.  She reports that relief from bilateral sacroiliac joint steroid injections has worn off.  She would like repeat sacroiliac joint steroid injections if appropriate.  Patient also has been having headaches chronically.  She has been evaluated thoroughly by Neurology without any specific source for of headache identified.  Suspicion for cervicogenic headaches.  Patient does have pain in the upper cervical region.  The pain will radiate to the occipital and parietal regions of the head.  She denies any pain radiating to the upper extremities.  She denies any associated bowel bladder dysfunction.      Interval History NP (5/5/21):    Pt returns today for follow up of bilateral SIJ injections. She reports at least 75% relief of low back pain. She can now stand for long periods of time without pain or having to sit for 30 minutes. She is able to cook and complete her ADL's with much improvement. Does report continued pain while bending over but it is now manageable. Denies new or  "worsening symptoms.      Interval History (3/31/21):  She returns today for follow up.  She reports that bilateral L3, L4, L5 radiofrequency ablation provided roughly 75% relief but for only 1 month.  She states that this.  Time her pain returned.  Today, she localizes pain across the lower lumbar/lumbosacral region.  The pain does not radiate.  The pain is equal bilaterally.  The pain is worse with activities such as mopping and cleaning.  She denies any associated numbness, tingling, weakness, bowel bladder dysfunction.      Interval History NP (7/16/20):  Pt returns today for follow up and MRI review. She states that her pain in unchanged in quality or location. Denies any new symptoms. No bladder or bowel dysfunction. She reports "missing more days of work than she attends". She is an  at [employer] which includes heavy manual lifting and moving boxes. Her pain is worsened with activity, especially after a shift at work or when she is home cleaning. She reports that gabapentin has helped, but takes 2 pills at night and 1 pill every morning because it makes her too drowsy during the day. She also takes 500mg Naproxen PRN for the pain, maybe every few days. She would like something else for the pain. States she "took valium in the past which helped her relax and get through the day".     Interval History (7/6/20):  She returns today for follow up.  She reports that she continues to have low back/hip pain.  This pain is worse with bending and lifting.  These are movements that are often required as a part of her job.  Overall she denies any changes in the quality or location of the pain since last encounter.  She denies any new symptoms.       Interval History (1/13/20):  She returns today for follow up.  She reports that her neck pain has improved with home exercise program.  She does not feel as though this needs any further attention.  She does state that her low back has been bothering her.  She " localizes pain to the bilateral lower lumbar regions.  The pain does radiate to the bilateral hip regions.  She denies any associated numbness, tingling, weakness, bowel bladder dysfunction.  The pain is constant and worsened with activity.  She states that the pain is typically worse when initiating activity after rest.        Initial Encounter (7/9/19):  Naomy Armstrong is a 56 y.o. female who presents today with chronic neck pain. This pain has been present for years.  No specific inciting event or injury noted. She localizes the pain to the midline cervical region.  The pain does not radiate.  She denies any associated numbness, tingling, weakness, bowel bladder dysfunction.  She has had some radicular pain on the left side in the past this has resolved.  The pain is constant and worsened with activity.  She specifically states that the pain is worse when looking at screens.  She has been treated in the past by Dr. Bhavya Oneill at [facility].   This pain is described in detail below.    Physical Therapy:  Yes, for her left hip.     Non-pharmacologic Treatment:  Rest helps         · TENS?  No    Pain Medications:         · Currently taking:  gabapentin    · Has tried in the past:  Opioids, muscle relaxers, Tylenol, NSAIDs, Amitriptyline, tizanidine, Celebrex, Robaxin    · Has not tried:  SNRIs, topical creams    Blood thinners:  None    Interventional Therapies:   10/8/13- Left RFA C5, C6, C7  9/10/13- Left C5, C6, C7 MBB   8/13/13- C7-T1 IL EMMA  8/19/20 - right L3, L4, L5 radiofrequency ablation - 75% relief for 1 month  9/4/20 - left L3, L4, L5 radiofrequency ablation - 75% relief for 1 month  4/16/21 - bilateral SIJ injections - 75% relief  01/19/21 - bilateral SIJ injections - 100% relief    Relevant Surgeries:  None    Affecting sleep?  Yes    Affecting daily activities? yes    Depressive symptoms? yes          · SI/HI? No    Work status: Employed    Pain Scores:    Best:       1/10  Worst:    10/10  Usually:   10/10  Today:   1/10    Review of Systems   Constitutional: Negative for activity change, appetite change, chills, fatigue, fever and unexpected weight change.   HENT: Negative for hearing loss.    Eyes: Negative for visual disturbance.   Respiratory: Negative for chest tightness and shortness of breath.    Cardiovascular: Negative for chest pain.   Gastrointestinal: Negative for abdominal pain, constipation, diarrhea, nausea and vomiting.   Genitourinary: Negative for difficulty urinating.   Musculoskeletal: Positive for neck pain and neck stiffness. Negative for arthralgias, back pain, gait problem and myalgias.   Skin: Negative for rash.   Neurological: Positive for headaches. Negative for dizziness, weakness, light-headedness and numbness.   Psychiatric/Behavioral: Negative for hallucinations, sleep disturbance and suicidal ideas. The patient is not nervous/anxious.        Past Medical History:   Diagnosis Date    Alcohol abuse     per chart, but patient denies today and cocaine per chart    Anxiety     Colon polyp     Depression     Hallucination     last couple of months started seeing someone standing in her room, saw boyfriend's reflection in glass    Headache     History of psychiatric hospitalization     age 13 [facility] for 2 months for acting out; 2018 for SI    Hx of psychiatric care     Hypertension     Psychiatric problem     Sleep difficulties     Suicide attempt     with knife by cutting stomach    Therapy        Past Surgical History:   Procedure Laterality Date    COLONOSCOPY N/A 5/7/2021    Procedure: COLONOSCOPY;  Surgeon: Prem Montana MD;  Location: [facility];  Service: Endoscopy;  Laterality: N/A;  COVID screening 5/4 LAPC-instr portal -ml    EPIDURAL STEROID INJECTION Bilateral 8/5/2020    Procedure: Bilateral MBB @ L3, L4, L5;  Surgeon: Jeremy Powell Jr., MD;  Location: [facility];  Service: Pain Management;  Laterality: Bilateral;  Bilat L3-5  MBB  Arrive @ 1315; No ATC or DM; Needs MD signature    EPIDURAL STEROID INJECTION Right 8/19/2020    Procedure: Lumbar Radiofrequency Thermocoagulation of Medial Branches;  Surgeon: Jeremy Powell Jr., MD;  Location: [facility];  Service: Pain Management;  Laterality: Right;  Right L3-5 RFA  Arrive @ 1045; No ATC or DM; Needs MD Signature    EPIDURAL STEROID INJECTION Left 9/4/2020    Procedure: Lumbar Radiofrequency Thermocoagulation of Medial Branches;  Surgeon: Jeremy Powell Jr., MD;  Location: [facility];  Service: Pain Management;  Laterality: Left;  Left L3-5 RFA  Arrive @ 0900 (requested); No ATC or DM; Needs MD signature    EPIDURAL STEROID INJECTION Bilateral 4/16/2021    Procedure: Sacroiliac Joint Steroid Injections @ Bilateral;  Surgeon: Jeremy Powell Jr., MD;  Location: [facility];  Service: Pain Management;  Laterality: Bilateral;  Arrive @ 1300; No ATC or DM    EPIDURAL STEROID INJECTION Bilateral 1/19/2022    Procedure: Bilateral Sacroiliac Joint Injections;  Surgeon: Jeremy Powell Jr., MD;  Location: [facility];  Service: Pain Management;  Laterality: Bilateral;  Arrive @ 1315; No ATC or DM; Needs Consent    ESOPHAGEAL MANOMETRY WITH MEASUREMENT OF IMPEDANCE N/A 10/20/2021    Procedure: MANOMETRY, ESOPHAGUS, WITH IMPEDANCE MEASUREMENT;  Surgeon: Anastacia Odonnell MD;  Location: [facility] ([address] [place] [address]);  Service: Endoscopy;  Laterality: N/A;  Covid test 10/17 [place], instructions sent to [id number]    ESOPHAGOGASTRODUODENOSCOPY N/A 5/7/2021    Procedure: EGD (ESOPHAGOGASTRODUODENOSCOPY);  Surgeon: Prem Montana MD;  Location: [facility];  Service: Endoscopy;  Laterality: N/A;  added on per Dr. NOEL Shepherd       Social History     Socioeconomic History    Marital status:     Number of children: 0   Occupational History    Occupation: Overnight nuvia     Employer: [employer]   Tobacco Use    Smoking status: Former Smoker     Packs/day: 1.00     Quit date: 4/26/2006      Years since quitting: 15.7    Smokeless tobacco: Never Used   Substance and Sexual Activity    Alcohol use: Yes     Comment: occasional    Drug use: Not Currently     Types: Marijuana     Comment: tried at age 14    Sexual activity: Yes     Partners: Male     Comment: going through menopause   Other Topics Concern    Patient feels they ought to cut down on drinking/drug use No    Patient annoyed by others criticizing their drinking/drug use No    Patient has felt bad or guilty about drinking/drug use No    Patient has had a drink/used drugs as an eye opener in the AM No   Social History Narrative     x 2.    Lives with boyfriend.     Works at [employer] as an .    Presently working on Bachelor's degree.    Has associates degree in criminal justice.     Social Determinants of Health     Financial Resource Strain: High Risk    Difficulty of Paying Living Expenses: Hard   Food Insecurity: Unknown    Worried About Running Out of Food in the Last Year: Patient refused    Ran Out of Food in the Last Year: Never true   Transportation Needs: No Transportation Needs    Lack of Transportation (Medical): No    Lack of Transportation (Non-Medical): No   Physical Activity: Inactive    Days of Exercise per Week: 0 days    Minutes of Exercise per Session: 0 min   Stress: No Stress Concern Present    Feeling of Stress : Only a little   Social Connections: Unknown    Frequency of Communication with Friends and Family: More than three times a week    Frequency of Social Gatherings with Friends and Family: Patient refused    Active Member of Clubs or Organizations: Yes    Attends Club or Organization Meetings: 1 to 4 times per year    Marital Status:    Housing Stability: High Risk    Unable to Pay for Housing in the Last Year: Yes    Number of Places Lived in the Last Year: 1    Unstable Housing in the Last Year: No       Review of patient's allergies indicates:   Allergen  Reactions    Effexor [venlafaxine]      Elevated her blood pressure    Zoloft [sertraline]     Paroxetine hcl      Made her feel drunk       Current Outpatient Medications on File Prior to Visit   Medication Sig Dispense Refill    amLODIPine (NORVASC) 5 MG tablet Take 1 tablet (5 mg total) by mouth once daily. 90 tablet 0    dicyclomine (BENTYL) 20 mg tablet Take 1 tablet (20 mg total) by mouth 4 (four) times daily. 120 tablet 1    ergocalciferol (ERGOCALCIFEROL) 50,000 unit Cap Take 1 capsule (50,000 Units total) by mouth every 7 days. 12 capsule 0    fluconazole (DIFLUCAN) 150 MG Tab 1 tablet at symptom onset.  Repeat if not better in 3 days. 2 tablet 0    FLUoxetine 20 MG capsule Take 1 capsule (20 mg total) by mouth once daily. Take in addition to 40 mg for a total of 60mg daily. 90 capsule 3    FLUoxetine 40 MG capsule Take 1 capsule (40 mg total) by mouth once daily. Take in addition to 20 mg for a total of 60mg daily. 90 capsule 3    gabapentin (NEURONTIN) 300 MG capsule Take 2 capsules (600 mg total) by mouth 3 (three) times daily. 180 capsule 11    hydrOXYzine HCL (ATARAX) 10 MG Tab Take 1 tablet (10 mg total) by mouth 2 (two) times daily as needed (anxiety). 60 tablet 3    lisinopriL (PRINIVIL,ZESTRIL) 20 MG tablet Take 1 tablet (20 mg total) by mouth once daily. 90 tablet 3    loperamide (IMODIUM) 2 mg capsule Take 2 mg by mouth once as needed for Diarrhea.      methylPREDNISolone (MEDROL DOSEPACK) 4 mg tablet use as directed 1 Package 0    multivitamin with minerals tablet Take 1 tablet by mouth once daily.      omeprazole (PRILOSEC) 40 MG capsule Take 1 capsule (40 mg total) by mouth every morning. 90 capsule 3    ondansetron (ZOFRAN) 4 MG tablet Take 1 tablet (4 mg total) by mouth every 12 (twelve) hours as needed for Nausea. 20 tablet 0    promethazine (PHENERGAN) 25 MG tablet Take 1 tablet (25 mg total) by mouth every 6 (six) hours as needed for Nausea. 30 tablet 0     No current  facility-administered medications on file prior to visit.       Objective:      There were no vitals taken for this visit.    Exam:  GEN:  Well developed, well nourished.  No acute distress.  Normal pain behavior.  HEENT:  No trauma.  Mucous membranes moist.  Nares patent bilaterally.  PSYCH: Normal affect. Thought content appropriate.  CHEST:  Breathing symmetric.  No audible wheezing.  ABD: Soft, non-distended.  SKIN:  Warm, pink, dry.  No rash on exposed areas.    EXT:  No cyanosis, clubbing, or edema.  No color change or changes in nail or hair growth.  NEURO/MUSCULOSKELETAL:  Fully alert, oriented, and appropriate. Speech normal indio. No cranial nerve deficits.                 Imaging:        Narrative & Impression    EXAMINATION:  MRI CERVICAL SPINE WITHOUT CONTRAST     CLINICAL HISTORY:  cervical DDD; Spondylosis without myelopathy or radiculopathy, cervical region     TECHNIQUE:  Multiplanar, multisequence MR images of the cervical spine were performed without the administration of contrast.     COMPARISON:  Radiograph 08/27/2020.     FINDINGS:  Alignment: Straightening of cervical spine with slight reversal at the level of C5 through C7.  Minimal degenerative anterolisthesis C4 on C5..     Vertebrae: Normal marrow signal. No fracture.     Discs: Disc space narrowing C5-C6-C7 level.     Cord: At C3 through C6 level, STIR images, the cervical cord appears slightly indistinct and of minimally increased signal intensity.  This may be artifactual in nature is a cannot be confirmed on the axial or sagittal T2 weighted sequences with certainty.  Postcontrast repeat examination may be indicated to exclude subtle intrinsic cord finding at this level..     Skull base and craniocervical junction: Normal.     Degenerative findings:     C2-C3: There is no focal disc herniation.No significant central canal narrowing.  No significant neural foraminal narrowing.     C3-C4: There is no focal disc herniation.No  significant central canal narrowing.  No significant neural foraminal narrowing.     C4-C5: There is no focal disc herniation.No significant central canal narrowing.  Moderate RIGHT neural foraminal narrowing.     C5-C6: There is no focal disc herniation.No significant central canal narrowing.  Moderate RIGHT and mild LEFT neural foraminal narrowing.     C6-C7: There is no focal disc herniation.No significant central canal narrowing.  Moderate LEFT neural foraminal narrowing.     C7-T1: There is no focal disc herniation.No significant central canal narrowing.  No significant neural foraminal narrowing.     T1-T2:     Paraspinal muscles & soft tissues: Unremarkable.     Impression:     Degenerative spondylosis as above predominantly C4-C5 through C6-C7.     Likely artifact identified level of the cord C3 through C6 STIR images only although at of abundance of caution, repeat examination with postcontrast MRI may be helpful to exclude underlying myelitis.     This report was flagged in Epic as abnormal.        Electronically signed by: Jerzy Rose MD  Date:                                            10/28/2021  Time:                                           06:44           Narrative & Impression    EXAMINATION:  XR CERVICAL SPINE 5 VIEW WITH FLEX AND EXT     CLINICAL HISTORY:  Headache     TECHNIQUE:  Five views of the cervical spine plus flexion and extension views were performed.     COMPARISON:  None 07/09/2019.     FINDINGS:  Mild DJD.  The disc spaces are narrowed between C5 and C7 vertebral segments.  Encroachment of the neural foramina identified at the C5-C6 level on the right and the C6/C7 level on the left.  There is a 2 mm C4/C5 anterolisthesis.  No fracture or dislocation.  No bone destruction identified.     Impression:     See above        Electronically signed by: Kaiser Ruiz MD  Date:                                            08/27/2020  Time:                                           14:12                Narrative & Impression     EXAMINATION:  MRI LUMBAR SPINE WITHOUT CONTRAST     CLINICAL HISTORY:  lumbar spinal stenosis; Other intervertebral disc degeneration, lumbar region     TECHNIQUE:  Multiplanar, multisequence MR images were acquired from the thoracolumbar junction to the sacrum without the administration of contrast.     COMPARISON:  Non plain film examination 01/13/2020 e.     FINDINGS:  Lumbar spine alignment is within normal limits. The vertebral body heights are well maintained, with no fracture.  No marrow signal abnormality suspicious for an infiltrative process.     The conus is normal in appearance, and terminates at the L1-L2 level.  The adjacent soft tissue structures show no significant abnormalities.     L1-L2: There is no focal disc herniation. No significant central canal or neural foraminal narrowing.     L2-L3: There is no focal disc herniation. No significant central canal or neural foraminal narrowing.     L3-L4: There is no focal disc herniation. No significant central canal or neural foraminal narrowing.     L4-L5: There is no focal disc herniation. No significant central canal or neural foraminal narrowing.     L5-S1: There is no focal disc herniation. No significant central canal or neural foraminal narrowing.     Impression:     No significant central canal stenosis, focal protrusion of disc material or neural foraminal encroachment.        Electronically signed by: Jerzy Rose MD  Date:                                            07/14/2020  Time:                                           14:15         Narrative & Impression    EXAMINATION:  XR LUMBAR SPINE AP AND LAT WITH FLEX/EXT     CLINICAL HISTORY:  Other intervertebral disc degeneration, lumbar region     TECHNIQUE:  AP and lateral views as well as lateral flexion and extension images are performed through the lumbar spine.     COMPARISON:  None     FINDINGS:  Mild DJD and lumbar scoliosis.  No significant  disc space narrowing identified.  No fracture, spondylolisthesis or bone destruction noted.  Spina bifida occulta involving the S1 vertebral segment noted.     Impression:     See above        Electronically signed by: Kaiser Ruiz MD  Date:                                            01/13/2020  Time:                                           09:22    Encounter    View Encounter                     Narrative     No significant alignment abnormality.  Vertebral body heights are normally maintained, without compression deformity at any level.  There is disc narrowing at C5-6 and C6-7, and each of these levels demonstrates posterior marginal vertebral endplate   spurring and accompanying disc bulging.  The spurring is more pronounced to the right of midline at C5-6 and to the left of midline at C6-7.  All other cervical and visualized upper thoracic levels have normal disc contour, and there is no evidence of a   significant focal disc herniation at any level.  AP diameter of the spinal canal is normally maintained at all levels with no canal stenosis/extrinsic cord compression observed.  The spinal cord is well delineated from the cervicomedullary junction to   the T3 level, and appears normal in size and configuration without focal enlargement or irregularity.  No Chiari malformation or cord cyst or syrinx.  No abnormal signal from the substance of the cord on any of the pulse sequences generated.  There is   some prominent neural foraminal stenosis at C5-6 on the right and C6-7 on the left.  No significant signal abnormality referable to the osseous structures.   Impression      Cervical spine MRI examination demonstrates degenerative changes as discussed above involving the C5-6 and C6-7 levels, with right-sided foraminal stenosis at C5-6 and left-sided foraminal stenosis at C6-7.  No evidence of a significant focal soft is   herniation, canal stenosis/extrinsic cord compression, or intramedullary cord lesion seen  at any level.      Electronically signed by: Kaiser Preston MD  Date: 08/01/13  Time: 06:40          Assessment:       Encounter Diagnoses   Name Primary?    Cervicogenic headache Yes    Lumbar spondylosis     DDD (degenerative disc disease), lumbar          Plan:       Diagnoses and all orders for this visit:    Cervicogenic headache    Lumbar spondylosis    DDD (degenerative disc disease), lumbar        Naomy Ana Armstrong is a 56 y.o. female with chronic low back pain.  Some relief with lumbar radiofrequency ablations but for only 1 month.  I suspect that a secondary etiology  is causing her current pain.  Patient does have transitional lumbosacral anatomy with what appears to be complete fusion on the right side and partial fusion with pseudoarthrosis on the left side.  Physical examination indicates sacroiliac joint pain. Excellent relief from bilateral SIJ injections.  Also with what appears to be cervicogenic headaches. Excellent relief from bilateral SIJs.       1.  Return to clinic in 3 months or sooner if needed.         The above plan and management options were discussed at length with patient. Patient is in agreement with the above and verbalized understanding.    GERMAINE Howell, FNP-C  [facility]  Interventional Pain Management

## 2022-02-07 DIAGNOSIS — K44.9 HIATAL HERNIA: Primary | ICD-10-CM

## 2022-02-07 DIAGNOSIS — K21.9 GASTROESOPHAGEAL REFLUX DISEASE WITHOUT ESOPHAGITIS: ICD-10-CM

## 2022-02-09 ENCOUNTER — LAB VISIT (OUTPATIENT)
Dept: LAB | Facility: HOSPITAL | Age: 57
End: 2022-02-09
Attending: SURGERY
Payer: COMMERCIAL

## 2022-02-09 ENCOUNTER — OFFICE VISIT (OUTPATIENT)
Dept: FAMILY MEDICINE | Facility: CLINIC | Age: 57
End: 2022-02-09
Payer: COMMERCIAL

## 2022-02-09 VITALS
RESPIRATION RATE: 18 BRPM | DIASTOLIC BLOOD PRESSURE: 82 MMHG | SYSTOLIC BLOOD PRESSURE: 124 MMHG | HEART RATE: 87 BPM | WEIGHT: 186.75 LBS | HEIGHT: 65 IN | OXYGEN SATURATION: 97 % | TEMPERATURE: 98 F | BODY MASS INDEX: 31.11 KG/M2

## 2022-02-09 DIAGNOSIS — Z01.818 PRE-OP EXAMINATION: Primary | ICD-10-CM

## 2022-02-09 DIAGNOSIS — K44.9 HIATAL HERNIA: ICD-10-CM

## 2022-02-09 DIAGNOSIS — K21.9 GASTROESOPHAGEAL REFLUX DISEASE WITHOUT ESOPHAGITIS: ICD-10-CM

## 2022-02-09 DIAGNOSIS — F33.41 MAJOR DEPRESSIVE DISORDER, RECURRENT EPISODE, IN PARTIAL REMISSION: ICD-10-CM

## 2022-02-09 LAB
ANION GAP SERPL CALC-SCNC: 10 MMOL/L (ref 8–16)
BASOPHILS # BLD AUTO: 0.03 K/UL (ref 0–0.2)
BASOPHILS NFR BLD: 0.3 % (ref 0–1.9)
BUN SERPL-MCNC: 20 MG/DL (ref 6–20)
CALCIUM SERPL-MCNC: 10.5 MG/DL (ref 8.7–10.5)
CHLORIDE SERPL-SCNC: 104 MMOL/L (ref 95–110)
CO2 SERPL-SCNC: 24 MMOL/L (ref 23–29)
CREAT SERPL-MCNC: 0.8 MG/DL (ref 0.5–1.4)
DIFFERENTIAL METHOD: ABNORMAL
EOSINOPHIL # BLD AUTO: 0.1 K/UL (ref 0–0.5)
EOSINOPHIL NFR BLD: 0.8 % (ref 0–8)
ERYTHROCYTE [DISTWIDTH] IN BLOOD BY AUTOMATED COUNT: 15.9 % (ref 11.5–14.5)
EST. GFR  (AFRICAN AMERICAN): >60 ML/MIN/1.73 M^2
EST. GFR  (NON AFRICAN AMERICAN): >60 ML/MIN/1.73 M^2
GLUCOSE SERPL-MCNC: 87 MG/DL (ref 70–110)
HCT VFR BLD AUTO: 42.1 % (ref 37–48.5)
HGB BLD-MCNC: 12.7 G/DL (ref 12–16)
IMM GRANULOCYTES # BLD AUTO: 0.01 K/UL (ref 0–0.04)
IMM GRANULOCYTES NFR BLD AUTO: 0.1 % (ref 0–0.5)
LYMPHOCYTES # BLD AUTO: 2.2 K/UL (ref 1–4.8)
LYMPHOCYTES NFR BLD: 25.1 % (ref 18–48)
MCH RBC QN AUTO: 25.6 PG (ref 27–31)
MCHC RBC AUTO-ENTMCNC: 30.2 G/DL (ref 32–36)
MCV RBC AUTO: 85 FL (ref 82–98)
MONOCYTES # BLD AUTO: 0.6 K/UL (ref 0.3–1)
MONOCYTES NFR BLD: 7.1 % (ref 4–15)
NEUTROPHILS # BLD AUTO: 5.8 K/UL (ref 1.8–7.7)
NEUTROPHILS NFR BLD: 66.6 % (ref 38–73)
NRBC BLD-RTO: 0 /100 WBC
PLATELET # BLD AUTO: 323 K/UL (ref 150–450)
PMV BLD AUTO: 9.4 FL (ref 9.2–12.9)
POTASSIUM SERPL-SCNC: 5.1 MMOL/L (ref 3.5–5.1)
RBC # BLD AUTO: 4.97 M/UL (ref 4–5.4)
SODIUM SERPL-SCNC: 138 MMOL/L (ref 136–145)
WBC # BLD AUTO: 8.7 K/UL (ref 3.9–12.7)

## 2022-02-09 PROCEDURE — 93005 ELECTROCARDIOGRAM TRACING: CPT | Mod: S$GLB,,, | Performed by: NURSE PRACTITIONER

## 2022-02-09 PROCEDURE — 1160F PR REVIEW ALL MEDS BY PRESCRIBER/CLIN PHARMACIST DOCUMENTED: ICD-10-PCS | Mod: CPTII,S$GLB,, | Performed by: NURSE PRACTITIONER

## 2022-02-09 PROCEDURE — 36415 COLL VENOUS BLD VENIPUNCTURE: CPT | Performed by: SURGERY

## 2022-02-09 PROCEDURE — 93010 EKG 12-LEAD: ICD-10-PCS | Mod: S$GLB,,, | Performed by: INTERNAL MEDICINE

## 2022-02-09 PROCEDURE — 93005 EKG 12-LEAD: ICD-10-PCS | Mod: S$GLB,,, | Performed by: NURSE PRACTITIONER

## 2022-02-09 PROCEDURE — 99214 PR OFFICE/OUTPT VISIT, EST, LEVL IV, 30-39 MIN: ICD-10-PCS | Mod: S$GLB,,, | Performed by: NURSE PRACTITIONER

## 2022-02-09 PROCEDURE — 99999 PR PBB SHADOW E&M-EST. PATIENT-LVL IV: CPT | Mod: PBBFAC,,, | Performed by: NURSE PRACTITIONER

## 2022-02-09 PROCEDURE — 93010 ELECTROCARDIOGRAM REPORT: CPT | Mod: S$GLB,,, | Performed by: INTERNAL MEDICINE

## 2022-02-09 PROCEDURE — 1159F PR MEDICATION LIST DOCUMENTED IN MEDICAL RECORD: ICD-10-PCS | Mod: CPTII,S$GLB,, | Performed by: NURSE PRACTITIONER

## 2022-02-09 PROCEDURE — 3008F BODY MASS INDEX DOCD: CPT | Mod: CPTII,S$GLB,, | Performed by: NURSE PRACTITIONER

## 2022-02-09 PROCEDURE — 1159F MED LIST DOCD IN RCRD: CPT | Mod: CPTII,S$GLB,, | Performed by: NURSE PRACTITIONER

## 2022-02-09 PROCEDURE — 3008F PR BODY MASS INDEX (BMI) DOCUMENTED: ICD-10-PCS | Mod: CPTII,S$GLB,, | Performed by: NURSE PRACTITIONER

## 2022-02-09 PROCEDURE — 3079F DIAST BP 80-89 MM HG: CPT | Mod: CPTII,S$GLB,, | Performed by: NURSE PRACTITIONER

## 2022-02-09 PROCEDURE — 80048 BASIC METABOLIC PNL TOTAL CA: CPT | Performed by: SURGERY

## 2022-02-09 PROCEDURE — 99214 OFFICE O/P EST MOD 30 MIN: CPT | Mod: S$GLB,,, | Performed by: NURSE PRACTITIONER

## 2022-02-09 PROCEDURE — 1160F RVW MEDS BY RX/DR IN RCRD: CPT | Mod: CPTII,S$GLB,, | Performed by: NURSE PRACTITIONER

## 2022-02-09 PROCEDURE — 99999 PR PBB SHADOW E&M-EST. PATIENT-LVL IV: ICD-10-PCS | Mod: PBBFAC,,, | Performed by: NURSE PRACTITIONER

## 2022-02-09 PROCEDURE — 3074F SYST BP LT 130 MM HG: CPT | Mod: CPTII,S$GLB,, | Performed by: NURSE PRACTITIONER

## 2022-02-09 PROCEDURE — 3074F PR MOST RECENT SYSTOLIC BLOOD PRESSURE < 130 MM HG: ICD-10-PCS | Mod: CPTII,S$GLB,, | Performed by: NURSE PRACTITIONER

## 2022-02-09 PROCEDURE — 85025 COMPLETE CBC W/AUTO DIFF WBC: CPT | Performed by: SURGERY

## 2022-02-09 PROCEDURE — 3079F PR MOST RECENT DIASTOLIC BLOOD PRESSURE 80-89 MM HG: ICD-10-PCS | Mod: CPTII,S$GLB,, | Performed by: NURSE PRACTITIONER

## 2022-02-09 NOTE — PROGRESS NOTES
Routine Office Visit    Patient Name: Naomy Armstrong    : 1965  MRN: [id number]    Chief Complaint:  Preop exam    Subjective:  Naomy is a 56 y.o. female who presents today for:    1. Preop exam - patient who is known to me reports today for evaluation.  She is scheduled for a hiatal hernia repair on  and needs clearance.  She denies any problems with anesthesia in the past and does not take any blood thinners.  Denies any history of stroke, mi, CHF, heart disease.  She does not smoke.  She has been dieting and has lost around 20 lb and feels great because of this.  She can walk up multiple flights of steps without chest pain or shortness of breath.  She did have a normal echo and stress test  of last year and denies any problems since.  She did have some lab work done at her preop appointment today.  She is without any new or acute concerns.    Past Medical History  Past Medical History:   Diagnosis Date    Alcohol abuse     per chart, but patient denies today and cocaine per chart    Anxiety     Colon polyp     Depression     Hallucination     last couple of months started seeing someone standing in her room, saw boyfriend's reflection in glass    Headache     History of psychiatric hospitalization     age 13 [facility] for 2 months for acting out;  for SI    Hx of psychiatric care     Hypertension     Psychiatric problem     Sleep difficulties     Suicide attempt     with knife by cutting stomach    Therapy        Past Surgical History  Past Surgical History:   Procedure Laterality Date    COLONOSCOPY N/A 2021    Procedure: COLONOSCOPY;  Surgeon: Prem Montana MD;  Location: [facility];  Service: Endoscopy;  Laterality: N/A;  COVID screening  [facility]-instr portal -ml    EPIDURAL STEROID INJECTION Bilateral 2020    Procedure: Bilateral MBB @ L3, L4, L5;  Surgeon: Jeremy Powell Jr., MD;  Location: [facility];  Service: Pain Management;  Laterality:  Bilateral;  Bilat L3-5 MBB  Arrive @ 1315; No ATC or DM; Needs MD signature    EPIDURAL STEROID INJECTION Right 8/19/2020    Procedure: Lumbar Radiofrequency Thermocoagulation of Medial Branches;  Surgeon: Jeremy Powell Jr., MD;  Location: [facility];  Service: Pain Management;  Laterality: Right;  Right L3-5 RFA  Arrive @ 1045; No ATC or DM; Needs MD Signature    EPIDURAL STEROID INJECTION Left 9/4/2020    Procedure: Lumbar Radiofrequency Thermocoagulation of Medial Branches;  Surgeon: Jeremy Powell Jr., MD;  Location: [facility];  Service: Pain Management;  Laterality: Left;  Left L3-5 RFA  Arrive @ 0900 (requested); No ATC or DM; Needs MD signature    EPIDURAL STEROID INJECTION Bilateral 4/16/2021    Procedure: Sacroiliac Joint Steroid Injections @ Bilateral;  Surgeon: Jeremy Powell Jr., MD;  Location: [facility];  Service: Pain Management;  Laterality: Bilateral;  Arrive @ 1300; No ATC or DM    EPIDURAL STEROID INJECTION Bilateral 1/19/2022    Procedure: Bilateral Sacroiliac Joint Injections;  Surgeon: Jeremy Powell Jr., MD;  Location: [facility];  Service: Pain Management;  Laterality: Bilateral;  Arrive @ 1315; No ATC or DM; Needs Consent    ESOPHAGEAL MANOMETRY WITH MEASUREMENT OF IMPEDANCE N/A 10/20/2021    Procedure: MANOMETRY, ESOPHAGUS, WITH IMPEDANCE MEASUREMENT;  Surgeon: Anastacia Odonnell MD;  Location: [facility] ([address] [place] [address]);  Service: Endoscopy;  Laterality: N/A;  Covid test 10/17 [place], instructions sent to [id number]    ESOPHAGOGASTRODUODENOSCOPY N/A 5/7/2021    Procedure: EGD (ESOPHAGOGASTRODUODENOSCOPY);  Surgeon: Prem Montana MD;  Location: [facility];  Service: Endoscopy;  Laterality: N/A;  added on per Dr. NOEL Shepherd       Family History  Family History   Problem Relation Age of Onset    Anxiety disorder Sister     Depression Sister     Alcohol abuse Maternal Uncle     Alcohol abuse Maternal Grandfather        Social History  Social History     Socioeconomic  History    Marital status:     Number of children: 0   Occupational History    Occupation:      Employer: [employer]   Tobacco Use    Smoking status: Former Smoker     Packs/day: 1.00     Quit date: 4/26/2006     Years since quitting: 15.8    Smokeless tobacco: Never Used   Substance and Sexual Activity    Alcohol use: Yes     Comment: occasional    Drug use: Not Currently     Types: Marijuana     Comment: tried at age 14    Sexual activity: Yes     Partners: Male     Comment: going through menopause   Other Topics Concern    Patient feels they ought to cut down on drinking/drug use No    Patient annoyed by others criticizing their drinking/drug use No    Patient has felt bad or guilty about drinking/drug use No    Patient has had a drink/used drugs as an eye opener in the AM No   Social History Narrative     x 2.    Lives with boyfriend.     Works at [employer] as an .    Presently working on Bachelor's degree.    Has associates degree in criminal justice.     Social Determinants of Health     Financial Resource Strain: High Risk    Difficulty of Paying Living Expenses: Hard   Food Insecurity: Food Insecurity Present    Worried About Running Out of Food in the Last Year: Sometimes true    Ran Out of Food in the Last Year: Sometimes true   Transportation Needs: No Transportation Needs    Lack of Transportation (Medical): No    Lack of Transportation (Non-Medical): No   Physical Activity: Inactive    Days of Exercise per Week: 0 days    Minutes of Exercise per Session: 0 min   Stress: No Stress Concern Present    Feeling of Stress : Only a little   Social Connections: Unknown    Frequency of Communication with Friends and Family: Twice a week    Frequency of Social Gatherings with Friends and Family: Patient refused    Active Member of Clubs or Organizations: Yes    Attends Club or Organization Meetings: 1 to 4 times per year    Marital Status:     Housing Stability: High Risk    Unable to Pay for Housing in the Last Year: Yes    Number of Places Lived in the Last Year: 1    Unstable Housing in the Last Year: No       Current Medications  Current Outpatient Medications on File Prior to Visit   Medication Sig Dispense Refill    amLODIPine (NORVASC) 5 MG tablet Take 1 tablet (5 mg total) by mouth once daily. 90 tablet 0    dicyclomine (BENTYL) 20 mg tablet Take 1 tablet (20 mg total) by mouth 4 (four) times daily. 120 tablet 1    ergocalciferol (ERGOCALCIFEROL) 50,000 unit Cap Take 1 capsule (50,000 Units total) by mouth every 7 days. 12 capsule 0    fluconazole (DIFLUCAN) 150 MG Tab 1 tablet at symptom onset.  Repeat if not better in 3 days. 2 tablet 0    FLUoxetine 20 MG capsule Take 1 capsule (20 mg total) by mouth once daily. Take in addition to 40 mg for a total of 60mg daily. 90 capsule 3    FLUoxetine 40 MG capsule Take 1 capsule (40 mg total) by mouth once daily. Take in addition to 20 mg for a total of 60mg daily. 90 capsule 3    hydrOXYzine HCL (ATARAX) 10 MG Tab Take 1 tablet (10 mg total) by mouth 2 (two) times daily as needed (anxiety). 60 tablet 3    lisinopriL (PRINIVIL,ZESTRIL) 20 MG tablet Take 1 tablet (20 mg total) by mouth once daily. 90 tablet 3    loperamide (IMODIUM) 2 mg capsule Take 2 mg by mouth once as needed for Diarrhea.      methylPREDNISolone (MEDROL DOSEPACK) 4 mg tablet use as directed 1 Package 0    multivitamin with minerals tablet Take 1 tablet by mouth once daily.      omeprazole (PRILOSEC) 40 MG capsule Take 1 capsule (40 mg total) by mouth every morning. 90 capsule 3    ondansetron (ZOFRAN) 4 MG tablet Take 1 tablet (4 mg total) by mouth every 12 (twelve) hours as needed for Nausea. 20 tablet 0    promethazine (PHENERGAN) 25 MG tablet Take 1 tablet (25 mg total) by mouth every 6 (six) hours as needed for Nausea. 30 tablet 0    gabapentin (NEURONTIN) 300 MG capsule Take 2 capsules (600 mg total)  "by mouth 3 (three) times daily. 180 capsule 11     No current facility-administered medications on file prior to visit.       Allergies   Review of patient's allergies indicates:   Allergen Reactions    Effexor [venlafaxine]      Elevated her blood pressure    Zoloft [sertraline]     Paroxetine hcl      Made her feel drunk       Review of Systems (Pertinent positives)  Review of Systems   Constitutional: Negative.  Negative for chills, fever and weight loss.   HENT: Negative.    Eyes: Negative.    Respiratory: Negative for cough, hemoptysis, sputum production, shortness of breath and wheezing.    Cardiovascular: Negative.  Negative for chest pain, palpitations, orthopnea and claudication.   Gastrointestinal: Negative.    Genitourinary: Negative.    Musculoskeletal: Negative.    Skin: Negative.    Neurological: Negative.    Endo/Heme/Allergies: Negative.    Psychiatric/Behavioral: Negative.        /82 (BP Location: Right arm, Patient Position: Sitting, BP Method: Medium (Manual))   Pulse 87   Temp 98 °F (36.7 °C) (Oral)   Resp 18   Ht 5' 5" (1.651 m)   Wt 84.7 kg (186 lb 11.7 oz)   SpO2 97%   BMI 31.07 kg/m²     Physical Exam  Vitals reviewed.   Constitutional:       General: She is not in acute distress.     Appearance: Normal appearance. She is not ill-appearing, toxic-appearing or diaphoretic.   HENT:      Head: Normocephalic and atraumatic.   Eyes:      Extraocular Movements: Extraocular movements intact.      Conjunctiva/sclera: Conjunctivae normal.      Pupils: Pupils are equal, round, and reactive to light.   Cardiovascular:      Rate and Rhythm: Normal rate and regular rhythm.      Pulses: Normal pulses.      Heart sounds: Normal heart sounds.   Pulmonary:      Effort: Pulmonary effort is normal.      Breath sounds: Normal breath sounds.   Abdominal:      General: Bowel sounds are normal. There is no distension.      Palpations: Abdomen is soft. There is no mass.      Tenderness: There is no " abdominal tenderness.   Musculoskeletal:         General: No swelling or tenderness. Normal range of motion.   Skin:     General: Skin is warm and dry.      Capillary Refill: Capillary refill takes less than 2 seconds.   Neurological:      General: No focal deficit present.      Mental Status: She is alert and oriented to person, place, and time.   Psychiatric:         Mood and Affect: Mood normal.         Behavior: Behavior normal.          Assessment/Plan:  Naomy Armstrong is a 56 y.o. female who presents today for :    Naomy was seen today for pre-op exam.    Diagnoses and all orders for this visit:    Pre-op examination  -     IN OFFICE EKG 12-LEAD (to Muse)    EKG done in office shows normal sinus rhythm.  RCRI 0.  Reviewed BMP results and CBC results from today.  Patient medically optimized for planned procedure at low cardiac risk.    Major depressive disorder, recurrent episode, in partial remission    No acute concerns.  Stable.    Follow-up as needed.        This office note has been dictated.  This dictation has been generated using M-Modal Fluency Direct dictation; some phonetic errors may occur.   My collaborating physician is Dr. Joseph Garcia.

## 2022-02-11 ENCOUNTER — TELEPHONE (OUTPATIENT)
Dept: SURGERY | Facility: CLINIC | Age: 57
End: 2022-02-11
Payer: COMMERCIAL

## 2022-02-14 ENCOUNTER — HOSPITAL ENCOUNTER (OUTPATIENT)
Facility: HOSPITAL | Age: 57
LOS: 1 days | Discharge: HOME | End: 2022-02-15
Attending: SURGERY | Admitting: SURGERY
Payer: COMMERCIAL

## 2022-02-14 ENCOUNTER — ANESTHESIA EVENT (OUTPATIENT)
Dept: SURGERY | Facility: HOSPITAL | Age: 57
End: 2022-02-14
Payer: COMMERCIAL

## 2022-02-14 ENCOUNTER — ANESTHESIA (OUTPATIENT)
Dept: SURGERY | Facility: HOSPITAL | Age: 57
End: 2022-02-14
Payer: COMMERCIAL

## 2022-02-14 DIAGNOSIS — K44.9 HIATAL HERNIA: ICD-10-CM

## 2022-02-14 LAB
CTP QC/QA: YES
SARS-COV-2 AG RESP QL IA.RAPID: NEGATIVE

## 2022-02-14 PROCEDURE — 63600175 PHARM REV CODE 636 W HCPCS: Performed by: NURSE ANESTHETIST, CERTIFIED REGISTERED

## 2022-02-14 PROCEDURE — 36000711: Performed by: SURGERY

## 2022-02-14 PROCEDURE — 63600175 PHARM REV CODE 636 W HCPCS: Performed by: ANESTHESIOLOGY

## 2022-02-14 PROCEDURE — 36000710: Performed by: SURGERY

## 2022-02-14 PROCEDURE — 37000008 HC ANESTHESIA 1ST 15 MINUTES: Performed by: SURGERY

## 2022-02-14 PROCEDURE — 25000003 PHARM REV CODE 250: Performed by: SURGERY

## 2022-02-14 PROCEDURE — 43281 PR LAP, REPAIR PARAESOPHAGEAL HERNIA, INCL FUNDOPLASTY W/O MESH: ICD-10-PCS | Mod: ,,, | Performed by: SURGERY

## 2022-02-14 PROCEDURE — D9220A PRA ANESTHESIA: ICD-10-PCS | Mod: ,,, | Performed by: NURSE ANESTHETIST, CERTIFIED REGISTERED

## 2022-02-14 PROCEDURE — 43281 LAP PARAESOPHAG HERN REPAIR: CPT | Mod: ,,, | Performed by: SURGERY

## 2022-02-14 PROCEDURE — D9220A PRA ANESTHESIA: ICD-10-PCS | Mod: ,,, | Performed by: ANESTHESIOLOGY

## 2022-02-14 PROCEDURE — 27201423 OPTIME MED/SURG SUP & DEVICES STERILE SUPPLY: Performed by: SURGERY

## 2022-02-14 PROCEDURE — 63600175 PHARM REV CODE 636 W HCPCS

## 2022-02-14 PROCEDURE — 25000003 PHARM REV CODE 250: Performed by: STUDENT IN AN ORGANIZED HEALTH CARE EDUCATION/TRAINING PROGRAM

## 2022-02-14 PROCEDURE — 37000009 HC ANESTHESIA EA ADD 15 MINS: Performed by: SURGERY

## 2022-02-14 PROCEDURE — D9220A PRA ANESTHESIA: Mod: ,,, | Performed by: ANESTHESIOLOGY

## 2022-02-14 PROCEDURE — 94761 N-INVAS EAR/PLS OXIMETRY MLT: CPT

## 2022-02-14 PROCEDURE — D9220A PRA ANESTHESIA: Mod: ,,, | Performed by: NURSE ANESTHETIST, CERTIFIED REGISTERED

## 2022-02-14 PROCEDURE — 71000015 HC POSTOP RECOV 1ST HR: Performed by: SURGERY

## 2022-02-14 PROCEDURE — 71000033 HC RECOVERY, INTIAL HOUR: Performed by: SURGERY

## 2022-02-14 PROCEDURE — 25000003 PHARM REV CODE 250: Performed by: NURSE ANESTHETIST, CERTIFIED REGISTERED

## 2022-02-14 RX ORDER — MIDAZOLAM HYDROCHLORIDE 1 MG/ML
INJECTION, SOLUTION INTRAMUSCULAR; INTRAVENOUS
Status: DISCONTINUED | OUTPATIENT
Start: 2022-02-14 | End: 2022-02-14

## 2022-02-14 RX ORDER — KETAMINE HCL IN 0.9 % NACL 50 MG/5 ML
SYRINGE (ML) INTRAVENOUS
Status: DISCONTINUED | OUTPATIENT
Start: 2022-02-14 | End: 2022-02-14

## 2022-02-14 RX ORDER — ONDANSETRON 2 MG/ML
INJECTION INTRAMUSCULAR; INTRAVENOUS
Status: DISCONTINUED | OUTPATIENT
Start: 2022-02-14 | End: 2022-02-14

## 2022-02-14 RX ORDER — ONDANSETRON 2 MG/ML
8 INJECTION INTRAMUSCULAR; INTRAVENOUS EVERY 8 HOURS PRN
Status: DISCONTINUED | OUTPATIENT
Start: 2022-02-14 | End: 2022-02-15 | Stop reason: HOSPADM

## 2022-02-14 RX ORDER — LIDOCAINE HYDROCHLORIDE 10 MG/ML
1 INJECTION, SOLUTION EPIDURAL; INFILTRATION; INTRACAUDAL; PERINEURAL ONCE
Status: DISCONTINUED | OUTPATIENT
Start: 2022-02-14 | End: 2022-02-15 | Stop reason: HOSPADM

## 2022-02-14 RX ORDER — SODIUM CHLORIDE 0.9 % (FLUSH) 0.9 %
10 SYRINGE (ML) INJECTION
Status: DISCONTINUED | OUTPATIENT
Start: 2022-02-14 | End: 2022-02-15 | Stop reason: HOSPADM

## 2022-02-14 RX ORDER — DEXAMETHASONE SODIUM PHOSPHATE 4 MG/ML
INJECTION, SOLUTION INTRA-ARTICULAR; INTRALESIONAL; INTRAMUSCULAR; INTRAVENOUS; SOFT TISSUE
Status: DISCONTINUED | OUTPATIENT
Start: 2022-02-14 | End: 2022-02-14

## 2022-02-14 RX ORDER — ENOXAPARIN SODIUM 100 MG/ML
40 INJECTION SUBCUTANEOUS EVERY 24 HOURS
Status: DISCONTINUED | OUTPATIENT
Start: 2022-02-15 | End: 2022-02-15 | Stop reason: HOSPADM

## 2022-02-14 RX ORDER — GABAPENTIN 300 MG/1
600 CAPSULE ORAL NIGHTLY
Status: DISCONTINUED | OUTPATIENT
Start: 2022-02-14 | End: 2022-02-15 | Stop reason: HOSPADM

## 2022-02-14 RX ORDER — CEFAZOLIN SODIUM/WATER 2 G/20 ML
SYRINGE (ML) INTRAVENOUS
Status: DISCONTINUED | OUTPATIENT
Start: 2022-02-14 | End: 2022-02-14

## 2022-02-14 RX ORDER — ACETAMINOPHEN 325 MG/1
650 TABLET ORAL EVERY 8 HOURS PRN
Status: DISCONTINUED | OUTPATIENT
Start: 2022-02-14 | End: 2022-02-15 | Stop reason: HOSPADM

## 2022-02-14 RX ORDER — EPHEDRINE SULFATE 50 MG/ML
INJECTION, SOLUTION INTRAVENOUS
Status: DISCONTINUED | OUTPATIENT
Start: 2022-02-14 | End: 2022-02-14

## 2022-02-14 RX ORDER — ONDANSETRON 8 MG/1
8 TABLET, ORALLY DISINTEGRATING ORAL EVERY 8 HOURS PRN
Status: DISCONTINUED | OUTPATIENT
Start: 2022-02-14 | End: 2022-02-14

## 2022-02-14 RX ORDER — TALC
6 POWDER (GRAM) TOPICAL NIGHTLY PRN
Status: DISCONTINUED | OUTPATIENT
Start: 2022-02-14 | End: 2022-02-15 | Stop reason: HOSPADM

## 2022-02-14 RX ORDER — NEOSTIGMINE METHYLSULFATE 0.5 MG/ML
INJECTION, SOLUTION INTRAVENOUS
Status: DISCONTINUED | OUTPATIENT
Start: 2022-02-14 | End: 2022-02-14

## 2022-02-14 RX ORDER — SODIUM CHLORIDE 9 MG/ML
INJECTION, SOLUTION INTRAVENOUS CONTINUOUS
Status: DISCONTINUED | OUTPATIENT
Start: 2022-02-14 | End: 2022-02-15

## 2022-02-14 RX ORDER — LIDOCAINE HYDROCHLORIDE 20 MG/ML
INJECTION INTRAVENOUS
Status: DISCONTINUED | OUTPATIENT
Start: 2022-02-14 | End: 2022-02-14

## 2022-02-14 RX ORDER — VASOPRESSIN 20 [USP'U]/ML
INJECTION, SOLUTION INTRAMUSCULAR; SUBCUTANEOUS
Status: DISCONTINUED | OUTPATIENT
Start: 2022-02-14 | End: 2022-02-14

## 2022-02-14 RX ORDER — ONDANSETRON 2 MG/ML
4 INJECTION INTRAMUSCULAR; INTRAVENOUS DAILY PRN
Status: DISCONTINUED | OUTPATIENT
Start: 2022-02-14 | End: 2022-02-14 | Stop reason: HOSPADM

## 2022-02-14 RX ORDER — ROCURONIUM BROMIDE 10 MG/ML
INJECTION, SOLUTION INTRAVENOUS
Status: DISCONTINUED | OUTPATIENT
Start: 2022-02-14 | End: 2022-02-14

## 2022-02-14 RX ORDER — ACETAMINOPHEN 500 MG
1000 TABLET ORAL EVERY 8 HOURS
Status: DISCONTINUED | OUTPATIENT
Start: 2022-02-14 | End: 2022-02-15 | Stop reason: HOSPADM

## 2022-02-14 RX ORDER — FENTANYL CITRATE 50 UG/ML
INJECTION, SOLUTION INTRAMUSCULAR; INTRAVENOUS
Status: DISCONTINUED | OUTPATIENT
Start: 2022-02-14 | End: 2022-02-14

## 2022-02-14 RX ORDER — FLUOXETINE HYDROCHLORIDE 20 MG/1
40 CAPSULE ORAL NIGHTLY
Status: DISCONTINUED | OUTPATIENT
Start: 2022-02-14 | End: 2022-02-15 | Stop reason: HOSPADM

## 2022-02-14 RX ORDER — OXYCODONE HYDROCHLORIDE 10 MG/1
10 TABLET ORAL EVERY 4 HOURS PRN
Status: DISCONTINUED | OUTPATIENT
Start: 2022-02-14 | End: 2022-02-15 | Stop reason: HOSPADM

## 2022-02-14 RX ORDER — OXYCODONE HYDROCHLORIDE 5 MG/1
5 TABLET ORAL EVERY 4 HOURS PRN
Status: DISCONTINUED | OUTPATIENT
Start: 2022-02-14 | End: 2022-02-15 | Stop reason: HOSPADM

## 2022-02-14 RX ORDER — FENTANYL CITRATE 50 UG/ML
25 INJECTION, SOLUTION INTRAMUSCULAR; INTRAVENOUS EVERY 10 MIN PRN
Status: DISCONTINUED | OUTPATIENT
Start: 2022-02-14 | End: 2022-02-15 | Stop reason: HOSPADM

## 2022-02-14 RX ORDER — PROPOFOL 10 MG/ML
VIAL (ML) INTRAVENOUS
Status: DISCONTINUED | OUTPATIENT
Start: 2022-02-14 | End: 2022-02-14

## 2022-02-14 RX ORDER — SODIUM CHLORIDE 0.9 % (FLUSH) 0.9 %
10 SYRINGE (ML) INJECTION
Status: DISCONTINUED | OUTPATIENT
Start: 2022-02-14 | End: 2022-02-14 | Stop reason: HOSPADM

## 2022-02-14 RX ORDER — FENTANYL CITRATE 50 UG/ML
INJECTION, SOLUTION INTRAMUSCULAR; INTRAVENOUS
Status: COMPLETED
Start: 2022-02-14 | End: 2022-02-14

## 2022-02-14 RX ORDER — BUPIVACAINE HYDROCHLORIDE 2.5 MG/ML
INJECTION, SOLUTION EPIDURAL; INFILTRATION; INTRACAUDAL
Status: DISCONTINUED | OUTPATIENT
Start: 2022-02-14 | End: 2022-02-14 | Stop reason: HOSPADM

## 2022-02-14 RX ORDER — CEFAZOLIN SODIUM/WATER 2 G/20 ML
2 SYRINGE (ML) INTRAVENOUS
Status: CANCELLED | OUTPATIENT
Start: 2022-02-14

## 2022-02-14 RX ADMIN — ACETAMINOPHEN 1000 MG: 500 TABLET ORAL at 03:02

## 2022-02-14 RX ADMIN — EPHEDRINE SULFATE 10 MG: 50 INJECTION INTRAVENOUS at 07:02

## 2022-02-14 RX ADMIN — OXYCODONE HYDROCHLORIDE 10 MG: 10 TABLET ORAL at 11:02

## 2022-02-14 RX ADMIN — ROCURONIUM BROMIDE 20 MG: 10 INJECTION, SOLUTION INTRAVENOUS at 07:02

## 2022-02-14 RX ADMIN — OXYCODONE HYDROCHLORIDE 10 MG: 10 TABLET ORAL at 10:02

## 2022-02-14 RX ADMIN — LIDOCAINE HYDROCHLORIDE 60 MG: 20 INJECTION, SOLUTION INTRAVENOUS at 07:02

## 2022-02-14 RX ADMIN — NEOSTIGMINE METHYLSULFATE 4 MG: 0.5 INJECTION INTRAVENOUS at 08:02

## 2022-02-14 RX ADMIN — GABAPENTIN 600 MG: 300 CAPSULE ORAL at 09:02

## 2022-02-14 RX ADMIN — GLYCOPYRROLATE 0.2 MG: 0.2 INJECTION, SOLUTION INTRAMUSCULAR; INTRAVITREAL at 07:02

## 2022-02-14 RX ADMIN — SODIUM CHLORIDE: 0.9 INJECTION, SOLUTION INTRAVENOUS at 05:02

## 2022-02-14 RX ADMIN — SODIUM CHLORIDE, SODIUM GLUCONATE, SODIUM ACETATE, POTASSIUM CHLORIDE, MAGNESIUM CHLORIDE, SODIUM PHOSPHATE, DIBASIC, AND POTASSIUM PHOSPHATE: .53; .5; .37; .037; .03; .012; .00082 INJECTION, SOLUTION INTRAVENOUS at 07:02

## 2022-02-14 RX ADMIN — Medication 2 G: at 07:02

## 2022-02-14 RX ADMIN — Medication 25 MG: at 07:02

## 2022-02-14 RX ADMIN — DEXAMETHASONE SODIUM PHOSPHATE 8 MG: 4 INJECTION, SOLUTION INTRAMUSCULAR; INTRAVENOUS at 07:02

## 2022-02-14 RX ADMIN — GLYCOPYRROLATE 0.6 MG: 0.2 INJECTION, SOLUTION INTRAMUSCULAR; INTRAVITREAL at 08:02

## 2022-02-14 RX ADMIN — FENTANYL CITRATE 50 MCG: 50 INJECTION, SOLUTION INTRAMUSCULAR; INTRAVENOUS at 07:02

## 2022-02-14 RX ADMIN — VASOPRESSIN 1 UNITS: 20 INJECTION, SOLUTION INTRAMUSCULAR; SUBCUTANEOUS at 07:02

## 2022-02-14 RX ADMIN — SODIUM CHLORIDE: 0.9 INJECTION, SOLUTION INTRAVENOUS at 09:02

## 2022-02-14 RX ADMIN — PROPOFOL 200 MG: 10 INJECTION, EMULSION INTRAVENOUS at 07:02

## 2022-02-14 RX ADMIN — FLUOXETINE 40 MG: 20 CAPSULE ORAL at 09:02

## 2022-02-14 RX ADMIN — MIDAZOLAM HYDROCHLORIDE 2 MG: 1 INJECTION, SOLUTION INTRAMUSCULAR; INTRAVENOUS at 07:02

## 2022-02-14 RX ADMIN — SODIUM CHLORIDE: 0.9 INJECTION, SOLUTION INTRAVENOUS at 07:02

## 2022-02-14 RX ADMIN — OXYCODONE HYDROCHLORIDE 10 MG: 10 TABLET ORAL at 05:02

## 2022-02-14 RX ADMIN — ONDANSETRON HYDROCHLORIDE 4 MG: 2 INJECTION INTRAMUSCULAR; INTRAVENOUS at 08:02

## 2022-02-14 RX ADMIN — OXYCODONE HYDROCHLORIDE 10 MG: 10 TABLET ORAL at 09:02

## 2022-02-14 RX ADMIN — FENTANYL CITRATE 25 MCG: 50 INJECTION INTRAMUSCULAR; INTRAVENOUS at 09:02

## 2022-02-14 RX ADMIN — ROCURONIUM BROMIDE 50 MG: 10 INJECTION, SOLUTION INTRAVENOUS at 07:02

## 2022-02-14 NOTE — BRIEF OP NOTE
[facility] - Surgery ([facility])  Brief Operative Note    Surgery Date: 2/14/2022     Surgeon(s) and Role:     * Christian Martinez MD - Primary     * Ravi Zamudio MD - Resident     Assisting Surgeon: None    Pre-op Diagnosis:  Hiatal hernia [K44.9]  Gastroesophageal reflux disease without esophagitis [K21.9]    Post-op Diagnosis:  Post-Op Diagnosis Codes:     * Hiatal hernia [K44.9]     * Gastroesophageal reflux disease without esophagitis [K21.9]    Procedure(s) (LRB):  REPAIR, HERNIA, HIATAL, LAPAROSCOPIC (N/A)  FUNDOPLICATION, LAPAROSCOPIC, TOUPET (N/A)    Anesthesia: General    Operative Findings:   - Hiatal hernia repair   - Toupet fundoplication     Estimated Blood Loss: * No values recorded between 2/14/2022  7:32 AM and 2/14/2022  8:43 AM *         Specimens:   Specimen (24h ago, onward)            None

## 2022-02-14 NOTE — BRIEF OP NOTE
Operative Note       Surgery Date: 2/14/2022     Surgeon(s) and Role:     * Christian Martinez MD - Primary    Pre-op Diagnosis:  Hiatal hernia [K44.9]  Gastroesophageal reflux disease without esophagitis [K21.9]    Post-op Diagnosis:  Hiatal hernia [K44.9]  Gastroesophageal reflux disease without esophagitis [K21.9]    Procedure(s) (LRB):  REPAIR, HERNIA, HIATAL, LAPAROSCOPIC (N/A)  FUNDOPLICATION, LAPAROSCOPIC, TOUPET (N/A)    Anesthesia: General    Procedure in Detail/Findings:  Type 1 hernia.  2 suture repair/56fr dilator/toupet.    Estimated Blood Loss: Minimal           Specimens (From admission, onward)    None        Implants: * No implants in log *           Disposition: PACU - hemodynamically stable.           Condition: Good    Attestation:  I was present and scrubbed for the entire procedure.

## 2022-02-14 NOTE — OP NOTE
Surgery Date: 2/14/2022     Surgeon(s) and Role:     * Christian Martinez MD - Primary    Pre-op Diagnosis:  Hiatal hernia [K44.9]  Gastroesophageal reflux disease without esophagitis [K21.9]    Post-op Diagnosis:  Hiatal hernia [K44.9]  Gastroesophageal reflux disease without esophagitis [K21.9]    Procedure(s) (LRB):  REPAIR, HERNIA, HIATAL, LAPAROSCOPIC (N/A)  FUNDOPLICATION, LAPAROSCOPIC, TOUPET (N/A)    Code: type 1  Without mesh  Without esophageal lengthening  Without 22 modifier     Anesthesia: General      PROCEDURE IN DETAIL: The patient was placed under general anesthesia on the   marquet bed. The legs were abducted. The abdomen was prepped and draped in the  usual manner. Access to peritoneum was gained 15 cm below the xiphoid using a 10mm  Optiview trocar under direct vision. Pneumoperitoneum to 15 mmHg with CO2 gas   was obtained. Four 5-mm trocars were placed medially, subcostally at 11 and 20   cm on the left side and 7 and 15 cm on the right side. A liver retractor was   placed exposing the esophageal hiatus.  The hernia was small. Using judicial use of the Harmonic   scalpel and blunt dissection the hernia sac was divided from the crura and brought into the abdomen.  It was not resected (from the left of the anterior vagus to the posterior vagus making sure to preserve these strutures and the specimen was removed at the end of the case).  This took 3 minutes longer than the typical hiatal hernia.  The greater curve was taken down with the harmonic scalpel from a third of the way from the LES to the base of the left jacques and taking all posterior attachements.  After dissection 3 to 4 cm of esophagus laying within the abdominal cavity without   tension. The crural opening was small and was reapproximated with 2  0 neurolon plegeted sutures.  A bio a mesh was not sutured over the closure.  The fundus of the stomach was brought around the esophagus posteriorly and we checked for the angle of His  and performed the shoe-shine maneuver.  The fundoplication was performed around a 56 dilator. A Toupet fundoplication was formed around the dilator using 3X2-0 Neurolon sutures on each side making a 270 degree 2 cm wrap.  Care was used not to suture around the vagus nerve.  .  The vagus nerves were preserved with the esophagus.  A grasper could just be placed between the fundoplication and esophagus.  The dilator was removed and the abdomen was inspected for hemostasis. The liver retractor was removed. The trocars were removed under direct vision. Prior to removing the last trocar, pneumoperitoneum was allowed to escape. The skin incisions were closed with 4-0 plain catgut and reinforced with Dermabond. The patient tolerated the procedurewell and was brought to Recovery Room in stable condition. Sponge and needle counts were correct at the end of the case.    Pathology: none Complication: none  Blood loss: minimal

## 2022-02-14 NOTE — ANESTHESIA POSTPROCEDURE EVALUATION
Anesthesia Post Evaluation    Patient: Naomy Armstrong    Procedure(s) Performed: Procedure(s) (LRB):  REPAIR, HERNIA, HIATAL, LAPAROSCOPIC (N/A)  FUNDOPLICATION, LAPAROSCOPIC, TOUPET (N/A)    Final Anesthesia Type: general      Patient location during evaluation: GI PACU  Patient participation: Yes- Able to Participate  Level of consciousness: awake and alert  Post-procedure vital signs: reviewed and stable  Pain management: adequate  Airway patency: patent    PONV status at discharge: No PONV  Anesthetic complications: no      Cardiovascular status: blood pressure returned to baseline  Respiratory status: unassisted  Hydration status: euvolemic  Follow-up not needed.          Vitals Value Taken Time   /62 02/14/22 1002   Temp 36.7 °C (98.1 °F) 02/14/22 0930   Pulse 76 02/14/22 1009   Resp 21 02/14/22 1008   SpO2 96 % 02/14/22 1009   Vitals shown include unvalidated device data.      Event Time   Out of Recovery 09:45:00         Pain/Josemanuel Score: Pain Rating Prior to Med Admin: 8 (2/14/2022  9:35 AM)  Pain Rating Post Med Admin: 6 (2/14/2022 10:13 AM)  Josemanuel Score: 9 (2/14/2022 10:13 AM)

## 2022-02-14 NOTE — H&P
"[facility] - Surgery ([facility])  General Surgery  History & Physical        Subjective:       History of Present Illness:  56 y.o. female presents with hiatal hernia.  She says when she bends over if she has had anything to drink recently it comes right out or if she lays flat she has regurgitation.  She has reflux with heartburn.  She has occasional pain after eating with the sensation the food is stuck.  States she is 5'5" and 195 pounds which makes her bmi 32.4.     She is under treatment and seeing someone for depression and anxiety.     She is a .  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  She has been fairly sedentary and lethargic since she got covid.     GERD Questionnaire      qd PPI  has Typical heartburn  Has severe Regurgitation  denies Dysphagia solids  denies Dysphagia liquids  Has frequent Hoarseness  Has occasional Sore throat  Has Cough at night when laying down  denies Asthma  Has Chest pain all the time despite negative heart work up  denies Water brash  denies Globus  Has frequent Nausea  denies Vomiting       No current facility-administered medications on file prior to encounter.     Current Outpatient Medications on File Prior to Encounter   Medication Sig    amLODIPine (NORVASC) 5 MG tablet Take 1 tablet (5 mg total) by mouth once daily. (Patient taking differently: Take 5 mg by mouth nightly.)    FLUoxetine 20 MG capsule Take 1 capsule (20 mg total) by mouth once daily. Take in addition to 40 mg for a total of 60mg daily. (Patient taking differently: Take 20 mg by mouth every evening. Take in addition to 40 mg for a total of 60mg daily.)    FLUoxetine 40 MG capsule Take 1 capsule (40 mg total) by mouth once daily. Take in addition to 20 mg for a total of 60mg daily. (Patient taking differently: Take 40 mg by mouth nightly. Take in addition to 20 mg for a total of 60mg daily.)    gabapentin (NEURONTIN) 300 MG capsule Take 2 capsules (600 mg total) by mouth 3 (three) times daily. (Patient " taking differently: Take 600 mg by mouth every evening.)    lisinopriL (PRINIVIL,ZESTRIL) 20 MG tablet Take 1 tablet (20 mg total) by mouth once daily. (Patient taking differently: Take 20 mg by mouth nightly.)    loperamide (IMODIUM) 2 mg capsule Take 2 mg by mouth once as needed for Diarrhea.    promethazine (PHENERGAN) 25 MG tablet Take 1 tablet (25 mg total) by mouth every 6 (six) hours as needed for Nausea.    fluconazole (DIFLUCAN) 150 MG Tab 1 tablet at symptom onset.  Repeat if not better in 3 days.    hydrOXYzine HCL (ATARAX) 10 MG Tab Take 1 tablet (10 mg total) by mouth 2 (two) times daily as needed (anxiety).    methylPREDNISolone (MEDROL DOSEPACK) 4 mg tablet use as directed    multivitamin with minerals tablet Take 1 tablet by mouth once daily.    omeprazole (PRILOSEC) 40 MG capsule Take 1 capsule (40 mg total) by mouth every morning. (Patient taking differently: Take 40 mg by mouth nightly.)    ondansetron (ZOFRAN) 4 MG tablet Take 1 tablet (4 mg total) by mouth every 12 (twelve) hours as needed for Nausea.       Review of patient's allergies indicates:   Allergen Reactions    Effexor [venlafaxine]      Elevated her blood pressure    Zoloft [sertraline]     Paroxetine hcl      Made her feel drunk       Past Medical History:   Diagnosis Date    Alcohol abuse     per chart, but patient denies today and cocaine per chart    Anxiety     Colon polyp     Depression     Hallucination     last couple of months started seeing someone standing in her room, saw boyfriend's reflection in glass    Headache     History of psychiatric hospitalization     age 13 [facility] for 2 months for acting out; 2018 for SI    Hx of psychiatric care     Hypertension     Psychiatric problem     Sleep difficulties     Suicide attempt     with knife by cutting stomach    Therapy      Past Surgical History:   Procedure Laterality Date    COLONOSCOPY N/A 5/7/2021    Procedure: COLONOSCOPY;  Surgeon: Prem HAYES  MD Rubén;  Location: [facility];  Service: Endoscopy;  Laterality: N/A;  COVID screening 5/4 LAPC-instr portal -ml    EPIDURAL STEROID INJECTION Bilateral 8/5/2020    Procedure: Bilateral MBB @ L3, L4, L5;  Surgeon: Jeremy Powell Jr., MD;  Location: [facility];  Service: Pain Management;  Laterality: Bilateral;  Bilat L3-5 MBB  Arrive @ 1315; No ATC or DM; Needs MD signature    EPIDURAL STEROID INJECTION Right 8/19/2020    Procedure: Lumbar Radiofrequency Thermocoagulation of Medial Branches;  Surgeon: Jeremy Powell Jr., MD;  Location: [facility];  Service: Pain Management;  Laterality: Right;  Right L3-5 RFA  Arrive @ 1045; No ATC or DM; Needs MD Signature    EPIDURAL STEROID INJECTION Left 9/4/2020    Procedure: Lumbar Radiofrequency Thermocoagulation of Medial Branches;  Surgeon: Jeremy Powell Jr., MD;  Location: [facility];  Service: Pain Management;  Laterality: Left;  Left L3-5 RFA  Arrive @ 0900 (requested); No ATC or DM; Needs MD signature    EPIDURAL STEROID INJECTION Bilateral 4/16/2021    Procedure: Sacroiliac Joint Steroid Injections @ Bilateral;  Surgeon: Jeremy Powell Jr., MD;  Location: [facility];  Service: Pain Management;  Laterality: Bilateral;  Arrive @ 1300; No ATC or DM    EPIDURAL STEROID INJECTION Bilateral 1/19/2022    Procedure: Bilateral Sacroiliac Joint Injections;  Surgeon: Jeremy Powell Jr., MD;  Location: [facility];  Service: Pain Management;  Laterality: Bilateral;  Arrive @ 1315; No ATC or DM; Needs Consent    ESOPHAGEAL MANOMETRY WITH MEASUREMENT OF IMPEDANCE N/A 10/20/2021    Procedure: MANOMETRY, ESOPHAGUS, WITH IMPEDANCE MEASUREMENT;  Surgeon: Anastacia Odonnell MD;  Location: [facility] ([facility]  Service: Endoscopy;  Laterality: N/A;  Covid test 10/17 [place], instructions sent to [facility]    ESOPHAGOGASTRODUODENOSCOPY N/A 5/7/2021    Procedure: EGD (ESOPHAGOGASTRODUODENOSCOPY);  Surgeon: Prem Montana MD;  Location: [facility];   Service: Endoscopy;  Laterality: N/A;  added on per Dr. NOEL Shepherd     Family History     Problem Relation (Age of Onset)    Alcohol abuse Maternal Uncle, Maternal Grandfather    Anxiety disorder Sister    Depression Sister        Tobacco Use    Smoking status: Former Smoker     Packs/day: 1.00     Quit date: 4/26/2006     Years since quitting: 15.8    Smokeless tobacco: Never Used   Substance and Sexual Activity    Alcohol use: Yes     Comment: occasional    Drug use: Not Currently     Types: Marijuana     Comment: tried at age 14    Sexual activity: Yes     Partners: Male     Comment: going through menopause       Objective:     Vital Signs (Most Recent):  Temp: 97.6 °F (36.4 °C) (02/14/22 0607)  Pulse: 64 (02/14/22 0607)  Resp: 18 (02/14/22 0607)  BP: 109/64 (02/14/22 0607)  SpO2: 97 % (02/14/22 0607) Vital Signs (24h Range):  Temp:  [97.6 °F (36.4 °C)] 97.6 °F (36.4 °C)  Pulse:  [64] 64  Resp:  [18] 18  SpO2:  [97 %] 97 %  BP: (109)/(64) 109/64     Weight: 84.4 kg (186 lb)  Body mass index is 30.95 kg/m².    Physical Exam  Cardiovascular:      Rate and Rhythm: Normal rate.      Pulses: Normal pulses.   Pulmonary:      Effort: Pulmonary effort is normal. No respiratory distress.   Abdominal:      General: Abdomen is flat.      Palpations: Abdomen is soft.   Skin:     General: Skin is warm.      Capillary Refill: Capillary refill takes less than 2 seconds.   Neurological:      General: No focal deficit present.      Mental Status: She is alert.         Assessment/Plan:     To OR for hiatal hernia repair     Ravi Zamudio MD  General Surgery  [facility] - Surgery (2nd Fl)

## 2022-02-14 NOTE — ANESTHESIA PREPROCEDURE EVALUATION
02/14/2022  Naomy Armstrong is a 56 y.o., female.    Anesthesia Evaluation    I have reviewed the Patient Summary Reports.    I have reviewed the Nursing Notes.    I have reviewed the Medications.     Review of Systems  Anesthesia Hx:  No problems with previous Anesthesia  Denies Family Hx of Anesthesia complications.    Cardiovascular:   Exercise tolerance: good Hypertension    Pulmonary:  Pulmonary Normal    Hepatic/GI:   Hiatal Hernia, GERD    Musculoskeletal:   Arthritis     Neurological:   Neuromuscular Disease, Headaches    Psych:   Psychiatric History depression        Past Medical History:   Diagnosis Date    Alcohol abuse     per chart, but patient denies today and cocaine per chart    Anxiety     Colon polyp     Depression     Hallucination     last couple of months started seeing someone standing in her room, saw boyfriend's reflection in glass    Headache     History of psychiatric hospitalization     age 13 [facility] for 2 months for acting out; 2018 for SI    Hx of psychiatric care     Hypertension     Psychiatric problem     Sleep difficulties     Suicide attempt     with knife by cutting stomach    Therapy      Past Surgical History:   Procedure Laterality Date    COLONOSCOPY N/A 5/7/2021    Procedure: COLONOSCOPY;  Surgeon: Prem Montana MD;  Location: [facility];  Service: Endoscopy;  Laterality: N/A;  COVID screening 5/4 LAPC-instr portal -ml    EPIDURAL STEROID INJECTION Bilateral 8/5/2020    Procedure: Bilateral MBB @ L3, L4, L5;  Surgeon: Jeremy Powell Jr., MD;  Location: [facility] ENDO;  Service: Pain Management;  Laterality: Bilateral;  Bilat L3-5 MBB  Arrive @ 1315; No ATC or DM; Needs MD signature    EPIDURAL STEROID INJECTION Right 8/19/2020    Procedure: Lumbar Radiofrequency Thermocoagulation of Medial Branches;  Surgeon: Jeremy Powell Jr., MD;   Location: [facility] END[facility];  Service: Pain Management;  Laterality: Right;  Right L3-5 RFA  Arrive @ 1045; No ATC or DM; Needs MD Signature    EPIDURAL STEROID INJECTION Left 9/4/2020    Procedure: Lumbar Radiofrequency Thermocoagulation of Medial Branches;  Surgeon: Jeremy Powell Jr., MD;  Location: [facility] ENDO;  Service: Pain Management;  Laterality: Left;  Left L3-5 RFA  Arrive @ 0900 (requested); No ATC or DM; Needs MD signature    EPIDURAL STEROID INJECTION Bilateral 4/16/2021    Procedure: Sacroiliac Joint Steroid Injections @ Bilateral;  Surgeon: Jeremy Powell Jr., MD;  Location: [facility];  Service: Pain Management;  Laterality: Bilateral;  Arrive @ 1300; No ATC or DM    EPIDURAL STEROID INJECTION Bilateral 1/19/2022    Procedure: Bilateral Sacroiliac Joint Injections;  Surgeon: Jeremy Powell Jr., MD;  Location: [facility];  Service: Pain Management;  Laterality: Bilateral;  Arrive @ 1315; No ATC or DM; Needs Consent    ESOPHAGEAL MANOMETRY WITH MEASUREMENT OF IMPEDANCE N/A 10/20/2021    Procedure: MANOMETRY, ESOPHAGUS, WITH IMPEDANCE MEASUREMENT;  Surgeon: Anastacia Odonnell MD;  [facility]  Service: Endoscopy;  Laterality: N/A;  Covid test 10/17 [place], instructions sent to [facility]    ESOPHAGOGASTRODUODENOSCOPY N/A 5/7/2021    Procedure: EGD (ESOPHAGOGASTRODUODENOSCOPY);  Surgeon: Prem Montana MD;  Location: [facility];  Service: Endoscopy;  Laterality: N/A;  added on per Dr. NOEL Shepherd     Patient Active Problem List   Diagnosis    Cervical radiculopathy    DDD (degenerative disc disease), cervical    Cervicogenic headache    Primary osteoarthritis of right knee    Neck pain    Spondylosis of cervical region without myelopathy or radiculopathy    Lumbar spondylosis    DDD (degenerative disc disease), lumbar    Bilateral primary osteoarthritis of knee    Decreased strength of lower extremity    Decreased strength of trunk and back    Pain in joint of  right hip    Decreased range of motion of right lower extremity    Chronic back pain greater than 3 months duration    Sacroiliac joint pain    Spondylosis of lumbosacral region    Lumbosacral spondylosis    GERD (gastroesophageal reflux disease)    SOB (shortness of breath)    Hiatal hernia    Irritable bowel syndrome with diarrhea    Cervical spondylosis    Hiatal hernia with GERD    Major depressive disorder, recurrent episode, in partial remission     Facility-Administered Medications as of 2/14/2022   Medication Dose Route Frequency Provider Last Rate Last Admin    acetaminophen tablet 650 mg  650 mg Oral Q8H PRN Ravi Zamudio MD        LIDOcaine (PF) 10 mg/ml (1%) injection 10 mg  1 mL Other Once Ravi Zamudio MD        melatonin tablet 6 mg  6 mg Oral Nightly PRN Ravi Zamudio MD        ondansetron disintegrating tablet 8 mg  8 mg Oral Q8H PRN Ravi Zamudio MD        sodium chloride 0.9% flush 10 mL  10 mL Intravenous PRN Ravi Zamudio MD         Outpatient Medications as of 2/14/2022   Medication Sig Dispense Refill    amLODIPine (NORVASC) 5 MG tablet Take 1 tablet (5 mg total) by mouth once daily. (Patient taking differently: Take 5 mg by mouth nightly.) 90 tablet 0    FLUoxetine 20 MG capsule Take 1 capsule (20 mg total) by mouth once daily. Take in addition to 40 mg for a total of 60mg daily. (Patient taking differently: Take 20 mg by mouth every evening. Take in addition to 40 mg for a total of 60mg daily.) 90 capsule 3    FLUoxetine 40 MG capsule Take 1 capsule (40 mg total) by mouth once daily. Take in addition to 20 mg for a total of 60mg daily. (Patient taking differently: Take 40 mg by mouth nightly. Take in addition to 20 mg for a total of 60mg daily.) 90 capsule 3    lisinopriL (PRINIVIL,ZESTRIL) 20 MG tablet Take 1 tablet (20 mg total) by mouth once daily. (Patient taking differently: Take 20 mg by mouth nightly.) 90 tablet 3     loperamide (IMODIUM) 2 mg capsule Take 2 mg by mouth once as needed for Diarrhea.      promethazine (PHENERGAN) 25 MG tablet Take 1 tablet (25 mg total) by mouth every 6 (six) hours as needed for Nausea. 30 tablet 0    fluconazole (DIFLUCAN) 150 MG Tab 1 tablet at symptom onset.  Repeat if not better in 3 days. 2 tablet 0    hydrOXYzine HCL (ATARAX) 10 MG Tab Take 1 tablet (10 mg total) by mouth 2 (two) times daily as needed (anxiety). 60 tablet 3    methylPREDNISolone (MEDROL DOSEPACK) 4 mg tablet use as directed 1 Package 0    multivitamin with minerals tablet Take 1 tablet by mouth once daily.      omeprazole (PRILOSEC) 40 MG capsule Take 1 capsule (40 mg total) by mouth every morning. (Patient taking differently: Take 40 mg by mouth nightly.) 90 capsule 3    ondansetron (ZOFRAN) 4 MG tablet Take 1 tablet (4 mg total) by mouth every 12 (twelve) hours as needed for Nausea. 20 tablet 0       Physical Exam  General:  Well nourished    Airway/Jaw/Neck:  Airway Findings: Mouth Opening: Normal General Airway Assessment: Adult  Mallampati: II  TM Distance: Normal, at least 6 cm  Jaw/Neck Findings:  Neck ROM: Normal ROM  Neck Findings:     Eyes/Ears/Nose:  Eyes/Ears/Nose Findings:    Dental:  Dental Findings: In tact   Chest/Lungs:  Chest/Lungs Findings: Normal Respiratory Rate     Heart/Vascular:  Heart Findings: Rate: Normal        Mental Status:  Mental Status Findings:  Cooperative, Alert and Oriented       Wt Readings from Last 3 Encounters:   02/14/22 84.4 kg (186 lb)   02/09/22 84.7 kg (186 lb 11.7 oz)   10/20/21 89.8 kg (198 lb)     Temp Readings from Last 3 Encounters:   02/14/22 36.4 °C (97.6 °F) (Temporal)   02/09/22 36.7 °C (98 °F) (Oral)   01/19/22 36.8 °C (98.2 °F) (Oral)     BP Readings from Last 3 Encounters:   02/14/22 109/64   02/09/22 124/82   01/19/22 124/72     Pulse Readings from Last 3 Encounters:   02/14/22 64   02/09/22 87   01/19/22 76     Lab Results   Component Value Date    WBC 8.70  02/09/2022    HGB 12.7 02/09/2022    HCT 42.1 02/09/2022    MCV 85 02/09/2022     02/09/2022         Chemistry        Component Value Date/Time     02/09/2022 1537    K 5.1 02/09/2022 1537     02/09/2022 1537    CO2 24 02/09/2022 1537    BUN 20 02/09/2022 1537    CREATININE 0.8 02/09/2022 1537    GLU 87 02/09/2022 1537        Component Value Date/Time    CALCIUM 10.5 02/09/2022 1537    ALKPHOS 125 03/16/2021 1012    AST 12 03/16/2021 1012    ALT 17 03/16/2021 1012    BILITOT 0.3 03/16/2021 1012    ESTGFRAFRICA >60 02/09/2022 1537    EGFRNONAA >60 02/09/2022 1537        Results for orders placed or performed in visit on 02/09/22   IN OFFICE EKG 12-LEAD (to [place])    Collection Time: 02/09/22  4:31 PM    Narrative    Test Reason : Z01.818,    Vent. Rate : 079 BPM     Atrial Rate : 079 BPM     P-R Int : 140 ms          QRS Dur : 070 ms      QT Int : 386 ms       P-R-T Axes : 045 -04 041 degrees     QTc Int : 442 ms    Normal sinus rhythm  Normal ECG  When compared with ECG of 21-JUN-2021 08:32,  Previous ECG has undetermined rhythm, needs review  Confirmed by Janis MCLEAN, Sarina FOLYE ([id number]) on 2/9/2022 11:04:58 PM    Referred By: MIGUEL CAMEJO           Confirmed By:Sarina Bruce MD     Nuclear Stress Test 6/21/21  Conclusion         Normal myocardial perfusion scan. There is no evidence of myocardial ischemia or infarction.    The visually estimated ejection fraction is normal during stress.    There is normal wall motion post stress.    Echo 6/21/21  Summary    · The left ventricle is normal in size with normal systolic function.  · The estimated ejection fraction is 65%.  · Normal right ventricular size with normal right ventricular systolic function.  · The estimated PA systolic pressure is 12 mmHg.        Anesthesia Plan  Type of Anesthesia, risks & benefits discussed:  Anesthesia Type:  general    Patient's Preference:   Plan Factors:          Intra-op Monitoring Plan: standard ASA monitors  Intra-op  Monitoring Plan Comments:   Post Op Pain Control Plan: per primary service following discharge from PACU  Post Op Pain Control Plan Comments:     Induction:   IV  Beta Blocker:         Informed Consent: Patient understands risks and agrees with Anesthesia plan.  Questions answered. Anesthesia consent signed with patient.  ASA Score: 2     Day of Surgery Review of History & Physical:    H&P update referred to the surgeon.         Ready For Surgery From Anesthesia Perspective.

## 2022-02-14 NOTE — ANESTHESIA PROCEDURE NOTES
Intubation    Date/Time: 2/14/2022 7:16 AM  Performed by: Kenneth Alba CRNA  Authorized by: Lorie Godwin MD     Intubation:     Induction:  Intravenous    Intubated:  Postinduction    Mask Ventilation:  Easy mask    Attempts:  1    Attempted By:  CRNA    Method of Intubation:  Direct    Blade:  Garcia 2    Laryngeal View Grade: Grade I - full view of cords      Difficult Airway Encountered?: No      Complications:  None    Airway Device:  Oral endotracheal tube    Airway Device Size:  7.0    Style/Cuff Inflation:  Cuffed (inflated to minimal occlusive pressure)    Inflation Amount (mL):  7    Tube secured:  21    Secured at:  The lips    Placement Verified By:  Capnometry    Complicating Factors:  None    Findings Post-Intubation:  BS equal bilateral

## 2022-02-14 NOTE — NURSING TRANSFER
Nursing Transfer Note      2/14/2022     Reason patient is being transferred: postop    Transfer To: 556    Transfer via stretcher    Transfer with belongings bag    Transported by transport    Medicines sent: n/a    Any special needs or follow-up needed:     Chart send with patient: Yes    Notified: family    Patient reassessed at: 2/14/22    Upon arrival to floor:  Report given to Angela Hall

## 2022-02-14 NOTE — TRANSFER OF CARE
"Anesthesia Transfer of Care Note    Patient: Naomy Armstrong    Procedure(s) Performed: Procedure(s) (LRB):  REPAIR, HERNIA, HIATAL, LAPAROSCOPIC (N/A)  FUNDOPLICATION, LAPAROSCOPIC, TOUPET (N/A)    Patient location: PACU    Anesthesia Type: general    Transport from OR: Transported from OR on 6-10 L/min O2 by face mask with adequate spontaneous ventilation    Post pain: adequate analgesia    Post assessment: no apparent anesthetic complications and tolerated procedure well    Post vital signs: stable    Level of consciousness: awake and alert    Nausea/Vomiting: no nausea/vomiting    Complications: none    Transfer of care protocol was followed      Last vitals:   Visit Vitals  BP (!) 149/67 (BP Location: Left arm, Patient Position: Lying)   Pulse 94   Temp 36.4 °C (97.6 °F) (Temporal)   Resp 10   Ht 5' 5" (1.651 m)   Wt 84.4 kg (186 lb)   LMP 09/15/2013   SpO2 96%   Breastfeeding No   BMI 30.95 kg/m²     "

## 2022-02-15 VITALS
BODY MASS INDEX: 31 KG/M2 | DIASTOLIC BLOOD PRESSURE: 78 MMHG | TEMPERATURE: 97 F | SYSTOLIC BLOOD PRESSURE: 164 MMHG | OXYGEN SATURATION: 97 % | HEIGHT: 65 IN | RESPIRATION RATE: 18 BRPM | HEART RATE: 59 BPM | WEIGHT: 186.06 LBS

## 2022-02-15 LAB
ANION GAP SERPL CALC-SCNC: 8 MMOL/L (ref 8–16)
BASOPHILS # BLD AUTO: 0.02 K/UL (ref 0–0.2)
BASOPHILS NFR BLD: 0.2 % (ref 0–1.9)
BUN SERPL-MCNC: 8 MG/DL (ref 6–20)
CALCIUM SERPL-MCNC: 10.2 MG/DL (ref 8.7–10.5)
CHLORIDE SERPL-SCNC: 101 MMOL/L (ref 95–110)
CO2 SERPL-SCNC: 24 MMOL/L (ref 23–29)
CREAT SERPL-MCNC: 0.7 MG/DL (ref 0.5–1.4)
DIFFERENTIAL METHOD: ABNORMAL
EOSINOPHIL # BLD AUTO: 0 K/UL (ref 0–0.5)
EOSINOPHIL NFR BLD: 0.1 % (ref 0–8)
ERYTHROCYTE [DISTWIDTH] IN BLOOD BY AUTOMATED COUNT: 16 % (ref 11.5–14.5)
EST. GFR  (AFRICAN AMERICAN): >60 ML/MIN/1.73 M^2
EST. GFR  (NON AFRICAN AMERICAN): >60 ML/MIN/1.73 M^2
GLUCOSE SERPL-MCNC: 86 MG/DL (ref 70–110)
HCT VFR BLD AUTO: 38.2 % (ref 37–48.5)
HGB BLD-MCNC: 11.5 G/DL (ref 12–16)
IMM GRANULOCYTES # BLD AUTO: 0.02 K/UL (ref 0–0.04)
IMM GRANULOCYTES NFR BLD AUTO: 0.2 % (ref 0–0.5)
LYMPHOCYTES # BLD AUTO: 1.7 K/UL (ref 1–4.8)
LYMPHOCYTES NFR BLD: 18 % (ref 18–48)
MAGNESIUM SERPL-MCNC: 2 MG/DL (ref 1.6–2.6)
MCH RBC QN AUTO: 25.9 PG (ref 27–31)
MCHC RBC AUTO-ENTMCNC: 30.1 G/DL (ref 32–36)
MCV RBC AUTO: 86 FL (ref 82–98)
MONOCYTES # BLD AUTO: 0.7 K/UL (ref 0.3–1)
MONOCYTES NFR BLD: 7.3 % (ref 4–15)
NEUTROPHILS # BLD AUTO: 6.9 K/UL (ref 1.8–7.7)
NEUTROPHILS NFR BLD: 74.2 % (ref 38–73)
NRBC BLD-RTO: 0 /100 WBC
PHOSPHATE SERPL-MCNC: 2.9 MG/DL (ref 2.7–4.5)
PLATELET # BLD AUTO: 281 K/UL (ref 150–450)
PMV BLD AUTO: 10.2 FL (ref 9.2–12.9)
POTASSIUM SERPL-SCNC: 5.2 MMOL/L (ref 3.5–5.1)
RBC # BLD AUTO: 4.44 M/UL (ref 4–5.4)
SODIUM SERPL-SCNC: 133 MMOL/L (ref 136–145)
WBC # BLD AUTO: 9.29 K/UL (ref 3.9–12.7)

## 2022-02-15 PROCEDURE — 85025 COMPLETE CBC W/AUTO DIFF WBC: CPT | Performed by: STUDENT IN AN ORGANIZED HEALTH CARE EDUCATION/TRAINING PROGRAM

## 2022-02-15 PROCEDURE — 25000003 PHARM REV CODE 250: Performed by: SURGERY

## 2022-02-15 PROCEDURE — 84100 ASSAY OF PHOSPHORUS: CPT | Performed by: STUDENT IN AN ORGANIZED HEALTH CARE EDUCATION/TRAINING PROGRAM

## 2022-02-15 PROCEDURE — 80048 BASIC METABOLIC PNL TOTAL CA: CPT | Performed by: STUDENT IN AN ORGANIZED HEALTH CARE EDUCATION/TRAINING PROGRAM

## 2022-02-15 PROCEDURE — 25000003 PHARM REV CODE 250: Performed by: STUDENT IN AN ORGANIZED HEALTH CARE EDUCATION/TRAINING PROGRAM

## 2022-02-15 PROCEDURE — 83735 ASSAY OF MAGNESIUM: CPT | Performed by: STUDENT IN AN ORGANIZED HEALTH CARE EDUCATION/TRAINING PROGRAM

## 2022-02-15 PROCEDURE — 36415 COLL VENOUS BLD VENIPUNCTURE: CPT | Performed by: STUDENT IN AN ORGANIZED HEALTH CARE EDUCATION/TRAINING PROGRAM

## 2022-02-15 RX ORDER — KETOROLAC TROMETHAMINE 10 MG/1
10 TABLET, FILM COATED ORAL EVERY 6 HOURS PRN
Qty: 20 TABLET | Refills: 0 | Status: SHIPPED | OUTPATIENT
Start: 2022-02-15 | End: 2022-02-20

## 2022-02-15 RX ORDER — KETOROLAC TROMETHAMINE 10 MG/1
10 TABLET, FILM COATED ORAL ONCE
Status: COMPLETED | OUTPATIENT
Start: 2022-02-15 | End: 2022-02-15

## 2022-02-15 RX ORDER — AMLODIPINE BESYLATE 5 MG/1
5 TABLET ORAL DAILY
Status: DISCONTINUED | OUTPATIENT
Start: 2022-02-15 | End: 2022-02-15 | Stop reason: HOSPADM

## 2022-02-15 RX ORDER — KETOROLAC TROMETHAMINE 10 MG/1
10 TABLET, FILM COATED ORAL EVERY 8 HOURS
Status: DISCONTINUED | OUTPATIENT
Start: 2022-02-15 | End: 2022-02-15 | Stop reason: HOSPADM

## 2022-02-15 RX ORDER — OXYCODONE AND ACETAMINOPHEN 5; 325 MG/1; MG/1
1 TABLET ORAL EVERY 6 HOURS PRN
Qty: 12 TABLET | Refills: 0 | Status: SHIPPED | OUTPATIENT
Start: 2022-02-15 | End: 2022-02-18 | Stop reason: SDUPTHER

## 2022-02-15 RX ORDER — GABAPENTIN 300 MG/1
300 CAPSULE ORAL 3 TIMES DAILY
Qty: 90 CAPSULE | Refills: 0 | Status: SHIPPED | OUTPATIENT
Start: 2022-02-15 | End: 2022-03-09 | Stop reason: SDUPTHER

## 2022-02-15 RX ADMIN — ACETAMINOPHEN 1000 MG: 500 TABLET ORAL at 09:02

## 2022-02-15 RX ADMIN — AMLODIPINE BESYLATE 5 MG: 5 TABLET ORAL at 09:02

## 2022-02-15 RX ADMIN — OXYCODONE HYDROCHLORIDE 10 MG: 10 TABLET ORAL at 02:02

## 2022-02-15 RX ADMIN — OXYCODONE 5 MG: 5 TABLET ORAL at 05:02

## 2022-02-15 RX ADMIN — ACETAMINOPHEN 1000 MG: 500 TABLET ORAL at 03:02

## 2022-02-15 RX ADMIN — KETOROLAC TROMETHAMINE 10 MG: 10 TABLET, FILM COATED ORAL at 09:02

## 2022-02-15 NOTE — HOSPITAL COURSE
Pt tolerated the above procedure without complication. She progressed appropriately post-operatively. Now on POD#1 her pain is controlled, she is ambulating independently, urinating without issue and tolerating a diet without nausea or vomiting. She is afebrile and HDS. She is stable for discharge. She will follow-up in clinic in 2 weeks.

## 2022-02-15 NOTE — ASSESSMENT & PLAN NOTE
56F s/p HH repair with  fundoplication on 02/14/22.     Tolerating clears. Advance to FLD as tolerated.   D/c mIVF  OOBTC/Encourage ambulation  Prn pain and nausea meds    Dispo: Likely today if tolerated ordered diet

## 2022-02-15 NOTE — PROGRESS NOTES
Discharge Note: pt discharged home alert and oriented x4, skin warm to touch, x5 lap sites with dermabond. Pt verbalized understanding of discharge teaching.

## 2022-02-15 NOTE — PROGRESS NOTES
[facility]  General Surgery  Progress Note    Subjective:     History of Present Illness:  No notes on file    Post-Op Info:  Procedure(s) (LRB):  REPAIR, HERNIA, HIATAL, LAPAROSCOPIC (N/A)  FUNDOPLICATION, LAPAROSCOPIC, TOUPET (N/A)   1 Day Post-Op     Interval History: NAEO. Tolerated clears well. She does note some pain improved with prn meds available. No nausea or vomiting. Not passing gas or BM. AF and hDS.     Medications:  Continuous Infusions:  Scheduled Meds:   acetaminophen  1,000 mg Oral Q8H    amLODIPine  5 mg Oral Daily    enoxaparin  40 mg Subcutaneous Daily    FLUoxetine  40 mg Oral Nightly    gabapentin  600 mg Oral QHS    LIDOcaine (PF) 10 mg/ml (1%)  1 mL Other Once     PRN Meds:acetaminophen, fentaNYL, melatonin, ondansetron, oxyCODONE, oxyCODONE, promethazine (PHENERGAN) IVPB, sodium chloride 0.9%, sodium chloride 0.9%     Review of patient's allergies indicates:   Allergen Reactions    Effexor [venlafaxine]      Elevated her blood pressure    Zoloft [sertraline]     Paroxetine hcl      Made her feel drunk     Objective:     Vital Signs (Most Recent):  Temp: 97.6 °F (36.4 °C) (02/15/22 0742)  Pulse: 67 (02/15/22 0742)  Resp: 18 (02/15/22 0742)  BP: (!) 170/79 (02/15/22 0742)  SpO2: 99 % (02/15/22 0742) Vital Signs (24h Range):  Temp:  [96.8 °F (36 °C)-98.1 °F (36.7 °C)] 97.6 °F (36.4 °C)  Pulse:  [63-85] 67  Resp:  [14-20] 18  SpO2:  [92 %-99 %] 99 %  BP: (119-170)/(58-93) 170/79     Weight: 84.4 kg (186 lb 1.1 oz)  Body mass index is 30.96 kg/m².    Intake/Output - Last 3 Shifts       02/13 0700  02/14 0659 02/14 0700  02/15 0659 02/15 0700  02/16 0659    P.O.  740     I.V. (mL/kg)  2438.6 (28.9)     IV Piggyback  1500     Total Intake(mL/kg)  4678.6 (55.4)     Net  +4678.6            Urine Occurrence  5 x           Physical Exam  Vitals reviewed.   Constitutional:       General: She is not in acute distress.  HENT:      Head: Normocephalic and atraumatic.      Nose: Nose  normal.   Eyes:      General:         Right eye: No discharge.         Left eye: No discharge.   Cardiovascular:      Rate and Rhythm: Normal rate and regular rhythm.   Pulmonary:      Effort: Pulmonary effort is normal. No respiratory distress.      Breath sounds: No stridor.   Abdominal:      Comments: Soft, ND, mild appropriate TTP.   Incisions c/d/i   Musculoskeletal:         General: Normal range of motion.      Cervical back: Normal range of motion.   Skin:     General: Skin is warm and dry.      Capillary Refill: Capillary refill takes 2 to 3 seconds.   Neurological:      General: No focal deficit present.      Mental Status: She is alert and oriented to person, place, and time.   Psychiatric:         Mood and Affect: Mood normal.         Behavior: Behavior normal.         Significant Labs:  I have reviewed all pertinent lab results within the past 24 hours.    Significant Diagnostics:  I have reviewed all pertinent imaging results/findings within the past 24 hours.    Assessment/Plan:     Hiatal hernia with GERD  56F s/p HH repair with  fundoplication on 02/14/22.     Tolerating clears. Advance to FLD as tolerated.   D/c mIVF  OOBTC/Encourage ambulation  Prn pain and nausea meds    Dispo: Likely today if tolerating ordered diet           Augustine Doyle MD  General Surgery  [facility] - Surgery

## 2022-02-15 NOTE — DISCHARGE SUMMARY
"[facility] - [facility]  General Surgery  Discharge Summary      Patient Name: Naomy Armstrong  MRN: [id number]  Admission Date: 2/14/2022  Hospital Length of Stay: 1 days  Discharge Date and Time:  02/15/2022 4:36 PM  Attending Physician: No att. providers found   Discharging Provider: Augustine Doyle MD  Primary Care Provider: Eric Hernandes Jr, MD    HPI:   56 y.o. female presents with hiatal hernia.  She says when she bends over if she has had anything to drink recently it comes right out or if she lays flat she has regurgitation.  She has reflux with heartburn.  She has occasional pain after eating with the sensation the food is stuck.  States she is 5'5" and 195 pounds which makes her bmi 32.4.     She is under treatment and seeing someone for depression and anxiety.     She is a .  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  She has been fairly sedentary and lethargic since she got covid.     GERD Questionnaire      qd PPI  has Typical heartburn  Has severe Regurgitation  denies Dysphagia solids  denies Dysphagia liquids  Has frequent Hoarseness  Has occasional Sore throat  Has Cough at night when laying down  denies Asthma  Has Chest pain all the time despite negative heart work up  denies Water brash  denies Globus  Has frequent Nausea  denies Vomiting      Procedure(s) (LRB):  REPAIR, HERNIA, HIATAL, LAPAROSCOPIC (N/A)  FUNDOPLICATION, LAPAROSCOPIC, TOUPET (N/A)      Indwelling Lines/Drains at time of discharge:   Lines/Drains/Airways     Airway                 Airway - Non-Surgical 02/14/22 0716 1 day              Hospital Course: Pt tolerated the above procedure without complication. She progressed appropriately post-operatively. Now on POD#1 her pain is controlled, she is ambulating independently, urinating without issue and tolerating a diet without nausea or vomiting. She is afebrile and HDS. She is stable for discharge. She will follow-up in clinic in 2 weeks.       Goals of Care Treatment " Preferences:  Code Status: Full Code      Consults:     Significant Diagnostic Studies: See EMR    Pending Diagnostic Studies:     None        Final Active Diagnoses:    Diagnosis Date Noted POA    Hiatal hernia with GERD [K21.9, K44.9] 10/20/2021 Yes      Problems Resolved During this Admission:      Discharged Condition: stable    Disposition: Home or Self Care    Follow Up:   Follow-up Information     Christian Martinez MD In 2 weeks.    Specialties: General Surgery, Bariatrics  Why: Post-operative follow-up   Contact information:  [address]  [facility] [address]  [phone]                       Patient Instructions:      Diet full liquid     Notify your health care provider if you experience any of the following:  temperature >100.4     Notify your health care provider if you experience any of the following:  persistent nausea and vomiting or diarrhea     Notify your health care provider if you experience any of the following:  severe uncontrolled pain     Notify your health care provider if you experience any of the following:  redness, tenderness, or signs of infection (pain, swelling, redness, odor or green/yellow discharge around incision site)     Notify your health care provider if you experience any of the following:  difficulty breathing or increased cough     Notify your health care provider if you experience any of the following:  severe persistent headache     Notify your health care provider if you experience any of the following:  worsening rash     Notify your health care provider if you experience any of the following:  persistent dizziness, light-headedness, or visual disturbances     Notify your health care provider if you experience any of the following:  increased confusion or weakness     No dressing needed   Order Comments: WOUND CARE  You have skin glue over your incision(s)  This will slowly flake away in about 10 days  It is okay to shower starting the day after  surgery  Can pat your incision dry  Please do not scrub hard over your incisions  Please do not pick the glue off or it will reopen the wound     Activity as tolerated   Order Comments: No lifting greater than 10 pounds for 6 weeks from day of surgery.  No pushing/pulling such as vacuuming or raking.  No straining, avoid constipation and take stool softeners as described and laxatives as needed.  No driving while on narcotics and until you can react quickly without pain.     Medications:  Reconciled Home Medications:      Medication List      START taking these medications    ketorolac 10 mg tablet  Commonly known as: TORADOL  Take 1 tablet (10 mg total) by mouth every 6 (six) hours as needed for Pain.     oxyCODONE-acetaminophen 5-325 mg per tablet  Commonly known as: PERCOCET  Take 1 tablet by mouth every 6 (six) hours as needed for Pain.        CHANGE how you take these medications    amLODIPine 5 MG tablet  Commonly known as: NORVASC  Take 1 tablet (5 mg total) by mouth once daily.  What changed: when to take this     * FLUoxetine 20 MG capsule  Take 1 capsule (20 mg total) by mouth once daily. Take in addition to 40 mg for a total of 60mg daily.  What changed: when to take this     * FLUoxetine 40 MG capsule  Take 1 capsule (40 mg total) by mouth once daily. Take in addition to 20 mg for a total of 60mg daily.  What changed: when to take this     * gabapentin 300 MG capsule  Commonly known as: NEURONTIN  Take 2 capsules (600 mg total) by mouth 3 (three) times daily.  What changed: when to take this     * gabapentin 300 MG capsule  Commonly known as: NEURONTIN  Take 1 capsule (300 mg total) by mouth 3 (three) times daily.  What changed: You were already taking a medication with the same name, and this prescription was added. Make sure you understand how and when to take each.     lisinopriL 20 MG tablet  Commonly known as: PRINIVIL,ZESTRIL  Take 1 tablet (20 mg total) by mouth once daily.  What changed: when to  take this     omeprazole 40 MG capsule  Commonly known as: PRILOSEC  Take 1 capsule (40 mg total) by mouth every morning.  What changed: when to take this         * This list has 4 medication(s) that are the same as other medications prescribed for you. Read the directions carefully, and ask your doctor or other care provider to review them with you.            CONTINUE taking these medications    fluconazole 150 MG Tab  Commonly known as: DIFLUCAN  1 tablet at symptom onset.  Repeat if not better in 3 days.     hydrOXYzine HCL 10 MG Tab  Commonly known as: ATARAX  Take 1 tablet (10 mg total) by mouth 2 (two) times daily as needed (anxiety).     loperamide 2 mg capsule  Commonly known as: IMODIUM  Take 2 mg by mouth once as needed for Diarrhea.     methylPREDNISolone 4 mg tablet  Commonly known as: MEDROL DOSEPACK  use as directed     multivitamin with minerals tablet  Take 1 tablet by mouth once daily.     ondansetron 4 MG tablet  Commonly known as: ZOFRAN  Take 1 tablet (4 mg total) by mouth every 12 (twelve) hours as needed for Nausea.     promethazine 25 MG tablet  Commonly known as: PHENERGAN  Take 1 tablet (25 mg total) by mouth every 6 (six) hours as needed for Nausea.          Time spent on the discharge of patient: 15 minutes    Augustine Doyle MD  General Surgery  [facility] - Surgery

## 2022-02-15 NOTE — SUBJECTIVE & OBJECTIVE
Interval History: NAEO. Tolerated clears well. She does note some pain improved with prn meds available. No nausea or vomiting. Not passing gas or BM. AF and hDS.     Medications:  Continuous Infusions:  Scheduled Meds:   acetaminophen  1,000 mg Oral Q8H    amLODIPine  5 mg Oral Daily    enoxaparin  40 mg Subcutaneous Daily    FLUoxetine  40 mg Oral Nightly    gabapentin  600 mg Oral QHS    LIDOcaine (PF) 10 mg/ml (1%)  1 mL Other Once     PRN Meds:acetaminophen, fentaNYL, melatonin, ondansetron, oxyCODONE, oxyCODONE, promethazine (PHENERGAN) IVPB, sodium chloride 0.9%, sodium chloride 0.9%     Review of patient's allergies indicates:   Allergen Reactions    Effexor [venlafaxine]      Elevated her blood pressure    Zoloft [sertraline]     Paroxetine hcl      Made her feel drunk     Objective:     Vital Signs (Most Recent):  Temp: 97.6 °F (36.4 °C) (02/15/22 0742)  Pulse: 67 (02/15/22 0742)  Resp: 18 (02/15/22 0742)  BP: (!) 170/79 (02/15/22 0742)  SpO2: 99 % (02/15/22 0742) Vital Signs (24h Range):  Temp:  [96.8 °F (36 °C)-98.1 °F (36.7 °C)] 97.6 °F (36.4 °C)  Pulse:  [63-85] 67  Resp:  [14-20] 18  SpO2:  [92 %-99 %] 99 %  BP: (119-170)/(58-93) 170/79     Weight: 84.4 kg (186 lb 1.1 oz)  Body mass index is 30.96 kg/m².    Intake/Output - Last 3 Shifts       02/13 0700  02/14 0659 02/14 0700  02/15 0659 02/15 0700 02/16 0659    P.O.  740     I.V. (mL/kg)  2438.6 (28.9)     IV Piggyback  1500     Total Intake(mL/kg)  4678.6 (55.4)     Net  +4678.6            Urine Occurrence  5 x           Physical Exam  Vitals reviewed.   Constitutional:       General: She is not in acute distress.  HENT:      Head: Normocephalic and atraumatic.      Nose: Nose normal.   Eyes:      General:         Right eye: No discharge.         Left eye: No discharge.   Cardiovascular:      Rate and Rhythm: Normal rate and regular rhythm.   Pulmonary:      Effort: Pulmonary effort is normal. No respiratory distress.      Breath sounds:  No stridor.   Abdominal:      Comments: Soft, ND, mild appropriate TTP.   Incisions c/d/i   Musculoskeletal:         General: Normal range of motion.      Cervical back: Normal range of motion.   Skin:     General: Skin is warm and dry.      Capillary Refill: Capillary refill takes 2 to 3 seconds.   Neurological:      General: No focal deficit present.      Mental Status: She is alert and oriented to person, place, and time.   Psychiatric:         Mood and Affect: Mood normal.         Behavior: Behavior normal.         Significant Labs:  I have reviewed all pertinent lab results within the past 24 hours.    Significant Diagnostics:  I have reviewed all pertinent imaging results/findings within the past 24 hours.

## 2022-02-17 NOTE — PLAN OF CARE
[facility]  Discharge Final Note    Primary Care Provider: Eric Hernandes Jr, MD    Expected Discharge Date: 2/15/2022    Final Discharge Note (most recent)     Final Note - 02/15/22 1607        Final Note    Assessment Type Final Discharge Note     Anticipated Discharge Disposition Home or Self Care     What phone number can be called within the next 1-3 days to see how you are doing after discharge? [phone]     Hospital Resources/Appts/Education Provided Provided patient/caregiver with written discharge plan information;Appointments scheduled by Navigator/Coordinator               Future Appointments   Date Time Provider Department Center   3/3/2022  4:30 PM Christian Martinez MD [facility]            Contact Info     Christian Martinez MD   Specialty: General Surgery, Bariatrics    [address]  [facility] [address]   Phone: [phone]       Next Steps: Follow up in 2 week(s)    Instructions: Post-operative follow-up

## 2022-02-18 ENCOUNTER — PATIENT MESSAGE (OUTPATIENT)
Dept: SURGERY | Facility: CLINIC | Age: 57
End: 2022-02-18
Payer: COMMERCIAL

## 2022-02-18 RX ORDER — OXYCODONE AND ACETAMINOPHEN 5; 325 MG/1; MG/1
1 TABLET ORAL EVERY 6 HOURS PRN
Qty: 12 TABLET | Refills: 0 | Status: SHIPPED | OUTPATIENT
Start: 2022-02-18 | End: 2022-02-23 | Stop reason: SDUPTHER

## 2022-02-22 ENCOUNTER — PATIENT MESSAGE (OUTPATIENT)
Dept: SURGERY | Facility: CLINIC | Age: 57
End: 2022-02-22
Payer: COMMERCIAL

## 2022-02-23 RX ORDER — OXYCODONE AND ACETAMINOPHEN 5; 325 MG/1; MG/1
1 TABLET ORAL EVERY 6 HOURS PRN
Qty: 5 TABLET | Refills: 0 | Status: SHIPPED | OUTPATIENT
Start: 2022-02-23 | End: 2022-03-07

## 2022-02-23 RX ORDER — GABAPENTIN 250 MG/5ML
250 SOLUTION ORAL 3 TIMES DAILY
Qty: 450 ML | Refills: 1 | Status: SHIPPED | OUTPATIENT
Start: 2022-02-23 | End: 2022-03-03

## 2022-03-03 ENCOUNTER — OFFICE VISIT (OUTPATIENT)
Dept: SURGERY | Facility: CLINIC | Age: 57
End: 2022-03-03
Payer: COMMERCIAL

## 2022-03-03 VITALS
HEIGHT: 65 IN | HEART RATE: 74 BPM | DIASTOLIC BLOOD PRESSURE: 80 MMHG | BODY MASS INDEX: 30.25 KG/M2 | WEIGHT: 181.56 LBS | SYSTOLIC BLOOD PRESSURE: 144 MMHG

## 2022-03-03 DIAGNOSIS — Z09 POSTOP CHECK: Primary | ICD-10-CM

## 2022-03-03 PROBLEM — K44.9 HIATAL HERNIA: Status: RESOLVED | Noted: 2021-07-09 | Resolved: 2022-03-03

## 2022-03-03 PROBLEM — K21.9 HIATAL HERNIA WITH GERD: Status: RESOLVED | Noted: 2021-10-20 | Resolved: 2022-03-03

## 2022-03-03 PROBLEM — K21.9 GERD (GASTROESOPHAGEAL REFLUX DISEASE): Status: RESOLVED | Noted: 2021-05-07 | Resolved: 2022-03-03

## 2022-03-03 PROBLEM — K44.9 HIATAL HERNIA WITH GERD: Status: RESOLVED | Noted: 2021-10-20 | Resolved: 2022-03-03

## 2022-03-03 PROCEDURE — 3008F BODY MASS INDEX DOCD: CPT | Mod: CPTII,S$GLB,, | Performed by: SURGERY

## 2022-03-03 PROCEDURE — 3008F PR BODY MASS INDEX (BMI) DOCUMENTED: ICD-10-PCS | Mod: CPTII,S$GLB,, | Performed by: SURGERY

## 2022-03-03 PROCEDURE — 3077F PR MOST RECENT SYSTOLIC BLOOD PRESSURE >= 140 MM HG: ICD-10-PCS | Mod: CPTII,S$GLB,, | Performed by: SURGERY

## 2022-03-03 PROCEDURE — 99024 POSTOP FOLLOW-UP VISIT: CPT | Mod: S$GLB,,, | Performed by: SURGERY

## 2022-03-03 PROCEDURE — 99024 PR POST-OP FOLLOW-UP VISIT: ICD-10-PCS | Mod: S$GLB,,, | Performed by: SURGERY

## 2022-03-03 PROCEDURE — 1160F PR REVIEW ALL MEDS BY PRESCRIBER/CLIN PHARMACIST DOCUMENTED: ICD-10-PCS | Mod: CPTII,S$GLB,, | Performed by: SURGERY

## 2022-03-03 PROCEDURE — 3079F DIAST BP 80-89 MM HG: CPT | Mod: CPTII,S$GLB,, | Performed by: SURGERY

## 2022-03-03 PROCEDURE — 1159F PR MEDICATION LIST DOCUMENTED IN MEDICAL RECORD: ICD-10-PCS | Mod: CPTII,S$GLB,, | Performed by: SURGERY

## 2022-03-03 PROCEDURE — 99999 PR PBB SHADOW E&M-EST. PATIENT-LVL IV: CPT | Mod: PBBFAC,,, | Performed by: SURGERY

## 2022-03-03 PROCEDURE — 1159F MED LIST DOCD IN RCRD: CPT | Mod: CPTII,S$GLB,, | Performed by: SURGERY

## 2022-03-03 PROCEDURE — 3077F SYST BP >= 140 MM HG: CPT | Mod: CPTII,S$GLB,, | Performed by: SURGERY

## 2022-03-03 PROCEDURE — 1160F RVW MEDS BY RX/DR IN RCRD: CPT | Mod: CPTII,S$GLB,, | Performed by: SURGERY

## 2022-03-03 PROCEDURE — 3079F PR MOST RECENT DIASTOLIC BLOOD PRESSURE 80-89 MM HG: ICD-10-PCS | Mod: CPTII,S$GLB,, | Performed by: SURGERY

## 2022-03-03 PROCEDURE — 99999 PR PBB SHADOW E&M-EST. PATIENT-LVL IV: ICD-10-PCS | Mod: PBBFAC,,, | Performed by: SURGERY

## 2022-03-03 RX ORDER — KETOROLAC TROMETHAMINE 10 MG/1
10 TABLET, FILM COATED ORAL EVERY 6 HOURS PRN
Qty: 40 TABLET | Refills: 0 | Status: SHIPPED | OUTPATIENT
Start: 2022-03-03 | End: 2022-03-08

## 2022-03-03 NOTE — LETTER
[facility] 2nd [facility]  [address]  [facility] [address]  Phone: [phone] March 3, 2022        Eric Hernandes Jr., MD  [address] [facility] [address]    Patient: Naomy Armstrong   MR Number: [id number]   YOB: 1965   Date of Visit: 3/3/2022     Dear Dr. Hernandes:    Thank you for referring Naomy Armstrong to me for evaluation. Attached you will find relevant portions of my assessment and plan of care.    If you have questions, please do not hesitate to call me. I look forward to following Naomy Armstrong along with you.    Sincerely,          MD ZHEN Rodrigues MD Enclosure

## 2022-03-03 NOTE — PROGRESS NOTES
Subjective: Patient doing well post op.  Activity returning to normal.  Pain still an issue. She is having some stapping pain on her left side asl well as some epigastric pain. It worse with movement.   Tolerating a diet without nausea or vomiting. She does notice some dysphagia with both solids and liquids. Worse with heavier foods. She states she from a GERD standpoint she is doing great and hasn't been taking her PPI.  Having normal regular bowel movements.  Incision healing well without drainage.    Objective:   Vitals:    03/03/22 1613   BP: (!) 144/80   Pulse: 74       Incision sites clean, dry, and intact.  No erythema or drainage.  Abdomen tenderness especially left side in between trochar sites.        Assessment: Naomy Armstrong is our 56 y.o. female s/p lap hh with toupet 2/14/22.  who has had an uncomplicated post operative course.    Plan:  - Can follow up in 1 month   - Slowly advance diet  - Continue light duty for 4 more weeks  - Can call the clinic with any questions or concerns  - Rx for Toradol

## 2022-03-03 NOTE — PROGRESS NOTES
I have seen the patient, reviewed the Resident's history and physical, assessment and plan. I have personally interviewed and examined the patient at bedside and: agree with the findings.     S/p lap hh with duane 2/14/22.  She is doing well with some pain in the upper trocar site and to the left of the navel and some dysphagia with advancing diet.  She denies nausea or vomiting.  Her gerd and regurgitation are resolved.  She is not taking antireflux medication.  She is happy she had the procedure due to the improvement in symptoms.  She has not lost weight (due to eating ice cream).  Wounds clear.  Advance diet, light duty one more month and rtc one month.  Rx toredol for pain.  Back to work in 2 weeks.  Light duty until 3/28/22.

## 2022-03-09 ENCOUNTER — PATIENT MESSAGE (OUTPATIENT)
Dept: SURGERY | Facility: CLINIC | Age: 57
End: 2022-03-09
Payer: COMMERCIAL

## 2022-03-09 RX ORDER — GABAPENTIN 300 MG/1
300 CAPSULE ORAL 3 TIMES DAILY
Qty: 20 CAPSULE | Refills: 0 | Status: SHIPPED | OUTPATIENT
Start: 2022-03-09 | End: 2022-04-12

## 2022-03-14 ENCOUNTER — PATIENT MESSAGE (OUTPATIENT)
Dept: SURGERY | Facility: CLINIC | Age: 57
End: 2022-03-14
Payer: COMMERCIAL

## 2022-03-15 ENCOUNTER — OFFICE VISIT (OUTPATIENT)
Dept: SURGERY | Facility: CLINIC | Age: 57
End: 2022-03-15
Payer: COMMERCIAL

## 2022-03-15 VITALS
HEIGHT: 65 IN | HEART RATE: 71 BPM | SYSTOLIC BLOOD PRESSURE: 116 MMHG | WEIGHT: 179.88 LBS | BODY MASS INDEX: 29.97 KG/M2 | DIASTOLIC BLOOD PRESSURE: 75 MMHG

## 2022-03-15 DIAGNOSIS — K44.9 HIATAL HERNIA: Primary | ICD-10-CM

## 2022-03-15 PROCEDURE — 3074F PR MOST RECENT SYSTOLIC BLOOD PRESSURE < 130 MM HG: ICD-10-PCS | Mod: CPTII,S$GLB,, | Performed by: SURGERY

## 2022-03-15 PROCEDURE — 1160F PR REVIEW ALL MEDS BY PRESCRIBER/CLIN PHARMACIST DOCUMENTED: ICD-10-PCS | Mod: CPTII,S$GLB,, | Performed by: SURGERY

## 2022-03-15 PROCEDURE — 99024 POSTOP FOLLOW-UP VISIT: CPT | Mod: S$GLB,,, | Performed by: SURGERY

## 2022-03-15 PROCEDURE — 99999 PR PBB SHADOW E&M-EST. PATIENT-LVL IV: CPT | Mod: PBBFAC,,, | Performed by: SURGERY

## 2022-03-15 PROCEDURE — 3078F DIAST BP <80 MM HG: CPT | Mod: CPTII,S$GLB,, | Performed by: SURGERY

## 2022-03-15 PROCEDURE — 3008F PR BODY MASS INDEX (BMI) DOCUMENTED: ICD-10-PCS | Mod: CPTII,S$GLB,, | Performed by: SURGERY

## 2022-03-15 PROCEDURE — 99999 PR PBB SHADOW E&M-EST. PATIENT-LVL IV: ICD-10-PCS | Mod: PBBFAC,,, | Performed by: SURGERY

## 2022-03-15 PROCEDURE — 3008F BODY MASS INDEX DOCD: CPT | Mod: CPTII,S$GLB,, | Performed by: SURGERY

## 2022-03-15 PROCEDURE — 3078F PR MOST RECENT DIASTOLIC BLOOD PRESSURE < 80 MM HG: ICD-10-PCS | Mod: CPTII,S$GLB,, | Performed by: SURGERY

## 2022-03-15 PROCEDURE — 1159F PR MEDICATION LIST DOCUMENTED IN MEDICAL RECORD: ICD-10-PCS | Mod: CPTII,S$GLB,, | Performed by: SURGERY

## 2022-03-15 PROCEDURE — 1160F RVW MEDS BY RX/DR IN RCRD: CPT | Mod: CPTII,S$GLB,, | Performed by: SURGERY

## 2022-03-15 PROCEDURE — 99024 PR POST-OP FOLLOW-UP VISIT: ICD-10-PCS | Mod: S$GLB,,, | Performed by: SURGERY

## 2022-03-15 PROCEDURE — 1159F MED LIST DOCD IN RCRD: CPT | Mod: CPTII,S$GLB,, | Performed by: SURGERY

## 2022-03-15 PROCEDURE — 3074F SYST BP LT 130 MM HG: CPT | Mod: CPTII,S$GLB,, | Performed by: SURGERY

## 2022-03-15 RX ORDER — GABAPENTIN 600 MG/1
600 TABLET ORAL 3 TIMES DAILY PRN
Qty: 90 TABLET | Refills: 1 | Status: SHIPPED | OUTPATIENT
Start: 2022-03-15 | End: 2022-08-22

## 2022-03-15 NOTE — PROGRESS NOTES
I have seen the patient, reviewed the Student's history and physical, assessment and plan. I have personally interviewed and examined the patient at bedside and: agree with the findings.     S/p lap hh with duane 2/14/22.  She denies gerd, denies dysphagia (on soft diet) and has epigastric pain.  The pain comes and goes and is there mostly in the evening or when sleeping.  It is worsened if she bends over a lot.  Despite gabapentin 300 tid.  She says nsaids and muscle relaxants haven't helped.  She is taking ppi 1-2 times a day because of her pain but that hasn't helped.  She also complains of diarrhea (took 15 immodium pills at one time for this)-she has had chronic diarrhea for decades.  She denies any nausea and vomiting.  She has lost 2 pounds.    PE: wounds clear, abdomen soft, non-tender    Doing well except for epigastric pain.  Obtain esophagram.  Increase gabapentin dose.  She will be out of work and light duty until March 28 but may need more time if pain continues.  May also try FDGard for pain.  Rtc April 4.

## 2022-03-15 NOTE — LETTER
[facility]  [address]  [facility] [address]  Phone: [phone] March 15, 2022        Eric Hernandes Jr., MD  [address] [facility] [address]    Patient: Naomy Armstrong   MR Number: [id number]   YOB: 1965   Date of Visit: 3/15/2022     Dear Dr. Hernandes:    Thank you for referring Naomy Armstrong to me for evaluation. Attached you will find relevant portions of my assessment and plan of care.    If you have questions, please do not hesitate to call me. I look forward to following Naomy Armstrong along with you.    Sincerely,          MD ZHEN Rodrigues MD Enclosure

## 2022-03-15 NOTE — PROGRESS NOTES
"Naomy Armstrong is our 56 y.o. female w/PMHx of HTN s/p lap HH repair on 2/14/22 who has had an uncomplicated post operative course.      Subjective: Pt c/o epigastric pain, stating the onset comes randomly despite trying to eat and not eat. She states the pain is throughout the day and night, and notices the pain starts when laying down/sitting down, wakes pt up from sleep. Bending over causes onset of pain/exacerbates pain if present already. She describes the pain as "intense, as if someone punched her in the stomach", with a pulsating description. Rates the pain as 8-9/10. She has tried anti-inflammatories, Gapapentin 300 mg, Omeprazole 40 mg, but she states nothing helps. Denies lifting anything heavy things or doing strenuous exercises. Tolerating a diet without nausea or vomiting.  Having normal regular bowel movements.  Incision healing well without drainage.    Objective:    Review of Systems   Constitutional: Positive for malaise/fatigue. Negative for chills, fever and weight loss.   HENT: Negative for sore throat.    Respiratory: Positive for cough (Intermittent). Negative for hemoptysis, sputum production, shortness of breath and wheezing.    Cardiovascular: Negative for palpitations.   Gastrointestinal: Positive for abdominal pain (Epigastric pain), diarrhea (She states she took 15 OTC Imodium pills; helped) and nausea (chronic). Negative for blood in stool, constipation, heartburn and vomiting.   Genitourinary: Negative for dysuria, frequency and urgency.   Neurological: Positive for weakness and headaches. Negative for dizziness.   Psychiatric/Behavioral: Positive for depression.           Vitals:    03/15/22 1102   BP: 116/75   Pulse: 71       Incision sites clean, dry, and intact.  No erythema or drainage.  Abdomen soft and nontender.      Assessment: Naomy Armstrong is our 56 y.o. female w/PMHx of HTN s/p lap HH repair who has had an uncomplicated post operative course.    Plan:  - " Can follow up as needed  - Continue to not lift more than 10-15lbs for 4 more weeks  - Can call the clinic with any questions or concerns

## 2022-03-17 ENCOUNTER — HOSPITAL ENCOUNTER (OUTPATIENT)
Dept: RADIOLOGY | Facility: HOSPITAL | Age: 57
Discharge: HOME | End: 2022-03-17
Attending: SURGERY
Payer: COMMERCIAL

## 2022-03-17 DIAGNOSIS — K44.9 HIATAL HERNIA: ICD-10-CM

## 2022-03-17 PROCEDURE — 74220 FL ESOPHAGRAM COMPLETE: ICD-10-PCS | Mod: 26,,, | Performed by: RADIOLOGY

## 2022-03-17 PROCEDURE — 74220 X-RAY XM ESOPHAGUS 1CNTRST: CPT | Mod: 26,,, | Performed by: RADIOLOGY

## 2022-03-17 PROCEDURE — 25500020 PHARM REV CODE 255: Performed by: SURGERY

## 2022-03-17 PROCEDURE — 74220 X-RAY XM ESOPHAGUS 1CNTRST: CPT | Mod: TC

## 2022-03-17 RX ADMIN — IOHEXOL 100 ML: 350 INJECTION, SOLUTION INTRAVENOUS at 09:03

## 2022-04-11 ENCOUNTER — PATIENT OUTREACH (OUTPATIENT)
Dept: ADMINISTRATIVE | Facility: OTHER | Age: 57
End: 2022-04-11
Payer: COMMERCIAL

## 2022-04-11 ENCOUNTER — PATIENT MESSAGE (OUTPATIENT)
Dept: ADMINISTRATIVE | Facility: OTHER | Age: 57
End: 2022-04-11
Payer: COMMERCIAL

## 2022-04-11 NOTE — PROGRESS NOTES
Care Everywhere: updated  Immunization: updated  Health Maintenance: updated  Media Review: review for outside mammogram report   Legacy Review:   DIS:no profile in portal   Order placed:   Upcoming appts:  EFAX:  Task Tickets:Mammogram scheduling ticket sent to patient's portal   Referrals:

## 2022-04-12 ENCOUNTER — OFFICE VISIT (OUTPATIENT)
Dept: GASTROENTEROLOGY | Facility: CLINIC | Age: 57
End: 2022-04-12
Payer: COMMERCIAL

## 2022-04-12 DIAGNOSIS — Z87.19 HISTORY OF REPAIR OF HIATAL HERNIA: ICD-10-CM

## 2022-04-12 DIAGNOSIS — R93.3 ABNORMAL ESOPHAGRAM: ICD-10-CM

## 2022-04-12 DIAGNOSIS — R10.10 UPPER ABDOMINAL PAIN: Primary | ICD-10-CM

## 2022-04-12 DIAGNOSIS — Z98.890 HISTORY OF REPAIR OF HIATAL HERNIA: ICD-10-CM

## 2022-04-12 DIAGNOSIS — K58.0 IRRITABLE BOWEL SYNDROME WITH DIARRHEA: ICD-10-CM

## 2022-04-12 PROCEDURE — 1160F PR REVIEW ALL MEDS BY PRESCRIBER/CLIN PHARMACIST DOCUMENTED: ICD-10-PCS | Mod: CPTII,95,, | Performed by: INTERNAL MEDICINE

## 2022-04-12 PROCEDURE — 1159F MED LIST DOCD IN RCRD: CPT | Mod: CPTII,95,, | Performed by: INTERNAL MEDICINE

## 2022-04-12 PROCEDURE — 1160F RVW MEDS BY RX/DR IN RCRD: CPT | Mod: CPTII,95,, | Performed by: INTERNAL MEDICINE

## 2022-04-12 PROCEDURE — 1159F PR MEDICATION LIST DOCUMENTED IN MEDICAL RECORD: ICD-10-PCS | Mod: CPTII,95,, | Performed by: INTERNAL MEDICINE

## 2022-04-12 PROCEDURE — 99213 OFFICE O/P EST LOW 20 MIN: CPT | Mod: 95,,, | Performed by: INTERNAL MEDICINE

## 2022-04-12 PROCEDURE — 99213 PR OFFICE/OUTPT VISIT, EST, LEVL III, 20-29 MIN: ICD-10-PCS | Mod: 95,,, | Performed by: INTERNAL MEDICINE

## 2022-04-12 RX ORDER — TRAMADOL HYDROCHLORIDE 50 MG/1
50 TABLET ORAL EVERY 6 HOURS PRN
Qty: 28 TABLET | Refills: 0 | Status: SHIPPED | OUTPATIENT
Start: 2022-04-12 | End: 2022-04-19

## 2022-04-12 RX ORDER — DICYCLOMINE HYDROCHLORIDE 10 MG/1
10 CAPSULE ORAL 3 TIMES DAILY PRN
Qty: 90 CAPSULE | Refills: 2 | Status: SHIPPED | OUTPATIENT
Start: 2022-04-12 | End: 2022-07-11

## 2022-04-12 NOTE — PROGRESS NOTES
Gastroenterology Telemedicine Virtual Visit    The patient location is:  Patient Home  The chief complaint leading to consultation is:  Abdominal pain and follow-up diarrhea  Visit type: Virtual visit with synchronous audio and video      Narrative:  56 y.o. female here on a telemedicine visit for follow-up of abdominal pain, GERD, and diarrhea.  She was last seen in my clinic in October for these issues.  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  She followed up with Dr. Martinez who subsequently performed a hiatal hernia repair with Toupet fundoplication on 02/14/2022.  She has had onset of epigastric abdominal pain following the surgery.  Her diarrhea has continued.  The pain is across the upper part of the abdomen and is not related to eating or to bowel movements.  The pain was more frequent initially after the surgery occurring on a daily basis, but now is more intermittent with occurrences a couple of times per week.  Symptoms are severe at times.  She cannot pinpoint any clear triggers.  The pain may last from a few minutes up to an hour.  The pain has woken her from sleep.  She did not experience this prior to surgery.  Dr. Martinez increased her gabapentin, but she went back to her prior dose due to drowsiness.  She restarted omeprazole to see if this would help, but this did not change symptoms.  She tried over-the-counter pain medications that include Aleve, Motrin, and Tylenol; but none of these have helped.  She took a pain medication that she had had following the surgery, which did help, but caused her to feel very drowsy.  She does not like to use these medications because she feels they are too strong.  An esophagram done 03/17/2022 revealed delay of contrast as well as the 13 mm barium tablet at the lower esophagus.  She denies symptoms of dysphagia.    As for the diarrhea, she is still  having multiple bowel movements per day.  No blood in stool.  She is controlling this with Imodium.          Assessment:  1. Upper abdominal pain    2. History of repair of hiatal hernia    3. Abnormal esophagram    4. Irritable bowel syndrome with diarrhea      New onset upper abdominal pain following her recent hiatal hernia repair with Toupet fundoplication.  The symptoms are not related to eating or to bowel movements.  They occur randomly, and do not appear to necessary be related to her GI tract.  She has tried omeprazole without improvement.  She has also tried several over-the-counter remedies that includes NSAIDs and acetaminophen without benefit.  There was some relief with use of narcotic pain medication, but this side effects not desirable to her.  My feeling is that this is not related to a luminal problem in the GI tract.  She had a recent abnormal esophagram, but these findings would be consistent with her recent surgery.  Without dysphagia or associated symptoms, I do not believe this is clinically relevant, and unlikely related to her pain.  She had a prior esophageal manometry that showed EGJ outflow obstruction, but this was likely due to the hiatal hernia.  She is already followed by Dr. Powell in pain management.    She has had ongoing diarrhea with some improvement using Imodium, but needing an excessive amount of doses.        Recommendation:  I will give her tramadol one-week supply to see if this helps with her pain.  I recommend she consult with her pain management specialist regarding evaluation and management of the pain.  For the diarrhea, I will give her a prescription for rifaximin to see if this helps.  If not, we may need to schedule her for a colonoscopy to take samples of the colon to rule out microscopic colitis.  Bentyl refilled.            Total time spent with patient:  20 min      Each patient to whom he or she provides medical services by telemedicine is:  (1) informed of  the relationship between the physician and patient and the respective role of any other health care provider with respect to management of the patient; and (2) notified that he or she may decline to receive medical services by telemedicine and may withdraw from such care at any time.

## 2022-04-13 NOTE — TELEPHONE ENCOUNTER
Refill Routing Note   Medication(s) are not appropriate for processing by [facility] for the following reason(s):      - Patient has been seen in the ED/Hospital since the last PCP visit    [facility] action(s):  Defer       Medication Therapy Plan: FOV;  Medication reconciliation completed: No     Appointments  past 12m or future 3m with PCP    Date Provider   Last Visit   5/25/2021 Eric Hernandes Jr., MD   Next Visit   5/18/2022 Eric Hernandes Jr., MD   ED visits in past 90 days: 0        Note composed:9:09 AM 04/13/2022

## 2022-04-13 NOTE — TELEPHONE ENCOUNTER
No new care gaps identified.  Powered by [employer]. Reference number: [id number].   4/12/2022 9:25:41 PM CDT

## 2022-04-14 RX ORDER — AMLODIPINE BESYLATE 5 MG/1
5 TABLET ORAL DAILY
Qty: 90 TABLET | Refills: 0 | Status: SHIPPED | OUTPATIENT
Start: 2022-04-14 | End: 2022-05-18 | Stop reason: SDUPTHER

## 2022-04-20 ENCOUNTER — PATIENT MESSAGE (OUTPATIENT)
Dept: RHEUMATOLOGY | Facility: CLINIC | Age: 57
End: 2022-04-20

## 2022-04-20 ENCOUNTER — PATIENT MESSAGE (OUTPATIENT)
Dept: GASTROENTEROLOGY | Facility: CLINIC | Age: 57
End: 2022-04-20
Payer: COMMERCIAL

## 2022-04-20 ENCOUNTER — OFFICE VISIT (OUTPATIENT)
Dept: RHEUMATOLOGY | Facility: CLINIC | Age: 57
End: 2022-04-20
Payer: COMMERCIAL

## 2022-04-20 VITALS
RESPIRATION RATE: 20 BRPM | HEIGHT: 65 IN | HEART RATE: 82 BPM | SYSTOLIC BLOOD PRESSURE: 107 MMHG | WEIGHT: 185.63 LBS | OXYGEN SATURATION: 98 % | DIASTOLIC BLOOD PRESSURE: 78 MMHG | BODY MASS INDEX: 30.93 KG/M2

## 2022-04-20 DIAGNOSIS — E66.9 CLASS 1 OBESITY WITH BODY MASS INDEX (BMI) OF 30.0 TO 30.9 IN ADULT, UNSPECIFIED OBESITY TYPE, UNSPECIFIED WHETHER SERIOUS COMORBIDITY PRESENT: ICD-10-CM

## 2022-04-20 DIAGNOSIS — Z71.89 COUNSELING AND COORDINATION OF CARE: ICD-10-CM

## 2022-04-20 DIAGNOSIS — M70.61 TROCHANTERIC BURSITIS OF RIGHT HIP: ICD-10-CM

## 2022-04-20 DIAGNOSIS — M15.9 PRIMARY OSTEOARTHRITIS INVOLVING MULTIPLE JOINTS: Primary | ICD-10-CM

## 2022-04-20 DIAGNOSIS — Z79.1 NSAID LONG-TERM USE: ICD-10-CM

## 2022-04-20 PROCEDURE — 99214 PR OFFICE/OUTPT VISIT, EST, LEVL IV, 30-39 MIN: ICD-10-PCS | Mod: S$GLB,,, | Performed by: INTERNAL MEDICINE

## 2022-04-20 PROCEDURE — 3008F PR BODY MASS INDEX (BMI) DOCUMENTED: ICD-10-PCS | Mod: CPTII,S$GLB,, | Performed by: INTERNAL MEDICINE

## 2022-04-20 PROCEDURE — 99999 PR PBB SHADOW E&M-EST. PATIENT-LVL III: CPT | Mod: PBBFAC,,, | Performed by: INTERNAL MEDICINE

## 2022-04-20 PROCEDURE — 3078F PR MOST RECENT DIASTOLIC BLOOD PRESSURE < 80 MM HG: ICD-10-PCS | Mod: CPTII,S$GLB,, | Performed by: INTERNAL MEDICINE

## 2022-04-20 PROCEDURE — 3078F DIAST BP <80 MM HG: CPT | Mod: CPTII,S$GLB,, | Performed by: INTERNAL MEDICINE

## 2022-04-20 PROCEDURE — 3074F PR MOST RECENT SYSTOLIC BLOOD PRESSURE < 130 MM HG: ICD-10-PCS | Mod: CPTII,S$GLB,, | Performed by: INTERNAL MEDICINE

## 2022-04-20 PROCEDURE — 1159F PR MEDICATION LIST DOCUMENTED IN MEDICAL RECORD: ICD-10-PCS | Mod: CPTII,S$GLB,, | Performed by: INTERNAL MEDICINE

## 2022-04-20 PROCEDURE — 99214 OFFICE O/P EST MOD 30 MIN: CPT | Mod: S$GLB,,, | Performed by: INTERNAL MEDICINE

## 2022-04-20 PROCEDURE — 1159F MED LIST DOCD IN RCRD: CPT | Mod: CPTII,S$GLB,, | Performed by: INTERNAL MEDICINE

## 2022-04-20 PROCEDURE — 3008F BODY MASS INDEX DOCD: CPT | Mod: CPTII,S$GLB,, | Performed by: INTERNAL MEDICINE

## 2022-04-20 PROCEDURE — 99999 PR PBB SHADOW E&M-EST. PATIENT-LVL III: ICD-10-PCS | Mod: PBBFAC,,, | Performed by: INTERNAL MEDICINE

## 2022-04-20 PROCEDURE — 3074F SYST BP LT 130 MM HG: CPT | Mod: CPTII,S$GLB,, | Performed by: INTERNAL MEDICINE

## 2022-04-20 RX ORDER — CELECOXIB 100 MG/1
100 CAPSULE ORAL 2 TIMES DAILY
Qty: 60 CAPSULE | Refills: 6 | Status: SHIPPED | OUTPATIENT
Start: 2022-04-20 | End: 2024-01-04

## 2022-04-20 NOTE — PROGRESS NOTES
RHEUMATOLOGY OUTPATIENT CLINIC NOTE    4/20/2022    Attending Rheumatologist: Jung Hutchison  Primary Care Provider: Eric Hernandes Jr, MD   Physician Requesting Consultation: No referring provider defined for this encounter.  Chief Complaint/Reason For Consultation:  No chief complaint on file.      Subjective:       CARLOS Armstrong is a 56 y.o. White female with medical history noted below presents for evaluation of arthralgias.  Patient was sent from Orthopedics for possible COVID related arthralgias.  She notes she has been following with Orthopedics and Pain Management for osteoarthritis has had multiple injections in the past and pain has been under control.  Does she developed COVID the end of January in afterwards noted that her right knee flared up.  Saw Orthopedics and was given steroid shot February 24th, typically shots will result the pain, though this time she notes that the pain has persisted.  She notes in addition to pain swelling.  Has taken some Aleve with relief occasionally.  Also notes some right shoulder pain as well.  Denies rash, fatigue, weight loss or other systemic symptoms.    Today  Patient here for follow up.   Last visit care for OA continued and given CSI to left knee and right trochanter. She notes her Right trochanter is still feeling well. Her left knee pain returned last week, but she has been taking NSAIDs with relief. Also notes when she wears tennis shoes to work that her joints hurt less, would like a note for this. No other issues.     Review of Systems   Constitutional: Negative for appetite change, chills, fatigue, fever and unexpected weight change.   HENT: Negative for nasal congestion, ear discharge, ear pain, hearing loss, mouth sores, nosebleeds, sneezing, sore throat, tinnitus and trouble swallowing.    Eyes: Negative for photophobia, pain, discharge, redness, itching and visual disturbance.   Respiratory: Negative for cough, chest tightness,  shortness of breath and wheezing.    Cardiovascular: Negative for chest pain, palpitations and leg swelling.   Gastrointestinal: Negative for abdominal distention, abdominal pain, blood in stool, constipation, diarrhea, nausea and vomiting.   Endocrine: Negative for cold intolerance, heat intolerance, polydipsia, polyphagia and polyuria.   Genitourinary: Negative for difficulty urinating, dyspareunia, dysuria, flank pain, frequency, genital sores, hematuria, menstrual problem, pelvic pain, urgency, vaginal bleeding, vaginal discharge, vaginal pain and vaginal dryness.   Musculoskeletal: Positive for arthralgias. Negative for back pain, gait problem, joint swelling, leg pain, myalgias, neck pain, neck stiffness and joint deformity.   Integumentary:  Negative for pallor and rash.   Neurological: Negative for dizziness, seizures, weakness, light-headedness, numbness and headaches.   Hematological: Negative for adenopathy. Does not bruise/bleed easily.   Psychiatric/Behavioral: Negative for confusion, decreased concentration and sleep disturbance. The patient is not nervous/anxious.    All other systems reviewed and are negative.       Chronic comorbid conditions affecting medical decision making today:  Past Medical History:   Diagnosis Date    Alcohol abuse     per chart, but patient denies today and cocaine per chart    Anxiety     Colon polyp     Depression     Hallucination     last couple of months started seeing someone standing in her room, saw boyfriend's reflection in glass    Headache     History of psychiatric hospitalization     age 13 [facility] for 2 months for acting out; 2018 for SI    Hx of psychiatric care     Hypertension     Psychiatric problem     Sleep difficulties     Suicide attempt     with knife by cutting stomach    Therapy      Past Surgical History:   Procedure Laterality Date    COLONOSCOPY N/A 5/7/2021    Procedure: COLONOSCOPY;  Surgeon: Prem Montana MD;  Location: [facility]  ENDO;  Service: Endoscopy;  Laterality: N/A;  COVID screening 5/4 LAPC-instr portal -ml    EPIDURAL STEROID INJECTION Bilateral 8/5/2020    Procedure: Bilateral MBB @ L3, L4, L5;  Surgeon: Jeremy Powell Jr., MD;  Location: [facility];  Service: Pain Management;  Laterality: Bilateral;  Bilat L3-5 MBB  Arrive @ 1315; No ATC or DM; Needs MD signature    EPIDURAL STEROID INJECTION Right 8/19/2020    Procedure: Lumbar Radiofrequency Thermocoagulation of Medial Branches;  Surgeon: eJremy Powell Jr., MD;  Location: [facility] ENDO;  Service: Pain Management;  Laterality: Right;  Right L3-5 RFA  Arrive @ 1045; No ATC or DM; Needs MD Signature    EPIDURAL STEROID INJECTION Left 9/4/2020    Procedure: Lumbar Radiofrequency Thermocoagulation of Medial Branches;  Surgeon: Jeremy Powell Jr., MD;  Location: [facility];  Service: Pain Management;  Laterality: Left;  Left L3-5 RFA  Arrive @ 0900 (requested); No ATC or DM; Needs MD signature    EPIDURAL STEROID INJECTION Bilateral 4/16/2021    Procedure: Sacroiliac Joint Steroid Injections @ Bilateral;  Surgeon: Jeremy Powell Jr., MD;  Location: [facility];  Service: Pain Management;  Laterality: Bilateral;  Arrive @ 1300; No ATC or DM    EPIDURAL STEROID INJECTION Bilateral 1/19/2022    Procedure: Bilateral Sacroiliac Joint Injections;  Surgeon: Jeremy Powell Jr., MD;  Location: [facility];  Service: Pain Management;  Laterality: Bilateral;  Arrive @ 1315; No ATC or DM; Needs Consent    ESOPHAGEAL MANOMETRY WITH MEASUREMENT OF IMPEDANCE N/A 10/20/2021    Procedure: MANOMETRY, ESOPHAGUS, WITH IMPEDANCE MEASUREMENT;  Surgeon: Anastacia Odonnell MD;  [facility]  Service: Endoscopy;  Laterality: N/A;  Covid test 10/17 [place], instructions sent to [email or web address]    ESOPHAGOGASTRODUODENOSCOPY N/A 5/7/2021    Procedure: EGD (ESOPHAGOGASTRODUODENOSCOPY);  Surgeon: Prem Montana MD;  Location: [facility];  Service: Endoscopy;  Laterality:  N/A;  added on per Dr. NOEL Shepherd    LAPAROSCOPIC TOUPET FUNDOPLICATION N/A 2/14/2022    Procedure: FUNDOPLICATION, LAPAROSCOPIC, TOUPET;  Surgeon: Christian Martinez MD;  Location: [facility] OR [address] [place] [address];  Service: General;  Laterality: N/A;     Family History   Problem Relation Age of Onset    Anxiety disorder Sister     Depression Sister     Alcohol abuse Maternal Uncle     Alcohol abuse Maternal Grandfather      Social History     Substance and Sexual Activity   Alcohol Use Yes    Comment: occasional     Social History     Tobacco Use   Smoking Status Former Smoker    Packs/day: 1.00    Quit date: 4/26/2006    Years since quitting: 15.9   Smokeless Tobacco Never Used     Social History     Substance and Sexual Activity   Drug Use Not Currently    Types: Marijuana    Comment: tried at age 14       Current Outpatient Medications:     amLODIPine (NORVASC) 5 MG tablet, Take 1 tablet (5 mg total) by mouth once daily., Disp: 90 tablet, Rfl: 0    dicyclomine (BENTYL) 10 MG capsule, Take 1 capsule (10 mg total) by mouth 3 (three) times daily as needed (abdominal cramping)., Disp: 90 capsule, Rfl: 2    FLUoxetine 20 MG capsule, Take 1 capsule (20 mg total) by mouth once daily. Take in addition to 40 mg for a total of 60mg daily. (Patient taking differently: Take 20 mg by mouth every evening. Take in addition to 40 mg for a total of 60mg daily.), Disp: 90 capsule, Rfl: 3    FLUoxetine 40 MG capsule, Take 1 capsule (40 mg total) by mouth once daily. Take in addition to 20 mg for a total of 60mg daily. (Patient taking differently: Take 40 mg by mouth nightly. Take in addition to 20 mg for a total of 60mg daily.), Disp: 90 capsule, Rfl: 3    gabapentin (NEURONTIN) 600 MG tablet, Take 1 tablet (600 mg total) by mouth 3 (three) times daily as needed., Disp: 90 tablet, Rfl: 1    hydrOXYzine HCL (ATARAX) 10 MG Tab, Take 1 tablet (10 mg total) by mouth 2 (two) times daily as needed (anxiety)., Disp: 60 tablet, Rfl: 3     lisinopriL (PRINIVIL,ZESTRIL) 20 MG tablet, Take 1 tablet (20 mg total) by mouth once daily. (Patient taking differently: Take 20 mg by mouth nightly.), Disp: 90 tablet, Rfl: 3    loperamide (IMODIUM) 2 mg capsule, Take 2 mg by mouth once as needed for Diarrhea., Disp: , Rfl:     multivitamin with minerals tablet, Take 1 tablet by mouth once daily., Disp: , Rfl:     omeprazole (PRILOSEC) 40 MG capsule, Take 1 capsule (40 mg total) by mouth every morning. (Patient taking differently: Take 40 mg by mouth nightly.), Disp: 90 capsule, Rfl: 3    ondansetron (ZOFRAN) 4 MG tablet, Take 1 tablet (4 mg total) by mouth every 12 (twelve) hours as needed for Nausea., Disp: 20 tablet, Rfl: 0    promethazine (PHENERGAN) 25 MG tablet, Take 1 tablet (25 mg total) by mouth every 6 (six) hours as needed for Nausea., Disp: 30 tablet, Rfl: 0    rifAXIMin (XIFAXAN) 550 mg Tab, Take 1 tablet (550 mg total) by mouth 3 (three) times daily. for 14 days, Disp: 42 tablet, Rfl: 0     Objective:         Vitals:    04/20/22 0803   BP: 107/78   Pulse: 82   Resp: 20     Physical Exam   Constitutional: She is oriented to person, place, and time.   HENT:   Head: Normocephalic and atraumatic.   Right Ear: External ear normal.   Left Ear: External ear normal.   Nose: Nose normal.   Mouth/Throat: Oropharynx is clear and moist.   Eyes: Pupils are equal, round, and reactive to light. Conjunctivae are normal.   Cardiovascular: Normal rate and regular rhythm.   Pulmonary/Chest: Effort normal and breath sounds normal.   Abdominal: Soft. Bowel sounds are normal.   Musculoskeletal:      Right shoulder: Normal.      Left shoulder: Normal.      Right elbow: Normal.      Left elbow: Normal.      Right wrist: Normal.      Left wrist: Normal.      Cervical back: Normal range of motion and neck supple.      Right knee: Normal.      Left knee: Normal.   Neurological: She is alert and oriented to person, place, and time.   Skin: No rash noted. No erythema.    Psychiatric: Mood and affect normal.       Right Side Rheumatological Exam     Examination finds the shoulder, elbow, wrist, knee, 1st PIP, 1st MCP, 2nd PIP, 2nd MCP, 3rd PIP, 3rd MCP, 4th PIP, 4th MCP, 5th PIP and 5th MCP normal.    Knee Exam   Patellofemoral Crepitus: positive    Left Side Rheumatological Exam     Examination finds the shoulder, elbow, wrist, knee, 1st PIP, 1st MCP, 2nd PIP, 2nd MCP, 3rd PIP, 3rd MCP, 4th PIP, 4th MCP, 5th PIP and 5th MCP normal.    Knee Exam     Patellofemoral Crepitus: positive          Reviewed old and all outside pertinent medical records available.    All lab results personally reviewed and interpreted by me.  Lab Results   Component Value Date    WBC 9.29 02/15/2022    HGB 11.5 (L) 02/15/2022    HCT 38.2 02/15/2022    MCV 86 02/15/2022    MCH 25.9 (L) 02/15/2022    MCHC 30.1 (L) 02/15/2022    RDW 16.0 (H) 02/15/2022     02/15/2022    MPV 10.2 02/15/2022       Lab Results   Component Value Date     (L) 02/15/2022    K 5.2 (H) 02/15/2022     02/15/2022    CO2 24 02/15/2022    GLU 86 02/15/2022    BUN 8 02/15/2022    CALCIUM 10.2 02/15/2022    PROT 6.5 03/16/2021    ALBUMIN 3.4 (L) 03/16/2021    BILITOT 0.3 03/16/2021    AST 12 03/16/2021    ALKPHOS 125 03/16/2021    ALT 17 03/16/2021       Lab Results   Component Value Date    COLORU Colorless (A) 12/20/2019    APPEARANCEUA Clear 12/20/2019    SPECGRAV 1.005 12/20/2019    PHUR 6.0 12/20/2019    PROTEINUA Negative 12/20/2019    KETONESU Negative 12/20/2019    LEUKOCYTESUR Negative 12/20/2019    NITRITE Negative 12/20/2019    UROBILINOGEN Negative 12/20/2019       Lab Results   Component Value Date    CRP 6.3 03/16/2021       Lab Results   Component Value Date    SEDRATE 12 03/16/2021       Lab Results   Component Value Date    SEDRATE 12 03/16/2021       No components found for: 25OHVITDTOT, 95FRSLGH3, 12RSHHFC6, METHODNOTE    No results found for: URICACID    No components found for:  TSPOTTB        Imaging:  All imaging reviewed and independently interpreted by me.         ASSESSMENT / PLAN:     Naomy Armstrong is a 56 y.o. White female with:      1. Primary osteoarthritis of both knees  - patient with know diagnosis of OA  - stable  - notes CSI last visit helped until last week, has been taking NSAIDs with relief    - wt loss  - continue NSAIDs  - reassurance and exercise    2. Right Trochanter Bursitis  - improved with CSI last visit     3.  Chronic NSAID use  - no history of GI bleed or coronary artery disease.  - previous labs without features of CKD.  - clinical significance side effect of long-term NSAID use discussed in detail.  - recommended for alternative therapies for pain management.    4. Other specified counseling  - over 10 minutes spent regarding below topics:  - Immunization counseling done.  - Weight loss counseling done.  - Nutrition and exercise counseling.  - Limitation of alcohol consumption.  - Regular exercise:  Aerobic and resistance.  - Medication counseling provided.    5. Obesity  - would benefit from decreasing at least 10% of body weight.  - recommended goal of losing 1 lb per week.  - consider nutritionist evaluation.      Follow up in about 6 months (around 10/20/2022).    Method of contact with patient concerns: [facility]    Disclaimer:  This note is prepared using voice recognition software and as such is likely to have errors and has not been proof read. Please contact me for questions.     Time spent: 30 minutes in face to face discussion concerning diagnosis, prognosis, review of lab and test results, benefits of treatment as well as management of disease, counseling of patient and coordination of care between various health care providers.  Greater than half the time spent was used for coordination of care and counseling of patient.    Jung Hutchison M.D.  Rheumatology Department   [facility]

## 2022-05-06 ENCOUNTER — OFFICE VISIT (OUTPATIENT)
Dept: GASTROENTEROLOGY | Facility: CLINIC | Age: 57
End: 2022-05-06
Payer: COMMERCIAL

## 2022-05-06 DIAGNOSIS — K58.0 IRRITABLE BOWEL SYNDROME WITH DIARRHEA: Primary | ICD-10-CM

## 2022-05-06 DIAGNOSIS — R10.10 UPPER ABDOMINAL PAIN: ICD-10-CM

## 2022-05-06 DIAGNOSIS — K21.9 GASTROESOPHAGEAL REFLUX DISEASE, UNSPECIFIED WHETHER ESOPHAGITIS PRESENT: ICD-10-CM

## 2022-05-06 PROCEDURE — 99213 PR OFFICE/OUTPT VISIT, EST, LEVL III, 20-29 MIN: ICD-10-PCS | Mod: 95,,, | Performed by: INTERNAL MEDICINE

## 2022-05-06 PROCEDURE — 1159F PR MEDICATION LIST DOCUMENTED IN MEDICAL RECORD: ICD-10-PCS | Mod: CPTII,95,, | Performed by: INTERNAL MEDICINE

## 2022-05-06 PROCEDURE — 1160F PR REVIEW ALL MEDS BY PRESCRIBER/CLIN PHARMACIST DOCUMENTED: ICD-10-PCS | Mod: CPTII,95,, | Performed by: INTERNAL MEDICINE

## 2022-05-06 PROCEDURE — 99213 OFFICE O/P EST LOW 20 MIN: CPT | Mod: 95,,, | Performed by: INTERNAL MEDICINE

## 2022-05-06 PROCEDURE — 1160F RVW MEDS BY RX/DR IN RCRD: CPT | Mod: CPTII,95,, | Performed by: INTERNAL MEDICINE

## 2022-05-06 PROCEDURE — 1159F MED LIST DOCD IN RCRD: CPT | Mod: CPTII,95,, | Performed by: INTERNAL MEDICINE

## 2022-05-06 NOTE — PROGRESS NOTES
Gastroenterology Telemedicine Virtual Visit    The patient location is:  Patient Home  The chief complaint leading to consultation is:  Follow-up  Visit type: Virtual visit with synchronous audio and video      Narrative:  56 y.o. female here on a telemedicine visit for follow-up of abdominal pain and diarrhea.  I saw her 04/12/2022 for the same.  I prescribed her rifaximin, but she was unable to obtain it from her local pharmacy.  She was told that her insurance did not cover it.  It is unclear if a PA was submitted a knot.  She has use the tramadol as needed, usually about 7 tablets out of the week.  She still has 11 tablets of the 28 that I gave her on 04/12.  She has an upcoming appointment with her pain specialist on May 20th.  She continues to have ongoing symptoms same as when I last saw her.  She is still taking her Bentyl, gabapentin, omeprazole, and Celebrex.          Assessment:  1. Irritable bowel syndrome with diarrhea    2. Upper abdominal pain    3. Gastroesophageal reflux disease, unspecified whether esophagitis present      Ongoing upper abdominal pain following her recent hiatal hernia repair with Toupet fundoplication.  The symptoms are not related to eating or to bowel movements.  They occur randomly, and do not appear to necessary be related to her GI tract.  She has tried omeprazole without improvement.  She has also tried several over-the-counter remedies that includes NSAIDs and acetaminophen without benefit.  There was some relief with use of narcotic pain medication, but this side effects not desirable to her.  I gave her Tramadol low-dose which has helped (she is using sparingly).  My feeling is that this is not related to a luminal problem in the GI tract.  She had a recent abnormal esophagram, but these findings would be consistent with her recent surgery.  Without dysphagia or associated symptoms, I do not believe this is clinically relevant, and unlikely related to her pain.  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  She has an appointment with Dr. Powell in pain management on 5/20.     She has had ongoing diarrhea with some improvement using Imodium, but needing an excessive amount of doses.  Rifaximin prescribed her last visit, but she has not been able to obtain yet.        Recommendation:  I will prescribe rifaximin again for her diarrhea, but will send the prescription to our specialty pharmacy for assistance in obtaining approval from her insurance.  If we are unable to obtain this, I am hesitant to use an antibiotic alternative due to potential side effects.  In that case, I might consider at home breath test for SIBO.  Also colonoscopy may be needed to get random biopsies to rule out microscopic colitis.          Total time spent with patient:  20 min      Each patient to whom he or she provides medical services by telemedicine is:  (1) informed of the relationship between the physician and patient and the respective role of any other health care provider with respect to management of the patient; and (2) notified that he or she may decline to receive medical services by telemedicine and may withdraw from such care at any time.

## 2022-05-10 ENCOUNTER — TELEPHONE (OUTPATIENT)
Dept: PHARMACY | Facility: CLINIC | Age: 57
End: 2022-05-10
Payer: COMMERCIAL

## 2022-05-11 ENCOUNTER — PATIENT MESSAGE (OUTPATIENT)
Dept: GASTROENTEROLOGY | Facility: CLINIC | Age: 57
End: 2022-05-11
Payer: COMMERCIAL

## 2022-05-11 ENCOUNTER — PATIENT MESSAGE (OUTPATIENT)
Dept: PHARMACY | Facility: CLINIC | Age: 57
End: 2022-05-11
Payer: COMMERCIAL

## 2022-05-11 ENCOUNTER — PATIENT MESSAGE (OUTPATIENT)
Dept: SURGERY | Facility: CLINIC | Age: 57
End: 2022-05-11
Payer: COMMERCIAL

## 2022-05-13 ENCOUNTER — PATIENT MESSAGE (OUTPATIENT)
Dept: GASTROENTEROLOGY | Facility: CLINIC | Age: 57
End: 2022-05-13
Payer: COMMERCIAL

## 2022-05-13 RX ORDER — TRAMADOL HYDROCHLORIDE 50 MG/1
50 TABLET ORAL EVERY 8 HOURS PRN
Qty: 14 TABLET | Refills: 0 | Status: SHIPPED | OUTPATIENT
Start: 2022-05-13 | End: 2022-05-20

## 2022-05-18 ENCOUNTER — LAB VISIT (OUTPATIENT)
Dept: LAB | Facility: HOSPITAL | Age: 57
End: 2022-05-18
Attending: FAMILY MEDICINE
Payer: COMMERCIAL

## 2022-05-18 ENCOUNTER — PATIENT OUTREACH (OUTPATIENT)
Dept: ADMINISTRATIVE | Facility: OTHER | Age: 57
End: 2022-05-18
Payer: COMMERCIAL

## 2022-05-18 ENCOUNTER — PATIENT MESSAGE (OUTPATIENT)
Dept: GASTROENTEROLOGY | Facility: CLINIC | Age: 57
End: 2022-05-18
Payer: COMMERCIAL

## 2022-05-18 ENCOUNTER — TELEPHONE (OUTPATIENT)
Dept: GASTROENTEROLOGY | Facility: CLINIC | Age: 57
End: 2022-05-18
Payer: COMMERCIAL

## 2022-05-18 ENCOUNTER — OFFICE VISIT (OUTPATIENT)
Dept: FAMILY MEDICINE | Facility: CLINIC | Age: 57
End: 2022-05-18
Payer: COMMERCIAL

## 2022-05-18 VITALS
HEART RATE: 82 BPM | OXYGEN SATURATION: 97 % | RESPIRATION RATE: 16 BRPM | SYSTOLIC BLOOD PRESSURE: 112 MMHG | TEMPERATURE: 99 F | WEIGHT: 187.19 LBS | BODY MASS INDEX: 31.19 KG/M2 | HEIGHT: 65 IN | DIASTOLIC BLOOD PRESSURE: 78 MMHG

## 2022-05-18 DIAGNOSIS — Z00.00 GENERAL MEDICAL EXAM: ICD-10-CM

## 2022-05-18 DIAGNOSIS — Z11.4 ENCOUNTER FOR SCREENING FOR HIV: ICD-10-CM

## 2022-05-18 DIAGNOSIS — K52.9 CHRONIC DIARRHEA: Primary | ICD-10-CM

## 2022-05-18 DIAGNOSIS — Z12.31 ENCOUNTER FOR SCREENING MAMMOGRAM FOR MALIGNANT NEOPLASM OF BREAST: ICD-10-CM

## 2022-05-18 DIAGNOSIS — R11.0 NAUSEA: ICD-10-CM

## 2022-05-18 DIAGNOSIS — I10 HYPERTENSION, ESSENTIAL: ICD-10-CM

## 2022-05-18 DIAGNOSIS — Z00.00 GENERAL MEDICAL EXAM: Primary | ICD-10-CM

## 2022-05-18 LAB
ALBUMIN SERPL BCP-MCNC: 3.4 G/DL (ref 3.5–5.2)
ALP SERPL-CCNC: 124 U/L (ref 55–135)
ALT SERPL W/O P-5'-P-CCNC: 22 U/L (ref 10–44)
ANION GAP SERPL CALC-SCNC: 8 MMOL/L (ref 8–16)
AST SERPL-CCNC: 13 U/L (ref 10–40)
BASOPHILS # BLD AUTO: 0.04 K/UL (ref 0–0.2)
BASOPHILS NFR BLD: 0.7 % (ref 0–1.9)
BILIRUB SERPL-MCNC: 0.2 MG/DL (ref 0.1–1)
BUN SERPL-MCNC: 21 MG/DL (ref 6–20)
CALCIUM SERPL-MCNC: 9.3 MG/DL (ref 8.7–10.5)
CHLORIDE SERPL-SCNC: 108 MMOL/L (ref 95–110)
CHOLEST SERPL-MCNC: 207 MG/DL (ref 120–199)
CHOLEST/HDLC SERPL: 3.2 {RATIO} (ref 2–5)
CO2 SERPL-SCNC: 25 MMOL/L (ref 23–29)
CREAT SERPL-MCNC: 1 MG/DL (ref 0.5–1.4)
DIFFERENTIAL METHOD: ABNORMAL
EOSINOPHIL # BLD AUTO: 0.1 K/UL (ref 0–0.5)
EOSINOPHIL NFR BLD: 1.9 % (ref 0–8)
ERYTHROCYTE [DISTWIDTH] IN BLOOD BY AUTOMATED COUNT: 14.6 % (ref 11.5–14.5)
EST. GFR  (AFRICAN AMERICAN): >60 ML/MIN/1.73 M^2
EST. GFR  (NON AFRICAN AMERICAN): >60 ML/MIN/1.73 M^2
GLUCOSE SERPL-MCNC: 66 MG/DL (ref 70–110)
HCT VFR BLD AUTO: 38.6 % (ref 37–48.5)
HDLC SERPL-MCNC: 64 MG/DL (ref 40–75)
HDLC SERPL: 30.9 % (ref 20–50)
HGB BLD-MCNC: 11.5 G/DL (ref 12–16)
IMM GRANULOCYTES # BLD AUTO: 0.01 K/UL (ref 0–0.04)
IMM GRANULOCYTES NFR BLD AUTO: 0.2 % (ref 0–0.5)
LDLC SERPL CALC-MCNC: 100 MG/DL (ref 63–159)
LYMPHOCYTES # BLD AUTO: 1.8 K/UL (ref 1–4.8)
LYMPHOCYTES NFR BLD: 29.5 % (ref 18–48)
MCH RBC QN AUTO: 25.8 PG (ref 27–31)
MCHC RBC AUTO-ENTMCNC: 29.8 G/DL (ref 32–36)
MCV RBC AUTO: 87 FL (ref 82–98)
MONOCYTES # BLD AUTO: 0.5 K/UL (ref 0.3–1)
MONOCYTES NFR BLD: 9.1 % (ref 4–15)
NEUTROPHILS # BLD AUTO: 3.5 K/UL (ref 1.8–7.7)
NEUTROPHILS NFR BLD: 58.6 % (ref 38–73)
NONHDLC SERPL-MCNC: 143 MG/DL
NRBC BLD-RTO: 0 /100 WBC
PLATELET # BLD AUTO: 330 K/UL (ref 150–450)
PMV BLD AUTO: 10 FL (ref 9.2–12.9)
POTASSIUM SERPL-SCNC: 4 MMOL/L (ref 3.5–5.1)
PROT SERPL-MCNC: 6.4 G/DL (ref 6–8.4)
RBC # BLD AUTO: 4.45 M/UL (ref 4–5.4)
SODIUM SERPL-SCNC: 141 MMOL/L (ref 136–145)
TRIGL SERPL-MCNC: 215 MG/DL (ref 30–150)
WBC # BLD AUTO: 5.94 K/UL (ref 3.9–12.7)

## 2022-05-18 PROCEDURE — 3008F BODY MASS INDEX DOCD: CPT | Mod: CPTII,S$GLB,, | Performed by: FAMILY MEDICINE

## 2022-05-18 PROCEDURE — 99396 PREV VISIT EST AGE 40-64: CPT | Mod: S$GLB,,, | Performed by: FAMILY MEDICINE

## 2022-05-18 PROCEDURE — 80061 LIPID PANEL: CPT | Performed by: FAMILY MEDICINE

## 2022-05-18 PROCEDURE — 36415 COLL VENOUS BLD VENIPUNCTURE: CPT | Mod: PN | Performed by: FAMILY MEDICINE

## 2022-05-18 PROCEDURE — 4010F ACE/ARB THERAPY RXD/TAKEN: CPT | Mod: CPTII,S$GLB,, | Performed by: FAMILY MEDICINE

## 2022-05-18 PROCEDURE — 3078F DIAST BP <80 MM HG: CPT | Mod: CPTII,S$GLB,, | Performed by: FAMILY MEDICINE

## 2022-05-18 PROCEDURE — 1159F PR MEDICATION LIST DOCUMENTED IN MEDICAL RECORD: ICD-10-PCS | Mod: CPTII,S$GLB,, | Performed by: FAMILY MEDICINE

## 2022-05-18 PROCEDURE — 1160F RVW MEDS BY RX/DR IN RCRD: CPT | Mod: CPTII,S$GLB,, | Performed by: FAMILY MEDICINE

## 2022-05-18 PROCEDURE — 1159F MED LIST DOCD IN RCRD: CPT | Mod: CPTII,S$GLB,, | Performed by: FAMILY MEDICINE

## 2022-05-18 PROCEDURE — 87389 HIV-1 AG W/HIV-1&-2 AB AG IA: CPT | Performed by: FAMILY MEDICINE

## 2022-05-18 PROCEDURE — 99396 PR PREVENTIVE VISIT,EST,40-64: ICD-10-PCS | Mod: S$GLB,,, | Performed by: FAMILY MEDICINE

## 2022-05-18 PROCEDURE — 4010F PR ACE/ARB THEARPY RXD/TAKEN: ICD-10-PCS | Mod: CPTII,S$GLB,, | Performed by: FAMILY MEDICINE

## 2022-05-18 PROCEDURE — 1160F PR REVIEW ALL MEDS BY PRESCRIBER/CLIN PHARMACIST DOCUMENTED: ICD-10-PCS | Mod: CPTII,S$GLB,, | Performed by: FAMILY MEDICINE

## 2022-05-18 PROCEDURE — 3074F PR MOST RECENT SYSTOLIC BLOOD PRESSURE < 130 MM HG: ICD-10-PCS | Mod: CPTII,S$GLB,, | Performed by: FAMILY MEDICINE

## 2022-05-18 PROCEDURE — 3008F PR BODY MASS INDEX (BMI) DOCUMENTED: ICD-10-PCS | Mod: CPTII,S$GLB,, | Performed by: FAMILY MEDICINE

## 2022-05-18 PROCEDURE — 99999 PR PBB SHADOW E&M-EST. PATIENT-LVL IV: CPT | Mod: PBBFAC,,, | Performed by: FAMILY MEDICINE

## 2022-05-18 PROCEDURE — 85025 COMPLETE CBC W/AUTO DIFF WBC: CPT | Performed by: FAMILY MEDICINE

## 2022-05-18 PROCEDURE — 99999 PR PBB SHADOW E&M-EST. PATIENT-LVL IV: ICD-10-PCS | Mod: PBBFAC,,, | Performed by: FAMILY MEDICINE

## 2022-05-18 PROCEDURE — 80053 COMPREHEN METABOLIC PANEL: CPT | Performed by: FAMILY MEDICINE

## 2022-05-18 PROCEDURE — 3074F SYST BP LT 130 MM HG: CPT | Mod: CPTII,S$GLB,, | Performed by: FAMILY MEDICINE

## 2022-05-18 PROCEDURE — 3078F PR MOST RECENT DIASTOLIC BLOOD PRESSURE < 80 MM HG: ICD-10-PCS | Mod: CPTII,S$GLB,, | Performed by: FAMILY MEDICINE

## 2022-05-18 RX ORDER — PROMETHAZINE HYDROCHLORIDE 25 MG/1
25 TABLET ORAL EVERY 8 HOURS PRN
Qty: 30 TABLET | Refills: 1 | Status: SHIPPED | OUTPATIENT
Start: 2022-05-18 | End: 2022-07-29 | Stop reason: SDUPTHER

## 2022-05-18 RX ORDER — AMLODIPINE BESYLATE 5 MG/1
5 TABLET ORAL DAILY
Qty: 90 TABLET | Refills: 1 | Status: SHIPPED | OUTPATIENT
Start: 2022-05-18 | End: 2024-01-04 | Stop reason: SDUPTHER

## 2022-05-18 RX ORDER — LISINOPRIL 20 MG/1
20 TABLET ORAL NIGHTLY
Qty: 90 TABLET | Refills: 1 | Status: SHIPPED | OUTPATIENT
Start: 2022-05-18 | End: 2022-12-28 | Stop reason: SDUPTHER

## 2022-05-18 NOTE — TELEPHONE ENCOUNTER
I called her to discuss symptoms, but there was no answer.  I will respond to her my chart message.

## 2022-05-18 NOTE — PROGRESS NOTES
Health Maintenance Due   Topic Date Due    Cervical Cancer Screening  Never done    COVID-19 Vaccine (1) Never done    HIV Screening  Never done    Sign Pain Contract  Never done    Complete Opioid Risk Tool  Never done    Mammogram  Never done    Shingles Vaccine (1 of 2) Never done    Urine Drug Screen  09/06/2020     Updates were requested from care everywhere.  Chart was reviewed for overdue [facility]active [facility]) topics (CRS, Breast Cancer Screening, Eye exam)  Health Maintenance has been updated.  LIN[place] immunization registry triggered.  Immunizations were reconciled.

## 2022-05-19 ENCOUNTER — PATIENT MESSAGE (OUTPATIENT)
Dept: GASTROENTEROLOGY | Facility: CLINIC | Age: 57
End: 2022-05-19
Payer: COMMERCIAL

## 2022-05-19 RX ORDER — DIPHENOXYLATE HYDROCHLORIDE AND ATROPINE SULFATE 2.5; .025 MG/1; MG/1
1 TABLET ORAL 4 TIMES DAILY PRN
Qty: 120 TABLET | Refills: 0 | Status: SHIPPED | OUTPATIENT
Start: 2022-05-19 | End: 2022-05-27 | Stop reason: SDUPTHER

## 2022-05-23 LAB — HIV 1+2 AB+HIV1 P24 AG SERPL QL IA: NEGATIVE

## 2022-05-25 ENCOUNTER — TELEPHONE (OUTPATIENT)
Dept: ENDOSCOPY | Facility: HOSPITAL | Age: 57
End: 2022-05-25
Payer: COMMERCIAL

## 2022-05-25 NOTE — TELEPHONE ENCOUNTER
Called patient to follow up on stool test and scheduling colonoscopy. Patient stated her car is not working and has not brought stool specimens to the lab yet. Will schedule colonoscopy pending C-diff results.

## 2022-05-27 ENCOUNTER — PATIENT MESSAGE (OUTPATIENT)
Dept: GASTROENTEROLOGY | Facility: CLINIC | Age: 57
End: 2022-05-27
Payer: COMMERCIAL

## 2022-05-30 ENCOUNTER — LAB VISIT (OUTPATIENT)
Dept: LAB | Facility: HOSPITAL | Age: 57
End: 2022-05-30
Attending: INTERNAL MEDICINE
Payer: COMMERCIAL

## 2022-05-30 DIAGNOSIS — K52.9 CHRONIC DIARRHEA: ICD-10-CM

## 2022-05-30 LAB
C DIFF GDH STL QL: NEGATIVE
C DIFF TOX A+B STL QL IA: NEGATIVE

## 2022-05-30 PROCEDURE — 87045 FECES CULTURE AEROBIC BACT: CPT | Performed by: INTERNAL MEDICINE

## 2022-05-30 PROCEDURE — 87329 GIARDIA AG IA: CPT | Performed by: INTERNAL MEDICINE

## 2022-05-30 PROCEDURE — 87046 STOOL CULTR AEROBIC BACT EA: CPT | Performed by: INTERNAL MEDICINE

## 2022-05-30 PROCEDURE — 87427 SHIGA-LIKE TOXIN AG IA: CPT | Mod: 59 | Performed by: INTERNAL MEDICINE

## 2022-05-30 PROCEDURE — 87177 OVA AND PARASITES SMEARS: CPT | Performed by: INTERNAL MEDICINE

## 2022-05-30 PROCEDURE — 87449 NOS EACH ORGANISM AG IA: CPT | Performed by: INTERNAL MEDICINE

## 2022-05-30 PROCEDURE — 87209 SMEAR COMPLEX STAIN: CPT | Performed by: INTERNAL MEDICINE

## 2022-05-30 NOTE — PROGRESS NOTES
Subjective:       Patient ID: Naomy Armstrong is a 57 y.o. female.    Chief Complaint: Annual Exam    HPI   57 year old female with hypertension comes in for annual exam. She has been seeing GI for diarrhea and nausea. Symptoms continue,    Review of Systems   Constitutional: Positive for activity change. Negative for unexpected weight change.   HENT: Positive for rhinorrhea. Negative for hearing loss and trouble swallowing.    Eyes: Negative for discharge and visual disturbance.   Respiratory: Negative for chest tightness and wheezing.    Cardiovascular: Negative for chest pain and palpitations.   Gastrointestinal: Positive for diarrhea. Negative for blood in stool, constipation and vomiting.   Endocrine: Positive for polydipsia. Negative for polyuria.   Genitourinary: Negative for difficulty urinating, dysuria, hematuria and menstrual problem.   Musculoskeletal: Negative for arthralgias, joint swelling and neck pain.   Neurological: Positive for headaches. Negative for weakness.   Psychiatric/Behavioral: Positive for dysphoric mood. Negative for confusion.         Objective:      Physical Exam  Vitals reviewed.   Constitutional:       General: She is not in acute distress.  HENT:      Head: Normocephalic and atraumatic.      Right Ear: Ear canal and external ear normal.      Left Ear: Ear canal and external ear normal.      Nose: Nose normal.      Mouth/Throat:      Mouth: Mucous membranes are moist.      Pharynx: No oropharyngeal exudate or posterior oropharyngeal erythema.   Eyes:      Extraocular Movements: Extraocular movements intact.      Conjunctiva/sclera: Conjunctivae normal.      Pupils: Pupils are equal, round, and reactive to light.   Cardiovascular:      Rate and Rhythm: Normal rate and regular rhythm.      Pulses: Normal pulses.      Heart sounds: Normal heart sounds.   Pulmonary:      Effort: Pulmonary effort is normal. No respiratory distress.      Breath sounds: No wheezing or rales.    Abdominal:      General: Abdomen is flat. Bowel sounds are normal. There is no distension.      Palpations: Abdomen is soft.      Tenderness: There is no abdominal tenderness. There is no guarding.   Musculoskeletal:      Cervical back: Normal range of motion. No rigidity or tenderness.   Lymphadenopathy:      Cervical: No cervical adenopathy.   Skin:     General: Skin is warm.      Capillary Refill: Capillary refill takes less than 2 seconds.   Neurological:      Mental Status: She is alert and oriented to person, place, and time.      Cranial Nerves: No cranial nerve deficit.      Sensory: No sensory deficit.   Psychiatric:         Mood and Affect: Mood normal.         Behavior: Behavior normal.         Assessment:       Problem List Items Addressed This Visit    None     Visit Diagnoses     General medical exam    -  Primary    Relevant Orders    Comprehensive Metabolic Panel (Completed)    Hypertension, essential        Relevant Medications    lisinopriL (PRINIVIL,ZESTRIL) 20 MG tablet    amLODIPine (NORVASC) 5 MG tablet    Other Relevant Orders    Comprehensive Metabolic Panel (Completed)    CBC Auto Differential (Completed)    Lipid Panel (Completed)    Microalbumin/Creatinine Ratio, Urine    Encounter for screening mammogram for malignant neoplasm of breast        Relevant Orders    Mammo Digital Screening Bilat w/ Danny    Encounter for screening for HIV        Relevant Orders    HIV 1/2 Ag/Ab (4th Gen) (Completed)    Nausea        Relevant Medications    promethazine (PHENERGAN) 25 MG tablet          Plan:       Naomy was seen today for annual exam.    Diagnoses and all orders for this visit:    General medical exam  -     Comprehensive Metabolic Panel; Future  Age appropriate recommendations reviewed.  Screening labs ordered.    Hypertension, essential  -     Comprehensive Metabolic Panel; Future  -     CBC Auto Differential; Future  -     Lipid Panel; Future  -     Microalbumin/Creatinine Ratio, Urine;  Future  -     lisinopriL (PRINIVIL,ZESTRIL) 20 MG tablet; Take 1 tablet (20 mg total) by mouth nightly.  -     amLODIPine (NORVASC) 5 MG tablet; Take 1 tablet (5 mg total) by mouth once daily.  Current therapy effective, will continue    Encounter for screening mammogram for malignant neoplasm of breast  -     Mammo Digital Screening Bilat w/ Danny; Future  Breast cancer screening ordered    Encounter for screening for HIV  -     HIV 1/2 Ag/Ab (4th Gen); Future  Screen for HIV as per USPSTF guidelines    Nausea  -     promethazine (PHENERGAN) 25 MG tablet; Take 1 tablet (25 mg total) by mouth every 8 (eight) hours as needed for Nausea.

## 2022-05-31 ENCOUNTER — PATIENT MESSAGE (OUTPATIENT)
Dept: ADMINISTRATIVE | Facility: HOSPITAL | Age: 57
End: 2022-05-31
Payer: COMMERCIAL

## 2022-05-31 LAB
E COLI SXT1 STL QL IA: NEGATIVE
E COLI SXT2 STL QL IA: NEGATIVE

## 2022-06-01 LAB
CRYPTOSP AG STL QL IA: NEGATIVE
G LAMBLIA AG STL QL IA: NEGATIVE

## 2022-06-02 LAB
BACTERIA STL CULT: NORMAL
O+P STL MICRO: NORMAL

## 2022-06-03 ENCOUNTER — PATIENT MESSAGE (OUTPATIENT)
Dept: ENDOSCOPY | Facility: HOSPITAL | Age: 57
End: 2022-06-03
Payer: COMMERCIAL

## 2022-06-03 ENCOUNTER — TELEPHONE (OUTPATIENT)
Dept: ENDOSCOPY | Facility: HOSPITAL | Age: 57
End: 2022-06-03
Payer: COMMERCIAL

## 2022-06-03 DIAGNOSIS — Z12.11 SPECIAL SCREENING FOR MALIGNANT NEOPLASMS, COLON: Primary | ICD-10-CM

## 2022-06-03 RX ORDER — POLYETHYLENE GLYCOL 3350, SODIUM SULFATE ANHYDROUS, SODIUM BICARBONATE, SODIUM CHLORIDE, POTASSIUM CHLORIDE 236; 22.74; 6.74; 5.86; 2.97 G/4L; G/4L; G/4L; G/4L; G/4L
4 POWDER, FOR SOLUTION ORAL ONCE
Qty: 4000 ML | Refills: 0 | Status: SHIPPED | OUTPATIENT
Start: 2022-06-03 | End: 2022-06-03

## 2022-06-05 DIAGNOSIS — E78.1 HYPERTRIGLYCERIDEMIA: ICD-10-CM

## 2022-06-05 DIAGNOSIS — D64.9 NORMOCYTIC ANEMIA: ICD-10-CM

## 2022-06-05 DIAGNOSIS — I10 HYPERTENSION, ESSENTIAL: Primary | ICD-10-CM

## 2022-06-06 ENCOUNTER — HOSPITAL ENCOUNTER (OUTPATIENT)
Facility: HOSPITAL | Age: 57
Discharge: HOME | End: 2022-06-06
Attending: INTERNAL MEDICINE | Admitting: INTERNAL MEDICINE
Payer: COMMERCIAL

## 2022-06-06 ENCOUNTER — ANESTHESIA EVENT (OUTPATIENT)
Dept: ENDOSCOPY | Facility: HOSPITAL | Age: 57
End: 2022-06-06
Payer: COMMERCIAL

## 2022-06-06 ENCOUNTER — TELEPHONE (OUTPATIENT)
Dept: FAMILY MEDICINE | Facility: CLINIC | Age: 57
End: 2022-06-06
Payer: COMMERCIAL

## 2022-06-06 ENCOUNTER — ANESTHESIA (OUTPATIENT)
Dept: ENDOSCOPY | Facility: HOSPITAL | Age: 57
End: 2022-06-06
Payer: COMMERCIAL

## 2022-06-06 VITALS
HEART RATE: 76 BPM | OXYGEN SATURATION: 96 % | DIASTOLIC BLOOD PRESSURE: 74 MMHG | WEIGHT: 180 LBS | HEIGHT: 65 IN | TEMPERATURE: 98 F | RESPIRATION RATE: 20 BRPM | SYSTOLIC BLOOD PRESSURE: 143 MMHG | BODY MASS INDEX: 29.99 KG/M2

## 2022-06-06 DIAGNOSIS — R19.7 DIARRHEA: ICD-10-CM

## 2022-06-06 DIAGNOSIS — R19.7 DIARRHEA, UNSPECIFIED TYPE: Primary | ICD-10-CM

## 2022-06-06 LAB
CTP QC/QA: YES
SARS-COV-2 AG RESP QL IA.RAPID: NEGATIVE

## 2022-06-06 PROCEDURE — 88305 TISSUE EXAM BY PATHOLOGIST: CPT | Mod: 26,,, | Performed by: PATHOLOGY

## 2022-06-06 PROCEDURE — 27201012 HC FORCEPS, HOT/COLD, DISP: Performed by: INTERNAL MEDICINE

## 2022-06-06 PROCEDURE — 45380 COLONOSCOPY AND BIOPSY: CPT | Performed by: INTERNAL MEDICINE

## 2022-06-06 PROCEDURE — D9220A PRA ANESTHESIA: Mod: ANES,,, | Performed by: ANESTHESIOLOGY

## 2022-06-06 PROCEDURE — 63600175 PHARM REV CODE 636 W HCPCS: Performed by: NURSE ANESTHETIST, CERTIFIED REGISTERED

## 2022-06-06 PROCEDURE — 88305 TISSUE EXAM BY PATHOLOGIST: CPT | Performed by: PATHOLOGY

## 2022-06-06 PROCEDURE — 45380 PR COLONOSCOPY,BIOPSY: ICD-10-PCS | Mod: ,,, | Performed by: INTERNAL MEDICINE

## 2022-06-06 PROCEDURE — D9220A PRA ANESTHESIA: Mod: CRNA,,, | Performed by: NURSE ANESTHETIST, CERTIFIED REGISTERED

## 2022-06-06 PROCEDURE — D9220A PRA ANESTHESIA: ICD-10-PCS | Mod: CRNA,,, | Performed by: NURSE ANESTHETIST, CERTIFIED REGISTERED

## 2022-06-06 PROCEDURE — 37000009 HC ANESTHESIA EA ADD 15 MINS: Performed by: INTERNAL MEDICINE

## 2022-06-06 PROCEDURE — 37000008 HC ANESTHESIA 1ST 15 MINUTES: Performed by: INTERNAL MEDICINE

## 2022-06-06 PROCEDURE — 25000003 PHARM REV CODE 250: Performed by: INTERNAL MEDICINE

## 2022-06-06 PROCEDURE — D9220A PRA ANESTHESIA: ICD-10-PCS | Mod: ANES,,, | Performed by: ANESTHESIOLOGY

## 2022-06-06 PROCEDURE — 45380 COLONOSCOPY AND BIOPSY: CPT | Mod: ,,, | Performed by: INTERNAL MEDICINE

## 2022-06-06 PROCEDURE — 88305 TISSUE EXAM BY PATHOLOGIST: ICD-10-PCS | Mod: 26,,, | Performed by: PATHOLOGY

## 2022-06-06 RX ORDER — CHOLESTYRAMINE 4 G/4.8G
1 POWDER, FOR SUSPENSION ORAL NIGHTLY
Qty: 30 PACKET | Refills: 0 | Status: SHIPPED | OUTPATIENT
Start: 2022-06-06 | End: 2022-07-29

## 2022-06-06 RX ORDER — PROPOFOL 10 MG/ML
VIAL (ML) INTRAVENOUS
Status: DISCONTINUED | OUTPATIENT
Start: 2022-06-06 | End: 2022-06-06

## 2022-06-06 RX ORDER — PROPOFOL 10 MG/ML
VIAL (ML) INTRAVENOUS CONTINUOUS PRN
Status: DISCONTINUED | OUTPATIENT
Start: 2022-06-06 | End: 2022-06-06

## 2022-06-06 RX ORDER — LIDOCAINE HCL/PF 100 MG/5ML
SYRINGE (ML) INTRAVENOUS
Status: DISCONTINUED | OUTPATIENT
Start: 2022-06-06 | End: 2022-06-06

## 2022-06-06 RX ORDER — ONDANSETRON 2 MG/ML
4 INJECTION INTRAMUSCULAR; INTRAVENOUS DAILY PRN
Status: DISCONTINUED | OUTPATIENT
Start: 2022-06-06 | End: 2022-06-06 | Stop reason: HOSPADM

## 2022-06-06 RX ORDER — SODIUM CHLORIDE 9 MG/ML
INJECTION, SOLUTION INTRAVENOUS CONTINUOUS
Status: DISCONTINUED | OUTPATIENT
Start: 2022-06-06 | End: 2022-06-06 | Stop reason: HOSPADM

## 2022-06-06 RX ADMIN — SODIUM CHLORIDE: 0.9 INJECTION, SOLUTION INTRAVENOUS at 12:06

## 2022-06-06 RX ADMIN — PROPOFOL 150 MCG/KG/MIN: 10 INJECTION, EMULSION INTRAVENOUS at 12:06

## 2022-06-06 RX ADMIN — PROPOFOL 80 MG: 10 INJECTION, EMULSION INTRAVENOUS at 12:06

## 2022-06-06 RX ADMIN — Medication 100 MG: at 12:06

## 2022-06-06 RX ADMIN — PROPOFOL 40 MG: 10 INJECTION, EMULSION INTRAVENOUS at 12:06

## 2022-06-06 NOTE — H&P
Short Stay Endoscopy History and Physical      Procedure - Colonoscopy  ASA - per anesthesia  Mallampati - per anesthesia  History of Anesthesia problems - no  Family history Anesthesia problems - no   Plan of anesthesia - MAC    HPI:  This is a 57 y.o. female here for evaluation of ongoing diarrhea.  Negative stool tests.        ROS:  Constitutional: No fevers, chills  CV: No chest pain  Pulm: No cough, No shortness of breath  GI: see HPI    Medical History:  has a past medical history of Alcohol abuse, Anxiety, Colon polyp, Depression, Hallucination, Headache, History of psychiatric hospitalization, psychiatric care, Hypertension, Psychiatric problem, Sleep difficulties, Suicide attempt, and Therapy.    Surgical History:  has a past surgical history that includes Epidural steroid injection (Bilateral, 8/5/2020); Epidural steroid injection (Right, 8/19/2020); Epidural steroid injection (Left, 9/4/2020); Epidural steroid injection (Bilateral, 4/16/2021); Esophagogastroduodenoscopy (N/A, 5/7/2021); Colonoscopy (N/A, 5/7/2021); Esophageal manometry with measurement of impedance (N/A, 10/20/2021); Epidural steroid injection (Bilateral, 1/19/2022); and Laparoscopic Toupet fundoplication (N/A, 2/14/2022).    Family History: family history includes Alcohol abuse in her maternal grandfather and maternal uncle; Anxiety disorder in her sister; Depression in her sister.    Social History:  reports that she quit smoking about 16 years ago. She smoked 1.00 pack per day. She has never used smokeless tobacco. She reports current alcohol use. She reports previous drug use. Drug: Marijuana.    Review of patient's allergies indicates:   Allergen Reactions    Effexor [venlafaxine]      Elevated her blood pressure    Zoloft [sertraline]     Paroxetine hcl      Made her feel drunk       Medications:   Medications Prior to Admission   Medication Sig Dispense Refill Last Dose    amLODIPine (NORVASC) 5 MG tablet Take 1 tablet (5  mg total) by mouth once daily. 90 tablet 1 6/5/2022 at Unknown time    celecoxib (CELEBREX) 100 MG capsule Take 1 capsule (100 mg total) by mouth 2 (two) times daily. 60 capsule 6 6/5/2022 at Unknown time    dicyclomine (BENTYL) 10 MG capsule Take 1 capsule (10 mg total) by mouth 3 (three) times daily as needed (abdominal cramping). 90 capsule 2 6/5/2022 at Unknown time    diphenoxylate-atropine 2.5-0.025 mg (LOMOTIL) 2.5-0.025 mg per tablet Take 1 tablet by mouth 4 (four) times daily as needed for Diarrhea. 28 tablet 0 6/5/2022 at Unknown time    FLUoxetine 20 MG capsule Take 1 capsule (20 mg total) by mouth once daily. Take in addition to 40 mg for a total of 60mg daily. (Patient taking differently: Take 20 mg by mouth every evening. Take in addition to 40 mg for a total of 60mg daily.) 90 capsule 3 6/5/2022 at Unknown time    FLUoxetine 40 MG capsule Take 1 capsule (40 mg total) by mouth once daily. Take in addition to 20 mg for a total of 60mg daily. (Patient taking differently: Take 40 mg by mouth nightly. Take in addition to 20 mg for a total of 60mg daily.) 90 capsule 3 6/5/2022 at Unknown time    gabapentin (NEURONTIN) 600 MG tablet Take 1 tablet (600 mg total) by mouth 3 (three) times daily as needed. 90 tablet 1 6/5/2022 at Unknown time    hydrOXYzine HCL (ATARAX) 10 MG Tab Take 1 tablet (10 mg total) by mouth 2 (two) times daily as needed (anxiety). 60 tablet 3 6/5/2022 at Unknown time    lisinopriL (PRINIVIL,ZESTRIL) 20 MG tablet Take 1 tablet (20 mg total) by mouth nightly. 90 tablet 1 6/5/2022 at Unknown time    loperamide (IMODIUM) 2 mg capsule Take 2 mg by mouth once as needed for Diarrhea.   6/5/2022 at Unknown time    multivitamin with minerals tablet Take 1 tablet by mouth once daily.   6/5/2022 at Unknown time    omeprazole (PRILOSEC) 40 MG capsule Take 1 capsule (40 mg total) by mouth every morning. (Patient taking differently: Take 40 mg by mouth nightly.) 90 capsule 3 6/5/2022 at  Unknown time    promethazine (PHENERGAN) 25 MG tablet Take 1 tablet (25 mg total) by mouth every 8 (eight) hours as needed for Nausea. 30 tablet 1 6/5/2022 at Unknown time         Physical Exam:    Vital Signs:   Vitals:    06/06/22 1123   BP: 134/78   Pulse: 83   Resp: 16   Temp: 98.1 °F (36.7 °C)       General Appearance: Well appearing in no acute distress  Eyes:    No scleral icterus  Lungs: CTA bilaterally  Heart:  reg rate and rhythm   Abdomen: Soft, non tender, non distended with positive bowel sounds      Labs:  Lab Results   Component Value Date    WBC 5.94 05/18/2022    HGB 11.5 (L) 05/18/2022    HCT 38.6 05/18/2022    MCV 87 05/18/2022     05/18/2022        BMP  Lab Results   Component Value Date     05/18/2022    K 4.0 05/18/2022     05/18/2022    CO2 25 05/18/2022    BUN 21 (H) 05/18/2022    CREATININE 1.0 05/18/2022    CALCIUM 9.3 05/18/2022    ANIONGAP 8 05/18/2022    ESTGFRAFRICA >60 05/18/2022    EGFRNONAA >60 05/18/2022     No results found for: INR, PROTIME       Assessment:  57 y.o. female with unexplained diarrhea.     Plan:  Proceed with colonoscopy today.  I have explained the risks and benefits of endoscopy procedures to the patient including but not limited to bleeding, perforation, infection, and death.  All questions and answered.        Fab Shepherd MD

## 2022-06-06 NOTE — TELEPHONE ENCOUNTER
No answer. Left message for Patient to return call to clinic to give lab results and schedule next labs 1 week prior to next visit

## 2022-06-06 NOTE — PROVATION PATIENT INSTRUCTIONS
Discharge Summary/Instructions after an Endoscopic Procedure  Patient Name: Naomy Armstrong  Patient MRN: [id number]  Patient YOB: 1965 Monday, June 6, 2022  Fab Shepherd MD  Dear patient,  As a result of recent federal legislation (The Federal Cures Act), you may   receive lab or pathology results from your procedure in your [facility]   account before your physician is able to contact you. Your physician or   their representative will relay the results to you with their   recommendations at their soonest availability.  Thank you,  RESTRICTIONS:  During your procedure today, you received medications for sedation.  These   medications may affect your judgment, balance and coordination.  Therefore,   for 24 hours, you have the following restrictions:   - DO NOT drive a car, operate machinery, make legal/financial decisions,   sign important papers or drink alcohol.    ACTIVITY:  Today: no heavy lifting, straining or running due to procedural   sedation/anesthesia.  The following day: return to full activity including work.  DIET:  Eat and drink normally unless instructed otherwise.     TREATMENT FOR COMMON SIDE EFFECTS:  - Mild abdominal pain, nausea, belching, bloating or excessive gas:  rest,   eat lightly and use a heating pad.  - Sore Throat: treat with throat lozenges and/or gargle with warm salt   water.  - Because air was used during the procedure, expelling large amounts of air   from your rectum or belching is normal.  - If a bowel prep was taken, you may not have a bowel movement for 1-3 days.    This is normal.  SYMPTOMS TO WATCH FOR AND REPORT TO YOUR PHYSICIAN:  1. Abdominal pain or bloating, other than gas cramps.  2. Chest pain.  3. Back pain.  4. Signs of infection such as: chills or fever occurring within 24 hours   after the procedure.  5. Rectal bleeding, which would show as bright red, maroon, or black stools.   (A tablespoon of blood from the rectum is not serious, especially if    hemorrhoids are present.)  6. Vomiting.  7. Weakness or dizziness.  GO DIRECTLY TO THE NEAREST EMERGENCY ROOM IF YOU HAVE ANY OF THE FOLLOWING:      Difficulty breathing              Chills and/or fever over 101 F   Persistent vomiting and/or vomiting blood   Severe abdominal pain   Severe chest pain   Black, tarry stools   Bleeding- more than one tablespoon   Any other symptom or condition that you feel may need urgent attention  Your doctor recommends these additional instructions:  If any biopsies were taken, your doctors clinic will contact you in 1 to 2   weeks with any results.  - Discharge patient to home.   - Patient has a contact number available for emergencies.  The signs and   symptoms of potential delayed complications were discussed with the   patient.  Return to normal activities tomorrow.  Written discharge   instructions were provided to the patient.   - Resume previous diet.   - Continue present medications.   - Await pathology results.   - Repeat colonoscopy in 7 years for surveillance.   - Start cholestyramine 4 gm PO every evening.  For questions, problems or results please call your physician - Fab Shepherd MD at Work:  [phone].  [facility] [place], EMERGENCY ROOM PHONE NUMBER: [phone]  IF A COMPLICATION OR EMERGENCY SITUATION ARISES AND YOU ARE UNABLE TO REACH   YOUR PHYSICIAN - GO DIRECTLY TO THE EMERGENCY ROOM.  Fab Shepherd MD  6/6/2022 12:32:03 PM  This report has been verified and signed electronically.  Dear patient,  As a result of recent federal legislation (The Federal Cures Act), you may   receive lab or pathology results from your procedure in your [facility]   account before your physician is able to contact you. Your physician or   their representative will relay the results to you with their   recommendations at their soonest availability.  Thank you,  PROVATION

## 2022-06-06 NOTE — TRANSFER OF CARE
"Anesthesia Transfer of Care Note    Patient: Naomy Armstrong    Procedure(s) Performed: Procedure(s) (LRB):  COLONOSCOPY (N/A)    Patient location: [facility]    Anesthesia Type: general    Transport from OR: Transported from OR on room air with adequate spontaneous ventilation    Post pain: adequate analgesia    Post assessment: no apparent anesthetic complications and tolerated procedure well    Post vital signs: stable    Level of consciousness: awake and alert    Nausea/Vomiting: no nausea/vomiting    Complications: none    Transfer of care protocol was followed      Last vitals:   Visit Vitals  /78   Pulse 83   Temp 36.7 °C (98.1 °F)   Resp 16   Ht 5' 5" (1.651 m)   Wt 81.6 kg (180 lb)   LMP 09/15/2013   SpO2 97%   Breastfeeding No   BMI 29.95 kg/m²     "

## 2022-06-06 NOTE — ANESTHESIA PREPROCEDURE EVALUATION
06/06/2022  Naomy Armstrong is a 57 y.o., female here for colonoscopy.    Pre-operative evaluation for Procedure(s) (LRB):  COLONOSCOPY (N/A)        Patient Active Problem List   Diagnosis    Cervical radiculopathy    DDD (degenerative disc disease), cervical    Cervicogenic headache    Primary osteoarthritis of right knee    Neck pain    Spondylosis of cervical region without myelopathy or radiculopathy    Lumbar spondylosis    DDD (degenerative disc disease), lumbar    Bilateral primary osteoarthritis of knee    Decreased strength of lower extremity    Decreased strength of trunk and back    Pain in joint of right hip    Decreased range of motion of right lower extremity    Chronic back pain greater than 3 months duration    Sacroiliac joint pain    Spondylosis of lumbosacral region    Lumbosacral spondylosis    SOB (shortness of breath)    Irritable bowel syndrome with diarrhea    Cervical spondylosis    Major depressive disorder, recurrent episode, in partial remission       Review of patient's allergies indicates:   Allergen Reactions    Effexor [venlafaxine]      Elevated her blood pressure    Zoloft [sertraline]     Paroxetine hcl      Made her feel drunk       No current facility-administered medications on file prior to encounter.     Current Outpatient Medications on File Prior to Encounter   Medication Sig Dispense Refill    amLODIPine (NORVASC) 5 MG tablet Take 1 tablet (5 mg total) by mouth once daily. 90 tablet 1    celecoxib (CELEBREX) 100 MG capsule Take 1 capsule (100 mg total) by mouth 2 (two) times daily. 60 capsule 6    dicyclomine (BENTYL) 10 MG capsule Take 1 capsule (10 mg total) by mouth 3 (three) times daily as needed (abdominal cramping). 90 capsule 2    FLUoxetine 20 MG capsule Take 1 capsule (20 mg total) by mouth once daily. Take in addition to  40 mg for a total of 60mg daily. (Patient taking differently: Take 20 mg by mouth every evening. Take in addition to 40 mg for a total of 60mg daily.) 90 capsule 3    FLUoxetine 40 MG capsule Take 1 capsule (40 mg total) by mouth once daily. Take in addition to 20 mg for a total of 60mg daily. (Patient taking differently: Take 40 mg by mouth nightly. Take in addition to 20 mg for a total of 60mg daily.) 90 capsule 3    gabapentin (NEURONTIN) 600 MG tablet Take 1 tablet (600 mg total) by mouth 3 (three) times daily as needed. 90 tablet 1    hydrOXYzine HCL (ATARAX) 10 MG Tab Take 1 tablet (10 mg total) by mouth 2 (two) times daily as needed (anxiety). 60 tablet 3    lisinopriL (PRINIVIL,ZESTRIL) 20 MG tablet Take 1 tablet (20 mg total) by mouth nightly. 90 tablet 1    loperamide (IMODIUM) 2 mg capsule Take 2 mg by mouth once as needed for Diarrhea.      multivitamin with minerals tablet Take 1 tablet by mouth once daily.      omeprazole (PRILOSEC) 40 MG capsule Take 1 capsule (40 mg total) by mouth every morning. (Patient taking differently: Take 40 mg by mouth nightly.) 90 capsule 3    promethazine (PHENERGAN) 25 MG tablet Take 1 tablet (25 mg total) by mouth every 8 (eight) hours as needed for Nausea. 30 tablet 1       Past Surgical History:   Procedure Laterality Date    COLONOSCOPY N/A 5/7/2021    Procedure: COLONOSCOPY;  Surgeon: Prem Montana MD;  Location: [facility];  Service: Endoscopy;  Laterality: N/A;  COVID screening 5/4 LAPC-instr portal -ml    EPIDURAL STEROID INJECTION Bilateral 8/5/2020    Procedure: Bilateral MBB @ L3, L4, L5;  Surgeon: Jeremy Powell Jr., MD;  Location: [facility] ENDO;  Service: Pain Management;  Laterality: Bilateral;  Bilat L3-5 MBB  Arrive @ 1315; No ATC or DM; Needs MD signature    EPIDURAL STEROID INJECTION Right 8/19/2020    Procedure: Lumbar Radiofrequency Thermocoagulation of Medial Branches;  Surgeon: Jeremy Powell Jr., MD;  Location: [facility];   Service: Pain Management;  Laterality: Right;  Right L3-5 RFA  Arrive @ 1045; No ATC or DM; Needs MD Signature    EPIDURAL STEROID INJECTION Left 9/4/2020    Procedure: Lumbar Radiofrequency Thermocoagulation of Medial Branches;  Surgeon: Jeremy Powell Jr., MD;  Location: [facility];  Service: Pain Management;  Laterality: Left;  Left L3-5 RFA  Arrive @ 0900 (requested); No ATC or DM; Needs MD signature    EPIDURAL STEROID INJECTION Bilateral 4/16/2021    Procedure: Sacroiliac Joint Steroid Injections @ Bilateral;  Surgeon: Jeremy Powell Jr., MD;  Location: [facility];  Service: Pain Management;  Laterality: Bilateral;  Arrive @ 1300; No ATC or DM    EPIDURAL STEROID INJECTION Bilateral 1/19/2022    Procedure: Bilateral Sacroiliac Joint Injections;  Surgeon: Jeremy Powell Jr., MD;  Location: [facility];  Service: Pain Management;  Laterality: Bilateral;  Arrive @ 1315; No ATC or DM; Needs Consent    ESOPHAGEAL MANOMETRY WITH MEASUREMENT OF IMPEDANCE N/A 10/20/2021    Procedure: MANOMETRY, ESOPHAGUS, WITH IMPEDANCE MEASUREMENT;  Surgeon: Anastacia Odonnell MD;  Location: [facility] (4TH FLR);  Service: Endoscopy;  Laterality: N/A;  Covid test 10/17 [place], instructions sent to [id number]    ESOPHAGOGASTRODUODENOSCOPY N/A 5/7/2021    Procedure: EGD (ESOPHAGOGASTRODUODENOSCOPY);  Surgeon: Prem Montana MD;  Location: [facility];  Service: Endoscopy;  Laterality: N/A;  added on per Dr. NOEL Shepherd    LAPAROSCOPIC TOUPET FUNDOPLICATION N/A 2/14/2022    Procedure: FUNDOPLICATION, LAPAROSCOPIC, TOUPET;  Surgeon: Christian Martinez MD;  Location: [facility] OR 2ND FLR;  Service: General;  Laterality: N/A;       Social History     Socioeconomic History    Marital status:     Number of children: 0   Occupational History    Occupation: Overnight nuvia     Employer: [employer]   Tobacco Use    Smoking status: Former Smoker     Packs/day: 1.00     Quit date: 4/26/2006     Years since quitting:  16.1    Smokeless tobacco: Never Used   Substance and Sexual Activity    Alcohol use: Yes     Comment: occasional    Drug use: Not Currently     Types: Marijuana     Comment: tried at age 14    Sexual activity: Yes     Partners: Male     Comment: going through menopause   Other Topics Concern    Patient feels they ought to cut down on drinking/drug use No    Patient annoyed by others criticizing their drinking/drug use No    Patient has felt bad or guilty about drinking/drug use No    Patient has had a drink/used drugs as an eye opener in the AM No   Social History Narrative     x 2.    Lives with boyfriend.     Works at [employer] as an .    Presently working on Bachelor's degree.    Has associates degree in criminal justice.     Social Determinants of Health     Financial Resource Strain: Medium Risk    Difficulty of Paying Living Expenses: Somewhat hard   Food Insecurity: Food Insecurity Present    Worried About Running Out of Food in the Last Year: Sometimes true    Ran Out of Food in the Last Year: Never true   Transportation Needs: No Transportation Needs    Lack of Transportation (Medical): No    Lack of Transportation (Non-Medical): No   Physical Activity: Inactive    Days of Exercise per Week: 0 days    Minutes of Exercise per Session: 0 min   Stress: Stress Concern Present    Feeling of Stress : To some extent   Social Connections: Unknown    Frequency of Communication with Friends and Family: Three times a week    Frequency of Social Gatherings with Friends and Family: Patient refused    Active Member of Clubs or Organizations: No    Attends Club or Organization Meetings: 1 to 4 times per year    Marital Status:    Housing Stability: High Risk    Unable to Pay for Housing in the Last Year: Yes    Number of Places Lived in the Last Year: 1    Unstable Housing in the Last Year: No         CBC: No results for input(s): WBC, RBC, HGB, HCT, PLT, MCV, MCH,  MCHC in the last 72 hours.    CMP: No results for input(s): NA, K, CL, CO2, BUN, CREATININE, GLU, MG, PHOS, CALCIUM, ALBUMIN, PROT, ALKPHOS, ALT, AST, BILITOT in the last 72 hours.    INR  No results for input(s): PT, INR, PROTIME, APTT in the last 72 hours.            2D Echo:  No results found for this or any previous visit.        Pre-op Assessment    I have reviewed the Patient Summary Reports.     I have reviewed the Nursing Notes.    I have reviewed the Medications.     Review of Systems  Anesthesia Hx:  No problems with previous Anesthesia    Hematology/Oncology:  Hematology Normal   Oncology Normal     EENT/Dental:EENT/Dental Normal   Cardiovascular:   Hypertension    Pulmonary:  Pulmonary Normal    Renal/:  Renal/ Normal     Hepatic/GI:  Hepatic/GI Normal    Musculoskeletal:   Arthritis     Neurological:   Neuromuscular Disease, Headaches    Endocrine:  Endocrine Normal    Dermatological:  Skin Normal    Psych:   Psychiatric History          Physical Exam  General: Well nourished    Airway:  Mallampati: II   Mouth Opening: Normal  TM Distance: Normal  Tongue: Normal  Neck ROM: Normal ROM    Dental:  Intact    Chest/Lungs:  Clear to auscultation, Normal Respiratory Rate    Heart:  Rate: Normal  Rhythm: Regular Rhythm  Sounds: Normal        Anesthesia Plan  Type of Anesthesia, risks & benefits discussed:    Anesthesia Type: Gen Natural Airway  Intra-op Monitoring Plan: Standard ASA Monitors  Post Op Pain Control Plan: multimodal analgesia  Induction:  IV  Informed Consent: Informed consent signed with the Patient and all parties understand the risks and agree with anesthesia plan.  All questions answered.   ASA Score: 3    Ready For Surgery From Anesthesia Perspective.     .

## 2022-06-06 NOTE — TELEPHONE ENCOUNTER
----- Message from Eric Hernandes Jr., MD sent at 6/5/2022  2:54 PM CDT -----  Labs showed slightly high triglycerides. Should work on decreasing fatty/greasy foods.  Please schedule next labs 1 week prior to next visit

## 2022-06-07 ENCOUNTER — TELEPHONE (OUTPATIENT)
Dept: PAIN MEDICINE | Facility: CLINIC | Age: 57
End: 2022-06-07
Payer: COMMERCIAL

## 2022-06-07 ENCOUNTER — OFFICE VISIT (OUTPATIENT)
Dept: PAIN MEDICINE | Facility: CLINIC | Age: 57
End: 2022-06-07
Payer: COMMERCIAL

## 2022-06-07 ENCOUNTER — TELEPHONE (OUTPATIENT)
Dept: ENDOSCOPY | Facility: HOSPITAL | Age: 57
End: 2022-06-07
Payer: COMMERCIAL

## 2022-06-07 VITALS
SYSTOLIC BLOOD PRESSURE: 169 MMHG | WEIGHT: 179.88 LBS | DIASTOLIC BLOOD PRESSURE: 97 MMHG | OXYGEN SATURATION: 97 % | HEIGHT: 65 IN | BODY MASS INDEX: 29.97 KG/M2 | HEART RATE: 99 BPM

## 2022-06-07 DIAGNOSIS — R10.13 EPIGASTRIC PAIN: ICD-10-CM

## 2022-06-07 PROCEDURE — 1160F RVW MEDS BY RX/DR IN RCRD: CPT | Mod: CPTII,S$GLB,, | Performed by: PAIN MEDICINE

## 2022-06-07 PROCEDURE — 99214 OFFICE O/P EST MOD 30 MIN: CPT | Mod: S$GLB,,, | Performed by: PAIN MEDICINE

## 2022-06-07 PROCEDURE — 3077F SYST BP >= 140 MM HG: CPT | Mod: CPTII,S$GLB,, | Performed by: PAIN MEDICINE

## 2022-06-07 PROCEDURE — 3008F BODY MASS INDEX DOCD: CPT | Mod: CPTII,S$GLB,, | Performed by: PAIN MEDICINE

## 2022-06-07 PROCEDURE — 3077F PR MOST RECENT SYSTOLIC BLOOD PRESSURE >= 140 MM HG: ICD-10-PCS | Mod: CPTII,S$GLB,, | Performed by: PAIN MEDICINE

## 2022-06-07 PROCEDURE — 1159F MED LIST DOCD IN RCRD: CPT | Mod: CPTII,S$GLB,, | Performed by: PAIN MEDICINE

## 2022-06-07 PROCEDURE — 99214 PR OFFICE/OUTPT VISIT, EST, LEVL IV, 30-39 MIN: ICD-10-PCS | Mod: S$GLB,,, | Performed by: PAIN MEDICINE

## 2022-06-07 PROCEDURE — 99999 PR PBB SHADOW E&M-EST. PATIENT-LVL IV: ICD-10-PCS | Mod: PBBFAC,,, | Performed by: PAIN MEDICINE

## 2022-06-07 PROCEDURE — 1160F PR REVIEW ALL MEDS BY PRESCRIBER/CLIN PHARMACIST DOCUMENTED: ICD-10-PCS | Mod: CPTII,S$GLB,, | Performed by: PAIN MEDICINE

## 2022-06-07 PROCEDURE — 3080F DIAST BP >= 90 MM HG: CPT | Mod: CPTII,S$GLB,, | Performed by: PAIN MEDICINE

## 2022-06-07 PROCEDURE — 4010F ACE/ARB THERAPY RXD/TAKEN: CPT | Mod: CPTII,S$GLB,, | Performed by: PAIN MEDICINE

## 2022-06-07 PROCEDURE — 3080F PR MOST RECENT DIASTOLIC BLOOD PRESSURE >= 90 MM HG: ICD-10-PCS | Mod: CPTII,S$GLB,, | Performed by: PAIN MEDICINE

## 2022-06-07 PROCEDURE — 1159F PR MEDICATION LIST DOCUMENTED IN MEDICAL RECORD: ICD-10-PCS | Mod: CPTII,S$GLB,, | Performed by: PAIN MEDICINE

## 2022-06-07 PROCEDURE — 4010F PR ACE/ARB THEARPY RXD/TAKEN: ICD-10-PCS | Mod: CPTII,S$GLB,, | Performed by: PAIN MEDICINE

## 2022-06-07 PROCEDURE — 3008F PR BODY MASS INDEX (BMI) DOCUMENTED: ICD-10-PCS | Mod: CPTII,S$GLB,, | Performed by: PAIN MEDICINE

## 2022-06-07 PROCEDURE — 99999 PR PBB SHADOW E&M-EST. PATIENT-LVL IV: CPT | Mod: PBBFAC,,, | Performed by: PAIN MEDICINE

## 2022-06-07 RX ORDER — POLYETHYLENE GLYCOL 3350, SODIUM SULFATE ANHYDROUS, SODIUM BICARBONATE, SODIUM CHLORIDE, POTASSIUM CHLORIDE 236; 22.74; 6.74; 5.86; 2.97 G/4L; G/4L; G/4L; G/4L; G/4L
POWDER, FOR SOLUTION ORAL
COMMUNITY
Start: 2022-06-03 | End: 2022-07-29

## 2022-06-07 NOTE — TELEPHONE ENCOUNTER
----- Message from Anita Curran sent at 6/7/2022  3:15 PM CDT -----  Regarding: Prep change  Contact: Lucila rivera/[facility] [phone]  Calling from [facility] regarding Rx cholestyramine-aspartame (CHOLESTYRAMINE LIGHT) 4 gram PwP, only have Prevalyte in stock. Please call to confirm new prescription.      [id number]  [address] [place] - [address] [id number] [facility]  [place] [address]  Phone: [phone] Fax: [phone]

## 2022-06-07 NOTE — PROGRESS NOTES
Subjective:     Patient ID: Naomy Armstrong is a 57 y.o. female    Chief Complaint: Abdominal Pain (Post Op pain from a surgery in February 2022)      Referred by: No ref. provider found    Disclaimer: This note was generated using voice recognition software.  There may be typographical errors that were missed during proofreading.    HPI:    Interval History (6/7/22):  She returns today for follow up.  She reports that she underwent surgery for hiatal hernia repair in February 2022.  Since his surgery she has had significant epigastric abdominal pain.  This pain is intermittent but is very severe when present.  No specific triggers identified.  Patient also reports worsening diarrhea since undergoing the surgery.  She has followed up with surgery and gastroenterology in no specific source has been identified as of yet.  States that she was provided tramadol which provided some relief.  States she was told to return to my clinic to discuss other options for pain management.       Interval History NP (02/03/22):    The patient location is: work  The chief complaint leading to consultation is: follow-up  Visit type: Virtual visit with audio.  Patient verbally consented for audio visit.  Total time spent with patient: 10 minutes  Each patient to whom he or she provides medical services by telemedicine is:  (1) informed of the relationship between the physician and patient and the respective role of any other health care provider with respect to management of the patient; and (2) notified that he or she may decline to receive medical services by telemedicine and may withdraw from such care at any time.    Pt returns today for follow up of bilateral SIJ injections. She reports 100% relief of low back pain. She denies any new or worsening symptoms. She would like to discuss chronic headaches at next visit with Dr. Powell. She is otherwise well today.     Interval History (12/29/21):    The patient location is:   home  The chief complaint leading to consultation is:  Follow-up  Visit type: Virtual visit with synchronous audio and video  Total time spent with patient:  8 minutes  Each patient to whom he or she provides medical services by telemedicine is:  (1) informed of the relationship between the physician and patient and the respective role of any other health care provider with respect to management of the patient; and (2) notified that he or she may decline to receive medical services by telemedicine and may withdraw from such care at any time.      She returns today for follow up and MRI review.  She reports that she would like to schedule bilateral sacroiliac joint steroid injections discussed at last visit.  She is still having neck pain and headaches as well and would like to undergo cervical medial branch blocks/RFA, but her back pain is worse at this time.  Otherwise, she denies any changes in the quality or location of her pain.  She denies any new worsening symptoms.        Interval History (8/11/21):    The patient location is:  home  The chief complaint leading to consultation is:  Follow-up  Visit type: Virtual visit with synchronous audio and video  Total time spent with patient:  15 minutes  Each patient to whom he or she provides medical services by telemedicine is:  (1) informed of the relationship between the physician and patient and the respective role of any other health care provider with respect to management of the patient; and (2) notified that he or she may decline to receive medical services by telemedicine and may withdraw from such care at any time.        She returns today for follow up.  She reports that relief from bilateral sacroiliac joint steroid injections has worn off.  She would like repeat sacroiliac joint steroid injections if appropriate.  Patient also has been having headaches chronically.  She has been evaluated thoroughly by Neurology without any specific source for of headache  "identified.  Suspicion for cervicogenic headaches.  Patient does have pain in the upper cervical region.  The pain will radiate to the occipital and parietal regions of the head.  She denies any pain radiating to the upper extremities.  She denies any associated bowel bladder dysfunction.      Interval History NP (5/5/21):    Pt returns today for follow up of bilateral SIJ injections. She reports at least 75% relief of low back pain. She can now stand for long periods of time without pain or having to sit for 30 minutes. She is able to cook and complete her ADL's with much improvement. Does report continued pain while bending over but it is now manageable. Denies new or worsening symptoms.      Interval History (3/31/21):  She returns today for follow up.  She reports that bilateral L3, L4, L5 radiofrequency ablation provided roughly 75% relief but for only 1 month.  She states that this.  Time her pain returned.  Today, she localizes pain across the lower lumbar/lumbosacral region.  The pain does not radiate.  The pain is equal bilaterally.  The pain is worse with activities such as mopping and cleaning.  She denies any associated numbness, tingling, weakness, bowel bladder dysfunction.      Interval History NP (7/16/20):  Pt returns today for follow up and MRI review. She states that her pain in unchanged in quality or location. Denies any new symptoms. No bladder or bowel dysfunction. She reports "missing more days of work than she attends". She is an  at [employer] which includes heavy manual lifting and moving boxes. Her pain is worsened with activity, especially after a shift at work or when she is home cleaning. She reports that gabapentin has helped, but takes 2 pills at night and 1 pill every morning because it makes her too drowsy during the day. She also takes 500mg Naproxen PRN for the pain, maybe every few days. She would like something else for the pain. States she "took valium in the " "past which helped her relax and get through the day".     Interval History (7/6/20):  She returns today for follow up.  She reports that she continues to have low back/hip pain.  This pain is worse with bending and lifting.  These are movements that are often required as a part of her job.  Overall she denies any changes in the quality or location of the pain since last encounter.  She denies any new symptoms.       Interval History (1/13/20):  She returns today for follow up.  She reports that her neck pain has improved with home exercise program.  She does not feel as though this needs any further attention.  She does state that her low back has been bothering her.  She localizes pain to the bilateral lower lumbar regions.  The pain does radiate to the bilateral hip regions.  She denies any associated numbness, tingling, weakness, bowel bladder dysfunction.  The pain is constant and worsened with activity.  She states that the pain is typically worse when initiating activity after rest.        Initial Encounter (7/9/19):  Naomy Armstrong is a 57 y.o. female who presents today with chronic neck pain. This pain has been present for years.  No specific inciting event or injury noted. She localizes the pain to the midline cervical region.  The pain does not radiate.  She denies any associated numbness, tingling, weakness, bowel bladder dysfunction.  She has had some radicular pain on the left side in the past this has resolved.  The pain is constant and worsened with activity.  She specifically states that the pain is worse when looking at screens.  She has been treated in the past by Dr. Bhavya Oneill at [facility].   This pain is described in detail below.    Physical Therapy:  Yes, for her left hip.     Non-pharmacologic Treatment:  Rest helps         · TENS?  No    Pain Medications:         · Currently taking:  gabapentin, Celebrex    · Has tried in the past:  Opioids, muscle relaxers, Tylenol, NSAIDs, " Amitriptyline, tizanidine, Celebrex, Robaxin    · Has not tried:  SNRIs, topical creams    Blood thinners:  None    Interventional Therapies:   10/8/13- Left RFA C5, C6, C7  9/10/13- Left C5, C6, C7 MBB   8/13/13- C7-T1 IL EMMA  8/19/20 - right L3, L4, L5 radiofrequency ablation - 75% relief for 1 month  9/4/20 - left L3, L4, L5 radiofrequency ablation - 75% relief for 1 month  4/16/21 - bilateral SIJ injections - 75% relief  01/19/21 - bilateral SIJ injections - 100% relief    Relevant Surgeries:  None    Affecting sleep?  Yes    Affecting daily activities? yes    Depressive symptoms? yes          · SI/HI? No    Work status: Employed    Pain Scores:    Best:       0/10  Worst:   10/10  Usually:   5/10  Today:   5/10    Review of Systems   Constitutional: Negative for activity change, appetite change, chills, fatigue, fever and unexpected weight change.   HENT: Negative for hearing loss.    Eyes: Negative for visual disturbance.   Respiratory: Negative for chest tightness and shortness of breath.    Cardiovascular: Negative for chest pain.   Gastrointestinal: Positive for abdominal pain and diarrhea. Negative for constipation, nausea and vomiting.   Genitourinary: Negative for difficulty urinating.   Musculoskeletal: Positive for neck pain and neck stiffness. Negative for arthralgias, back pain, gait problem and myalgias.   Skin: Negative for rash.   Neurological: Positive for headaches. Negative for dizziness, weakness, light-headedness and numbness.   Psychiatric/Behavioral: Negative for hallucinations, sleep disturbance and suicidal ideas. The patient is not nervous/anxious.        Past Medical History:   Diagnosis Date    Alcohol abuse     per chart, but patient denies today and cocaine per chart    Anxiety     Colon polyp     Depression     Hallucination     last couple of months started seeing someone standing in her room, saw boyfriend's reflection in glass    Headache     History of psychiatric  hospitalization     age 13 [facility] for 2 months for acting out; 2018 for SI    Hx of psychiatric care     Hypertension     Psychiatric problem     Sleep difficulties     Suicide attempt     with knife by cutting stomach    Therapy        Past Surgical History:   Procedure Laterality Date    COLONOSCOPY N/A 5/7/2021    Procedure: COLONOSCOPY;  Surgeon: Prem Montana MD;  Location: [facility];  Service: Endoscopy;  Laterality: N/A;  COVID screening 5/4 LAPC-instr portal -ml    COLONOSCOPY N/A 6/6/2022    Procedure: COLONOSCOPY;  Surgeon: Fab Shepherd MD;  Location: [facility] ENDO ([facility] FLR);  Service: Endoscopy;  Laterality: N/A;  C-Diff neg  Rapid COVID    EPIDURAL STEROID INJECTION Bilateral 8/5/2020    Procedure: Bilateral MBB @ L3, L4, L5;  Surgeon: Jeremy Powell Jr., MD;  Location: [facility];  Service: Pain Management;  Laterality: Bilateral;  Bilat L3-5 MBB  Arrive @ 1315; No ATC or DM; Needs MD signature    EPIDURAL STEROID INJECTION Right 8/19/2020    Procedure: Lumbar Radiofrequency Thermocoagulation of Medial Branches;  Surgeon: Jeremy Powell Jr., MD;  Location: [facility];  Service: Pain Management;  Laterality: Right;  Right L3-5 RFA  Arrive @ 1045; No ATC or DM; Needs MD Signature    EPIDURAL STEROID INJECTION Left 9/4/2020    Procedure: Lumbar Radiofrequency Thermocoagulation of Medial Branches;  Surgeon: Jeremy Powell Jr., MD;  Location: [facility];  Service: Pain Management;  Laterality: Left;  Left L3-5 RFA  Arrive @ 0900 (requested); No ATC or DM; Needs MD signature    EPIDURAL STEROID INJECTION Bilateral 4/16/2021    Procedure: Sacroiliac Joint Steroid Injections @ Bilateral;  Surgeon: Jeremy Powell Jr., MD;  Location: [facility];  Service: Pain Management;  Laterality: Bilateral;  Arrive @ 1300; No ATC or DM    EPIDURAL STEROID INJECTION Bilateral 1/19/2022    Procedure: Bilateral Sacroiliac Joint Injections;  Surgeon: Jeremy Powell Jr., MD;  Location: [facility]  ENDO;  Service: Pain Management;  Laterality: Bilateral;  Arrive @ 1315; No ATC or DM; Needs Consent    ESOPHAGEAL MANOMETRY WITH MEASUREMENT OF IMPEDANCE N/A 10/20/2021    Procedure: MANOMETRY, ESOPHAGUS, WITH IMPEDANCE MEASUREMENT;  Surgeon: Anastacia Odonnell MD;  Location: [facility] (4TH FLR);  Service: Endoscopy;  Laterality: N/A;  Covid test 10/17 [facility], instructions [facility] to [id number]    ESOPHAGOGASTRODUODENOSCOPY N/A 2021    Procedure: EGD (ESOPHAGOGASTRODUODENOSCOPY);  Surgeon: Prem Montana MD;  Location: [facility] ENDO;  Service: Endoscopy;  Laterality: N/A;  added on per Dr. NOEL Shepherd    LAPAROSCOPIC TOUPET FUNDOPLICATION N/A 2022    Procedure: FUNDOPLICATION, LAPAROSCOPIC, TOUPET;  Surgeon: Christian Martinez MD;  Location: [facility] OR [address] [place] [address];  Service: General;  Laterality: N/A;       Social History     Socioeconomic History    Marital status:     Number of children: 0   Occupational History    Occupation:      Employer: [employer]   Tobacco Use    Smoking status: Former Smoker     Packs/day: 1.00     Quit date: 2006     Years since quittin.1    Smokeless tobacco: Never Used   Substance and Sexual Activity    Alcohol use: Yes     Comment: occasional    Drug use: Not Currently     Types: Marijuana     Comment: tried at age 14    Sexual activity: Yes     Partners: Male     Comment: going through menopause   Other Topics Concern    Patient feels they ought to cut down on drinking/drug use No    Patient annoyed by others criticizing their drinking/drug use No    Patient has felt bad or guilty about drinking/drug use No    Patient has had a drink/used drugs as an eye opener in the AM No   Social History Narrative     x 2.    Lives with boyfriend.     Works at [employer] as an .    Presently working on Bachelor's degree.    Has associates degree in criminal justice.     Social Determinants of Health     Financial Resource  Strain: Medium Risk    Difficulty of Paying Living Expenses: Somewhat hard   Food Insecurity: Food Insecurity Present    Worried About Running Out of Food in the Last Year: Sometimes true    Ran Out of Food in the Last Year: Never true   Transportation Needs: No Transportation Needs    Lack of Transportation (Medical): No    Lack of Transportation (Non-Medical): No   Physical Activity: Inactive    Days of Exercise per Week: 0 days    Minutes of Exercise per Session: 0 min   Stress: Stress Concern Present    Feeling of Stress : To some extent   Social Connections: Unknown    Frequency of Communication with Friends and Family: Three times a week    Frequency of Social Gatherings with Friends and Family: Patient refused    Active Member of Clubs or Organizations: No    Attends Club or Organization Meetings: 1 to 4 times per year    Marital Status:    Housing Stability: High Risk    Unable to Pay for Housing in the Last Year: Yes    Number of Places Lived in the Last Year: 1    Unstable Housing in the Last Year: No       Review of patient's allergies indicates:   Allergen Reactions    Effexor [venlafaxine]      Elevated her blood pressure    Zoloft [sertraline]     Paroxetine hcl      Made her feel drunk       Current Outpatient Medications on File Prior to Visit   Medication Sig Dispense Refill    amLODIPine (NORVASC) 5 MG tablet Take 1 tablet (5 mg total) by mouth once daily. 90 tablet 1    celecoxib (CELEBREX) 100 MG capsule Take 1 capsule (100 mg total) by mouth 2 (two) times daily. 60 capsule 6    cholestyramine-aspartame (CHOLESTYRAMINE LIGHT) 4 gram PwPk Take 1 packet (4 g total) by mouth every evening. 30 packet 0    dicyclomine (BENTYL) 10 MG capsule Take 1 capsule (10 mg total) by mouth 3 (three) times daily as needed (abdominal cramping). 90 capsule 2    diphenoxylate-atropine 2.5-0.025 mg (LOMOTIL) 2.5-0.025 mg per tablet Take 1 tablet by mouth 4 (four) times daily as needed  for Diarrhea. 28 tablet 0    FLUoxetine 20 MG capsule Take 1 capsule (20 mg total) by mouth once daily. Take in addition to 40 mg for a total of 60mg daily. (Patient taking differently: Take 20 mg by mouth every evening. Take in addition to 40 mg for a total of 60mg daily.) 90 capsule 3    FLUoxetine 40 MG capsule Take 1 capsule (40 mg total) by mouth once daily. Take in addition to 20 mg for a total of 60mg daily. (Patient taking differently: Take 40 mg by mouth nightly. Take in addition to 20 mg for a total of 60mg daily.) 90 capsule 3    gabapentin (NEURONTIN) 600 MG tablet Take 1 tablet (600 mg total) by mouth 3 (three) times daily as needed. 90 tablet 1    hydrOXYzine HCL (ATARAX) 10 MG Tab Take 1 tablet (10 mg total) by mouth 2 (two) times daily as needed (anxiety). 60 tablet 3    lisinopriL (PRINIVIL,ZESTRIL) 20 MG tablet Take 1 tablet (20 mg total) by mouth nightly. 90 tablet 1    loperamide (IMODIUM) 2 mg capsule Take 2 mg by mouth once as needed for Diarrhea.      multivitamin with minerals tablet Take 1 tablet by mouth once daily.      omeprazole (PRILOSEC) 40 MG capsule Take 1 capsule (40 mg total) by mouth every morning. (Patient taking differently: Take 40 mg by mouth nightly.) 90 capsule 3    polyethylene glycol (GOLYTELY) 236-22.74-6.74 -5.86 gram suspension SMARTSI Milliliter(s) By Mouth Once      promethazine (PHENERGAN) 25 MG tablet Take 1 tablet (25 mg total) by mouth every 8 (eight) hours as needed for Nausea. 30 tablet 1     Current Facility-Administered Medications on File Prior to Visit   Medication Dose Route Frequency Provider Last Rate Last Admin    [DISCONTINUED] 0.9%  NaCl infusion   Intravenous Continuous Fab Shepherd MD   New Bag at 22 1201    [DISCONTINUED] LIDOcaine (cardiac) injection   Intravenous PRN Nicole Figueroa CRNA   100 mg at 22 1206    [DISCONTINUED] ondansetron injection 4 mg  4 mg Intravenous Daily PRN Kenneth Santos MD         "[DISCONTINUED] propofol (DIPRIVAN) 10 mg/mL infusion   Intravenous PRN Nicole Figueroa CRNA   40 mg at 06/06/22 1212    [DISCONTINUED] propofol (DIPRIVAN) 10 mg/mL infusion   Intravenous Continuous PRN Nicole Figueroa CRNA   Stopped at 06/06/22 1224       Objective:      BP (!) 169/97 (BP Location: Right arm, Patient Position: Sitting, BP Method: Medium (Automatic))   Pulse 99   Ht 5' 5" (1.651 m)   Wt 81.6 kg (179 lb 14.3 oz)   LMP 09/15/2013   SpO2 97%   BMI 29.94 kg/m²     Exam:  GEN:  Well developed, well nourished.  No acute distress.   HEENT:  No trauma.  Mucous membranes moist.  Nares patent bilaterally.  PSYCH: Normal affect. Thought content appropriate.  CHEST:  Breathing symmetric.  No audible wheezing.  ABD: Soft, non-distended.  SKIN:  Warm, pink, dry.  No rash on exposed areas.    EXT:  No cyanosis, clubbing, or edema.  No color change or changes in nail or hair growth.  NEURO/MUSCULOSKELETAL:  Fully alert, oriented, and appropriate. Speech normal indio. No cranial nerve deficits.   Gait:  Normal.  No focal motor deficits.                   Imaging:        Narrative & Impression    EXAMINATION:  MRI CERVICAL SPINE WITHOUT CONTRAST     CLINICAL HISTORY:  cervical DDD; Spondylosis without myelopathy or radiculopathy, cervical region     TECHNIQUE:  Multiplanar, multisequence MR images of the cervical spine were performed without the administration of contrast.     COMPARISON:  Radiograph 08/27/2020.     FINDINGS:  Alignment: Straightening of cervical spine with slight reversal at the level of C5 through C7.  Minimal degenerative anterolisthesis C4 on C5..     Vertebrae: Normal marrow signal. No fracture.     Discs: Disc space narrowing C5-C6-C7 level.     Cord: At C3 through C6 level, STIR images, the cervical cord appears slightly indistinct and of minimally increased signal intensity.  This may be artifactual in nature is a cannot be confirmed on the axial or sagittal T2 weighted " sequences with certainty.  Postcontrast repeat examination may be indicated to exclude subtle intrinsic cord finding at this level..     Skull base and craniocervical junction: Normal.     Degenerative findings:     C2-C3: There is no focal disc herniation.No significant central canal narrowing.  No significant neural foraminal narrowing.     C3-C4: There is no focal disc herniation.No significant central canal narrowing.  No significant neural foraminal narrowing.     C4-C5: There is no focal disc herniation.No significant central canal narrowing.  Moderate RIGHT neural foraminal narrowing.     C5-C6: There is no focal disc herniation.No significant central canal narrowing.  Moderate RIGHT and mild LEFT neural foraminal narrowing.     C6-C7: There is no focal disc herniation.No significant central canal narrowing.  Moderate LEFT neural foraminal narrowing.     C7-T1: There is no focal disc herniation.No significant central canal narrowing.  No significant neural foraminal narrowing.     T1-T2:     Paraspinal muscles & soft tissues: Unremarkable.     Impression:     Degenerative spondylosis as above predominantly C4-C5 through C6-C7.     Likely artifact identified level of the cord C3 through C6 STIR images only although at of abundance of caution, repeat examination with postcontrast MRI may be helpful to exclude underlying myelitis.     This report was flagged in Epic as abnormal.        Electronically signed by: Jerzy Rose MD  Date:                                            10/28/2021  Time:                                           06:44           Narrative & Impression    EXAMINATION:  XR CERVICAL SPINE 5 VIEW WITH FLEX AND EXT     CLINICAL HISTORY:  Headache     TECHNIQUE:  Five views of the cervical spine plus flexion and extension views were performed.     COMPARISON:  None 07/09/2019.     FINDINGS:  Mild DJD.  The disc spaces are narrowed between C5 and C7 vertebral segments.  Encroachment of the  neural foramina identified at the C5-C6 level on the right and the C6/C7 level on the left.  There is a 2 mm C4/C5 anterolisthesis.  No fracture or dislocation.  No bone destruction identified.     Impression:     See above        Electronically signed by: Kaiser Ruiz MD  Date:                                            08/27/2020  Time:                                           14:12               Narrative & Impression     EXAMINATION:  MRI LUMBAR SPINE WITHOUT CONTRAST     CLINICAL HISTORY:  lumbar spinal stenosis; Other intervertebral disc degeneration, lumbar region     TECHNIQUE:  Multiplanar, multisequence MR images were acquired from the thoracolumbar junction to the sacrum without the administration of contrast.     COMPARISON:  Non plain film examination 01/13/2020 e.     FINDINGS:  Lumbar spine alignment is within normal limits. The vertebral body heights are well maintained, with no fracture.  No marrow signal abnormality suspicious for an infiltrative process.     The conus is normal in appearance, and terminates at the L1-L2 level.  The adjacent soft tissue structures show no significant abnormalities.     L1-L2: There is no focal disc herniation. No significant central canal or neural foraminal narrowing.     L2-L3: There is no focal disc herniation. No significant central canal or neural foraminal narrowing.     L3-L4: There is no focal disc herniation. No significant central canal or neural foraminal narrowing.     L4-L5: There is no focal disc herniation. No significant central canal or neural foraminal narrowing.     L5-S1: There is no focal disc herniation. No significant central canal or neural foraminal narrowing.     Impression:     No significant central canal stenosis, focal protrusion of disc material or neural foraminal encroachment.        Electronically signed by: Jerzy Rose MD  Date:                                            07/14/2020  Time:                                            14:15         Narrative & Impression    EXAMINATION:  XR LUMBAR SPINE AP AND LAT WITH FLEX/EXT     CLINICAL HISTORY:  Other intervertebral disc degeneration, lumbar region     TECHNIQUE:  AP and lateral views as well as lateral flexion and extension images are performed through the lumbar spine.     COMPARISON:  None     FINDINGS:  Mild DJD and lumbar scoliosis.  No significant disc space narrowing identified.  No fracture, spondylolisthesis or bone destruction noted.  Spina bifida occulta involving the S1 vertebral segment noted.     Impression:     See above        Electronically signed by: Kaiser Ruiz MD  Date:                                            01/13/2020  Time:                                           09:22    Encounter    View Encounter                     Narrative     No significant alignment abnormality.  Vertebral body heights are normally maintained, without compression deformity at any level.  There is disc narrowing at C5-6 and C6-7, and each of these levels demonstrates posterior marginal vertebral endplate   spurring and accompanying disc bulging.  The spurring is more pronounced to the right of midline at C5-6 and to the left of midline at C6-7.  All other cervical and visualized upper thoracic levels have normal disc contour, and there is no evidence of a   significant focal disc herniation at any level.  AP diameter of the spinal canal is normally maintained at all levels with no canal stenosis/extrinsic cord compression observed.  The spinal cord is well delineated from the cervicomedullary junction to   the T3 level, and appears normal in size and configuration without focal enlargement or irregularity.  No Chiari malformation or cord cyst or syrinx.  No abnormal signal from the substance of the cord on any of the pulse sequences generated.  There is   some prominent neural foraminal stenosis at C5-6 on the right and C6-7 on the left.  No significant signal abnormality referable to  the osseous structures.   Impression      Cervical spine MRI examination demonstrates degenerative changes as discussed above involving the C5-6 and C6-7 levels, with right-sided foraminal stenosis at C5-6 and left-sided foraminal stenosis at C6-7.  No evidence of a significant focal soft is   herniation, canal stenosis/extrinsic cord compression, or intramedullary cord lesion seen at any level.      Electronically signed by: Kaiser Preston MD  Date: 08/01/13  Time: 06:40          Assessment:       Encounter Diagnosis   Name Primary?    Epigastric pain          Plan:       Naomy was seen today for abdominal pain.    Diagnoses and all orders for this visit:    Epigastric pain        Naomy Armstrong is a 57 y.o. female with chronic postoperative epigastric abdominal pain and diarrhea.  Exact etiology these symptoms unknown but did start following hiatal hernia repair in February 2022.     1.  We discussed that the exact etiology of her pain is unknown at this time.  I would defer additional workup to clarify etiology of pain to surgery team and to gastroenterology.  2.  Given the unclear nature of pain, I am reluctant to utilize any interventional procedures for the management of this issue.  3.  We discussed that I would not recommend chronic opioid use including tramadol for the management of this pain.  Patient expressed understanding.  4.  I suggest that patient discuss her problems further with surgery team and Gastroenterology.  She may want to request a 2nd opinion to ensure that any specific source of her symptoms can be found.  5.  Return to clinic as needed.

## 2022-06-07 NOTE — TELEPHONE ENCOUNTER
----- Message from Hugo Ríos sent at 6/7/2022 10:42 AM CDT -----  Regarding: pt will be arriving about 10 mins late      The Pt wanted you to know that the draw bridge was up and will be arriving shortly.     # [phone]

## 2022-06-09 ENCOUNTER — PATIENT MESSAGE (OUTPATIENT)
Dept: GASTROENTEROLOGY | Facility: CLINIC | Age: 57
End: 2022-06-09
Payer: COMMERCIAL

## 2022-06-09 ENCOUNTER — PATIENT MESSAGE (OUTPATIENT)
Dept: PAIN MEDICINE | Facility: CLINIC | Age: 57
End: 2022-06-09
Payer: COMMERCIAL

## 2022-06-13 LAB
FINAL PATHOLOGIC DIAGNOSIS: NORMAL
Lab: NORMAL

## 2022-06-14 ENCOUNTER — TELEPHONE (OUTPATIENT)
Dept: ENDOSCOPY | Facility: HOSPITAL | Age: 57
End: 2022-06-14
Payer: COMMERCIAL

## 2022-06-14 DIAGNOSIS — R10.9 ABDOMINAL PAIN, UNSPECIFIED ABDOMINAL LOCATION: Primary | ICD-10-CM

## 2022-06-15 NOTE — TELEPHONE ENCOUNTER
----- Message from Fab Shepherd MD sent at 6/14/2022  5:18 PM CDT -----  Regarding: pain specialist  Please see the MyChart message from her regarding the pain specialist.  Her current pain doctor does not evaluate or treat abdominal pain.  Will you please contact the pain management department to see who is an appropriate provider to evaluate this.

## 2022-06-17 ENCOUNTER — PATIENT MESSAGE (OUTPATIENT)
Dept: GASTROENTEROLOGY | Facility: CLINIC | Age: 57
End: 2022-06-17
Payer: COMMERCIAL

## 2022-07-11 ENCOUNTER — PATIENT MESSAGE (OUTPATIENT)
Dept: FAMILY MEDICINE | Facility: CLINIC | Age: 57
End: 2022-07-11

## 2022-07-11 ENCOUNTER — OFFICE VISIT (OUTPATIENT)
Dept: FAMILY MEDICINE | Facility: CLINIC | Age: 57
End: 2022-07-11
Payer: COMMERCIAL

## 2022-07-11 DIAGNOSIS — R09.82 POST-NASAL DRIP: Primary | ICD-10-CM

## 2022-07-11 DIAGNOSIS — R09.81 NASAL CONGESTION: ICD-10-CM

## 2022-07-11 PROCEDURE — 99214 OFFICE O/P EST MOD 30 MIN: CPT | Mod: 95,,, | Performed by: NURSE PRACTITIONER

## 2022-07-11 PROCEDURE — 99214 PR OFFICE/OUTPT VISIT, EST, LEVL IV, 30-39 MIN: ICD-10-PCS | Mod: 95,,, | Performed by: NURSE PRACTITIONER

## 2022-07-11 PROCEDURE — 4010F PR ACE/ARB THEARPY RXD/TAKEN: ICD-10-PCS | Mod: CPTII,95,, | Performed by: NURSE PRACTITIONER

## 2022-07-11 PROCEDURE — 1160F PR REVIEW ALL MEDS BY PRESCRIBER/CLIN PHARMACIST DOCUMENTED: ICD-10-PCS | Mod: CPTII,95,, | Performed by: NURSE PRACTITIONER

## 2022-07-11 PROCEDURE — 4010F ACE/ARB THERAPY RXD/TAKEN: CPT | Mod: CPTII,95,, | Performed by: NURSE PRACTITIONER

## 2022-07-11 PROCEDURE — 1159F MED LIST DOCD IN RCRD: CPT | Mod: CPTII,95,, | Performed by: NURSE PRACTITIONER

## 2022-07-11 PROCEDURE — 1160F RVW MEDS BY RX/DR IN RCRD: CPT | Mod: CPTII,95,, | Performed by: NURSE PRACTITIONER

## 2022-07-11 PROCEDURE — 1159F PR MEDICATION LIST DOCUMENTED IN MEDICAL RECORD: ICD-10-PCS | Mod: CPTII,95,, | Performed by: NURSE PRACTITIONER

## 2022-07-11 RX ORDER — METHYLPREDNISOLONE 4 MG/1
TABLET ORAL
Qty: 1 EACH | Refills: 0 | Status: SHIPPED | OUTPATIENT
Start: 2022-07-11 | End: 2022-08-01

## 2022-07-11 RX ORDER — AZELASTINE 1 MG/ML
1 SPRAY, METERED NASAL 2 TIMES DAILY
Qty: 30 ML | Refills: 1 | Status: SHIPPED | OUTPATIENT
Start: 2022-07-11 | End: 2024-02-06 | Stop reason: SDUPTHER

## 2022-07-11 NOTE — PROGRESS NOTES
Answers for HPI/ROS submitted by the patient on 2022  activity change: No  unexpected weight change: No  neck pain: Yes  hearing loss: No  rhinorrhea: Yes  trouble swallowing: No  eye discharge: No  visual disturbance: No  chest tightness: Yes  wheezing: No  chest pain: No  palpitations: No  blood in stool: No  constipation: No  vomiting: No  diarrhea: No  polydipsia: No  polyuria: No  difficulty urinating: No  hematuria: No  menstrual problem: No  dysuria: No  joint swelling: No  arthralgias: No  headaches: Yes  weakness: No  confusion: No  dysphoric mood: Yes    The patient location is: [place], [place]  The chief complaint leading to consultation is:   Congestion, postnasal drip      Visit type: audiovisual    Face to Face time with patient: 9 minutes  20 minutes of total time spent on the encounter, which includes face to face time and non-face to face time preparing to see the patient (eg, review of tests), Obtaining and/or reviewing separately obtained history, Documenting clinical information in the electronic or other health record, Independently interpreting results (not separately reported) and communicating results to the patient/family/caregiver, or Care coordination (not separately reported).         Each patient to whom he or she provides medical services by telemedicine is:  (1) informed of the relationship between the physician and patient and the respective role of any other health care provider with respect to management of the patient; and (2) notified that he or she may decline to receive medical services by telemedicine and may withdraw from such care at any time.    Notes:     Patient Name: Naomy Armstrong    : 1965  MRN: [id number]    Chief Complaint:  Congestion, postnasal drip      Subjective:  Naomy is a 57 y.o. female who presents today for:    1. Congestion, postnasal drip -  Patient who is known to me reports today for evaluation.  For the last 3 days she has been dealing  with upper respiratory symptoms of runny nose, nasal congestion, postnasal drip, and a frontal headache and fatigue.  She did take a home COVID test today which is negative.  Denies any fevers or chills.  Reports that her boss was high with similar symptoms last week.  She has been using Sudafed for the symptoms without significant benefit.  She reports Flonase in the past has caused headache.  She states that her GERD has been significantly improved since the hiatal hernia procedure and she denies any history of peptic ulcers.     Past Medical History  Past Medical History:   Diagnosis Date    Alcohol abuse     per chart, but patient denies today and cocaine per chart    Anxiety     Colon polyp     Depression     Hallucination     last couple of months started seeing someone standing in her room, saw boyfriend's reflection in glass    Headache     History of psychiatric hospitalization     age 13 [facility] for 2 months for acting out; 2018 for SI    Hx of psychiatric care     Hypertension     Psychiatric problem     Sleep difficulties     Suicide attempt     with knife by cutting stomach    Therapy        Past Surgical History  Past Surgical History:   Procedure Laterality Date    COLONOSCOPY N/A 5/7/2021    Procedure: COLONOSCOPY;  Surgeon: Prem Montana MD;  Location: [facility];  Service: Endoscopy;  Laterality: N/A;  COVID screening 5/4 LAPC-instr portal -ml    COLONOSCOPY N/A 6/6/2022    Procedure: COLONOSCOPY;  Surgeon: Fab Shepherd MD;  Location: [facility] ([address] [place] [address]);  Service: Endoscopy;  Laterality: N/A;  C-Diff neg  Rapid COVID    EPIDURAL STEROID INJECTION Bilateral 8/5/2020    Procedure: Bilateral MBB @ L3, L4, L5;  Surgeon: Jeremy Powell Jr., MD;  Location: [facility];  Service: Pain Management;  Laterality: Bilateral;  Bilat L3-5 MBB  Arrive @ 1315; No ATC or DM; Needs MD signature    EPIDURAL STEROID INJECTION Right 8/19/2020    Procedure: Lumbar Radiofrequency  Thermocoagulation of Medial Branches;  Surgeon: Jeremy Powell Jr., MD;  Location: [facility] END[facility];  Service: Pain Management;  Laterality: Right;  Right L3-5 RFA  Arrive @ 1045; No ATC or DM; Needs MD Signature    EPIDURAL STEROID INJECTION Left 9/4/2020    Procedure: Lumbar Radiofrequency Thermocoagulation of Medial Branches;  Surgeon: Jeremy Powell Jr., MD;  Location: [facility] END[facility];  Service: Pain Management;  Laterality: Left;  Left L3-5 RFA  Arrive @ 0900 (requested); No ATC or DM; Needs MD signature    EPIDURAL STEROID INJECTION Bilateral 4/16/2021    Procedure: Sacroiliac Joint Steroid Injections @ Bilateral;  Surgeon: Jeremy Powell Jr., MD;  Location: [facility] END[facility];  Service: Pain Management;  Laterality: Bilateral;  Arrive @ 1300; No ATC or DM    EPIDURAL STEROID INJECTION Bilateral 1/19/2022    Procedure: Bilateral Sacroiliac Joint Injections;  Surgeon: Jeremy Powell Jr., MD;  Location: [facility] END[facility];  Service: Pain Management;  Laterality: Bilateral;  Arrive @ 1315; No ATC or DM; Needs Consent    ESOPHAGEAL MANOMETRY WITH MEASUREMENT OF IMPEDANCE N/A 10/20/2021    Procedure: MANOMETRY, ESOPHAGUS, WITH IMPEDANCE MEASUREMENT;  Surgeon: Anastacia Odonnell MD;  Location: [facility] (4TH FLR);  Service: Endoscopy;  Laterality: N/A;  Covid test 10/17 Gay, instructions [facility]    ESOPHAGOGASTRODUODENOSCOPY N/A 5/7/2021    Procedure: EGD (ESOPHAGOGASTRODUODENOSCOPY);  Surgeon: Prem Montana MD;  Location: [facility];  Service: Endoscopy;  Laterality: N/A;  added on per Dr. NOEL Shepherd    LAPAROSCOPIC TOUPET FUNDOPLICATION N/A 2/14/2022    Procedure: FUNDOPLICATION, LAPAROSCOPIC, TOUPET;  Surgeon: Christian Martinez MD;  Location: [facility] F[facility];  Service: General;  Laterality: N/A;       Family History  Family History   Problem Relation Age of Onset    Anxiety disorder Sister     Depression Sister     Alcohol abuse Maternal Uncle     Alcohol abuse Maternal Grandfather         Social History  Social History     Socioeconomic History    Marital status:     Number of children: 0   Occupational History    Occupation:      Employer: [employer]   Tobacco Use    Smoking status: Former Smoker     Packs/day: 1.00     Quit date: 2006     Years since quittin.2    Smokeless tobacco: Never Used   Substance and Sexual Activity    Alcohol use: Yes     Comment: occasional    Drug use: Not Currently     Types: Marijuana     Comment: tried at age 14    Sexual activity: Yes     Partners: Male     Comment: going through menopause   Other Topics Concern    Patient feels they ought to cut down on drinking/drug use No    Patient annoyed by others criticizing their drinking/drug use No    Patient has felt bad or guilty about drinking/drug use No    Patient has had a drink/used drugs as an eye opener in the AM No   Social History Narrative     x 2.    Lives with boyfriend.     Works at [employer] as an .    Presently working on Bachelor's degree.    Has associates degree in criminal justice.     Social Determinants of Health     Financial Resource Strain: High Risk    Difficulty of Paying Living Expenses: Very hard   Food Insecurity: Food Insecurity Present    Worried About Running Out of Food in the Last Year: Sometimes true    Ran Out of Food in the Last Year: Sometimes true   Transportation Needs: No Transportation Needs    Lack of Transportation (Medical): No    Lack of Transportation (Non-Medical): No   Physical Activity: Unknown    Days of Exercise per Week: 0 days    Minutes of Exercise per Session: Patient refused   Stress: Stress Concern Present    Feeling of Stress : To some extent   Social Connections: Unknown    Frequency of Communication with Friends and Family: More than three times a week    Frequency of Social Gatherings with Friends and Family: Patient refused    Active Member of Clubs or Organizations: Yes     Attends Club or Organization Meetings: 1 to 4 times per year    Marital Status: Living with partner   Housing Stability: High Risk    Unable to Pay for Housing in the Last Year: Yes    Number of Places Lived in the Last Year: 1    Unstable Housing in the Last Year: No       Current Medications  Current Outpatient Medications on File Prior to Visit   Medication Sig Dispense Refill    amLODIPine (NORVASC) 5 MG tablet Take 1 tablet (5 mg total) by mouth once daily. 90 tablet 1    celecoxib (CELEBREX) 100 MG capsule Take 1 capsule (100 mg total) by mouth 2 (two) times daily. 60 capsule 6    cholestyramine-aspartame (CHOLESTYRAMINE LIGHT) 4 gram PwPk Take 1 packet (4 g total) by mouth every evening. 30 packet 0    dicyclomine (BENTYL) 10 MG capsule Take 1 capsule (10 mg total) by mouth 3 (three) times daily as needed (abdominal cramping). 90 capsule 2    diphenoxylate-atropine 2.5-0.025 mg (LOMOTIL) 2.5-0.025 mg per tablet TAKE 1 TABLET BY MOUTH 4 TIMES DAILY AS NEEDED FOR DIARRHEA 28 tablet 0    FLUoxetine 20 MG capsule Take 1 capsule (20 mg total) by mouth once daily. Take in addition to 40 mg for a total of 60mg daily. (Patient taking differently: Take 20 mg by mouth every evening. Take in addition to 40 mg for a total of 60mg daily.) 90 capsule 3    FLUoxetine 40 MG capsule Take 1 capsule (40 mg total) by mouth once daily. Take in addition to 20 mg for a total of 60mg daily. (Patient taking differently: Take 40 mg by mouth nightly. Take in addition to 20 mg for a total of 60mg daily.) 90 capsule 3    gabapentin (NEURONTIN) 600 MG tablet Take 1 tablet (600 mg total) by mouth 3 (three) times daily as needed. 90 tablet 1    hydrOXYzine HCL (ATARAX) 10 MG Tab Take 1 tablet (10 mg total) by mouth 2 (two) times daily as needed (anxiety). 60 tablet 3    lisinopriL (PRINIVIL,ZESTRIL) 20 MG tablet Take 1 tablet (20 mg total) by mouth nightly. 90 tablet 1    loperamide (IMODIUM) 2 mg capsule Take 2 mg by mouth  once as needed for Diarrhea.      multivitamin with minerals tablet Take 1 tablet by mouth once daily.      omeprazole (PRILOSEC) 40 MG capsule Take 1 capsule (40 mg total) by mouth every morning. 90 capsule 3    polyethylene glycol (GOLYTELY) 236-22.74-6.74 -5.86 gram suspension SMARTSI Milliliter(s) By Mouth Once      promethazine (PHENERGAN) 25 MG tablet Take 1 tablet (25 mg total) by mouth every 8 (eight) hours as needed for Nausea. 30 tablet 1     No current facility-administered medications on file prior to visit.       Allergies   Review of patient's allergies indicates:   Allergen Reactions    Effexor [venlafaxine]      Elevated her blood pressure    Zoloft [sertraline]     Paroxetine hcl      Made her feel drunk       Review of Systems (Pertinent positives)  Review of Systems   Constitutional: Positive for malaise/fatigue. Negative for chills, diaphoresis, fever and weight loss.   HENT: Positive for congestion. Negative for ear discharge, ear pain, hearing loss, sinus pain and tinnitus.    Eyes: Negative.  Negative for discharge.   Respiratory: Negative.  Negative for cough, sputum production, wheezing and stridor.    Cardiovascular: Negative.  Negative for chest pain and palpitations.   Gastrointestinal: Negative.  Negative for blood in stool, constipation, diarrhea and vomiting.   Genitourinary: Negative.  Negative for dysuria and hematuria.   Musculoskeletal: Positive for neck pain.   Neurological: Positive for headaches. Negative for dizziness, tingling, tremors and weakness.   Endo/Heme/Allergies: Negative.  Negative for polydipsia.   Psychiatric/Behavioral: Negative.        [facility] 09/15/2013     Physical Exam  Vitals reviewed.   Constitutional:       General: She is not in acute distress.     Appearance: Normal appearance. She is not ill-appearing, toxic-appearing or diaphoretic.   HENT:      Head: Normocephalic and atraumatic.   Eyes:      General: No scleral icterus.        Right eye: No  discharge.         Left eye: No discharge.      Extraocular Movements: Extraocular movements intact.      Conjunctiva/sclera: Conjunctivae normal.   Pulmonary:      Effort: Pulmonary effort is normal. No tachypnea or accessory muscle usage.      Breath sounds: No wheezing.      Comments: No audible wheezes noted speaking in complete sentences  Skin:     General: Skin is dry.      Findings: No bruising, erythema or rash.   Neurological:      Mental Status: She is alert and oriented to person, place, and time.   Psychiatric:         Mood and Affect: Mood normal.         Behavior: Behavior normal.          Assessment/Plan:  Naomy Armstrong is a 57 y.o. female who presents today for :    Diagnoses and all orders for this visit:    Post-nasal drip  -     methylPREDNISolone (MEDROL DOSEPACK) 4 mg tablet; use as directed  -     azelastine (ASTELIN) 137 mcg (0.1 %) nasal spray; 1 spray (137 mcg total) by Nasal route 2 (two) times daily.    Nasal congestion  -     methylPREDNISolone (MEDROL DOSEPACK) 4 mg tablet; use as directed  -     azelastine (ASTELIN) 137 mcg (0.1 %) nasal spray; 1 spray (137 mcg total) by Nasal route 2 (two) times daily.    - tx with astelin spray, medrol dosepack  - no bacterial nidus  - Mychart message if symptoms do not improve        This office note has been dictated.  This dictation has been generated using M-Modal Fluency Direct dictation; some phonetic errors may occur.   My collaborating physician is Dr. Joseph Garcia.

## 2022-07-11 NOTE — LETTER
July 11, 2022      [facility]  [address], [address]  [place] [address]  Phone: [phone]       Patient: Naomy Armstrong   YOB: 1965  Date of Visit: 07/11/2022    To Whom It May Concern:    Ronald Armstrong  was at [facility] on 07/11/2022. The patient may return to work/school on 7/12/22 with no restrictions. If you have any questions or concerns, or if I can be of further assistance, please do not hesitate to contact me.    Sincerely,    Dutch Estrada, NP

## 2022-07-13 RX ORDER — DIPHENOXYLATE HYDROCHLORIDE AND ATROPINE SULFATE 2.5; .025 MG/1; MG/1
1 TABLET ORAL 4 TIMES DAILY PRN
Qty: 28 TABLET | Refills: 0 | Status: SHIPPED | OUTPATIENT
Start: 2022-07-13 | End: 2022-07-20 | Stop reason: SDUPTHER

## 2022-07-21 RX ORDER — DIPHENOXYLATE HYDROCHLORIDE AND ATROPINE SULFATE 2.5; .025 MG/1; MG/1
1 TABLET ORAL 4 TIMES DAILY PRN
Qty: 60 TABLET | Refills: 1 | Status: SHIPPED | OUTPATIENT
Start: 2022-07-21 | End: 2022-07-29 | Stop reason: SDUPTHER

## 2022-07-22 ENCOUNTER — PATIENT MESSAGE (OUTPATIENT)
Dept: GASTROENTEROLOGY | Facility: CLINIC | Age: 57
End: 2022-07-22
Payer: COMMERCIAL

## 2022-07-29 ENCOUNTER — PATIENT MESSAGE (OUTPATIENT)
Dept: GASTROENTEROLOGY | Facility: CLINIC | Age: 57
End: 2022-07-29
Payer: COMMERCIAL

## 2022-07-29 RX ORDER — DICYCLOMINE HYDROCHLORIDE 20 MG/1
20 TABLET ORAL EVERY 6 HOURS
Qty: 120 TABLET | Refills: 0 | Status: SHIPPED | OUTPATIENT
Start: 2022-07-29 | End: 2022-08-28

## 2022-08-07 RX ORDER — DIPHENOXYLATE HYDROCHLORIDE AND ATROPINE SULFATE 2.5; .025 MG/1; MG/1
1 TABLET ORAL 4 TIMES DAILY PRN
Qty: 60 TABLET | Refills: 1 | Status: CANCELLED | OUTPATIENT
Start: 2022-08-07

## 2022-08-09 ENCOUNTER — PATIENT MESSAGE (OUTPATIENT)
Dept: GASTROENTEROLOGY | Facility: CLINIC | Age: 57
End: 2022-08-09
Payer: COMMERCIAL

## 2022-08-10 ENCOUNTER — OFFICE VISIT (OUTPATIENT)
Dept: PSYCHIATRY | Facility: CLINIC | Age: 57
End: 2022-08-10
Payer: COMMERCIAL

## 2022-08-10 VITALS
WEIGHT: 187.06 LBS | BODY MASS INDEX: 31.17 KG/M2 | DIASTOLIC BLOOD PRESSURE: 89 MMHG | SYSTOLIC BLOOD PRESSURE: 152 MMHG | HEART RATE: 75 BPM | HEIGHT: 65 IN

## 2022-08-10 DIAGNOSIS — F41.1 GAD (GENERALIZED ANXIETY DISORDER): ICD-10-CM

## 2022-08-10 DIAGNOSIS — Z65.8 PSYCHOSOCIAL DISTRESS: ICD-10-CM

## 2022-08-10 DIAGNOSIS — F33.0 MAJOR DEPRESSIVE DISORDER, RECURRENT EPISODE, MILD: Primary | ICD-10-CM

## 2022-08-10 PROCEDURE — 3079F PR MOST RECENT DIASTOLIC BLOOD PRESSURE 80-89 MM HG: ICD-10-PCS | Mod: CPTII,S$GLB,, | Performed by: PSYCHIATRY & NEUROLOGY

## 2022-08-10 PROCEDURE — 4010F PR ACE/ARB THEARPY RXD/TAKEN: ICD-10-PCS | Mod: CPTII,S$GLB,, | Performed by: PSYCHIATRY & NEUROLOGY

## 2022-08-10 PROCEDURE — 3008F PR BODY MASS INDEX (BMI) DOCUMENTED: ICD-10-PCS | Mod: CPTII,S$GLB,, | Performed by: PSYCHIATRY & NEUROLOGY

## 2022-08-10 PROCEDURE — 4010F ACE/ARB THERAPY RXD/TAKEN: CPT | Mod: CPTII,S$GLB,, | Performed by: PSYCHIATRY & NEUROLOGY

## 2022-08-10 PROCEDURE — 3077F PR MOST RECENT SYSTOLIC BLOOD PRESSURE >= 140 MM HG: ICD-10-PCS | Mod: CPTII,S$GLB,, | Performed by: PSYCHIATRY & NEUROLOGY

## 2022-08-10 PROCEDURE — 3079F DIAST BP 80-89 MM HG: CPT | Mod: CPTII,S$GLB,, | Performed by: PSYCHIATRY & NEUROLOGY

## 2022-08-10 PROCEDURE — 3008F BODY MASS INDEX DOCD: CPT | Mod: CPTII,S$GLB,, | Performed by: PSYCHIATRY & NEUROLOGY

## 2022-08-10 PROCEDURE — 99999 PR PBB SHADOW E&M-EST. PATIENT-LVL IV: ICD-10-PCS | Mod: PBBFAC,,, | Performed by: PSYCHIATRY & NEUROLOGY

## 2022-08-10 PROCEDURE — 99999 PR PBB SHADOW E&M-EST. PATIENT-LVL IV: CPT | Mod: PBBFAC,,, | Performed by: PSYCHIATRY & NEUROLOGY

## 2022-08-10 PROCEDURE — 1160F RVW MEDS BY RX/DR IN RCRD: CPT | Mod: CPTII,S$GLB,, | Performed by: PSYCHIATRY & NEUROLOGY

## 2022-08-10 PROCEDURE — 99214 PR OFFICE/OUTPT VISIT, EST, LEVL IV, 30-39 MIN: ICD-10-PCS | Mod: S$GLB,,, | Performed by: PSYCHIATRY & NEUROLOGY

## 2022-08-10 PROCEDURE — 99214 OFFICE O/P EST MOD 30 MIN: CPT | Mod: S$GLB,,, | Performed by: PSYCHIATRY & NEUROLOGY

## 2022-08-10 PROCEDURE — 1159F PR MEDICATION LIST DOCUMENTED IN MEDICAL RECORD: ICD-10-PCS | Mod: CPTII,S$GLB,, | Performed by: PSYCHIATRY & NEUROLOGY

## 2022-08-10 PROCEDURE — 1160F PR REVIEW ALL MEDS BY PRESCRIBER/CLIN PHARMACIST DOCUMENTED: ICD-10-PCS | Mod: CPTII,S$GLB,, | Performed by: PSYCHIATRY & NEUROLOGY

## 2022-08-10 PROCEDURE — 1159F MED LIST DOCD IN RCRD: CPT | Mod: CPTII,S$GLB,, | Performed by: PSYCHIATRY & NEUROLOGY

## 2022-08-10 PROCEDURE — 3077F SYST BP >= 140 MM HG: CPT | Mod: CPTII,S$GLB,, | Performed by: PSYCHIATRY & NEUROLOGY

## 2022-08-10 RX ORDER — FLUOXETINE HYDROCHLORIDE 40 MG/1
80 CAPSULE ORAL DAILY
Qty: 180 CAPSULE | Refills: 3 | Status: SHIPPED | OUTPATIENT
Start: 2022-08-10 | End: 2023-07-19 | Stop reason: SDUPTHER

## 2022-08-10 NOTE — PATIENT INSTRUCTIONS

## 2022-08-16 DIAGNOSIS — G44.86 CERVICOGENIC HEADACHE: ICD-10-CM

## 2022-08-17 DIAGNOSIS — G44.86 CERVICOGENIC HEADACHE: ICD-10-CM

## 2022-08-17 RX ORDER — CYCLOBENZAPRINE HCL 5 MG
TABLET ORAL
Qty: 60 TABLET | Refills: 0 | Status: SHIPPED | OUTPATIENT
Start: 2022-08-17 | End: 2022-12-04 | Stop reason: SDUPTHER

## 2022-08-18 RX ORDER — CYCLOBENZAPRINE HCL 5 MG
TABLET ORAL
Qty: 60 TABLET | Refills: 0 | OUTPATIENT
Start: 2022-08-18

## 2022-08-24 ENCOUNTER — PATIENT MESSAGE (OUTPATIENT)
Dept: ADMINISTRATIVE | Facility: HOSPITAL | Age: 57
End: 2022-08-24
Payer: COMMERCIAL

## 2022-08-24 ENCOUNTER — OFFICE VISIT (OUTPATIENT)
Dept: FAMILY MEDICINE | Facility: CLINIC | Age: 57
End: 2022-08-24
Payer: COMMERCIAL

## 2022-08-24 DIAGNOSIS — J00 ACUTE NASOPHARYNGITIS: Primary | ICD-10-CM

## 2022-08-24 PROCEDURE — 4010F PR ACE/ARB THEARPY RXD/TAKEN: ICD-10-PCS | Mod: CPTII,95,, | Performed by: FAMILY MEDICINE

## 2022-08-24 PROCEDURE — 99213 OFFICE O/P EST LOW 20 MIN: CPT | Mod: 95,,, | Performed by: FAMILY MEDICINE

## 2022-08-24 PROCEDURE — 99213 PR OFFICE/OUTPT VISIT, EST, LEVL III, 20-29 MIN: ICD-10-PCS | Mod: 95,,, | Performed by: FAMILY MEDICINE

## 2022-08-24 PROCEDURE — 1159F PR MEDICATION LIST DOCUMENTED IN MEDICAL RECORD: ICD-10-PCS | Mod: CPTII,95,, | Performed by: FAMILY MEDICINE

## 2022-08-24 PROCEDURE — 4010F ACE/ARB THERAPY RXD/TAKEN: CPT | Mod: CPTII,95,, | Performed by: FAMILY MEDICINE

## 2022-08-24 PROCEDURE — 1159F MED LIST DOCD IN RCRD: CPT | Mod: CPTII,95,, | Performed by: FAMILY MEDICINE

## 2022-08-24 PROCEDURE — 1160F PR REVIEW ALL MEDS BY PRESCRIBER/CLIN PHARMACIST DOCUMENTED: ICD-10-PCS | Mod: CPTII,95,, | Performed by: FAMILY MEDICINE

## 2022-08-24 PROCEDURE — 1160F RVW MEDS BY RX/DR IN RCRD: CPT | Mod: CPTII,95,, | Performed by: FAMILY MEDICINE

## 2022-08-24 NOTE — LETTER
August 24, 2022      [facility]  [address], [address]  [place] [address]  Phone: [phone]       Patient: Naomy Armstrong   YOB: 1965  Date of Visit: 08/24/2022    To Whom It May Concern:    Ronald Armstrong  was seen virtually with [facility] on 08/24/2022. The patient may return to work on 08/29/2022. If you have any questions or concerns, or if I can be of further assistance, please do not hesitate to contact me.    Sincerely,    Eric Hernandes Jr., MD

## 2022-08-24 NOTE — PROGRESS NOTES
The patient location is: [place], [place]  The chief complaint leading to consultation is: Cold    Visit type: audiovisual    Face to Face time with patient: 8 minutes   9 minutes of total time spent on the encounter, which includes face to face time and non-face to face time preparing to see the patient (eg, review of tests), Obtaining and/or reviewing separately obtained history, Documenting clinical information in the electronic or other health record, Independently interpreting results (not separately reported) and communicating results to the patient/family/caregiver, or Care coordination (not separately reported).         Each patient to whom he or she provides medical services by telemedicine is:  (1) informed of the relationship between the physician and patient and the respective role of any other health care provider with respect to management of the patient; and (2) notified that he or she may decline to receive medical services by telemedicine and may withdraw from such care at any time.    Notes:     Subjective:       Patient ID: Naomy Armstrong is a 57 y.o. female.    Chief Complaint: Cough    Cough  This is a new problem. The current episode started in the past 7 days. The problem has been unchanged. The problem occurs every few minutes. The cough is Productive of sputum. Associated symptoms include chest pain, headaches, nasal congestion, postnasal drip, rhinorrhea and a sore throat. Pertinent negatives include no chills, ear congestion, ear pain, fever, heartburn, hemoptysis, myalgias, rash, shortness of breath, sweats, weight loss or wheezing. Nothing aggravates the symptoms. She has tried OTC cough suppressant, prescription cough suppressant and rest for the symptoms. The treatment provided mild relief. Her past medical history is significant for bronchitis and pneumonia. There is no history of asthma, bronchiectasis, COPD, emphysema or environmental allergies.       Review of Systems    Constitutional:  Negative for chills, fever and weight loss.   HENT:  Positive for postnasal drip, rhinorrhea and sore throat. Negative for ear pain.    Respiratory:  Positive for cough. Negative for hemoptysis, shortness of breath and wheezing.    Cardiovascular:  Positive for chest pain.   Gastrointestinal:  Negative for heartburn.   Musculoskeletal:  Negative for myalgias.   Integumentary:  Negative for rash.   Allergic/Immunologic: Negative for environmental allergies.   Neurological:  Positive for headaches.       Objective:      Physical Exam  Constitutional:       General: She is not in acute distress.     Appearance: She is ill-appearing.   Pulmonary:      Effort: Pulmonary effort is normal.   Neurological:      Mental Status: She is alert.   Psychiatric:         Mood and Affect: Mood normal.         Behavior: Behavior normal.         Thought Content: Thought content normal.       Assessment:       Problem List Items Addressed This Visit    None  Visit Diagnoses       Acute nasopharyngitis    -  Primary            Plan:       Naomy was seen today for cough.    Diagnoses and all orders for this visit:    Acute nasopharyngitis    Advised symptomatic care with plenty of fluids, honey and lemon, rest, and continue promethazine-DM.  OTC Ibuprofen or Tylenol for pain.  Discussed course of a viral respiratory infections.  Explained that after respiratory infection is better, may continue to have a dry cough for a week or two.  Return to office if symptoms worsen or do not improve in a week.

## 2022-08-28 RX ORDER — DIPHENOXYLATE HYDROCHLORIDE AND ATROPINE SULFATE 2.5; .025 MG/1; MG/1
1 TABLET ORAL 4 TIMES DAILY PRN
Qty: 60 TABLET | Refills: 1 | Status: CANCELLED | OUTPATIENT
Start: 2022-08-28

## 2022-08-29 ENCOUNTER — PATIENT MESSAGE (OUTPATIENT)
Dept: GASTROENTEROLOGY | Facility: CLINIC | Age: 57
End: 2022-08-29
Payer: COMMERCIAL

## 2022-09-23 ENCOUNTER — TELEPHONE (OUTPATIENT)
Dept: GASTROENTEROLOGY | Facility: CLINIC | Age: 57
End: 2022-09-23
Payer: COMMERCIAL

## 2022-10-10 ENCOUNTER — PATIENT MESSAGE (OUTPATIENT)
Dept: ADMINISTRATIVE | Facility: HOSPITAL | Age: 57
End: 2022-10-10
Payer: COMMERCIAL

## 2022-11-22 ENCOUNTER — OFFICE VISIT (OUTPATIENT)
Dept: GASTROENTEROLOGY | Facility: CLINIC | Age: 57
End: 2022-11-22
Payer: COMMERCIAL

## 2022-11-22 DIAGNOSIS — K58.0 IRRITABLE BOWEL SYNDROME WITH DIARRHEA: Primary | ICD-10-CM

## 2022-11-22 DIAGNOSIS — R10.13 EPIGASTRIC PAIN: ICD-10-CM

## 2022-11-22 DIAGNOSIS — K22.2 ESOPHAGOGASTRIC JUNCTION OUTFLOW OBSTRUCTION: ICD-10-CM

## 2022-11-22 PROBLEM — K31.89 ESOPHAGOGASTRIC JUNCTION OUTFLOW OBSTRUCTION: Status: ACTIVE | Noted: 2022-11-22

## 2022-11-22 PROBLEM — K22.89 ESOPHAGOGASTRIC JUNCTION OUTFLOW OBSTRUCTION: Status: ACTIVE | Noted: 2022-11-22

## 2022-11-22 PROCEDURE — 1159F PR MEDICATION LIST DOCUMENTED IN MEDICAL RECORD: ICD-10-PCS | Mod: CPTII,95,, | Performed by: INTERNAL MEDICINE

## 2022-11-22 PROCEDURE — 1160F RVW MEDS BY RX/DR IN RCRD: CPT | Mod: CPTII,95,, | Performed by: INTERNAL MEDICINE

## 2022-11-22 PROCEDURE — 99213 PR OFFICE/OUTPT VISIT, EST, LEVL III, 20-29 MIN: ICD-10-PCS | Mod: 95,,, | Performed by: INTERNAL MEDICINE

## 2022-11-22 PROCEDURE — 1159F MED LIST DOCD IN RCRD: CPT | Mod: CPTII,95,, | Performed by: INTERNAL MEDICINE

## 2022-11-22 PROCEDURE — 4010F ACE/ARB THERAPY RXD/TAKEN: CPT | Mod: CPTII,95,, | Performed by: INTERNAL MEDICINE

## 2022-11-22 PROCEDURE — 1160F PR REVIEW ALL MEDS BY PRESCRIBER/CLIN PHARMACIST DOCUMENTED: ICD-10-PCS | Mod: CPTII,95,, | Performed by: INTERNAL MEDICINE

## 2022-11-22 PROCEDURE — 4010F PR ACE/ARB THEARPY RXD/TAKEN: ICD-10-PCS | Mod: CPTII,95,, | Performed by: INTERNAL MEDICINE

## 2022-11-22 PROCEDURE — 99213 OFFICE O/P EST LOW 20 MIN: CPT | Mod: 95,,, | Performed by: INTERNAL MEDICINE

## 2022-11-22 RX ORDER — ALOSETRON HYDROCHLORIDE 0.5 MG/1
0.5 TABLET ORAL DAILY
Qty: 30 TABLET | Refills: 2 | Status: SHIPPED | OUTPATIENT
Start: 2022-11-22 | End: 2023-02-06

## 2022-11-22 NOTE — PROGRESS NOTES
Gastroenterology Telemedicine Virtual Visit    The patient location is:  Patient Home  The chief complaint leading to consultation is:  Follow-up chronic abdominal pain and diarrhea  Visit type: Virtual visit with synchronous audio and video      Narrative:  57 y.o. female here on a telemedicine visit for follow-up of chronic diarrhea and abdominal pain.  I last saw her in May for similar symptoms.  We are unable to get rifaximin.  We were also unable to get Viberzi.  She had been taking Lomotil and Imodium regularly.  Arrange for a colonoscopy that was performed 06/06/2022.  This was complete with intubation of the terminal ileum and excellent bowel preparation.  Diverticulosis was seen in the sigmoid, descending, and ascending colon.  Otherwise, the exam was unremarkable.  Random colon biopsies were unremarkable.  I recommended cholestyramine.  She tried taking it, but could not tolerate it.  The diarrhea is still present, watery at times, and unchanged from previous.  She still has abdominal pain as well that responds to pain medication.  She reports a new symptom of a lump in the throat that occurred last week.  This lasts for several days, but is gone now.  This had never happened before.  She denies heartburn or reflux.  Prior EGD 05/07/2021 reported a normal esophagus with a medium-sized hiatal hernia.  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  An esophagram with barium tablet done 03/17/2022 revealed delayed passage of barium tablet across the gastroesophageal junction remaining within the esophagus at the conclusion of the exam.          Assessment:  1. Irritable bowel syndrome with diarrhea    2. Epigastric pain    3. Esophagogastric junction outflow obstruction      Ongoing problems with diarrhea and chronic abdominal pain.  We have been unable to obtain rifaximin or Viberzi.  She was not able to tolerate cholestyramine.  Has some response to Imodium and Lomotil, but requiring  large doses.  We discussed alternatives which could be to use a different binder such as colestipol.  We could also try Lotronex.  Of these options, she is interested in Lotronex.        Recommendation:  Will start Lotronex with a low-dose strategy at 0.5 mg daily.  I discussed with her the potential risks of severe constipation and ischemic colitis.  I advised her not to use Imodium or Lomotil while starting Lotronex.  We have the option to titrate this medication based on response.  If no improvement after the 1st 4 weeks, we can increase to 0.5 mg twice daily.  If she develops constipation during our trial, she needs to stop using the medication and contact me.    Return to clinic in 3 months.          Total time spent with patient:  20 min      Each patient to whom he or she provides medical services by telemedicine is:  (1) informed of the relationship between the physician and patient and the respective role of any other health care provider with respect to management of the patient; and (2) notified that he or she may decline to receive medical services by telemedicine and may withdraw from such care at any time.

## 2022-11-23 ENCOUNTER — OFFICE VISIT (OUTPATIENT)
Dept: FAMILY MEDICINE | Facility: CLINIC | Age: 57
End: 2022-11-23
Payer: COMMERCIAL

## 2022-11-23 VITALS — DIASTOLIC BLOOD PRESSURE: 87 MMHG | SYSTOLIC BLOOD PRESSURE: 130 MMHG

## 2022-11-23 DIAGNOSIS — K44.9 HIATAL HERNIA: Chronic | ICD-10-CM

## 2022-11-23 DIAGNOSIS — R10.9 ABDOMINAL PAIN, UNSPECIFIED ABDOMINAL LOCATION: ICD-10-CM

## 2022-11-23 DIAGNOSIS — F33.41 MAJOR DEPRESSIVE DISORDER, RECURRENT EPISODE, IN PARTIAL REMISSION: Chronic | ICD-10-CM

## 2022-11-23 DIAGNOSIS — I10 HYPERTENSION, ESSENTIAL: Primary | Chronic | ICD-10-CM

## 2022-11-23 DIAGNOSIS — F41.1 GENERALIZED ANXIETY DISORDER: Chronic | ICD-10-CM

## 2022-11-23 PROCEDURE — 99214 PR OFFICE/OUTPT VISIT, EST, LEVL IV, 30-39 MIN: ICD-10-PCS | Mod: 95,,, | Performed by: FAMILY MEDICINE

## 2022-11-23 PROCEDURE — 1160F RVW MEDS BY RX/DR IN RCRD: CPT | Mod: CPTII,95,, | Performed by: FAMILY MEDICINE

## 2022-11-23 PROCEDURE — 1159F MED LIST DOCD IN RCRD: CPT | Mod: CPTII,95,, | Performed by: FAMILY MEDICINE

## 2022-11-23 PROCEDURE — 3079F PR MOST RECENT DIASTOLIC BLOOD PRESSURE 80-89 MM HG: ICD-10-PCS | Mod: CPTII,95,, | Performed by: FAMILY MEDICINE

## 2022-11-23 PROCEDURE — 3075F SYST BP GE 130 - 139MM HG: CPT | Mod: CPTII,95,, | Performed by: FAMILY MEDICINE

## 2022-11-23 PROCEDURE — 3075F PR MOST RECENT SYSTOLIC BLOOD PRESS GE 130-139MM HG: ICD-10-PCS | Mod: CPTII,95,, | Performed by: FAMILY MEDICINE

## 2022-11-23 PROCEDURE — 4010F PR ACE/ARB THEARPY RXD/TAKEN: ICD-10-PCS | Mod: CPTII,95,, | Performed by: FAMILY MEDICINE

## 2022-11-23 PROCEDURE — 1159F PR MEDICATION LIST DOCUMENTED IN MEDICAL RECORD: ICD-10-PCS | Mod: CPTII,95,, | Performed by: FAMILY MEDICINE

## 2022-11-23 PROCEDURE — 99214 OFFICE O/P EST MOD 30 MIN: CPT | Mod: 95,,, | Performed by: FAMILY MEDICINE

## 2022-11-23 PROCEDURE — 4010F ACE/ARB THERAPY RXD/TAKEN: CPT | Mod: CPTII,95,, | Performed by: FAMILY MEDICINE

## 2022-11-23 PROCEDURE — 1160F PR REVIEW ALL MEDS BY PRESCRIBER/CLIN PHARMACIST DOCUMENTED: ICD-10-PCS | Mod: CPTII,95,, | Performed by: FAMILY MEDICINE

## 2022-11-23 PROCEDURE — 3079F DIAST BP 80-89 MM HG: CPT | Mod: CPTII,95,, | Performed by: FAMILY MEDICINE

## 2022-11-26 ENCOUNTER — PATIENT MESSAGE (OUTPATIENT)
Dept: FAMILY MEDICINE | Facility: CLINIC | Age: 57
End: 2022-11-26
Payer: COMMERCIAL

## 2022-11-27 PROBLEM — F41.1 GENERALIZED ANXIETY DISORDER: Status: ACTIVE | Noted: 2022-11-27

## 2022-11-27 RX ORDER — OMEPRAZOLE 40 MG/1
40 CAPSULE, DELAYED RELEASE ORAL EVERY MORNING
Qty: 90 CAPSULE | Refills: 3 | Status: SHIPPED | OUTPATIENT
Start: 2022-11-27 | End: 2024-01-04

## 2022-11-27 NOTE — PROGRESS NOTES
The patient location is: [place]  The chief complaint leading to consultation is: HTN, Abdominal pain    Visit type: audiovisual    Face to Face time with patient: 17 minutes  20 minutes of total time spent on the encounter, which includes face to face time and non-face to face time preparing to see the patient (eg, review of tests), Obtaining and/or reviewing separately obtained history, Documenting clinical information in the electronic or other health record, Independently interpreting results (not separately reported) and communicating results to the patient/family/caregiver, or Care coordination (not separately reported).         Each patient to whom he or she provides medical services by telemedicine is:  (1) informed of the relationship between the physician and patient and the respective role of any other health care provider with respect to management of the patient; and (2) notified that he or she may decline to receive medical services by telemedicine and may withdraw from such care at any time.    Notes:   Subjective:       Patient ID: Naomy Armstrong is a 57 y.o. female.    Chief Complaint: No chief complaint on file.    Hypertension  This is a chronic problem. The current episode started more than 1 year ago. Associated symptoms include headaches. Pertinent negatives include no chest pain, neck pain or palpitations. Past treatments include calcium channel blockers. Improvement on treatment: patient did not check blood pressure prior to visit.   Abdominal Pain  This is a chronic problem. The pain is located in the generalized abdominal region. The abdominal pain radiates to the epigastric region. Associated symptoms include diarrhea and headaches. Pertinent negatives include no arthralgias, constipation, dysuria, hematuria or vomiting. The pain is aggravated by eating. She has tried proton pump inhibitors for the symptoms. The treatment provided significant relief.   Review of Systems    Constitutional:  Negative for activity change and unexpected weight change.   HENT:  Positive for trouble swallowing. Negative for hearing loss and rhinorrhea.    Eyes:  Negative for discharge and visual disturbance.   Respiratory:  Negative for chest tightness and wheezing.    Cardiovascular:  Negative for chest pain and palpitations.   Gastrointestinal:  Positive for abdominal pain and diarrhea. Negative for blood in stool, constipation and vomiting.   Endocrine: Negative for polydipsia and polyuria.   Genitourinary:  Negative for difficulty urinating, dysuria, hematuria and menstrual problem.   Musculoskeletal:  Negative for arthralgias, joint swelling and neck pain.   Neurological:  Positive for headaches. Negative for weakness.   Psychiatric/Behavioral:  Positive for dysphoric mood. Negative for confusion.        Objective:      Physical Exam  Pulmonary:      Effort: Pulmonary effort is normal.   Neurological:      Mental Status: She is alert.       Assessment:       Problem List Items Addressed This Visit          Psychiatric    Major depressive disorder, recurrent episode, in partial remission    Generalized anxiety disorder       GI    Hiatal hernia    Relevant Medications    omeprazole (PRILOSEC) 40 MG capsule     Other Visit Diagnoses       Hypertension, essential  (Chronic)   -  Primary    Abdominal pain, unspecified abdominal location        Relevant Medications    omeprazole (PRILOSEC) 40 MG capsule              Plan:       Diagnoses and all orders for this visit:    Hypertension, essential  Difficult to adjust medication as no reading available  Patient agrees to message me with readings.    Abdominal pain, unspecified abdominal location/ Hiatal hernia  -     omeprazole (PRILOSEC) 40 MG capsule; Take 1 capsule (40 mg total) by mouth every morning.  Continue Prilosec    Major depressive disorder, recurrent episode, in partial remission/ Generalized anxiety disorder  Management by  psychiatry

## 2022-11-29 ENCOUNTER — PATIENT MESSAGE (OUTPATIENT)
Dept: FAMILY MEDICINE | Facility: CLINIC | Age: 57
End: 2022-11-29
Payer: COMMERCIAL

## 2022-11-29 VITALS — SYSTOLIC BLOOD PRESSURE: 112 MMHG | DIASTOLIC BLOOD PRESSURE: 78 MMHG

## 2022-11-29 DIAGNOSIS — G44.86 CERVICOGENIC HEADACHE: ICD-10-CM

## 2022-12-04 ENCOUNTER — PATIENT MESSAGE (OUTPATIENT)
Dept: FAMILY MEDICINE | Facility: CLINIC | Age: 57
End: 2022-12-04
Payer: COMMERCIAL

## 2022-12-04 RX ORDER — CYCLOBENZAPRINE HCL 5 MG
5 TABLET ORAL 3 TIMES DAILY PRN
Qty: 90 TABLET | Refills: 2 | Status: SHIPPED | OUTPATIENT
Start: 2022-12-04 | End: 2023-04-21

## 2022-12-07 ENCOUNTER — PATIENT MESSAGE (OUTPATIENT)
Dept: FAMILY MEDICINE | Facility: CLINIC | Age: 57
End: 2022-12-07
Payer: COMMERCIAL

## 2022-12-07 ENCOUNTER — OFFICE VISIT (OUTPATIENT)
Dept: URGENT CARE | Facility: CLINIC | Age: 57
End: 2022-12-07
Payer: COMMERCIAL

## 2022-12-07 VITALS
SYSTOLIC BLOOD PRESSURE: 144 MMHG | WEIGHT: 195 LBS | BODY MASS INDEX: 32.49 KG/M2 | DIASTOLIC BLOOD PRESSURE: 97 MMHG | TEMPERATURE: 99 F | HEIGHT: 65 IN | HEART RATE: 88 BPM | OXYGEN SATURATION: 97 % | RESPIRATION RATE: 18 BRPM

## 2022-12-07 DIAGNOSIS — R09.81 NASAL CONGESTION: ICD-10-CM

## 2022-12-07 DIAGNOSIS — U07.1 COVID-19: Primary | ICD-10-CM

## 2022-12-07 LAB
CTP QC/QA: YES
SARS-COV-2 AG RESP QL IA.RAPID: POSITIVE

## 2022-12-07 PROCEDURE — 87811 SARS CORONAVIRUS 2 ANTIGEN POCT, MANUAL READ: ICD-10-PCS | Mod: QW,S$GLB,,

## 2022-12-07 PROCEDURE — 4010F PR ACE/ARB THEARPY RXD/TAKEN: ICD-10-PCS | Mod: CPTII,S$GLB,,

## 2022-12-07 PROCEDURE — 1160F PR REVIEW ALL MEDS BY PRESCRIBER/CLIN PHARMACIST DOCUMENTED: ICD-10-PCS | Mod: CPTII,S$GLB,,

## 2022-12-07 PROCEDURE — 4010F ACE/ARB THERAPY RXD/TAKEN: CPT | Mod: CPTII,S$GLB,,

## 2022-12-07 PROCEDURE — 3077F SYST BP >= 140 MM HG: CPT | Mod: CPTII,S$GLB,,

## 2022-12-07 PROCEDURE — 99213 PR OFFICE/OUTPT VISIT, EST, LEVL III, 20-29 MIN: ICD-10-PCS | Mod: S$GLB,,,

## 2022-12-07 PROCEDURE — 3080F PR MOST RECENT DIASTOLIC BLOOD PRESSURE >= 90 MM HG: ICD-10-PCS | Mod: CPTII,S$GLB,,

## 2022-12-07 PROCEDURE — 1159F PR MEDICATION LIST DOCUMENTED IN MEDICAL RECORD: ICD-10-PCS | Mod: CPTII,S$GLB,,

## 2022-12-07 PROCEDURE — 3077F PR MOST RECENT SYSTOLIC BLOOD PRESSURE >= 140 MM HG: ICD-10-PCS | Mod: CPTII,S$GLB,,

## 2022-12-07 PROCEDURE — 1159F MED LIST DOCD IN RCRD: CPT | Mod: CPTII,S$GLB,,

## 2022-12-07 PROCEDURE — 3008F PR BODY MASS INDEX (BMI) DOCUMENTED: ICD-10-PCS | Mod: CPTII,S$GLB,,

## 2022-12-07 PROCEDURE — 87811 SARS-COV-2 COVID19 W/OPTIC: CPT | Mod: QW,S$GLB,,

## 2022-12-07 PROCEDURE — 3080F DIAST BP >= 90 MM HG: CPT | Mod: CPTII,S$GLB,,

## 2022-12-07 PROCEDURE — 99213 OFFICE O/P EST LOW 20 MIN: CPT | Mod: S$GLB,,,

## 2022-12-07 PROCEDURE — 1160F RVW MEDS BY RX/DR IN RCRD: CPT | Mod: CPTII,S$GLB,,

## 2022-12-07 PROCEDURE — 3008F BODY MASS INDEX DOCD: CPT | Mod: CPTII,S$GLB,,

## 2022-12-07 RX ORDER — IPRATROPIUM BROMIDE 42 UG/1
2 SPRAY, METERED NASAL 4 TIMES DAILY
Qty: 15 ML | Refills: 0 | Status: SHIPPED | OUTPATIENT
Start: 2022-12-07 | End: 2023-07-17

## 2022-12-07 NOTE — PROGRESS NOTES
"Subjective:       Patient ID: Naomy Armstrong is a 57 y.o. female.    Vitals:  height is 5' 5" (1.651 m) and weight is 88.5 kg (195 lb). Her oral temperature is 98.9 °F (37.2 °C). Her blood pressure is 144/97 (abnormal) and her pulse is 88. Her respiration is 18 and oxygen saturation is 97%.     Chief Complaint: COVID-19 Concerns (I need to be tested for work. - Entered by patient)    Patient is a 57-year-old female who presents with coughing, congestion, headaches, body aches, sore throat, fever, fatigue that started yesterday.  She had a positive at home COVID test.  Denies any chest pain, shortness of breath, nausea, vomiting, dizziness.    Cough  This is a new problem. The current episode started in the past 7 days. The problem has been gradually worsening. The problem occurs constantly. The cough is Non-productive. Associated symptoms include a fever, headaches, myalgias, nasal congestion and a sore throat. Pertinent negatives include no chest pain, chills, ear pain, rash, shortness of breath or wheezing. Nothing aggravates the symptoms. She has tried OTC cough suppressant for the symptoms. The treatment provided mild relief.     Constitution: Positive for fever. Negative for chills.   HENT:  Positive for congestion and sore throat. Negative for ear pain and ear discharge.    Neck: Negative for neck pain and neck stiffness.   Cardiovascular:  Negative for chest pain.   Eyes:  Negative for eye discharge and eye itching.   Respiratory:  Positive for cough. Negative for shortness of breath and wheezing.    Gastrointestinal:  Negative for abdominal pain, nausea, vomiting and diarrhea.   Genitourinary:  Negative for dysuria.   Musculoskeletal:  Positive for muscle ache.   Skin:  Negative for rash.   Neurological:  Positive for headaches. Negative for dizziness.     Objective:      Physical Exam   Constitutional: She is oriented to person, place, and time. She appears well-developed.   HENT:   Head: " Normocephalic and atraumatic.   Ears:   Right Ear: External ear normal.   Left Ear: External ear normal.   Nose: Rhinorrhea and congestion present.   Mouth/Throat: Mucous membranes are moist. Posterior oropharyngeal erythema present. Oropharynx is clear.   Eyes: Conjunctivae, EOM and lids are normal. Pupils are equal, round, and reactive to light.   Neck: Trachea normal and phonation normal. Neck supple.   Cardiovascular: Normal rate, regular rhythm, normal heart sounds and normal pulses.   Pulmonary/Chest: Effort normal and breath sounds normal. No respiratory distress.   Musculoskeletal: Normal range of motion.         General: Normal range of motion.   Neurological: no focal deficit. She is alert and oriented to person, place, and time. No cranial nerve deficit.   Skin: Skin is warm, dry and intact. Capillary refill takes less than 2 seconds.   Psychiatric: Her speech is normal and behavior is normal. Judgment and thought content normal.   Nursing note and vitals reviewed.      Results for orders placed or performed in visit on 12/07/22   SARS Coronavirus 2 Antigen, POCT Manual Read   Result Value Ref Range    SARS Coronavirus 2 Antigen Positive (A) Negative     Acceptable Yes        Assessment:       1. COVID-19    2. Nasal congestion            Plan:         COVID-19  -     SARS Coronavirus 2 Antigen, POCT Manual Read  -     nirmatrelvir-ritonavir 300 mg (150 mg x 2)-100 mg copackaged tablets (EUA); Take 3 tablets by mouth 2 (two) times daily for 5 days. Each dose contains 2 nirmatrelvir (pink tablets) and 1 ritonavir (white tablet). Take all 3 tablets together  Dispense: 30 tablet; Refill: 0    Nasal congestion  -     ipratropium (ATROVENT) 42 mcg (0.06 %) nasal spray; 2 sprays by Each Nostril route 4 (four) times daily.  Dispense: 15 mL; Refill: 0       COVID risk score 2 (calculated by Epic).  Will treat with Paxlovid.  Discussed possible medication interactions.  Discussed supportive care and  over-the-counter medications.  Discussed quarantine and isolation.  Clinic return and ED precautions given.  Follow-up with PCP.  Patient verbalizes understanding and agrees with plan.          Patient Instructions   You have tested positive for COVID-19 today.    ISOLATION  If you tested positive and do not have symptoms, you must isolate for 5 days starting on the day of the positive test.  If you tested positive and have symptoms, you must isolate for 5 days starting on the day of the first symptoms,  not the day of the positive test.   This is the most important part, both the CDC and the [facility] emphasize that you do not test out of isolation.   After 5 days, if your symptoms have improved and you have not had fever on day 5, you can return to the community on day 6- NO TESTING REQUIRED!    In fact, we do not retest if you were positive in the last 90 days.  After your 5 days of isolation are completed, the CDC recommends strict mask use for the first 5 days that you come out of isolation.    - Rest.    - Drink plenty of fluids.     - You can take over-the-counter claritin, zyrtec, allegra, or xyzal as directed. These are antihistamines that can help with runny nose, nasal congestion, sneezing, and helps to dry up post-nasal drip, which usually causes sore throat and cough.              - If you do NOT have high blood pressure, you may use a decongestant form (D)  of this medication (ie. Claritin- D, zyrtec-D, allegra-D) or if you do not take the D form, you can take sudafed (pseudoephedrine) over the counter, which is a decongestant. Do NOT take two decongestant (D) medications at the same time (such as mucinex-D and claritin-D or plain sudafed and claritin D)    - If you DO have Hypertension, anxiety, or palpitations, it is safe to take Coricidin HBP for relief of sinus symptoms.     - You can use Flonase (fluticasone) nasal spray as directed for sinus congestion and postnasal drip. This is a steroid nasal spray  that works locally over time to decrease the inflammation in your nose/sinuses and help with allergic symptoms. This is not an quick- relief spray like afrin, but it works well if used daily.  Discontinue if you develop nose bleed  - use nasal saline prior to Flonase.  - Use Ocean Spray Nasal Saline 1-3 puffs each nostril every 2-3 hours then blow out onto tissue. This is to irrigate the nasal passage way to clear the sinus openings. Use until sinus problem resolved.     - you can take plain Mucinex (guaifenesin) 1200 mg twice a day to help loosen mucous.      -warm salt water gargles can help with sore throat     - warm tea with honey can help with cough. Honey is a natural cough suppressant.     - Dextromethorphan (DM) is a cough suppressant over the counter (ie. mucinex DM, robitussin, delsym; dayquil/nyquil has DM as well.)        - Follow up with your PCP or specialty clinic as directed in the next 1-2 weeks if not improved or as needed.  You can call [phone] to schedule an appointment with the appropriate provider.       - Go to the ER if you develop new or worsening symptoms.      - You must understand that you have received an Urgent Care treatment only and that you may be released before all of your medical problems are known or treated.   - You, the patient, will arrange for follow up care as instructed.   - If your condition worsens or fails to improve we recommend that you receive another evaluation at the ER immediately or contact your PCP to discuss your concerns or return here.

## 2022-12-07 NOTE — PATIENT INSTRUCTIONS
You have tested positive for COVID-19 today.    ISOLATION  If you tested positive and do not have symptoms, you must isolate for 5 days starting on the day of the positive test.  If you tested positive and have symptoms, you must isolate for 5 days starting on the day of the first symptoms,  not the day of the positive test.   This is the most important part, both the CDC and the [facility] emphasize that you do not test out of isolation.   After 5 days, if your symptoms have improved and you have not had fever on day 5, you can return to the community on day 6- NO TESTING REQUIRED!    In fact, we do not retest if you were positive in the last 90 days.  After your 5 days of isolation are completed, the CDC recommends strict mask use for the first 5 days that you come out of isolation.    - Rest.    - Drink plenty of fluids.     - You can take over-the-counter claritin, zyrtec, allegra, or xyzal as directed. These are antihistamines that can help with runny nose, nasal congestion, sneezing, and helps to dry up post-nasal drip, which usually causes sore throat and cough.              - If you do NOT have high blood pressure, you may use a decongestant form (D)  of this medication (ie. Claritin- D, zyrtec-D, allegra-D) or if you do not take the D form, you can take sudafed (pseudoephedrine) over the counter, which is a decongestant. Do NOT take two decongestant (D) medications at the same time (such as mucinex-D and claritin-D or plain sudafed and claritin D)    - If you DO have Hypertension, anxiety, or palpitations, it is safe to take Coricidin HBP for relief of sinus symptoms.     - You can use Flonase (fluticasone) nasal spray as directed for sinus congestion and postnasal drip. This is a steroid nasal spray that works locally over time to decrease the inflammation in your nose/sinuses and help with allergic symptoms. This is not an quick- relief spray like afrin, but it works well if used daily.  Discontinue if you  develop nose bleed  - use nasal saline prior to Flonase.  - Use Ocean Spray Nasal Saline 1-3 puffs each nostril every 2-3 hours then blow out onto tissue. This is to irrigate the nasal passage way to clear the sinus openings. Use until sinus problem resolved.     - you can take plain Mucinex (guaifenesin) 1200 mg twice a day to help loosen mucous.      -warm salt water gargles can help with sore throat     - warm tea with honey can help with cough. Honey is a natural cough suppressant.     - Dextromethorphan (DM) is a cough suppressant over the counter (ie. mucinex DM, robitussin, delsym; dayquil/nyquil has DM as well.)        - Follow up with your PCP or specialty clinic as directed in the next 1-2 weeks if not improved or as needed.  You can call [phone] to schedule an appointment with the appropriate provider.       - Go to the ER if you develop new or worsening symptoms.      - You must understand that you have received an Urgent Care treatment only and that you may be released before all of your medical problems are known or treated.   - You, the patient, will arrange for follow up care as instructed.   - If your condition worsens or fails to improve we recommend that you receive another evaluation at the ER immediately or contact your PCP to discuss your concerns or return here.

## 2022-12-07 NOTE — LETTER
[address], [address]  [place] [address]  Phone: [phone]  Fax: [phone]          Return to Work/School    Patient: Naomy Armstrong  YOB: 1965   Date: 12/07/2022     To Whom It May Concern:     Naomy Armstrong was in contact with/seen in my office on 12/07/2022. COVID-19 is present in our communities across the state. There is limited testing for COVID at this time, so not all patients can be tested. In this situation, your employee meets the following criteria:     Naomy Armstrong *has met the criteria for COVID-19 testing based upon symptoms, travel, and/or potential exposure. The test has been completed and is positive. During this time the employee is not able to work and should be quarantined per the Centers for Disease Control timelines. She can return to work once their symptoms improve and they are without fever for 24 hours without the use of fever reducing medications AND at least five days from the start of symptoms (12/11/22 at the earliest).      If you have any questions or concerns, or if I can be of further assistance, please do not hesitate to contact me.     Sincerely,    Renan Winston PA-C

## 2022-12-21 ENCOUNTER — OFFICE VISIT (OUTPATIENT)
Dept: URGENT CARE | Facility: CLINIC | Age: 57
End: 2022-12-21
Payer: COMMERCIAL

## 2022-12-21 VITALS
DIASTOLIC BLOOD PRESSURE: 88 MMHG | SYSTOLIC BLOOD PRESSURE: 138 MMHG | OXYGEN SATURATION: 95 % | HEIGHT: 65 IN | RESPIRATION RATE: 18 BRPM | HEART RATE: 87 BPM | TEMPERATURE: 97 F | BODY MASS INDEX: 32.49 KG/M2 | WEIGHT: 195 LBS

## 2022-12-21 DIAGNOSIS — Z11.52 ENCOUNTER FOR SCREENING FOR COVID-19: ICD-10-CM

## 2022-12-21 DIAGNOSIS — Z71.9 HEALTH EDUCATION/COUNSELING: ICD-10-CM

## 2022-12-21 DIAGNOSIS — R05.8 PRODUCTIVE COUGH: ICD-10-CM

## 2022-12-21 DIAGNOSIS — J32.9 BACTERIAL SINUSITIS: ICD-10-CM

## 2022-12-21 DIAGNOSIS — U07.1 REINFECTION BY COVID-19 VIRUS: Primary | ICD-10-CM

## 2022-12-21 DIAGNOSIS — R51.9 SINUS HEADACHE: ICD-10-CM

## 2022-12-21 DIAGNOSIS — B96.89 BACTERIAL SINUSITIS: ICD-10-CM

## 2022-12-21 DIAGNOSIS — T36.95XA ANTIBIOTIC-INDUCED YEAST INFECTION: ICD-10-CM

## 2022-12-21 DIAGNOSIS — B37.9 ANTIBIOTIC-INDUCED YEAST INFECTION: ICD-10-CM

## 2022-12-21 DIAGNOSIS — R09.81 CONGESTION OF PARANASAL SINUS: ICD-10-CM

## 2022-12-21 LAB
CTP QC/QA: YES
SARS-COV-2 AG RESP QL IA.RAPID: POSITIVE

## 2022-12-21 PROCEDURE — 3075F SYST BP GE 130 - 139MM HG: CPT | Mod: CPTII,S$GLB,, | Performed by: PHYSICIAN ASSISTANT

## 2022-12-21 PROCEDURE — 3079F DIAST BP 80-89 MM HG: CPT | Mod: CPTII,S$GLB,, | Performed by: PHYSICIAN ASSISTANT

## 2022-12-21 PROCEDURE — 1159F PR MEDICATION LIST DOCUMENTED IN MEDICAL RECORD: ICD-10-PCS | Mod: CPTII,S$GLB,, | Performed by: PHYSICIAN ASSISTANT

## 2022-12-21 PROCEDURE — 4010F PR ACE/ARB THEARPY RXD/TAKEN: ICD-10-PCS | Mod: CPTII,S$GLB,, | Performed by: PHYSICIAN ASSISTANT

## 2022-12-21 PROCEDURE — 3075F PR MOST RECENT SYSTOLIC BLOOD PRESS GE 130-139MM HG: ICD-10-PCS | Mod: CPTII,S$GLB,, | Performed by: PHYSICIAN ASSISTANT

## 2022-12-21 PROCEDURE — 3079F PR MOST RECENT DIASTOLIC BLOOD PRESSURE 80-89 MM HG: ICD-10-PCS | Mod: CPTII,S$GLB,, | Performed by: PHYSICIAN ASSISTANT

## 2022-12-21 PROCEDURE — 87811 SARS CORONAVIRUS 2 ANTIGEN POCT, MANUAL READ: ICD-10-PCS | Mod: QW,S$GLB,, | Performed by: PHYSICIAN ASSISTANT

## 2022-12-21 PROCEDURE — 1159F MED LIST DOCD IN RCRD: CPT | Mod: CPTII,S$GLB,, | Performed by: PHYSICIAN ASSISTANT

## 2022-12-21 PROCEDURE — 3008F BODY MASS INDEX DOCD: CPT | Mod: CPTII,S$GLB,, | Performed by: PHYSICIAN ASSISTANT

## 2022-12-21 PROCEDURE — 99215 OFFICE O/P EST HI 40 MIN: CPT | Mod: S$GLB,,, | Performed by: PHYSICIAN ASSISTANT

## 2022-12-21 PROCEDURE — 3008F PR BODY MASS INDEX (BMI) DOCUMENTED: ICD-10-PCS | Mod: CPTII,S$GLB,, | Performed by: PHYSICIAN ASSISTANT

## 2022-12-21 PROCEDURE — 4010F ACE/ARB THERAPY RXD/TAKEN: CPT | Mod: CPTII,S$GLB,, | Performed by: PHYSICIAN ASSISTANT

## 2022-12-21 PROCEDURE — 99215 PR OFFICE/OUTPT VISIT, EST, LEVL V, 40-54 MIN: ICD-10-PCS | Mod: S$GLB,,, | Performed by: PHYSICIAN ASSISTANT

## 2022-12-21 PROCEDURE — 87811 SARS-COV-2 COVID19 W/OPTIC: CPT | Mod: QW,S$GLB,, | Performed by: PHYSICIAN ASSISTANT

## 2022-12-21 RX ORDER — AZELASTINE 1 MG/ML
1 SPRAY, METERED NASAL 2 TIMES DAILY PRN
Qty: 30 ML | Refills: 0 | Status: SHIPPED | OUTPATIENT
Start: 2022-12-21 | End: 2022-12-28 | Stop reason: SDUPTHER

## 2022-12-21 RX ORDER — AMOXICILLIN AND CLAVULANATE POTASSIUM 875; 125 MG/1; MG/1
1 TABLET, FILM COATED ORAL EVERY 12 HOURS
Qty: 14 TABLET | Refills: 0 | Status: SHIPPED | OUTPATIENT
Start: 2022-12-21 | End: 2022-12-28

## 2022-12-21 RX ORDER — FLUCONAZOLE 150 MG/1
150 TABLET ORAL ONCE
Qty: 2 TABLET | Refills: 0 | Status: SHIPPED | OUTPATIENT
Start: 2022-12-21 | End: 2022-12-21

## 2022-12-21 RX ORDER — BENZONATATE 200 MG/1
200 CAPSULE ORAL 3 TIMES DAILY PRN
Qty: 30 CAPSULE | Refills: 0 | Status: SHIPPED | OUTPATIENT
Start: 2022-12-21 | End: 2022-12-31

## 2022-12-21 NOTE — LETTER
[address], [address] ? [place] [address] ? Phone [phone] ? Fax [phone]           Return to Work/School    Patient: Naomy Armstrong  YOB: 1965   Date: 12/21/2022      To Whom It May Concern:     Naomy Armstrong was in contact with/seen in my office on 12/21/2022. COVID-19 is present in our communities across the [facility]. Not all patients are eligible or appropriate to be tested. In this situation, your employee meets the following criteria:     Naomy Armstrong has met the criteria for COVID-19 testing and has a POSITIVE result. NO REPEAT COVID TESTING AFTER COMPLETING QUARANTINE IS RECOMMENDED OR REQUIRED!    They can return to work/school once they are asymptomatic for 24 hours without the use of fever reducing medications AND at least 5 days from the  first positive result. Quarantine start date 12/21/22 and come out of quarantine on day #6 on 12/26/2022 BUT CONTINUE MASKS FOR ANOTHER 5 DAYS until day #11. After 5 days, if your symptoms have improved and you have not had fever on day 5, you can return to the community on day 6-     PER CDC GUIDELINES, YOU MAY NOT TRAVEL UNTIL DAY #11; WHICH IS 12/31/22  STRICT MASKS precautions. Masks must be well fitted and closed (mask at all times in public) FOR ANOTHER 5 DAYS.   You should continue to wear a well-fitting mask around others at home and in public for 5 additional days (day 6 through day 10) after the end of your 5-day isolation period. If you are unable to wear a mask when around others, you should continue to isolate for a full 10 days. Avoid people who have weakened immune systems or are more likely to get very sick from COVID-19, and nursing homes and other high-risk settings, until after at least 10 days.      If you have any questions or concerns, or if I can be of further assistance, please do not hesitate to contact me.     Sincerely,    [facility]

## 2022-12-21 NOTE — PROGRESS NOTES
"Subjective:       Patient ID: Naomy Armstrong is a 57 y.o. female.    Vitals:  height is 5' 5" (1.651 m) and weight is 88.5 kg (195 lb). Her tympanic temperature is 97.4 °F (36.3 °C). Her blood pressure is 138/88 and her pulse is 87. Her respiration is 18 and oxygen saturation is 95%.     Chief Complaint: COVID-19 Concerns (Took at home covid test. It was possitive - Entered by patient)    57-year-old female with extensive medical history including psychiatric hospitalization, anxiety, alcohol abuse, depression, cervical/lumbar spondylosis, hiatal hernia, and other comorbidities who presents to urgent care clinic for evaluation.  Not vaccinated for COVID-19 at this time.  Was seen at this clinic by different provider on 12/07/2022.  At that time tested positive for COVID-19.  Was treated with Paxlovid.  Symptoms completely resolved and returned again 2 days ago with nasal/sinus congestion, sinus headache, postnasal drip, sore throat, productive cough.  Sinus pain and headache are worse symptoms.  Has not been taking over-the-counter meds consistently.  Takes Mucinex once a day and Sudafed twice a day.  No other associated symptoms.  Requesting re-evaluation since her home COVID test was positive again.      Medical assistant note:  Pt is coming in with cough and congestion that started about two weeks ago. Pt was here on the 7th and tested positive for Covid and tested herself again this morning and tested positive again. Pt says her symptoms got worse again so she tested herself. Pt says she took OTC medication to help with mild relief.     Cough  This is a new problem. The current episode started 1 to 4 weeks ago. The problem has been unchanged. The problem occurs every few minutes. The cough is Productive of sputum. Associated symptoms include headaches, nasal congestion, postnasal drip and a sore throat. Pertinent negatives include no chest pain, chills, ear pain, fever, heartburn, hemoptysis, myalgias, " rash, shortness of breath or wheezing. Nothing aggravates the symptoms. She has tried OTC cough suppressant for the symptoms. The treatment provided mild relief. There is no history of asthma, bronchitis or pneumonia.     Constitution: Negative for activity change, appetite change, chills, sweating, fatigue, fever and generalized weakness.   HENT:  Positive for congestion, postnasal drip, sinus pain, sinus pressure and sore throat. Negative for ear pain, hearing loss, facial swelling, trouble swallowing and voice change.    Neck: Negative for neck pain, neck stiffness and painful lymph nodes.   Cardiovascular:  Negative for chest pain, leg swelling, palpitations, sob on exertion and passing out.   Eyes:  Negative for eye discharge, eye pain, photophobia, vision loss, double vision and blurred vision.   Respiratory:  Positive for cough and sputum production. Negative for chest tightness, bloody sputum, COPD, shortness of breath, stridor, wheezing and asthma.    Gastrointestinal:  Negative for abdominal pain, nausea, vomiting, constipation, diarrhea, bright red blood in stool, rectal bleeding, heartburn and bowel incontinence.   Genitourinary:  Negative for dysuria, frequency, urgency, urine decreased, flank pain, bladder incontinence and hematuria.   Musculoskeletal:  Negative for trauma, joint pain, joint swelling, abnormal ROM of joint, muscle cramps and muscle ache.   Skin:  Negative for color change, pale, rash and wound.   Allergic/Immunologic: Negative for seasonal allergies, asthma and immunocompromised state.   Neurological:  Positive for headaches. Negative for dizziness, history of vertigo, light-headedness, passing out, facial drooping, speech difficulty, coordination disturbances, loss of balance, disorientation, altered mental status, loss of consciousness, numbness, tingling and seizures.   Hematologic/Lymphatic: Negative for swollen lymph nodes, easy bruising/bleeding and trouble clotting. Does not  bruise/bleed easily.   Psychiatric/Behavioral:  Negative for altered mental status and disorientation.        Past Medical History:   Diagnosis Date    Alcohol abuse     per chart, but patient denies today and cocaine per chart    Anxiety     Colon polyp     Depression     Hallucination     last couple of months started seeing someone standing in her room, saw boyfriend's reflection in glass    Headache     History of psychiatric hospitalization     age 13 [employer] for 2 months for acting out; 2018 for SI    Hx of psychiatric care     Hypertension     Psychiatric problem     Sleep difficulties     Suicide attempt     with knife by cutting stomach    Therapy        Objective:      Physical Exam   Constitutional: She is oriented to person, place, and time. She appears well-developed. She is cooperative.  Non-toxic appearance. She does not appear ill. No distress.      Comments:Well-appearing     HENT:   Head: Normocephalic and atraumatic.   Ears:   Right Ear: Hearing, external ear and ear canal normal. No no drainage, swelling or tenderness.   Left Ear: Hearing, external ear and ear canal normal. No no drainage, swelling or tenderness.   Nose: Congestion present. No rhinorrhea or purulent discharge. Right sinus exhibits no maxillary sinus tenderness and no frontal sinus tenderness. Left sinus exhibits no maxillary sinus tenderness and no frontal sinus tenderness.   Mouth/Throat: Uvula is midline, oropharynx is clear and moist and mucous membranes are normal. No oral lesions. No trismus in the jaw. No uvula swelling. No posterior oropharyngeal edema. No tonsillar exudate.   Eyes: Conjunctivae, EOM and lids are normal. Pupils are equal, round, and reactive to light. No visual field deficit is present. Right eye exhibits no discharge. Left eye exhibits no discharge. Right conjunctiva is not injected. Right conjunctiva has no hemorrhage. Left conjunctiva is not injected. Left conjunctiva has no hemorrhage. Extraocular  movement intact vision grossly intact gaze aligned appropriately   Neck: Neck supple. No neck rigidity present.   Cardiovascular: Normal rate, regular rhythm, normal heart sounds and normal pulses.   No murmur heard.     Comments: No leg edema, calf tenderness/erythema, or Homans sign bilaterally.       Pulmonary/Chest: Effort normal and breath sounds normal. No accessory muscle usage or stridor. No respiratory distress. She has no wheezes. She exhibits no tenderness.   Abdominal: Normal appearance. She exhibits no distension and no mass. Soft. There is no abdominal tenderness. There is no rebound and no guarding.   Musculoskeletal: Normal range of motion.         General: Normal range of motion.      Right lower leg: No edema.      Left lower leg: No edema.      Comments: Moves all extremities with normal tone, strength, and ROM.  Gait normal.   Lymphadenopathy:     She has cervical adenopathy.        Right cervical: Superficial cervical adenopathy present.        Left cervical: Superficial cervical adenopathy present.   Neurological: no focal deficit. She is alert, oriented to person, place, and time and at baseline. She has normal motor skills and normal sensation. She displays no weakness, facial symmetry, normal reflexes and no dysarthria. No cranial nerve deficit or sensory deficit. She exhibits normal muscle tone. She has a normal Finger-Nose-Finger Test. Coordination: Heel to shin test normal. She shows no pronator drift. She displays no seizure activity. Gait and coordination normal. Coordination normal. GCS eye subscore is 4. GCS verbal subscore is 5. GCS motor subscore is 6.   Skin: Skin is warm, dry, not diaphoretic and no rash. Capillary refill takes less than 2 seconds.   Psychiatric: Her speech is normal and behavior is normal. Thought content normal.   Nursing note and vitals reviewed.        Results for orders placed or performed in visit on 12/21/22   SARS Coronavirus 2 Antigen, POCT Manual Read    Result Value Ref Range    SARS Coronavirus 2 Antigen Positive (A) Negative     Acceptable Yes        Assessment:       1. Reinfection by COVID-19 virus    2. Bacterial sinusitis    3. Congestion of paranasal sinus    4. Productive cough    5. Sinus headache    6. Encounter for screening for COVID-19    7. Antibiotic-induced yeast infection    8. Health education/counseling          Nontoxic appearing. Vitals are stable. Rapid covid positive.  However, did have previous COVID positive test 12/07/2022 and was treated with Paxlovid treatment.  Symptoms returned 2 days ago and had positive COVID test today.  Positive COVID test at home and in clinic may be continue positive for several months so recommended patient to stop testing.  She may also have rebound COVID infection from Paxil of id expected side effects since symptoms completely resolved and returned again.      Patient was recommended OTC treatments for their symptoms. Patient was also prescribed medications for their symptoms.     Prescribed Augmentin course for her bacterial sinusitis.  Will also prescribed Diflucan as she has antibiotic induced yeast infection.  Emphasized the importance of OTC symptomatic treatment for improvements in symptoms and prevent further worsening of conditioning.  Recommend oral antihistamine q.a.m., antihistamine nasal spray 1-2 times per day, Mucinex DM Q 12 hour, and Sudafed q.4-6 hours in addition to prescribed Tessalon and Astelin.    Patient will need to quarantine for total 5 days and on day 6 May come out quarantine with full masks for an additional 5 days ( for total 10 days from symptoms/covid positive test).    Patient was counseled, explained with the test results meaning, expected course, and answered all of questions. They can also receive results via my chart.  Printed and verbal COVID /treatment guidelines were given.   Recommend follow-up PCP in the next 5-7 days if new or worsening  symptoms.    Patient understands that they received an Urgent Care treatment only and that they may be released before all your medical problems are known or treated. Strict ED versus clinic precautions given.  Patient verbalized understanding and agreed with plan of care.    Note dictated with voice recognition software, please excuse any grammatical errors.    Plan:         Reinfection by COVID-19 virus  -     SARS Coronavirus 2 Antigen, POCT Manual Read  -     benzonatate (TESSALON) 200 MG capsule; Take 1 capsule (200 mg total) by mouth 3 (three) times daily as needed for Cough.  Dispense: 30 capsule; Refill: 0    Bacterial sinusitis  -     amoxicillin-clavulanate 875-125mg (AUGMENTIN) 875-125 mg per tablet; Take 1 tablet by mouth every 12 (twelve) hours. Take with food for 7 days  Dispense: 14 tablet; Refill: 0    Congestion of paranasal sinus  -     azelastine (ASTELIN) 137 mcg (0.1 %) nasal spray; 1 spray (137 mcg total) by Nasal route 2 (two) times daily as needed for Rhinitis.  Dispense: 30 mL; Refill: 0    Productive cough  -     benzonatate (TESSALON) 200 MG capsule; Take 1 capsule (200 mg total) by mouth 3 (three) times daily as needed for Cough.  Dispense: 30 capsule; Refill: 0    Sinus headache  -     azelastine (ASTELIN) 137 mcg (0.1 %) nasal spray; 1 spray (137 mcg total) by Nasal route 2 (two) times daily as needed for Rhinitis.  Dispense: 30 mL; Refill: 0    Encounter for screening for COVID-19    Antibiotic-induced yeast infection  -     fluconazole (DIFLUCAN) 150 MG Tab; Take 1 tablet (150 mg total) by mouth once. May repeat in 1 week if continued symptoms. for 1 dose  Dispense: 2 tablet; Refill: 0    Health education/counseling              Additional MDM:     Heart Failure Score:   COPD = No    Patient Instructions     PLEASE READ YOUR DISCHARGE INSTRUCTIONS ENTIRELY AS IT CONTAINS IMPORTANT INFORMATION.    Patient had covid testing done today.    Discussed corona virus precautions and reviewed  CDC FAC; printed a copy for patient.  I discussed to continue to monitor their symptoms. Discussed that if their symptoms persist or worsen to seek re-evaluation. Clinic vs. ER precautions were given.  Patient verbalized understanding and agreed with the entire plan of care.    If you tested positive and have symptoms, you must isolate for 5 days starting today OR day of the positive test. After 5 days (ON DAY #6), if your symptoms have improved and you have not had fever on day 5, you can return to the community on day #6- NO TESTING REQUIRED to return to community! If no fever without fever reducing meds for 24 hours, before coming out of quarantine. After your 5 days of isolation are completed, the CDC recommends strict mask use for the first 5 days (day #6-10) that you come out of isolation.  MAY COME OUT OF QUARANTINE ON DAY#6 DATE** 12/26/22 BUT CONTINUE STRICT MASKS FOR ANOTHER 5 DAYS.   QUARANTINE START DATE** 12/21/22    PER CDC GUIDELINES, YOU MAY NOT TRAVEL UNTIL DAY #11; WHICH IS 12/31/22    You should continue to wear a well-fitting mask around others at home and in public for 5 additional days (day 6 through day 10) after the end of your 5-day isolation period. If you are unable to wear a mask when around others, you should continue to isolate for a full 10 days. Avoid people who have weakened immune systems or are more likely to get very sick from COVID-19, and nursing homes and other high-risk settings, until after at least 10 days.      - Reviewed radiographs and all diagnostic testing with patient/family.    - Rest.  Drink plenty of fluids.    - Tylenol OR anti-inflammatory (NSAIDs, ibuprofen, aleve, motrin) as directed as needed for fever/pain.  For Tylenol, do not exceed 3000 mg/ day. If no contraindication or allergies.  -OK to supplement with OTC DayQuil, NyQuil or TheraFlu every 6 hours as needed for cough and congestion.  Use caution of total amount of Tylenol/acetaminophen per day.  - take  Tessalon as needed for cough suppression.     - If you were prescribed antibiotics, please take them to completion. Please supplement with OTC probiotics and yogurt.  Contact clinic if develop profuse diarrhea and weakness.  - If you are female and on birth control pills - please use additional methods of contraception to prevent pregnancy while on antibiotics and for one cycle after.     -Below are suggestions for symptomatic relief:              -Salt water gargles to soothe throat pain.              -Chloroseptic spray also helps to numb throat pain.              -Nasal saline spray reduces inflammation and dryness.              -Warm face compresses to help with facial sinus pain/pressure.              -Vicks vapor rub at night.              -Astelin NASAL SPRAY twice day for nasal/sinus congestion   **may also supplement with 2nd OTC nasal spray to help with inflammation and congestion.   Wean to off when you nose becomes to dry or bleed. Also use nasal saline twice a day to help with dryness.               -Flonase OTC or Nasacort OTC  once or twice a day for nasal/sinus congestion. DON'T USE IF YOU HAVE GLAUCOMA. CHECK WITH YOUR PHARMACIST/PHYSICIAN.              -Simple foods like chicken noodle soup.              -Mucinex DM (ANY COUGH EXPECTORANT-- guaifenesin) for cough or chest congestion with mucus during the day time. Delsym or robitussin (ANY COUGH SUPPRESSANT- dextromethorphan) helps with coughing at night. Mucinex-DM if you have chest congestion or sputum (caution if history of high blood pressure or palpitations).              -Zyrtec/Claritin/xyzal during the day time  & Benadryl at night (only if severe runny nose) may help with allergies and runny nose. Add decongestant if you have nasal/sinus congestion/sinus pressure/ear fullness sensation. (see below)              -may take OTC meclizine as needed for dizziness or nausea.     Caution with use of Decongestant meds:  -If you DO NOT have  Hypertension or any history of palpitations, it is ok to take over the counter Sudafed or Mucinex D or Allegra-D or Claritin-D or Zyrtec-D.  -If you do take one of the above, it is ok to combine that with plain over the counter Mucinex or Allegra or Claritin or Zyrtec. If, for example, you are taking Zyrtec -D, you can combine that with Mucinex, but not Mucinex-D.  If you are taking Mucinex-D, you can combine that with plain Allegra or Claritin or Zyrtec.     -Do not combine pseudophed or phenylephrine with any other brand allergy-D for DECONGESTANT.   -Or vice versa, you can you take plain allergy medications (allegra/claritin/zyrtec with NO Decongestant) and ADD OTC pseudophed or phenelyphrine 2-3 times a day (or every 4-6 hours needed). Avoid taking decongestant late at night or with caffeine as it can keep you up or cause jittery feeling.     -If you DO have Hypertension , anxiety, or palpitations, it is safe to take Coricidin HBP for relief of cough, congestion, or sinus symptoms.      For your GI symptoms:  -Use gatorade/pedialyte or rehydration packets to help stay hydrated. Vitamin water and plain water do not contain rehydrating electrolytes.  -Increase clear liquids (water, gatorade, pedialyte, broths, jello, etc) Hold off on solids for 12-18 hours. Then advance to BRAT diet (banana, rice, applesauce, tea, toast/crackers), then advance further as tolerated. Avoid spicy or fatty foods.   -May take Emitrol OTC as needed for nausea.   -Use Peptobismol or Immodium to help alleviate your diarrhea symptoms.   -Take mylanta or simethicone for bloating or gas pain.   -Take pepcid or omeprazole if you have heartburn or reflux sensation.  -Avoid imodium unless you have more than 6 loose stools in 24 hours. Take 1 dose and monitor to see if you can repeat AS IT WILL CAUSE CONSTIPATION.  -Wash hands frequently while sick. Avoid ibuprofen or other NSAIDS until you are well.   -Please go to the ER if you experience  worsening abdominal pain, blood in your vomit or stool, high fever, dizziness, fainting, swelling of your abdomen, inability to pass gas or stool, or inability to urinate.     -You must understand that you've received an Urgent Care treatment only and that you may be released before all your medical problems are known or treated. You, the patient, will arrange for follow up care as instructed. Please arrange follow up with your primary medical clinic within 2-5 days if your signs and symptoms have not resolved or worsen.     - Follow up with your PCP or specialty clinic as directed.  You can call [phone] or [phone] to schedule an appointment with the appropriate provider.  Schedule CENTER is open Mon-Friday 8-5pm (excluded holidays).    - If your condition worsens or fails to improve we recommend that you receive another evaluation at the emergency room immediately or contact your primary medical clinic to discuss your concerns.            Prevention steps for patients with confirmed or suspected COVID-19  Stay home and stay away from family members and friends. The CDC says, you can leave home after these three things have happened: 1) You have had no fever for at least 24 hours (that is one full day of no fever without the use of medicine that reduces fevers) 2) AND other symptoms have improved (for example, when your cough or shortness of breath have improved) 3) AND at least 10 days have passed since your symptoms first appeared OR after 6-10 days passed from first positive test (with mask on till day #11).  Separate yourself from other people and animals in your home.  Call ahead before visiting your doctor.  Wear a facemask.  Cover your coughs and sneezes.  Wash your hands often with soap and water; hand  can be used, too.  Avoid sharing personal household items.  Wipe down surfaces used daily.  Monitor your symptoms. Seek prompt medical attention if your illness is worsening (e.g.,  difficulty breathing).   Before seeking care, call your healthcare provider.  If you have a medical emergency and need to call 911, notify the dispatch personnel that you have, or are being evaluated for COVID-19. If possible, put on a facemask before emergency medical services arrive.      Recommended precautions for household members, intimate partners, and caregivers in a home setting of a patient with symptomatic laboratory-confirmed COVID-19 or a patient under investigation.  Household members, intimate partners, and caregivers in the home setting awaiting tests results have close contact with a person with symptomatic, laboratory-confirmed COVID-19 or a person under investigation. Close contacts should monitor their health; they should call their provider right away if they develop symptoms suggestive of COVID-19 (e.g., fever, cough, shortness of breath).    Close contacts should also follow these recommendations:  Make sure that you understand and can help the patient follow their provider's instructions for medication(s) and care. You should help the patient with basic needs in the home and provide support for getting groceries, prescriptions, and other personal needs.  Monitor the patient's symptoms. If the patient is getting sicker, call his or her healthcare provider and tell them that the patient has laboratory-confirmed COVID-19. If the patient has a medical emergency and you need to call 911, notify the dispatch personnel that the patient has, or is being evaluated for COVID-19.  Household members should stay in another room or be  from the patient. Household members should use a separate bedroom and bathroom, if available.  Prohibit visitors.  Household members should care for any pets in the home.  Make sure that shared spaces in the home have good air flow, such as by an air conditioner or an opened window, weather permitting.  Perform hand hygiene frequently. Wash your hands often with  soap and water for at least 20 seconds or use an alcohol-based hand  (that contains > 60% alcohol) covering all surfaces of your hands and rubbing them together until they feel dry. Soap and water should be used preferentially.  Avoid touching your eyes, nose, and mouth.  The patient should wear a facemask. If the patient is not able to wear a facemask (for example, because it causes trouble breathing), caregivers should wear a mask when they are in the same room as the patient.  Wear a disposable facemask and gloves when you touch or have contact with the patient's blood, stool, or body fluids, such as saliva, sputum, nasal mucus, vomit, urine.  Throw out disposable facemasks and gloves after using them. Do not reuse.  When removing personal protective equipment, first remove and dispose of gloves. Then, immediately clean your hands with soap and water or alcohol-based hand . Next, remove and dispose of facemask, and immediately clean your hands again with soap and water or alcohol-based hand .  You should not share dishes, drinking glasses, cups, eating utensils, towels, bedding, or other items with the patient. After the patient uses these items, you should wash them thoroughly (see below Wash laundry thoroughly).  Clean all high-touch surfaces, such as counters, tabletops, doorknobs, bathroom fixtures, toilets, phones, keyboards, tablets, and bedside tables, every day. Also, clean any surfaces that may have blood, stool, or body fluids on them.  Use a household cleaning spray or wipe, according to the label instructions. Labels contain instructions for safe and effective use of the cleaning product including precautions you should take when applying the product, such as wearing gloves and making sure you have good ventilation during use of the product.  Wash laundry thoroughly.  Immediately remove and wash clothes or bedding that have blood, stool, or body fluids on them.  Wear  disposable gloves while handling soiled items and keep soiled items away from your body. Clean your hands (with soap and water or an alcohol-based hand ) immediately after removing your gloves.  Read and follow directions on labels of laundry or clothing items and detergent. In general, using a normal laundry detergent according to washing machine instructions and dry thoroughly using the warmest temperatures recommended on the clothing label.  Place all used disposable gloves, facemasks, and other contaminated items in a lined container before disposing of them with other household waste. Clean your hands (with soap and water or an alcohol-based hand ) immediately after handling these items. Soap and water should be used preferentially if hands are visibly dirty.  Discuss any additional questions with your state or local health department or healthcare provider. Check available hours when contacting your local health department.    For more information see CDC link below.      https://www.cdc.gov/coronavirus/2019-ncov/hcp/guidance-prevent-spread.html#precautions        Sources:  [facility], [facility]          Instructions for Home Care of Patients and Caretakers with Coronavirus Disease 2019  Limit visitors to the home.  Older persons and those that have chronic medical conditions such as diabetes, lung and heart disease are at increased risk for illness.   If possible, patients should use a separate bedroom while recovering. Caregivers and household members should avoid prolonged contact with the patient which means to stay 6 feet away and avoid contact with cough droplets.  When close contact is necessary, wash your hands before and immediately after contact.   Perform hand hygiene frequently. Wash your hands often with soap and water for at least 20 seconds or use an alcohol-based hand , covering all surfaces of your hands and rubbing them together until  they feel dry.   Avoid touching your eyes, nose, and mouth with unwashed hands.  Avoid sharing household items with the patient. You should not share dishes, drinking glasses, cups, eating utensils, towels, bedding, or other items. After the patient uses these items, you should wash them thoroughly.  Wash laundry thoroughly.   Immediately remove and wash clothes or bedding that have blood, stool, or body fluids on them.  Clean all high-touch surfaces, such as counters, tabletops, doorknobs, bathroom fixtures, toilets, phones, keyboards, tablets, and bedside tables, every day.   Use a household cleaning spray or wipe, according to the label instructions. Labels contain instructions for safe and effective use of the cleaning product including precautions you should take when applying the product, such as wearing gloves and making sure you have good ventilation during use of the product.    For more information see CDC link below.      https://www.cdc.gov/coronavirus/2019-ncov/hcp/guidance-prevent-spread.html#precautions               If your symptoms worsen or if you have any other concerns, please contact [facility] at [phone].

## 2022-12-21 NOTE — PATIENT INSTRUCTIONS
PLEASE READ YOUR DISCHARGE INSTRUCTIONS ENTIRELY AS IT CONTAINS IMPORTANT INFORMATION.    Patient had covid testing done today.    Discussed corona virus precautions and reviewed CDC FAC; printed a copy for patient.  I discussed to continue to monitor their symptoms. Discussed that if their symptoms persist or worsen to seek re-evaluation. Clinic vs. ER precautions were given.  Patient verbalized understanding and agreed with the entire plan of care.    If you tested positive and have symptoms, you must isolate for 5 days starting today OR day of the positive test. After 5 days (ON DAY #6), if your symptoms have improved and you have not had fever on day 5, you can return to the community on day #6- NO TESTING REQUIRED to return to community! If no fever without fever reducing meds for 24 hours, before coming out of quarantine. After your 5 days of isolation are completed, the CDC recommends strict mask use for the first 5 days (day #6-10) that you come out of isolation.  MAY COME OUT OF QUARANTINE ON DAY#6 DATE** 12/26/22 BUT CONTINUE STRICT MASKS FOR ANOTHER 5 DAYS.   QUARANTINE START DATE** 12/21/22    PER CDC GUIDELINES, YOU MAY NOT TRAVEL UNTIL DAY #11; WHICH IS 12/31/22    You should continue to wear a well-fitting mask around others at home and in public for 5 additional days (day 6 through day 10) after the end of your 5-day isolation period. If you are unable to wear a mask when around others, you should continue to isolate for a full 10 days. Avoid people who have weakened immune systems or are more likely to get very sick from COVID-19, and nursing homes and other high-risk settings, until after at least 10 days.      - Reviewed radiographs and all diagnostic testing with patient/family.    - Rest.  Drink plenty of fluids.    - Tylenol OR anti-inflammatory (NSAIDs, ibuprofen, aleve, motrin) as directed as needed for fever/pain.  For Tylenol, do not exceed 3000 mg/ day. If no contraindication or  allergies.  -OK to supplement with OTC DayQuil, NyQuil or TheraFlu every 6 hours as needed for cough and congestion.  Use caution of total amount of Tylenol/acetaminophen per day.  - take Tessalon as needed for cough suppression.     - If you were prescribed antibiotics, please take them to completion. Please supplement with OTC probiotics and yogurt.  Contact clinic if develop profuse diarrhea and weakness.  - If you are female and on birth control pills - please use additional methods of contraception to prevent pregnancy while on antibiotics and for one cycle after.     -Below are suggestions for symptomatic relief:              -Salt water gargles to soothe throat pain.              -Chloroseptic spray also helps to numb throat pain.              -Nasal saline spray reduces inflammation and dryness.              -Warm face compresses to help with facial sinus pain/pressure.              -Vicks vapor rub at night.              -Astelin NASAL SPRAY twice day for nasal/sinus congestion   **may also supplement with 2nd OTC nasal spray to help with inflammation and congestion.   Wean to off when you nose becomes to dry or bleed. Also use nasal saline twice a day to help with dryness.               -Flonase OTC or Nasacort OTC  once or twice a day for nasal/sinus congestion. DON'T USE IF YOU HAVE GLAUCOMA. CHECK WITH YOUR PHARMACIST/PHYSICIAN.              -Simple foods like chicken noodle soup.              -Mucinex DM (ANY COUGH EXPECTORANT-- guaifenesin) for cough or chest congestion with mucus during the day time. Delsym or robitussin (ANY COUGH SUPPRESSANT- dextromethorphan) helps with coughing at night. Mucinex-DM if you have chest congestion or sputum (caution if history of high blood pressure or palpitations).              -Zyrtec/Claritin/xyzal during the day time  & Benadryl at night (only if severe runny nose) may help with allergies and runny nose. Add decongestant if you have nasal/sinus congestion/sinus  pressure/ear fullness sensation. (see below)              -may take OTC meclizine as needed for dizziness or nausea.     Caution with use of Decongestant meds:  -If you DO NOT have Hypertension or any history of palpitations, it is ok to take over the counter Sudafed or Mucinex D or Allegra-D or Claritin-D or Zyrtec-D.  -If you do take one of the above, it is ok to combine that with plain over the counter Mucinex or Allegra or Claritin or Zyrtec. If, for example, you are taking Zyrtec -D, you can combine that with Mucinex, but not Mucinex-D.  If you are taking Mucinex-D, you can combine that with plain Allegra or Claritin or Zyrtec.     -Do not combine pseudophed or phenylephrine with any other brand allergy-D for DECONGESTANT.   -Or vice versa, you can you take plain allergy medications (allegra/claritin/zyrtec with NO Decongestant) and ADD OTC pseudophed or phenelyphrine 2-3 times a day (or every 4-6 hours needed). Avoid taking decongestant late at night or with caffeine as it can keep you up or cause jittery feeling.     -If you DO have Hypertension , anxiety, or palpitations, it is safe to take Coricidin HBP for relief of cough, congestion, or sinus symptoms.      For your GI symptoms:  -Use gatorade/pedialyte or rehydration packets to help stay hydrated. Vitamin water and plain water do not contain rehydrating electrolytes.  -Increase clear liquids (water, gatorade, pedialyte, broths, jello, etc) Hold off on solids for 12-18 hours. Then advance to BRAT diet (banana, rice, applesauce, tea, toast/crackers), then advance further as tolerated. Avoid spicy or fatty foods.   -May take Emitrol OTC as needed for nausea.   -Use Peptobismol or Immodium to help alleviate your diarrhea symptoms.   -Take mylanta or simethicone for bloating or gas pain.   -Take pepcid or omeprazole if you have heartburn or reflux sensation.  -Avoid imodium unless you have more than 6 loose stools in 24 hours. Take 1 dose and monitor to see  if you can repeat AS IT WILL CAUSE CONSTIPATION.  -Wash hands frequently while sick. Avoid ibuprofen or other NSAIDS until you are well.   -Please go to the ER if you experience worsening abdominal pain, blood in your vomit or stool, high fever, dizziness, fainting, swelling of your abdomen, inability to pass gas or stool, or inability to urinate.     -You must understand that you've received an Urgent Care treatment only and that you may be released before all your medical problems are known or treated. You, the patient, will arrange for follow up care as instructed. Please arrange follow up with your primary medical clinic within 2-5 days if your signs and symptoms have not resolved or worsen.     - Follow up with your PCP or specialty clinic as directed.  You can call [phone] or [phone] to schedule an appointment with the appropriate provider.  Schedule CENTER is open Mon-Friday 8-5pm (excluded holidays).    - If your condition worsens or fails to improve we recommend that you receive another evaluation at the emergency room immediately or contact your primary medical clinic to discuss your concerns.            Prevention steps for patients with confirmed or suspected COVID-19  Stay home and stay away from family members and friends. The CDC says, you can leave home after these three things have happened: 1) You have had no fever for at least 24 hours (that is one full day of no fever without the use of medicine that reduces fevers) 2) AND other symptoms have improved (for example, when your cough or shortness of breath have improved) 3) AND at least 10 days have passed since your symptoms first appeared OR after 6-10 days passed from first positive test (with mask on till day #11).  Separate yourself from other people and animals in your home.  Call ahead before visiting your doctor.  Wear a facemask.  Cover your coughs and sneezes.  Wash your hands often with soap and water; hand  can be  used, too.  Avoid sharing personal household items.  Wipe down surfaces used daily.  Monitor your symptoms. Seek prompt medical attention if your illness is worsening (e.g., difficulty breathing).   Before seeking care, call your healthcare provider.  If you have a medical emergency and need to call 911, notify the dispatch personnel that you have, or are being evaluated for COVID-19. If possible, put on a facemask before emergency medical services arrive.      Recommended precautions for household members, intimate partners, and caregivers in a home setting of a patient with symptomatic laboratory-confirmed COVID-19 or a patient under investigation.  Household members, intimate partners, and caregivers in the home setting awaiting tests results have close contact with a person with symptomatic, laboratory-confirmed COVID-19 or a person under investigation. Close contacts should monitor their health; they should call their provider right away if they develop symptoms suggestive of COVID-19 (e.g., fever, cough, shortness of breath).    Close contacts should also follow these recommendations:  Make sure that you understand and can help the patient follow their provider's instructions for medication(s) and care. You should help the patient with basic needs in the home and provide support for getting groceries, prescriptions, and other personal needs.  Monitor the patient's symptoms. If the patient is getting sicker, call his or her healthcare provider and tell them that the patient has laboratory-confirmed COVID-19. If the patient has a medical emergency and you need to call 911, notify the dispatch personnel that the patient has, or is being evaluated for COVID-19.  Household members should stay in another room or be  from the patient. Household members should use a separate bedroom and bathroom, if available.  Prohibit visitors.  Household members should care for any pets in the home.  Make sure that shared  spaces in the home have good air flow, such as by an air conditioner or an opened window, weather permitting.  Perform hand hygiene frequently. Wash your hands often with soap and water for at least 20 seconds or use an alcohol-based hand  (that contains > 60% alcohol) covering all surfaces of your hands and rubbing them together until they feel dry. Soap and water should be used preferentially.  Avoid touching your eyes, nose, and mouth.  The patient should wear a facemask. If the patient is not able to wear a facemask (for example, because it causes trouble breathing), caregivers should wear a mask when they are in the same room as the patient.  Wear a disposable facemask and gloves when you touch or have contact with the patient's blood, stool, or body fluids, such as saliva, sputum, nasal mucus, vomit, urine.  Throw out disposable facemasks and gloves after using them. Do not reuse.  When removing personal protective equipment, first remove and dispose of gloves. Then, immediately clean your hands with soap and water or alcohol-based hand . Next, remove and dispose of facemask, and immediately clean your hands again with soap and water or alcohol-based hand .  You should not share dishes, drinking glasses, cups, eating utensils, towels, bedding, or other items with the patient. After the patient uses these items, you should wash them thoroughly (see below Wash laundry thoroughly).  Clean all high-touch surfaces, such as counters, tabletops, doorknobs, bathroom fixtures, toilets, phones, keyboards, tablets, and bedside tables, every day. Also, clean any surfaces that may have blood, stool, or body fluids on them.  Use a household cleaning spray or wipe, according to the label instructions. Labels contain instructions for safe and effective use of the cleaning product including precautions you should take when applying the product, such as wearing gloves and making sure you have good  ventilation during use of the product.  Wash laundry thoroughly.  Immediately remove and wash clothes or bedding that have blood, stool, or body fluids on them.  Wear disposable gloves while handling soiled items and keep soiled items away from your body. Clean your hands (with soap and water or an alcohol-based hand ) immediately after removing your gloves.  Read and follow directions on labels of laundry or clothing items and detergent. In general, using a normal laundry detergent according to washing machine instructions and dry thoroughly using the warmest temperatures recommended on the clothing label.  Place all used disposable gloves, facemasks, and other contaminated items in a lined container before disposing of them with other household waste. Clean your hands (with soap and water or an alcohol-based hand ) immediately after handling these items. Soap and water should be used preferentially if hands are visibly dirty.  Discuss any additional questions with your state or local health department or healthcare provider. Check available hours when contacting your local health department.    For more information see CDC link below.      https://www.cdc.gov/coronavirus/2019-ncov/hcp/guidance-prevent-spread.html#precautions        Sources:  [facility], [facility]          Instructions for Home Care of Patients and Caretakers with Coronavirus Disease 2019  Limit visitors to the home.  Older persons and those that have chronic medical conditions such as diabetes, lung and heart disease are at increased risk for illness.   If possible, patients should use a separate bedroom while recovering. Caregivers and household members should avoid prolonged contact with the patient which means to stay 6 feet away and avoid contact with cough droplets.  When close contact is necessary, wash your hands before and immediately after contact.   Perform hand hygiene frequently. Wash your  hands often with soap and water for at least 20 seconds or use an alcohol-based hand , covering all surfaces of your hands and rubbing them together until they feel dry.   Avoid touching your eyes, nose, and mouth with unwashed hands.  Avoid sharing household items with the patient. You should not share dishes, drinking glasses, cups, eating utensils, towels, bedding, or other items. After the patient uses these items, you should wash them thoroughly.  Wash laundry thoroughly.   Immediately remove and wash clothes or bedding that have blood, stool, or body fluids on them.  Clean all high-touch surfaces, such as counters, tabletops, doorknobs, bathroom fixtures, toilets, phones, keyboards, tablets, and bedside tables, every day.   Use a household cleaning spray or wipe, according to the label instructions. Labels contain instructions for safe and effective use of the cleaning product including precautions you should take when applying the product, such as wearing gloves and making sure you have good ventilation during use of the product.    For more information see CDC link below.      https://www.cdc.gov/coronavirus/2019-ncov/hcp/guidance-prevent-spread.html#precautions               If your symptoms worsen or if you have any other concerns, please contact [facility] at [phone].

## 2022-12-28 ENCOUNTER — OFFICE VISIT (OUTPATIENT)
Dept: FAMILY MEDICINE | Facility: CLINIC | Age: 57
End: 2022-12-28
Payer: COMMERCIAL

## 2022-12-28 DIAGNOSIS — I10 HYPERTENSION, ESSENTIAL: Chronic | ICD-10-CM

## 2022-12-28 DIAGNOSIS — K44.9 HIATAL HERNIA: ICD-10-CM

## 2022-12-28 DIAGNOSIS — G93.31 POST VIRAL SYNDROME: ICD-10-CM

## 2022-12-28 DIAGNOSIS — G93.31 POST VIRAL SYNDROME: Primary | ICD-10-CM

## 2022-12-28 DIAGNOSIS — M51.36 DDD (DEGENERATIVE DISC DISEASE), LUMBAR: Chronic | ICD-10-CM

## 2022-12-28 PROCEDURE — 1160F PR REVIEW ALL MEDS BY PRESCRIBER/CLIN PHARMACIST DOCUMENTED: ICD-10-PCS | Mod: CPTII,95,, | Performed by: FAMILY MEDICINE

## 2022-12-28 PROCEDURE — 1159F PR MEDICATION LIST DOCUMENTED IN MEDICAL RECORD: ICD-10-PCS | Mod: CPTII,95,, | Performed by: FAMILY MEDICINE

## 2022-12-28 PROCEDURE — 99214 OFFICE O/P EST MOD 30 MIN: CPT | Mod: 95,,, | Performed by: FAMILY MEDICINE

## 2022-12-28 PROCEDURE — 4010F PR ACE/ARB THEARPY RXD/TAKEN: ICD-10-PCS | Mod: CPTII,95,, | Performed by: FAMILY MEDICINE

## 2022-12-28 PROCEDURE — 1160F RVW MEDS BY RX/DR IN RCRD: CPT | Mod: CPTII,95,, | Performed by: FAMILY MEDICINE

## 2022-12-28 PROCEDURE — 99214 PR OFFICE/OUTPT VISIT, EST, LEVL IV, 30-39 MIN: ICD-10-PCS | Mod: 95,,, | Performed by: FAMILY MEDICINE

## 2022-12-28 PROCEDURE — 1159F MED LIST DOCD IN RCRD: CPT | Mod: CPTII,95,, | Performed by: FAMILY MEDICINE

## 2022-12-28 PROCEDURE — 4010F ACE/ARB THERAPY RXD/TAKEN: CPT | Mod: CPTII,95,, | Performed by: FAMILY MEDICINE

## 2022-12-28 RX ORDER — LISINOPRIL 20 MG/1
20 TABLET ORAL NIGHTLY
Qty: 90 TABLET | Refills: 3 | Status: SHIPPED | OUTPATIENT
Start: 2022-12-28 | End: 2024-01-04 | Stop reason: SDUPTHER

## 2022-12-28 RX ORDER — BROMPHENIRAMINE MALEATE, PSEUDOEPHEDRINE HYDROCHLORIDE, AND DEXTROMETHORPHAN HYDROBROMIDE 2; 30; 10 MG/5ML; MG/5ML; MG/5ML
10 SYRUP ORAL
Qty: 473 ML | Refills: 0 | Status: SHIPPED | OUTPATIENT
Start: 2022-12-28 | End: 2023-01-07

## 2022-12-28 RX ORDER — GABAPENTIN 600 MG/1
600 TABLET ORAL 3 TIMES DAILY PRN
Qty: 90 TABLET | Refills: 0 | Status: SHIPPED | OUTPATIENT
Start: 2022-12-28 | End: 2023-04-18 | Stop reason: SDUPTHER

## 2022-12-28 NOTE — PROGRESS NOTES
The patient location is: [place], [place]  The chief complaint leading to consultation is: COVID-19     Visit type: audiovisual    Face to Face time with patient: 20 minuyes  22  minutes of total time spent on the encounter, which includes face to face time and non-face to face time preparing to see the patient (eg, review of tests), Obtaining and/or reviewing separately obtained history, Documenting clinical information in the electronic or other health record, Independently interpreting results (not separately reported) and communicating results to the patient/family/caregiver, or Care coordination (not separately reported).         Each patient to whom he or she provides medical services by telemedicine is:  (1) informed of the relationship between the physician and patient and the respective role of any other health care provider with respect to management of the patient; and (2) notified that he or she may decline to receive medical services by telemedicine and may withdraw from such care at any time.    Notes:     Subjective:       Patient ID: Naomy Armstrong is a 57 y.o. female.    Chief Complaint: COVID-19 Concerns and Cough    Cough  This is a recurrent problem. Episode onset: Tested positive for COVID on 12/7/22 and again on 12/21/22. The problem has been unchanged. Associated symptoms include headaches, nasal congestion, postnasal drip and rhinorrhea. Pertinent negatives include no chest pain, chills, fever, shortness of breath or wheezing.   Hypertension  This is a chronic problem. The current episode started more than 1 year ago. The problem is controlled. Associated symptoms include headaches. Pertinent negatives include no chest pain, neck pain, palpitations or shortness of breath. Past treatments include calcium channel blockers and ACE inhibitors. There are no compliance problems.    Abdominal Pain  This is a chronic problem. The pain is located in the generalized abdominal region. The abdominal  pain radiates to the epigastric region. Associated symptoms include headaches. Pertinent negatives include no arthralgias, constipation, diarrhea, dysuria, fever, hematuria or vomiting. The pain is aggravated by eating. She has tried proton pump inhibitors (gabapentin) for the symptoms. The treatment provided significant relief.   Review of Systems   Constitutional:  Positive for activity change. Negative for chills, fever and unexpected weight change.   HENT:  Positive for postnasal drip and rhinorrhea. Negative for hearing loss and trouble swallowing.    Eyes:  Negative for discharge and visual disturbance.   Respiratory:  Positive for cough. Negative for chest tightness, shortness of breath and wheezing.    Cardiovascular:  Negative for chest pain and palpitations.   Gastrointestinal:  Positive for abdominal pain. Negative for blood in stool, constipation, diarrhea and vomiting.   Endocrine: Negative for polydipsia and polyuria.   Genitourinary:  Negative for difficulty urinating, dysuria, hematuria and menstrual problem.   Musculoskeletal:  Negative for arthralgias, joint swelling and neck pain.   Neurological:  Positive for headaches. Negative for weakness.   Psychiatric/Behavioral:  Negative for confusion and dysphoric mood.        Objective:      Physical Exam  Constitutional:       General: She is not in acute distress.     Appearance: She is not ill-appearing.   Pulmonary:      Effort: Pulmonary effort is normal.   Neurological:      Mental Status: She is alert.   Psychiatric:         Mood and Affect: Mood normal.         Behavior: Behavior normal.         Thought Content: Thought content normal.       Assessment:       Problem List Items Addressed This Visit          Neuro    DDD (degenerative disc disease), lumbar    Relevant Medications    gabapentin (NEURONTIN) 600 MG tablet       GI    Hiatal hernia    Relevant Medications    gabapentin (NEURONTIN) 600 MG tablet     Other Visit Diagnoses       Post  viral syndrome    -  Primary    Relevant Medications    brompheniramine-pseudoeph-DM (BROMFED DM) 2-30-10 mg/5 mL Syrp    Hypertension, essential        Relevant Medications    lisinopriL (PRINIVIL,ZESTRIL) 20 MG tablet            Plan:       Naomy was seen today for covid-19 concerns and cough.    Diagnoses and all orders for this visit:    Post viral syndrome  -     brompheniramine-pseudoeph-DM (BROMFED DM) 2-30-10 mg/5 mL Syrp; Take 10 mLs by mouth every 4 to 6 hours as needed (cough). Maximum of 4 doses in 24 hours  Discussed course of a viral respiratory infections.  Explained that after respiratory infection is better, may continue to have a dry cough for a week or two.    Hypertension, essential  -     lisinopriL (PRINIVIL,ZESTRIL) 20 MG tablet; Take 1 tablet (20 mg total) by mouth nightly.  Current therapy (amlodipine and lisinopril) effective, will continue    DDD (degenerative disc disease), lumbar  -     gabapentin (NEURONTIN) 600 MG tablet; Take 1 tablet (600 mg total) by mouth 3 (three) times daily as needed (pain).  Current therapy effective, will continue    Hiatal hernia  -     gabapentin (NEURONTIN) 600 MG tablet; Take 1 tablet (600 mg total) by mouth 3 (three) times daily as needed (pain).  Current therapy effective, will continue

## 2022-12-28 NOTE — TELEPHONE ENCOUNTER
No new care gaps identified.  [facility] Embedded Care Gaps. Reference number: [id number]. 12/28/2022   3:50:25 PM CST

## 2022-12-28 NOTE — LETTER
December 28, 2022      [facility]  [address], [address]  [place] [address]  Phone: [phone]       Patient: Naomy Armstrong   YOB: 1965  Date of Visit: 12/28/2022    To Whom It May Concern:    Ronald Armstrong  had a virtual visit [facility] on 12/28/2022. Please excuse 12/28/2022 to 01/02/2023 from work. If you have any questions or concerns, or if I can be of further assistance, please do not hesitate to contact me.    Sincerely,      Eric Hernandes Jr., MD

## 2022-12-29 RX ORDER — BROMPHENIRAMINE MALEATE, PSEUDOEPHEDRINE HYDROCHLORIDE, AND DEXTROMETHORPHAN HYDROBROMIDE 2; 30; 10 MG/5ML; MG/5ML; MG/5ML
SYRUP ORAL
Qty: 473 ML | Refills: 0 | OUTPATIENT
Start: 2022-12-29

## 2022-12-29 RX ORDER — PROMETHAZINE HYDROCHLORIDE AND DEXTROMETHORPHAN HYDROBROMIDE 6.25; 15 MG/5ML; MG/5ML
5 SYRUP ORAL
Qty: 240 ML | Refills: 0 | Status: SHIPPED | OUTPATIENT
Start: 2022-12-29 | End: 2024-01-04 | Stop reason: ALTCHOICE

## 2023-01-10 ENCOUNTER — TELEPHONE (OUTPATIENT)
Dept: PAIN MEDICINE | Facility: CLINIC | Age: 58
End: 2023-01-10
Payer: COMMERCIAL

## 2023-01-10 NOTE — TELEPHONE ENCOUNTER
----- Message from Melvi Macias sent at 1/10/2023 10:22 AM CST -----  Regarding: Reschedule Appt  Pt call to reschedule appt that was for my chart office visit at 11:30 today    [phone] (home) [phone] (work)

## 2023-01-10 NOTE — TELEPHONE ENCOUNTER
Rescheduled to 1/24/23 at 8:45 am. Date and time accepted by patient. All questions answered and nothing else discussed.

## 2023-01-18 ENCOUNTER — PATIENT MESSAGE (OUTPATIENT)
Dept: ADMINISTRATIVE | Facility: HOSPITAL | Age: 58
End: 2023-01-18
Payer: COMMERCIAL

## 2023-02-03 ENCOUNTER — PATIENT MESSAGE (OUTPATIENT)
Dept: GASTROENTEROLOGY | Facility: CLINIC | Age: 58
End: 2023-02-03
Payer: COMMERCIAL

## 2023-02-06 ENCOUNTER — PATIENT OUTREACH (OUTPATIENT)
Dept: ADMINISTRATIVE | Facility: HOSPITAL | Age: 58
End: 2023-02-06
Payer: COMMERCIAL

## 2023-02-06 ENCOUNTER — PATIENT MESSAGE (OUTPATIENT)
Dept: ADMINISTRATIVE | Facility: HOSPITAL | Age: 58
End: 2023-02-06
Payer: COMMERCIAL

## 2023-02-06 DIAGNOSIS — R73.03 PREDIABETES: Primary | ICD-10-CM

## 2023-02-14 ENCOUNTER — PATIENT MESSAGE (OUTPATIENT)
Dept: GASTROENTEROLOGY | Facility: CLINIC | Age: 58
End: 2023-02-14
Payer: COMMERCIAL

## 2023-02-14 DIAGNOSIS — K58.0 IRRITABLE BOWEL SYNDROME WITH DIARRHEA: Primary | ICD-10-CM

## 2023-02-16 ENCOUNTER — PATIENT MESSAGE (OUTPATIENT)
Dept: GASTROENTEROLOGY | Facility: CLINIC | Age: 58
End: 2023-02-16
Payer: COMMERCIAL

## 2023-02-16 RX ORDER — ALOSETRON HYDROCHLORIDE 0.5 MG/1
0.5 TABLET ORAL 2 TIMES DAILY
Qty: 60 TABLET | Refills: 0 | Status: SHIPPED | OUTPATIENT
Start: 2023-02-16 | End: 2023-04-10

## 2023-02-28 ENCOUNTER — PATIENT MESSAGE (OUTPATIENT)
Dept: PSYCHIATRY | Facility: CLINIC | Age: 58
End: 2023-02-28
Payer: COMMERCIAL

## 2023-04-12 ENCOUNTER — PATIENT MESSAGE (OUTPATIENT)
Dept: ADMINISTRATIVE | Facility: HOSPITAL | Age: 58
End: 2023-04-12
Payer: COMMERCIAL

## 2023-05-07 DIAGNOSIS — K58.0 IRRITABLE BOWEL SYNDROME WITH DIARRHEA: ICD-10-CM

## 2023-05-08 RX ORDER — ALOSETRON HYDROCHLORIDE 0.5 MG/1
TABLET ORAL
Qty: 60 TABLET | Refills: 0 | Status: SHIPPED | OUTPATIENT
Start: 2023-05-08 | End: 2023-06-21

## 2023-05-14 NOTE — PROGRESS NOTES
[facility] INPATIENT ENCOUNTER  PEDIATRIC NEPHROLOGY CONSULT NOTE    Consults    History Of Present Illness  Mell is a 31 month old female (unvaccinated) who presented with decreased energy, increasing pale appearance.  Later developed epistaxis.  She also seemed less interested in running.  Later family noticed some nodes in the back of the patient's head.  She was referred to the emergency department by urgent care due to the persistent pale appearance.  Labs were drawn and patient was noted to be anemic.  Later transferred to the pediatric ICU.  Overall, she was noted to have severe anemia, thrombocytopenia, and neutropenia concerning for leukemia.  Initial CBC concerning for hemoglobin of 2.6, platelets of 18,000 and WBC of 14 with an ANC of 461% blasts.  She was admitted to the pediatric ICU for management of her anemia as well as oncology consult.  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    Interval History:  Blood pressure range = /60s  Intake/output = 540/804  Now on amlodipine 1.2 mg q12   Isradipine x 0  Mother present    Past medical/surgical history prior to admission:  Negative    Family history:  Mother: Healthy  Paternal family: Hypertension  Father: Heart disease    Social history:  Lives with parents and sister in [place], [place]    Past Medical History  Past Medical History:   Diagnosis Date   • Eczema    • Undiagnosed cardiac murmurs 01/2023        Surgical History  No past surgical history on file.     Social History  Social History     Social History Narrative   • Not on file       Family History    Family History   Problem Relation Age of Onset   • Hypertension Paternal Grandmother    • Diabetes Paternal Grandmother    • Heart disease Paternal Grandfather    • Hypertension Paternal Grandfather         Allergies  ALLERGIES:   Allergen Reactions   • Peanut Butter    (Food) Other (See Comments)  PT is here for COVID screening with no symptoms presenting for Pre-op.           Medications  Prior to Admission medications    Not on File     Current Facility-Administered Medications   Medication Dose Route Frequency Provider Last Rate Last Admin   • sodium chloride (PF) 0.9 % injection 10 mL  10 mL Intravenous 2 times per day Nayely Shah DO   10 mL at 05/13/23 2100    And   • sodium chloride (PF) 0.9 % injection 0.5-1 mL  0.5-1 mL Intravenous PRN Nayely Shah DO   1 mL at 05/14/23 0605   • isradipine (DYNACIRC) (compounded) oral suspension 0.8 mg  0.8 mg Oral Q8H PRN Candice Crouch MD       • amLODIPine benzoate (KATERZIA) 1 MG/ML oral suspension 1.2 mg  1.2 mg Oral 2 times per day Arron Sam MD   1.2 mg at 05/14/23 0920   • ondansetron (ZOFRAN) injection 1.62 mg  0.15 mg/kg (Dosing Weight) Intravenous Q8H PRN Arron Sam MD   1.62 mg at 05/10/23 0833   • diphenhydrAMINE (BENADRYL) injection 11 mg  1 mg/kg (Dosing Weight) Intravenous Q6H PRN Silvia Burt MD   11 mg at 05/01/23 1118   • sodium chloride 0.9% infusion   Intravenous Continuous Silvia Burt MD       • polyethylene glycol (MIRALAX) packet 8.5 g  8.5 g Oral BID PRN Yamile Grajeda DO       • sennosides (SENOKOT) 8.8 MG/5ML syrup 4.4 mg  4.4 mg Oral Daily PRN Yamile Grajeda DO       • sulfamethoxazole-trimethoprim (BACTRIM) 200-40 MG/5ML oral suspension 32 mg  32 mg Oral 2 times per day on Mon Tue Katie Britt DO   32 mg at 05/09/23 1734   • famotidine (PEPCID) oral suspension 5.44 mg  0.5 mg/kg (Dosing Weight) Oral 2 times per day Katie Britt DO   5.44 mg at 05/14/23 0920   • dexAMETHasone (DECADRON) tablet 1.5 mg  3 mg/m2 (Treatment Plan Recorded) Oral BID WC Goodell, William R, MD   1.5 mg at 05/14/23 0920   • lidocaine (ANECREAM/LMX) 4 % cream 1 application.  1 application. Topical PRN Shaquille Moreno MD   1 application. at 04/19/23 0610       Review of Systems  Review of Systems as per HPI     Physical Exam  Physical Exam  Vitals and nursing note reviewed.   Constitutional:        General: She is not in acute distress.     Appearance: She is not toxic-appearing.   Cardiovascular:      Rate and Rhythm: Normal rate and regular rhythm.      Heart sounds: No murmur heard.  Pulmonary:      Effort: Pulmonary effort is normal. No respiratory distress or nasal flaring.      Breath sounds: Normal breath sounds. No stridor.   Abdominal:      General: There is no distension.      Palpations: Abdomen is soft.   Musculoskeletal:         General: No swelling.           Last Recorded Vitals    VITAL SIGNS:     Vital Last Value 24 Hour Range   Temperature 97.5 °F (36.4 °C) (05/14/23 0816) Temp  Min: 97.5 °F (36.4 °C)  Max: 98.2 °F (36.8 °C)   Pulse 115 (05/14/23 0816) Pulse  Min: 76  Max: 115   Respiratory 24 (05/14/23 0816) Resp  Min: 24  Max: 28   Non-Invasive  Blood Pressure 97/61 (05/14/23 0400) BP  Min: 77/49  Max: 102/62   Pulse Oximetry 98 % (05/14/23 0816) SpO2  Min: 98 %  Max: 100 %     Vital Today Admitted   Weight (!) 10.3 kg (22 lb 11.3 oz) (05/13/23 1547) Weight: 10.8 kg (23 lb 13 oz) (04/15/23 0236)   Height N/A Height: 3' 1.4\" (95 cm) (04/15/23 0236)   Body Mass Index N/A BMI (Calculated): 11.97 (04/15/23 0236)     INTAKE/OUTPUT:      Intake/Output Summary (Last 24 hours) at 5/14/2023 0935  Last data filed at 5/14/2023 0821  Gross per 24 hour   Intake 300 ml   Output 949 ml   Net -649 ml           Relevant Results    4/15/2023:  Sodium 137, potassium 4.0, chloride 111, bicarb 21, BUN 11, creatinine 0.22, calcium 8.3, magnesium 2.7, phosphorus 4.7, glucose 88  AST 30, ALT 24, albumin 3.0    Uric acid 4.6  WBC 14.0, hemoglobin 3.6, platelets 13  Smear: Numerous circulating blasts present    4/20/2023:  Sodium 139, potassium 3.5, chloride 113, bicarb 22, BUN 6, creatinine 0.20, calcium 8.5, magnesium 2.0, phosphorus 5.3, glucose 310  Uric acid 1.5  WBC 3.4, hemoglobin 10.4, platelets 50  tsh 0.748, fT4 1.4  UA: 1.007, ph 7.0, otherwise normal    5/3/23:  Na 137, k 4.3, cl 106, co2 25, bun  18, cr 0.18, ca 8.6  Wbc 1.5, hgb 10.0, plt 51    5/5/23:  GI Pathogen panel: + norovirus    Sodium (mmol/L)   Date Value   05/10/2023 139     Potassium (mmol/L)   Date Value   05/10/2023 3.6     Chloride (mmol/L)   Date Value   05/10/2023 110     Glucose (mg/dL)   Date Value   05/10/2023 78     Calcium (mg/dL)   Date Value   05/10/2023 8.6     Carbon Dioxide (mmol/L)   Date Value   05/10/2023 24     BUN (mg/dL)   Date Value   05/10/2023 9     Creatinine (mg/dL)   Date Value   05/10/2023 0.21         4/18/2023: Echocardiogram: Left ventricular ejection fraction = 58%, normal biventricular size and function    04.22.2023  Vascular duplex of renal arteries  04.21.2023  The vascular duplex of the renal arteries is read as demonstrating that no evidence of any hemodynamically significant renal arterial stenosis is seen.      04.22.2023  Renal ultrasound 04.21.2023  Right kidney 7.76 cm by 3.96 cm   Left kidney  8.16 cm by 3.46 cm   Expected kidney length based upon height is 6.3 cm with 2 std dev 1.5 cm  Both kidneys are large in length  Otherwise, the right kidney is normal with no pelvic dilatation, hydronephrosis, renal mass, renal cyst or nephrocalcinosis.   There is mild left pelvic dilatation. There is no hydronephrosis, renal mass, renal cyst or nephrocalcinosis in the left kidney.     Assessment & Plan     Principal Problem:    Severe anemia  Active Problems:    Thrombocytopenia (CMD)    Elevated uric acid in blood    Other secondary hypertension    31-month-old with new diagnosis of B-cell ALL, is noted to have elevated blood pressure  Normal blood pressures for age/size/sex:  50th percentile = 87/46  90th percentile = 101/58  95th percentile = 105/63    Recommendations:  1.  Continue amlodipine to 1.2 mg PO BID (No changes)  2. Isradipine 0.8mg PO Q8hr PRN bp >110/65 on repeat measures when patient is calm   4.  Strict I's and O's  5.  Follow blood pressures closely; please check on upper extremity when  patient is calm.  May need to repeat pressures 5 minutes apart if elevated  6.  Lower salt diet    Patient is accompanied by:mother  Discussed with family and primary team    Donell Foote M.D.  Pediatric Nephrology  5/14/2023 9:35 AM

## 2023-06-21 DIAGNOSIS — K58.0 IRRITABLE BOWEL SYNDROME WITH DIARRHEA: ICD-10-CM

## 2023-06-21 RX ORDER — ALOSETRON HYDROCHLORIDE 0.5 MG/1
TABLET ORAL
Qty: 60 TABLET | Refills: 0 | Status: SHIPPED | OUTPATIENT
Start: 2023-06-21 | End: 2023-07-14

## 2023-07-05 DIAGNOSIS — K52.9 ACUTE GASTROENTERITIS: ICD-10-CM

## 2023-07-05 DIAGNOSIS — K44.9 HIATAL HERNIA: ICD-10-CM

## 2023-07-05 DIAGNOSIS — M51.36 DDD (DEGENERATIVE DISC DISEASE), LUMBAR: Chronic | ICD-10-CM

## 2023-07-05 RX ORDER — DICYCLOMINE HYDROCHLORIDE 20 MG/1
20 TABLET ORAL EVERY 6 HOURS
Qty: 120 TABLET | Refills: 0 | Status: SHIPPED | OUTPATIENT
Start: 2023-07-05 | End: 2023-10-25 | Stop reason: SDUPTHER

## 2023-07-05 RX ORDER — GABAPENTIN 600 MG/1
600 TABLET ORAL 3 TIMES DAILY PRN
Qty: 90 TABLET | Refills: 0 | Status: SHIPPED | OUTPATIENT
Start: 2023-07-05 | End: 2023-08-06 | Stop reason: SDUPTHER

## 2023-07-05 NOTE — TELEPHONE ENCOUNTER
Care Due:                  Date            Visit Type   Department     Provider  --------------------------------------------------------------------------------                                MYCHART                              SAME DAY     [facility]                              VIRTUAL      MEDICINE/  Last Visit: 12-      VISIT        INTERNAL MED   Eric NUNEZT                              ANNUAL       [facility]                              CHECKUP/PHY  MEDICINE/  Next Visit: 11-      S            INTERNAL TORIE Hernandes                                                            Last  Test          Frequency    Reason                     Performed    Due Date  --------------------------------------------------------------------------------    CMP.........  12 months..  lisinopriL...............  05- 05-    [facility] Embedded Care Due Messages. Reference number: [id number].   7/05/2023 10:37:57 AM CDT

## 2023-07-14 DIAGNOSIS — K58.0 IRRITABLE BOWEL SYNDROME WITH DIARRHEA: ICD-10-CM

## 2023-07-14 RX ORDER — ALOSETRON HYDROCHLORIDE 0.5 MG/1
TABLET ORAL
Qty: 60 TABLET | Refills: 0 | Status: SHIPPED | OUTPATIENT
Start: 2023-07-14 | End: 2023-08-10

## 2023-07-17 ENCOUNTER — OFFICE VISIT (OUTPATIENT)
Dept: PAIN MEDICINE | Facility: CLINIC | Age: 58
End: 2023-07-17
Payer: COMMERCIAL

## 2023-07-17 VITALS
OXYGEN SATURATION: 96 % | SYSTOLIC BLOOD PRESSURE: 145 MMHG | DIASTOLIC BLOOD PRESSURE: 73 MMHG | RESPIRATION RATE: 16 BRPM | HEART RATE: 87 BPM

## 2023-07-17 DIAGNOSIS — M47.897 OTHER OSTEOARTHRITIS OF SPINE, LUMBOSACRAL REGION: Primary | ICD-10-CM

## 2023-07-17 DIAGNOSIS — M51.36 DDD (DEGENERATIVE DISC DISEASE), LUMBAR: ICD-10-CM

## 2023-07-17 DIAGNOSIS — M53.3 SACROILIAC JOINT PAIN: ICD-10-CM

## 2023-07-17 DIAGNOSIS — M47.816 LUMBAR SPONDYLOSIS: ICD-10-CM

## 2023-07-17 PROCEDURE — 1160F PR REVIEW ALL MEDS BY PRESCRIBER/CLIN PHARMACIST DOCUMENTED: ICD-10-PCS | Mod: CPTII,S$GLB,, | Performed by: PAIN MEDICINE

## 2023-07-17 PROCEDURE — 99999 PR PBB SHADOW E&M-EST. PATIENT-LVL III: CPT | Mod: PBBFAC,,, | Performed by: PAIN MEDICINE

## 2023-07-17 PROCEDURE — 99999 PR PBB SHADOW E&M-EST. PATIENT-LVL III: ICD-10-PCS | Mod: PBBFAC,,, | Performed by: PAIN MEDICINE

## 2023-07-17 PROCEDURE — 99214 OFFICE O/P EST MOD 30 MIN: CPT | Mod: S$GLB,,, | Performed by: PAIN MEDICINE

## 2023-07-17 PROCEDURE — 3078F DIAST BP <80 MM HG: CPT | Mod: CPTII,S$GLB,, | Performed by: PAIN MEDICINE

## 2023-07-17 PROCEDURE — 1159F MED LIST DOCD IN RCRD: CPT | Mod: CPTII,S$GLB,, | Performed by: PAIN MEDICINE

## 2023-07-17 PROCEDURE — 1159F PR MEDICATION LIST DOCUMENTED IN MEDICAL RECORD: ICD-10-PCS | Mod: CPTII,S$GLB,, | Performed by: PAIN MEDICINE

## 2023-07-17 PROCEDURE — 99214 PR OFFICE/OUTPT VISIT, EST, LEVL IV, 30-39 MIN: ICD-10-PCS | Mod: S$GLB,,, | Performed by: PAIN MEDICINE

## 2023-07-17 PROCEDURE — 3077F PR MOST RECENT SYSTOLIC BLOOD PRESSURE >= 140 MM HG: ICD-10-PCS | Mod: CPTII,S$GLB,, | Performed by: PAIN MEDICINE

## 2023-07-17 PROCEDURE — 3078F PR MOST RECENT DIASTOLIC BLOOD PRESSURE < 80 MM HG: ICD-10-PCS | Mod: CPTII,S$GLB,, | Performed by: PAIN MEDICINE

## 2023-07-17 PROCEDURE — 4010F PR ACE/ARB THEARPY RXD/TAKEN: ICD-10-PCS | Mod: CPTII,S$GLB,, | Performed by: PAIN MEDICINE

## 2023-07-17 PROCEDURE — 3077F SYST BP >= 140 MM HG: CPT | Mod: CPTII,S$GLB,, | Performed by: PAIN MEDICINE

## 2023-07-17 PROCEDURE — 1160F RVW MEDS BY RX/DR IN RCRD: CPT | Mod: CPTII,S$GLB,, | Performed by: PAIN MEDICINE

## 2023-07-17 PROCEDURE — 4010F ACE/ARB THERAPY RXD/TAKEN: CPT | Mod: CPTII,S$GLB,, | Performed by: PAIN MEDICINE

## 2023-07-17 NOTE — PROGRESS NOTES
Ms. Moralez will be scheduled for bilat L2-4 MBB #1 on 7/28/23.  Reviewed the pre-procedure instructions listed below with the patient- copy also provided.  Instructed patient to notify clinic if she begins feeling ill, runs a fever, is prescribed antibiotics, and/or has any outpatient procedures (colonoscopy, endoscopy, OBGYN, dental work, etc.), and/or any vaccinations or booster shots within the two weeks leading up to this procedure.  Instructed patient to report to [facility], 2nd Floor Endoscopy Registration Desk.  All questions answered- patient verbalized understanding.     Day of Procedure  Ensure you have obtained an arrival time from the Pain Management Staff  You will be contacted the week before your scheduled date to review these instructions and receive your arrival time.  Please check any voicemails received.  If you arrive past your scheduled procedure time, you may be asked to reschedule your procedure.    Ensure you have a  with you.  If you arrive without a responsible adult to stay with you and drive you home, you may be asked to reschedule your procedure.     Take all of your prescribed medications that you routinely take for blood pressure, heart medications, thyroid, cholesterol, etc.  You will be informed if blood thinners should be held.    This is NOT a fasting procedure, you may eat the day of your procedure.    Wear comfortable clothing (loose fitting pants).  You may wear glasses, dentures, contact lenses, and/or hearing aids.     Notify the nurse during the intake process if you are allergic to any medications, if you are diabetic, or if you are not feeling well      Diabetic Patients  Please check your blood sugar prior to coming in for your procedure.  If the value is greater than 200, contact the clinic [phone] as the procedure may need to be rescheduled.  The procedure may not be performed if your blood sugar is above 200 even after checking into the  Hospitals in [place].      IF you are taking blood thinners, please make sure our staff is aware so that we can obtain clearance and have you hold the medication accordingly.

## 2023-07-17 NOTE — H&P (VIEW-ONLY)
Subjective:     Patient ID: Naomy Armstrong is a 58 y.o. female    Chief Complaint: Low-back Pain (Midline right above tailbone sharp pain)      Referred by: No ref. provider found    Disclaimer: This note was generated using voice recognition software.  There may be typographical errors that were missed during proofreading.    HPI:    Interval History (7/17/23):  She returns today for follow up.  She reports that she is having recurrent bilateral low back pain.  This pain is located across the lower lumbar region.  Pain will radiate to the bilateral buttocks, but not more distally than this.  She denies any associated numbness, tingling, weakness, bowel bladder dysfunction.  Pain is constant and worsened with activity.        Interval History (6/7/22):  She returns today for follow up.  She reports that she underwent surgery for hiatal hernia repair in February 2022.  Since his surgery she has had significant epigastric abdominal pain.  This pain is intermittent but is very severe when present.  No specific triggers identified.  Patient also reports worsening diarrhea since undergoing the surgery.  She has followed up with surgery and gastroenterology in no specific source has been identified as of yet.  States that she was provided tramadol which provided some relief.  States she was told to return to my clinic to discuss other options for pain management.       Interval History NP (02/03/22):    The patient location is: work  The chief complaint leading to consultation is: follow-up  Visit type: Virtual visit with audio.  Patient verbally consented for audio visit.  Total time spent with patient: 10 minutes  Each patient to whom he or she provides medical services by telemedicine is:  (1) informed of the relationship between the physician and patient and the respective role of any other health care provider with respect to management of the patient; and (2) notified that he or she may decline to receive  medical services by telemedicine and may withdraw from such care at any time.    Pt returns today for follow up of bilateral SIJ injections. She reports 100% relief of low back pain. She denies any new or worsening symptoms. She would like to discuss chronic headaches at next visit with Dr. Powell. She is otherwise well today.     Interval History (12/29/21):    The patient location is:  home  The chief complaint leading to consultation is:  Follow-up  Visit type: Virtual visit with synchronous audio and video  Total time spent with patient:  8 minutes  Each patient to whom he or she provides medical services by telemedicine is:  (1) informed of the relationship between the physician and patient and the respective role of any other health care provider with respect to management of the patient; and (2) notified that he or she may decline to receive medical services by telemedicine and may withdraw from such care at any time.      She returns today for follow up and MRI review.  She reports that she would like to schedule bilateral sacroiliac joint steroid injections discussed at last visit.  She is still having neck pain and headaches as well and would like to undergo cervical medial branch blocks/RFA, but her back pain is worse at this time.  Otherwise, she denies any changes in the quality or location of her pain.  She denies any new worsening symptoms.        Interval History (8/11/21):    The patient location is:  home  The chief complaint leading to consultation is:  Follow-up  Visit type: Virtual visit with synchronous audio and video  Total time spent with patient:  15 minutes  Each patient to whom he or she provides medical services by telemedicine is:  (1) informed of the relationship between the physician and patient and the respective role of any other health care provider with respect to management of the patient; and (2) notified that he or she may decline to receive medical services by telemedicine  "and may withdraw from such care at any time.        She returns today for follow up.  She reports that relief from bilateral sacroiliac joint steroid injections has worn off.  She would like repeat sacroiliac joint steroid injections if appropriate.  Patient also has been having headaches chronically.  She has been evaluated thoroughly by Neurology without any specific source for of headache identified.  Suspicion for cervicogenic headaches.  Patient does have pain in the upper cervical region.  The pain will radiate to the occipital and parietal regions of the head.  She denies any pain radiating to the upper extremities.  She denies any associated bowel bladder dysfunction.      Interval History NP (5/5/21):    Pt returns today for follow up of bilateral SIJ injections. She reports at least 75% relief of low back pain. She can now stand for long periods of time without pain or having to sit for 30 minutes. She is able to cook and complete her ADL's with much improvement. Does report continued pain while bending over but it is now manageable. Denies new or worsening symptoms.      Interval History (3/31/21):  She returns today for follow up.  She reports that bilateral L3, L4, L5 radiofrequency ablation provided roughly 75% relief but for only 1 month.  She states that this.  Time her pain returned.  Today, she localizes pain across the lower lumbar/lumbosacral region.  The pain does not radiate.  The pain is equal bilaterally.  The pain is worse with activities such as mopping and cleaning.  She denies any associated numbness, tingling, weakness, bowel bladder dysfunction.      Interval History NP (7/16/20):  Pt returns today for follow up and MRI review. She states that her pain in unchanged in quality or location. Denies any new symptoms. No bladder or bowel dysfunction. She reports "missing more days of work than she attends". She is an  at [employer] which includes heavy manual lifting and " "moving boxes. Her pain is worsened with activity, especially after a shift at work or when she is home cleaning. She reports that gabapentin has helped, but takes 2 pills at night and 1 pill every morning because it makes her too drowsy during the day. She also takes 500mg Naproxen PRN for the pain, maybe every few days. She would like something else for the pain. States she "took valium in the past which helped her relax and get through the day".     Interval History (7/6/20):  She returns today for follow up.  She reports that she continues to have low back/hip pain.  This pain is worse with bending and lifting.  These are movements that are often required as a part of her job.  Overall she denies any changes in the quality or location of the pain since last encounter.  She denies any new symptoms.       Interval History (1/13/20):  She returns today for follow up.  She reports that her neck pain has improved with home exercise program.  She does not feel as though this needs any further attention.  She does state that her low back has been bothering her.  She localizes pain to the bilateral lower lumbar regions.  The pain does radiate to the bilateral hip regions.  She denies any associated numbness, tingling, weakness, bowel bladder dysfunction.  The pain is constant and worsened with activity.  She states that the pain is typically worse when initiating activity after rest.        Initial Encounter (7/9/19):  Naomy Armstrong is a 58 y.o. female who presents today with chronic neck pain. This pain has been present for years.  No specific inciting event or injury noted. She localizes the pain to the midline cervical region.  The pain does not radiate.  She denies any associated numbness, tingling, weakness, bowel bladder dysfunction.  She has had some radicular pain on the left side in the past this has resolved.  The pain is constant and worsened with activity.  She specifically states that the pain is " worse when looking at screens.  She has been treated in the past by Dr. Bhavya Oneill at [facility].   This pain is described in detail below.    Physical Therapy:  Yes, for her left hip.     Non-pharmacologic Treatment:  Rest helps         TENS?  No    Pain Medications:         Currently taking:  gabapentin, Celebrex    Has tried in the past:  Opioids, muscle relaxers, Tylenol, NSAIDs, Amitriptyline, tizanidine, Celebrex, Robaxin    Has not tried:  SNRIs, topical creams    Blood thinners:  None    Interventional Therapies:   10/8/13- Left RFA C5, C6, C7  9/10/13- Left C5, C6, C7 MBB   8/13/13- C7-T1 IL EMMA  8/19/20 - right L3, L4, L5 radiofrequency ablation - 75% relief for 1 month  9/4/20 - left L3, L4, L5 radiofrequency ablation - 75% relief for 1 month  4/16/21 - bilateral SIJ injections - 75% relief  01/19/21 - bilateral SIJ injections - 100% relief    Relevant Surgeries:  None    Affecting sleep?  Yes    Affecting daily activities? yes    Depressive symptoms? yes          SI/HI? No    Work status: Employed    Pain Scores:    Best:       3/10  Worst:   9/10  Usually:   5/10  Today:   3/10    Review of Systems   Constitutional:  Negative for activity change, appetite change, chills, fatigue, fever and unexpected weight change.   HENT:  Negative for hearing loss.    Eyes:  Negative for visual disturbance.   Respiratory:  Negative for chest tightness and shortness of breath.    Cardiovascular:  Negative for chest pain.   Gastrointestinal:  Positive for abdominal pain and diarrhea. Negative for constipation, nausea and vomiting.   Genitourinary:  Negative for difficulty urinating.   Musculoskeletal:  Positive for neck pain and neck stiffness. Negative for arthralgias, back pain, gait problem and myalgias.   Skin:  Negative for rash.   Neurological:  Positive for headaches. Negative for dizziness, weakness, light-headedness and numbness.   Psychiatric/Behavioral:  Negative for hallucinations, sleep disturbance  and suicidal ideas. The patient is not nervous/anxious.      Past Medical History:   Diagnosis Date    Alcohol abuse     per chart, but patient denies today and cocaine per chart    Anxiety     Colon polyp     Depression     Hallucination     last couple of months started seeing someone standing in her room, saw boyfriend's reflection in glass    Headache     History of psychiatric hospitalization     age 13 [facility] for 2 months for acting out; 2018 for SI    Hx of psychiatric care     Hypertension     Psychiatric problem     Sleep difficulties     Suicide attempt     with knife by cutting stomach    Therapy        Past Surgical History:   Procedure Laterality Date    COLONOSCOPY N/A 5/7/2021    Procedure: COLONOSCOPY;  Surgeon: Prem Montana MD;  Location: [facility];  Service: Endoscopy;  Laterality: N/A;  COVID screening 5/4 [facility]-instr portal -ml    COLONOSCOPY N/A 6/6/2022    Procedure: COLONOSCOPY;  Surgeon: Fab Shepherd MD;  [facility]  Service: Endoscopy;  Laterality: N/A;  C-Diff neg  Rapid COVID    EPIDURAL STEROID INJECTION Bilateral 8/5/2020    Procedure: Bilateral MBB @ L3, L4, L5;  Surgeon: Jeremy Powell Jr., MD;  Location: [facility];  Service: Pain Management;  Laterality: Bilateral;  Bilat L3-5 MBB  Arrive @ 1315; No ATC or DM; Needs MD signature    EPIDURAL STEROID INJECTION Right 8/19/2020    Procedure: Lumbar Radiofrequency Thermocoagulation of Medial Branches;  Surgeon: Jeremy Powell Jr., MD;  Location: [facility];  Service: Pain Management;  Laterality: Right;  Right L3-5 RFA  Arrive @ 1045; No ATC or DM; Needs MD Signature    EPIDURAL STEROID INJECTION Left 9/4/2020    Procedure: Lumbar Radiofrequency Thermocoagulation of Medial Branches;  Surgeon: Jeremy Powell Jr., MD;  Location: [facility];  Service: Pain Management;  Laterality: Left;  Left L3-5 RFA  Arrive @ 0900 (requested); No ATC or DM; Needs MD signature    EPIDURAL STEROID INJECTION Bilateral  2021    Procedure: Sacroiliac Joint Steroid Injections @ Bilateral;  Surgeon: Jeremy Powell Jr., MD;  Location: [facility];  Service: Pain Management;  Laterality: Bilateral;  Arrive @ 1300; No ATC or DM    EPIDURAL STEROID INJECTION Bilateral 2022    Procedure: Bilateral Sacroiliac Joint Injections;  Surgeon: Jeremy Powell Jr., MD;  Location: [facility];  Service: Pain Management;  Laterality: Bilateral;  Arrive @ 1315; No ATC or DM; Needs Consent    ESOPHAGEAL MANOMETRY WITH MEASUREMENT OF IMPEDANCE N/A 10/20/2021    Procedure: MANOMETRY, ESOPHAGUS, WITH IMPEDANCE MEASUREMENT;  Surgeon: Anastacia Odonnell MD;  Location: [facility] (4TH FLR);  Service: Endoscopy;  Laterality: N/A;  Covid test 10/17 [place], instructions sent to [id number]    ESOPHAGOGASTRODUODENOSCOPY N/A 2021    Procedure: EGD (ESOPHAGOGASTRODUODENOSCOPY);  Surgeon: Prem Montana MD;  Location: [facility];  Service: Endoscopy;  Laterality: N/A;  added on per Dr. NOEL Shepherd    LAPAROSCOPIC TOUPET FUNDOPLICATION N/A 2022    Procedure: FUNDOPLICATION, LAPAROSCOPIC, TOUPET;  Surgeon: Christian Martinez MD;  [facility];  Service: General;  Laterality: N/A;       Social History     Socioeconomic History    Marital status:     Number of children: 0   Occupational History    Occupation: Overnight nuvia     Employer: [employer]   Tobacco Use    Smoking status: Former     Packs/day: 1.00     Types: Cigarettes     Quit date: 2006     Years since quittin.2    Smokeless tobacco: Never   Substance and Sexual Activity    Alcohol use: Yes     Comment: occasional    Drug use: Not Currently     Types: Marijuana     Comment: tried at age 14    Sexual activity: Yes     Partners: Male     Comment: going through menopause   Other Topics Concern    Patient feels they ought to cut down on drinking/drug use No    Patient annoyed by others criticizing their drinking/drug use No    Patient has felt bad or  guilty about drinking/drug use No    Patient has had a drink/used drugs as an eye opener in the AM No   Social History Narrative     x 2.    Lives with boyfriend.     Works at [employer] as an .    Presently working on Bachelor's degree.    Has associates degree in criminal justice.     Social Determinants of Health     Financial Resource Strain: Low Risk     Difficulty of Paying Living Expenses: Not very hard   Food Insecurity: Food Insecurity Present    Worried About Running Out of Food in the Last Year: Sometimes true    Ran Out of Food in the Last Year: Never true   Transportation Needs: No Transportation Needs    Lack of Transportation (Medical): No    Lack of Transportation (Non-Medical): No   Physical Activity: Unknown    Days of Exercise per Week: Patient refused    Minutes of Exercise per Session: 0 min   Stress: Stress Concern Present    Feeling of Stress : To some extent   Social Connections: Unknown    Frequency of Communication with Friends and Family: More than three times a week    Frequency of Social Gatherings with Friends and Family: Patient refused    Active Member of Clubs or Organizations: Patient refused    Attends Club or Organization Meetings: Patient refused    Marital Status: Patient refused   Housing Stability: High Risk    Unable to Pay for Housing in the Last Year: Yes    Number of Places Lived in the Last Year: 1    Unstable Housing in the Last Year: No       Review of patient's allergies indicates:   Allergen Reactions    Effexor [venlafaxine]      Elevated her blood pressure    Zoloft [sertraline]     Paroxetine hcl      Made her feel drunk       Current Outpatient Medications on File Prior to Visit   Medication Sig Dispense Refill    alosetron (LOTRONEX) 0.5 MG tablet Take 1 tablet by mouth twice daily 60 tablet 0    amLODIPine (NORVASC) 5 MG tablet Take 1 tablet (5 mg total) by mouth once daily. 90 tablet 1    celecoxib (CELEBREX) 100 MG capsule Take 1 capsule  (100 mg total) by mouth 2 (two) times daily. 60 capsule 6    dicyclomine (BENTYL) 20 mg tablet Take 1 tablet (20 mg total) by mouth every 6 (six) hours. 120 tablet 0    diphenoxylate-atropine 2.5-0.025 mg (LOMOTIL) 2.5-0.025 mg per tablet Take 1 tablet by mouth 4 (four) times daily as needed for Diarrhea. 60 tablet 1    FLUoxetine 40 MG capsule Take 2 capsules (80 mg total) by mouth once daily. 180 capsule 3    gabapentin (NEURONTIN) 600 MG tablet Take 1 tablet (600 mg total) by mouth 3 (three) times daily as needed (pain). 90 tablet 0    hydrOXYzine HCL (ATARAX) 10 MG Tab Take 1 tablet (10 mg total) by mouth 2 (two) times daily as needed (anxiety). 60 tablet 3    lisinopriL (PRINIVIL,ZESTRIL) 20 MG tablet Take 1 tablet (20 mg total) by mouth nightly. 90 tablet 3    loperamide (IMODIUM) 2 mg capsule Take 2 mg by mouth once as needed for Diarrhea.      multivitamin with minerals tablet Take 1 tablet by mouth once daily.      omeprazole (PRILOSEC) 40 MG capsule Take 1 capsule (40 mg total) by mouth every morning. 90 capsule 3    promethazine (PHENERGAN) 25 MG tablet Take 1 tablet (25 mg total) by mouth every 8 (eight) hours as needed for Nausea. 30 tablet 1    promethazine-dextromethorphan (PROMETHAZINE-DM) 6.25-15 mg/5 mL Syrp Take 5 mLs by mouth every 4 to 6 hours as needed. 240 mL 0    azelastine (ASTELIN) 137 mcg (0.1 %) nasal spray 1 spray (137 mcg total) by Nasal route 2 (two) times daily. 30 mL 1     No current facility-administered medications on file prior to visit.       Objective:      BP (!) 145/73 (BP Location: Left arm, Patient Position: Sitting, BP Method: Medium (Automatic))   Pulse 87   Resp 16   LMP 09/15/2013   SpO2 96%     Exam:  GEN:  Well developed, well nourished.  No acute distress.  Normal pain behavior.  HEENT:  No trauma.  Mucous membranes moist.  Nares patent bilaterally.  PSYCH: Normal affect. Thought content appropriate.  CHEST:  Breathing symmetric.  No audible wheezing.  ABD: Soft,  non-distended.  SKIN:  Warm, pink, dry.  No rash on exposed areas.    EXT:  No cyanosis, clubbing, or edema.  No color change or changes in nail or hair growth.  NEURO/MUSCULOSKELETAL:  Fully alert, oriented, and appropriate. Speech normal indio. No cranial nerve deficits.   Gait:  Normal.  No trendelenburg sign bilaterally.   Motor Strength:  5/5 motor strength throughout lower extremities.   Sensory:  No sensory deficit in the lower extremities.   Reflexes:  2+ and symmetric throughout.  Downgoing Babinski's bilaterally.  No clonus or spasticity.  L-Spine:  Slightly limited ROM with pain on extension.  Positive pain with axial/facet loading bilaterally.  Negative SLR bilaterally.    SI Joint/Hip:  Negative MOHIT bilaterally.  Negative FADIR bilaterally.  Negative Gaenslen bilaterally.  Positive TTP over bilateral lower lumbar paraspinals              Imaging:    Narrative & Impression    EXAMINATION:  MRI CERVICAL SPINE WITHOUT CONTRAST     CLINICAL HISTORY:  cervical DDD; Spondylosis without myelopathy or radiculopathy, cervical region     TECHNIQUE:  Multiplanar, multisequence MR images of the cervical spine were performed without the administration of contrast.     COMPARISON:  Radiograph 08/27/2020.     FINDINGS:  Alignment: Straightening of cervical spine with slight reversal at the level of C5 through C7.  Minimal degenerative anterolisthesis C4 on C5..     Vertebrae: Normal marrow signal. No fracture.     Discs: Disc space narrowing C5-C6-C7 level.     Cord: At C3 through C6 level, STIR images, the cervical cord appears slightly indistinct and of minimally increased signal intensity.  This may be artifactual in nature is a cannot be confirmed on the axial or sagittal T2 weighted sequences with certainty.  Postcontrast repeat examination may be indicated to exclude subtle intrinsic cord finding at this level..     Skull base and craniocervical junction: Normal.     Degenerative findings:     C2-C3: There  is no focal disc herniation.No significant central canal narrowing.  No significant neural foraminal narrowing.     C3-C4: There is no focal disc herniation.No significant central canal narrowing.  No significant neural foraminal narrowing.     C4-C5: There is no focal disc herniation.No significant central canal narrowing.  Moderate RIGHT neural foraminal narrowing.     C5-C6: There is no focal disc herniation.No significant central canal narrowing.  Moderate RIGHT and mild LEFT neural foraminal narrowing.     C6-C7: There is no focal disc herniation.No significant central canal narrowing.  Moderate LEFT neural foraminal narrowing.     C7-T1: There is no focal disc herniation.No significant central canal narrowing.  No significant neural foraminal narrowing.     T1-T2:     Paraspinal muscles & soft tissues: Unremarkable.     Impression:     Degenerative spondylosis as above predominantly C4-C5 through C6-C7.     Likely artifact identified level of the cord C3 through C6 STIR images only although at of abundance of caution, repeat examination with postcontrast MRI may be helpful to exclude underlying myelitis.     This report was flagged in Epic as abnormal.        Electronically signed by: Jerzy Rose MD  Date:                                            10/28/2021  Time:                                           06:44           Narrative & Impression    EXAMINATION:  XR CERVICAL SPINE 5 VIEW WITH FLEX AND EXT     CLINICAL HISTORY:  Headache     TECHNIQUE:  Five views of the cervical spine plus flexion and extension views were performed.     COMPARISON:  None 07/09/2019.     FINDINGS:  Mild DJD.  The disc spaces are narrowed between C5 and C7 vertebral segments.  Encroachment of the neural foramina identified at the C5-C6 level on the right and the C6/C7 level on the left.  There is a 2 mm C4/C5 anterolisthesis.  No fracture or dislocation.  No bone destruction identified.     Impression:     See above         Electronically signed by: Kaiser Ruiz MD  Date:                                            08/27/2020  Time:                                           14:12               Narrative & Impression     EXAMINATION:  MRI LUMBAR SPINE WITHOUT CONTRAST     CLINICAL HISTORY:  lumbar spinal stenosis; Other intervertebral disc degeneration, lumbar region     TECHNIQUE:  Multiplanar, multisequence MR images were acquired from the thoracolumbar junction to the sacrum without the administration of contrast.     COMPARISON:  Non plain film examination 01/13/2020 e.     FINDINGS:  Lumbar spine alignment is within normal limits. The vertebral body heights are well maintained, with no fracture.  No marrow signal abnormality suspicious for an infiltrative process.     The conus is normal in appearance, and terminates at the L1-L2 level.  The adjacent soft tissue structures show no significant abnormalities.     L1-L2: There is no focal disc herniation. No significant central canal or neural foraminal narrowing.     L2-L3: There is no focal disc herniation. No significant central canal or neural foraminal narrowing.     L3-L4: There is no focal disc herniation. No significant central canal or neural foraminal narrowing.     L4-L5: There is no focal disc herniation. No significant central canal or neural foraminal narrowing.     L5-S1: There is no focal disc herniation. No significant central canal or neural foraminal narrowing.     Impression:     No significant central canal stenosis, focal protrusion of disc material or neural foraminal encroachment.        Electronically signed by: Jerzy Rose MD  Date:                                            07/14/2020  Time:                                           14:15         Narrative & Impression    EXAMINATION:  XR LUMBAR SPINE AP AND LAT WITH FLEX/EXT     CLINICAL HISTORY:  Other intervertebral disc degeneration, lumbar region     TECHNIQUE:  AP and lateral views as well as  lateral flexion and extension images are performed through the lumbar spine.     COMPARISON:  None     FINDINGS:  Mild DJD and lumbar scoliosis.  No significant disc space narrowing identified.  No fracture, spondylolisthesis or bone destruction noted.  Spina bifida occulta involving the S1 vertebral segment noted.     Impression:     See above        Electronically signed by: Kaiser Ruiz MD  Date:                                            01/13/2020  Time:                                           09:22    Encounter    View Encounter                     Narrative     No significant alignment abnormality.  Vertebral body heights are normally maintained, without compression deformity at any level.  There is disc narrowing at C5-6 and C6-7, and each of these levels demonstrates posterior marginal vertebral endplate   spurring and accompanying disc bulging.  The spurring is more pronounced to the right of midline at C5-6 and to the left of midline at C6-7.  All other cervical and visualized upper thoracic levels have normal disc contour, and there is no evidence of a   significant focal disc herniation at any level.  AP diameter of the spinal canal is normally maintained at all levels with no canal stenosis/extrinsic cord compression observed.  The spinal cord is well delineated from the cervicomedullary junction to   the T3 level, and appears normal in size and configuration without focal enlargement or irregularity.  No Chiari malformation or cord cyst or syrinx.  No abnormal signal from the substance of the cord on any of the pulse sequences generated.  There is   some prominent neural foraminal stenosis at C5-6 on the right and C6-7 on the left.  No significant signal abnormality referable to the osseous structures.   Impression      Cervical spine MRI examination demonstrates degenerative changes as discussed above involving the C5-6 and C6-7 levels, with right-sided foraminal stenosis at C5-6 and left-sided  foraminal stenosis at C6-7.  No evidence of a significant focal soft is   herniation, canal stenosis/extrinsic cord compression, or intramedullary cord lesion seen at any level.      Electronically signed by: Kaiser Preston MD  Date: 08/01/13  Time: 06:40          Assessment:       Encounter Diagnoses   Name Primary?    Other osteoarthritis of spine, lumbosacral region Yes    Sacroiliac joint pain     Lumbar spondylosis     DDD (degenerative disc disease), lumbar            Plan:       Naomy was seen today for low-back pain.    Diagnoses and all orders for this visit:    Other osteoarthritis of spine, lumbosacral region    Sacroiliac joint pain    Lumbar spondylosis  -     Case Request Endoscopy: Bilateral L2, L3, L4 Medial Branch Blocks #1    DDD (degenerative disc disease), lumbar          Naomy Ana Armstrong is a 58 y.o. female with recurrent bilateral low back pain.  At this time, pain appears to be most likely related to lower lumbar facet joints.  Can not rule out sacroiliac joint dysfunction.  Patient has gotten good relief from bilateral sacroiliac joint steroid injections in the past, but current examination not consistent with this diagnosis.    Pertinent imaging studies reviewed by me. Imaging results were discussed with patient.  Schedule for bilateral L2, L3, L4 medial branch blocks x2.  If diagnostic can proceed with radiofrequency ablations.  Return to clinic after procedure to discuss results.

## 2023-07-17 NOTE — PROGRESS NOTES
Subjective:     Patient ID: Naomy Armstrong is a 58 y.o. female    Chief Complaint: Low-back Pain (Midline right above tailbone sharp pain)      Referred by: No ref. provider found    Disclaimer: This note was generated using voice recognition software.  There may be typographical errors that were missed during proofreading.    HPI:    Interval History (7/17/23):  She returns today for follow up.  She reports that she is having recurrent bilateral low back pain.  This pain is located across the lower lumbar region.  Pain will radiate to the bilateral buttocks, but not more distally than this.  She denies any associated numbness, tingling, weakness, bowel bladder dysfunction.  Pain is constant and worsened with activity.        Interval History (6/7/22):  She returns today for follow up.  She reports that she underwent surgery for hiatal hernia repair in February 2022.  Since his surgery she has had significant epigastric abdominal pain.  This pain is intermittent but is very severe when present.  No specific triggers identified.  Patient also reports worsening diarrhea since undergoing the surgery.  She has followed up with surgery and gastroenterology in no specific source has been identified as of yet.  States that she was provided tramadol which provided some relief.  States she was told to return to my clinic to discuss other options for pain management.       Interval History NP (02/03/22):    The patient location is: work  The chief complaint leading to consultation is: follow-up  Visit type: Virtual visit with audio.  Patient verbally consented for audio visit.  Total time spent with patient: 10 minutes  Each patient to whom he or she provides medical services by telemedicine is:  (1) informed of the relationship between the physician and patient and the respective role of any other health care provider with respect to management of the patient; and (2) notified that he or she may decline to receive  medical services by telemedicine and may withdraw from such care at any time.    Pt returns today for follow up of bilateral SIJ injections. She reports 100% relief of low back pain. She denies any new or worsening symptoms. She would like to discuss chronic headaches at next visit with Dr. Powell. She is otherwise well today.     Interval History (12/29/21):    The patient location is:  home  The chief complaint leading to consultation is:  Follow-up  Visit type: Virtual visit with synchronous audio and video  Total time spent with patient:  8 minutes  Each patient to whom he or she provides medical services by telemedicine is:  (1) informed of the relationship between the physician and patient and the respective role of any other health care provider with respect to management of the patient; and (2) notified that he or she may decline to receive medical services by telemedicine and may withdraw from such care at any time.      She returns today for follow up and MRI review.  She reports that she would like to schedule bilateral sacroiliac joint steroid injections discussed at last visit.  She is still having neck pain and headaches as well and would like to undergo cervical medial branch blocks/RFA, but her back pain is worse at this time.  Otherwise, she denies any changes in the quality or location of her pain.  She denies any new worsening symptoms.        Interval History (8/11/21):    The patient location is:  home  The chief complaint leading to consultation is:  Follow-up  Visit type: Virtual visit with synchronous audio and video  Total time spent with patient:  15 minutes  Each patient to whom he or she provides medical services by telemedicine is:  (1) informed of the relationship between the physician and patient and the respective role of any other health care provider with respect to management of the patient; and (2) notified that he or she may decline to receive medical services by telemedicine  "and may withdraw from such care at any time.        She returns today for follow up.  She reports that relief from bilateral sacroiliac joint steroid injections has worn off.  She would like repeat sacroiliac joint steroid injections if appropriate.  Patient also has been having headaches chronically.  She has been evaluated thoroughly by Neurology without any specific source for of headache identified.  Suspicion for cervicogenic headaches.  Patient does have pain in the upper cervical region.  The pain will radiate to the occipital and parietal regions of the head.  She denies any pain radiating to the upper extremities.  She denies any associated bowel bladder dysfunction.      Interval History NP (5/5/21):    Pt returns today for follow up of bilateral SIJ injections. She reports at least 75% relief of low back pain. She can now stand for long periods of time without pain or having to sit for 30 minutes. She is able to cook and complete her ADL's with much improvement. Does report continued pain while bending over but it is now manageable. Denies new or worsening symptoms.      Interval History (3/31/21):  She returns today for follow up.  She reports that bilateral L3, L4, L5 radiofrequency ablation provided roughly 75% relief but for only 1 month.  She states that this.  Time her pain returned.  Today, she localizes pain across the lower lumbar/lumbosacral region.  The pain does not radiate.  The pain is equal bilaterally.  The pain is worse with activities such as mopping and cleaning.  She denies any associated numbness, tingling, weakness, bowel bladder dysfunction.      Interval History NP (7/16/20):  Pt returns today for follow up and MRI review. She states that her pain in unchanged in quality or location. Denies any new symptoms. No bladder or bowel dysfunction. She reports "missing more days of work than she attends". She is an  at [employer] which includes heavy manual lifting and " "moving boxes. Her pain is worsened with activity, especially after a shift at work or when she is home cleaning. She reports that gabapentin has helped, but takes 2 pills at night and 1 pill every morning because it makes her too drowsy during the day. She also takes 500mg Naproxen PRN for the pain, maybe every few days. She would like something else for the pain. States she "took valium in the past which helped her relax and get through the day".     Interval History (7/6/20):  She returns today for follow up.  She reports that she continues to have low back/hip pain.  This pain is worse with bending and lifting.  These are movements that are often required as a part of her job.  Overall she denies any changes in the quality or location of the pain since last encounter.  She denies any new symptoms.       Interval History (1/13/20):  She returns today for follow up.  She reports that her neck pain has improved with home exercise program.  She does not feel as though this needs any further attention.  She does state that her low back has been bothering her.  She localizes pain to the bilateral lower lumbar regions.  The pain does radiate to the bilateral hip regions.  She denies any associated numbness, tingling, weakness, bowel bladder dysfunction.  The pain is constant and worsened with activity.  She states that the pain is typically worse when initiating activity after rest.        Initial Encounter (7/9/19):  Naomy Armstrong is a 58 y.o. female who presents today with chronic neck pain. This pain has been present for years.  No specific inciting event or injury noted. She localizes the pain to the midline cervical region.  The pain does not radiate.  She denies any associated numbness, tingling, weakness, bowel bladder dysfunction.  She has had some radicular pain on the left side in the past this has resolved.  The pain is constant and worsened with activity.  She specifically states that the pain is " worse when looking at screens.  She has been treated in the past by Dr. Bhavya Oneill at [facility].   This pain is described in detail below.    Physical Therapy:  Yes, for her left hip.     Non-pharmacologic Treatment:  Rest helps         TENS?  No    Pain Medications:         Currently taking:  gabapentin, Celebrex    Has tried in the past:  Opioids, muscle relaxers, Tylenol, NSAIDs, Amitriptyline, tizanidine, Celebrex, Robaxin    Has not tried:  SNRIs, topical creams    Blood thinners:  None    Interventional Therapies:   10/8/13- Left RFA C5, C6, C7  9/10/13- Left C5, C6, C7 MBB   8/13/13- C7-T1 IL EMMA  8/19/20 - right L3, L4, L5 radiofrequency ablation - 75% relief for 1 month  9/4/20 - left L3, L4, L5 radiofrequency ablation - 75% relief for 1 month  4/16/21 - bilateral SIJ injections - 75% relief  01/19/21 - bilateral SIJ injections - 100% relief    Relevant Surgeries:  None    Affecting sleep?  Yes    Affecting daily activities? yes    Depressive symptoms? yes          SI/HI? No    Work status: Employed    Pain Scores:    Best:       3/10  Worst:   9/10  Usually:   5/10  Today:   3/10    Review of Systems   Constitutional:  Negative for activity change, appetite change, chills, fatigue, fever and unexpected weight change.   HENT:  Negative for hearing loss.    Eyes:  Negative for visual disturbance.   Respiratory:  Negative for chest tightness and shortness of breath.    Cardiovascular:  Negative for chest pain.   Gastrointestinal:  Positive for abdominal pain and diarrhea. Negative for constipation, nausea and vomiting.   Genitourinary:  Negative for difficulty urinating.   Musculoskeletal:  Positive for neck pain and neck stiffness. Negative for arthralgias, back pain, gait problem and myalgias.   Skin:  Negative for rash.   Neurological:  Positive for headaches. Negative for dizziness, weakness, light-headedness and numbness.   Psychiatric/Behavioral:  Negative for hallucinations, sleep disturbance  and suicidal ideas. The patient is not nervous/anxious.      Past Medical History:   Diagnosis Date    Alcohol abuse     per chart, but patient denies today and cocaine per chart    Anxiety     Colon polyp     Depression     Hallucination     last couple of months started seeing someone standing in her room, saw boyfriend's reflection in glass    Headache     History of psychiatric hospitalization     age 13 [facility] for 2 months for acting out; 2018 for SI    Hx of psychiatric care     Hypertension     Psychiatric problem     Sleep difficulties     Suicide attempt     with knife by cutting stomach    Therapy        Past Surgical History:   Procedure Laterality Date    COLONOSCOPY N/A 5/7/2021    Procedure: COLONOSCOPY;  Surgeon: Prem Montana MD;  Location: [facility];  Service: Endoscopy;  Laterality: N/A;  COVID screening 5/4 LAP[facility]-instr portal -ml    COLONOSCOPY N/A 6/6/2022    Procedure: COLONOSCOPY;  Surgeon: Fab Shepherd MD;  [facility]  Service: Endoscopy;  Laterality: N/A;  C-Diff neg  Rapid COVID    EPIDURAL STEROID INJECTION Bilateral 8/5/2020    Procedure: Bilateral MBB @ L3, L4, L5;  Surgeon: Jeremy Powell Jr., MD;  Location: [facility];  Service: Pain Management;  Laterality: Bilateral;  Bilat L3-5 MBB  Arrive @ 1315; No ATC or DM; Needs MD signature    EPIDURAL STEROID INJECTION Right 8/19/2020    Procedure: Lumbar Radiofrequency Thermocoagulation of Medial Branches;  Surgeon: Jeremy Powell Jr., MD;  Location: [facility];  Service: Pain Management;  Laterality: Right;  Right L3-5 RFA  Arrive @ 1045; No ATC or DM; Needs MD Signature    EPIDURAL STEROID INJECTION Left 9/4/2020    Procedure: Lumbar Radiofrequency Thermocoagulation of Medial Branches;  Surgeon: Jeremy Powell Jr., MD;  Location: [facility];  Service: Pain Management;  Laterality: Left;  Left L3-5 RFA  Arrive @ 0900 (requested); No ATC or DM; Needs MD signature    EPIDURAL STEROID INJECTION Bilateral  2021    Procedure: Sacroiliac Joint Steroid Injections @ Bilateral;  Surgeon: Jeremy Powell Jr., MD;  Location: [facility];  Service: Pain Management;  Laterality: Bilateral;  Arrive @ 1300; No ATC or DM    EPIDURAL STEROID INJECTION Bilateral 2022    Procedure: Bilateral Sacroiliac Joint Injections;  Surgeon: Jeremy Powell Jr., MD;  Location: [facility];  Service: Pain Management;  Laterality: Bilateral;  Arrive @ 1315; No ATC or DM; Needs Consent    ESOPHAGEAL MANOMETRY WITH MEASUREMENT OF IMPEDANCE N/A 10/20/2021    Procedure: MANOMETRY, ESOPHAGUS, WITH IMPEDANCE MEASUREMENT;  Surgeon: Anastacia Odonnell MD;  Location: [facility] (4TH FLR);  Service: Endoscopy;  Laterality: N/A;  Covid test 10/17 [facility], instructions sent to [id number]    ESOPHAGOGASTRODUODENOSCOPY N/A 2021    Procedure: EGD (ESOPHAGOGASTRODUODENOSCOPY);  Surgeon: Prem Montana MD;  Location: [facility];  Service: Endoscopy;  Laterality: N/A;  added on per Dr. NOEL Shepherd    LAPAROSCOPIC TOUPET FUNDOPLICATION N/A 2022    Procedure: FUNDOPLICATION, LAPAROSCOPIC, TOUPET;  Surgeon: Christian Martinez MD;  [facility];  Service: General;  Laterality: N/A;       Social History     Socioeconomic History    Marital status:     Number of children: 0   Occupational History    Occupation: Overnight nuvia     Employer: [employer]   Tobacco Use    Smoking status: Former     Packs/day: 1.00     Types: Cigarettes     Quit date: 2006     Years since quittin.2    Smokeless tobacco: Never   Substance and Sexual Activity    Alcohol use: Yes     Comment: occasional    Drug use: Not Currently     Types: Marijuana     Comment: tried at age 14    Sexual activity: Yes     Partners: Male     Comment: going through menopause   Other Topics Concern    Patient feels they ought to cut down on drinking/drug use No    Patient annoyed by others criticizing their drinking/drug use No    Patient has felt bad or  guilty about drinking/drug use No    Patient has had a drink/used drugs as an eye opener in the AM No   Social History Narrative     x 2.    Lives with boyfriend.     Works at [employer] as an .    Presently working on Bachelor's degree.    Has associates degree in criminal justice.     Social Determinants of Health     Financial Resource Strain: Low Risk     Difficulty of Paying Living Expenses: Not very hard   Food Insecurity: Food Insecurity Present    Worried About Running Out of Food in the Last Year: Sometimes true    Ran Out of Food in the Last Year: Never true   Transportation Needs: No Transportation Needs    Lack of Transportation (Medical): No    Lack of Transportation (Non-Medical): No   Physical Activity: Unknown    Days of Exercise per Week: Patient refused    Minutes of Exercise per Session: 0 min   Stress: Stress Concern Present    Feeling of Stress : To some extent   Social Connections: Unknown    Frequency of Communication with Friends and Family: More than three times a week    Frequency of Social Gatherings with Friends and Family: Patient refused    Active Member of Clubs or Organizations: Patient refused    Attends Club or Organization Meetings: Patient refused    Marital Status: Patient refused   Housing Stability: High Risk    Unable to Pay for Housing in the Last Year: Yes    Number of Places Lived in the Last Year: 1    Unstable Housing in the Last Year: No       Review of patient's allergies indicates:   Allergen Reactions    Effexor [venlafaxine]      Elevated her blood pressure    Zoloft [sertraline]     Paroxetine hcl      Made her feel drunk       Current Outpatient Medications on File Prior to Visit   Medication Sig Dispense Refill    alosetron (LOTRONEX) 0.5 MG tablet Take 1 tablet by mouth twice daily 60 tablet 0    amLODIPine (NORVASC) 5 MG tablet Take 1 tablet (5 mg total) by mouth once daily. 90 tablet 1    celecoxib (CELEBREX) 100 MG capsule Take 1 capsule  (100 mg total) by mouth 2 (two) times daily. 60 capsule 6    dicyclomine (BENTYL) 20 mg tablet Take 1 tablet (20 mg total) by mouth every 6 (six) hours. 120 tablet 0    diphenoxylate-atropine 2.5-0.025 mg (LOMOTIL) 2.5-0.025 mg per tablet Take 1 tablet by mouth 4 (four) times daily as needed for Diarrhea. 60 tablet 1    FLUoxetine 40 MG capsule Take 2 capsules (80 mg total) by mouth once daily. 180 capsule 3    gabapentin (NEURONTIN) 600 MG tablet Take 1 tablet (600 mg total) by mouth 3 (three) times daily as needed (pain). 90 tablet 0    hydrOXYzine HCL (ATARAX) 10 MG Tab Take 1 tablet (10 mg total) by mouth 2 (two) times daily as needed (anxiety). 60 tablet 3    lisinopriL (PRINIVIL,ZESTRIL) 20 MG tablet Take 1 tablet (20 mg total) by mouth nightly. 90 tablet 3    loperamide (IMODIUM) 2 mg capsule Take 2 mg by mouth once as needed for Diarrhea.      multivitamin with minerals tablet Take 1 tablet by mouth once daily.      omeprazole (PRILOSEC) 40 MG capsule Take 1 capsule (40 mg total) by mouth every morning. 90 capsule 3    promethazine (PHENERGAN) 25 MG tablet Take 1 tablet (25 mg total) by mouth every 8 (eight) hours as needed for Nausea. 30 tablet 1    promethazine-dextromethorphan (PROMETHAZINE-DM) 6.25-15 mg/5 mL Syrp Take 5 mLs by mouth every 4 to 6 hours as needed. 240 mL 0    azelastine (ASTELIN) 137 mcg (0.1 %) nasal spray 1 spray (137 mcg total) by Nasal route 2 (two) times daily. 30 mL 1     No current facility-administered medications on file prior to visit.       Objective:      BP (!) 145/73 (BP Location: Left arm, Patient Position: Sitting, BP Method: Medium (Automatic))   Pulse 87   Resp 16   LMP 09/15/2013   SpO2 96%     Exam:  GEN:  Well developed, well nourished.  No acute distress.  Normal pain behavior.  HEENT:  No trauma.  Mucous membranes moist.  Nares patent bilaterally.  PSYCH: Normal affect. Thought content appropriate.  CHEST:  Breathing symmetric.  No audible wheezing.  ABD: Soft,  non-distended.  SKIN:  Warm, pink, dry.  No rash on exposed areas.    EXT:  No cyanosis, clubbing, or edema.  No color change or changes in nail or hair growth.  NEURO/MUSCULOSKELETAL:  Fully alert, oriented, and appropriate. Speech normal indio. No cranial nerve deficits.   Gait:  Normal.  No trendelenburg sign bilaterally.   Motor Strength:  5/5 motor strength throughout lower extremities.   Sensory:  No sensory deficit in the lower extremities.   Reflexes:  2+ and symmetric throughout.  Downgoing Babinski's bilaterally.  No clonus or spasticity.  L-Spine:  Slightly limited ROM with pain on extension.  Positive pain with axial/facet loading bilaterally.  Negative SLR bilaterally.    SI Joint/Hip:  Negative MOHIT bilaterally.  Negative FADIR bilaterally.  Negative Gaenslen bilaterally.  Positive TTP over bilateral lower lumbar paraspinals              Imaging:    Narrative & Impression    EXAMINATION:  MRI CERVICAL SPINE WITHOUT CONTRAST     CLINICAL HISTORY:  cervical DDD; Spondylosis without myelopathy or radiculopathy, cervical region     TECHNIQUE:  Multiplanar, multisequence MR images of the cervical spine were performed without the administration of contrast.     COMPARISON:  Radiograph 08/27/2020.     FINDINGS:  Alignment: Straightening of cervical spine with slight reversal at the level of C5 through C7.  Minimal degenerative anterolisthesis C4 on C5..     Vertebrae: Normal marrow signal. No fracture.     Discs: Disc space narrowing C5-C6-C7 level.     Cord: At C3 through C6 level, STIR images, the cervical cord appears slightly indistinct and of minimally increased signal intensity.  This may be artifactual in nature is a cannot be confirmed on the axial or sagittal T2 weighted sequences with certainty.  Postcontrast repeat examination may be indicated to exclude subtle intrinsic cord finding at this level..     Skull base and craniocervical junction: Normal.     Degenerative findings:     C2-C3: There  is no focal disc herniation.No significant central canal narrowing.  No significant neural foraminal narrowing.     C3-C4: There is no focal disc herniation.No significant central canal narrowing.  No significant neural foraminal narrowing.     C4-C5: There is no focal disc herniation.No significant central canal narrowing.  Moderate RIGHT neural foraminal narrowing.     C5-C6: There is no focal disc herniation.No significant central canal narrowing.  Moderate RIGHT and mild LEFT neural foraminal narrowing.     C6-C7: There is no focal disc herniation.No significant central canal narrowing.  Moderate LEFT neural foraminal narrowing.     C7-T1: There is no focal disc herniation.No significant central canal narrowing.  No significant neural foraminal narrowing.     T1-T2:     Paraspinal muscles & soft tissues: Unremarkable.     Impression:     Degenerative spondylosis as above predominantly C4-C5 through C6-C7.     Likely artifact identified level of the cord C3 through C6 STIR images only although at of abundance of caution, repeat examination with postcontrast MRI may be helpful to exclude underlying myelitis.     This report was flagged in Epic as abnormal.        Electronically signed by: Jerzy Rose MD  Date:                                            10/28/2021  Time:                                           06:44           Narrative & Impression    EXAMINATION:  XR CERVICAL SPINE 5 VIEW WITH FLEX AND EXT     CLINICAL HISTORY:  Headache     TECHNIQUE:  Five views of the cervical spine plus flexion and extension views were performed.     COMPARISON:  None 07/09/2019.     FINDINGS:  Mild DJD.  The disc spaces are narrowed between C5 and C7 vertebral segments.  Encroachment of the neural foramina identified at the C5-C6 level on the right and the C6/C7 level on the left.  There is a 2 mm C4/C5 anterolisthesis.  No fracture or dislocation.  No bone destruction identified.     Impression:     See above         Electronically signed by: Kaiser Ruiz MD  Date:                                            08/27/2020  Time:                                           14:12               Narrative & Impression     EXAMINATION:  MRI LUMBAR SPINE WITHOUT CONTRAST     CLINICAL HISTORY:  lumbar spinal stenosis; Other intervertebral disc degeneration, lumbar region     TECHNIQUE:  Multiplanar, multisequence MR images were acquired from the thoracolumbar junction to the sacrum without the administration of contrast.     COMPARISON:  Non plain film examination 01/13/2020 e.     FINDINGS:  Lumbar spine alignment is within normal limits. The vertebral body heights are well maintained, with no fracture.  No marrow signal abnormality suspicious for an infiltrative process.     The conus is normal in appearance, and terminates at the L1-L2 level.  The adjacent soft tissue structures show no significant abnormalities.     L1-L2: There is no focal disc herniation. No significant central canal or neural foraminal narrowing.     L2-L3: There is no focal disc herniation. No significant central canal or neural foraminal narrowing.     L3-L4: There is no focal disc herniation. No significant central canal or neural foraminal narrowing.     L4-L5: There is no focal disc herniation. No significant central canal or neural foraminal narrowing.     L5-S1: There is no focal disc herniation. No significant central canal or neural foraminal narrowing.     Impression:     No significant central canal stenosis, focal protrusion of disc material or neural foraminal encroachment.        Electronically signed by: Jerzy Rose MD  Date:                                            07/14/2020  Time:                                           14:15         Narrative & Impression    EXAMINATION:  XR LUMBAR SPINE AP AND LAT WITH FLEX/EXT     CLINICAL HISTORY:  Other intervertebral disc degeneration, lumbar region     TECHNIQUE:  AP and lateral views as well as  lateral flexion and extension images are performed through the lumbar spine.     COMPARISON:  None     FINDINGS:  Mild DJD and lumbar scoliosis.  No significant disc space narrowing identified.  No fracture, spondylolisthesis or bone destruction noted.  Spina bifida occulta involving the S1 vertebral segment noted.     Impression:     See above        Electronically signed by: Kaiser Ruiz MD  Date:                                            01/13/2020  Time:                                           09:22    Encounter    View Encounter                     Narrative     No significant alignment abnormality.  Vertebral body heights are normally maintained, without compression deformity at any level.  There is disc narrowing at C5-6 and C6-7, and each of these levels demonstrates posterior marginal vertebral endplate   spurring and accompanying disc bulging.  The spurring is more pronounced to the right of midline at C5-6 and to the left of midline at C6-7.  All other cervical and visualized upper thoracic levels have normal disc contour, and there is no evidence of a   significant focal disc herniation at any level.  AP diameter of the spinal canal is normally maintained at all levels with no canal stenosis/extrinsic cord compression observed.  The spinal cord is well delineated from the cervicomedullary junction to   the T3 level, and appears normal in size and configuration without focal enlargement or irregularity.  No Chiari malformation or cord cyst or syrinx.  No abnormal signal from the substance of the cord on any of the pulse sequences generated.  There is   some prominent neural foraminal stenosis at C5-6 on the right and C6-7 on the left.  No significant signal abnormality referable to the osseous structures.   Impression      Cervical spine MRI examination demonstrates degenerative changes as discussed above involving the C5-6 and C6-7 levels, with right-sided foraminal stenosis at C5-6 and left-sided  foraminal stenosis at C6-7.  No evidence of a significant focal soft is   herniation, canal stenosis/extrinsic cord compression, or intramedullary cord lesion seen at any level.      Electronically signed by: Kaiser Preston MD  Date: 08/01/13  Time: 06:40          Assessment:       Encounter Diagnoses   Name Primary?    Other osteoarthritis of spine, lumbosacral region Yes    Sacroiliac joint pain     Lumbar spondylosis     DDD (degenerative disc disease), lumbar            Plan:       Naomy was seen today for low-back pain.    Diagnoses and all orders for this visit:    Other osteoarthritis of spine, lumbosacral region    Sacroiliac joint pain    Lumbar spondylosis  -     Case Request Endoscopy: Bilateral L2, L3, L4 Medial Branch Blocks #1    DDD (degenerative disc disease), lumbar          Naomy Ana Armstrong is a 58 y.o. female with recurrent bilateral low back pain.  At this time, pain appears to be most likely related to lower lumbar facet joints.  Can not rule out sacroiliac joint dysfunction.  Patient has gotten good relief from bilateral sacroiliac joint steroid injections in the past, but current examination not consistent with this diagnosis.    Pertinent imaging studies reviewed by me. Imaging results were discussed with patient.  Schedule for bilateral L2, L3, L4 medial branch blocks x2.  If diagnostic can proceed with radiofrequency ablations.  Return to clinic after procedure to discuss results.

## 2023-07-17 NOTE — LETTER
July 17, 2023      [facility]  [address], [address]  [place] [address]  Phone: [phone]  Fax: [phone]       Patient: Naomy Armstrong   YOB: 1965  Date of Visit: 07/17/2023    To Whom It May Concern:    Ronald Armstrong  was at [facility] on 07/17/2023. The patient may return to work/school on 7/18/2023 with no restrictions. If you have any questions or concerns, or if I can be of further assistance, please do not hesitate to contact me.    Sincerely,    Carlene Narayan RN

## 2023-07-17 NOTE — H&P (VIEW-ONLY)
Subjective:     Patient ID: Naomy Armstrong is a 58 y.o. female    Chief Complaint: Low-back Pain (Midline right above tailbone sharp pain)      Referred by: No ref. provider found    Disclaimer: This note was generated using voice recognition software.  There may be typographical errors that were missed during proofreading.    HPI:    Interval History (7/17/23):  She returns today for follow up.  She reports that she is having recurrent bilateral low back pain.  This pain is located across the lower lumbar region.  Pain will radiate to the bilateral buttocks, but not more distally than this.  She denies any associated numbness, tingling, weakness, bowel bladder dysfunction.  Pain is constant and worsened with activity.        Interval History (6/7/22):  She returns today for follow up.  She reports that she underwent surgery for hiatal hernia repair in February 2022.  Since his surgery she has had significant epigastric abdominal pain.  This pain is intermittent but is very severe when present.  No specific triggers identified.  Patient also reports worsening diarrhea since undergoing the surgery.  She has followed up with surgery and gastroenterology in no specific source has been identified as of yet.  States that she was provided tramadol which provided some relief.  States she was told to return to my clinic to discuss other options for pain management.       Interval History NP (02/03/22):    The patient location is: work  The chief complaint leading to consultation is: follow-up  Visit type: Virtual visit with audio.  Patient verbally consented for audio visit.  Total time spent with patient: 10 minutes  Each patient to whom he or she provides medical services by telemedicine is:  (1) informed of the relationship between the physician and patient and the respective role of any other health care provider with respect to management of the patient; and (2) notified that he or she may decline to receive  medical services by telemedicine and may withdraw from such care at any time.    Pt returns today for follow up of bilateral SIJ injections. She reports 100% relief of low back pain. She denies any new or worsening symptoms. She would like to discuss chronic headaches at next visit with Dr. Powell. She is otherwise well today.     Interval History (12/29/21):    The patient location is:  home  The chief complaint leading to consultation is:  Follow-up  Visit type: Virtual visit with synchronous audio and video  Total time spent with patient:  8 minutes  Each patient to whom he or she provides medical services by telemedicine is:  (1) informed of the relationship between the physician and patient and the respective role of any other health care provider with respect to management of the patient; and (2) notified that he or she may decline to receive medical services by telemedicine and may withdraw from such care at any time.      She returns today for follow up and MRI review.  She reports that she would like to schedule bilateral sacroiliac joint steroid injections discussed at last visit.  She is still having neck pain and headaches as well and would like to undergo cervical medial branch blocks/RFA, but her back pain is worse at this time.  Otherwise, she denies any changes in the quality or location of her pain.  She denies any new worsening symptoms.        Interval History (8/11/21):    The patient location is:  home  The chief complaint leading to consultation is:  Follow-up  Visit type: Virtual visit with synchronous audio and video  Total time spent with patient:  15 minutes  Each patient to whom he or she provides medical services by telemedicine is:  (1) informed of the relationship between the physician and patient and the respective role of any other health care provider with respect to management of the patient; and (2) notified that he or she may decline to receive medical services by telemedicine  "and may withdraw from such care at any time.        She returns today for follow up.  She reports that relief from bilateral sacroiliac joint steroid injections has worn off.  She would like repeat sacroiliac joint steroid injections if appropriate.  Patient also has been having headaches chronically.  She has been evaluated thoroughly by Neurology without any specific source for of headache identified.  Suspicion for cervicogenic headaches.  Patient does have pain in the upper cervical region.  The pain will radiate to the occipital and parietal regions of the head.  She denies any pain radiating to the upper extremities.  She denies any associated bowel bladder dysfunction.      Interval History NP (5/5/21):    Pt returns today for follow up of bilateral SIJ injections. She reports at least 75% relief of low back pain. She can now stand for long periods of time without pain or having to sit for 30 minutes. She is able to cook and complete her ADL's with much improvement. Does report continued pain while bending over but it is now manageable. Denies new or worsening symptoms.      Interval History (3/31/21):  She returns today for follow up.  She reports that bilateral L3, L4, L5 radiofrequency ablation provided roughly 75% relief but for only 1 month.  She states that this.  Time her pain returned.  Today, she localizes pain across the lower lumbar/lumbosacral region.  The pain does not radiate.  The pain is equal bilaterally.  The pain is worse with activities such as mopping and cleaning.  She denies any associated numbness, tingling, weakness, bowel bladder dysfunction.      Interval History NP (7/16/20):  Pt returns today for follow up and MRI review. She states that her pain in unchanged in quality or location. Denies any new symptoms. No bladder or bowel dysfunction. She reports "missing more days of work than she attends". She is an  at [employer] which includes heavy manual lifting and " "moving boxes. Her pain is worsened with activity, especially after a shift at work or when she is home cleaning. She reports that gabapentin has helped, but takes 2 pills at night and 1 pill every morning because it makes her too drowsy during the day. She also takes 500mg Naproxen PRN for the pain, maybe every few days. She would like something else for the pain. States she "took valium in the past which helped her relax and get through the day".     Interval History (7/6/20):  She returns today for follow up.  She reports that she continues to have low back/hip pain.  This pain is worse with bending and lifting.  These are movements that are often required as a part of her job.  Overall she denies any changes in the quality or location of the pain since last encounter.  She denies any new symptoms.       Interval History (1/13/20):  She returns today for follow up.  She reports that her neck pain has improved with home exercise program.  She does not feel as though this needs any further attention.  She does state that her low back has been bothering her.  She localizes pain to the bilateral lower lumbar regions.  The pain does radiate to the bilateral hip regions.  She denies any associated numbness, tingling, weakness, bowel bladder dysfunction.  The pain is constant and worsened with activity.  She states that the pain is typically worse when initiating activity after rest.        Initial Encounter (7/9/19):  Naomy Armstrong is a 58 y.o. female who presents today with chronic neck pain. This pain has been present for years.  No specific inciting event or injury noted. She localizes the pain to the midline cervical region.  The pain does not radiate.  She denies any associated numbness, tingling, weakness, bowel bladder dysfunction.  She has had some radicular pain on the left side in the past this has resolved.  The pain is constant and worsened with activity.  She specifically states that the pain is " worse when looking at screens.  She has been treated in the past by Dr. Bhavya Oneill at [facility].   This pain is described in detail below.    Physical Therapy:  Yes, for her left hip.     Non-pharmacologic Treatment:  Rest helps         TENS?  No    Pain Medications:         Currently taking:  gabapentin, Celebrex    Has tried in the past:  Opioids, muscle relaxers, Tylenol, NSAIDs, Amitriptyline, tizanidine, Celebrex, Robaxin    Has not tried:  SNRIs, topical creams    Blood thinners:  None    Interventional Therapies:   10/8/13- Left RFA C5, C6, C7  9/10/13- Left C5, C6, C7 MBB   8/13/13- C7-T1 IL EMMA  8/19/20 - right L3, L4, L5 radiofrequency ablation - 75% relief for 1 month  9/4/20 - left L3, L4, L5 radiofrequency ablation - 75% relief for 1 month  4/16/21 - bilateral SIJ injections - 75% relief  01/19/21 - bilateral SIJ injections - 100% relief    Relevant Surgeries:  None    Affecting sleep?  Yes    Affecting daily activities? yes    Depressive symptoms? yes          SI/HI? No    Work status: Employed    Pain Scores:    Best:       3/10  Worst:   9/10  Usually:   5/10  Today:   3/10    Review of Systems   Constitutional:  Negative for activity change, appetite change, chills, fatigue, fever and unexpected weight change.   HENT:  Negative for hearing loss.    Eyes:  Negative for visual disturbance.   Respiratory:  Negative for chest tightness and shortness of breath.    Cardiovascular:  Negative for chest pain.   Gastrointestinal:  Positive for abdominal pain and diarrhea. Negative for constipation, nausea and vomiting.   Genitourinary:  Negative for difficulty urinating.   Musculoskeletal:  Positive for neck pain and neck stiffness. Negative for arthralgias, back pain, gait problem and myalgias.   Skin:  Negative for rash.   Neurological:  Positive for headaches. Negative for dizziness, weakness, light-headedness and numbness.   Psychiatric/Behavioral:  Negative for hallucinations, sleep disturbance  and suicidal ideas. The patient is not nervous/anxious.      Past Medical History:   Diagnosis Date    Alcohol abuse     per chart, but patient denies today and cocaine per chart    Anxiety     Colon polyp     Depression     Hallucination     last couple of months started seeing someone standing in her room, saw boyfriend's reflection in glass    Headache     History of psychiatric hospitalization     age 13 [facility] for 2 months for acting out; 2018 for SI    Hx of psychiatric care     Hypertension     Psychiatric problem     Sleep difficulties     Suicide attempt     with knife by cutting stomach    Therapy        Past Surgical History:   Procedure Laterality Date    COLONOSCOPY N/A 5/7/2021    Procedure: COLONOSCOPY;  Surgeon: Prem Montana MD;  Location: [facility];  Service: Endoscopy;  Laterality: N/A;  COVID screening 5/4 LAP[facility]-instr portal -ml    COLONOSCOPY N/A 6/6/2022    Procedure: COLONOSCOPY;  Surgeon: Fab Shepherd MD;  [facility]  Service: Endoscopy;  Laterality: N/A;  C-Diff neg  Rapid COVID    EPIDURAL STEROID INJECTION Bilateral 8/5/2020    Procedure: Bilateral MBB @ L3, L4, L5;  Surgeon: Jeremy Powell Jr., MD;  Location: [facility];  Service: Pain Management;  Laterality: Bilateral;  Bilat L3-5 MBB  Arrive @ 1315; No ATC or DM; Needs MD signature    EPIDURAL STEROID INJECTION Right 8/19/2020    Procedure: Lumbar Radiofrequency Thermocoagulation of Medial Branches;  Surgeon: Jeremy Powell Jr., MD;  Location: [facility];  Service: Pain Management;  Laterality: Right;  Right L3-5 RFA  Arrive @ 1045; No ATC or DM; Needs MD Signature    EPIDURAL STEROID INJECTION Left 9/4/2020    Procedure: Lumbar Radiofrequency Thermocoagulation of Medial Branches;  Surgeon: Jeremy Powell Jr., MD;  Location: [facility];  Service: Pain Management;  Laterality: Left;  Left L3-5 RFA  Arrive @ 0900 (requested); No ATC or DM; Needs MD signature    EPIDURAL STEROID INJECTION Bilateral  2021    Procedure: Sacroiliac Joint Steroid Injections @ Bilateral;  Surgeon: Jeremy Powell Jr., MD;  Location: [facility];  Service: Pain Management;  Laterality: Bilateral;  Arrive @ 1300; No ATC or DM    EPIDURAL STEROID INJECTION Bilateral 2022    Procedure: Bilateral Sacroiliac Joint Injections;  Surgeon: Jeremy Powell Jr., MD;  Location: [facility] ENDO;  Service: Pain Management;  Laterality: Bilateral;  Arrive @ 1315; No ATC or DM; Needs Consent    ESOPHAGEAL MANOMETRY WITH MEASUREMENT OF IMPEDANCE N/A 10/20/2021    Procedure: MANOMETRY, ESOPHAGUS, WITH IMPEDANCE MEASUREMENT;  Surgeon: Anastacia Odonnell MD;  Location: [facility] (4TH FLR);  Service: Endoscopy;  Laterality: N/A;  Covid test 10/17 [place], instructions sent to [email or web address]    ESOPHAGOGASTRODUODENOSCOPY N/A 2021    Procedure: EGD (ESOPHAGOGASTRODUODENOSCOPY);  Surgeon: Prem Montana MD;  Location: [facility];  Service: Endoscopy;  Laterality: N/A;  added on per Dr. NOEL Shepherd    LAPAROSCOPIC TOUPET FUNDOPLICATION N/A 2022    Procedure: FUNDOPLICATION, LAPAROSCOPIC, TOUPET;  Surgeon: Christian Martinez MD;  [facility];  Service: General;  Laterality: N/A;       Social History     Socioeconomic History    Marital status:     Number of children: 0   Occupational History    Occupation: Overnight nuvia     Employer: [employer]   Tobacco Use    Smoking status: Former     Packs/day: 1.00     Types: Cigarettes     Quit date: 2006     Years since quittin.2    Smokeless tobacco: Never   Substance and Sexual Activity    Alcohol use: Yes     Comment: occasional    Drug use: Not Currently     Types: Marijuana     Comment: tried at age 14    Sexual activity: Yes     Partners: Male     Comment: going through menopause   Other Topics Concern    Patient feels they ought to cut down on drinking/drug use No    Patient annoyed by others criticizing their drinking/drug use No    Patient has felt bad or  guilty about drinking/drug use No    Patient has had a drink/used drugs as an eye opener in the AM No   Social History Narrative     x 2.    Lives with boyfriend.     Works at [employer] as an .    Presently working on Bachelor's degree.    Has associates degree in criminal justice.     Social Determinants of Health     Financial Resource Strain: Low Risk     Difficulty of Paying Living Expenses: Not very hard   Food Insecurity: Food Insecurity Present    Worried About Running Out of Food in the Last Year: Sometimes true    Ran Out of Food in the Last Year: Never true   Transportation Needs: No Transportation Needs    Lack of Transportation (Medical): No    Lack of Transportation (Non-Medical): No   Physical Activity: Unknown    Days of Exercise per Week: Patient refused    Minutes of Exercise per Session: 0 min   Stress: Stress Concern Present    Feeling of Stress : To some extent   Social Connections: Unknown    Frequency of Communication with Friends and Family: More than three times a week    Frequency of Social Gatherings with Friends and Family: Patient refused    Active Member of Clubs or Organizations: Patient refused    Attends Club or Organization Meetings: Patient refused    Marital Status: Patient refused   Housing Stability: High Risk    Unable to Pay for Housing in the Last Year: Yes    Number of Places Lived in the Last Year: 1    Unstable Housing in the Last Year: No       Review of patient's allergies indicates:   Allergen Reactions    Effexor [venlafaxine]      Elevated her blood pressure    Zoloft [sertraline]     Paroxetine hcl      Made her feel drunk       Current Outpatient Medications on File Prior to Visit   Medication Sig Dispense Refill    alosetron (LOTRONEX) 0.5 MG tablet Take 1 tablet by mouth twice daily 60 tablet 0    amLODIPine (NORVASC) 5 MG tablet Take 1 tablet (5 mg total) by mouth once daily. 90 tablet 1    celecoxib (CELEBREX) 100 MG capsule Take 1 capsule  (100 mg total) by mouth 2 (two) times daily. 60 capsule 6    dicyclomine (BENTYL) 20 mg tablet Take 1 tablet (20 mg total) by mouth every 6 (six) hours. 120 tablet 0    diphenoxylate-atropine 2.5-0.025 mg (LOMOTIL) 2.5-0.025 mg per tablet Take 1 tablet by mouth 4 (four) times daily as needed for Diarrhea. 60 tablet 1    FLUoxetine 40 MG capsule Take 2 capsules (80 mg total) by mouth once daily. 180 capsule 3    gabapentin (NEURONTIN) 600 MG tablet Take 1 tablet (600 mg total) by mouth 3 (three) times daily as needed (pain). 90 tablet 0    hydrOXYzine HCL (ATARAX) 10 MG Tab Take 1 tablet (10 mg total) by mouth 2 (two) times daily as needed (anxiety). 60 tablet 3    lisinopriL (PRINIVIL,ZESTRIL) 20 MG tablet Take 1 tablet (20 mg total) by mouth nightly. 90 tablet 3    loperamide (IMODIUM) 2 mg capsule Take 2 mg by mouth once as needed for Diarrhea.      multivitamin with minerals tablet Take 1 tablet by mouth once daily.      omeprazole (PRILOSEC) 40 MG capsule Take 1 capsule (40 mg total) by mouth every morning. 90 capsule 3    promethazine (PHENERGAN) 25 MG tablet Take 1 tablet (25 mg total) by mouth every 8 (eight) hours as needed for Nausea. 30 tablet 1    promethazine-dextromethorphan (PROMETHAZINE-DM) 6.25-15 mg/5 mL Syrp Take 5 mLs by mouth every 4 to 6 hours as needed. 240 mL 0    azelastine (ASTELIN) 137 mcg (0.1 %) nasal spray 1 spray (137 mcg total) by Nasal route 2 (two) times daily. 30 mL 1     No current facility-administered medications on file prior to visit.       Objective:      BP (!) 145/73 (BP Location: Left arm, Patient Position: Sitting, BP Method: Medium (Automatic))   Pulse 87   Resp 16   LMP 09/15/2013   SpO2 96%     Exam:  GEN:  Well developed, well nourished.  No acute distress.  Normal pain behavior.  HEENT:  No trauma.  Mucous membranes moist.  Nares patent bilaterally.  PSYCH: Normal affect. Thought content appropriate.  CHEST:  Breathing symmetric.  No audible wheezing.  ABD: Soft,  non-distended.  SKIN:  Warm, pink, dry.  No rash on exposed areas.    EXT:  No cyanosis, clubbing, or edema.  No color change or changes in nail or hair growth.  NEURO/MUSCULOSKELETAL:  Fully alert, oriented, and appropriate. Speech normal indio. No cranial nerve deficits.   Gait:  Normal.  No trendelenburg sign bilaterally.   Motor Strength:  5/5 motor strength throughout lower extremities.   Sensory:  No sensory deficit in the lower extremities.   Reflexes:  2+ and symmetric throughout.  Downgoing Babinski's bilaterally.  No clonus or spasticity.  L-Spine:  Slightly limited ROM with pain on extension.  Positive pain with axial/facet loading bilaterally.  Negative SLR bilaterally.    SI Joint/Hip:  Negative MOHIT bilaterally.  Negative FADIR bilaterally.  Negative Gaenslen bilaterally.  Positive TTP over bilateral lower lumbar paraspinals              Imaging:    Narrative & Impression    EXAMINATION:  MRI CERVICAL SPINE WITHOUT CONTRAST     CLINICAL HISTORY:  cervical DDD; Spondylosis without myelopathy or radiculopathy, cervical region     TECHNIQUE:  Multiplanar, multisequence MR images of the cervical spine were performed without the administration of contrast.     COMPARISON:  Radiograph 08/27/2020.     FINDINGS:  Alignment: Straightening of cervical spine with slight reversal at the level of C5 through C7.  Minimal degenerative anterolisthesis C4 on C5..     Vertebrae: Normal marrow signal. No fracture.     Discs: Disc space narrowing C5-C6-C7 level.     Cord: At C3 through C6 level, STIR images, the cervical cord appears slightly indistinct and of minimally increased signal intensity.  This may be artifactual in nature is a cannot be confirmed on the axial or sagittal T2 weighted sequences with certainty.  Postcontrast repeat examination may be indicated to exclude subtle intrinsic cord finding at this level..     Skull base and craniocervical junction: Normal.     Degenerative findings:     C2-C3: There  is no focal disc herniation.No significant central canal narrowing.  No significant neural foraminal narrowing.     C3-C4: There is no focal disc herniation.No significant central canal narrowing.  No significant neural foraminal narrowing.     C4-C5: There is no focal disc herniation.No significant central canal narrowing.  Moderate RIGHT neural foraminal narrowing.     C5-C6: There is no focal disc herniation.No significant central canal narrowing.  Moderate RIGHT and mild LEFT neural foraminal narrowing.     C6-C7: There is no focal disc herniation.No significant central canal narrowing.  Moderate LEFT neural foraminal narrowing.     C7-T1: There is no focal disc herniation.No significant central canal narrowing.  No significant neural foraminal narrowing.     T1-T2:     Paraspinal muscles & soft tissues: Unremarkable.     Impression:     Degenerative spondylosis as above predominantly C4-C5 through C6-C7.     Likely artifact identified level of the cord C3 through C6 STIR images only although at of abundance of caution, repeat examination with postcontrast MRI may be helpful to exclude underlying myelitis.     This report was flagged in Epic as abnormal.        Electronically signed by: Jerzy Rose MD  Date:                                            10/28/2021  Time:                                           06:44           Narrative & Impression    EXAMINATION:  XR CERVICAL SPINE 5 VIEW WITH FLEX AND EXT     CLINICAL HISTORY:  Headache     TECHNIQUE:  Five views of the cervical spine plus flexion and extension views were performed.     COMPARISON:  None 07/09/2019.     FINDINGS:  Mild DJD.  The disc spaces are narrowed between C5 and C7 vertebral segments.  Encroachment of the neural foramina identified at the C5-C6 level on the right and the C6/C7 level on the left.  There is a 2 mm C4/C5 anterolisthesis.  No fracture or dislocation.  No bone destruction identified.     Impression:     See above         Electronically signed by: Kaiser Ruiz MD  Date:                                            08/27/2020  Time:                                           14:12               Narrative & Impression     EXAMINATION:  MRI LUMBAR SPINE WITHOUT CONTRAST     CLINICAL HISTORY:  lumbar spinal stenosis; Other intervertebral disc degeneration, lumbar region     TECHNIQUE:  Multiplanar, multisequence MR images were acquired from the thoracolumbar junction to the sacrum without the administration of contrast.     COMPARISON:  Non plain film examination 01/13/2020 e.     FINDINGS:  Lumbar spine alignment is within normal limits. The vertebral body heights are well maintained, with no fracture.  No marrow signal abnormality suspicious for an infiltrative process.     The conus is normal in appearance, and terminates at the L1-L2 level.  The adjacent soft tissue structures show no significant abnormalities.     L1-L2: There is no focal disc herniation. No significant central canal or neural foraminal narrowing.     L2-L3: There is no focal disc herniation. No significant central canal or neural foraminal narrowing.     L3-L4: There is no focal disc herniation. No significant central canal or neural foraminal narrowing.     L4-L5: There is no focal disc herniation. No significant central canal or neural foraminal narrowing.     L5-S1: There is no focal disc herniation. No significant central canal or neural foraminal narrowing.     Impression:     No significant central canal stenosis, focal protrusion of disc material or neural foraminal encroachment.        Electronically signed by: Jerzy Rose MD  Date:                                            07/14/2020  Time:                                           14:15         Narrative & Impression    EXAMINATION:  XR LUMBAR SPINE AP AND LAT WITH FLEX/EXT     CLINICAL HISTORY:  Other intervertebral disc degeneration, lumbar region     TECHNIQUE:  AP and lateral views as well as  lateral flexion and extension images are performed through the lumbar spine.     COMPARISON:  None     FINDINGS:  Mild DJD and lumbar scoliosis.  No significant disc space narrowing identified.  No fracture, spondylolisthesis or bone destruction noted.  Spina bifida occulta involving the S1 vertebral segment noted.     Impression:     See above        Electronically signed by: Kaiser Ruiz MD  Date:                                            01/13/2020  Time:                                           09:22    Encounter    View Encounter                     Narrative     No significant alignment abnormality.  Vertebral body heights are normally maintained, without compression deformity at any level.  There is disc narrowing at C5-6 and C6-7, and each of these levels demonstrates posterior marginal vertebral endplate   spurring and accompanying disc bulging.  The spurring is more pronounced to the right of midline at C5-6 and to the left of midline at C6-7.  All other cervical and visualized upper thoracic levels have normal disc contour, and there is no evidence of a   significant focal disc herniation at any level.  AP diameter of the spinal canal is normally maintained at all levels with no canal stenosis/extrinsic cord compression observed.  The spinal cord is well delineated from the cervicomedullary junction to   the T3 level, and appears normal in size and configuration without focal enlargement or irregularity.  No Chiari malformation or cord cyst or syrinx.  No abnormal signal from the substance of the cord on any of the pulse sequences generated.  There is   some prominent neural foraminal stenosis at C5-6 on the right and C6-7 on the left.  No significant signal abnormality referable to the osseous structures.   Impression      Cervical spine MRI examination demonstrates degenerative changes as discussed above involving the C5-6 and C6-7 levels, with right-sided foraminal stenosis at C5-6 and left-sided  foraminal stenosis at C6-7.  No evidence of a significant focal soft is   herniation, canal stenosis/extrinsic cord compression, or intramedullary cord lesion seen at any level.      Electronically signed by: Kaiser Preston MD  Date: 08/01/13  Time: 06:40          Assessment:       Encounter Diagnoses   Name Primary?    Other osteoarthritis of spine, lumbosacral region Yes    Sacroiliac joint pain     Lumbar spondylosis     DDD (degenerative disc disease), lumbar            Plan:       Naomy was seen today for low-back pain.    Diagnoses and all orders for this visit:    Other osteoarthritis of spine, lumbosacral region    Sacroiliac joint pain    Lumbar spondylosis  -     Case Request Endoscopy: Bilateral L2, L3, L4 Medial Branch Blocks #1    DDD (degenerative disc disease), lumbar          Naomy Ana Armstrong is a 58 y.o. female with recurrent bilateral low back pain.  At this time, pain appears to be most likely related to lower lumbar facet joints.  Can not rule out sacroiliac joint dysfunction.  Patient has gotten good relief from bilateral sacroiliac joint steroid injections in the past, but current examination not consistent with this diagnosis.    Pertinent imaging studies reviewed by me. Imaging results were discussed with patient.  Schedule for bilateral L2, L3, L4 medial branch blocks x2.  If diagnostic can proceed with radiofrequency ablations.  Return to clinic after procedure to discuss results.

## 2023-07-18 ENCOUNTER — OFFICE VISIT (OUTPATIENT)
Dept: RHEUMATOLOGY | Facility: CLINIC | Age: 58
End: 2023-07-18
Payer: COMMERCIAL

## 2023-07-18 VITALS
BODY MASS INDEX: 33.29 KG/M2 | WEIGHT: 199.81 LBS | HEIGHT: 65 IN | HEART RATE: 96 BPM | SYSTOLIC BLOOD PRESSURE: 136 MMHG | OXYGEN SATURATION: 98 % | DIASTOLIC BLOOD PRESSURE: 91 MMHG

## 2023-07-18 DIAGNOSIS — M15.9 PRIMARY OSTEOARTHRITIS INVOLVING MULTIPLE JOINTS: Primary | ICD-10-CM

## 2023-07-18 DIAGNOSIS — Z12.31 OTHER SCREENING MAMMOGRAM: ICD-10-CM

## 2023-07-18 DIAGNOSIS — Z71.89 COUNSELING AND COORDINATION OF CARE: ICD-10-CM

## 2023-07-18 DIAGNOSIS — Z79.1 NSAID LONG-TERM USE: ICD-10-CM

## 2023-07-18 DIAGNOSIS — E66.9 CLASS 1 OBESITY WITH BODY MASS INDEX (BMI) OF 33.0 TO 33.9 IN ADULT, UNSPECIFIED OBESITY TYPE, UNSPECIFIED WHETHER SERIOUS COMORBIDITY PRESENT: ICD-10-CM

## 2023-07-18 PROCEDURE — 99215 PR OFFICE/OUTPT VISIT, EST, LEVL V, 40-54 MIN: ICD-10-PCS | Mod: 25,S$GLB,, | Performed by: INTERNAL MEDICINE

## 2023-07-18 PROCEDURE — 4010F PR ACE/ARB THEARPY RXD/TAKEN: ICD-10-PCS | Mod: CPTII,S$GLB,, | Performed by: INTERNAL MEDICINE

## 2023-07-18 PROCEDURE — 4010F ACE/ARB THERAPY RXD/TAKEN: CPT | Mod: CPTII,S$GLB,, | Performed by: INTERNAL MEDICINE

## 2023-07-18 PROCEDURE — 20610 DRAIN/INJ JOINT/BURSA W/O US: CPT | Mod: 50,S$GLB,, | Performed by: INTERNAL MEDICINE

## 2023-07-18 PROCEDURE — 20610 LARGE JOINT ASPIRATION/INJECTION: BILATERAL KNEE: ICD-10-PCS | Mod: 50,S$GLB,, | Performed by: INTERNAL MEDICINE

## 2023-07-18 PROCEDURE — 99215 OFFICE O/P EST HI 40 MIN: CPT | Mod: 25,S$GLB,, | Performed by: INTERNAL MEDICINE

## 2023-07-18 PROCEDURE — 3080F PR MOST RECENT DIASTOLIC BLOOD PRESSURE >= 90 MM HG: ICD-10-PCS | Mod: CPTII,S$GLB,, | Performed by: INTERNAL MEDICINE

## 2023-07-18 PROCEDURE — 3075F SYST BP GE 130 - 139MM HG: CPT | Mod: CPTII,S$GLB,, | Performed by: INTERNAL MEDICINE

## 2023-07-18 PROCEDURE — 3080F DIAST BP >= 90 MM HG: CPT | Mod: CPTII,S$GLB,, | Performed by: INTERNAL MEDICINE

## 2023-07-18 PROCEDURE — 1159F PR MEDICATION LIST DOCUMENTED IN MEDICAL RECORD: ICD-10-PCS | Mod: CPTII,S$GLB,, | Performed by: INTERNAL MEDICINE

## 2023-07-18 PROCEDURE — 99999 PR PBB SHADOW E&M-EST. PATIENT-LVL IV: ICD-10-PCS | Mod: PBBFAC,,, | Performed by: INTERNAL MEDICINE

## 2023-07-18 PROCEDURE — 3075F PR MOST RECENT SYSTOLIC BLOOD PRESS GE 130-139MM HG: ICD-10-PCS | Mod: CPTII,S$GLB,, | Performed by: INTERNAL MEDICINE

## 2023-07-18 PROCEDURE — 3008F BODY MASS INDEX DOCD: CPT | Mod: CPTII,S$GLB,, | Performed by: INTERNAL MEDICINE

## 2023-07-18 PROCEDURE — 3008F PR BODY MASS INDEX (BMI) DOCUMENTED: ICD-10-PCS | Mod: CPTII,S$GLB,, | Performed by: INTERNAL MEDICINE

## 2023-07-18 PROCEDURE — 99999 PR PBB SHADOW E&M-EST. PATIENT-LVL IV: CPT | Mod: PBBFAC,,, | Performed by: INTERNAL MEDICINE

## 2023-07-18 PROCEDURE — 1159F MED LIST DOCD IN RCRD: CPT | Mod: CPTII,S$GLB,, | Performed by: INTERNAL MEDICINE

## 2023-07-18 RX ORDER — LIDOCAINE HYDROCHLORIDE 10 MG/ML
2 INJECTION INFILTRATION; PERINEURAL
Status: DISCONTINUED | OUTPATIENT
Start: 2023-07-18 | End: 2023-07-18 | Stop reason: HOSPADM

## 2023-07-18 RX ORDER — TRIAMCINOLONE ACETONIDE 40 MG/ML
40 INJECTION, SUSPENSION INTRA-ARTICULAR; INTRAMUSCULAR
Status: DISCONTINUED | OUTPATIENT
Start: 2023-07-18 | End: 2023-07-18 | Stop reason: HOSPADM

## 2023-07-18 RX ADMIN — LIDOCAINE HYDROCHLORIDE 2 ML: 10 INJECTION INFILTRATION; PERINEURAL at 11:07

## 2023-07-18 RX ADMIN — TRIAMCINOLONE ACETONIDE 40 MG: 40 INJECTION, SUSPENSION INTRA-ARTICULAR; INTRAMUSCULAR at 11:07

## 2023-07-18 NOTE — PROGRESS NOTES
Answers submitted by the patient for this visit:  Rheumatology Questionnaire (Submitted on 7/18/2023)  fever: No  eye redness: No  mouth sores: No  headaches: Yes  shortness of breath: No  chest pain: No  trouble swallowing: No  diarrhea: No  constipation: No  unexpected weight change: No  genital sore: No  dysuria: No  During the last 3 days, have you had a skin rash?: No  Bruises or bleeds easily: No  cough: No         RHEUMATOLOGY OUTPATIENT CLINIC NOTE    7/18/2023    Attending Rheumatologist: Jung Hutchison  Primary Care Provider: Eric Hernandes Jr, MD   Physician Requesting Consultation: No referring provider defined for this encounter.  Chief Complaint/Reason For Consultation:  Osteoarthritis and Pain      Subjective:       HPI  Naomy Armstrong is a 58 y.o. White female with medical history noted below presents for evaluation of arthralgias.  Patient was sent from Orthopedics for possible COVID related arthralgias.  She notes she has been following with Orthopedics and Pain Management for osteoarthritis has had multiple injections in the past and pain has been under control.  Does she developed COVID the end of January in afterwards noted that her right knee flared up.  Saw Orthopedics and was given steroid shot February 24th, typically shots will result the pain, though this time she notes that the pain has persisted.  She notes in addition to pain swelling.  Has taken some Aleve with relief occasionally.  Also notes some right shoulder pain as well.  Denies rash, fatigue, weight loss or other systemic symptoms.    Today  Patient here for follow up.   Last visit NSAIDs continued for OA and given CSI. She notes she has been having knee pain again and would like CSI. Notes right shoulder pain with arm paraesthesias, similar in left leg. Has upcoming EMMA with Pain for L spine, afterwards will work on Cervical spine. She notes Celebrex causing GI discomfort.     Review of Systems   Constitutional:   Negative for appetite change, chills, fatigue, fever and unexpected weight change.   HENT:  Negative for nasal congestion, ear discharge, ear pain, hearing loss, mouth sores, nosebleeds, sneezing, sore throat, tinnitus and trouble swallowing.    Eyes:  Negative for photophobia, pain, discharge, redness, itching and visual disturbance.   Respiratory:  Negative for cough, chest tightness, shortness of breath and wheezing.    Cardiovascular:  Negative for chest pain, palpitations and leg swelling.   Gastrointestinal:  Negative for abdominal distention, abdominal pain, blood in stool, constipation, diarrhea, nausea and vomiting.   Endocrine: Negative for cold intolerance, heat intolerance, polydipsia, polyphagia and polyuria.   Genitourinary:  Negative for difficulty urinating, dyspareunia, dysuria, flank pain, frequency, genital sores, hematuria, menstrual problem, pelvic pain, urgency, vaginal bleeding, vaginal discharge, vaginal pain and vaginal dryness.   Musculoskeletal:  Positive for arthralgias. Negative for back pain, gait problem, joint swelling, leg pain, myalgias, neck pain, neck stiffness and joint deformity.   Integumentary:  Negative for pallor and rash.   Neurological:  Positive for numbness. Negative for dizziness, seizures, weakness, light-headedness and headaches.   Hematological:  Negative for adenopathy. Does not bruise/bleed easily.   Psychiatric/Behavioral:  Negative for confusion, decreased concentration and sleep disturbance. The patient is not nervous/anxious.    All other systems reviewed and are negative.     Chronic comorbid conditions affecting medical decision making today:  Past Medical History:   Diagnosis Date    Alcohol abuse     per chart, but patient denies today and cocaine per chart    Anxiety     Colon polyp     Depression     Hallucination     last couple of months started seeing someone standing in her room, saw boyfriend's reflection in glass    Headache     History of  psychiatric hospitalization     age 13 [facility] for 2 months for acting out; 2018 for SI    Hx of psychiatric care     Hypertension     Psychiatric problem     Sleep difficulties     Suicide attempt     with knife by cutting stomach    Therapy      Past Surgical History:   Procedure Laterality Date    COLONOSCOPY N/A 5/7/2021    Procedure: COLONOSCOPY;  Surgeon: Prem Montana MD;  Location: [facility];  Service: Endoscopy;  Laterality: N/A;  COVID screening 5/4 LAPC-instr portal -ml    COLONOSCOPY N/A 6/6/2022    Procedure: COLONOSCOPY;  Surgeon: Fab Shepherd MD;  Location: [facility] ([facility] FLR);  Service: Endoscopy;  Laterality: N/A;  C-Diff neg  Rapid COVID    EPIDURAL STEROID INJECTION Bilateral 8/5/2020    Procedure: Bilateral MBB @ L3, L4, L5;  Surgeon: Jeremy Powell Jr., MD;  Location: [facility];  Service: Pain Management;  Laterality: Bilateral;  Bilat L3-5 MBB  Arrive @ 1315; No ATC or DM; Needs MD signature    EPIDURAL STEROID INJECTION Right 8/19/2020    Procedure: Lumbar Radiofrequency Thermocoagulation of Medial Branches;  Surgeon: Jeremy Powell Jr., MD;  Location: [facility];  Service: Pain Management;  Laterality: Right;  Right L3-5 RFA  Arrive @ 1045; No ATC or DM; Needs MD Signature    EPIDURAL STEROID INJECTION Left 9/4/2020    Procedure: Lumbar Radiofrequency Thermocoagulation of Medial Branches;  Surgeon: Jeremy Powell Jr., MD;  Location: [facility];  Service: Pain Management;  Laterality: Left;  Left L3-5 RFA  Arrive @ 0900 (requested); No ATC or DM; Needs MD signature    EPIDURAL STEROID INJECTION Bilateral 4/16/2021    Procedure: Sacroiliac Joint Steroid Injections @ Bilateral;  Surgeon: Jeremy Powell Jr., MD;  Location: [facility];  Service: Pain Management;  Laterality: Bilateral;  Arrive @ 1300; No ATC or DM    EPIDURAL STEROID INJECTION Bilateral 1/19/2022    Procedure: Bilateral Sacroiliac Joint Injections;  Surgeon: Jeremy Powell Jr., MD;  Location: [facility]  ENDO;  Service: Pain Management;  Laterality: Bilateral;  Arrive @ 1315; No ATC or DM; Needs Consent    ESOPHAGEAL MANOMETRY WITH MEASUREMENT OF IMPEDANCE N/A 10/20/2021    Procedure: MANOMETRY, ESOPHAGUS, WITH IMPEDANCE MEASUREMENT;  Surgeon: Anastacia Odonnell MD;  Location: [facility] (4TH FLR);  Service: Endoscopy;  Laterality: N/A;  Covid test 10/17 [place], instructions sent to [id number]    ESOPHAGOGASTRODUODENOSCOPY N/A 2021    Procedure: EGD (ESOPHAGOGASTRODUODENOSCOPY);  Surgeon: Prem Montana MD;  Location: [facility];  Service: Endoscopy;  Laterality: N/A;  added on per Dr. NOEL Shepherd    LAPAROSCOPIC TOUPET FUNDOPLICATION N/A 2022    Procedure: FUNDOPLICATION, LAPAROSCOPIC, TOUPET;  Surgeon: Christian Martinez MD;  Location: [facility] OR 2ND FLR;  Service: General;  Laterality: N/A;     Family History   Problem Relation Age of Onset    Anxiety disorder Sister     Depression Sister     Alcohol abuse Maternal Uncle     Alcohol abuse Maternal Grandfather      Social History     Substance and Sexual Activity   Alcohol Use Yes    Comment: occasional     Social History     Tobacco Use   Smoking Status Former    Packs/day: 1.00    Types: Cigarettes    Quit date: 2006    Years since quittin.2   Smokeless Tobacco Never     Social History     Substance and Sexual Activity   Drug Use Not Currently    Types: Marijuana    Comment: tried at age 14       Current Outpatient Medications:     amLODIPine (NORVASC) 5 MG tablet, Take 1 tablet (5 mg total) by mouth once daily., Disp: 90 tablet, Rfl: 1    celecoxib (CELEBREX) 100 MG capsule, Take 1 capsule (100 mg total) by mouth 2 (two) times daily., Disp: 60 capsule, Rfl: 6    dicyclomine (BENTYL) 20 mg tablet, Take 1 tablet (20 mg total) by mouth every 6 (six) hours., Disp: 120 tablet, Rfl: 0    diphenoxylate-atropine 2.5-0.025 mg (LOMOTIL) 2.5-0.025 mg per tablet, Take 1 tablet by mouth 4 (four) times daily as needed for Diarrhea., Disp: 60 tablet, Rfl:  1    FLUoxetine 40 MG capsule, Take 2 capsules (80 mg total) by mouth once daily., Disp: 180 capsule, Rfl: 3    gabapentin (NEURONTIN) 600 MG tablet, Take 1 tablet (600 mg total) by mouth 3 (three) times daily as needed (pain)., Disp: 90 tablet, Rfl: 0    hydrOXYzine HCL (ATARAX) 10 MG Tab, Take 1 tablet (10 mg total) by mouth 2 (two) times daily as needed (anxiety)., Disp: 60 tablet, Rfl: 3    lisinopriL (PRINIVIL,ZESTRIL) 20 MG tablet, Take 1 tablet (20 mg total) by mouth nightly., Disp: 90 tablet, Rfl: 3    loperamide (IMODIUM) 2 mg capsule, Take 2 mg by mouth once as needed for Diarrhea., Disp: , Rfl:     multivitamin with minerals tablet, Take 1 tablet by mouth once daily., Disp: , Rfl:     omeprazole (PRILOSEC) 40 MG capsule, Take 1 capsule (40 mg total) by mouth every morning., Disp: 90 capsule, Rfl: 3    promethazine (PHENERGAN) 25 MG tablet, Take 1 tablet (25 mg total) by mouth every 8 (eight) hours as needed for Nausea., Disp: 30 tablet, Rfl: 1    alosetron (LOTRONEX) 0.5 MG tablet, Take 1 tablet by mouth twice daily (Patient not taking: Reported on 7/18/2023), Disp: 60 tablet, Rfl: 0    azelastine (ASTELIN) 137 mcg (0.1 %) nasal spray, 1 spray (137 mcg total) by Nasal route 2 (two) times daily. (Patient not taking: Reported on 7/18/2023), Disp: 30 mL, Rfl: 1    promethazine-dextromethorphan (PROMETHAZINE-DM) 6.25-15 mg/5 mL Syrp, Take 5 mLs by mouth every 4 to 6 hours as needed. (Patient not taking: Reported on 7/18/2023), Disp: 240 mL, Rfl: 0     Objective:         Vitals:    07/18/23 1049   BP: (!) 136/91   Pulse: 96     Physical Exam   Constitutional: She is oriented to person, place, and time.   HENT:   Head: Normocephalic and atraumatic.   Right Ear: External ear normal.   Left Ear: External ear normal.   Nose: Nose normal.   Mouth/Throat: Oropharynx is clear and moist.   Eyes: Pupils are equal, round, and reactive to light. Conjunctivae are normal.   Cardiovascular: Normal rate and regular rhythm.    Pulmonary/Chest: Effort normal and breath sounds normal.   Abdominal: Soft. Bowel sounds are normal.   Musculoskeletal:      Right shoulder: Normal.      Left shoulder: Normal.      Right elbow: Normal.      Left elbow: Normal.      Right wrist: Normal.      Left wrist: Normal.      Cervical back: Normal range of motion and neck supple.      Right knee: Normal.      Left knee: Normal.   Neurological: She is alert and oriented to person, place, and time.   Skin: No rash noted. No erythema.   Psychiatric: Mood and affect normal.       Right Side Rheumatological Exam     Examination finds the shoulder, elbow, wrist, knee, 1st PIP, 1st MCP, 2nd PIP, 2nd MCP, 3rd PIP, 3rd MCP, 4th PIP, 4th MCP, 5th PIP and 5th MCP normal.    Knee Exam   Patellofemoral Crepitus: positive    Left Side Rheumatological Exam     Examination finds the shoulder, elbow, wrist, knee, 1st PIP, 1st MCP, 2nd PIP, 2nd MCP, 3rd PIP, 3rd MCP, 4th PIP, 4th MCP, 5th PIP and 5th MCP normal.    Knee Exam     Patellofemoral Crepitus: positive        Reviewed old and all outside pertinent medical records available.    All lab results personally reviewed and interpreted by me.  Lab Results   Component Value Date    WBC 5.94 05/18/2022    HGB 11.5 (L) 05/18/2022    HCT 38.6 05/18/2022    MCV 87 05/18/2022    MCH 25.8 (L) 05/18/2022    MCHC 29.8 (L) 05/18/2022    RDW 14.6 (H) 05/18/2022     05/18/2022    MPV 10.0 05/18/2022       Lab Results   Component Value Date     05/18/2022    K 4.0 05/18/2022     05/18/2022    CO2 25 05/18/2022    GLU 66 (L) 05/18/2022    BUN 21 (H) 05/18/2022    CALCIUM 9.3 05/18/2022    PROT 6.4 05/18/2022    ALBUMIN 3.4 (L) 05/18/2022    BILITOT 0.2 05/18/2022    AST 13 05/18/2022    ALKPHOS 124 05/18/2022    ALT 22 05/18/2022       Lab Results   Component Value Date    COLORU Colorless (A) 12/20/2019    APPEARANCEUA Clear 12/20/2019    SPECGRAV 1.005 12/20/2019    PHUR 6.0 12/20/2019    PROTEINUA Negative  12/20/2019    KETONESU Negative 12/20/2019    LEUKOCYTESUR Negative 12/20/2019    NITRITE Negative 12/20/2019    UROBILINOGEN Negative 12/20/2019       Lab Results   Component Value Date    CRP 6.3 03/16/2021       Lab Results   Component Value Date    SEDRATE 12 03/16/2021       Lab Results   Component Value Date    SEDRATE 12 03/16/2021       No components found for: 25OHVITDTOT, 10TOPRRF5, 00NKYJMO0, METHODNOTE    No results found for: URICACID    No components found for: TSPOTTB        Imaging:  All imaging reviewed and independently interpreted by me.         ASSESSMENT / PLAN:     Naomy Armstrong is a 58 y.o. White female with:      1. Primary osteoarthritis of both knees  - patient with know diagnosis of OA  - chronic  - CSI today, see procedure note, post procedure precautions discussed, ice area  - I have advised her to try Advil Liqui Gel tablets and see if thesse help with GI isuees, I did advised her to hold meds one week prior to EMMA  - follow up with Pain  - wt loss  - reassurance and exercise    2.  Chronic NSAID use  - no history of GI bleed or coronary artery disease.  - previous labs without features of CKD.  - clinical significance side effect of long-term NSAID use discussed in detail.  - recommended for alternative therapies for pain management.    3. Other specified counseling  - over 10 minutes spent regarding below topics:  - Immunization counseling done.  - Weight loss counseling done.  - Nutrition and exercise counseling.  - Limitation of alcohol consumption.  - Regular exercise:  Aerobic and resistance.  - Medication counseling provided.    4. Obesity  - would benefit from decreasing at least 10% of body weight.  - recommended goal of losing 1 lb per week.  - consider nutritionist evaluation.      Follow up in about 4 months (around 11/18/2023).    Method of contact with patient concerns: Mirella attn Rheumatology    Disclaimer:  This note is prepared using voice recognition  software and as such is likely to have errors and has not been proof read. Please contact me for questions.     Time spent: 40 minutes in face to face discussion concerning diagnosis, prognosis, review of lab and test results, benefits of treatment as well as management of disease, counseling of patient and coordination of care between various health care providers.  Greater than half the time spent was used for coordination of care and counseling of patient.    Jung Hutchison M.D.  Rheumatology Department   [facility]

## 2023-07-18 NOTE — PROCEDURES
Large Joint Aspiration/Injection: bilateral knee    Date/Time: 7/18/2023 11:00 AM  Performed by: Jung Hutchison MD  Authorized by: Jung Hutchison MD     Consent Done?:  Yes (Verbal)  Indications:  Arthritis and pain  Site marked: the procedure site was marked    Timeout: prior to procedure the correct patient, procedure, and site was verified    Prep: patient was prepped and draped in usual sterile fashion    Local anesthesia used?: No      Details:  Needle Size:  25 G  Ultrasonic Guidance for needle placement?: No    Approach:  Anterolateral  Location:  Knee  Laterality:  Bilateral  Site:  Bilateral knee  Medications (Right):  2 mL LIDOcaine HCL 10 mg/ml (1%) 10 mg/mL (1 %); 40 mg triamcinolone acetonide 40 mg/mL  Medications (Left):  2 mL LIDOcaine HCL 10 mg/ml (1%) 10 mg/mL (1 %); 40 mg triamcinolone acetonide 40 mg/mL  Patient tolerance:  Patient tolerated the procedure well with no immediate complications  
Patient tolerated procedure well.

## 2023-07-19 ENCOUNTER — OFFICE VISIT (OUTPATIENT)
Dept: PSYCHIATRY | Facility: CLINIC | Age: 58
End: 2023-07-19
Payer: COMMERCIAL

## 2023-07-19 VITALS
HEART RATE: 87 BPM | BODY MASS INDEX: 32.91 KG/M2 | DIASTOLIC BLOOD PRESSURE: 80 MMHG | SYSTOLIC BLOOD PRESSURE: 139 MMHG | WEIGHT: 197.75 LBS

## 2023-07-19 DIAGNOSIS — F41.1 GAD (GENERALIZED ANXIETY DISORDER): Primary | ICD-10-CM

## 2023-07-19 DIAGNOSIS — F33.1 MODERATE EPISODE OF RECURRENT MAJOR DEPRESSIVE DISORDER: ICD-10-CM

## 2023-07-19 PROCEDURE — 90792 PR PSYCHIATRIC DIAGNOSTIC EVALUATION W/MEDICAL SERVICES: ICD-10-PCS | Mod: S$GLB,,, | Performed by: STUDENT IN AN ORGANIZED HEALTH CARE EDUCATION/TRAINING PROGRAM

## 2023-07-19 PROCEDURE — 3075F SYST BP GE 130 - 139MM HG: CPT | Mod: CPTII,S$GLB,, | Performed by: STUDENT IN AN ORGANIZED HEALTH CARE EDUCATION/TRAINING PROGRAM

## 2023-07-19 PROCEDURE — 4010F PR ACE/ARB THEARPY RXD/TAKEN: ICD-10-PCS | Mod: CPTII,S$GLB,, | Performed by: STUDENT IN AN ORGANIZED HEALTH CARE EDUCATION/TRAINING PROGRAM

## 2023-07-19 PROCEDURE — 3079F PR MOST RECENT DIASTOLIC BLOOD PRESSURE 80-89 MM HG: ICD-10-PCS | Mod: CPTII,S$GLB,, | Performed by: STUDENT IN AN ORGANIZED HEALTH CARE EDUCATION/TRAINING PROGRAM

## 2023-07-19 PROCEDURE — 90792 PSYCH DIAG EVAL W/MED SRVCS: CPT | Mod: S$GLB,,, | Performed by: STUDENT IN AN ORGANIZED HEALTH CARE EDUCATION/TRAINING PROGRAM

## 2023-07-19 PROCEDURE — 99999 PR PBB SHADOW E&M-EST. PATIENT-LVL I: CPT | Mod: PBBFAC,,, | Performed by: STUDENT IN AN ORGANIZED HEALTH CARE EDUCATION/TRAINING PROGRAM

## 2023-07-19 PROCEDURE — 3079F DIAST BP 80-89 MM HG: CPT | Mod: CPTII,S$GLB,, | Performed by: STUDENT IN AN ORGANIZED HEALTH CARE EDUCATION/TRAINING PROGRAM

## 2023-07-19 PROCEDURE — 4010F ACE/ARB THERAPY RXD/TAKEN: CPT | Mod: CPTII,S$GLB,, | Performed by: STUDENT IN AN ORGANIZED HEALTH CARE EDUCATION/TRAINING PROGRAM

## 2023-07-19 PROCEDURE — 3075F PR MOST RECENT SYSTOLIC BLOOD PRESS GE 130-139MM HG: ICD-10-PCS | Mod: CPTII,S$GLB,, | Performed by: STUDENT IN AN ORGANIZED HEALTH CARE EDUCATION/TRAINING PROGRAM

## 2023-07-19 PROCEDURE — 3008F BODY MASS INDEX DOCD: CPT | Mod: CPTII,S$GLB,, | Performed by: STUDENT IN AN ORGANIZED HEALTH CARE EDUCATION/TRAINING PROGRAM

## 2023-07-19 PROCEDURE — 99999 PR PBB SHADOW E&M-EST. PATIENT-LVL I: ICD-10-PCS | Mod: PBBFAC,,, | Performed by: STUDENT IN AN ORGANIZED HEALTH CARE EDUCATION/TRAINING PROGRAM

## 2023-07-19 PROCEDURE — 3008F PR BODY MASS INDEX (BMI) DOCUMENTED: ICD-10-PCS | Mod: CPTII,S$GLB,, | Performed by: STUDENT IN AN ORGANIZED HEALTH CARE EDUCATION/TRAINING PROGRAM

## 2023-07-19 RX ORDER — BUPROPION HYDROCHLORIDE 150 MG/1
150 TABLET ORAL DAILY
Qty: 30 TABLET | Refills: 11 | Status: SHIPPED | OUTPATIENT
Start: 2023-07-19 | End: 2024-01-04

## 2023-07-19 RX ORDER — FLUOXETINE HYDROCHLORIDE 40 MG/1
80 CAPSULE ORAL DAILY
Qty: 180 CAPSULE | Refills: 3 | Status: SHIPPED | OUTPATIENT
Start: 2023-07-19 | End: 2023-10-30 | Stop reason: SDUPTHER

## 2023-07-19 NOTE — PROGRESS NOTES
"Outpatient Psychiatry Follow-Up Visit (MD/NP)    7/19/2023    Clinical Status of Patient:  Outpatient (Ambulatory)    Chief Complaint:  Naomy Armstrong is a 58 y.o. female who presents today for follow-up of depression and anxiety.  Met with patient.      Interval History and Content of Current Session:  Interim Events/Subjective Report/Content of Current Session:     Patient Naomy Armstrong presents to clinic for follow up. Last seen 8/2022 by Dr. Franco; chart reviewed and care transferred to me. At last visit prozac was increased to 80 mg daily. Pt presents on time to appointment. States things have not been going well. She reports feeling more and more depressed. She feels like her thoughts are all over the place. She is now disorganized, and this is really out of character. She states she has tried a lot of antidepressants, and prozac is one of the only ones who helps her. She reports feelings of irritability and grumpiness. She has had some hair loss and weight gain, she also thinks she maybe has IBS. She has been told that she snores- may need a test for sleep apnea. States she has "constant headaches" and wakes up with headaches. Pt denies interest in sleep referral, when I describe the risks she states "that's a risk I'll have to take."   Work- loves her job, but she has been stressed due to her feelings of amotivation and disorganization.   She is having problems with her memory, she believes.   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   She states she has taken wellbutrin to help her stop smoking. She has never had a seizure before.   She is trying to cut out sugar.   Her energy levels are really low.   She lives with her boyfriend and her pets. They have a good relationship.   Infrequent intrusive thoughts we discussed.     No drugs or alcohol- it sounds like she was a problem drinking. No nicotine. No side effects noted or endorsed.  No manic behaviors.    No active SI, " no recent passive SI. Has attempted suicide in the past- about 5-6 years ago, and before that about 10 years ago in the context of alcohol use, and she is now sober.     Review of Systems   PSYCHIATRIC: Pertinant items are noted in the narrative.  CONSTITUTIONAL: No weight gain or loss.   MUSCULOSKELETAL: No pain or stiffness of the joints.  NEUROLOGIC: No weakness, sensory changes, seizures, confusion, memory loss, tremor or other abnormal movements.  RESPIRATORY: No shortness of breath.  CARDIOVASCULAR: No tachycardia or chest pain.  GASTROINTESTINAL: No nausea, vomiting, pain, constipation or diarrhea.    Past Medical, Family and Social History: The patient's past medical, family and social history have been reviewed and updated as appropriate within the electronic medical record - see encounter notes.    Compliance: yes    Side effects: None    Risk Parameters:  Patient reports no suicidal ideation  Patient reports no homicidal ideation  Patient reports no self-injurious behavior  Patient reports no violent behavior    Exam (detailed: at least 9 elements; comprehensive: all 15 elements)   Constitutional  Vitals:  Most recent vital signs, dated less than 90 days prior to this appointment, were reviewed.   Vitals:    07/19/23 0751   BP: 139/80   Pulse: 87   Weight: 89.7 kg (197 lb 12 oz)        General:  unremarkable, age appropriate     Musculoskeletal  Muscle Strength/Tone:  no tremor, no tic   Gait & Station:  non-ataxic     Psychiatric  Speech:  no latency; no press   Mood & Affect:  dysthymic  congruent and appropriate   Thought Process:  normal and logical   Associations:  intact   Thought Content:  normal, no suicidality, no homicidality, delusions, or paranoia   Insight:  limited awareness of illness   Judgement: good   Orientation:  person, place, situation, time/date   Memory: able to remember recent events- yes, able to remember remote events- yes   Language: able to name, able to repeat   Attention  Span & Concentration:  able to focus   Fund of Knowledge:  intact and appropriate to age and level of education     Assessment and Diagnosis   Status/Progress: Based on the examination today, the patient's problem(s) is/are adequately but not ideally controlled.  New problems have been presented today.   Co-morbidities are complicating management of the primary condition.  There are no active rule-out diagnoses for this patient at this time.     General Impression: We will continue pharmacological intervention and adjunctive therapy.       ICD-10-CM ICD-9-CM   1. LOR (generalized anxiety disorder)  F41.1 300.02   2. Moderate episode of recurrent major depressive disorder  F33.1 296.32     Intervention/Counseling/Treatment Plan   Medication Management: Continue current medications. The risks and benefits of medication were discussed with the patient.  Counseling provided with patient as follows: importance of compliance with chosen treatment options was emphasized, risks and benefits of treatment options, including medications, were discussed with the patient, risk factor reduction, prognosis, patient education, instructions for  management, treatment and follow-up were reviewed  1.  Continue Prozac to 80 mg (2x40 mg) daily targeting depression and anxiety.  Warned of risk of alba, suicidality, serotonin syndrome.  2. Initiate wellbutrin 150 mg XL for depression augmentation- targeting sx of low energy and problems with motivation/concentration, which are new for the patient. Discussed risks, benefits, SE including but not limited to risk of seizure.   3.  Continue rare use of hydroxyzine 10 mg p.o. b.i.d. p.r.n. anxiety or insomnia.  Warned of risk of over-sedation.  4.  May have to consider monitoring for memory changes, given significant genetic history.   5. Reviewed ED precautions. No SI, HI, AVH.       Return to Clinic: 8 weeks

## 2023-07-20 ENCOUNTER — TELEPHONE (OUTPATIENT)
Dept: PAIN MEDICINE | Facility: CLINIC | Age: 58
End: 2023-07-20
Payer: COMMERCIAL

## 2023-07-20 NOTE — TELEPHONE ENCOUNTER
Message left with review of pre-procedure instructions, as well as provided arrival time of 12:00pm.  Information was also sent via [employer].  Asked that patient call clinic to confirm- phone number included.

## 2023-07-21 ENCOUNTER — TELEPHONE (OUTPATIENT)
Dept: PAIN MEDICINE | Facility: CLINIC | Age: 58
End: 2023-07-21
Payer: COMMERCIAL

## 2023-07-21 NOTE — TELEPHONE ENCOUNTER
Message left with review of pre-procedure instructions, as well as provided arrival time of 12:00p.  Information was also sent via [employer] yesterday.  Asked that patient call clinic to confirm- phone number included.

## 2023-07-25 ENCOUNTER — PATIENT MESSAGE (OUTPATIENT)
Dept: PAIN MEDICINE | Facility: CLINIC | Age: 58
End: 2023-07-25
Payer: COMMERCIAL

## 2023-07-25 NOTE — DISCHARGE INSTRUCTIONS

## 2023-07-28 ENCOUNTER — HOSPITAL ENCOUNTER (OUTPATIENT)
Facility: HOSPITAL | Age: 58
Discharge: HOME | End: 2023-07-28
Attending: PAIN MEDICINE | Admitting: PAIN MEDICINE
Payer: COMMERCIAL

## 2023-07-28 VITALS
OXYGEN SATURATION: 97 % | HEART RATE: 74 BPM | RESPIRATION RATE: 17 BRPM | SYSTOLIC BLOOD PRESSURE: 126 MMHG | DIASTOLIC BLOOD PRESSURE: 74 MMHG

## 2023-07-28 DIAGNOSIS — M47.816 LUMBAR SPONDYLOSIS: Primary | ICD-10-CM

## 2023-07-28 PROCEDURE — 64494 INJ PARAVERT F JNT L/S 2 LEV: CPT | Mod: 50,,, | Performed by: PAIN MEDICINE

## 2023-07-28 PROCEDURE — 64493 PR INJ DX/THER AGNT PARAVERT FACET JOINT,IMG GUIDE,LUMBAR/SAC,1ST LVL: ICD-10-PCS | Mod: 50,,, | Performed by: PAIN MEDICINE

## 2023-07-28 PROCEDURE — 64494 INJ PARAVERT F JNT L/S 2 LEV: CPT | Mod: 50 | Performed by: PAIN MEDICINE

## 2023-07-28 PROCEDURE — 63600175 PHARM REV CODE 636 W HCPCS: Performed by: PAIN MEDICINE

## 2023-07-28 PROCEDURE — 64493 INJ PARAVERT F JNT L/S 1 LEV: CPT | Mod: 50 | Performed by: PAIN MEDICINE

## 2023-07-28 PROCEDURE — 25000003 PHARM REV CODE 250: Performed by: PAIN MEDICINE

## 2023-07-28 PROCEDURE — 64493 INJ PARAVERT F JNT L/S 1 LEV: CPT | Mod: 50,,, | Performed by: PAIN MEDICINE

## 2023-07-28 PROCEDURE — 64494 PR INJ DX/THER AGNT PARAVERT FACET JOINT,IMG GUIDE,LUMBAR/SAC, 2ND LEVEL: ICD-10-PCS | Mod: 50,,, | Performed by: PAIN MEDICINE

## 2023-07-28 RX ORDER — BUPIVACAINE HYDROCHLORIDE 2.5 MG/ML
10 INJECTION, SOLUTION EPIDURAL; INFILTRATION; INTRACAUDAL ONCE
Status: COMPLETED | OUTPATIENT
Start: 2023-07-28 | End: 2023-07-28

## 2023-07-28 RX ORDER — BUPIVACAINE HYDROCHLORIDE 2.5 MG/ML
INJECTION, SOLUTION EPIDURAL; INFILTRATION; INTRACAUDAL
Status: DISCONTINUED
Start: 2023-07-28 | End: 2023-07-28 | Stop reason: HOSPADM

## 2023-07-28 RX ORDER — LIDOCAINE HYDROCHLORIDE 20 MG/ML
10 INJECTION, SOLUTION INFILTRATION; PERINEURAL ONCE
Status: DISCONTINUED | OUTPATIENT
Start: 2023-07-28 | End: 2023-07-28 | Stop reason: ALTCHOICE

## 2023-07-28 RX ORDER — LIDOCAINE HYDROCHLORIDE 10 MG/ML
INJECTION INFILTRATION; PERINEURAL
Status: DISCONTINUED
Start: 2023-07-28 | End: 2023-07-28 | Stop reason: HOSPADM

## 2023-07-28 RX ORDER — LIDOCAINE HYDROCHLORIDE 10 MG/ML
10 INJECTION INFILTRATION; PERINEURAL ONCE
Status: COMPLETED | OUTPATIENT
Start: 2023-07-28 | End: 2023-07-28

## 2023-07-28 NOTE — DISCHARGE SUMMARY
[facility] - Endoscopy  Discharge Note  Short Stay    Procedure(s) (LRB):  Bilateral L2, L3, L4 Medial Branch Blocks #1 (Bilateral)      OUTCOME: Patient tolerated treatment/procedure well without complication and is now ready for discharge.    DISPOSITION: Home or Self Care    FINAL DIAGNOSIS:  <principal problem not specified>    FOLLOWUP: In clinic    DISCHARGE INSTRUCTIONS:  No discharge procedures on file.       Discharge Diagnosis:Lumbar spondylosis [M47.816]  Condition on Discharge: Stable.  Diet on Discharge: Same as before.  Activity: as per instruction sheet.  Discharge to: Home with a responsible adult.  Follow up: as per Discharge instructions

## 2023-07-28 NOTE — OP NOTE
LUMBAR Medial Branch Block Under Fluoroscopy  Time-out taken to identify patient and procedure side prior to starting the procedure.   I attest that I have reviewed the patient's home medications prior to the procedure and no contraindication have been identified. I  re-evaluated the patient after the patient was positioned for the procedure in the procedure room immediately before the procedural time-out. The vital signs are current and represent the current state of the patient which has not significantly changed since the preprocedure assessment.            Date of Service: 07/28/2023    PCP: Eric Hernandes Jr, MD    Referring Physician:                                                           PROCEDURE:  Bilateral  L2, L3 and L4 medial branch block    REASON FOR PROCEDURE: Lumbar spondylosis [M47.816]    PHYSICIAN: Jeremy Powell MD    ASSISTANTS: None    MEDICATIONS INJECTED: Bupivicaine 0.25% 1.5ml at each level    LOCAL ANESTHETIC USED: Xylocaine 1% 3ml each site.    SEDATION MEDICATIONS: None    ESTIMATED BLOOD LOSS:  None.    COMPLICATIONS:  None.    TECHNIQUE: Laying in a prone position, the patient was prepped and draped in the usual sterile fashion using ChloraPrep and fenestrated drape.  The level was determined under fluoroscopic guidance.  Local anesthetic was given by going down to the hub of the 27-gauge 1.25in needle and raising a wheal.  A 25-gauge 4.75 inch needle was introduced to the anatomic site of the medial branch at the lateral mass utilizing live fluoroscopy. Medication was then injected slowly at each level as stated above. The patient tolerated the procedure well.       The patient was monitored after the procedure.  Patient was given post procedure and discharge instructions to follow at home.  We will see the patient back in two weeks or the patient may call to inform of status. The patient was discharged in a stable condition    
Bed in low position, brakes on/Side rails x 2 or 4 up, assess large gaps, such that a patient could get extremity or other body part entrapped, use additional safety procedures/Environment clear of unused equipment, furniture's in place, clear of hazards/Assess for adequate lighting, leave nightlight on/Check patient minimum every 1 hour/Evaluate medication administration times

## 2023-07-31 ENCOUNTER — TELEPHONE (OUTPATIENT)
Dept: PAIN MEDICINE | Facility: CLINIC | Age: 58
End: 2023-07-31
Payer: COMMERCIAL

## 2023-07-31 DIAGNOSIS — M47.816 LUMBAR SPONDYLOSIS: Primary | ICD-10-CM

## 2023-07-31 NOTE — TELEPHONE ENCOUNTER
Mrs. Moralez reports 80-90% reduction in pain and improvement in ADLs following the bilat L2-4 MBB #1 performed Friday.  Her pain has since returned to baseline.  She would like to proceed with the 2nd set of blocks on 8/11/23- provider updated.

## 2023-08-01 ENCOUNTER — PATIENT MESSAGE (OUTPATIENT)
Dept: GASTROENTEROLOGY | Facility: CLINIC | Age: 58
End: 2023-08-01
Payer: COMMERCIAL

## 2023-08-04 ENCOUNTER — OFFICE VISIT (OUTPATIENT)
Dept: FAMILY MEDICINE | Facility: CLINIC | Age: 58
End: 2023-08-04
Payer: COMMERCIAL

## 2023-08-04 DIAGNOSIS — R20.2 PARESTHESIA AND PAIN OF RIGHT EXTREMITY: Primary | ICD-10-CM

## 2023-08-04 DIAGNOSIS — M79.609 PARESTHESIA AND PAIN OF RIGHT EXTREMITY: Primary | ICD-10-CM

## 2023-08-04 PROCEDURE — 1159F MED LIST DOCD IN RCRD: CPT | Mod: CPTII,95,, | Performed by: NURSE PRACTITIONER

## 2023-08-04 PROCEDURE — 4010F ACE/ARB THERAPY RXD/TAKEN: CPT | Mod: CPTII,95,, | Performed by: NURSE PRACTITIONER

## 2023-08-04 PROCEDURE — 1160F PR REVIEW ALL MEDS BY PRESCRIBER/CLIN PHARMACIST DOCUMENTED: ICD-10-PCS | Mod: CPTII,95,, | Performed by: NURSE PRACTITIONER

## 2023-08-04 PROCEDURE — 99213 PR OFFICE/OUTPT VISIT, EST, LEVL III, 20-29 MIN: ICD-10-PCS | Mod: 95,,, | Performed by: NURSE PRACTITIONER

## 2023-08-04 PROCEDURE — 99213 OFFICE O/P EST LOW 20 MIN: CPT | Mod: 95,,, | Performed by: NURSE PRACTITIONER

## 2023-08-04 PROCEDURE — 1159F PR MEDICATION LIST DOCUMENTED IN MEDICAL RECORD: ICD-10-PCS | Mod: CPTII,95,, | Performed by: NURSE PRACTITIONER

## 2023-08-04 PROCEDURE — 1160F RVW MEDS BY RX/DR IN RCRD: CPT | Mod: CPTII,95,, | Performed by: NURSE PRACTITIONER

## 2023-08-04 PROCEDURE — 4010F PR ACE/ARB THEARPY RXD/TAKEN: ICD-10-PCS | Mod: CPTII,95,, | Performed by: NURSE PRACTITIONER

## 2023-08-04 RX ORDER — TIZANIDINE 4 MG/1
4 TABLET ORAL EVERY 8 HOURS PRN
Qty: 30 TABLET | Refills: 0 | Status: SHIPPED | OUTPATIENT
Start: 2023-08-04 | End: 2023-08-14

## 2023-08-04 NOTE — PROGRESS NOTES
The patient location is: [place]  The chief complaint leading to consultation is: Paresthesia and pain    Visit type: audiovisual    Face to Face time with patient: 11 minutes  20 minutes of total time spent on the encounter, which includes face to face time and non-face to face time preparing to see the patient (eg, review of tests), Obtaining and/or reviewing separately obtained history, Documenting clinical information in the electronic or other health record, Independently interpreting results (not separately reported) and communicating results to the patient/family/caregiver, or Care coordination (not separately reported).         Each patient to whom he or she provides medical services by telemedicine is:  (1) informed of the relationship between the physician and patient and the respective role of any other health care provider with respect to management of the patient; and (2) notified that he or she may decline to receive medical services by telemedicine and may withdraw from such care at any time.    Notes:     Patient Name: Naomy Armstrong    : 1965  MRN: [id number]    Chief Complaint:  Paresthesia and pain    Subjective:  Naomy is a 58 y.o. female who presents today for:    1. Paresthesia and pain - patient to his known to me reports today for an audiovisual visit for evaluation.  For the last 3 days without any inciting event or injury she has been having intermittent right arm pain from the elbow to the wrist with numbness and tingling of the thumb, index finger, middle finger, and ring finger of the same arm.  She has taken meloxicam, Celebrex, and gabapentin without significant benefit.  No significant neck or shoulder pain.  She notes that the pain is mostly worse at night.  Last night she did use a wrist brace throughout the night and was actually able to sleep with this on.  No reported weakness.  No other acute concerns.  She does have a medial branch block scheduled for next  week.    Past Medical History  Past Medical History:   Diagnosis Date    Alcohol abuse     per chart, but patient denies today and cocaine per chart    Anxiety     Colon polyp     Depression     Hallucination     last couple of months started seeing someone standing in her room, saw boyfriend's reflection in glass    Headache     History of psychiatric hospitalization     age 13 [facility] for 2 months for acting out; 2018 for SI    Hx of psychiatric care     Hypertension     Psychiatric problem     Sleep difficulties     Suicide attempt     with knife by cutting stomach    Therapy        Past Surgical History  Past Surgical History:   Procedure Laterality Date    COLONOSCOPY N/A 5/7/2021    Procedure: COLONOSCOPY;  Surgeon: Prem Montana MD;  Location: [facility];  Service: Endoscopy;  Laterality: N/A;  COVID screening 5/4 [facility]-instr portal -ml    COLONOSCOPY N/A 6/6/2022    Procedure: COLONOSCOPY;  Surgeon: Fab Shepherd MD;  Location: [facility] (2ND FLR);  Service: Endoscopy;  Laterality: N/A;  C-Diff neg  Rapid COVID    EPIDURAL STEROID INJECTION Bilateral 8/5/2020    Procedure: Bilateral MBB @ L3, L4, L5;  Surgeon: Jeremy Powell Jr., MD;  Location: [facility];  Service: Pain Management;  Laterality: Bilateral;  Bilat L3-5 MBB  Arrive @ 1315; No ATC or DM; Needs MD signature    EPIDURAL STEROID INJECTION Right 8/19/2020    Procedure: Lumbar Radiofrequency Thermocoagulation of Medial Branches;  Surgeon: Jeremy Powell Jr., MD;  Location: [facility];  Service: Pain Management;  Laterality: Right;  Right L3-5 RFA  Arrive @ 1045; No ATC or DM; Needs MD Signature    EPIDURAL STEROID INJECTION Left 9/4/2020    Procedure: Lumbar Radiofrequency Thermocoagulation of Medial Branches;  Surgeon: Jeremy Powell Jr., MD;  Location: [facility];  Service: Pain Management;  Laterality: Left;  Left L3-5 RFA  Arrive @ 0900 (requested); No ATC or DM; Needs MD signature    EPIDURAL STEROID INJECTION Bilateral  4/16/2021    Procedure: Sacroiliac Joint Steroid Injections @ Bilateral;  Surgeon: Jeremy Powell Jr., MD;  Location: [facility] ENDO;  Service: Pain Management;  Laterality: Bilateral;  Arrive @ 1300; No ATC or DM    EPIDURAL STEROID INJECTION Bilateral 1/19/2022    Procedure: Bilateral Sacroiliac Joint Injections;  Surgeon: Jeremy Powell Jr., MD;  Location: [facility] ENDO;  Service: Pain Management;  Laterality: Bilateral;  Arrive @ 1315; No ATC or DM; Needs Consent    EPIDURAL STEROID INJECTION Bilateral 7/28/2023    Procedure: Bilateral L2, L3, L4 Medial Branch Blocks #1;  Surgeon: Jeremy Powell Jr., MD;  Location: [facility] ENDO;  Service: Pain Management;  Laterality: Bilateral;  @1200  No ATC or DM    ESOPHAGEAL MANOMETRY WITH MEASUREMENT OF IMPEDANCE N/A 10/20/2021    Procedure: MANOMETRY, ESOPHAGUS, WITH IMPEDANCE MEASUREMENT;  Surgeon: Anastacia Odonnell MD;  Location: [facility] (4TH FLR);  Service: Endoscopy;  Laterality: N/A;  Covid test 10/17 [place], instructions sent to [id number]    ESOPHAGOGASTRODUODENOSCOPY N/A 5/7/2021    Procedure: EGD (ESOPHAGOGASTRODUODENOSCOPY);  Surgeon: Prem Montana MD;  Location: [facility];  Service: Endoscopy;  Laterality: N/A;  added on per Dr. NOEL Shepherd    LAPAROSCOPIC TOUPET FUNDOPLICATION N/A 2/14/2022    Procedure: FUNDOPLICATION, LAPAROSCOPIC, TOUPET;  Surgeon: Christian Martinez MD;  Location: [facility] OR 2ND FLR;  Service: General;  Laterality: N/A;       Family History  Family History   Problem Relation Age of Onset    Anxiety disorder Sister     Depression Sister     Alcohol abuse Maternal Uncle     Alcohol abuse Maternal Grandfather        Social History  Social History     Socioeconomic History    Marital status:     Number of children: 0   Occupational History    Occupation: Overnight [employer]     Employer: [employer]   Tobacco Use    Smoking status: Former     Current packs/day: 0.00     Types: Cigarettes     Quit date: 4/26/2006     Years since  quittin.2    Smokeless tobacco: Never   Substance and Sexual Activity    Alcohol use: Yes     Comment: occasional    Drug use: Not Currently     Types: Marijuana     Comment: tried at age 14    Sexual activity: Yes     Partners: Male     Comment: going through menopause   Other Topics Concern    Patient feels they ought to cut down on drinking/drug use No    Patient annoyed by others criticizing their drinking/drug use No    Patient has felt bad or guilty about drinking/drug use No    Patient has had a drink/used drugs as an eye opener in the AM No   Social History Narrative     x 2.    Lives with boyfriend.     Works at [employer] as an .    Presently working on Bachelor's degree.    Has associates degree in criminal justice.     Social Determinants of Health     Financial Resource Strain: Unknown (2023)    Overall Financial Resource Strain (CARDIA)     Difficulty of Paying Living Expenses: Patient refused   Food Insecurity: Unknown (2023)    Hunger Vital Sign     Worried About Running Out of Food in the Last Year: Patient refused     Ran Out of Food in the Last Year: Patient refused   Recent Concern: Food Insecurity - Food Insecurity Present (2023)    Hunger Vital Sign     Worried About Running Out of Food in the Last Year: Sometimes true     Ran Out of Food in the Last Year: Never true   Transportation Needs: Unknown (2023)    PRAPARE - Transportation     Lack of Transportation (Medical): Patient refused     Lack of Transportation (Non-Medical): Patient refused   Physical Activity: Unknown (2023)    Exercise Vital Sign     Days of Exercise per Week: Patient refused     Minutes of Exercise per Session: 0 min   Stress: Stress Concern Present (2023)    [employer] - Occupational Stress Questionnaire     Feeling of Stress : To some extent   Social Connections: Unknown (2023)    Social Connection and Isolation Panel [NHANES]      Frequency of Communication with Friends and Family: Patient refused     Frequency of Social Gatherings with Friends and Family: Patient refused     Active Member of Clubs or Organizations: No     Attends Club or Organization Meetings: Patient refused     Marital Status: Patient refused   Housing Stability: Unknown (8/4/2023)    Housing Stability Vital Sign     Unable to Pay for Housing in the Last Year: Patient refused     Number of Places Lived in the Last Year: 1     Unstable Housing in the Last Year: Patient refused   Recent Concern: Housing Stability - High Risk (7/17/2023)    Housing Stability Vital Sign     Unable to Pay for Housing in the Last Year: Yes     Number of Places Lived in the Last Year: 1     Unstable Housing in the Last Year: No       Current Medications  Current Outpatient Medications on File Prior to Visit   Medication Sig Dispense Refill    alosetron (LOTRONEX) 0.5 MG tablet Take 1 tablet by mouth twice daily (Patient not taking: Reported on 7/18/2023) 60 tablet 0    amLODIPine (NORVASC) 5 MG tablet Take 1 tablet (5 mg total) by mouth once daily. 90 tablet 1    azelastine (ASTELIN) 137 mcg (0.1 %) nasal spray 1 spray (137 mcg total) by Nasal route 2 (two) times daily. (Patient not taking: Reported on 7/18/2023) 30 mL 1    buPROPion (WELLBUTRIN XL) 150 MG TB24 tablet Take 1 tablet (150 mg total) by mouth once daily. 30 tablet 11    celecoxib (CELEBREX) 100 MG capsule Take 1 capsule (100 mg total) by mouth 2 (two) times daily. 60 capsule 6    dicyclomine (BENTYL) 20 mg tablet Take 1 tablet (20 mg total) by mouth every 6 (six) hours. 120 tablet 0    diphenoxylate-atropine 2.5-0.025 mg (LOMOTIL) 2.5-0.025 mg per tablet Take 1 tablet by mouth 4 (four) times daily as needed for Diarrhea. 60 tablet 1    FLUoxetine 40 MG capsule Take 2 capsules (80 mg total) by mouth once daily. 180 capsule 3    gabapentin (NEURONTIN) 600 MG tablet Take 1 tablet (600 mg total) by mouth 3 (three) times daily as needed  (pain). 90 tablet 0    hydrOXYzine HCL (ATARAX) 10 MG Tab Take 1 tablet (10 mg total) by mouth 2 (two) times daily as needed (anxiety). 60 tablet 3    lisinopriL (PRINIVIL,ZESTRIL) 20 MG tablet Take 1 tablet (20 mg total) by mouth nightly. 90 tablet 3    loperamide (IMODIUM) 2 mg capsule Take 2 mg by mouth once as needed for Diarrhea.      multivitamin with minerals tablet Take 1 tablet by mouth once daily.      omeprazole (PRILOSEC) 40 MG capsule Take 1 capsule (40 mg total) by mouth every morning. 90 capsule 3    promethazine (PHENERGAN) 25 MG tablet Take 1 tablet (25 mg total) by mouth every 8 (eight) hours as needed for Nausea. 30 tablet 1    promethazine-dextromethorphan (PROMETHAZINE-DM) 6.25-15 mg/5 mL Syrp Take 5 mLs by mouth every 4 to 6 hours as needed. (Patient not taking: Reported on 7/18/2023) 240 mL 0     No current facility-administered medications on file prior to visit.       Allergies   Review of patient's allergies indicates:   Allergen Reactions    Effexor [venlafaxine]      Elevated her blood pressure    Zoloft [sertraline]     Paroxetine hcl      Made her feel drunk       Review of Systems (Pertinent positives)  Review of Systems   Constitutional:  Negative for chills and fever.   HENT:  Negative for hearing loss.    Eyes:  Negative for discharge.   Respiratory:  Negative for wheezing.    Cardiovascular:  Negative for chest pain and palpitations.   Gastrointestinal:  Negative for blood in stool, constipation, diarrhea and vomiting.   Genitourinary:  Negative for dysuria and hematuria.   Musculoskeletal:  Positive for joint pain and neck pain. Negative for back pain, falls and myalgias.   Neurological:  Positive for tingling and headaches. Negative for tremors, focal weakness, seizures and weakness.   Endo/Heme/Allergies:  Negative for polydipsia.       LMP 09/15/2013     Physical Exam  Vitals reviewed.   Constitutional:       General: She is not in acute distress.     Appearance: Normal  appearance. She is not ill-appearing, toxic-appearing or diaphoretic.   HENT:      Head: Normocephalic and atraumatic.   Cardiovascular:      Rate and Rhythm: Regular rhythm.   Pulmonary:      Effort: Pulmonary effort is normal. No respiratory distress.      Breath sounds: No wheezing.   Musculoskeletal:      Cervical back: Normal range of motion and neck supple.   Skin:     General: Skin is dry.      Findings: No erythema, lesion or rash.   Neurological:      Mental Status: She is alert and oriented to person, place, and time.   Psychiatric:         Mood and Affect: Mood normal.         Behavior: Behavior normal.          Assessment/Plan:  Naomy Armstrong is a 58 y.o. female who presents today for :    Diagnoses and all orders for this visit:    Paresthesia and pain of right extremity  -     tiZANidine (ZANAFLEX) 4 MG tablet; Take 1 tablet (4 mg total) by mouth every 8 (eight) hours as needed (pain).    Symptomatology consistent with carpal tunnel syndrome.  Continue nightly wrist bracing.  Treat with p.r.n. tizanidine.  Potential side effects discussed.  Monitor for any potentiate any effects of this with gabapentin.  Will avoid steroids due to upcoming procedure.  If needed can refer to Orthopedics/Physical therapy.  Can also have patient return in office if needed as well.  All questions answered        This office note has been dictated.  This dictation has been generated using M-Modal Fluency Direct dictation; some phonetic errors may occur.   My collaborating physician is Dr. Joseph Garcia.

## 2023-08-06 DIAGNOSIS — M51.36 DDD (DEGENERATIVE DISC DISEASE), LUMBAR: Chronic | ICD-10-CM

## 2023-08-06 DIAGNOSIS — K44.9 HIATAL HERNIA: ICD-10-CM

## 2023-08-07 RX ORDER — GABAPENTIN 600 MG/1
600 TABLET ORAL 3 TIMES DAILY PRN
Qty: 90 TABLET | Refills: 0 | Status: SHIPPED | OUTPATIENT
Start: 2023-08-07 | End: 2023-08-29

## 2023-08-07 NOTE — TELEPHONE ENCOUNTER
No care due was identified.  [facility] Embedded Care Due Messages. Reference number: [id number].   8/06/2023 9:24:11 PM CDT

## 2023-08-08 DIAGNOSIS — M25.531 RIGHT WRIST PAIN: Primary | ICD-10-CM

## 2023-08-10 DIAGNOSIS — K58.0 IRRITABLE BOWEL SYNDROME WITH DIARRHEA: ICD-10-CM

## 2023-08-10 RX ORDER — ALOSETRON HYDROCHLORIDE 0.5 MG/1
TABLET ORAL
Qty: 60 TABLET | Refills: 0 | Status: SHIPPED | OUTPATIENT
Start: 2023-08-10 | End: 2023-09-05

## 2023-08-11 ENCOUNTER — HOSPITAL ENCOUNTER (OUTPATIENT)
Facility: HOSPITAL | Age: 58
Discharge: HOME | End: 2023-08-11
Attending: PAIN MEDICINE | Admitting: PAIN MEDICINE
Payer: COMMERCIAL

## 2023-08-11 VITALS
HEART RATE: 75 BPM | DIASTOLIC BLOOD PRESSURE: 78 MMHG | TEMPERATURE: 98 F | RESPIRATION RATE: 16 BRPM | SYSTOLIC BLOOD PRESSURE: 137 MMHG | OXYGEN SATURATION: 97 %

## 2023-08-11 DIAGNOSIS — M47.816 LUMBAR SPONDYLOSIS: Primary | ICD-10-CM

## 2023-08-11 PROCEDURE — 64493 INJ PARAVERT F JNT L/S 1 LEV: CPT | Mod: 50 | Performed by: PAIN MEDICINE

## 2023-08-11 PROCEDURE — 64494 PR INJ DX/THER AGNT PARAVERT FACET JOINT,IMG GUIDE,LUMBAR/SAC, 2ND LEVEL: ICD-10-PCS | Mod: 50,,, | Performed by: PAIN MEDICINE

## 2023-08-11 PROCEDURE — 64494 INJ PARAVERT F JNT L/S 2 LEV: CPT | Mod: 50 | Performed by: PAIN MEDICINE

## 2023-08-11 PROCEDURE — 64493 PR INJ DX/THER AGNT PARAVERT FACET JOINT,IMG GUIDE,LUMBAR/SAC,1ST LVL: ICD-10-PCS | Mod: 50,,, | Performed by: PAIN MEDICINE

## 2023-08-11 PROCEDURE — 63600175 PHARM REV CODE 636 W HCPCS: Performed by: PAIN MEDICINE

## 2023-08-11 PROCEDURE — 25000003 PHARM REV CODE 250: Performed by: PAIN MEDICINE

## 2023-08-11 PROCEDURE — 64494 INJ PARAVERT F JNT L/S 2 LEV: CPT | Mod: 50,,, | Performed by: PAIN MEDICINE

## 2023-08-11 PROCEDURE — 64493 INJ PARAVERT F JNT L/S 1 LEV: CPT | Mod: 50,,, | Performed by: PAIN MEDICINE

## 2023-08-11 RX ORDER — BUPIVACAINE HYDROCHLORIDE 2.5 MG/ML
INJECTION, SOLUTION EPIDURAL; INFILTRATION; INTRACAUDAL
Status: DISCONTINUED
Start: 2023-08-11 | End: 2023-08-11 | Stop reason: HOSPADM

## 2023-08-11 RX ORDER — LIDOCAINE HYDROCHLORIDE 10 MG/ML
INJECTION INFILTRATION; PERINEURAL
Status: DISCONTINUED
Start: 2023-08-11 | End: 2023-08-11 | Stop reason: HOSPADM

## 2023-08-11 RX ORDER — LIDOCAINE HYDROCHLORIDE 10 MG/ML
10 INJECTION INFILTRATION; PERINEURAL ONCE
Status: COMPLETED | OUTPATIENT
Start: 2023-08-11 | End: 2023-08-11

## 2023-08-11 RX ORDER — LIDOCAINE HYDROCHLORIDE 20 MG/ML
10 INJECTION, SOLUTION INFILTRATION; PERINEURAL ONCE
Status: DISCONTINUED | OUTPATIENT
Start: 2023-08-11 | End: 2023-08-11 | Stop reason: ALTCHOICE

## 2023-08-11 RX ORDER — BUPIVACAINE HYDROCHLORIDE 2.5 MG/ML
10 INJECTION, SOLUTION EPIDURAL; INFILTRATION; INTRACAUDAL ONCE
Status: COMPLETED | OUTPATIENT
Start: 2023-08-11 | End: 2023-08-11

## 2023-08-11 NOTE — DISCHARGE INSTRUCTIONS

## 2023-08-11 NOTE — OP NOTE
LUMBAR Medial Branch Block Under Fluoroscopy  Time-out taken to identify patient and procedure side prior to starting the procedure.   I attest that I have reviewed the patient's home medications prior to the procedure and no contraindication have been identified. I  re-evaluated the patient after the patient was positioned for the procedure in the procedure room immediately before the procedural time-out. The vital signs are current and represent the current state of the patient which has not significantly changed since the preprocedure assessment.            Date of Service: 08/11/2023    PCP: Eric Hernandes Jr., MD    Referring Physician:                                                           PROCEDURE:  Bilateral  L2, L3 and L4 medial branch block    REASON FOR PROCEDURE: Lumbar spondylosis [M47.816]    PHYSICIAN: Jeremy Powell MD    ASSISTANTS: None    MEDICATIONS INJECTED: Bupivicaine 0.25% 1.5ml at each level    LOCAL ANESTHETIC USED: Xylocaine 1% 3ml each site.    SEDATION MEDICATIONS: None    ESTIMATED BLOOD LOSS:  None.    COMPLICATIONS:  None.    TECHNIQUE: Laying in a prone position, the patient was prepped and draped in the usual sterile fashion using ChloraPrep and fenestrated drape.  The level was determined under fluoroscopic guidance.  Local anesthetic was given by going down to the hub of the 27-gauge 1.25in needle and raising a wheal.  A 25-gauge 4.75 inch needle was introduced to the anatomic site of the medial branch at the lateral mass utilizing live fluoroscopy. Medication was then injected slowly at each level as stated above. The patient tolerated the procedure well.       The patient was monitored after the procedure.  Patient was given post procedure and discharge instructions to follow at home.  We will see the patient back in two weeks or the patient may call to inform of status. The patient was discharged in a stable condition

## 2023-08-11 NOTE — DISCHARGE SUMMARY
[facility] - Endoscopy  Discharge Note  Short Stay    Procedure(s) (LRB):  Bilateral L2, L3, L4 Medial Branch Blocks #2 (Bilateral)      OUTCOME: Patient tolerated treatment/procedure well without complication and is now ready for discharge.    DISPOSITION: Home or Self Care    FINAL DIAGNOSIS:  <principal problem not specified>    FOLLOWUP: In clinic    DISCHARGE INSTRUCTIONS:  No discharge procedures on file.       Discharge Diagnosis:Lumbar spondylosis [M47.816]  Condition on Discharge: Stable.  Diet on Discharge: Same as before.  Activity: as per instruction sheet.  Discharge to: Home with a responsible adult.  Follow up: as per Discharge instructions

## 2023-08-14 ENCOUNTER — PATIENT MESSAGE (OUTPATIENT)
Dept: FAMILY MEDICINE | Facility: CLINIC | Age: 58
End: 2023-08-14
Payer: COMMERCIAL

## 2023-08-14 ENCOUNTER — TELEPHONE (OUTPATIENT)
Dept: PAIN MEDICINE | Facility: CLINIC | Age: 58
End: 2023-08-14
Payer: COMMERCIAL

## 2023-08-14 NOTE — TELEPHONE ENCOUNTER
LVM asking pt to contact clinic to discuss efficacy of 2nd bilat L2-4 MBB performed on Friday- phone number included.

## 2023-08-15 ENCOUNTER — TELEPHONE (OUTPATIENT)
Dept: PAIN MEDICINE | Facility: CLINIC | Age: 58
End: 2023-08-15
Payer: COMMERCIAL

## 2023-08-15 NOTE — TELEPHONE ENCOUNTER
LVM asking pt to contact clinic to discuss efficacy of 2nd L2-4 MBB performed last week- phone number included.

## 2023-08-18 ENCOUNTER — PATIENT MESSAGE (OUTPATIENT)
Dept: PAIN MEDICINE | Facility: CLINIC | Age: 58
End: 2023-08-18
Payer: COMMERCIAL

## 2023-08-21 ENCOUNTER — OFFICE VISIT (OUTPATIENT)
Dept: ORTHOPEDICS | Facility: CLINIC | Age: 58
End: 2023-08-21
Attending: ORTHOPAEDIC SURGERY
Payer: COMMERCIAL

## 2023-08-21 DIAGNOSIS — G56.01 RIGHT CARPAL TUNNEL SYNDROME: Primary | ICD-10-CM

## 2023-08-21 PROCEDURE — 20526 THER INJECTION CARP TUNNEL: CPT | Mod: RT,S$GLB,, | Performed by: ORTHOPAEDIC SURGERY

## 2023-08-21 PROCEDURE — 20526 PR INJECT CARPAL TUNNEL: ICD-10-PCS | Mod: RT,S$GLB,, | Performed by: ORTHOPAEDIC SURGERY

## 2023-08-21 PROCEDURE — 1159F PR MEDICATION LIST DOCUMENTED IN MEDICAL RECORD: ICD-10-PCS | Mod: CPTII,S$GLB,, | Performed by: ORTHOPAEDIC SURGERY

## 2023-08-21 PROCEDURE — 99213 PR OFFICE/OUTPT VISIT, EST, LEVL III, 20-29 MIN: ICD-10-PCS | Mod: 25,S$GLB,, | Performed by: ORTHOPAEDIC SURGERY

## 2023-08-21 PROCEDURE — 99999 PR PBB SHADOW E&M-EST. PATIENT-LVL III: CPT | Mod: PBBFAC,,, | Performed by: ORTHOPAEDIC SURGERY

## 2023-08-21 PROCEDURE — 4010F ACE/ARB THERAPY RXD/TAKEN: CPT | Mod: CPTII,S$GLB,, | Performed by: ORTHOPAEDIC SURGERY

## 2023-08-21 PROCEDURE — 1159F MED LIST DOCD IN RCRD: CPT | Mod: CPTII,S$GLB,, | Performed by: ORTHOPAEDIC SURGERY

## 2023-08-21 PROCEDURE — 99213 OFFICE O/P EST LOW 20 MIN: CPT | Mod: 25,S$GLB,, | Performed by: ORTHOPAEDIC SURGERY

## 2023-08-21 PROCEDURE — 4010F PR ACE/ARB THEARPY RXD/TAKEN: ICD-10-PCS | Mod: CPTII,S$GLB,, | Performed by: ORTHOPAEDIC SURGERY

## 2023-08-21 PROCEDURE — 1160F PR REVIEW ALL MEDS BY PRESCRIBER/CLIN PHARMACIST DOCUMENTED: ICD-10-PCS | Mod: CPTII,S$GLB,, | Performed by: ORTHOPAEDIC SURGERY

## 2023-08-21 PROCEDURE — 99999 PR PBB SHADOW E&M-EST. PATIENT-LVL III: ICD-10-PCS | Mod: PBBFAC,,, | Performed by: ORTHOPAEDIC SURGERY

## 2023-08-21 PROCEDURE — 1160F RVW MEDS BY RX/DR IN RCRD: CPT | Mod: CPTII,S$GLB,, | Performed by: ORTHOPAEDIC SURGERY

## 2023-08-21 RX ORDER — TRIAMCINOLONE ACETONIDE 40 MG/ML
40 INJECTION, SUSPENSION INTRA-ARTICULAR; INTRAMUSCULAR
Status: DISCONTINUED | OUTPATIENT
Start: 2023-08-21 | End: 2023-08-21 | Stop reason: HOSPADM

## 2023-08-21 RX ADMIN — TRIAMCINOLONE ACETONIDE 40 MG: 40 INJECTION, SUSPENSION INTRA-ARTICULAR; INTRAMUSCULAR at 11:08

## 2023-08-21 NOTE — PROGRESS NOTES
Assessment: 58 y.o. female with R CTS    I explained my diagnostic impression and the reasoning behind it in detail, using layman's terms.      Plan:   - Injection of the right  Carpal tunnel   performed, please see procedure note for more details.  Prior to the injection risks and benefits of corticosteroid injection were discussed with the patient including pain, infection, bleeding, skin color changes, swelling, steroid flare. We discussed that over time injections can result in chondral damage, acceleration of arthritis formation, damage to tendons and damage to joints.  The patient consented for the procedure.  Post-injection instructions were given to the patient in writing.  - Return to clinic PRN    All questions were answered in detail. The patient is in full agreement with the treatment plan and will proceed accordingly.    Chief Complaint   Patient presents with    Right Wrist - Pain, Numbness       Initial visit (8/21/23): Naomy Armstrong is a 58 y.o. female who presents today complaining of Pain and Numbness of the Right Wrist     Duration of symptoms:  Pain has been much more bothersome over the last two weeks   Trauma or new activity: no  Pain is intermittent   Numbness and tingling over volar and dorsal radial hand. Worse in median n dist. Radiates up to the elbow  No neck pain   Aggravating factors: worse at night, with fixed hand activity such as typing  Relieving factors: rest, changing positions  Radicular symptoms: no neck pain   Prior treatment:  has been wearing a brace at night and during the day without relief. It is annoying to wear   Pain does interfere with activities of daily living .      This patient was seen in consultation at the request of Dr. Jessica Shay    This is the extent of the patient's complaints at this time.     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        Review of patient's allergies  indicates:   Allergen Reactions    Effexor [venlafaxine]      Elevated her blood pressure    Zoloft [sertraline]     Paroxetine hcl      Made her feel drunk         Current Outpatient Medications:     alosetron (LOTRONEX) 0.5 MG tablet, Take 1 tablet by mouth twice daily, Disp: 60 tablet, Rfl: 0    amLODIPine (NORVASC) 5 MG tablet, Take 1 tablet (5 mg total) by mouth once daily., Disp: 90 tablet, Rfl: 1    buPROPion (WELLBUTRIN XL) 150 MG TB24 tablet, Take 1 tablet (150 mg total) by mouth once daily., Disp: 30 tablet, Rfl: 11    celecoxib (CELEBREX) 100 MG capsule, Take 1 capsule (100 mg total) by mouth 2 (two) times daily., Disp: 60 capsule, Rfl: 6    dicyclomine (BENTYL) 20 mg tablet, Take 1 tablet (20 mg total) by mouth every 6 (six) hours., Disp: 120 tablet, Rfl: 0    diphenoxylate-atropine 2.5-0.025 mg (LOMOTIL) 2.5-0.025 mg per tablet, Take 1 tablet by mouth 4 (four) times daily as needed for Diarrhea., Disp: 60 tablet, Rfl: 1    FLUoxetine 40 MG capsule, Take 2 capsules (80 mg total) by mouth once daily., Disp: 180 capsule, Rfl: 3    gabapentin (NEURONTIN) 600 MG tablet, Take 1 tablet (600 mg total) by mouth 3 (three) times daily as needed (pain)., Disp: 90 tablet, Rfl: 0    hydrOXYzine HCL (ATARAX) 10 MG Tab, Take 1 tablet (10 mg total) by mouth 2 (two) times daily as needed (anxiety)., Disp: 60 tablet, Rfl: 3    lisinopriL (PRINIVIL,ZESTRIL) 20 MG tablet, Take 1 tablet (20 mg total) by mouth nightly., Disp: 90 tablet, Rfl: 3    loperamide (IMODIUM) 2 mg capsule, Take 2 mg by mouth once as needed for Diarrhea., Disp: , Rfl:     multivitamin with minerals tablet, Take 1 tablet by mouth once daily., Disp: , Rfl:     omeprazole (PRILOSEC) 40 MG capsule, Take 1 capsule (40 mg total) by mouth every morning., Disp: 90 capsule, Rfl: 3    promethazine (PHENERGAN) 25 MG tablet, Take 1 tablet (25 mg total) by mouth every 8 (eight) hours as needed for Nausea., Disp: 30 tablet, Rfl: 1    promethazine-dextromethorphan  (PROMETHAZINE-DM) 6.25-15 mg/5 mL Syrp, Take 5 mLs by mouth every 4 to 6 hours as needed., Disp: 240 mL, Rfl: 0    azelastine (ASTELIN) 137 mcg (0.1 %) nasal spray, 1 spray (137 mcg total) by Nasal route 2 (two) times daily. (Patient not taking: Reported on 7/18/2023), Disp: 30 mL, Rfl: 1    Physical Exam:   Vitals:    08/21/23 1123   PainSc:   5   PainLoc: Wrist       General:  Patient is alert, awake and oriented to time, place and person. Mood and affect are appropriate.  Patient does not appear to be in any distress, denies any constitutional symptoms and appears stated age.   HEENT:  Pupils are equal and round, sclera are not injected. External examination of ears and nose reveals no abnormalities. Cranial nerves II-X are grossly intact  Skin: no rashes, abrasions or open wounds on the affected extremity   Resp:  No respiratory distress or audible wheezing   CV: 2+  pulses, all extremities warm and well perfused   Right Hand/Wrist Examination:    Observation/Inspection:  Swelling  none    Deformity  none  Discoloration  none     Scars   none    Atrophy  none    HAND/WRIST EXAMINATION:  Finkelstein's Test   Neg  WHAT Test    Neg  CMC grind    Neg  Circumduction test   Neg    Neurovascular Exam:  Digits WWP, brisk CR < 3s throughout  NVI motor/LTS to M/R/U nerves, radial pulse 2+  Tinel's Test - Carpal Tunnel  Neg  Tinel's Test - Cubital Tunnel  Neg  Phalen's Test    Neg  Median Nerve Compression Test Neg    ROM hand/wrist/elbow full, painless      Imaging: 3 views of the right wrist negative for fractures, degenerative changes    I personally reviewed and interpreted the patient's imaging obtained today in clinic       A note notifying Dr. Jessica Shay of my findings was sent via the electronic medical record     This note was created by combination of typed  and M-Modal dictation. Transcription and phonetic errors may be present.  If there are any questions, please contact me.    Past Medical  History:   Diagnosis Date    Alcohol abuse     per chart, but patient denies today and cocaine per chart    Anxiety     Colon polyp     Depression     Hallucination     last couple of months started seeing someone standing in her room, saw boyfriend's reflection in glass    Headache     History of psychiatric hospitalization     age 13 [employer] for 2 months for acting out; 2018 for SI    Hx of psychiatric care     Hypertension     Psychiatric problem     Sleep difficulties     Suicide attempt     with knife by cutting stomach    Therapy        Active Problem List with Overview Notes    Diagnosis Date Noted    Generalized anxiety disorder 11/27/2022    Esophagogastric junction outflow obstruction 11/22/2022    Epigastric pain 06/07/2022    Major depressive disorder, recurrent episode, in partial remission 02/09/2022    Cervical spondylosis 08/11/2021    Hiatal hernia 07/09/2021    Irritable bowel syndrome with diarrhea 07/09/2021    SOB (shortness of breath)     Lumbosacral spondylosis 04/16/2021    Sacroiliac joint pain 03/31/2021    Spondylosis of lumbosacral region 03/31/2021    Chronic back pain greater than 3 months duration 07/16/2020    Decreased strength of lower extremity 06/01/2020    Decreased strength of trunk and back 06/01/2020    Pain in joint of right hip 06/01/2020    Decreased range of motion of right lower extremity 06/01/2020    Bilateral primary osteoarthritis of knee 03/12/2020    Lumbar spondylosis 01/13/2020    DDD (degenerative disc disease), lumbar 01/13/2020    Psychosocial distress 12/20/2019    Spondylosis of cervical region without myelopathy or radiculopathy 07/09/2019    Primary osteoarthritis of right knee 06/24/2019    Neck pain 06/24/2019    Cervical radiculopathy 04/26/2013     X-ray cervical spine and EMG/nerve conduction studies      DDD (degenerative disc disease), cervical 04/26/2013    Cervicogenic headache 04/26/2013     Patient has cervicogenic headaches which we will address  with a combination of heat/ice application, muscle relaxers, and potentially anti-inflammatories.          Past Surgical History:   Procedure Laterality Date    COLONOSCOPY N/A 5/7/2021    Procedure: COLONOSCOPY;  Surgeon: Prem Montana MD;  Location: [facility];  Service: Endoscopy;  Laterality: N/A;  COVID screening 5/4 [facility]-instr portal -ml    COLONOSCOPY N/A 6/6/2022    Procedure: COLONOSCOPY;  Surgeon: Fab Shepherd MD;  Location: [facility] ([address] [place] [address]);  Service: Endoscopy;  Laterality: N/A;  C-Diff neg  Rapid COVID    EPIDURAL STEROID INJECTION Bilateral 8/5/2020    Procedure: Bilateral MBB @ L3, L4, L5;  Surgeon: Jeremy Powell Jr., MD;  Location: [facility];  Service: Pain Management;  Laterality: Bilateral;  Bilat L3-5 MBB  Arrive @ 1315; No ATC or DM; Needs MD signature    EPIDURAL STEROID INJECTION Right 8/19/2020    Procedure: Lumbar Radiofrequency Thermocoagulation of Medial Branches;  Surgeon: Jeremy Powell Jr., MD;  Location: [facility];  Service: Pain Management;  Laterality: Right;  Right L3-5 RFA  Arrive @ 1045; No ATC or DM; Needs MD Signature    EPIDURAL STEROID INJECTION Left 9/4/2020    Procedure: Lumbar Radiofrequency Thermocoagulation of Medial Branches;  Surgeon: Jeremy Powell Jr., MD;  Location: [facility];  Service: Pain Management;  Laterality: Left;  Left L3-5 RFA  Arrive @ 0900 (requested); No ATC or DM; Needs MD signature    EPIDURAL STEROID INJECTION Bilateral 4/16/2021    Procedure: Sacroiliac Joint Steroid Injections @ Bilateral;  Surgeon: Jeremy Powell Jr., MD;  Location: [facility];  Service: Pain Management;  Laterality: Bilateral;  Arrive @ 1300; No ATC or DM    EPIDURAL STEROID INJECTION Bilateral 1/19/2022    Procedure: Bilateral Sacroiliac Joint Injections;  Surgeon: Jeremy Powell Jr., MD;  Location: [facility];  Service: Pain Management;  Laterality: Bilateral;  Arrive @ 1315; No ATC or DM; Needs Consent    EPIDURAL STEROID INJECTION Bilateral  7/28/2023    Procedure: Bilateral L2, L3, L4 Medial Branch Blocks #1;  Surgeon: Jeremy Powell Jr., MD;  Location: [facility];  Service: Pain Management;  Laterality: Bilateral;  @1200  No ATC or DM    EPIDURAL STEROID INJECTION Bilateral 8/11/2023    Procedure: Bilateral L2, L3, L4 Medial Branch Blocks #2;  Surgeon: Jeremy Powell Jr., MD;  Location: [facility];  Service: Pain Management;  Laterality: Bilateral;  @1330  No ATC or DM    ESOPHAGEAL MANOMETRY WITH MEASUREMENT OF IMPEDANCE N/A 10/20/2021    Procedure: MANOMETRY, ESOPHAGUS, WITH IMPEDANCE MEASUREMENT;  Surgeon: Anastacia Odonnell MD;  Location: [facility] (4TH FLR);  Service: Endoscopy;  Laterality: N/A;  Covid test 10/17 [place], instructions sent to [id number]    ESOPHAGOGASTRODUODENOSCOPY N/A 5/7/2021    Procedure: EGD (ESOPHAGOGASTRODUODENOSCOPY);  Surgeon: Prem Montana MD;  Location: [facility];  Service: Endoscopy;  Laterality: N/A;  added on per Dr. NOEL Shepherd    LAPAROSCOPIC TOUPET FUNDOPLICATION N/A 2/14/2022    Procedure: FUNDOPLICATION, LAPAROSCOPIC, TOUPET;  Surgeon: Christian Martinez MD;  [facility];  Service: General;  Laterality: N/A;       Family History   Problem Relation Age of Onset    Anxiety disorder Sister     Depression Sister     Alcohol abuse Maternal Uncle     Alcohol abuse Maternal Grandfather

## 2023-08-21 NOTE — PROCEDURES
Carpal Tunnel    Date/Time: 8/21/2023 11:30 AM    Performed by: Jessica Shay MD  Authorized by: Jessica Shay MD    Consent Done?:  Yes (Verbal)  Indications:  Pain  Timeout: prior to procedure the correct patient, procedure, and site was verified    Location:  Wrist  Needle size:  22 G  Approach:  [place]  Medications:  40 mg triamcinolone acetonide 40 mg/mL  Patient tolerance:  Patient tolerated the procedure well with no immediate complications

## 2023-08-28 DIAGNOSIS — K44.9 HIATAL HERNIA: ICD-10-CM

## 2023-08-28 DIAGNOSIS — M51.36 DDD (DEGENERATIVE DISC DISEASE), LUMBAR: Chronic | ICD-10-CM

## 2023-08-28 NOTE — TELEPHONE ENCOUNTER
No care due was identified.  [facility] Embedded Care Due Messages. Reference number: [id number].   8/28/2023 12:18:30 PM CDT

## 2023-08-29 RX ORDER — GABAPENTIN 600 MG/1
600 TABLET ORAL 3 TIMES DAILY
Qty: 90 TABLET | Refills: 3 | Status: SHIPPED | OUTPATIENT
Start: 2023-08-29 | End: 2023-10-25 | Stop reason: SDUPTHER

## 2023-09-05 DIAGNOSIS — K58.0 IRRITABLE BOWEL SYNDROME WITH DIARRHEA: ICD-10-CM

## 2023-09-05 RX ORDER — ALOSETRON HYDROCHLORIDE 0.5 MG/1
TABLET ORAL
Qty: 60 TABLET | Refills: 0 | Status: SHIPPED | OUTPATIENT
Start: 2023-09-05 | End: 2023-10-02

## 2023-09-11 ENCOUNTER — OFFICE VISIT (OUTPATIENT)
Dept: FAMILY MEDICINE | Facility: CLINIC | Age: 58
End: 2023-09-11
Payer: COMMERCIAL

## 2023-09-11 VITALS
DIASTOLIC BLOOD PRESSURE: 82 MMHG | SYSTOLIC BLOOD PRESSURE: 118 MMHG | RESPIRATION RATE: 16 BRPM | BODY MASS INDEX: 31.85 KG/M2 | TEMPERATURE: 98 F | WEIGHT: 191.13 LBS | HEIGHT: 65 IN | HEART RATE: 89 BPM | OXYGEN SATURATION: 99 %

## 2023-09-11 DIAGNOSIS — M25.511 ACUTE PAIN OF RIGHT SHOULDER: Primary | ICD-10-CM

## 2023-09-11 PROCEDURE — 3008F PR BODY MASS INDEX (BMI) DOCUMENTED: ICD-10-PCS | Mod: CPTII,S$GLB,, | Performed by: NURSE PRACTITIONER

## 2023-09-11 PROCEDURE — 1159F MED LIST DOCD IN RCRD: CPT | Mod: CPTII,S$GLB,, | Performed by: NURSE PRACTITIONER

## 2023-09-11 PROCEDURE — 3079F PR MOST RECENT DIASTOLIC BLOOD PRESSURE 80-89 MM HG: ICD-10-PCS | Mod: CPTII,S$GLB,, | Performed by: NURSE PRACTITIONER

## 2023-09-11 PROCEDURE — 3079F DIAST BP 80-89 MM HG: CPT | Mod: CPTII,S$GLB,, | Performed by: NURSE PRACTITIONER

## 2023-09-11 PROCEDURE — 99999 PR PBB SHADOW E&M-EST. PATIENT-LVL V: ICD-10-PCS | Mod: PBBFAC,,, | Performed by: NURSE PRACTITIONER

## 2023-09-11 PROCEDURE — 3008F BODY MASS INDEX DOCD: CPT | Mod: CPTII,S$GLB,, | Performed by: NURSE PRACTITIONER

## 2023-09-11 PROCEDURE — 99213 OFFICE O/P EST LOW 20 MIN: CPT | Mod: 25,S$GLB,, | Performed by: NURSE PRACTITIONER

## 2023-09-11 PROCEDURE — 96372 PR INJECTION,THERAP/PROPH/DIAG2ST, IM OR SUBCUT: ICD-10-PCS | Mod: S$GLB,,, | Performed by: NURSE PRACTITIONER

## 2023-09-11 PROCEDURE — 4010F PR ACE/ARB THEARPY RXD/TAKEN: ICD-10-PCS | Mod: CPTII,S$GLB,, | Performed by: NURSE PRACTITIONER

## 2023-09-11 PROCEDURE — 99999 PR PBB SHADOW E&M-EST. PATIENT-LVL V: CPT | Mod: PBBFAC,,, | Performed by: NURSE PRACTITIONER

## 2023-09-11 PROCEDURE — 1160F RVW MEDS BY RX/DR IN RCRD: CPT | Mod: CPTII,S$GLB,, | Performed by: NURSE PRACTITIONER

## 2023-09-11 PROCEDURE — 3074F SYST BP LT 130 MM HG: CPT | Mod: CPTII,S$GLB,, | Performed by: NURSE PRACTITIONER

## 2023-09-11 PROCEDURE — 4010F ACE/ARB THERAPY RXD/TAKEN: CPT | Mod: CPTII,S$GLB,, | Performed by: NURSE PRACTITIONER

## 2023-09-11 PROCEDURE — 99213 PR OFFICE/OUTPT VISIT, EST, LEVL III, 20-29 MIN: ICD-10-PCS | Mod: 25,S$GLB,, | Performed by: NURSE PRACTITIONER

## 2023-09-11 PROCEDURE — 3074F PR MOST RECENT SYSTOLIC BLOOD PRESSURE < 130 MM HG: ICD-10-PCS | Mod: CPTII,S$GLB,, | Performed by: NURSE PRACTITIONER

## 2023-09-11 PROCEDURE — 1159F PR MEDICATION LIST DOCUMENTED IN MEDICAL RECORD: ICD-10-PCS | Mod: CPTII,S$GLB,, | Performed by: NURSE PRACTITIONER

## 2023-09-11 PROCEDURE — 96372 THER/PROPH/DIAG INJ SC/IM: CPT | Mod: S$GLB,,, | Performed by: NURSE PRACTITIONER

## 2023-09-11 PROCEDURE — 1160F PR REVIEW ALL MEDS BY PRESCRIBER/CLIN PHARMACIST DOCUMENTED: ICD-10-PCS | Mod: CPTII,S$GLB,, | Performed by: NURSE PRACTITIONER

## 2023-09-11 RX ORDER — KETOROLAC TROMETHAMINE 30 MG/ML
30 INJECTION, SOLUTION INTRAMUSCULAR; INTRAVENOUS
Status: COMPLETED | OUTPATIENT
Start: 2023-09-11 | End: 2023-09-11

## 2023-09-11 RX ADMIN — KETOROLAC TROMETHAMINE 30 MG: 30 INJECTION, SOLUTION INTRAMUSCULAR; INTRAVENOUS at 02:09

## 2023-09-11 NOTE — PROGRESS NOTES
Routine Office Visit    Patient Name: Naomy Armstrong    : 1965  MRN: [id number]    Chief Complaint:  Right shoulder pain    Subjective:  Naomy is a 58 y.o. female who presents today for:    1. Right shoulder pain - patient who is known to me with the below documented medical history reports today for evaluation.  For the last 2 weeks has been having anterior shoulder pain which radiates to the bicep without any inciting event or injury.  Recently she had a steroid shot at the carpal tunnel which has helped with her carpal tunnel syndrome pain.  She still does have paresthesias of the right hand but does not report that it is worsening.  She has been taking gabapentin, Flexeril, and meloxicam for the shoulder pain and her other joint pains with only modest benefit.  She has not take meloxicam yet today.    Past Medical History  Past Medical History:   Diagnosis Date    Alcohol abuse     per chart, but patient denies today and cocaine per chart    Anxiety     Colon polyp     Depression     Hallucination     last couple of months started seeing someone standing in her room, saw boyfriend's reflection in glass    Headache     History of psychiatric hospitalization     age 13 [facility] for 2 months for acting out; 2018 for SI    Hx of psychiatric care     Hypertension     Psychiatric problem     Sleep difficulties     Suicide attempt     with knife by cutting stomach    Therapy        Past Surgical History  Past Surgical History:   Procedure Laterality Date    COLONOSCOPY N/A 2021    Procedure: COLONOSCOPY;  Surgeon: Prem Montana MD;  Location: [facility];  Service: Endoscopy;  Laterality: N/A;  COVID screening  [facility]-instr portal -ml    COLONOSCOPY N/A 2022    Procedure: COLONOSCOPY;  Surgeon: Fab Shepherd MD;  [facility] ([address] [place] [address]);  Service: Endoscopy;  Laterality: N/A;  C-Diff neg  Rapid COVID    EPIDURAL STEROID INJECTION Bilateral 2020    Procedure: Bilateral MBB @ L3, L4,  L5;  Surgeon: Jeremy Powell Jr., MD;  Location: [facility] ENDO;  Service: Pain Management;  Laterality: Bilateral;  Bilat L3-5 MBB  Arrive @ 1315; No ATC or DM; Needs MD signature    EPIDURAL STEROID INJECTION Right 8/19/2020    Procedure: Lumbar Radiofrequency Thermocoagulation of Medial Branches;  Surgeon: Jeremy Powell Jr., MD;  Location: [facility] ENDO;  Service: Pain Management;  Laterality: Right;  Right L3-5 RFA  Arrive @ 1045; No ATC or DM; Needs MD Signature    EPIDURAL STEROID INJECTION Left 9/4/2020    Procedure: Lumbar Radiofrequency Thermocoagulation of Medial Branches;  Surgeon: Jeremy Powell Jr., MD;  Location: [facility] ENDO;  Service: Pain Management;  Laterality: Left;  Left L3-5 RFA  Arrive @ 0900 (requested); No ATC or DM; Needs MD signature    EPIDURAL STEROID INJECTION Bilateral 4/16/2021    Procedure: Sacroiliac Joint Steroid Injections @ Bilateral;  Surgeon: Jeremy Powell Jr., MD;  Location: [facility] ENDO;  Service: Pain Management;  Laterality: Bilateral;  Arrive @ 1300; No ATC or DM    EPIDURAL STEROID INJECTION Bilateral 1/19/2022    Procedure: Bilateral Sacroiliac Joint Injections;  Surgeon: Jeremy Powell Jr., MD;  Location: [facility] ENDO;  Service: Pain Management;  Laterality: Bilateral;  Arrive @ 1315; No ATC or DM; Needs Consent    EPIDURAL STEROID INJECTION Bilateral 7/28/2023    Procedure: Bilateral L2, L3, L4 Medial Branch Blocks #1;  Surgeon: Jeremy Powell Jr., MD;  Location: [facility] ENDO;  Service: Pain Management;  Laterality: Bilateral;  @1200  No ATC or DM    EPIDURAL STEROID INJECTION Bilateral 8/11/2023    Procedure: Bilateral L2, L3, L4 Medial Branch Blocks #2;  Surgeon: Jeremy Powell Jr., MD;  Location: [facility] ENDO;  Service: Pain Management;  Laterality: Bilateral;  @1330  No ATC or DM    ESOPHAGEAL MANOMETRY WITH MEASUREMENT OF IMPEDANCE N/A 10/20/2021    Procedure: MANOMETRY, ESOPHAGUS, WITH IMPEDANCE MEASUREMENT;  Surgeon: Anastacia Odonnell MD;   Location: [facility] ENDO (4TH FLR);  Service: Endoscopy;  Laterality: N/A;  Covid test 10/17 [place], instructions sent to [id number]    ESOPHAGOGASTRODUODENOSCOPY N/A 2021    Procedure: EGD (ESOPHAGOGASTRODUODENOSCOPY);  Surgeon: Prem Montana MD;  Location: [facility];  Service: Endoscopy;  Laterality: N/A;  added on per Dr. NOEL Shepherd    LAPAROSCOPIC TOUPET FUNDOPLICATION N/A 2022    Procedure: FUNDOPLICATION, LAPAROSCOPIC, TOUPET;  Surgeon: Christian Martinez MD;  Location: [facility] OR 2ND FLR;  Service: General;  Laterality: N/A;       Family History  Family History   Problem Relation Age of Onset    Anxiety disorder Sister     Depression Sister     Alcohol abuse Maternal Uncle     Alcohol abuse Maternal Grandfather        Social History  Social History     Socioeconomic History    Marital status:     Number of children: 0   Occupational History    Occupation:      Employer: [employer]   Tobacco Use    Smoking status: Former     Current packs/day: 0.00     Types: Cigarettes     Quit date: 2006     Years since quittin.3    Smokeless tobacco: Never   Substance and Sexual Activity    Alcohol use: Yes     Comment: occasional    Drug use: Not Currently     Types: Marijuana     Comment: tried at age 14    Sexual activity: Yes     Partners: Male     Comment: going through menopause   Other Topics Concern    Patient feels they ought to cut down on drinking/drug use No    Patient annoyed by others criticizing their drinking/drug use No    Patient has felt bad or guilty about drinking/drug use No    Patient has had a drink/used drugs as an eye opener in the AM No   Social History Narrative     x 2.    Lives with boyfriend.     Works at [employer] as an .    Presently working on Bachelor's degree.    Has associates degree in criminal justice.     Social Determinants of Health     Financial Resource Strain: Unknown (2023)    Overall Financial Resource  Strain (CARDIA)     Difficulty of Paying Living Expenses: Patient refused   Food Insecurity: Unknown (9/11/2023)    Hunger Vital Sign     Worried About Running Out of Food in the Last Year: Patient refused     Ran Out of Food in the Last Year: Patient refused   Recent Concern: Food Insecurity - Food Insecurity Present (7/17/2023)    Hunger Vital Sign     Worried About Running Out of Food in the Last Year: Sometimes true     Ran Out of Food in the Last Year: Never true   Transportation Needs: Unknown (9/11/2023)    PRAPARE - Transportation     Lack of Transportation (Medical): Patient refused     Lack of Transportation (Non-Medical): Patient refused   Physical Activity: Unknown (9/11/2023)    Exercise Vital Sign     Days of Exercise per Week: Patient refused     Minutes of Exercise per Session: Patient refused   Stress: Unknown (9/11/2023)    [employer] [place] [employer] - Occupational Stress Questionnaire     Feeling of Stress : Patient refused   Recent Concern: Stress - Stress Concern Present (7/17/2023)    [employer] [place] [employer] - Occupational Stress Questionnaire     Feeling of Stress : To some extent   Social Connections: Unknown (9/11/2023)    Social Connection and Isolation Panel [NHANES]     Frequency of Communication with Friends and Family: Patient refused     Frequency of Social Gatherings with Friends and Family: Patient refused     Active Member of Clubs or Organizations: Patient refused     Attends Club or Organization Meetings: Patient refused     Marital Status: Patient refused   Housing Stability: Unknown (9/11/2023)    Housing Stability Vital Sign     Unable to Pay for Housing in the Last Year: Patient refused     Number of Places Lived in the Last Year: 1     Unstable Housing in the Last Year: Patient refused   Recent Concern: Housing Stability - High Risk (7/17/2023)    Housing Stability Vital Sign     Unable to Pay for Housing in the Last Year: Yes     Number of  Places Lived in the Last Year: 1     Unstable Housing in the Last Year: No       Current Medications  Current Outpatient Medications on File Prior to Visit   Medication Sig Dispense Refill    alosetron (LOTRONEX) 0.5 MG tablet Take 1 tablet by mouth twice daily 60 tablet 0    amLODIPine (NORVASC) 5 MG tablet Take 1 tablet (5 mg total) by mouth once daily. 90 tablet 1    buPROPion (WELLBUTRIN XL) 150 MG TB24 tablet Take 1 tablet (150 mg total) by mouth once daily. 30 tablet 11    dicyclomine (BENTYL) 20 mg tablet Take 1 tablet (20 mg total) by mouth every 6 (six) hours. 120 tablet 0    diphenoxylate-atropine 2.5-0.025 mg (LOMOTIL) 2.5-0.025 mg per tablet Take 1 tablet by mouth 4 (four) times daily as needed for Diarrhea. 60 tablet 1    FLUoxetine 40 MG capsule Take 2 capsules (80 mg total) by mouth once daily. 180 capsule 3    gabapentin (NEURONTIN) 600 MG tablet Take 1 tablet (600 mg total) by mouth 3 (three) times daily. 90 tablet 3    hydrOXYzine HCL (ATARAX) 10 MG Tab Take 1 tablet (10 mg total) by mouth 2 (two) times daily as needed (anxiety). 60 tablet 3    lisinopriL (PRINIVIL,ZESTRIL) 20 MG tablet Take 1 tablet (20 mg total) by mouth nightly. 90 tablet 3    loperamide (IMODIUM) 2 mg capsule Take 2 mg by mouth once as needed for Diarrhea.      multivitamin with minerals tablet Take 1 tablet by mouth once daily.      omeprazole (PRILOSEC) 40 MG capsule Take 1 capsule (40 mg total) by mouth every morning. 90 capsule 3    promethazine (PHENERGAN) 25 MG tablet Take 1 tablet (25 mg total) by mouth every 8 (eight) hours as needed for Nausea. 30 tablet 1    azelastine (ASTELIN) 137 mcg (0.1 %) nasal spray 1 spray (137 mcg total) by Nasal route 2 (two) times daily. (Patient not taking: Reported on 7/18/2023) 30 mL 1    celecoxib (CELEBREX) 100 MG capsule Take 1 capsule (100 mg total) by mouth 2 (two) times daily. (Patient not taking: Reported on 9/11/2023) 60 capsule 6    promethazine-dextromethorphan  "(PROMETHAZINE-DM) 6.25-15 mg/5 mL Syrp Take 5 mLs by mouth every 4 to 6 hours as needed. (Patient not taking: Reported on 9/11/2023) 240 mL 0     No current facility-administered medications on file prior to visit.       Allergies   Review of patient's allergies indicates:   Allergen Reactions    Effexor [venlafaxine]      Elevated her blood pressure    Zoloft [sertraline]     Paroxetine hcl      Made her feel drunk       Review of Systems (Pertinent positives)  Review of Systems   Constitutional:  Negative for chills, fever, malaise/fatigue and weight loss.   HENT:  Negative for hearing loss.    Eyes: Negative.  Negative for discharge.   Respiratory: Negative.  Negative for wheezing.    Cardiovascular: Negative.  Negative for chest pain and palpitations.   Gastrointestinal:  Negative for blood in stool, constipation, diarrhea and vomiting.   Genitourinary:  Negative for dysuria and hematuria.   Musculoskeletal:  Positive for joint pain and neck pain. Negative for back pain, falls and myalgias.   Skin: Negative.    Neurological:  Positive for headaches. Negative for weakness.   Endo/Heme/Allergies:  Negative for polydipsia.       /82 (BP Location: Right arm, Patient Position: Sitting, BP Method: Medium (Manual))   Pulse 89   Temp 97.9 °F (36.6 °C) (Oral)   Resp 16   Ht 5' 5" (1.651 m)   Wt 86.7 kg (191 lb 2.2 oz)   LMP 09/15/2013   SpO2 99%   BMI 31.81 kg/m²     Physical Exam  Vitals reviewed.   Constitutional:       General: She is not in acute distress.     Appearance: Normal appearance. She is not ill-appearing, toxic-appearing or diaphoretic.   Pulmonary:      Effort: Pulmonary effort is normal. No respiratory distress.      Breath sounds: No wheezing.   Musculoskeletal:      Right shoulder: No swelling, tenderness or bony tenderness. Normal range of motion.      Cervical back: Full passive range of motion without pain and normal range of motion. No spinous process tenderness or muscular " tenderness.      Comments: R shoulder - there is pain with empty can testing and resisted external rotation   Skin:     General: Skin is warm and dry.   Neurological:      Mental Status: She is alert and oriented to person, place, and time.   Psychiatric:         Mood and Affect: Mood normal.         Behavior: Behavior normal.          Assessment/Plan:  Naomy Armstrong is a 58 y.o. female who presents today for :    Naomy was seen today for shoulder pain.    Diagnoses and all orders for this visit:    Acute pain of right shoulder  -     ketorolac injection 30 mg    Patient has not taken NSAID yet today.  Will treat with Toradol injection.  Discussed potential causes, including arthritis, rotator cuff tendinitis etc.    Will defer shoulder imaging at that time.  Patient can continue other medications, including resuming p.o. NSAID therapy tomorrow.  Offered referral to physical therapy but patient declined at this time.  Can consider referral to them and/or orthopedics if necessary.      Recommended patient to get her mammogram and cervical cancer screening done.  She declines these today.        This office note has been dictated.  This dictation has been generated using M-Modal Fluency Direct dictation; some phonetic errors may occur.   My collaborating physician is Dr. Joseph Garcia.     Answers submitted by the patient for this visit:  Review of Systems Questionnaire (Submitted on 9/11/2023)  activity change: Yes  unexpected weight change: No  rhinorrhea: No  trouble swallowing: No  visual disturbance: No  chest tightness: No  polyuria: No  difficulty urinating: No  menstrual problem: No  joint swelling: No  arthralgias: No  confusion: No  dysphoric mood: No

## 2023-09-14 ENCOUNTER — PATIENT MESSAGE (OUTPATIENT)
Dept: PSYCHIATRY | Facility: CLINIC | Age: 58
End: 2023-09-14
Payer: COMMERCIAL

## 2023-09-14 ENCOUNTER — PATIENT MESSAGE (OUTPATIENT)
Dept: FAMILY MEDICINE | Facility: CLINIC | Age: 58
End: 2023-09-14
Payer: COMMERCIAL

## 2023-09-20 DIAGNOSIS — M25.511 RIGHT SHOULDER PAIN, UNSPECIFIED CHRONICITY: Primary | ICD-10-CM

## 2023-09-21 ENCOUNTER — OFFICE VISIT (OUTPATIENT)
Dept: ORTHOPEDICS | Facility: CLINIC | Age: 58
End: 2023-09-21
Payer: COMMERCIAL

## 2023-09-21 DIAGNOSIS — M75.21 BICEPS TENDINITIS OF RIGHT SHOULDER: Primary | ICD-10-CM

## 2023-09-21 PROCEDURE — 20550 PR INJECT TENDON SHEATH/LIGAMENT: ICD-10-PCS | Mod: RT,S$GLB,, | Performed by: ORTHOPAEDIC SURGERY

## 2023-09-21 PROCEDURE — 4010F ACE/ARB THERAPY RXD/TAKEN: CPT | Mod: CPTII,S$GLB,, | Performed by: ORTHOPAEDIC SURGERY

## 2023-09-21 PROCEDURE — 99213 OFFICE O/P EST LOW 20 MIN: CPT | Mod: 25,S$GLB,, | Performed by: ORTHOPAEDIC SURGERY

## 2023-09-21 PROCEDURE — 99999 PR PBB SHADOW E&M-EST. PATIENT-LVL III: CPT | Mod: PBBFAC,,, | Performed by: ORTHOPAEDIC SURGERY

## 2023-09-21 PROCEDURE — 1159F MED LIST DOCD IN RCRD: CPT | Mod: CPTII,S$GLB,, | Performed by: ORTHOPAEDIC SURGERY

## 2023-09-21 PROCEDURE — 99213 PR OFFICE/OUTPT VISIT, EST, LEVL III, 20-29 MIN: ICD-10-PCS | Mod: 25,S$GLB,, | Performed by: ORTHOPAEDIC SURGERY

## 2023-09-21 PROCEDURE — 1159F PR MEDICATION LIST DOCUMENTED IN MEDICAL RECORD: ICD-10-PCS | Mod: CPTII,S$GLB,, | Performed by: ORTHOPAEDIC SURGERY

## 2023-09-21 PROCEDURE — 20550 NJX 1 TENDON SHEATH/LIGAMENT: CPT | Mod: RT,S$GLB,, | Performed by: ORTHOPAEDIC SURGERY

## 2023-09-21 PROCEDURE — 1160F PR REVIEW ALL MEDS BY PRESCRIBER/CLIN PHARMACIST DOCUMENTED: ICD-10-PCS | Mod: CPTII,S$GLB,, | Performed by: ORTHOPAEDIC SURGERY

## 2023-09-21 PROCEDURE — 1160F RVW MEDS BY RX/DR IN RCRD: CPT | Mod: CPTII,S$GLB,, | Performed by: ORTHOPAEDIC SURGERY

## 2023-09-21 PROCEDURE — 99999 PR PBB SHADOW E&M-EST. PATIENT-LVL III: ICD-10-PCS | Mod: PBBFAC,,, | Performed by: ORTHOPAEDIC SURGERY

## 2023-09-21 PROCEDURE — 4010F PR ACE/ARB THEARPY RXD/TAKEN: ICD-10-PCS | Mod: CPTII,S$GLB,, | Performed by: ORTHOPAEDIC SURGERY

## 2023-09-21 RX ORDER — TRIAMCINOLONE ACETONIDE 40 MG/ML
40 INJECTION, SUSPENSION INTRA-ARTICULAR; INTRAMUSCULAR
Status: DISCONTINUED | OUTPATIENT
Start: 2023-09-21 | End: 2023-09-21 | Stop reason: HOSPADM

## 2023-09-21 RX ADMIN — TRIAMCINOLONE ACETONIDE 40 MG: 40 INJECTION, SUSPENSION INTRA-ARTICULAR; INTRAMUSCULAR at 09:09

## 2023-09-21 NOTE — PROGRESS NOTES
Assessment: 58 y.o. female with right biceps tendinitis     I explained my diagnostic impression and the reasoning behind it in detail, using layman's terms.      Plan:   - Injection of the right  biceps tendon sheath  performed, please see procedure note for more details.  Prior to the injection risks and benefits of corticosteroid injection were discussed with the patient including pain, infection, bleeding, skin color changes, swelling, steroid flare. We discussed that over time injections can result in chondral damage, acceleration of arthritis formation, damage to tendons and damage to joints.  The patient consented for the procedure.  Post-injection instructions were given to the patient in writing.  - PT referral to [facility]   - Return to clinic in 12 weeks. Return sooner if symptoms worsen or fail to improve.    All questions were answered in detail. The patient is in full agreement with the treatment plan and will proceed accordingly.    Chief Complaint   Patient presents with    Right Shoulder - Pain       Initial visit (9/20/23): Naomy Armstrong is a 58 y.o. female who presents today complaining of right shoulder pain  Duration of symptoms:  several years- pain has been intermittent. She has had constant severe pain for the last two weeks  Trauma or new activity: no  Pain is constant  Aggravating factors: Reaching overhead is somewhat painful, abduction, lifting, turning over at night  Relieving factors: rst  Night pain is present and is disruptive to sleep  Radicular symptoms: no numbness, paresthesias   Associated symptoms:  limited range of motion.    Prior treatment:  meloxicam without relief, celebrex, advil without improvement in pain.     Pain does interfere with sleep and activities of daily living .  All pain the last to the anterior shoulder, no pain localized over the subdeltoid fossa.  Does not radiate to the lateral upper arm      This is the extent of the patient's complaints at  this time.      Review of patient's allergies indicates:   Allergen Reactions    Effexor [venlafaxine]      Elevated her blood pressure    Zoloft [sertraline]     Paroxetine hcl      Made her feel drunk         Physical Exam:   Vitals:    09/21/23 0911   PainSc:   8   PainLoc: Shoulder       General:   There is no height or weight on file to calculate BMI.  Patient is alert, awake and oriented to time, place and person. Mood and affect are appropriate.  Patient does not appear to be in any distress, denies any constitutional symptoms and appears stated age.   HEENT: Pupils are equal and round, sclera are not injected. External examination of ears and nose reveals no abnormalities. Cranial nerves II-X are grossly intact  Skin: no rashes, abrasions or open wounds on the affected extremity   Resp: No respiratory distress or audible wheezing   CV: 2+  pulses, all extremities warm and well perfused   Right Shoulder    Shoulder Range of Motion    Right     Left   (Active/Passive)       Forward Elevation     165/165             165/165  External rotation (arm at side)  45/45             45/45   Internal rotation behind the back  L3             L3     Range of motion is painful  - anterior pain    Acromioclavicular joint is not tender  Crossbody test: negative    Neer's negative  Hawkin's negative    Kofi's negative  Drop arm negative  Belly press negative      Cuff Strength     Right     Left   Supraspinatus        5/5    5/5  Infraspinatus     5/5    5/5  Subscapularis     5/5    5/5    Deltoid testing            5/5    5/5    Stephens's test negative  Speeds positive  Yergasons positive  TTP over bicipital groove    Elbow examination demonstrates no tenderness to palpation and has normal range of motion.     ltsi C5-T1  + epl, io, fds, fdp   2+ RP      Imaging:  3 views of the right shoulder:  positive for degenerative changes of the AC joint. The humeral head is well centered on the AP and axillary views.  No cortical  changes of the greater tuberosity. There is not significant degenerative change of the glenohumeral joint or posterior subluxation of the humeral head. No acute changes or fracture.      I personally reviewed and interpreted the patient's imaging obtained today in clinic    This note was created by combination of typed  and M-Modal dictation. Transcription and phonetic errors may be present.  If there are any questions, please contact me.      Current Outpatient Medications:     alosetron (LOTRONEX) 0.5 MG tablet, Take 1 tablet by mouth twice daily, Disp: 60 tablet, Rfl: 0    amLODIPine (NORVASC) 5 MG tablet, Take 1 tablet (5 mg total) by mouth once daily., Disp: 90 tablet, Rfl: 1    buPROPion (WELLBUTRIN XL) 150 MG TB24 tablet, Take 1 tablet (150 mg total) by mouth once daily., Disp: 30 tablet, Rfl: 11    celecoxib (CELEBREX) 100 MG capsule, Take 1 capsule (100 mg total) by mouth 2 (two) times daily., Disp: 60 capsule, Rfl: 6    dicyclomine (BENTYL) 20 mg tablet, Take 1 tablet (20 mg total) by mouth every 6 (six) hours., Disp: 120 tablet, Rfl: 0    diphenoxylate-atropine 2.5-0.025 mg (LOMOTIL) 2.5-0.025 mg per tablet, Take 1 tablet by mouth 4 (four) times daily as needed for Diarrhea., Disp: 60 tablet, Rfl: 1    FLUoxetine 40 MG capsule, Take 2 capsules (80 mg total) by mouth once daily., Disp: 180 capsule, Rfl: 3    gabapentin (NEURONTIN) 600 MG tablet, Take 1 tablet (600 mg total) by mouth 3 (three) times daily., Disp: 90 tablet, Rfl: 3    hydrOXYzine HCL (ATARAX) 10 MG Tab, Take 1 tablet (10 mg total) by mouth 2 (two) times daily as needed (anxiety)., Disp: 60 tablet, Rfl: 3    lisinopriL (PRINIVIL,ZESTRIL) 20 MG tablet, Take 1 tablet (20 mg total) by mouth nightly., Disp: 90 tablet, Rfl: 3    loperamide (IMODIUM) 2 mg capsule, Take 2 mg by mouth once as needed for Diarrhea., Disp: , Rfl:     multivitamin with minerals tablet, Take 1 tablet by mouth once daily., Disp: , Rfl:     omeprazole (PRILOSEC)  40 MG capsule, Take 1 capsule (40 mg total) by mouth every morning., Disp: 90 capsule, Rfl: 3    promethazine (PHENERGAN) 25 MG tablet, Take 1 tablet (25 mg total) by mouth every 8 (eight) hours as needed for Nausea., Disp: 30 tablet, Rfl: 1    promethazine-dextromethorphan (PROMETHAZINE-DM) 6.25-15 mg/5 mL Syrp, Take 5 mLs by mouth every 4 to 6 hours as needed., Disp: 240 mL, Rfl: 0    azelastine (ASTELIN) 137 mcg (0.1 %) nasal spray, 1 spray (137 mcg total) by Nasal route 2 (two) times daily. (Patient not taking: Reported on 7/18/2023), Disp: 30 mL, Rfl: 1    Past Medical History:   Diagnosis Date    Alcohol abuse     per chart, but patient denies today and cocaine per chart    Anxiety     Colon polyp     Depression     Hallucination     last couple of months started seeing someone standing in her room, saw boyfriend's reflection in glass    Headache     History of psychiatric hospitalization     age 13 [facility] for 2 months for acting out; 2018 for SI    Hx of psychiatric care     Hypertension     Psychiatric problem     Sleep difficulties     Suicide attempt     with knife by cutting stomach    Therapy        Active Problem List with Overview Notes    Diagnosis Date Noted    Generalized anxiety disorder 11/27/2022    Esophagogastric junction outflow obstruction 11/22/2022    Epigastric pain 06/07/2022    Major depressive disorder, recurrent episode, in partial remission 02/09/2022    Cervical spondylosis 08/11/2021    Hiatal hernia 07/09/2021    Irritable bowel syndrome with diarrhea 07/09/2021    SOB (shortness of breath)     Lumbosacral spondylosis 04/16/2021    Sacroiliac joint pain 03/31/2021    Spondylosis of lumbosacral region 03/31/2021    Chronic back pain greater than 3 months duration 07/16/2020    Decreased strength of lower extremity 06/01/2020    Decreased strength of trunk and back 06/01/2020    Pain in joint of right hip 06/01/2020    Decreased range of motion of right lower extremity 06/01/2020     Bilateral primary osteoarthritis of knee 03/12/2020    Lumbar spondylosis 01/13/2020    DDD (degenerative disc disease), lumbar 01/13/2020    Psychosocial distress 12/20/2019    Spondylosis of cervical region without myelopathy or radiculopathy 07/09/2019    Primary osteoarthritis of right knee 06/24/2019    Neck pain 06/24/2019    Cervical radiculopathy 04/26/2013     X-ray cervical spine and EMG/nerve conduction studies      DDD (degenerative disc disease), cervical 04/26/2013    Cervicogenic headache 04/26/2013     Patient has cervicogenic headaches which we will address with a combination of heat/ice application, muscle relaxers, and potentially anti-inflammatories.          Past Surgical History:   Procedure Laterality Date    COLONOSCOPY N/A 5/7/2021    Procedure: COLONOSCOPY;  Surgeon: Prem Montana MD;  Location: [facility];  Service: Endoscopy;  Laterality: N/A;  COVID screening 5/4 LAP[facility]-instr portal -ml    COLONOSCOPY N/A 6/6/2022    Procedure: COLONOSCOPY;  Surgeon: Fab Shepherd MD;  [facility]  Service: Endoscopy;  Laterality: N/A;  C-Diff neg  Rapid COVID    EPIDURAL STEROID INJECTION Bilateral 8/5/2020    Procedure: Bilateral MBB @ L3, L4, L5;  Surgeon: Jeremy Powell Jr., MD;  Location: [facility];  Service: Pain Management;  Laterality: Bilateral;  Bilat L3-5 MBB  Arrive @ 1315; No ATC or DM; Needs MD signature    EPIDURAL STEROID INJECTION Right 8/19/2020    Procedure: Lumbar Radiofrequency Thermocoagulation of Medial Branches;  Surgeon: Jeremy Powell Jr., MD;  Location: [facility];  Service: Pain Management;  Laterality: Right;  Right L3-5 RFA  Arrive @ 1045; No ATC or DM; Needs MD Signature    EPIDURAL STEROID INJECTION Left 9/4/2020    Procedure: Lumbar Radiofrequency Thermocoagulation of Medial Branches;  Surgeon: Jeremy Powell Jr., MD;  Location: [facility];  Service: Pain Management;  Laterality: Left;  Left L3-5 RFA  Arrive @ 0900 (requested); No ATC  or DM; Needs MD signature    EPIDURAL STEROID INJECTION Bilateral 4/16/2021    Procedure: Sacroiliac Joint Steroid Injections @ Bilateral;  Surgeon: Jeremy Powell Jr., MD;  Location: [facility];  Service: Pain Management;  Laterality: Bilateral;  Arrive @ 1300; No ATC or DM    EPIDURAL STEROID INJECTION Bilateral 1/19/2022    Procedure: Bilateral Sacroiliac Joint Injections;  Surgeon: Jeremy Powell Jr., MD;  Location: [facility];  Service: Pain Management;  Laterality: Bilateral;  Arrive @ 1315; No ATC or DM; Needs Consent    EPIDURAL STEROID INJECTION Bilateral 7/28/2023    Procedure: Bilateral L2, L3, L4 Medial Branch Blocks #1;  Surgeon: Jeremy Powell Jr., MD;  Location: [facility];  Service: Pain Management;  Laterality: Bilateral;  @1200  No ATC or DM    EPIDURAL STEROID INJECTION Bilateral 8/11/2023    Procedure: Bilateral L2, L3, L4 Medial Branch Blocks #2;  Surgeon: Jeremy Powell Jr., MD;  Location: [facility];  Service: Pain Management;  Laterality: Bilateral;  @1330  No ATC or DM    ESOPHAGEAL MANOMETRY WITH MEASUREMENT OF IMPEDANCE N/A 10/20/2021    Procedure: MANOMETRY, ESOPHAGUS, WITH IMPEDANCE MEASUREMENT;  Surgeon: Anastacia Odonnell MD;  Location: [facility] (4TH FLR);  Service: Endoscopy;  Laterality: N/A;  Covid test 10/17 [place], instructions sent to [id number]    ESOPHAGOGASTRODUODENOSCOPY N/A 5/7/2021    Procedure: EGD (ESOPHAGOGASTRODUODENOSCOPY);  Surgeon: Prem Montana MD;  Location: [facility];  Service: Endoscopy;  Laterality: N/A;  added on per Dr. NOEL Shepherd    LAPAROSCOPIC TOUPET FUNDOPLICATION N/A 2/14/2022    Procedure: FUNDOPLICATION, LAPAROSCOPIC, TOUPET;  Surgeon: Christian Martinez MD;  Location: [facility] F[facility];  Service: General;  Laterality: N/A;       Social History     Socioeconomic History    Marital status:     Number of children: 0   Occupational History    Occupation: Overnight nuvia     Employer: [employer]   Tobacco Use    Smoking  status: Former     Current packs/day: 0.00     Types: Cigarettes     Quit date: 2006     Years since quittin.4    Smokeless tobacco: Never   Substance and Sexual Activity    Alcohol use: Yes     Comment: occasional    Drug use: Not Currently     Types: Marijuana     Comment: tried at age 14    Sexual activity: Yes     Partners: Male     Comment: going through menopause   Other Topics Concern    Patient feels they ought to cut down on drinking/drug use No    Patient annoyed by others criticizing their drinking/drug use No    Patient has felt bad or guilty about drinking/drug use No    Patient has had a drink/used drugs as an eye opener in the AM No   Social History Narrative     x 2.    Lives with boyfriend.     Works at [employer] as an .    Presently working on Bachelor's degree.    Has associates degree in criminal justice.     Social Determinants of Health     Financial Resource Strain: Unknown (2023)    Overall Financial Resource Strain (CARDIA)     Difficulty of Paying Living Expenses: Patient refused   Food Insecurity: Food Insecurity Present (2023)    Hunger Vital Sign     Worried About Running Out of Food in the Last Year: Sometimes true     Ran Out of Food in the Last Year: Sometimes true   Transportation Needs: No Transportation Needs (2023)    PRAPARE - Transportation     Lack of Transportation (Medical): No     Lack of Transportation (Non-Medical): No   Physical Activity: Unknown (2023)    Exercise Vital Sign     Days of Exercise per Week: Patient refused     Minutes of Exercise per Session: Patient refused   Stress: No Stress Concern Present (2023)    [employer] [place] [employer] - Occupational Stress Questionnaire     Feeling of Stress : Only a little   Recent Concern: Stress - Stress Concern Present (2023)    [place] [place] [employer] - Occupational Stress Questionnaire     Feeling of Stress : To some extent    Social Connections: Unknown (9/21/2023)    Social Connection and Isolation Panel [NHANES]     Frequency of Communication with Friends and Family: More than three times a week     Frequency of Social Gatherings with Friends and Family: Patient refused     Active Member of Clubs or Organizations: No     Attends Club or Organization Meetings: Patient refused     Marital Status: Patient refused   Housing Stability: Unknown (9/21/2023)    Housing Stability Vital Sign     Unable to Pay for Housing in the Last Year: Patient refused     Number of Places Lived in the Last Year: 1     Unstable Housing in the Last Year: No   Recent Concern: Housing Stability - High Risk (7/17/2023)    Housing Stability Vital Sign     Unable to Pay for Housing in the Last Year: Yes     Number of Places Lived in the Last Year: 1     Unstable Housing in the Last Year: No

## 2023-09-21 NOTE — PROCEDURES
Large Joint Aspiration/Injection    Date/Time: 9/21/2023 9:00 AM    Performed by: Jessica Shay MD  Authorized by: Jessica Shay MD    Consent Done?:  Yes (Verbal)  Indications:  Pain  Location:  Shoulder (R biceps tendon sheath)  Medications:  40 mg triamcinolone acetonide 40 mg/mL  Patient tolerance:  Patient tolerated the procedure well with no immediate complications

## 2023-10-01 DIAGNOSIS — K58.0 IRRITABLE BOWEL SYNDROME WITH DIARRHEA: ICD-10-CM

## 2023-10-02 ENCOUNTER — PATIENT OUTREACH (OUTPATIENT)
Dept: ADMINISTRATIVE | Facility: OTHER | Age: 58
End: 2023-10-02

## 2023-10-02 ENCOUNTER — OFFICE VISIT (OUTPATIENT)
Dept: RHEUMATOLOGY | Facility: CLINIC | Age: 58
End: 2023-10-02
Payer: COMMERCIAL

## 2023-10-02 VITALS
HEART RATE: 89 BPM | DIASTOLIC BLOOD PRESSURE: 74 MMHG | SYSTOLIC BLOOD PRESSURE: 114 MMHG | BODY MASS INDEX: 31.77 KG/M2 | WEIGHT: 190.69 LBS | OXYGEN SATURATION: 97 % | HEIGHT: 65 IN

## 2023-10-02 DIAGNOSIS — Z71.89 COUNSELING AND COORDINATION OF CARE: ICD-10-CM

## 2023-10-02 DIAGNOSIS — E66.9 OBESITY, UNSPECIFIED CLASSIFICATION, UNSPECIFIED OBESITY TYPE, UNSPECIFIED WHETHER SERIOUS COMORBIDITY PRESENT: ICD-10-CM

## 2023-10-02 DIAGNOSIS — M15.9 PRIMARY OSTEOARTHRITIS INVOLVING MULTIPLE JOINTS: Primary | ICD-10-CM

## 2023-10-02 DIAGNOSIS — Z79.1 NSAID LONG-TERM USE: ICD-10-CM

## 2023-10-02 PROCEDURE — 3078F DIAST BP <80 MM HG: CPT | Mod: CPTII,S$GLB,, | Performed by: INTERNAL MEDICINE

## 2023-10-02 PROCEDURE — 99999 PR PBB SHADOW E&M-EST. PATIENT-LVL IV: ICD-10-PCS | Mod: PBBFAC,,, | Performed by: INTERNAL MEDICINE

## 2023-10-02 PROCEDURE — 3074F PR MOST RECENT SYSTOLIC BLOOD PRESSURE < 130 MM HG: ICD-10-PCS | Mod: CPTII,S$GLB,, | Performed by: INTERNAL MEDICINE

## 2023-10-02 PROCEDURE — 99999 PR PBB SHADOW E&M-EST. PATIENT-LVL IV: CPT | Mod: PBBFAC,,, | Performed by: INTERNAL MEDICINE

## 2023-10-02 PROCEDURE — 4010F ACE/ARB THERAPY RXD/TAKEN: CPT | Mod: CPTII,S$GLB,, | Performed by: INTERNAL MEDICINE

## 2023-10-02 PROCEDURE — 99214 OFFICE O/P EST MOD 30 MIN: CPT | Mod: S$GLB,,, | Performed by: INTERNAL MEDICINE

## 2023-10-02 PROCEDURE — 99214 PR OFFICE/OUTPT VISIT, EST, LEVL IV, 30-39 MIN: ICD-10-PCS | Mod: S$GLB,,, | Performed by: INTERNAL MEDICINE

## 2023-10-02 PROCEDURE — 1159F PR MEDICATION LIST DOCUMENTED IN MEDICAL RECORD: ICD-10-PCS | Mod: CPTII,S$GLB,, | Performed by: INTERNAL MEDICINE

## 2023-10-02 PROCEDURE — 1159F MED LIST DOCD IN RCRD: CPT | Mod: CPTII,S$GLB,, | Performed by: INTERNAL MEDICINE

## 2023-10-02 PROCEDURE — 3008F PR BODY MASS INDEX (BMI) DOCUMENTED: ICD-10-PCS | Mod: CPTII,S$GLB,, | Performed by: INTERNAL MEDICINE

## 2023-10-02 PROCEDURE — 3078F PR MOST RECENT DIASTOLIC BLOOD PRESSURE < 80 MM HG: ICD-10-PCS | Mod: CPTII,S$GLB,, | Performed by: INTERNAL MEDICINE

## 2023-10-02 PROCEDURE — 4010F PR ACE/ARB THEARPY RXD/TAKEN: ICD-10-PCS | Mod: CPTII,S$GLB,, | Performed by: INTERNAL MEDICINE

## 2023-10-02 PROCEDURE — 3074F SYST BP LT 130 MM HG: CPT | Mod: CPTII,S$GLB,, | Performed by: INTERNAL MEDICINE

## 2023-10-02 PROCEDURE — 3008F BODY MASS INDEX DOCD: CPT | Mod: CPTII,S$GLB,, | Performed by: INTERNAL MEDICINE

## 2023-10-02 RX ORDER — CYCLOBENZAPRINE HCL 5 MG
5 TABLET ORAL
COMMUNITY
Start: 2023-09-21 | End: 2023-10-28

## 2023-10-02 RX ORDER — ALOSETRON HYDROCHLORIDE 0.5 MG/1
TABLET ORAL
Qty: 60 TABLET | Refills: 0 | Status: SHIPPED | OUTPATIENT
Start: 2023-10-02 | End: 2024-01-13 | Stop reason: SDUPTHER

## 2023-10-02 RX ORDER — MELOXICAM 15 MG/1
15 TABLET ORAL
COMMUNITY
Start: 2023-09-21

## 2023-10-02 NOTE — PROGRESS NOTES
RHEUMATOLOGY OUTPATIENT CLINIC NOTE    10/2/2023    Attending Rheumatologist: Jung Hutchison  Primary Care Provider: Eric Hernandes Jr., MD   Physician Requesting Consultation: No referring provider defined for this encounter.  Chief Complaint/Reason For Consultation:  Osteoarthritis and Pain      Subjective:       HPI  Naomy Armstrong is a 58 y.o. White female with medical history noted below presents for evaluation of arthralgias.  Patient was sent from Orthopedics for possible COVID related arthralgias.  She notes she has been following with Orthopedics and Pain Management for osteoarthritis has had multiple injections in the past and pain has been under control.  Does she developed COVID the end of January in afterwards noted that her right knee flared up.  Saw Orthopedics and was given steroid shot February 24th, typically shots will result the pain, though this time she notes that the pain has persisted.  She notes in addition to pain swelling.  Has taken some Aleve with relief occasionally.  Also notes some right shoulder pain as well.  Denies rash, fatigue, weight loss or other systemic symptoms.    Today  Patient here for follow up.   Last visit NSAIDs continued for OA and given CSI. She notes she has been doing well, notes occasional locking/buckling of her left knee, otherwise stable, would like to defer CSI at the moment.     Review of Systems   Constitutional:  Negative for appetite change, chills, fatigue, fever and unexpected weight change.   HENT:  Negative for nasal congestion, ear discharge, ear pain, hearing loss, mouth sores, nosebleeds, sneezing, sore throat, tinnitus and trouble swallowing.    Eyes:  Negative for photophobia, pain, discharge, redness, itching and visual disturbance.   Respiratory:  Negative for cough, chest tightness, shortness of breath and wheezing.    Cardiovascular:  Negative for chest pain, palpitations and leg swelling.   Gastrointestinal:  Negative  for abdominal distention, abdominal pain, blood in stool, constipation, diarrhea, nausea and vomiting.   Endocrine: Negative for cold intolerance, heat intolerance, polydipsia, polyphagia and polyuria.   Genitourinary:  Negative for difficulty urinating, dyspareunia, dysuria, flank pain, frequency, genital sores, hematuria, menstrual problem, pelvic pain, urgency, vaginal bleeding, vaginal discharge, vaginal pain and vaginal dryness.   Musculoskeletal:  Positive for arthralgias. Negative for back pain, gait problem, joint swelling, leg pain, myalgias, neck pain, neck stiffness and joint deformity.   Integumentary:  Negative for pallor and rash.   Neurological:  Positive for numbness. Negative for dizziness, seizures, weakness, light-headedness and headaches.   Hematological:  Negative for adenopathy. Does not bruise/bleed easily.   Psychiatric/Behavioral:  Negative for confusion, decreased concentration and sleep disturbance. The patient is not nervous/anxious.    All other systems reviewed and are negative.       Chronic comorbid conditions affecting medical decision making today:  Past Medical History:   Diagnosis Date    Alcohol abuse     per chart, but patient denies today and cocaine per chart    Anxiety     Colon polyp     Depression     Hallucination     last couple of months started seeing someone standing in her room, saw boyfriend's reflection in glass    Headache     History of psychiatric hospitalization     age 13 [facility] for 2 months for acting out; 2018 for SI    Hx of psychiatric care     Hypertension     Psychiatric problem     Sleep difficulties     Suicide attempt     with knife by cutting stomach    Therapy      Past Surgical History:   Procedure Laterality Date    COLONOSCOPY N/A 5/7/2021    Procedure: COLONOSCOPY;  Surgeon: Prem Montana MD;  Location: [facility];  Service: Endoscopy;  Laterality: N/A;  COVID screening 5/4 LAPC-instr portal -ml    COLONOSCOPY N/A 6/6/2022    Procedure:  COLONOSCOPY;  Surgeon: Fab Shepherd MD;  [facility]  Service: Endoscopy;  Laterality: N/A;  C-Diff neg  Rapid COVID    EPIDURAL STEROID INJECTION Bilateral 8/5/2020    Procedure: Bilateral MBB @ L3, L4, L5;  Surgeon: Jeremy Powell Jr., MD;  Location: [facility];  Service: Pain Management;  Laterality: Bilateral;  Bilat L3-5 MBB  Arrive @ 1315; No ATC or DM; Needs MD signature    EPIDURAL STEROID INJECTION Right 8/19/2020    Procedure: Lumbar Radiofrequency Thermocoagulation of Medial Branches;  Surgeon: Jeremy Powell Jr., MD;  Location: [facility] ENDO;  Service: Pain Management;  Laterality: Right;  Right L3-5 RFA  Arrive @ 1045; No ATC or DM; Needs MD Signature    EPIDURAL STEROID INJECTION Left 9/4/2020    Procedure: Lumbar Radiofrequency Thermocoagulation of Medial Branches;  Surgeon: Jeremy Powell Jr., MD;  Location: [facility] ENDO;  Service: Pain Management;  Laterality: Left;  Left L3-5 RFA  Arrive @ 0900 (requested); No ATC or DM; Needs MD signature    EPIDURAL STEROID INJECTION Bilateral 4/16/2021    Procedure: Sacroiliac Joint Steroid Injections @ Bilateral;  Surgeon: Jeremy Powell Jr., MD;  Location: [facility];  Service: Pain Management;  Laterality: Bilateral;  Arrive @ 1300; No ATC or DM    EPIDURAL STEROID INJECTION Bilateral 1/19/2022    Procedure: Bilateral Sacroiliac Joint Injections;  Surgeon: Jeremy Powell Jr., MD;  Location: [facility];  Service: Pain Management;  Laterality: Bilateral;  Arrive @ 1315; No ATC or DM; Needs Consent    EPIDURAL STEROID INJECTION Bilateral 7/28/2023    Procedure: Bilateral L2, L3, L4 Medial Branch Blocks #1;  Surgeon: Jeremy Powell Jr., MD;  Location: [facility] ENDO;  Service: Pain Management;  Laterality: Bilateral;  @1200  No ATC or DM    EPIDURAL STEROID INJECTION Bilateral 8/11/2023    Procedure: Bilateral L2, L3, L4 Medial Branch Blocks #2;  Surgeon: Jeremy Powell Jr., MD;  Location: [facility];  Service: Pain  Management;  Laterality: Bilateral;  @1330  No ATC or DM    ESOPHAGEAL MANOMETRY WITH MEASUREMENT OF IMPEDANCE N/A 10/20/2021    Procedure: MANOMETRY, ESOPHAGUS, WITH IMPEDANCE MEASUREMENT;  Surgeon: Anastacia Odonnell MD;  Location: [facility] (4TH FLR);  Service: Endoscopy;  Laterality: N/A;  Covid test 10/17 [place], instructions sent to [id number]    ESOPHAGOGASTRODUODENOSCOPY N/A 2021    Procedure: EGD (ESOPHAGOGASTRODUODENOSCOPY);  Surgeon: Prem Montana MD;  Location: [facility] ENDO;  Service: Endoscopy;  Laterality: N/A;  added on per Dr. NOEL Shepherd    LAPAROSCOPIC TOUPET FUNDOPLICATION N/A 2022    Procedure: FUNDOPLICATION, LAPAROSCOPIC, TOUPET;  Surgeon: Christian Martinez MD;  Location: [facility] OR 2ND FLR;  Service: General;  Laterality: N/A;     Family History   Problem Relation Age of Onset    Anxiety disorder Sister     Depression Sister     Alcohol abuse Maternal Uncle     Alcohol abuse Maternal Grandfather      Social History     Substance and Sexual Activity   Alcohol Use Yes    Comment: occasional     Social History     Tobacco Use   Smoking Status Former    Current packs/day: 0.00    Types: Cigarettes    Quit date: 2006    Years since quittin.4   Smokeless Tobacco Never     Social History     Substance and Sexual Activity   Drug Use Not Currently    Types: Marijuana    Comment: tried at age 14       Current Outpatient Medications:     alosetron (LOTRONEX) 0.5 MG tablet, Take 1 tablet by mouth twice daily, Disp: 60 tablet, Rfl: 0    amLODIPine (NORVASC) 5 MG tablet, Take 1 tablet (5 mg total) by mouth once daily., Disp: 90 tablet, Rfl: 1    buPROPion (WELLBUTRIN XL) 150 MG TB24 tablet, Take 1 tablet (150 mg total) by mouth once daily., Disp: 30 tablet, Rfl: 11    celecoxib (CELEBREX) 100 MG capsule, Take 1 capsule (100 mg total) by mouth 2 (two) times daily., Disp: 60 capsule, Rfl: 6    dicyclomine (BENTYL) 20 mg tablet, Take 1 tablet (20 mg total) by mouth every 6 (six) hours.,  Disp: 120 tablet, Rfl: 0    diphenoxylate-atropine 2.5-0.025 mg (LOMOTIL) 2.5-0.025 mg per tablet, Take 1 tablet by mouth 4 (four) times daily as needed for Diarrhea., Disp: 60 tablet, Rfl: 1    FLUoxetine 40 MG capsule, Take 2 capsules (80 mg total) by mouth once daily., Disp: 180 capsule, Rfl: 3    gabapentin (NEURONTIN) 600 MG tablet, Take 1 tablet (600 mg total) by mouth 3 (three) times daily., Disp: 90 tablet, Rfl: 3    hydrOXYzine HCL (ATARAX) 10 MG Tab, Take 1 tablet (10 mg total) by mouth 2 (two) times daily as needed (anxiety)., Disp: 60 tablet, Rfl: 3    lisinopriL (PRINIVIL,ZESTRIL) 20 MG tablet, Take 1 tablet (20 mg total) by mouth nightly., Disp: 90 tablet, Rfl: 3    loperamide (IMODIUM) 2 mg capsule, Take 2 mg by mouth once as needed for Diarrhea., Disp: , Rfl:     multivitamin with minerals tablet, Take 1 tablet by mouth once daily., Disp: , Rfl:     omeprazole (PRILOSEC) 40 MG capsule, Take 1 capsule (40 mg total) by mouth every morning., Disp: 90 capsule, Rfl: 3    promethazine (PHENERGAN) 25 MG tablet, Take 1 tablet (25 mg total) by mouth every 8 (eight) hours as needed for Nausea., Disp: 30 tablet, Rfl: 1    azelastine (ASTELIN) 137 mcg (0.1 %) nasal spray, 1 spray (137 mcg total) by Nasal route 2 (two) times daily. (Patient not taking: Reported on 7/18/2023), Disp: 30 mL, Rfl: 1    cyclobenzaprine (FLEXERIL) 5 MG tablet, Take 5 mg by mouth., Disp: , Rfl:     meloxicam (MOBIC) 15 MG tablet, Take 15 mg by mouth., Disp: , Rfl:     promethazine-dextromethorphan (PROMETHAZINE-DM) 6.25-15 mg/5 mL Syrp, Take 5 mLs by mouth every 4 to 6 hours as needed. (Patient not taking: Reported on 10/2/2023), Disp: 240 mL, Rfl: 0     Objective:         Vitals:    10/02/23 1605   BP: 114/74   Pulse: 89     Physical Exam   Constitutional: She is oriented to person, place, and time.   HENT:   Head: Normocephalic and atraumatic.   Right Ear: External ear normal.   Left Ear: External ear normal.   Nose: Nose normal.    Mouth/Throat: Oropharynx is clear and moist.   Eyes: Pupils are equal, round, and reactive to light. Conjunctivae are normal.   Cardiovascular: Normal rate and regular rhythm.   Pulmonary/Chest: Effort normal and breath sounds normal.   Abdominal: Soft. Bowel sounds are normal.   Musculoskeletal:      Right shoulder: Normal.      Left shoulder: Normal.      Right elbow: Normal.      Left elbow: Normal.      Right wrist: Normal.      Left wrist: Normal.      Cervical back: Normal range of motion and neck supple.      Right knee: Normal.      Left knee: Normal.   Neurological: She is alert and oriented to person, place, and time.   Skin: No rash noted. No erythema.   Psychiatric: Mood and affect normal.       Right Side Rheumatological Exam     Examination finds the shoulder, elbow, wrist, knee, 1st PIP, 1st MCP, 2nd PIP, 2nd MCP, 3rd PIP, 3rd MCP, 4th PIP, 4th MCP, 5th PIP and 5th MCP normal.    Knee Exam   Patellofemoral Crepitus: positive    Left Side Rheumatological Exam     Examination finds the shoulder, elbow, wrist, knee, 1st PIP, 1st MCP, 2nd PIP, 2nd MCP, 3rd PIP, 3rd MCP, 4th PIP, 4th MCP, 5th PIP and 5th MCP normal.    Knee Exam     Patellofemoral Crepitus: positive          Reviewed old and all outside pertinent medical records available.    All lab results personally reviewed and interpreted by me.  Lab Results   Component Value Date    WBC 6.30 07/28/2023    HGB 13.6 07/28/2023    HCT 44.4 07/28/2023    MCV 94 07/28/2023    MCH 28.7 07/28/2023    MCHC 30.6 (L) 07/28/2023    RDW 13.2 07/28/2023     07/28/2023    MPV 9.1 (L) 07/28/2023       Lab Results   Component Value Date     07/28/2023    K 5.2 (H) 07/28/2023     07/28/2023    CO2 31 (H) 07/28/2023     07/28/2023    BUN 16 07/28/2023    CALCIUM 10.0 07/28/2023    PROT 6.7 07/28/2023    ALBUMIN 3.8 07/28/2023    BILITOT 0.3 07/28/2023    AST 13 07/28/2023    ALKPHOS 123 07/28/2023    ALT 17 07/28/2023       Lab Results  "  Component Value Date    COLORU Colorless (A) 12/20/2019    APPEARANCEUA Clear 12/20/2019    SPECGRAV 1.005 12/20/2019    PHUR 6.0 12/20/2019    PROTEINUA Negative 12/20/2019    KETONESU Negative 12/20/2019    LEUKOCYTESUR Negative 12/20/2019    NITRITE Negative 12/20/2019    UROBILINOGEN Negative 12/20/2019       Lab Results   Component Value Date    CRP 6.3 03/16/2021       Lab Results   Component Value Date    SEDRATE 12 03/16/2021       Lab Results   Component Value Date    SEDRATE 12 03/16/2021       No components found for: "25OHVITDTOT", "43BOUZQT6", "52AYIIVD4", "METHODNOTE"    No results found for: "URICACID"    No components found for: "TSPOTTB"        Imaging:  All imaging reviewed and independently interpreted by me.         ASSESSMENT / PLAN:     Naomy Armstrong is a 58 y.o. White female with:      1. Primary osteoarthritis of both knees  - patient with know diagnosis of OA  - chronic  - NSAIDs PRN  - will let me know when she wants CSI   - follow up with Pain  - wt loss  - reassurance and exercise    2.  Chronic NSAID use  - no history of GI bleed or coronary artery disease.  - previous labs without features of CKD.  - clinical significance side effect of long-term NSAID use discussed in detail.  - recommended for alternative therapies for pain management.    3. Other specified counseling  - over 10 minutes spent regarding below topics:  - Immunization counseling done.  - Weight loss counseling done.  - Nutrition and exercise counseling.  - Limitation of alcohol consumption.  - Regular exercise:  Aerobic and resistance.  - Medication counseling provided.    4. Obesity  - would benefit from decreasing at least 10% of body weight.  - recommended goal of losing 1 lb per week.  - consider nutritionist evaluation.      No follow-ups on file.    Method of contact with patient concerns: Mirella attn Rheumatology    Disclaimer:  This note is prepared using voice recognition software and as such is " likely to have errors and has not been proof read. Please contact me for questions.     Time spent: 30 minutes in face to face discussion concerning diagnosis, prognosis, review of lab and test results, benefits of treatment as well as management of disease, counseling of patient and coordination of care between various health care providers.  Greater than half the time spent was used for coordination of care and counseling of patient.    Jung Hutchison M.D.  Rheumatology Department   [facility]

## 2023-10-09 ENCOUNTER — PATIENT MESSAGE (OUTPATIENT)
Dept: PSYCHIATRY | Facility: CLINIC | Age: 58
End: 2023-10-09
Payer: COMMERCIAL

## 2023-10-10 ENCOUNTER — TELEPHONE (OUTPATIENT)
Dept: PSYCHIATRY | Facility: CLINIC | Age: 58
End: 2023-10-10
Payer: COMMERCIAL

## 2023-10-17 ENCOUNTER — PATIENT MESSAGE (OUTPATIENT)
Dept: ADMINISTRATIVE | Facility: HOSPITAL | Age: 58
End: 2023-10-17
Payer: COMMERCIAL

## 2023-10-23 ENCOUNTER — PATIENT MESSAGE (OUTPATIENT)
Dept: ADMINISTRATIVE | Facility: OTHER | Age: 58
End: 2023-10-23
Payer: COMMERCIAL

## 2023-10-23 ENCOUNTER — TELEPHONE (OUTPATIENT)
Dept: FAMILY MEDICINE | Facility: CLINIC | Age: 58
End: 2023-10-23
Payer: COMMERCIAL

## 2023-10-23 NOTE — PROGRESS NOTES
CHW - Outreach Attempt    Community Health Worker left a In Basket message for 1st attempt to contact patient regarding: SDOH  Community Health Worker to attempt to contact patient on: 11/8  CHW - Outreach Attempt    Community Health Worker left a voicemail message for 2nd attempt to contact patient regarding: SDOH  Community Health Worker to attempt to contact patient on: 11/22  CHW - Case Closure    This Community Health Worker spoke to patient via telephone today.   Pt/Caregiver reported: Pt declined needing any assistance at this time, SDOH was completed updated verified by CHW. Case Closed  Pt/Caregiver denied any additional needs at this time and agrees with episode closure at this time.  Provided patient with Community Health Worker's contact information and encouraged him/her to contact this Community Health Worker if additional needs arise.

## 2023-10-25 DIAGNOSIS — M51.36 DDD (DEGENERATIVE DISC DISEASE), LUMBAR: Chronic | ICD-10-CM

## 2023-10-25 DIAGNOSIS — K44.9 HIATAL HERNIA: ICD-10-CM

## 2023-10-25 DIAGNOSIS — K52.9 ACUTE GASTROENTERITIS: ICD-10-CM

## 2023-10-26 ENCOUNTER — OFFICE VISIT (OUTPATIENT)
Dept: ORTHOPEDICS | Facility: CLINIC | Age: 58
End: 2023-10-26
Payer: COMMERCIAL

## 2023-10-26 DIAGNOSIS — M25.512 ACUTE PAIN OF LEFT SHOULDER: Primary | ICD-10-CM

## 2023-10-26 DIAGNOSIS — G56.01 RIGHT CARPAL TUNNEL SYNDROME: ICD-10-CM

## 2023-10-26 PROCEDURE — 20610 DRAIN/INJ JOINT/BURSA W/O US: CPT | Mod: LT,S$GLB,, | Performed by: ORTHOPAEDIC SURGERY

## 2023-10-26 PROCEDURE — 4010F ACE/ARB THERAPY RXD/TAKEN: CPT | Mod: CPTII,S$GLB,, | Performed by: ORTHOPAEDIC SURGERY

## 2023-10-26 PROCEDURE — 99999 PR PBB SHADOW E&M-EST. PATIENT-LVL III: ICD-10-PCS | Mod: PBBFAC,,, | Performed by: ORTHOPAEDIC SURGERY

## 2023-10-26 PROCEDURE — 99213 OFFICE O/P EST LOW 20 MIN: CPT | Mod: 25,S$GLB,, | Performed by: ORTHOPAEDIC SURGERY

## 2023-10-26 PROCEDURE — 20610 LARGE JOINT ASPIRATION/INJECTION: L GLENOHUMERAL: ICD-10-PCS | Mod: LT,S$GLB,, | Performed by: ORTHOPAEDIC SURGERY

## 2023-10-26 PROCEDURE — 1160F RVW MEDS BY RX/DR IN RCRD: CPT | Mod: CPTII,S$GLB,, | Performed by: ORTHOPAEDIC SURGERY

## 2023-10-26 PROCEDURE — 99213 PR OFFICE/OUTPT VISIT, EST, LEVL III, 20-29 MIN: ICD-10-PCS | Mod: 25,S$GLB,, | Performed by: ORTHOPAEDIC SURGERY

## 2023-10-26 PROCEDURE — 1159F MED LIST DOCD IN RCRD: CPT | Mod: CPTII,S$GLB,, | Performed by: ORTHOPAEDIC SURGERY

## 2023-10-26 PROCEDURE — 1159F PR MEDICATION LIST DOCUMENTED IN MEDICAL RECORD: ICD-10-PCS | Mod: CPTII,S$GLB,, | Performed by: ORTHOPAEDIC SURGERY

## 2023-10-26 PROCEDURE — 1160F PR REVIEW ALL MEDS BY PRESCRIBER/CLIN PHARMACIST DOCUMENTED: ICD-10-PCS | Mod: CPTII,S$GLB,, | Performed by: ORTHOPAEDIC SURGERY

## 2023-10-26 PROCEDURE — 4010F PR ACE/ARB THEARPY RXD/TAKEN: ICD-10-PCS | Mod: CPTII,S$GLB,, | Performed by: ORTHOPAEDIC SURGERY

## 2023-10-26 PROCEDURE — 99999 PR PBB SHADOW E&M-EST. PATIENT-LVL III: CPT | Mod: PBBFAC,,, | Performed by: ORTHOPAEDIC SURGERY

## 2023-10-26 RX ORDER — TRIAMCINOLONE ACETONIDE 40 MG/ML
40 INJECTION, SUSPENSION INTRA-ARTICULAR; INTRAMUSCULAR
Status: DISCONTINUED | OUTPATIENT
Start: 2023-10-26 | End: 2024-01-04

## 2023-10-26 RX ORDER — DIPHENOXYLATE HYDROCHLORIDE AND ATROPINE SULFATE 2.5; .025 MG/1; MG/1
1 TABLET ORAL 4 TIMES DAILY PRN
Qty: 60 TABLET | Refills: 1 | Status: SHIPPED | OUTPATIENT
Start: 2023-10-26 | End: 2024-01-13 | Stop reason: SDUPTHER

## 2023-10-26 RX ORDER — DICYCLOMINE HYDROCHLORIDE 20 MG/1
20 TABLET ORAL EVERY 6 HOURS
Qty: 120 TABLET | Refills: 0 | Status: SHIPPED | OUTPATIENT
Start: 2023-10-26 | End: 2023-11-20

## 2023-10-26 RX ADMIN — TRIAMCINOLONE ACETONIDE 40 MG: 40 INJECTION, SUSPENSION INTRA-ARTICULAR; INTRAMUSCULAR at 02:10

## 2023-10-26 NOTE — PROCEDURES
Large Joint Aspiration/Injection: L glenohumeral    Date/Time: 10/26/2023 2:00 PM    Performed by: Jessica Shay MD  Authorized by: Jesscia Shay MD    Consent Done?:  Yes (Verbal)  Indications:  Arthritis  Timeout: prior to procedure the correct patient, procedure, and site was verified    Prep: patient was prepped and draped in usual sterile fashion      Local anesthesia used?: Yes    Local anesthetic:  Topical anesthetic    Details:  Needle Size:  22 G  Approach:  Posterior  Location:  Shoulder  Site:  L glenohumeral  Medications:  40 mg triamcinolone acetonide 40 mg/mL  Patient tolerance:  Patient tolerated the procedure well with no immediate complications

## 2023-10-26 NOTE — TELEPHONE ENCOUNTER
No care due was identified.  Health [facility] Embedded Care Due Messages. Reference number: [id number].   10/25/2023 9:09:53 PM CDT

## 2023-10-26 NOTE — TELEPHONE ENCOUNTER
No care due was identified.  Health [facility] Embedded Care Due Messages. Reference number: [id number].   10/25/2023 9:13:52 PM CDT

## 2023-10-26 NOTE — PROGRESS NOTES
Assessment: 58 y.o. female with right biceps tendinitis     I explained my diagnostic impression and the reasoning behind it in detail, using layman's terms.      Plan:   - Injection of the left glenohumeral joint performed, please see procedure note for more details.  Prior to the injection risks and benefits of corticosteroid injection were discussed with the patient including pain, infection, bleeding, skin color changes, swelling, steroid flare. We discussed that over time injections can result in chondral damage, acceleration of arthritis formation, damage to tendons and damage to joints.  The patient consented for the procedure.  Post-injection instructions were given to the patient in writing.  - PT  - Return to clinic in 12 weeks. Return sooner if symptoms worsen or fail to improve.    All questions were answered in detail. The patient is in full agreement with the treatment plan and will proceed accordingly.    Chief Complaint   Patient presents with    Left Shoulder - Pain       Initial visit (9/20/23): Naomy Armstrong is a 58 y.o. female who presents today complaining of right shoulder pain  Duration of symptoms:  several years- pain has been intermittent. She has had constant severe pain for the last two weeks  Trauma or new activity: no  Pain is constant  Aggravating factors: Reaching overhead is somewhat painful, abduction, lifting, turning over at night  Relieving factors: rst  Night pain is present and is disruptive to sleep  Radicular symptoms: no numbness, paresthesias   Associated symptoms:  limited range of motion.    Prior treatment:  meloxicam without relief, celebrex, advil without improvement in pain.     Pain does interfere with sleep and activities of daily living .  All pain the last to the anterior shoulder, no pain localized over the subdeltoid fossa.  Does not radiate to the lateral upper arm    10/26/23  Had a lot of pain to the bilateral shoulders yesterday. Took a Meloxicam  last night and her pain is somewhat better   Still has pin int he right shoulder - never got better with injection  PT starts soon - she was not able to get in sooner because of her work schedule       This is the extent of the patient's complaints at this time.      Review of patient's allergies indicates:   Allergen Reactions    Effexor [venlafaxine]      Elevated her blood pressure    Zoloft [sertraline]     Paroxetine hcl      Made her feel drunk         Physical Exam:   Vitals:    10/26/23 1405   PainSc:   4   PainLoc: Shoulder       General: Patient is alert, awake and oriented to time, place and person. Mood and affect are appropriate.  Patient does not appear to be in any distress, denies any constitutional symptoms and appears stated age.   HEENT: Pupils are equal and round, sclera are not injected. External examination of ears and nose reveals no abnormalities. Cranial nerves II-X are grossly intact  Skin: no rashes, abrasions or open wounds on the affected extremity   Resp: No respiratory distress or audible wheezing   CV: 2+  pulses, all extremities warm and well perfused   Bilateral Shoulder    Shoulder Range of Motion    Right     Left   (Active/Passive)       Forward Elevation     165/165         165/165  External rotation (arm at side)  45/45             45/45   Internal rotation behind the back  L3             L3     Range of motion is painful  - anterior pain    Acromioclavicular joint is not tender  Crossbody test: negative    Neer's negative  Hawkin's negative    Kofi's negative  Drop arm negative  Belly press negative      Cuff Strength     Right     Left   Supraspinatus        5/5    5/5  Infraspinatus     5/5    5/5  Subscapularis     5/5    5/5    Deltoid testing            5/5    5/5    Stephens's test negative  Speeds positive  Yergasons positive  TTP over bicipital groove on R    Elbow examination demonstrates no tenderness to palpation and has normal range of motion.     ltsi C5-T1  + epl,  io, fds, fdp   2+ RP      Imaging:  no new      This note was created by combination of typed  and M-Modal dictation. Transcription and phonetic errors may be present.  If there are any questions, please contact me.      Current Outpatient Medications:     alosetron (LOTRONEX) 0.5 MG tablet, Take 1 tablet by mouth twice daily, Disp: 60 tablet, Rfl: 0    amLODIPine (NORVASC) 5 MG tablet, Take 1 tablet (5 mg total) by mouth once daily., Disp: 90 tablet, Rfl: 1    buPROPion (WELLBUTRIN XL) 150 MG TB24 tablet, Take 1 tablet (150 mg total) by mouth once daily., Disp: 30 tablet, Rfl: 11    celecoxib (CELEBREX) 100 MG capsule, Take 1 capsule (100 mg total) by mouth 2 (two) times daily., Disp: 60 capsule, Rfl: 6    cyclobenzaprine (FLEXERIL) 5 MG tablet, Take 5 mg by mouth., Disp: , Rfl:     dicyclomine (BENTYL) 20 mg tablet, Take 1 tablet (20 mg total) by mouth every 6 (six) hours., Disp: 120 tablet, Rfl: 0    diphenoxylate-atropine 2.5-0.025 mg (LOMOTIL) 2.5-0.025 mg per tablet, Take 1 tablet by mouth 4 (four) times daily as needed for Diarrhea., Disp: 60 tablet, Rfl: 1    FLUoxetine 40 MG capsule, Take 2 capsules (80 mg total) by mouth once daily., Disp: 180 capsule, Rfl: 3    gabapentin (NEURONTIN) 600 MG tablet, Take 1 tablet (600 mg total) by mouth 3 (three) times daily., Disp: 90 tablet, Rfl: 3    hydrOXYzine HCL (ATARAX) 10 MG Tab, Take 1 tablet (10 mg total) by mouth 2 (two) times daily as needed (anxiety)., Disp: 60 tablet, Rfl: 3    lisinopriL (PRINIVIL,ZESTRIL) 20 MG tablet, Take 1 tablet (20 mg total) by mouth nightly., Disp: 90 tablet, Rfl: 3    loperamide (IMODIUM) 2 mg capsule, Take 2 mg by mouth once as needed for Diarrhea., Disp: , Rfl:     meloxicam (MOBIC) 15 MG tablet, Take 15 mg by mouth., Disp: , Rfl:     multivitamin with minerals tablet, Take 1 tablet by mouth once daily., Disp: , Rfl:     omeprazole (PRILOSEC) 40 MG capsule, Take 1 capsule (40 mg total) by mouth every morning., Disp: 90  capsule, Rfl: 3    promethazine (PHENERGAN) 25 MG tablet, Take 1 tablet (25 mg total) by mouth every 8 (eight) hours as needed for Nausea., Disp: 30 tablet, Rfl: 1    promethazine-dextromethorphan (PROMETHAZINE-DM) 6.25-15 mg/5 mL Syrp, Take 5 mLs by mouth every 4 to 6 hours as needed., Disp: 240 mL, Rfl: 0    azelastine (ASTELIN) 137 mcg (0.1 %) nasal spray, 1 spray (137 mcg total) by Nasal route 2 (two) times daily. (Patient not taking: Reported on 7/18/2023), Disp: 30 mL, Rfl: 1    Past Medical History:   Diagnosis Date    Alcohol abuse     per chart, but patient denies today and cocaine per chart    Anxiety     Colon polyp     Depression     Hallucination     last couple of months started seeing someone standing in her room, saw boyfriend's reflection in glass    Headache     History of psychiatric hospitalization     age 13 [facility] for 2 months for acting out; 2018 for SI    Hx of psychiatric care     Hypertension     Psychiatric problem     Sleep difficulties     Suicide attempt     with knife by cutting stomach    Therapy        Active Problem List with Overview Notes    Diagnosis Date Noted    Generalized anxiety disorder 11/27/2022    Esophagogastric junction outflow obstruction 11/22/2022    Epigastric pain 06/07/2022    Major depressive disorder, recurrent episode, in partial remission 02/09/2022    Cervical spondylosis 08/11/2021    Hiatal hernia 07/09/2021    Irritable bowel syndrome with diarrhea 07/09/2021    SOB (shortness of breath)     Lumbosacral spondylosis 04/16/2021    Sacroiliac joint pain 03/31/2021    Spondylosis of lumbosacral region 03/31/2021    Chronic back pain greater than 3 months duration 07/16/2020    Decreased strength of lower extremity 06/01/2020    Decreased strength of trunk and back 06/01/2020    Pain in joint of right hip 06/01/2020    Decreased range of motion of right lower extremity 06/01/2020    Bilateral primary osteoarthritis of knee 03/12/2020    Lumbar spondylosis  01/13/2020    DDD (degenerative disc disease), lumbar 01/13/2020    Psychosocial distress 12/20/2019    Spondylosis of cervical region without myelopathy or radiculopathy 07/09/2019    Primary osteoarthritis of right knee 06/24/2019    Neck pain 06/24/2019    Cervical radiculopathy 04/26/2013     X-ray cervical spine and EMG/nerve conduction studies      DDD (degenerative disc disease), cervical 04/26/2013    Cervicogenic headache 04/26/2013     Patient has cervicogenic headaches which we will address with a combination of heat/ice application, muscle relaxers, and potentially anti-inflammatories.          Past Surgical History:   Procedure Laterality Date    COLONOSCOPY N/A 5/7/2021    Procedure: COLONOSCOPY;  Surgeon: Prem Montana MD;  Location: [facility];  Service: Endoscopy;  Laterality: N/A;  COVID screening 5/4 LAPC-instr portal -ml    COLONOSCOPY N/A 6/6/2022    Procedure: COLONOSCOPY;  Surgeon: Fab Shepherd MD;  Location: [facility] ([address] [place] [address]);  Service: Endoscopy;  Laterality: N/A;  C-Diff neg  Rapid COVID    EPIDURAL STEROID INJECTION Bilateral 8/5/2020    Procedure: Bilateral MBB @ L3, L4, L5;  Surgeon: Jeremy Powell Jr., MD;  Location: [facility];  Service: Pain Management;  Laterality: Bilateral;  Bilat L3-5 MBB  Arrive @ 1315; No ATC or DM; Needs MD signature    EPIDURAL STEROID INJECTION Right 8/19/2020    Procedure: Lumbar Radiofrequency Thermocoagulation of Medial Branches;  Surgeon: Jeremy Powell Jr., MD;  Location: [facility];  Service: Pain Management;  Laterality: Right;  Right L3-5 RFA  Arrive @ 1045; No ATC or DM; Needs MD Signature    EPIDURAL STEROID INJECTION Left 9/4/2020    Procedure: Lumbar Radiofrequency Thermocoagulation of Medial Branches;  Surgeon: Jeremy Powell Jr., MD;  Location: [facility];  Service: Pain Management;  Laterality: Left;  Left L3-5 RFA  Arrive @ 0900 (requested); No ATC or DM; Needs MD signature    EPIDURAL STEROID INJECTION Bilateral 4/16/2021     Procedure: Sacroiliac Joint Steroid Injections @ Bilateral;  Surgeon: Jeremy Powell Jr., MD;  Location: [facility] ENDO;  Service: Pain Management;  Laterality: Bilateral;  Arrive @ 1300; No ATC or DM    EPIDURAL STEROID INJECTION Bilateral 1/19/2022    Procedure: Bilateral Sacroiliac Joint Injections;  Surgeon: Jeremy Powell Jr., MD;  Location: [facility] ENDO;  Service: Pain Management;  Laterality: Bilateral;  Arrive @ 1315; No ATC or DM; Needs Consent    EPIDURAL STEROID INJECTION Bilateral 7/28/2023    Procedure: Bilateral L2, L3, L4 Medial Branch Blocks #1;  Surgeon: Jeremy Powell Jr., MD;  Location: [facility];  Service: Pain Management;  Laterality: Bilateral;  @1200  No ATC or DM    EPIDURAL STEROID INJECTION Bilateral 8/11/2023    Procedure: Bilateral L2, L3, L4 Medial Branch Blocks #2;  Surgeon: Jeremy Powell Jr., MD;  Location: [facility];  Service: Pain Management;  Laterality: Bilateral;  @1330  No ATC or DM    ESOPHAGEAL MANOMETRY WITH MEASUREMENT OF IMPEDANCE N/A 10/20/2021    Procedure: MANOMETRY, ESOPHAGUS, WITH IMPEDANCE MEASUREMENT;  Surgeon: Anastacia Odonnell MD;  Location: [facility] (4TH FLR);  Service: Endoscopy;  Laterality: N/A;  Covid test 10/17 [place], instructions sent to [id number]    ESOPHAGOGASTRODUODENOSCOPY N/A 5/7/2021    Procedure: EGD (ESOPHAGOGASTRODUODENOSCOPY);  Surgeon: Prem Montana MD;  Location: [facility];  Service: Endoscopy;  Laterality: N/A;  added on per Dr. NOEL Shepherd    LAPAROSCOPIC TOUPET FUNDOPLICATION N/A 2/14/2022    Procedure: FUNDOPLICATION, LAPAROSCOPIC, TOUPET;  Surgeon: Christian Martinez MD;  Location: [facility] OR 2ND FLR;  Service: General;  Laterality: N/A;       Social History     Socioeconomic History    Marital status:     Number of children: 0   Occupational History    Occupation: Overnight nuvia     Employer: [employer]   Tobacco Use    Smoking status: Former     Current packs/day: 0.00     Types: Cigarettes     Quit date:  2006     Years since quittin.5    Smokeless tobacco: Never   Substance and Sexual Activity    Alcohol use: Yes     Comment: occasional    Drug use: Not Currently     Types: Marijuana     Comment: tried at age 14    Sexual activity: Yes     Partners: Male     Comment: going through menopause   Other Topics Concern    Patient feels they ought to cut down on drinking/drug use No    Patient annoyed by others criticizing their drinking/drug use No    Patient has felt bad or guilty about drinking/drug use No    Patient has had a drink/used drugs as an eye opener in the AM No   Social History Narrative     x 2.    Lives with boyfriend.     Works at [employer] as an .    Presently working on Bachelor's degree.    Has associates degree in criminal justice.     Social Determinants of Health     Financial Resource Strain: Medium Risk (10/25/2023)    Overall Financial Resource Strain (CARDIA)     Difficulty of Paying Living Expenses: Somewhat hard   Food Insecurity: Unknown (10/25/2023)    Hunger Vital Sign     Worried About Running Out of Food in the Last Year: Patient refused     Ran Out of Food in the Last Year: Patient refused   Recent Concern: Food Insecurity - Food Insecurity Present (2023)    Hunger Vital Sign     Worried About Running Out of Food in the Last Year: Sometimes true     Ran Out of Food in the Last Year: Sometimes true   Transportation Needs: Unknown (10/25/2023)    PRAPARE - Transportation     Lack of Transportation (Medical): Patient refused     Lack of Transportation (Non-Medical): Patient refused   Physical Activity: Inactive (10/25/2023)    Exercise Vital Sign     Days of Exercise per Week: 0 days     Minutes of Exercise per Session: 0 min   Stress: Unknown (10/25/2023)    [place] [employer] - Occupational Stress Questionnaire     Feeling of Stress : Patient refused   Social Connections: Unknown (10/25/2023)    Social Connection and Isolation  Panel [NHANES]     Frequency of Communication with Friends and Family: Patient refused     Frequency of Social Gatherings with Friends and Family: Patient refused     Active Member of Clubs or Organizations: Patient refused     Attends Club or Organization Meetings: Patient refused     Marital Status: Patient refused   Housing Stability: Unknown (10/25/2023)    Housing Stability Vital Sign     Unable to Pay for Housing in the Last Year: Patient refused     Number of Places Lived in the Last Year: 1     Unstable Housing in the Last Year: Patient refused

## 2023-10-28 RX ORDER — GABAPENTIN 600 MG/1
600 TABLET ORAL 3 TIMES DAILY
Qty: 90 TABLET | Refills: 0 | Status: SHIPPED | OUTPATIENT
Start: 2023-10-28 | End: 2023-12-18 | Stop reason: SDUPTHER

## 2023-10-28 RX ORDER — CYCLOBENZAPRINE HCL 5 MG
5 TABLET ORAL 3 TIMES DAILY PRN
Qty: 30 TABLET | Refills: 0 | Status: SHIPPED | OUTPATIENT
Start: 2023-10-28 | End: 2023-11-02 | Stop reason: SDUPTHER

## 2023-10-30 ENCOUNTER — OFFICE VISIT (OUTPATIENT)
Dept: PSYCHIATRY | Facility: CLINIC | Age: 58
End: 2023-10-30
Payer: COMMERCIAL

## 2023-10-30 DIAGNOSIS — F33.1 MODERATE EPISODE OF RECURRENT MAJOR DEPRESSIVE DISORDER: ICD-10-CM

## 2023-10-30 DIAGNOSIS — F41.1 GAD (GENERALIZED ANXIETY DISORDER): Primary | ICD-10-CM

## 2023-10-30 PROCEDURE — 4010F ACE/ARB THERAPY RXD/TAKEN: CPT | Mod: CPTII,95,, | Performed by: STUDENT IN AN ORGANIZED HEALTH CARE EDUCATION/TRAINING PROGRAM

## 2023-10-30 PROCEDURE — 99214 OFFICE O/P EST MOD 30 MIN: CPT | Mod: 95,,, | Performed by: STUDENT IN AN ORGANIZED HEALTH CARE EDUCATION/TRAINING PROGRAM

## 2023-10-30 PROCEDURE — 99214 PR OFFICE/OUTPT VISIT, EST, LEVL IV, 30-39 MIN: ICD-10-PCS | Mod: 95,,, | Performed by: STUDENT IN AN ORGANIZED HEALTH CARE EDUCATION/TRAINING PROGRAM

## 2023-10-30 PROCEDURE — 4010F PR ACE/ARB THEARPY RXD/TAKEN: ICD-10-PCS | Mod: CPTII,95,, | Performed by: STUDENT IN AN ORGANIZED HEALTH CARE EDUCATION/TRAINING PROGRAM

## 2023-10-30 RX ORDER — LURASIDONE HYDROCHLORIDE 20 MG/1
20 TABLET, FILM COATED ORAL DAILY
Qty: 30 TABLET | Refills: 11 | Status: SHIPPED | OUTPATIENT
Start: 2023-10-30 | End: 2024-02-13 | Stop reason: SDUPTHER

## 2023-10-30 RX ORDER — FLUOXETINE HYDROCHLORIDE 40 MG/1
80 CAPSULE ORAL DAILY
Qty: 180 CAPSULE | Refills: 3 | Status: SHIPPED | OUTPATIENT
Start: 2023-10-30

## 2023-10-30 NOTE — PROGRESS NOTES
"Outpatient Psychiatry Follow-Up Visit (MD/NP)    10/30/2023    Clinical Status of Patient:  Outpatient (Ambulatory)    Chief Complaint:  Naomy Armstrong is a 58 y.o. female who presents today for follow-up of depression and anxiety.  Met with patient.      Interval History and Content of Current Session:  Interim Events/Subjective Report/Content of Current Session:     Patient Naomy Armstrong presents to clinic for follow up. Pt last seen 7/2023- was trialed on wellbutrin but felt it was not helpful. Still taking 80 mg of prozac. She is still taking the wellbutrin; however she is not sure if it is helping. She states that she feels "really sequeira." She has a very low desire to get up and do things. She feels like after she started taking wellbutrin she felt worse. She tried increasing the dose to 300 mg XL- discussed going back down to 150 mg xl, then to stop taking. Discussed that perhaps she is having a paradoxical reaction. She is struggling a little bit at work. She has carpal tunnel and tendonitis she is struggling with. She is starting physical therapists. She has not been able to look for a therapist yet. She states she would not be able to do the IOP because of work. Sleep is OK. She has not talked to any other doctors about this problem- she has an appointment scheduled with her PCP. She states she took mirtazipine a long time ago, had sedation and weight gain. She can't take paxil. She has never tried any of the second generation antipsychotics. Talked about side effects, NMS, TD, dysmetabolic syndrome- lurasidone as an option, discussed FDA approvals, quetiapine (concern about weight gain), aripiprazole (concern about prozac interaction.) Reviewed most recent QTC from 2022. Discussed labwork, including milgly high potassium. Talked about therapy resources. 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   No SI, HI, AVH.      Initial visit with me:   Pt presents on time " "to appointment. States things have not been going well. She reports feeling more and more depressed. She feels like her thoughts are all over the place. She is now disorganized, and this is really out of character. She states she has tried a lot of antidepressants, and prozac is one of the only ones who helps her. She reports feelings of irritability and grumpiness. She has had some hair loss and weight gain, she also thinks she maybe has IBS. She has been told that she snores- may need a test for sleep apnea. States she has "constant headaches" and wakes up with headaches. Pt denies interest in sleep referral, when I describe the risks she states "that's a risk I'll have to take."   Work- loves her job, but she has been stressed due to her feelings of amotivation and disorganization.   She is having problems with her memory, she believes.   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   She states she has taken wellbutrin to help her stop smoking. She has never had a seizure before.   She is trying to cut out sugar.   Her energy levels are really low.   She lives with her boyfriend and her pets. They have a good relationship.   Infrequent intrusive thoughts we discussed.     No drugs or alcohol- it sounds like she was a problem drinking. No nicotine. No side effects noted or endorsed.  No manic behaviors.    No active SI, no recent passive SI. Has attempted suicide in the past- about 5-6 years ago, and before that about 10 years ago in the context of alcohol use, and she is now sober.     Review of Systems   PSYCHIATRIC: Pertinant items are noted in the narrative.  CONSTITUTIONAL: No weight gain or loss.   MUSCULOSKELETAL: No pain or stiffness of the joints.  NEUROLOGIC: No weakness, sensory changes, seizures, confusion, memory loss, tremor or other abnormal movements.  RESPIRATORY: No shortness of breath.  CARDIOVASCULAR: No tachycardia or chest pain.  GASTROINTESTINAL: No nausea, vomiting, pain, " "constipation or diarrhea.    Past Medical, Family and Social History: The patient's past medical, family and social history have been reviewed and updated as appropriate within the electronic medical record - see encounter notes.    Compliance: yes    Side effects: None    Risk Parameters:  Patient reports no suicidal ideation  Patient reports no homicidal ideation  Patient reports no self-injurious behavior  Patient reports no violent behavior    Exam (detailed: at least 9 elements; comprehensive: all 15 elements)   Constitutional  Vitals:  Most recent vital signs, dated less than 90 days prior to this appointment, were reviewed.   There were no vitals filed for this visit.       General:  unremarkable, age appropriate     Musculoskeletal  Muscle Strength/Tone:  no tremor, no tic   Gait & Station:  non-ataxic     Psychiatric  Speech:  no latency; no press   Mood & Affect:  dysthymic, "depressed"  congruent and appropriate   Thought Process:  normal and logical   Associations:  intact   Thought Content:  normal, no suicidality, no homicidality, delusions, or paranoia   Insight:  limited awareness of illness   Judgement: good   Orientation:  person, place, situation, time/date   Memory: able to remember recent events- yes, able to remember remote events- yes   Language: able to name, able to repeat   Attention Span & Concentration:  able to focus   Fund of Knowledge:  intact and appropriate to age and level of education     Assessment and Diagnosis   Status/Progress: Based on the examination today, the patient's problem(s) is/are adequately but not ideally controlled.  New problems have been presented today.   Co-morbidities are complicating management of the primary condition.  There are no active rule-out diagnoses for this patient at this time.     General Impression: We will continue pharmacological intervention and adjunctive therapy.     No diagnosis found.    Intervention/Counseling/Treatment Plan   Medication " Management: Continue current medications. The risks and benefits of medication were discussed with the patient.  Counseling provided with patient as follows: importance of compliance with chosen treatment options was emphasized, risks and benefits of treatment options, including medications, were discussed with the patient, risk factor reduction, prognosis, patient education, instructions for  management, treatment and follow-up were reviewed  1.  Continue Prozac to 80 mg (2x40 mg) daily targeting depression and anxiety.  Warned of risk of alba, suicidality, serotonin syndrome.  2. Taper down wellbutrin as pt feels no benefit and possible harm. Go to 150 mg xl x 1 week, then stop.  3. Discussed augmentation of antidepressant therapy with SGA v mirtazipine v other options. Does not want mirtazipine. Discussed SGA for depression- abilify interacts with prozac, seroquel she is concerned about weight gain. Not clear insurance will cover vraylar/rexulti. Will try lurasidone 20 mg daily. Discussed taking with food. Discussed FDA approval for bipolar depression rather than unipolar MDD. Discussed r/b/SE including dysmetabolia/need for labwork, TD, NMS, dystonia, etc.   4. Reviewed labwork and advised pt to f/u with PCP regarding potassium/   5.  Continue rare use of hydroxyzine 10 mg p.o. b.i.d. p.r.n. anxiety or insomnia.  Warned of risk of over-sedation.  6.  May have to consider monitoring for memory changes, given significant genetic history.   7. Reviewed ED precautions. No SI, HI, AVH.   8. Discussed upcoming maternity leave.        Return to Clinic: 2-3 months or sooner PRN

## 2023-11-02 DIAGNOSIS — R11.0 NAUSEA: ICD-10-CM

## 2023-11-02 NOTE — TELEPHONE ENCOUNTER
No care due was identified.  [facility] Embedded Care Due Messages. Reference number: [id number].   11/02/2023 12:01:56 PM CDT

## 2023-11-02 NOTE — TELEPHONE ENCOUNTER
No care due was identified.  [facility] Embedded Care Due Messages. Reference number: [id number].   11/02/2023 12:04:01 PM CDT

## 2023-11-04 RX ORDER — PROMETHAZINE HYDROCHLORIDE 25 MG/1
25 TABLET ORAL EVERY 8 HOURS PRN
Qty: 30 TABLET | Refills: 0 | Status: SHIPPED | OUTPATIENT
Start: 2023-11-04 | End: 2023-12-18 | Stop reason: SDUPTHER

## 2023-11-04 RX ORDER — CYCLOBENZAPRINE HCL 5 MG
5 TABLET ORAL 3 TIMES DAILY PRN
Qty: 30 TABLET | Refills: 0 | Status: SHIPPED | OUTPATIENT
Start: 2023-11-04 | End: 2023-11-13

## 2023-11-13 ENCOUNTER — OFFICE VISIT (OUTPATIENT)
Dept: FAMILY MEDICINE | Facility: CLINIC | Age: 58
End: 2023-11-13
Payer: COMMERCIAL

## 2023-11-13 DIAGNOSIS — M62.830 MUSCLE SPASM OF BACK: Primary | ICD-10-CM

## 2023-11-13 DIAGNOSIS — F41.1 GENERALIZED ANXIETY DISORDER: ICD-10-CM

## 2023-11-13 DIAGNOSIS — M54.50 ACUTE BILATERAL LOW BACK PAIN WITHOUT SCIATICA: ICD-10-CM

## 2023-11-13 PROCEDURE — 99213 PR OFFICE/OUTPT VISIT, EST, LEVL III, 20-29 MIN: ICD-10-PCS | Mod: 95,,, | Performed by: NURSE PRACTITIONER

## 2023-11-13 PROCEDURE — 4010F PR ACE/ARB THEARPY RXD/TAKEN: ICD-10-PCS | Mod: CPTII,95,, | Performed by: NURSE PRACTITIONER

## 2023-11-13 PROCEDURE — 4010F ACE/ARB THERAPY RXD/TAKEN: CPT | Mod: CPTII,95,, | Performed by: NURSE PRACTITIONER

## 2023-11-13 PROCEDURE — 99213 OFFICE O/P EST LOW 20 MIN: CPT | Mod: 95,,, | Performed by: NURSE PRACTITIONER

## 2023-11-13 RX ORDER — METHOCARBAMOL 500 MG/1
500 TABLET, FILM COATED ORAL 3 TIMES DAILY PRN
Qty: 30 TABLET | Refills: 0 | Status: SHIPPED | OUTPATIENT
Start: 2023-11-13 | End: 2024-01-04 | Stop reason: SDUPTHER

## 2023-11-13 NOTE — PROGRESS NOTES
"The patient location is:  Patient Home  The chief complaint leading to consultation is: as below  Visit type: Virtual visit with synchronous audio and video  Total time spent with patient: 15 minutes  Each patient to whom he or she provides medical services by telemedicine is:  (1) informed of the relationship between the physician and patient and the respective role of any other health care provider with respect to management of the patient; and (2) notified that she may decline to receive medical services by telemedicine and may withdraw from such care at any time.      HPI     Chief Complaint:  Back pain      Naomy Armstrong is a 58 y.o. female with multiple medical diagnoses as listed in the medical history and problem list that presents for back pain.  Pt is new to me but is known to this clinic with her last appointment being Visit date not found.      Back Pain  This is a new problem. The current episode started yesterday. The problem occurs constantly. The problem has been waxing and waning since onset. The pain is present in the lumbar spine. The quality of the pain is described as aching. The pain does not radiate. The pain is at a severity of 9/10. The pain is severe. The symptoms are aggravated by bending, sitting, standing and twisting. Stiffness is present In the morning and all day. Pertinent negatives include no abdominal pain, bladder incontinence, bowel incontinence, chest pain, dysuria, fever, headaches, leg pain, numbness, paresis, paresthesias, pelvic pain, perianal numbness, tingling, weakness or weight loss. Risk factors include lack of exercise, menopause, obesity, recent trauma and sedentary lifestyle. The treatment provided no relief.     Pt reports she "rearranged and moved objects around her house all weekend." "I have taken flexeril in the past for tension headaches. The flexeril is not strong enough but I fall asleep. Is there anything else I can take to help with my muscle " "pain." Pt also takes mobic qd and gabapentin 600 mg tid prn. Pt states she has previously taken robaxin prn in the past for similar muscle spasms which made her less lethargic compared to flexeril. She is asking to transition from flexeril to robaxin.       Assessment & Plan     Problem List Items Addressed This Visit          Psychiatric    Generalized anxiety disorder    Encouraged the patient to perform self-calming techniques, such as deep breathing/relaxation techniques and exercise.       Other Visit Diagnoses       Muscle spasm of back    -  Primary    Reports new onset muscle spasm after moving heavy furniture. Denies fall or impact injury. Denies red flag symptoms.     D/c flexeril.   Start robaxin prn  Precautions provided  rest  Refer to PT    Discussed DDx, condition, and treatment.   Education sent to patient portal/included in after visit summary.  ED precautions given.   Notify provider if symptoms do not resolve or increase in severity.   Patient verbalizes understanding and agrees with plan of care.      Relevant Medications    methocarbamoL (ROBAXIN) 500 MG Tab    Acute bilateral low back pain without sciatica        As above.    Relevant Orders    Ambulatory referral/consult to Physical/Occupational Therapy            --------------------------------------------      Health Maintenance:  Health Maintenance         Date Due Completion Date    Cervical Cancer Screening Never done ---    COVID-19 Vaccine (1) Never done ---    Mammogram Never done ---    Shingles Vaccine (1 of 2) Never done ---    Hemoglobin A1c (Prediabetes) 12/21/2020 12/21/2019    RSV Vaccine (Age 60+) (1 - 1-dose 60+ series) Never done ---    Influenza Vaccine (1) Never done ---    Lipid Panel 05/18/2027 5/18/2022    Colorectal Cancer Screening 06/06/2029 6/6/2022    TETANUS VACCINE 12/20/2029 12/20/2019            Advised patient on the importance of completing overdue health maintenance items    Follow Up:  Follow up in about 2 " weeks (around 11/27/2023), or if symptoms worsen or fail to improve.    Exam     Review of Systems:  (as noted above)  Review of Systems   Constitutional:  Negative for fever and weight loss.   Cardiovascular:  Negative for chest pain.   Gastrointestinal:  Negative for abdominal pain and bowel incontinence.   Genitourinary:  Negative for bladder incontinence, dysuria, hematuria and pelvic pain.   Musculoskeletal:  Positive for back pain.   Neurological:  Negative for tingling, weakness, numbness, headaches and paresthesias.       Physical Exam:   Physical Exam  Constitutional:       General: She is not in acute distress.     Appearance: She is not toxic-appearing or diaphoretic.   Pulmonary:      Effort: Pulmonary effort is normal.   Neurological:      Mental Status: She is alert.   Psychiatric:         Mood and Affect: Mood normal.       There were no vitals filed for this visit.   There is no height or weight on file to calculate BMI.        History     Past Medical History:  Past Medical History:   Diagnosis Date    Alcohol abuse     per chart, but patient denies today and cocaine per chart    Anxiety     Colon polyp     Depression     Hallucination     last couple of months started seeing someone standing in her room, saw boyfriend's reflection in glass    Headache     History of psychiatric hospitalization     age 13 [facility] for 2 months for acting out; 2018 for SI    Hx of psychiatric care     Hypertension     Psychiatric problem     Sleep difficulties     Suicide attempt     with knife by cutting stomach    Therapy        Past Surgical History:  Past Surgical History:   Procedure Laterality Date    COLONOSCOPY N/A 5/7/2021    Procedure: COLONOSCOPY;  Surgeon: Prem Montana MD;  Location: [facility];  Service: Endoscopy;  Laterality: N/A;  COVID screening 5/4 [facility]-instr portal -ml    COLONOSCOPY N/A 6/6/2022    Procedure: COLONOSCOPY;  Surgeon: Fab Shepherd MD;  Location: [facility] ([address] [place] [address]);  Service:  Endoscopy;  Laterality: N/A;  C-Diff neg  Rapid COVID    EPIDURAL STEROID INJECTION Bilateral 8/5/2020    Procedure: Bilateral MBB @ L3, L4, L5;  Surgeon: Jeremy Powell Jr., MD;  Location: [facility] ENDO;  Service: Pain Management;  Laterality: Bilateral;  Bilat L3-5 MBB  Arrive @ 1315; No ATC or DM; Needs MD signature    EPIDURAL STEROID INJECTION Right 8/19/2020    Procedure: Lumbar Radiofrequency Thermocoagulation of Medial Branches;  Surgeon: Jeremy Powell Jr., MD;  Location: [facility] ENDO;  Service: Pain Management;  Laterality: Right;  Right L3-5 RFA  Arrive @ 1045; No ATC or DM; Needs MD Signature    EPIDURAL STEROID INJECTION Left 9/4/2020    Procedure: Lumbar Radiofrequency Thermocoagulation of Medial Branches;  Surgeon: Jeremy Powell Jr., MD;  Location: [facility] END[facility];  Service: Pain Management;  Laterality: Left;  Left L3-5 RFA  Arrive @ 0900 (requested); No ATC or DM; Needs MD signature    EPIDURAL STEROID INJECTION Bilateral 4/16/2021    Procedure: Sacroiliac Joint Steroid Injections @ Bilateral;  Surgeon: Jeremy Powell Jr., MD;  Location: [facility] END[facility];  Service: Pain Management;  Laterality: Bilateral;  Arrive @ 1300; No ATC or DM    EPIDURAL STEROID INJECTION Bilateral 1/19/2022    Procedure: Bilateral Sacroiliac Joint Injections;  Surgeon: Jeremy Powell Jr., MD;  Location: [facility] END[facility];  Service: Pain Management;  Laterality: Bilateral;  Arrive @ 1315; No ATC or DM; Needs Consent    EPIDURAL STEROID INJECTION Bilateral 7/28/2023    Procedure: Bilateral L2, L3, L4 Medial Branch Blocks #1;  Surgeon: Jeremy Powell Jr., MD;  Location: [facility] END[facility];  Service: Pain Management;  Laterality: Bilateral;  @1200  No ATC or DM    EPIDURAL STEROID INJECTION Bilateral 8/11/2023    Procedure: Bilateral L2, L3, L4 Medial Branch Blocks #2;  Surgeon: Jeremy Powell Jr., MD;  Location: [facility] END[facility];  Service: Pain Management;  Laterality: Bilateral;  @1330  No ATC or DM    ESOPHAGEAL MANOMETRY WITH  MEASUREMENT OF IMPEDANCE N/A 10/20/2021    Procedure: MANOMETRY, ESOPHAGUS, WITH IMPEDANCE MEASUREMENT;  Surgeon: Anastacia Odonnell MD;  Location: [facility] (4TH FLR);  Service: Endoscopy;  Laterality: N/A;  Covid test 10/17 [place], instructions sent to [id number]    ESOPHAGOGASTRODUODENOSCOPY N/A 2021    Procedure: EGD (ESOPHAGOGASTRODUODENOSCOPY);  Surgeon: Prem Montana MD;  Location: [facility];  Service: Endoscopy;  Laterality: N/A;  added on per Dr. NOEL Shepherd    LAPAROSCOPIC TOUPET FUNDOPLICATION N/A 2022    Procedure: FUNDOPLICATION, LAPAROSCOPIC, TOUPET;  Surgeon: Christian Martinez MD;  Location: [facility] OR [address] [place] [address];  Service: General;  Laterality: N/A;       Social History:  Social History     Socioeconomic History    Marital status:     Number of children: 0   Occupational History    Occupation:      Employer: [employer]   Tobacco Use    Smoking status: Former     Current packs/day: 0.00     Types: Cigarettes     Quit date: 2006     Years since quittin.5    Smokeless tobacco: Never   Substance and Sexual Activity    Alcohol use: Yes     Comment: occasional    Drug use: Not Currently     Types: Marijuana     Comment: tried at age 14    Sexual activity: Yes     Partners: Male     Comment: going through menopause   Other Topics Concern    Patient feels they ought to cut down on drinking/drug use No    Patient annoyed by others criticizing their drinking/drug use No    Patient has felt bad or guilty about drinking/drug use No    Patient has had a drink/used drugs as an eye opener in the AM No   Social History Narrative     x 2.    Lives with boyfriend.     Works at [employer] as an .    Presently working on Bachelor's degree.    Has associates degree in criminal justice.     Social Determinants of Health     Financial Resource Strain: Unknown (2023)    Overall Financial Resource Strain (CARDIA)     Difficulty of Paying Living Expenses:  Patient refused   Recent Concern: Financial Resource Strain - Medium Risk (10/25/2023)    Overall Financial Resource Strain (CARDIA)     Difficulty of Paying Living Expenses: Somewhat hard   Food Insecurity: Unknown (11/13/2023)    Hunger Vital Sign     Worried About Running Out of Food in the Last Year: Patient refused     Ran Out of Food in the Last Year: Patient refused   Recent Concern: Food Insecurity - Food Insecurity Present (9/21/2023)    Hunger Vital Sign     Worried About Running Out of Food in the Last Year: Sometimes true     Ran Out of Food in the Last Year: Sometimes true   Transportation Needs: Unknown (11/13/2023)    PRAPARE - Transportation     Lack of Transportation (Medical): Patient refused     Lack of Transportation (Non-Medical): Patient refused   Physical Activity: Unknown (11/13/2023)    Exercise Vital Sign     Days of Exercise per Week: Patient refused     Minutes of Exercise per Session: 0 min   Recent Concern: Physical Activity - Inactive (10/25/2023)    Exercise Vital Sign     Days of Exercise per Week: 0 days     Minutes of Exercise per Session: 0 min   Stress: No Stress Concern Present (11/13/2023)    [employer] [place] [employer] - Occupational Stress Questionnaire     Feeling of Stress : Only a little   Social Connections: Unknown (11/13/2023)    Social Connection and Isolation Panel [NHANES]     Frequency of Communication with Friends and Family: More than three times a week     Frequency of Social Gatherings with Friends and Family: Patient refused     Active Member of Clubs or Organizations: Patient refused     Attends Club or Organization Meetings: Patient refused     Marital Status: Patient refused   Housing Stability: Unknown (11/13/2023)    Housing Stability Vital Sign     Unable to Pay for Housing in the Last Year: Patient refused     Number of Places Lived in the Last Year: 1     Unstable Housing in the Last Year: No       Family History:  Family History   Problem  Relation Age of Onset    Anxiety disorder Sister     Depression Sister     Alcohol abuse Maternal Uncle     Alcohol abuse Maternal Grandfather        Allergies and Medications: (updated and reviewed)  Review of patient's allergies indicates:   Allergen Reactions    Effexor [venlafaxine]      Elevated her blood pressure    Zoloft [sertraline]     Paroxetine hcl      Made her feel drunk     Current Outpatient Medications   Medication Sig Dispense Refill    alosetron (LOTRONEX) 0.5 MG tablet Take 1 tablet by mouth twice daily 60 tablet 0    amLODIPine (NORVASC) 5 MG tablet Take 1 tablet (5 mg total) by mouth once daily. 90 tablet 1    azelastine (ASTELIN) 137 mcg (0.1 %) nasal spray 1 spray (137 mcg total) by Nasal route 2 (two) times daily. (Patient not taking: Reported on 7/18/2023) 30 mL 1    buPROPion (WELLBUTRIN XL) 150 MG TB24 tablet Take 1 tablet (150 mg total) by mouth once daily. 30 tablet 11    celecoxib (CELEBREX) 100 MG capsule Take 1 capsule (100 mg total) by mouth 2 (two) times daily. 60 capsule 6    dicyclomine (BENTYL) 20 mg tablet Take 1 tablet (20 mg total) by mouth every 6 (six) hours. 120 tablet 0    diphenoxylate-atropine 2.5-0.025 mg (LOMOTIL) 2.5-0.025 mg per tablet Take 1 tablet by mouth 4 (four) times daily as needed for Diarrhea. 60 tablet 1    FLUoxetine 40 MG capsule Take 2 capsules (80 mg total) by mouth once daily. 180 capsule 3    gabapentin (NEURONTIN) 600 MG tablet Take 1 tablet (600 mg total) by mouth 3 (three) times daily. 90 tablet 0    hydrOXYzine HCL (ATARAX) 10 MG Tab Take 1 tablet (10 mg total) by mouth 2 (two) times daily as needed (anxiety). 60 tablet 3    lisinopriL (PRINIVIL,ZESTRIL) 20 MG tablet Take 1 tablet (20 mg total) by mouth nightly. 90 tablet 3    loperamide (IMODIUM) 2 mg capsule Take 2 mg by mouth once as needed for Diarrhea.      lurasidone (LATUDA) 20 mg Tab tablet Take 1 tablet (20 mg total) by mouth once daily. 30 tablet 11    meloxicam (MOBIC) 15 MG tablet  Take 15 mg by mouth.      methocarbamoL (ROBAXIN) 500 MG Tab Take 1 tablet (500 mg total) by mouth 3 (three) times daily as needed (muscle spasms). 30 tablet 0    multivitamin with minerals tablet Take 1 tablet by mouth once daily.      omeprazole (PRILOSEC) 40 MG capsule Take 1 capsule (40 mg total) by mouth every morning. 90 capsule 3    promethazine (PHENERGAN) 25 MG tablet Take 1 tablet (25 mg total) by mouth every 8 (eight) hours as needed for Nausea. 30 tablet 0    promethazine-dextromethorphan (PROMETHAZINE-DM) 6.25-15 mg/5 mL Syrp Take 5 mLs by mouth every 4 to 6 hours as needed. 240 mL 0     No current facility-administered medications for this visit.     Facility-Administered Medications Ordered in Other Visits   Medication Dose Route Frequency Provider Last Rate Last Admin    triamcinolone acetonide injection 40 mg  40 mg Intra-articular  Jessica Shay MD   40 mg at 10/26/23 1400       Patient Care Team:  Eric Hernandes Jr., MD as PCP - General (Family Medicine)  Chrystal Seaman       - The patient was sent an After Visit Summary virtually that lists all medications with directions, allergies, education, orders placed during this encounter and follow-up instructions.      - I have reviewed the patient's medical information including past medical, family, and social history sections including the medications and allergies.      - We discussed the patient's current medications.     This note was created by combination of typed  and MModal dictation.  Transcription errors may be present.  If there are any questions, please contact me.       Simeon Newton NP

## 2023-11-13 NOTE — PATIENT INSTRUCTIONS
Medical [facility]itness--[phone]  Imaging, Xray, CT, MRI, Ultrasound---[phone]  Bariatrics---[phone]  Breast Surgery---[phone]  Case Management---[phone]  Colonoscopy---[phone]  DME---[phone]  Infectious Disease---[phone]  Interventional Radiology---[phone]  Medical Records---[phone]  [facility]---[phone]  Optometry/Ophthalmology---[phone]  [facility]---[phone]  Physical Therapy---[phone]  Psychiatry---[phone] or [phone]  Plastic Surgery---[phone]  Recovery--[phone] option 2, or [phone].  Sleep Study---[phone]  Smoking Cessation---[phone]  Wound Care---[phone]  Referral Desk---[phone]

## 2023-11-13 NOTE — LETTER
November 13, 2023    Naomy Armstrong  [address] [place] [address]         [facility]  [address] [facility]  [place] [address]  Phone: [phone]  Fax: [phone] November 13, 2023     Patient: Naomy Armstrong   YOB: 1965   Date of Visit: 11/13/2023       To Whom It May Concern:    It is my medical opinion that Naomy Armstrong may return to work on 11/14/23 .    If you have any questions or concerns, please don't hesitate to call.    Sincerely,        Simeon Newton, NP

## 2023-11-14 ENCOUNTER — PATIENT MESSAGE (OUTPATIENT)
Dept: FAMILY MEDICINE | Facility: CLINIC | Age: 58
End: 2023-11-14
Payer: COMMERCIAL

## 2023-11-14 DIAGNOSIS — M54.50 ACUTE BILATERAL LOW BACK PAIN WITHOUT SCIATICA: Primary | ICD-10-CM

## 2023-11-17 ENCOUNTER — PATIENT MESSAGE (OUTPATIENT)
Dept: ORTHOPEDICS | Facility: CLINIC | Age: 58
End: 2023-11-17
Payer: COMMERCIAL

## 2023-11-17 DIAGNOSIS — K52.9 ACUTE GASTROENTERITIS: ICD-10-CM

## 2023-11-20 ENCOUNTER — TELEPHONE (OUTPATIENT)
Dept: ORTHOPEDICS | Facility: CLINIC | Age: 58
End: 2023-11-20
Payer: COMMERCIAL

## 2023-11-20 RX ORDER — DICYCLOMINE HYDROCHLORIDE 20 MG/1
20 TABLET ORAL EVERY 6 HOURS
Qty: 120 TABLET | Refills: 0 | Status: SHIPPED | OUTPATIENT
Start: 2023-11-20 | End: 2023-12-28

## 2023-11-21 DIAGNOSIS — G44.86 CERVICOGENIC HEADACHE: ICD-10-CM

## 2023-11-22 NOTE — TELEPHONE ENCOUNTER
No care due was identified.  [facility] Embedded Care Due Messages. Reference number: [id number].   11/21/2023 9:17:00 PM CST

## 2023-11-30 ENCOUNTER — TELEPHONE (OUTPATIENT)
Dept: FAMILY MEDICINE | Facility: CLINIC | Age: 58
End: 2023-11-30
Payer: COMMERCIAL

## 2023-11-30 RX ORDER — CYCLOBENZAPRINE HCL 5 MG
TABLET ORAL
Qty: 90 TABLET | Refills: 0 | Status: SHIPPED | OUTPATIENT
Start: 2023-11-30 | End: 2023-12-18 | Stop reason: SDUPTHER

## 2023-11-30 NOTE — TELEPHONE ENCOUNTER
----- Message from Gina Reeves sent at 11/30/2023 11:54 AM CST -----  Type: RX Refill Request    Who Called:[facility] pharmacy [phone] second request    Have you contacted your pharmacy:    Refill or New Rx:cyclobenzaprine 5 mg    RX Name and Strength:    How is the patient currently taking it? (ex. 1XDay):    Is this a 30 day or 90 day RX:    Preferred Pharmacy with phone number:    Local or Mail Order:    Ordering Provider:    Would the patient rather a call back or a response via [facility]?     Best Call Back Number:    Additional Information:

## 2023-12-01 ENCOUNTER — HOSPITAL ENCOUNTER (EMERGENCY)
Facility: HOSPITAL | Age: 58
Discharge: HOME | End: 2023-12-01
Attending: EMERGENCY MEDICINE
Payer: COMMERCIAL

## 2023-12-01 VITALS
DIASTOLIC BLOOD PRESSURE: 88 MMHG | SYSTOLIC BLOOD PRESSURE: 134 MMHG | RESPIRATION RATE: 16 BRPM | OXYGEN SATURATION: 100 % | HEART RATE: 84 BPM | HEIGHT: 65 IN | WEIGHT: 185 LBS | TEMPERATURE: 98 F | BODY MASS INDEX: 30.82 KG/M2

## 2023-12-01 DIAGNOSIS — G89.29 OTHER CHRONIC PAIN: ICD-10-CM

## 2023-12-01 DIAGNOSIS — L03.116 CELLULITIS OF LEFT LOWER EXTREMITY: Primary | ICD-10-CM

## 2023-12-01 DIAGNOSIS — T14.8XXA OPEN WOUND: ICD-10-CM

## 2023-12-01 PROCEDURE — 99284 EMERGENCY DEPT VISIT MOD MDM: CPT

## 2023-12-01 PROCEDURE — 96372 THER/PROPH/DIAG INJ SC/IM: CPT

## 2023-12-01 PROCEDURE — 63600175 PHARM REV CODE 636 W HCPCS

## 2023-12-01 RX ORDER — DEXAMETHASONE SODIUM PHOSPHATE 4 MG/ML
4 INJECTION, SOLUTION INTRA-ARTICULAR; INTRALESIONAL; INTRAMUSCULAR; INTRAVENOUS; SOFT TISSUE
Status: COMPLETED | OUTPATIENT
Start: 2023-12-01 | End: 2023-12-01

## 2023-12-01 RX ORDER — MUPIROCIN 20 MG/G
OINTMENT TOPICAL 3 TIMES DAILY
Qty: 15 G | Refills: 0 | Status: SHIPPED | OUTPATIENT
Start: 2023-12-01 | End: 2024-01-04 | Stop reason: ALTCHOICE

## 2023-12-01 RX ORDER — KETOROLAC TROMETHAMINE 30 MG/ML
15 INJECTION, SOLUTION INTRAMUSCULAR; INTRAVENOUS
Status: COMPLETED | OUTPATIENT
Start: 2023-12-01 | End: 2023-12-01

## 2023-12-01 RX ORDER — LIDOCAINE 50 MG/G
1 PATCH TOPICAL
Qty: 15 PATCH | Refills: 0 | Status: SHIPPED | OUTPATIENT
Start: 2023-12-01 | End: 2024-01-04

## 2023-12-01 RX ORDER — DOXYCYCLINE 100 MG/1
100 CAPSULE ORAL 2 TIMES DAILY
Qty: 14 CAPSULE | Refills: 0 | Status: SHIPPED | OUTPATIENT
Start: 2023-12-01 | End: 2024-01-04 | Stop reason: ALTCHOICE

## 2023-12-01 RX ADMIN — KETOROLAC TROMETHAMINE 15 MG: 30 INJECTION, SOLUTION INTRAMUSCULAR at 09:12

## 2023-12-01 RX ADMIN — DEXAMETHASONE SODIUM PHOSPHATE 4 MG: 4 INJECTION INTRA-ARTICULAR; INTRALESIONAL; INTRAMUSCULAR; INTRAVENOUS; SOFT TISSUE at 09:12

## 2023-12-02 NOTE — ED PROVIDER NOTES
Encounter Date: 12/1/2023       History     Chief Complaint   Patient presents with    Wound Check     Patient arrives with wound to left lower leg. She initially had a small 1 cm wound to area since last Saturday, possibly insect or spider bite, and has grown and worsened since. She has been using topical antibiotics. Wound opened a few days ago with purulent drainage and has since scabbed over. Also pain to both shoulders and right wrist - has been seen for these but pain is not getting better with meds or physical therapy.     Patient is a 58 year old female with PMH of arthritis and HTN presenting for evaluation of wound to left shin area x 1 week. She also endorses chronic bilateral shoulder pain and left wrist pain worsening for the last month.  Denies any new trauma or injury.  Wound started as a small pinkish red dot and has progressively gotten larger and more red. She describes a burning pain to wound with occasional itching. She also endorses some scabbing and yellow purulent drainage Sunday 11/26 that has since stopped. Wound pain worse with light pressure. She has been seen for both the shoulder pain and wrist pain previously. Has been doing physical therapy for shoulders with no relief. No new trauma or falls. Denies fever, NVD, CP, SOB.     The history is provided by the patient.     Review of patient's allergies indicates:   Allergen Reactions    Effexor [venlafaxine]      Elevated her blood pressure    Zoloft [sertraline]     Paroxetine hcl      Made her feel drunk     Past Medical History:   Diagnosis Date    Alcohol abuse     per chart, but patient denies today and cocaine per chart    Anxiety     Colon polyp     Depression     Hallucination     last couple of months started seeing someone standing in her room, saw boyfriend's reflection in glass    Headache     History of psychiatric hospitalization     age 13 [facility] for 2 months for acting out; 2018 for SI    Hx of psychiatric care      Hypertension     Psychiatric problem     Sleep difficulties     Suicide attempt     with knife by cutting stomach    Therapy      Past Surgical History:   Procedure Laterality Date    COLONOSCOPY N/A 5/7/2021    Procedure: COLONOSCOPY;  Surgeon: Prem Montana MD;  Location: [facility];  Service: Endoscopy;  Laterality: N/A;  COVID screening 5/4 LAPC-instr portal -ml    COLONOSCOPY N/A 6/6/2022    Procedure: COLONOSCOPY;  Surgeon: Fab Shepherd MD;  [facility]  Service: Endoscopy;  Laterality: N/A;  C-Diff neg  Rapid COVID    EPIDURAL STEROID INJECTION Bilateral 8/5/2020    Procedure: Bilateral MBB @ L3, L4, L5;  Surgeon: Jeremy Powell Jr., MD;  Location: [facility];  Service: Pain Management;  Laterality: Bilateral;  Bilat L3-5 MBB  Arrive @ 1315; No ATC or DM; Needs MD signature    EPIDURAL STEROID INJECTION Right 8/19/2020    Procedure: Lumbar Radiofrequency Thermocoagulation of Medial Branches;  Surgeon: Jeremy Powell Jr., MD;  Location: [facility];  Service: Pain Management;  Laterality: Right;  Right L3-5 RFA  Arrive @ 1045; No ATC or DM; Needs MD Signature    EPIDURAL STEROID INJECTION Left 9/4/2020    Procedure: Lumbar Radiofrequency Thermocoagulation of Medial Branches;  Surgeon: Jeremy Powell Jr., MD;  Location: [facility];  Service: Pain Management;  Laterality: Left;  Left L3-5 RFA  Arrive @ 0900 (requested); No ATC or DM; Needs MD signature    EPIDURAL STEROID INJECTION Bilateral 4/16/2021    Procedure: Sacroiliac Joint Steroid Injections @ Bilateral;  Surgeon: Jeremy Powell Jr., MD;  Location: [facility];  Service: Pain Management;  Laterality: Bilateral;  Arrive @ 1300; No ATC or DM    EPIDURAL STEROID INJECTION Bilateral 1/19/2022    Procedure: Bilateral Sacroiliac Joint Injections;  Surgeon: Jeremy Powell Jr., MD;  Location: [facility];  Service: Pain Management;  Laterality: Bilateral;  Arrive @ 1315; No ATC or DM; Needs Consent    EPIDURAL STEROID  INJECTION Bilateral 2023    Procedure: Bilateral L2, L3, L4 Medial Branch Blocks #1;  Surgeon: Jeremy Powell Jr., MD;  Location: [facility];  Service: Pain Management;  Laterality: Bilateral;  @1200  No ATC or DM    EPIDURAL STEROID INJECTION Bilateral 2023    Procedure: Bilateral L2, L3, L4 Medial Branch Blocks #2;  Surgeon: Jeremy Powell Jr., MD;  Location: [facility] ENDO;  Service: Pain Management;  Laterality: Bilateral;  @1330  No ATC or DM    ESOPHAGEAL MANOMETRY WITH MEASUREMENT OF IMPEDANCE N/A 10/20/2021    Procedure: MANOMETRY, ESOPHAGUS, WITH IMPEDANCE MEASUREMENT;  Surgeon: Anastacia Odonnell MD;  Location: [facility] (4TH FLR);  Service: Endoscopy;  Laterality: N/A;  Covid test 10/17 [place], instructions sent to [id number]    ESOPHAGOGASTRODUODENOSCOPY N/A 2021    Procedure: EGD (ESOPHAGOGASTRODUODENOSCOPY);  Surgeon: Prem Montana MD;  Location: [facility];  Service: Endoscopy;  Laterality: N/A;  added on per Dr. NOEL Shepherd    LAPAROSCOPIC TOUPET FUNDOPLICATION N/A 2022    Procedure: FUNDOPLICATION, LAPAROSCOPIC, TOUPET;  Surgeon: Christian Martinez MD;  Location: [facility] OR 2ND FLR;  Service: General;  Laterality: N/A;     Family History   Problem Relation Age of Onset    Anxiety disorder Sister     Depression Sister     Alcohol abuse Maternal Uncle     Alcohol abuse Maternal Grandfather      Social History     Tobacco Use    Smoking status: Former     Current packs/day: 0.00     Types: Cigarettes     Quit date: 2006     Years since quittin.6    Smokeless tobacco: Never   Substance Use Topics    Alcohol use: Yes     Comment: occasional    Drug use: Not Currently     Types: Marijuana     Comment: tried at age 14     Review of Systems   Constitutional:  Negative for chills and fever.   HENT:  Negative for sore throat.    Respiratory:  Negative for shortness of breath.    Cardiovascular:  Negative for chest pain.   Gastrointestinal:  Negative for abdominal pain,  nausea and vomiting.   Genitourinary:  Negative for dysuria.   Musculoskeletal:  Positive for arthralgias (Bilateral shoulder pain. Right wrist pain). Negative for back pain, gait problem, joint swelling and neck pain.   Skin:  Positive for color change and wound. Negative for rash.   Neurological:  Negative for dizziness, weakness, light-headedness, numbness and headaches.   Hematological:  Does not bruise/bleed easily.       Physical Exam     Initial Vitals [12/01/23 1947]   BP Pulse Resp Temp SpO2   (!) 146/77 110 18 98.2 °F (36.8 °C) 97 %      MAP       --         Physical Exam    Nursing note and vitals reviewed.  Constitutional: She appears well-developed and well-nourished.   HENT:   Head: Normocephalic and atraumatic.   Right Ear: External ear normal.   Left Ear: External ear normal.   Neck: Carotid bruit is not present.   Normal range of motion.  Cardiovascular:  Normal rate, regular rhythm, normal heart sounds and intact distal pulses.     Exam reveals no gallop and no friction rub.       No murmur heard.  Pulmonary/Chest: Breath sounds normal. No respiratory distress. She has no wheezes. She has no rhonchi. She has no rales.   Abdominal: Abdomen is soft. Bowel sounds are normal. She exhibits no distension. There is no abdominal tenderness. There is no rebound and no guarding.   Musculoskeletal:         General: Normal range of motion.      Right shoulder: No swelling, deformity, tenderness or bony tenderness. Normal range of motion. Normal strength. Normal pulse.      Left shoulder: No swelling, deformity, tenderness or bony tenderness. Normal range of motion. Normal strength. Normal pulse.      Right wrist: No swelling, effusion or tenderness. Normal range of motion. Normal pulse.      Left wrist: No swelling, effusion or tenderness. Normal range of motion. Normal pulse.      Cervical back: Normal range of motion.      Comments: Normal ROM of left leg, ankle, foot, and toes. Neurovascullary   Intact. 2+  pulses. 5/5 strength in all joints.      Neurological: She is alert and oriented to person, place, and time. GCS score is 15. GCS eye subscore is 4. GCS verbal subscore is 5. GCS motor subscore is 6.   Skin:   Approximately 2 cm ulcerated red wound to left shin. Small ring of surrounding erythema. No purulence or drainage from wound.    Psychiatric: She has a normal mood and affect.         ED Course   Procedures  Labs Reviewed - No data to display       Imaging Results              X-Ray Tibia Fibula 2 View Left (Final result)  Result time 12/01/23 22:03:44      Final result by Ashlyn Waller MD (12/01/23 22:03:44)                   Impression:      No acute osseous abnormality identified.      Electronically signed by: Ashlyn Waller MD  Date:    12/01/2023  Time:    22:03               Narrative:    EXAMINATION:  XR TIBIA FIBULA 2 VIEW LEFT    CLINICAL HISTORY:  Other injury of unspecified body region, initial encounter    TECHNIQUE:  AP and lateral views of the left tibia and fibula were performed.    COMPARISON:  None.    FINDINGS:  No evidence of acute displaced fracture, dislocation, or osseous destructive process.  Joint spaces of the knee are preserved.  No abnormal widening of the ankle mortise.  There is calcaneal spurring.                                       Medications   ketorolac injection 15 mg (15 mg Intramuscular Given 12/1/23 2154)   dexAMETHasone injection 4 mg (4 mg Intramuscular Given 12/1/23 2154)     Medical Decision Making  This is an emergent evaluation of a 58-year-old female with a past medical history of hypertension, arthritis who presents to the emergency department for evaluation of wound to left lower extremity x 1 week and chronic right wrist pain and bilateral shoulder pain worsening over the last month.  Physical exam reveals approximately 2 cm ulcerated red wound to left shin. Small ring of surrounding erythema. No purulence or drainage from wound.  Normal range of motion of  bilateral upper extremities, right wrist without difficulty or pain.  There is no overlying swelling or erythema noted on exam.  2+ radial pulses bilaterally.  Neurovascularly intact. Regular rate rhythm without murmurs.  No carotid bruits appreciated on exam. Lungs are clear to auscultation bilaterally.  Abdomen is soft, nontender, non distended, with normal bowel sounds.  Differential diagnosis includes but is not limited to insect bite, cellulitis, osteomyelitis, fracture, dislocation.  I doubt brown recluse or black  spider bite given no systemic symptoms and appearance of wound.  Workup initiated with x-ray of left tibia fibula.  We will hold off on any imaging of the shoulders and wrist given patient has had new trauma or injury has had normal x-rays in the past.  Patient does have recent labs showing normal renal function.  Ordered Decadron and Toradol.  X-ray shows no acute osseous abnormality identified.  Will treat for cellulitis.  Will send home with doxycycline and topical mupirocin.  Will send home with Lidoderm patches for chronic pain. Patient is very well appearing, and in no acute distress. Vital signs are reassuring here in the emergency department, patient is afebrile, breathing comfortable, satting 100 % on room air. Patient/Caregiver is stable for discharge at this time. Patient/Caregiver verbalize understanding of care plan. All questions and concerns were addressed. Discussed strict return precautions with the patient/caregiver. Instructed follow up with primary care provider within 1 week.      Espinoza Winchester PA-C    DISCLAIMER: This note was prepared with "[employer] voice recognition transcription software. Garbled syntax, mangled pronouns, and other bizarre constructions may be attributed to that software system.     Amount and/or Complexity of Data Reviewed  Radiology: ordered.    Risk  Prescription drug management.                                      Clinical Impression:  Final  diagnoses:  [T14.8XXA] Open wound  [L03.116] Cellulitis of left lower extremity (Primary)  [G89.29] Other chronic pain          ED Disposition Condition    Discharge Stable          ED Prescriptions       Medication Sig Dispense Start Date End Date Auth. Provider    LIDOcaine (LIDODERM) 5 % Place 1 patch onto the skin every 6 to 8 hours as needed. Remove & Discard patch within 12 hours or as directed by MD 15 patch 12/1/2023 -- Espinoza Winchester PA-C    doxycycline (VIBRAMYCIN) 100 MG Cap Take 1 capsule (100 mg total) by mouth 2 (two) times daily. for 7 days 14 capsule 12/1/2023 12/8/2023 Espinoza Winchester PA-C    mupirocin (BACTROBAN) 2 % ointment Apply topically 3 (three) times daily. 15 g 12/1/2023 -- Espinoza Winchester PA-C          Follow-up Information       Follow up With Specialties Details Why Contact Info    Eric Hernandes Jr., MD Family Medicine   [address] [place] [id number]  [phone]      [facility] Emergency Dept Emergency Medicine Go to  As needed, If symptoms worsen, or new symptoms develop [address] [facility] [place] [address] [phone]             Espinoza Winchester PA-C  12/01/23 [id number]

## 2023-12-02 NOTE — ED TRIAGE NOTES
Pt reports she was bitten by a spider a week ago.  The bite semaj has not healed and has increased in diameter.

## 2023-12-02 NOTE — DISCHARGE INSTRUCTIONS

## 2023-12-04 ENCOUNTER — OFFICE VISIT (OUTPATIENT)
Dept: ORTHOPEDICS | Facility: CLINIC | Age: 58
End: 2023-12-04
Payer: COMMERCIAL

## 2023-12-04 ENCOUNTER — TELEPHONE (OUTPATIENT)
Dept: ORTHOPEDICS | Facility: CLINIC | Age: 58
End: 2023-12-04
Payer: COMMERCIAL

## 2023-12-04 DIAGNOSIS — G56.01 RIGHT CARPAL TUNNEL SYNDROME: Primary | ICD-10-CM

## 2023-12-04 PROCEDURE — 99213 PR OFFICE/OUTPT VISIT, EST, LEVL III, 20-29 MIN: ICD-10-PCS | Mod: S$GLB,,, | Performed by: ORTHOPAEDIC SURGERY

## 2023-12-04 PROCEDURE — 99999 PR PBB SHADOW E&M-EST. PATIENT-LVL II: ICD-10-PCS | Mod: PBBFAC,,, | Performed by: ORTHOPAEDIC SURGERY

## 2023-12-04 PROCEDURE — 99999 PR PBB SHADOW E&M-EST. PATIENT-LVL II: CPT | Mod: PBBFAC,,, | Performed by: ORTHOPAEDIC SURGERY

## 2023-12-04 PROCEDURE — 1159F MED LIST DOCD IN RCRD: CPT | Mod: CPTII,S$GLB,, | Performed by: ORTHOPAEDIC SURGERY

## 2023-12-04 PROCEDURE — 1159F PR MEDICATION LIST DOCUMENTED IN MEDICAL RECORD: ICD-10-PCS | Mod: CPTII,S$GLB,, | Performed by: ORTHOPAEDIC SURGERY

## 2023-12-04 PROCEDURE — 99213 OFFICE O/P EST LOW 20 MIN: CPT | Mod: S$GLB,,, | Performed by: ORTHOPAEDIC SURGERY

## 2023-12-04 PROCEDURE — 4010F PR ACE/ARB THEARPY RXD/TAKEN: ICD-10-PCS | Mod: CPTII,S$GLB,, | Performed by: ORTHOPAEDIC SURGERY

## 2023-12-04 PROCEDURE — 1160F RVW MEDS BY RX/DR IN RCRD: CPT | Mod: CPTII,S$GLB,, | Performed by: ORTHOPAEDIC SURGERY

## 2023-12-04 PROCEDURE — 1160F PR REVIEW ALL MEDS BY PRESCRIBER/CLIN PHARMACIST DOCUMENTED: ICD-10-PCS | Mod: CPTII,S$GLB,, | Performed by: ORTHOPAEDIC SURGERY

## 2023-12-04 PROCEDURE — 4010F ACE/ARB THERAPY RXD/TAKEN: CPT | Mod: CPTII,S$GLB,, | Performed by: ORTHOPAEDIC SURGERY

## 2023-12-04 RX ORDER — HYDROCODONE BITARTRATE AND ACETAMINOPHEN 5; 325 MG/1; MG/1
1 TABLET ORAL NIGHTLY PRN
Qty: 7 TABLET | Refills: 0 | Status: SHIPPED | OUTPATIENT
Start: 2023-12-04

## 2023-12-04 NOTE — TELEPHONE ENCOUNTER
----- Message from Vicky Atwood sent at 12/4/2023 10:21 AM CST -----  Regarding: Refill Request  Type: RX Refill Request      Who Called: Self       Refill or New Rx: new       RX Name and Strength: hydrocodine       Preferred Pharmacy with phone number:  [id number] - [place], [place] - [address] [facility]        Would the patient rather a call back or a response via [facility]? Call       Best Call Back Number: .[phone]

## 2023-12-04 NOTE — PROGRESS NOTES
Follow up visit    History of Present Illness:   Naomy comes to the office for follow up evaluation of right carpal tunne syndrome. Here for EMG results. Has a sore to the l leg that is being treated with antibiotics. Taking 600 mg gabapentin TID - affecting her performance at work    ROS: unremarkable and no change since last visit    Physical Examination:    NAD  Right wrist  No change    LLE   1 cm ulceration to pre tibial area   No sign of active infection  Granulation tissue around rim    Radiographic imaging: no new    EMG shows mild to moderate CTS    Assessment/Plan:  58 y.o. female  with Right CTS    Recommend injection for CTS - she has a wound to the LLE that is being treated with antibiotics. Will avoid steroids until this has healed some   Hydrocodone for 1 week at night until she can return for injection   Return for follow up visit: 1 week    All questions were answered in detail. The patient  verbalized the understanding of the treatment plan and is in full agreement with the treatment plan.

## 2023-12-11 ENCOUNTER — OFFICE VISIT (OUTPATIENT)
Dept: ORTHOPEDICS | Facility: CLINIC | Age: 58
End: 2023-12-11
Payer: COMMERCIAL

## 2023-12-11 DIAGNOSIS — G56.01 RIGHT CARPAL TUNNEL SYNDROME: Primary | ICD-10-CM

## 2023-12-11 PROCEDURE — 99999 PR PBB SHADOW E&M-EST. PATIENT-LVL III: CPT | Mod: PBBFAC,,, | Performed by: ORTHOPAEDIC SURGERY

## 2023-12-11 PROCEDURE — 4010F ACE/ARB THERAPY RXD/TAKEN: CPT | Mod: CPTII,S$GLB,, | Performed by: ORTHOPAEDIC SURGERY

## 2023-12-11 PROCEDURE — 20526 THER INJECTION CARP TUNNEL: CPT | Mod: RT,S$GLB,, | Performed by: ORTHOPAEDIC SURGERY

## 2023-12-11 PROCEDURE — 1160F RVW MEDS BY RX/DR IN RCRD: CPT | Mod: CPTII,S$GLB,, | Performed by: ORTHOPAEDIC SURGERY

## 2023-12-11 PROCEDURE — 1160F PR REVIEW ALL MEDS BY PRESCRIBER/CLIN PHARMACIST DOCUMENTED: ICD-10-PCS | Mod: CPTII,S$GLB,, | Performed by: ORTHOPAEDIC SURGERY

## 2023-12-11 PROCEDURE — 99213 PR OFFICE/OUTPT VISIT, EST, LEVL III, 20-29 MIN: ICD-10-PCS | Mod: 25,S$GLB,, | Performed by: ORTHOPAEDIC SURGERY

## 2023-12-11 PROCEDURE — 99213 OFFICE O/P EST LOW 20 MIN: CPT | Mod: 25,S$GLB,, | Performed by: ORTHOPAEDIC SURGERY

## 2023-12-11 PROCEDURE — 4010F PR ACE/ARB THEARPY RXD/TAKEN: ICD-10-PCS | Mod: CPTII,S$GLB,, | Performed by: ORTHOPAEDIC SURGERY

## 2023-12-11 PROCEDURE — 1159F PR MEDICATION LIST DOCUMENTED IN MEDICAL RECORD: ICD-10-PCS | Mod: CPTII,S$GLB,, | Performed by: ORTHOPAEDIC SURGERY

## 2023-12-11 PROCEDURE — 1159F MED LIST DOCD IN RCRD: CPT | Mod: CPTII,S$GLB,, | Performed by: ORTHOPAEDIC SURGERY

## 2023-12-11 PROCEDURE — 20526 PR INJECT CARPAL TUNNEL: ICD-10-PCS | Mod: RT,S$GLB,, | Performed by: ORTHOPAEDIC SURGERY

## 2023-12-11 PROCEDURE — 99999 PR PBB SHADOW E&M-EST. PATIENT-LVL III: ICD-10-PCS | Mod: PBBFAC,,, | Performed by: ORTHOPAEDIC SURGERY

## 2023-12-11 RX ORDER — TRIAMCINOLONE ACETONIDE 40 MG/ML
40 INJECTION, SUSPENSION INTRA-ARTICULAR; INTRAMUSCULAR
Status: DISCONTINUED | OUTPATIENT
Start: 2023-12-11 | End: 2023-12-11 | Stop reason: HOSPADM

## 2023-12-11 RX ADMIN — TRIAMCINOLONE ACETONIDE 40 MG: 40 INJECTION, SUSPENSION INTRA-ARTICULAR; INTRAMUSCULAR at 02:12

## 2023-12-11 NOTE — PROCEDURES
Carpal Tunnel    Date/Time: 12/11/2023 2:45 PM    Performed by: Jessica Shay MD  Authorized by: Jessica Shay MD    Consent Done?:  Yes (Verbal)  Indications:  Pain  Timeout: prior to procedure the correct patient, procedure, and site was verified    Location:  Wrist  Needle size:  22 G  Approach:  Volar  Medications:  40 mg triamcinolone acetonide 40 mg/mL  Patient tolerance:  Patient tolerated the procedure well with no immediate complications

## 2023-12-11 NOTE — PROGRESS NOTES
Follow up visit    History of Present Illness:   Naomy comes to the office for follow up evaluation of right carpal tunne syndrome.  Here for wound check to see if injection is feasible   Hydrocodone has been helpful with pain    ROS: unremarkable and no change since last visit    Physical Examination:    NAD  Right wrist  No change    LLE   1 cm ulceration to pre tibial area, healing  No sign of active infection      Radiographic imaging: no new    EMG shows mild to moderate CTS    Assessment/Plan:  58 y.o. female  with Right CTS    Injection of the right  carpal tunnel  performed, please see procedure note for more details.  Prior to the injection risks and benefits of corticosteroid injection were discussed with the patient including pain, infection, bleeding, skin color changes, swelling, steroid flare. We discussed that over time injections can result in chondral damage, acceleration of arthritis formation, damage to tendons and damage to joints.  The patient consented for the procedure.  Post-injection instructions were given to the patient in writing.  Return for follow up visit:  P.r.n.  All questions were answered in detail. The patient  verbalized the understanding of the treatment plan and is in full agreement with the treatment plan.

## 2023-12-18 DIAGNOSIS — R11.0 NAUSEA: ICD-10-CM

## 2023-12-18 DIAGNOSIS — K44.9 HIATAL HERNIA: ICD-10-CM

## 2023-12-18 DIAGNOSIS — M51.36 DDD (DEGENERATIVE DISC DISEASE), LUMBAR: Chronic | ICD-10-CM

## 2023-12-18 DIAGNOSIS — G44.86 CERVICOGENIC HEADACHE: ICD-10-CM

## 2023-12-18 RX ORDER — CYCLOBENZAPRINE HCL 5 MG
10 TABLET ORAL 3 TIMES DAILY PRN
Qty: 90 TABLET | Refills: 0 | Status: SHIPPED | OUTPATIENT
Start: 2023-12-18

## 2023-12-18 RX ORDER — GABAPENTIN 600 MG/1
600 TABLET ORAL 3 TIMES DAILY
Qty: 90 TABLET | Refills: 0 | Status: SHIPPED | OUTPATIENT
Start: 2023-12-18 | End: 2024-01-04

## 2023-12-18 RX ORDER — PROMETHAZINE HYDROCHLORIDE 25 MG/1
25 TABLET ORAL EVERY 8 HOURS PRN
Qty: 30 TABLET | Refills: 0 | Status: SHIPPED | OUTPATIENT
Start: 2023-12-18 | End: 2024-02-13 | Stop reason: SDUPTHER

## 2023-12-18 NOTE — TELEPHONE ENCOUNTER
No care due was identified.  Health [facility] Embedded Care Due Messages. Reference number: [id number].   12/18/2023 4:29:06 PM CST

## 2023-12-18 NOTE — TELEPHONE ENCOUNTER
No care due was identified.  Health [facility] Embedded Care Due Messages. Reference number: [id number].   12/18/2023 3:40:48 PM CST

## 2023-12-25 DIAGNOSIS — K52.9 ACUTE GASTROENTERITIS: ICD-10-CM

## 2023-12-28 RX ORDER — DICYCLOMINE HYDROCHLORIDE 20 MG/1
20 TABLET ORAL EVERY 6 HOURS
Qty: 120 TABLET | Refills: 0 | Status: SHIPPED | OUTPATIENT
Start: 2023-12-28 | End: 2024-01-29

## 2024-01-04 ENCOUNTER — OFFICE VISIT (OUTPATIENT)
Dept: FAMILY MEDICINE | Facility: CLINIC | Age: 59
End: 2024-01-04
Payer: COMMERCIAL

## 2024-01-04 VITALS
WEIGHT: 184.94 LBS | DIASTOLIC BLOOD PRESSURE: 92 MMHG | BODY MASS INDEX: 30.81 KG/M2 | OXYGEN SATURATION: 99 % | HEIGHT: 65 IN | SYSTOLIC BLOOD PRESSURE: 134 MMHG | TEMPERATURE: 98 F | HEART RATE: 83 BPM

## 2024-01-04 DIAGNOSIS — M50.30 DDD (DEGENERATIVE DISC DISEASE), CERVICAL: ICD-10-CM

## 2024-01-04 DIAGNOSIS — I10 HYPERTENSION, ESSENTIAL: Chronic | ICD-10-CM

## 2024-01-04 DIAGNOSIS — M62.830 MUSCLE SPASM OF BACK: ICD-10-CM

## 2024-01-04 DIAGNOSIS — R41.3 MEMORY DEFICIT: ICD-10-CM

## 2024-01-04 DIAGNOSIS — F33.1 MAJOR DEPRESSIVE DISORDER, RECURRENT, MODERATE: Chronic | ICD-10-CM

## 2024-01-04 DIAGNOSIS — M47.816 LUMBAR SPONDYLOSIS: ICD-10-CM

## 2024-01-04 DIAGNOSIS — Z00.00 ROUTINE PHYSICAL EXAMINATION: Primary | ICD-10-CM

## 2024-01-04 DIAGNOSIS — Z12.31 ENCOUNTER FOR SCREENING MAMMOGRAM FOR MALIGNANT NEOPLASM OF BREAST: ICD-10-CM

## 2024-01-04 PROBLEM — F41.1 GENERALIZED ANXIETY DISORDER: Chronic | Status: ACTIVE | Noted: 2022-11-27

## 2024-01-04 PROCEDURE — 4010F ACE/ARB THERAPY RXD/TAKEN: CPT | Mod: CPTII,S$GLB,, | Performed by: FAMILY MEDICINE

## 2024-01-04 PROCEDURE — 3008F BODY MASS INDEX DOCD: CPT | Mod: CPTII,S$GLB,, | Performed by: FAMILY MEDICINE

## 2024-01-04 PROCEDURE — 3075F SYST BP GE 130 - 139MM HG: CPT | Mod: CPTII,S$GLB,, | Performed by: FAMILY MEDICINE

## 2024-01-04 PROCEDURE — 99396 PREV VISIT EST AGE 40-64: CPT | Mod: S$GLB,,, | Performed by: FAMILY MEDICINE

## 2024-01-04 PROCEDURE — 3080F DIAST BP >= 90 MM HG: CPT | Mod: CPTII,S$GLB,, | Performed by: FAMILY MEDICINE

## 2024-01-04 PROCEDURE — 99999 PR PBB SHADOW E&M-EST. PATIENT-LVL V: CPT | Mod: PBBFAC,,, | Performed by: FAMILY MEDICINE

## 2024-01-04 PROCEDURE — 3044F HG A1C LEVEL LT 7.0%: CPT | Mod: CPTII,S$GLB,, | Performed by: FAMILY MEDICINE

## 2024-01-04 RX ORDER — LISINOPRIL 20 MG/1
20 TABLET ORAL NIGHTLY
Qty: 90 TABLET | Refills: 3 | Status: SHIPPED | OUTPATIENT
Start: 2024-01-04

## 2024-01-04 RX ORDER — PREGABALIN 100 MG/1
100 CAPSULE ORAL 2 TIMES DAILY
Qty: 60 CAPSULE | Refills: 6 | Status: SHIPPED | OUTPATIENT
Start: 2024-01-04 | End: 2024-01-16 | Stop reason: SDUPTHER

## 2024-01-04 RX ORDER — METHOCARBAMOL 500 MG/1
500-1000 TABLET, FILM COATED ORAL 3 TIMES DAILY PRN
Qty: 90 TABLET | Refills: 5 | Status: SHIPPED | OUTPATIENT
Start: 2024-01-04 | End: 2024-05-17

## 2024-01-04 RX ORDER — AMLODIPINE BESYLATE 5 MG/1
5 TABLET ORAL DAILY
Qty: 90 TABLET | Refills: 1 | Status: SHIPPED | OUTPATIENT
Start: 2024-01-04 | End: 2024-06-03

## 2024-01-04 NOTE — PATIENT INSTRUCTIONS
Next time you com, please make sure to bring in all your medications so we can reconcile your medication list. It is very important we know everything you are taking.    Stop the Flexeril.  You can continue the Robaxin. You can take one pill as needed every 8 hours during the day but at night you can use 2, prior to bedtime    Change the gabapentin 600 mg twice a day to pregabalin (Lyrica) 100 mg twice a day and in 2 weeks we can do a virtual follow up to see if this continues to help with the pain and less sedating    I placed a referral for Pain Management. When you get a call to schedule, advise that you want it with Dr. Rolon at [facility].    Make sure to discuss promethazine prescription with GI as it is generally not recommended for long term use

## 2024-01-04 NOTE — PROGRESS NOTES
"  Patient Name: Naomy Armstrong    : 1965  MRN: [id number]      Subjective:     Patient ID: Naomy is a 58 y.o. female    Chief Complaint:  Annual Exam    58-year-old female presents for annual health maintenance exam.    Taking lisinopril for blood pressure with no concerns.    She reports continued back spasms, neck pain, and low back pain.  She does have a cervical and lumbar degenerative disc disease.  She is tried various medications without success.         Review of Systems   Constitutional:  Positive for activity change. Negative for chills, fever and unexpected weight change.   HENT:  Positive for postnasal drip and rhinorrhea. Negative for hearing loss and trouble swallowing.    Eyes:  Negative for discharge and visual disturbance.   Respiratory:  Positive for cough. Negative for chest tightness, shortness of breath and wheezing.    Cardiovascular:  Negative for chest pain and palpitations.   Gastrointestinal:  Positive for abdominal pain. Negative for blood in stool, constipation, diarrhea and vomiting.   Endocrine: Negative for polydipsia and polyuria.   Genitourinary:  Negative for difficulty urinating, dysuria, hematuria and menstrual problem.   Musculoskeletal:  Positive for back pain and myalgias. Negative for arthralgias, joint swelling and neck pain.   Neurological:  Positive for headaches and memory loss. Negative for weakness.   Psychiatric/Behavioral:  Negative for confusion and dysphoric mood.         Objective:   BP (!) 134/92 (BP Location: Right arm, Patient Position: Sitting, BP Method: Medium (Manual))   Pulse 83   Temp 98.2 °F (36.8 °C) (Oral)   Ht 5' 5" (1.651 m)   Wt 83.9 kg (184 lb 15.5 oz)   LMP 09/15/2013   SpO2 99%   BMI 30.78 kg/m²     Physical Exam  Vitals reviewed.   Constitutional:       General: She is not in acute distress.     Appearance: Normal appearance. She is not ill-appearing, toxic-appearing or diaphoretic.   HENT:      Head: Normocephalic and " atraumatic.      Nose: Nose normal.      Mouth/Throat:      Mouth: Mucous membranes are moist.   Eyes:      Extraocular Movements: Extraocular movements intact.      Pupils: Pupils are equal, round, and reactive to light.   Cardiovascular:      Rate and Rhythm: Regular rhythm.   Pulmonary:      Effort: Pulmonary effort is normal. No respiratory distress.      Breath sounds: No wheezing.   Musculoskeletal:      Cervical back: Normal range of motion and neck supple.   Skin:     General: Skin is dry.      Findings: No erythema, lesion or rash.   Neurological:      Mental Status: She is alert and oriented to person, place, and time.   Psychiatric:         Mood and Affect: Mood normal.         Behavior: Behavior normal.        No visits with results within 1 Month(s) from this visit.   Latest known visit with results is:   Lab Visit on 07/28/2023   Component Date Value Ref Range Status    TSH 07/28/2023 1.166  0.400 - 4.000 uIU/mL Final    WBC 07/28/2023 6.30  3.90 - 12.70 K/uL Final    RBC 07/28/2023 4.74  4.00 - 5.40 M/uL Final    Hemoglobin 07/28/2023 13.6  12.0 - 16.0 g/dL Final    Hematocrit 07/28/2023 44.4  37.0 - 48.5 % Final    MCV 07/28/2023 94  82 - 98 fL Final    MCH 07/28/2023 28.7  27.0 - 31.0 pg Final    MCHC 07/28/2023 30.6 (L)  32.0 - 36.0 g/dL Final    RDW 07/28/2023 13.2  11.5 - 14.5 % Final    Platelets 07/28/2023 313  150 - 450 K/uL Final    MPV 07/28/2023 9.1 (L)  9.2 - 12.9 fL Final    Immature Granulocytes 07/28/2023 0.3  0.0 - 0.5 % Final    Gran # (ANC) 07/28/2023 3.9  1.8 - 7.7 K/uL Final    Immature Grans (Abs) 07/28/2023 0.02  0.00 - 0.04 K/uL Final    Comment: Mild elevation in immature granulocytes is non specific and   can be seen in a variety of conditions including stress response,   acute inflammation, trauma and pregnancy. Correlation with other   laboratory and clinical findings is essential.      Lymph # 07/28/2023 1.8  1.0 - 4.8 K/uL Final    Mono # 07/28/2023 0.5  0.3 - 1.0 K/uL  Final    Eos # 07/28/2023 0.1  0.0 - 0.5 K/uL Final    Baso # 07/28/2023 0.03  0.00 - 0.20 K/uL Final    nRBC 07/28/2023 0  0 /100 WBC Final    Gran % 07/28/2023 61.6  38.0 - 73.0 % Final    Lymph % 07/28/2023 27.9  18.0 - 48.0 % Final    Mono % 07/28/2023 7.5  4.0 - 15.0 % Final    Eosinophil % 07/28/2023 2.2  0.0 - 8.0 % Final    Basophil % 07/28/2023 0.5  0.0 - 1.9 % Final    Differential Method 07/28/2023 Automated   Final    Sodium 07/28/2023 141  136 - 145 mmol/L Final    Potassium 07/28/2023 5.2 (H)  3.5 - 5.1 mmol/L Final    Chloride 07/28/2023 105  95 - 110 mmol/L Final    CO2 07/28/2023 31 (H)  23 - 29 mmol/L Final    Glucose 07/28/2023 104  70 - 110 mg/dL Final    BUN 07/28/2023 16  6 - 20 mg/dL Final    Creatinine 07/28/2023 0.8  0.5 - 1.4 mg/dL Final    Calcium 07/28/2023 10.0  8.7 - 10.5 mg/dL Final    Total Protein 07/28/2023 6.7  6.0 - 8.4 g/dL Final    Albumin 07/28/2023 3.8  3.5 - 5.2 g/dL Final    Total Bilirubin 07/28/2023 0.3  0.1 - 1.0 mg/dL Final    Comment: For infants and newborns, interpretation of results should be based  on gestational age, weight and in agreement with clinical  observations.    Premature Infant recommended reference ranges:  Up to 24 hours.............<8.0 mg/dL  Up to 48 hours............<12.0 mg/dL  3-5 days..................<15.0 mg/dL  6-29 days.................<15.0 mg/dL      Alkaline Phosphatase 07/28/2023 123  55 - 135 U/L Final    AST 07/28/2023 13  10 - 40 U/L Final    ALT 07/28/2023 17  10 - 44 U/L Final    eGFR 07/28/2023 >60  >60 mL/min/1.73 m^2 Final    Anion Gap 07/28/2023 5 (L)  8 - 16 mmol/L Final       The 10-year ASCVD risk score (Damaris GERARDO, et al., 2019) is: 3.9%    Values used to calculate the score:      Age: 58 years      Sex: Female      Is Non- : No      Diabetic: No      Tobacco smoker: No      Systolic Blood Pressure: 118 mmHg      Is BP treated: Yes      HDL Cholesterol: 68 mg/dL      Total Cholesterol: 250  mg/dL      Assessment        ICD-10-CM ICD-9-CM   1. Routine physical examination  Z00.00 V70.0   2. Major depressive disorder, recurrent, moderate  F33.1 296.32   3. Hypertension, essential  I10 401.9   4. Muscle spasm of back  M62.830 724.8   5. DDD (degenerative disc disease), cervical  M50.30 722.4   6. Lumbar spondylosis  M47.816 721.3   7. Encounter for screening mammogram for malignant neoplasm of breast  Z12.31 V76.12   8. Memory deficit  R41.3 780.93         Plan:     1. Routine physical examination  Age appropriate screenings, vaccinations, and recommendations reviewed and discussed.  Appropriate orders placed and previous results reviewed.    2. Major depressive disorder, recurrent, moderate  Assessment & Plan:  Reports no concerns.  Continue monitoring.    3. Hypertension, essential  Assessment & Plan:  Will add resulted check blood in the next four weeks.    Orders:  -     amLODIPine (NORVASC) 5 MG tablet; Take 1 tablet (5 mg total) by mouth once daily.  Dispense: 90 tablet; Refill: 1  -     Comprehensive Metabolic Panel; Future; Expected date: 01/04/2024  -     Lipid Panel; Future; Expected date: 01/04/2024  -     CBC Auto Differential; Future; Expected date: 01/04/2024  -     lisinopriL (PRINIVIL,ZESTRIL) 20 MG tablet; Take 1 tablet (20 mg total) by mouth nightly.  Dispense: 90 tablet; Refill: 3    4. Muscle spasm of back/  5. DDD (degenerative disc disease), cervical/  6. Lumbar spondylosis  -     methocarbamoL (ROBAXIN) 500 MG Tab; Take 1-2 tablets (500-1,000 mg total) by mouth 3 (three) times daily as needed (muscle spasms).  Dispense: 90 tablet; Refill: 5  -     Ambulatory referral/consult to Pain Clinic; Future; Expected date: 01/11/2024  -     pregabalin (LYRICA) 100 MG capsule; Take 1 capsule (100 mg total) by mouth 2 (two) times daily.  Dispense: 60 capsule; Refill: 5  Probably carbon let pressure.  Re-evaluate in the next four weeks.  7. Encounter for screening mammogram for malignant  neoplasm of breast  -     Mammo Digital Screening Bilat w/ Danny; Future; Expected date: 01/04/2024  Breast cancer screening ordered    8. Memory deficit  -     Ambulatory referral/consult to Adult Neuropsychology; Future; Expected date: 01/11/2024  Referral to neuropsych for evaluation.             -Eric Hernandes Jr., MD, AAHIVS      This office note has been dictated.  This dictation has been generated using M-Modal Fluency Direct dictation; some phonetic errors may occur.         Patient Instructions   Next time you com, please make sure to bring in all your medications so we can reconcile your medication list. It is very important we know everything you are taking.    Stop the Flexeril.  You can continue the Robaxin. You can take one pill as needed every 8 hours during the day but at night you can use 2, prior to bedtime    Change the gabapentin 600 mg twice a day to pregabalin (Lyrica) 100 mg twice a day and in 2 weeks we can do a virtual follow up to see if this continues to help with the pain and less sedating    I placed a referral for Pain Management. When you get a call to schedule, advise that you want it with Dr. Rolon at [facility].    Make sure to discuss promethazine prescription with GI as it is generally not recommended for long term use    Follow Up  Follow up in about 2 weeks (around 1/18/2024).    Future Appointments   Date Time Provider Department Center   3/13/2024 11:30 AM Jessica Shay MD [facility] -    6/7/2024 10:30 AM Fab Shepherd MD [facility]

## 2024-01-05 ENCOUNTER — TELEPHONE (OUTPATIENT)
Dept: FAMILY MEDICINE | Facility: CLINIC | Age: 59
End: 2024-01-05
Payer: COMMERCIAL

## 2024-01-05 NOTE — TELEPHONE ENCOUNTER
----- Message from Sherlyn Mcgarry sent at 1/5/2024  2:14 PM CST -----  Regarding: [facility]  Type: Patient Call Back    Who called: Lucila with [facility]     What is the request in detail: pregabalin (LYRICA) 100 MG capsule they received an error code stating, this Rx does not meet the requirement for controled substance. She  asked if it can be resent.    Can the clinic reply by [facility]? No     Would the patient rather a call back or a response via [facility]? Call back    Best call back number:   [phone] [address] [place] - [address]  [address]  [place] [address]  Phone: [phone] Fax: [phone]

## 2024-01-06 ENCOUNTER — LAB VISIT (OUTPATIENT)
Dept: LAB | Facility: HOSPITAL | Age: 59
End: 2024-01-06
Attending: FAMILY MEDICINE
Payer: COMMERCIAL

## 2024-01-06 DIAGNOSIS — R73.03 PREDIABETES: ICD-10-CM

## 2024-01-06 DIAGNOSIS — I10 HYPERTENSION, ESSENTIAL: ICD-10-CM

## 2024-01-06 LAB
ALBUMIN SERPL BCP-MCNC: 3.7 G/DL (ref 3.5–5.2)
ALP SERPL-CCNC: 134 U/L (ref 55–135)
ALT SERPL W/O P-5'-P-CCNC: 15 U/L (ref 10–44)
ANION GAP SERPL CALC-SCNC: 10 MMOL/L (ref 8–16)
AST SERPL-CCNC: 13 U/L (ref 10–40)
BASOPHILS # BLD AUTO: 0.03 K/UL (ref 0–0.2)
BASOPHILS NFR BLD: 0.5 % (ref 0–1.9)
BILIRUB SERPL-MCNC: 0.5 MG/DL (ref 0.1–1)
BUN SERPL-MCNC: 12 MG/DL (ref 6–20)
CALCIUM SERPL-MCNC: 10.6 MG/DL (ref 8.7–10.5)
CHLORIDE SERPL-SCNC: 105 MMOL/L (ref 95–110)
CHOLEST SERPL-MCNC: 250 MG/DL (ref 120–199)
CHOLEST/HDLC SERPL: 3.7 {RATIO} (ref 2–5)
CO2 SERPL-SCNC: 25 MMOL/L (ref 23–29)
CREAT SERPL-MCNC: 0.8 MG/DL (ref 0.5–1.4)
DIFFERENTIAL METHOD BLD: ABNORMAL
EOSINOPHIL # BLD AUTO: 0.1 K/UL (ref 0–0.5)
EOSINOPHIL NFR BLD: 1 % (ref 0–8)
ERYTHROCYTE [DISTWIDTH] IN BLOOD BY AUTOMATED COUNT: 13.3 % (ref 11.5–14.5)
EST. GFR  (NO RACE VARIABLE): >60 ML/MIN/1.73 M^2
ESTIMATED AVG GLUCOSE: 108 MG/DL (ref 68–131)
GLUCOSE SERPL-MCNC: 104 MG/DL (ref 70–110)
HBA1C MFR BLD: 5.4 % (ref 4–5.6)
HCT VFR BLD AUTO: 45 % (ref 37–48.5)
HDLC SERPL-MCNC: 68 MG/DL (ref 40–75)
HDLC SERPL: 27.2 % (ref 20–50)
HGB BLD-MCNC: 13.6 G/DL (ref 12–16)
IMM GRANULOCYTES # BLD AUTO: 0.02 K/UL (ref 0–0.04)
IMM GRANULOCYTES NFR BLD AUTO: 0.3 % (ref 0–0.5)
LDLC SERPL CALC-MCNC: 161.4 MG/DL (ref 63–159)
LYMPHOCYTES # BLD AUTO: 1.4 K/UL (ref 1–4.8)
LYMPHOCYTES NFR BLD: 23.6 % (ref 18–48)
MCH RBC QN AUTO: 27.3 PG (ref 27–31)
MCHC RBC AUTO-ENTMCNC: 30.2 G/DL (ref 32–36)
MCV RBC AUTO: 90 FL (ref 82–98)
MONOCYTES # BLD AUTO: 0.4 K/UL (ref 0.3–1)
MONOCYTES NFR BLD: 7 % (ref 4–15)
NEUTROPHILS # BLD AUTO: 4.1 K/UL (ref 1.8–7.7)
NEUTROPHILS NFR BLD: 67.6 % (ref 38–73)
NONHDLC SERPL-MCNC: 182 MG/DL
NRBC BLD-RTO: 0 /100 WBC
PLATELET # BLD AUTO: 335 K/UL (ref 150–450)
PMV BLD AUTO: 9 FL (ref 9.2–12.9)
POTASSIUM SERPL-SCNC: 5.5 MMOL/L (ref 3.5–5.1)
PROT SERPL-MCNC: 7.1 G/DL (ref 6–8.4)
RBC # BLD AUTO: 4.98 M/UL (ref 4–5.4)
SODIUM SERPL-SCNC: 140 MMOL/L (ref 136–145)
TRIGL SERPL-MCNC: 103 MG/DL (ref 30–150)
WBC # BLD AUTO: 6.11 K/UL (ref 3.9–12.7)

## 2024-01-06 PROCEDURE — 80061 LIPID PANEL: CPT | Performed by: FAMILY MEDICINE

## 2024-01-06 PROCEDURE — 36415 COLL VENOUS BLD VENIPUNCTURE: CPT | Performed by: FAMILY MEDICINE

## 2024-01-06 PROCEDURE — 83036 HEMOGLOBIN GLYCOSYLATED A1C: CPT | Performed by: FAMILY MEDICINE

## 2024-01-06 PROCEDURE — 85025 COMPLETE CBC W/AUTO DIFF WBC: CPT | Performed by: FAMILY MEDICINE

## 2024-01-06 PROCEDURE — 80053 COMPREHEN METABOLIC PANEL: CPT | Performed by: FAMILY MEDICINE

## 2024-01-08 ENCOUNTER — TELEPHONE (OUTPATIENT)
Dept: FAMILY MEDICINE | Facility: CLINIC | Age: 59
End: 2024-01-08

## 2024-01-08 NOTE — TELEPHONE ENCOUNTER
----- Message from Elvi Lockhart sent at 1/8/2024  1:24 PM CST -----  Regarding: Afsaneh [facility] [phone]  .Type: Patient Call Back    Who called:[employer]    What is the request in detail: Afsaneh is requesting  a call back in regards to the medication  pregabalin (LYRICA) 100 MG capsule and if seeking advice it has been verified.     Can the clinic reply by [facility]?no    Would the patient rather a call back or a response via [facility]? Call back    Best call back number [phone]

## 2024-01-08 NOTE — TELEPHONE ENCOUNTER
Please advise , [facility] pharmacy is calling back for a f/u on 1/5 tele-encounter .  they received an error code stating, this Rx does not meet the requirement for controled substance. She  asked if it can be resent.

## 2024-01-11 DIAGNOSIS — M47.816 LUMBAR SPONDYLOSIS: ICD-10-CM

## 2024-01-11 DIAGNOSIS — M62.830 MUSCLE SPASM OF BACK: ICD-10-CM

## 2024-01-11 DIAGNOSIS — M50.30 DDD (DEGENERATIVE DISC DISEASE), CERVICAL: ICD-10-CM

## 2024-01-11 RX ORDER — PREGABALIN 100 MG/1
100 CAPSULE ORAL 2 TIMES DAILY
Qty: 60 CAPSULE | Refills: 5 | Status: CANCELLED | OUTPATIENT
Start: 2024-01-11 | End: 2024-07-11

## 2024-01-11 NOTE — TELEPHONE ENCOUNTER
No care due was identified.  [facility] Embedded Care Due Messages. Reference number: [id number].   1/11/2024 1:40:38 PM CST

## 2024-01-11 NOTE — TELEPHONE ENCOUNTER
----- Message from Michael White sent at 1/11/2024 12:47 PM CST -----  Regarding: Divina with [facility]  Name of Caller: Divina     Pharmacy Name: [facility]       Prescription Name: Divina with [facility]     What do they need to clarify? Need to resend prescription with just 5 refills not 6 because it is against the law because it is a controlled substance. Please resend prescription     Can you be contacted via [facility]?     Best Call Back Number: .  [facility] - [place] - [address] [address] [facility]  [place] [address]  Phone: [phone] Fax: [phone]      Additional Information

## 2024-01-13 DIAGNOSIS — K58.0 IRRITABLE BOWEL SYNDROME WITH DIARRHEA: ICD-10-CM

## 2024-01-16 RX ORDER — HYDROCODONE BITARTRATE AND ACETAMINOPHEN 5; 325 MG/1; MG/1
1 TABLET ORAL NIGHTLY PRN
Qty: 7 TABLET | Refills: 0 | OUTPATIENT
Start: 2024-01-16

## 2024-01-16 RX ORDER — PREGABALIN 100 MG/1
100 CAPSULE ORAL 2 TIMES DAILY
Qty: 60 CAPSULE | Refills: 5 | Status: SHIPPED | OUTPATIENT
Start: 2024-01-16 | End: 2024-03-12

## 2024-01-16 RX ORDER — DIPHENOXYLATE HYDROCHLORIDE AND ATROPINE SULFATE 2.5; .025 MG/1; MG/1
1 TABLET ORAL 4 TIMES DAILY PRN
Qty: 60 TABLET | Refills: 1 | Status: SHIPPED | OUTPATIENT
Start: 2024-01-16

## 2024-01-16 RX ORDER — ALOSETRON HYDROCHLORIDE 0.5 MG/1
0.5 TABLET ORAL 2 TIMES DAILY
Qty: 180 TABLET | Refills: 0 | Status: SHIPPED | OUTPATIENT
Start: 2024-01-16 | End: 2024-03-11 | Stop reason: SDUPTHER

## 2024-01-18 ENCOUNTER — TELEPHONE (OUTPATIENT)
Dept: FAMILY MEDICINE | Facility: CLINIC | Age: 59
End: 2024-01-18
Payer: COMMERCIAL

## 2024-01-23 ENCOUNTER — TELEPHONE (OUTPATIENT)
Dept: FAMILY MEDICINE | Facility: CLINIC | Age: 59
End: 2024-01-23
Payer: COMMERCIAL

## 2024-01-23 NOTE — TELEPHONE ENCOUNTER
----- Message from Eric Hernandes Jr., MD sent at 1/18/2024 11:19 PM CST -----  Please call patient as she missed her appointment with me to review her lab results.  Needs to reschedule virtual visit.  If she does not answer, please send letter to patient to contact office to schedule an appointment to review her lab results

## 2024-01-27 DIAGNOSIS — K52.9 ACUTE GASTROENTERITIS: ICD-10-CM

## 2024-01-29 RX ORDER — DICYCLOMINE HYDROCHLORIDE 20 MG/1
20 TABLET ORAL EVERY 6 HOURS
Qty: 120 TABLET | Refills: 0 | Status: SHIPPED | OUTPATIENT
Start: 2024-01-29 | End: 2024-02-29

## 2024-02-06 ENCOUNTER — OFFICE VISIT (OUTPATIENT)
Dept: FAMILY MEDICINE | Facility: CLINIC | Age: 59
End: 2024-02-06
Payer: COMMERCIAL

## 2024-02-06 DIAGNOSIS — B96.89 ACUTE BACTERIAL SINUSITIS: Primary | ICD-10-CM

## 2024-02-06 DIAGNOSIS — J01.90 ACUTE BACTERIAL SINUSITIS: Primary | ICD-10-CM

## 2024-02-06 DIAGNOSIS — R09.82 POST-NASAL DRIP: ICD-10-CM

## 2024-02-06 DIAGNOSIS — R09.81 NASAL CONGESTION: ICD-10-CM

## 2024-02-06 PROCEDURE — 1160F RVW MEDS BY RX/DR IN RCRD: CPT | Mod: CPTII,95,, | Performed by: NURSE PRACTITIONER

## 2024-02-06 PROCEDURE — 1159F MED LIST DOCD IN RCRD: CPT | Mod: CPTII,95,, | Performed by: NURSE PRACTITIONER

## 2024-02-06 PROCEDURE — 99213 OFFICE O/P EST LOW 20 MIN: CPT | Mod: 95,,, | Performed by: NURSE PRACTITIONER

## 2024-02-06 PROCEDURE — 4010F ACE/ARB THERAPY RXD/TAKEN: CPT | Mod: CPTII,95,, | Performed by: NURSE PRACTITIONER

## 2024-02-06 PROCEDURE — 3044F HG A1C LEVEL LT 7.0%: CPT | Mod: CPTII,95,, | Performed by: NURSE PRACTITIONER

## 2024-02-06 RX ORDER — AZELASTINE 1 MG/ML
1 SPRAY, METERED NASAL 2 TIMES DAILY
Qty: 30 ML | Refills: 1 | Status: SHIPPED | OUTPATIENT
Start: 2024-02-06 | End: 2024-04-10 | Stop reason: SDUPTHER

## 2024-02-06 RX ORDER — ONDANSETRON 8 MG/1
8 TABLET, ORALLY DISINTEGRATING ORAL EVERY 12 HOURS PRN
Qty: 30 TABLET | Refills: 0 | Status: SHIPPED | OUTPATIENT
Start: 2024-02-06

## 2024-02-06 RX ORDER — AMOXICILLIN AND CLAVULANATE POTASSIUM 875; 125 MG/1; MG/1
1 TABLET, FILM COATED ORAL EVERY 12 HOURS
Qty: 20 TABLET | Refills: 0 | Status: SHIPPED | OUTPATIENT
Start: 2024-02-06 | End: 2024-04-10

## 2024-02-06 RX ORDER — FLUCONAZOLE 200 MG/1
200 TABLET ORAL DAILY
Qty: 1 TABLET | Refills: 0 | Status: SHIPPED | OUTPATIENT
Start: 2024-02-06

## 2024-02-06 NOTE — PROGRESS NOTES
Answers submitted by the patient for this visit:  Review of Systems Questionnaire (Submitted on 2024)  activity change: No  unexpected weight change: No  neck pain: No  hearing loss: No  rhinorrhea: No  trouble swallowing: Yes  eye discharge: No  visual disturbance: Yes  chest tightness: No  wheezing: No  chest pain: No  palpitations: No  blood in stool: No  constipation: No  vomiting: No  diarrhea: No  polydipsia: No  polyuria: No  difficulty urinating: No  hematuria: No  menstrual problem: No  dysuria: No  joint swelling: No  arthralgias: No  headaches: Yes  weakness: No  confusion: No  dysphoric mood: Yes    The patient location is: [place]  The chief complaint leading to consultation is: Headache    Visit type: audiovisual    Face to Face time with patient: 9 minutes  15 minutes of total time spent on the encounter, which includes face to face time and non-face to face time preparing to see the patient (eg, review of tests), Obtaining and/or reviewing separately obtained history, Documenting clinical information in the electronic or other health record, Independently interpreting results (not separately reported) and communicating results to the patient/family/caregiver, or Care coordination (not separately reported).         Each patient to whom he or she provides medical services by telemedicine is:  (1) informed of the relationship between the physician and patient and the respective role of any other health care provider with respect to management of the patient; and (2) notified that he or she may decline to receive medical services by telemedicine and may withdraw from such care at any time.    Notes:     Routine Office Visit    Patient Name: Naomy Armstrong    : 1965  MRN: [id number]    Chief Complaint:  Headache    Subjective:  Naomy is a 58 y.o. female who presents today for:    Headache - pt who is known to me reports today for headaches.  For the last 2 weeks has been dealing with  significant sinus congestion and sinus pain especially on the left side.  For the last 5 days she has developed significant left-sided headache as well which she states feels like a migraine.  She has chronic headaches and is following with her neurologist Dr. Boateng.  She states she will be seeing him tomorrow.  She has had a left-sided headache for at least 4 days associated with some nausea without vomiting.  She states that at 1 point her vision went black but promptly returned.  No reported blurred vision or double vision today.  She has been using an ice pack to help with the symptoms with only modest benefit.  She states that she had an MRI ordered by her neurologist but missed this appointment and will be discussing rescheduling this with him tomorrow.    Past Medical History  Past Medical History:   Diagnosis Date    Alcohol abuse     per chart, but patient denies today and cocaine per chart    Anxiety     Colon polyp     Depression     Hallucination     last couple of months started seeing someone standing in her room, saw boyfriend's reflection in glass    Headache     History of psychiatric hospitalization     age 13 [facility] for 2 months for acting out; 2018 for SI    Hx of psychiatric care     Hypertension     Psychiatric problem     Sleep difficulties     Suicide attempt     with knife by cutting stomach    Therapy        Family History  Family History   Problem Relation Age of Onset    Anxiety disorder Sister     Depression Sister     Alcohol abuse Maternal Uncle     Alcohol abuse Maternal Grandfather        Current Medications  Current Outpatient Medications on File Prior to Visit   Medication Sig Dispense Refill    alosetron (LOTRONEX) 0.5 MG tablet Take 1 tablet (0.5 mg total) by mouth 2 (two) times daily. 180 tablet 0    amLODIPine (NORVASC) 5 MG tablet Take 1 tablet (5 mg total) by mouth once daily. 90 tablet 1    cyclobenzaprine (FLEXERIL) 5 MG tablet Take 2 tablets (10 mg total) by mouth 3  (three) times daily as needed for Muscle spasms. 90 tablet 0    dicyclomine (BENTYL) 20 mg tablet TAKE 1 TABLET BY MOUTH EVERY 6 HOURS 120 tablet 0    diphenoxylate-atropine 2.5-0.025 mg (LOMOTIL) 2.5-0.025 mg per tablet Take 1 tablet by mouth 4 (four) times daily as needed for Diarrhea. 60 tablet 1    FLUoxetine 40 MG capsule Take 2 capsules (80 mg total) by mouth once daily. 180 capsule 3    HYDROcodone-acetaminophen (NORCO) 5-325 mg per tablet Take 1 tablet by mouth nightly as needed for Pain. 7 tablet 0    hydrOXYzine HCL (ATARAX) 10 MG Tab Take 1 tablet (10 mg total) by mouth 2 (two) times daily as needed (anxiety). 60 tablet 3    lisinopriL (PRINIVIL,ZESTRIL) 20 MG tablet Take 1 tablet (20 mg total) by mouth nightly. 90 tablet 3    lurasidone (LATUDA) 20 mg Tab tablet Take 1 tablet (20 mg total) by mouth once daily. 30 tablet 11    meloxicam (MOBIC) 15 MG tablet Take 15 mg by mouth.      methocarbamoL (ROBAXIN) 500 MG Tab Take 1-2 tablets (500-1,000 mg total) by mouth 3 (three) times daily as needed (muscle spasms). 90 tablet 5    multivitamin with minerals tablet Take 1 tablet by mouth once daily.      pregabalin (LYRICA) 100 MG capsule Take 1 capsule (100 mg total) by mouth 2 (two) times daily. 60 capsule 5    promethazine (PHENERGAN) 25 MG tablet Take 1 tablet (25 mg total) by mouth every 8 (eight) hours as needed for Nausea. 30 tablet 0    omeprazole (PRILOSEC) 40 MG capsule Take 1 capsule (40 mg total) by mouth every morning. 90 capsule 3    [DISCONTINUED] azelastine (ASTELIN) 137 mcg (0.1 %) nasal spray 1 spray (137 mcg total) by Nasal route 2 (two) times daily. 30 mL 1     No current facility-administered medications on file prior to visit.       Allergies   Review of patient's allergies indicates:   Allergen Reactions    Effexor [venlafaxine]      Elevated her blood pressure    Zoloft [sertraline]     Paroxetine hcl      Made her feel drunk       Review of Systems (Pertinent positives)  Review of  Systems   Constitutional:  Negative for chills, fever and weight loss.   HENT:  Positive for congestion and sinus pain. Negative for ear pain, hearing loss and tinnitus.    Eyes:  Negative for photophobia, pain and discharge.   Respiratory:  Negative for cough, hemoptysis and wheezing.    Cardiovascular:  Negative for chest pain and palpitations.   Gastrointestinal: Negative.  Negative for blood in stool, constipation, diarrhea and vomiting.   Genitourinary: Negative.  Negative for dysuria and hematuria.   Musculoskeletal:  Negative for neck pain.   Skin: Negative.    Neurological:  Positive for headaches. Negative for tingling, tremors, sensory change and weakness.   Endo/Heme/Allergies:  Negative for polydipsia.       LMP 09/15/2013     Physical Exam  Vitals reviewed.   Constitutional:       General: She is not in acute distress.     Appearance: Normal appearance. She is not ill-appearing, toxic-appearing or diaphoretic.   HENT:      Head: Normocephalic and atraumatic.   Pulmonary:      Effort: Pulmonary effort is normal. No respiratory distress.   Skin:     General: Skin is dry.      Findings: No bruising, erythema, lesion or rash.   Neurological:      Mental Status: She is alert and oriented to person, place, and time.   Psychiatric:         Mood and Affect: Mood normal.         Behavior: Behavior normal.          Assessment/Plan:  Naomy Armstrong is a 58 y.o. female who presents today for :    Diagnoses and all orders for this visit:    Acute bacterial sinusitis  -     amoxicillin-clavulanate 875-125mg (AUGMENTIN) 875-125 mg per tablet; Take 1 tablet by mouth every 12 (twelve) hours. Take with food  -     ondansetron (ZOFRAN-ODT) 8 MG TbDL; Take 1 tablet (8 mg total) by mouth every 12 (twelve) hours as needed (nausea).  -     fluconazole (DIFLUCAN) 200 MG Tab; Take 1 tablet (200 mg total) by mouth once daily. Take at first sign of yeast infection    Given length and severity of sinus symptoms will treat  with Augmentin today.  P.r.n. Zofran for nausea.  One time Diflucan sent for possible antibiotic induced yeast infection.  Patient will discuss her headaches with her neurologist tomorrow.  Needs to get MRI rescheduled.    Post-nasal drip  -     azelastine (ASTELIN) 137 mcg (0.1 %) nasal spray; 1 spray (137 mcg total) by Nasal route 2 (two) times daily.    Refilled for patient.    Nasal congestion  -     azelastine (ASTELIN) 137 mcg (0.1 %) nasal spray; 1 spray (137 mcg total) by Nasal route 2 (two) times daily.    Refilled for patient.        This office note has been dictated.  This dictation has been generated using M-Modal Fluency Direct dictation; some phonetic errors may occur.    don't get vaccine

## 2024-02-07 ENCOUNTER — OFFICE VISIT (OUTPATIENT)
Dept: FAMILY MEDICINE | Facility: CLINIC | Age: 59
End: 2024-02-07
Payer: COMMERCIAL

## 2024-02-07 ENCOUNTER — LAB VISIT (OUTPATIENT)
Dept: LAB | Facility: HOSPITAL | Age: 59
End: 2024-02-07
Attending: NEUROLOGICAL SURGERY
Payer: COMMERCIAL

## 2024-02-07 VITALS — SYSTOLIC BLOOD PRESSURE: 118 MMHG | DIASTOLIC BLOOD PRESSURE: 79 MMHG

## 2024-02-07 DIAGNOSIS — R41.3 MEMORY LOSS: Primary | ICD-10-CM

## 2024-02-07 DIAGNOSIS — I10 HYPERTENSION, ESSENTIAL: Chronic | ICD-10-CM

## 2024-02-07 DIAGNOSIS — M47.816 LUMBAR SPONDYLOSIS: Chronic | ICD-10-CM

## 2024-02-07 DIAGNOSIS — R41.3 MEMORY LOSS: ICD-10-CM

## 2024-02-07 DIAGNOSIS — M50.30 DDD (DEGENERATIVE DISC DISEASE), CERVICAL: Primary | Chronic | ICD-10-CM

## 2024-02-07 DIAGNOSIS — M62.830 MUSCLE SPASM OF BACK: Chronic | ICD-10-CM

## 2024-02-07 LAB
TSH SERPL DL<=0.005 MIU/L-ACNC: 0.75 UIU/ML (ref 0.4–4)
VIT B12 SERPL-MCNC: 220 PG/ML (ref 210–950)

## 2024-02-07 PROCEDURE — 4010F ACE/ARB THERAPY RXD/TAKEN: CPT | Mod: CPTII,95,, | Performed by: FAMILY MEDICINE

## 2024-02-07 PROCEDURE — 36415 COLL VENOUS BLD VENIPUNCTURE: CPT | Performed by: NEUROLOGICAL SURGERY

## 2024-02-07 PROCEDURE — 83921 ORGANIC ACID SINGLE QUANT: CPT | Performed by: NEUROLOGICAL SURGERY

## 2024-02-07 PROCEDURE — 3078F DIAST BP <80 MM HG: CPT | Mod: CPTII,95,, | Performed by: FAMILY MEDICINE

## 2024-02-07 PROCEDURE — 84443 ASSAY THYROID STIM HORMONE: CPT | Performed by: NEUROLOGICAL SURGERY

## 2024-02-07 PROCEDURE — 1160F RVW MEDS BY RX/DR IN RCRD: CPT | Mod: CPTII,95,, | Performed by: FAMILY MEDICINE

## 2024-02-07 PROCEDURE — 3044F HG A1C LEVEL LT 7.0%: CPT | Mod: CPTII,95,, | Performed by: FAMILY MEDICINE

## 2024-02-07 PROCEDURE — 82607 VITAMIN B-12: CPT | Performed by: NEUROLOGICAL SURGERY

## 2024-02-07 PROCEDURE — 3074F SYST BP LT 130 MM HG: CPT | Mod: CPTII,95,, | Performed by: FAMILY MEDICINE

## 2024-02-07 PROCEDURE — 1159F MED LIST DOCD IN RCRD: CPT | Mod: CPTII,95,, | Performed by: FAMILY MEDICINE

## 2024-02-07 PROCEDURE — 99214 OFFICE O/P EST MOD 30 MIN: CPT | Mod: 95,,, | Performed by: FAMILY MEDICINE

## 2024-02-07 RX ORDER — PREGABALIN 165 MG/1
165 TABLET, FILM COATED, EXTENDED RELEASE ORAL NIGHTLY
Qty: 30 TABLET | Refills: 0 | Status: SHIPPED | OUTPATIENT
Start: 2024-02-07 | End: 2024-03-11 | Stop reason: SDUPTHER

## 2024-02-09 DIAGNOSIS — M25.562 PAIN IN BOTH KNEES, UNSPECIFIED CHRONICITY: Primary | ICD-10-CM

## 2024-02-09 DIAGNOSIS — M25.561 PAIN IN BOTH KNEES, UNSPECIFIED CHRONICITY: Primary | ICD-10-CM

## 2024-02-12 ENCOUNTER — OFFICE VISIT (OUTPATIENT)
Dept: ORTHOPEDICS | Facility: CLINIC | Age: 59
End: 2024-02-12
Payer: COMMERCIAL

## 2024-02-12 DIAGNOSIS — M25.562 PAIN IN BOTH KNEES, UNSPECIFIED CHRONICITY: Primary | ICD-10-CM

## 2024-02-12 DIAGNOSIS — M25.561 PAIN IN BOTH KNEES, UNSPECIFIED CHRONICITY: Primary | ICD-10-CM

## 2024-02-12 PROCEDURE — 3044F HG A1C LEVEL LT 7.0%: CPT | Mod: CPTII,S$GLB,, | Performed by: ORTHOPAEDIC SURGERY

## 2024-02-12 PROCEDURE — 4010F ACE/ARB THERAPY RXD/TAKEN: CPT | Mod: CPTII,S$GLB,, | Performed by: ORTHOPAEDIC SURGERY

## 2024-02-12 PROCEDURE — 1160F RVW MEDS BY RX/DR IN RCRD: CPT | Mod: CPTII,S$GLB,, | Performed by: ORTHOPAEDIC SURGERY

## 2024-02-12 PROCEDURE — 99999 PR PBB SHADOW E&M-EST. PATIENT-LVL IV: CPT | Mod: PBBFAC,,, | Performed by: ORTHOPAEDIC SURGERY

## 2024-02-12 PROCEDURE — 99213 OFFICE O/P EST LOW 20 MIN: CPT | Mod: 25,S$GLB,, | Performed by: ORTHOPAEDIC SURGERY

## 2024-02-12 PROCEDURE — 1159F MED LIST DOCD IN RCRD: CPT | Mod: CPTII,S$GLB,, | Performed by: ORTHOPAEDIC SURGERY

## 2024-02-12 PROCEDURE — 20610 DRAIN/INJ JOINT/BURSA W/O US: CPT | Mod: 50,S$GLB,, | Performed by: ORTHOPAEDIC SURGERY

## 2024-02-12 RX ORDER — TRIAMCINOLONE ACETONIDE 40 MG/ML
40 INJECTION, SUSPENSION INTRA-ARTICULAR; INTRAMUSCULAR
Status: DISCONTINUED | OUTPATIENT
Start: 2024-02-12 | End: 2024-02-12 | Stop reason: HOSPADM

## 2024-02-12 RX ADMIN — TRIAMCINOLONE ACETONIDE 40 MG: 40 INJECTION, SUSPENSION INTRA-ARTICULAR; INTRAMUSCULAR at 11:02

## 2024-02-12 NOTE — PROCEDURES
Large Joint Aspiration/Injection: bilateral knee    Date/Time: 2/12/2024 11:30 AM    Performed by: Jessica Shay MD  Authorized by: Jessica Shay MD    Consent Done?:  Yes (Verbal)  Indications:  Arthritis  Timeout: prior to procedure the correct patient, procedure, and site was verified    Prep: patient was prepped and draped in usual sterile fashion      Local anesthesia used?: Yes    Local anesthetic:  Topical anesthetic    Details:  Needle Size:  22 G  Approach:  Anteromedial  Location:  Knee  Laterality:  Bilateral  Site:  Bilateral knee  Medications (Right):  40 mg triamcinolone acetonide 40 mg/mL  Medications (Left):  40 mg triamcinolone acetonide 40 mg/mL  Patient tolerance:  Patient tolerated the procedure well with no immediate complications

## 2024-02-12 NOTE — PROGRESS NOTES
Follow up visit    History of Present Illness:   Naomy comes to the office for follow up evaluation of bilateral knee pain due to osteoarthritis  Last injection in July with Dr Hutchison - relief for about 3 months    ROS: unremarkable and no change since last visit    Physical Examination:    NAD  Left and Right knees  AROM  0 -130  TTP MJL on L   No effusion or soft tissue swelling     Radiographic imaging:  3 views bilateral knee:  moderate tricompartmental OA with subchondral sclerosis, joint space narrowing, osteophyte formation. Worse on L than R    I personally reviewed and interpreted the patient's imaging obtained today in clinic    Assessment/Plan:  58 y.o. female  with Bilateral knee arthritis     We discussed the etiology of persistent pain and further treatment options.  Injection of the bilateral knee  performed, please see procedure note for more details.  Prior to the injection risks and benefits of corticosteroid injection were discussed with the patient including pain, infection, bleeding, skin color changes, swelling, steroid flare. We discussed that over time injections can result in chondral damage, acceleration of arthritis formation, damage to tendons and damage to joints.  The patient consented for the procedure.  Post-injection instructions were given to the patient in writing.  Return for follow up visit: PRN     All questions were answered in detail. The patient  verbalized the understanding of the treatment plan and is in full agreement with the treatment plan.

## 2024-02-13 DIAGNOSIS — R11.0 NAUSEA: ICD-10-CM

## 2024-02-13 LAB — METHYLMALONATE SERPL-SCNC: 0.14 UMOL/L

## 2024-02-14 RX ORDER — PROMETHAZINE HYDROCHLORIDE 25 MG/1
25 TABLET ORAL EVERY 8 HOURS PRN
Qty: 30 TABLET | Refills: 0 | Status: SHIPPED | OUTPATIENT
Start: 2024-02-14 | End: 2024-05-03 | Stop reason: SDUPTHER

## 2024-02-14 NOTE — TELEPHONE ENCOUNTER
No care due was identified.  [facility] Embedded Care Due Messages. Reference number: [id number].   2/13/2024 9:41:14 PM CST

## 2024-02-18 PROBLEM — I10 HYPERTENSION, ESSENTIAL: Chronic | Status: ACTIVE | Noted: 2024-02-18

## 2024-02-18 RX ORDER — LURASIDONE HYDROCHLORIDE 20 MG/1
20 TABLET, FILM COATED ORAL DAILY
Qty: 30 TABLET | Refills: 11 | Status: SHIPPED | OUTPATIENT
Start: 2024-02-18 | End: 2024-04-24 | Stop reason: SDUPTHER

## 2024-02-19 ENCOUNTER — PATIENT MESSAGE (OUTPATIENT)
Dept: PSYCHIATRY | Facility: CLINIC | Age: 59
End: 2024-02-19
Payer: COMMERCIAL

## 2024-02-19 ENCOUNTER — HOSPITAL ENCOUNTER (OUTPATIENT)
Dept: RADIOLOGY | Facility: HOSPITAL | Age: 59
Discharge: HOME | End: 2024-02-19
Attending: NEUROLOGICAL SURGERY
Payer: COMMERCIAL

## 2024-02-19 ENCOUNTER — TELEPHONE (OUTPATIENT)
Dept: PSYCHIATRY | Facility: CLINIC | Age: 59
End: 2024-02-19
Payer: COMMERCIAL

## 2024-02-19 DIAGNOSIS — G43.919 MIGRAINE, UNSPECIFIED, INTRACTABLE, WITHOUT STATUS MIGRAINOSUS: ICD-10-CM

## 2024-02-19 PROCEDURE — 70551 MRI BRAIN STEM W/O DYE: CPT | Mod: 26,,, | Performed by: RADIOLOGY

## 2024-02-19 PROCEDURE — 70551 MRI BRAIN STEM W/O DYE: CPT | Mod: TC

## 2024-02-19 NOTE — ASSESSMENT & PLAN NOTE
BP Readings from Last 3 Encounters:   02/07/24 118/79   01/04/24 (!) 134/92   12/01/23 134/88      ACC/AHA guidelines on blood pressure goals reviewed.  Reinforced correct way of measuring blood pressure.   Reports blood pressure doing well with amlodipine.  Will continue.

## 2024-02-19 NOTE — PROGRESS NOTES
Patient Name: Naomy Armstrong    : 1965  MRN: [id number]    The patient location is: [place], [place]  The chief complaint leading to consultation is: Pain    Visit type: audiovisual    Face to Face time with patient: 10 minutes  15 minutes of total time spent on the encounter, which includes face to face time and non-face to face time preparing to see the patient (eg, review of tests), Obtaining and/or reviewing separately obtained history, Documenting clinical information in the electronic or other health record, Independently interpreting results (not separately reported) and communicating results to the patient/family/caregiver, or Care coordination (not separately reported).         Each patient to whom he or she provides medical services by telemedicine is:  (1) informed of the relationship between the physician and patient and the respective role of any other health care provider with respect to management of the patient; and (2) notified that he or she may decline to receive medical services by telemedicine and may withdraw from such care at any time.    Notes:     Subjective:     Patient ID: Naomy is a 58 y.o. female    Chief Complaint:  Pain    58-year-old female makes virtual visit for follow-up on musculoskeletal pains since switching gabapentin to pregabalin.  She reports that she does overall get better results for pain control with the pregabalin however she does have sedation with morning dose.  She would like to discuss options.         Review of Systems   Constitutional:  Negative for activity change and unexpected weight change.   HENT:  Positive for trouble swallowing. Negative for hearing loss and rhinorrhea.    Eyes:  Negative for discharge and visual disturbance.   Respiratory:  Negative for chest tightness and wheezing.    Cardiovascular:  Negative for chest pain and palpitations.   Gastrointestinal:  Negative for blood in stool, constipation, diarrhea and vomiting.   Endocrine:  Negative for polydipsia and polyuria.   Genitourinary:  Negative for difficulty urinating, dysuria, hematuria and menstrual problem.   Musculoskeletal:  Negative for arthralgias, joint swelling and neck pain.   Neurological:  Positive for headaches. Negative for weakness.   Psychiatric/Behavioral:  Positive for dysphoric mood. Negative for confusion.         Objective:   /79 Comment: done at Neurologist office  LMP 09/15/2013     Physical Exam   Assessment        ICD-10-CM ICD-9-CM   1. DDD (degenerative disc disease), cervical  M50.30 722.4   2. Muscle spasm of back  M62.830 724.8   3. Lumbar spondylosis  M47.816 721.3   4. Hypertension, essential  I10 401.9         Plan:     1. DDD (degenerative disc disease), cervical/  2. Muscle spasm of back/ 3. Lumbar spondylosis  -     pregabalin 165 mg Tb24; Take 165 mg by mouth every evening.  Dispense: 30 tablet; Refill: 0  Will switch to pregabalin extended-release 165 milligrams every evening.    This is to see if side effects of morning sedation is improved with this regimen.  Patient to message me in the next few weeks with how she is doing with this.    4. Hypertension, essential  Assessment & Plan:  BP Readings from Last 3 Encounters:   02/07/24 118/79   01/04/24 (!) 134/92   12/01/23 134/88      ACC/AHA guidelines on blood pressure goals reviewed.  Reinforced correct way of measuring blood pressure.   Reports blood pressure doing well with amlodipine.  Will continue.             -Eric Hernandes Jr., MD, AAHIVS      This office note has been dictated.  This dictation has been generated using M-Modal Fluency Direct dictation; some phonetic errors may occur.         There are no Patient Instructions on file for this visit.      No follow-ups on file.   Future Appointments   Date Time Provider Department Center   2/19/2024  8:00 AM [facility] MRI1 LIMIT 350 LBS WB MRI [facility]   2/21/2024  3:30 PM Christine Brunson MD [facility]   3/13/2024 11:30 AM  Jessica Shay MD [facility]

## 2024-02-29 DIAGNOSIS — K52.9 ACUTE GASTROENTERITIS: ICD-10-CM

## 2024-02-29 RX ORDER — DICYCLOMINE HYDROCHLORIDE 20 MG/1
20 TABLET ORAL EVERY 6 HOURS
Qty: 120 TABLET | Refills: 0 | Status: SHIPPED | OUTPATIENT
Start: 2024-02-29 | End: 2024-04-03

## 2024-03-11 DIAGNOSIS — M47.816 LUMBAR SPONDYLOSIS: Chronic | ICD-10-CM

## 2024-03-11 DIAGNOSIS — M62.830 MUSCLE SPASM OF BACK: Chronic | ICD-10-CM

## 2024-03-11 DIAGNOSIS — K58.0 IRRITABLE BOWEL SYNDROME WITH DIARRHEA: ICD-10-CM

## 2024-03-11 DIAGNOSIS — M50.30 DDD (DEGENERATIVE DISC DISEASE), CERVICAL: Chronic | ICD-10-CM

## 2024-03-11 DIAGNOSIS — M25.511 RIGHT SHOULDER PAIN, UNSPECIFIED CHRONICITY: Primary | ICD-10-CM

## 2024-03-12 RX ORDER — ALOSETRON HYDROCHLORIDE 0.5 MG/1
0.5 TABLET ORAL 2 TIMES DAILY
Qty: 180 TABLET | Refills: 0 | Status: SHIPPED | OUTPATIENT
Start: 2024-03-12 | End: 2024-06-07 | Stop reason: SDUPTHER

## 2024-03-12 RX ORDER — PREGABALIN 165 MG/1
165 TABLET, FILM COATED, EXTENDED RELEASE ORAL NIGHTLY
Qty: 90 TABLET | Refills: 0 | Status: SHIPPED | OUTPATIENT
Start: 2024-03-12 | End: 2024-04-18 | Stop reason: SDUPTHER

## 2024-03-12 NOTE — TELEPHONE ENCOUNTER
No care due was identified.  [facility] Embedded Care Due Messages. Reference number: [id number].   3/11/2024 8:13:17 PM CDT

## 2024-03-13 ENCOUNTER — OFFICE VISIT (OUTPATIENT)
Dept: ORTHOPEDICS | Facility: CLINIC | Age: 59
End: 2024-03-13
Payer: COMMERCIAL

## 2024-03-13 DIAGNOSIS — G89.29 CHRONIC RIGHT SHOULDER PAIN: Primary | ICD-10-CM

## 2024-03-13 DIAGNOSIS — M25.511 CHRONIC RIGHT SHOULDER PAIN: Primary | ICD-10-CM

## 2024-03-13 DIAGNOSIS — M25.512 LEFT SHOULDER PAIN, UNSPECIFIED CHRONICITY: Primary | ICD-10-CM

## 2024-03-13 PROCEDURE — 1159F MED LIST DOCD IN RCRD: CPT | Mod: CPTII,S$GLB,, | Performed by: ORTHOPAEDIC SURGERY

## 2024-03-13 PROCEDURE — 3044F HG A1C LEVEL LT 7.0%: CPT | Mod: CPTII,S$GLB,, | Performed by: ORTHOPAEDIC SURGERY

## 2024-03-13 PROCEDURE — 1160F RVW MEDS BY RX/DR IN RCRD: CPT | Mod: CPTII,S$GLB,, | Performed by: ORTHOPAEDIC SURGERY

## 2024-03-13 PROCEDURE — 4010F ACE/ARB THERAPY RXD/TAKEN: CPT | Mod: CPTII,S$GLB,, | Performed by: ORTHOPAEDIC SURGERY

## 2024-03-13 PROCEDURE — 99999 PR PBB SHADOW E&M-EST. PATIENT-LVL IV: CPT | Mod: PBBFAC,,, | Performed by: ORTHOPAEDIC SURGERY

## 2024-03-13 PROCEDURE — 99213 OFFICE O/P EST LOW 20 MIN: CPT | Mod: S$GLB,,, | Performed by: ORTHOPAEDIC SURGERY

## 2024-03-13 NOTE — PROGRESS NOTES
Assessment: 58 y.o. female with right biceps tendinitis     I explained my diagnostic impression and the reasoning behind it in detail, using layman's terms.      Plan:   - L shoulder XR  - MRI R shoulder  - Meloxicam PRN, do not take daily except for short courses  - Return to clinic when MRI complete    All questions were answered in detail. The patient is in full agreement with the treatment plan and will proceed accordingly.    Chief Complaint   Patient presents with    Right Shoulder - Pain    Left Shoulder - Pain       Initial visit (9/20/23): Naomy Armstrong is a 58 y.o. female who presents today complaining of right shoulder pain  Duration of symptoms:  several years- pain has been intermittent. She has had constant severe pain for the last two weeks  Trauma or new activity: no  Pain is constant  Aggravating factors: Reaching overhead is somewhat painful, abduction, lifting, turning over at night  Relieving factors: rst  Night pain is present and is disruptive to sleep  Radicular symptoms: no numbness, paresthesias   Associated symptoms:  limited range of motion.    Prior treatment:  meloxicam without relief, celebrex, advil without improvement in pain.     Pain does interfere with sleep and activities of daily living .  All pain the last to the anterior shoulder, no pain localized over the subdeltoid fossa.  Does not radiate to the lateral upper arm    10/26/23  Had a lot of pain to the bilateral shoulders yesterday. Took a Meloxicam last night and her pain is somewhat better   Still has pain in the right shoulder - never got better with injection  PT starts soon - she was not able to get in sooner because of her work schedule     3/13/24  Pain in both shoulders has returned   Did PT with epic and it did not help   R is more bothersome today, usually left is very painful as well   Pushing and motion with the shoulder are painful  More painful in morning  Pain is anterior     This is the extent of  the patient's complaints at this time.      Review of patient's allergies indicates:   Allergen Reactions    Effexor [venlafaxine]      Elevated her blood pressure    Zoloft [sertraline]     Paroxetine hcl      Made her feel drunk         Physical Exam:   Vitals:    03/13/24 1138   PainSc:   4   PainLoc: Shoulder       General: Patient is alert, awake and oriented to time, place and person. Mood and affect are appropriate.  Patient does not appear to be in any distress, denies any constitutional symptoms and appears stated age.   HEENT: Pupils are equal and round, sclera are not injected. External examination of ears and nose reveals no abnormalities. Cranial nerves II-X are grossly intact  Skin: no rashes, abrasions or open wounds on the affected extremity   Resp: No respiratory distress or audible wheezing   CV: 2+  pulses, all extremities warm and well perfused   Bilateral Shoulder    Shoulder Range of Motion    Right     Left   (Active/Passive)       Forward Elevation     165/165         165/165  External rotation (arm at side)  45/45             45/45   Internal rotation behind the back  L3             L3     Range of motion is painful  - anterior pain    Acromioclavicular joint is not tender  Crossbody test: negative    Neer's negative  Hawkin's negative    Kofi's negative  Drop arm negative  Belly press negative      Cuff Strength     Right     Left   Supraspinatus        4/5    5/5  Infraspinatus     5/5    5/5  Subscapularis     5/5    5/5    Deltoid testing            5/5    5/5    Stephens's test negative  Speeds positive  Yergasons positive  TTP over bicipital groove on R    Elbow examination demonstrates no tenderness to palpation and has normal range of motion.     ltsi C5-T1  + epl, io, fds, fdp   2+ RP      Imaging:  no new      This note was created by combination of typed  and M-Modal dictation. Transcription and phonetic errors may be present.  If there are any questions, please contact  me.      Current Outpatient Medications:     alosetron (LOTRONEX) 0.5 MG tablet, Take 1 tablet (0.5 mg total) by mouth 2 (two) times daily., Disp: 180 tablet, Rfl: 0    amLODIPine (NORVASC) 5 MG tablet, Take 1 tablet (5 mg total) by mouth once daily., Disp: 90 tablet, Rfl: 1    amoxicillin-clavulanate 875-125mg (AUGMENTIN) 875-125 mg per tablet, Take 1 tablet by mouth every 12 (twelve) hours. Take with food, Disp: 20 tablet, Rfl: 0    azelastine (ASTELIN) 137 mcg (0.1 %) nasal spray, 1 spray (137 mcg total) by Nasal route 2 (two) times daily., Disp: 30 mL, Rfl: 1    cyclobenzaprine (FLEXERIL) 5 MG tablet, Take 2 tablets (10 mg total) by mouth 3 (three) times daily as needed for Muscle spasms., Disp: 90 tablet, Rfl: 0    dicyclomine (BENTYL) 20 mg tablet, TAKE 1 TABLET BY MOUTH EVERY 6 HOURS, Disp: 120 tablet, Rfl: 0    diphenoxylate-atropine 2.5-0.025 mg (LOMOTIL) 2.5-0.025 mg per tablet, Take 1 tablet by mouth 4 (four) times daily as needed for Diarrhea., Disp: 60 tablet, Rfl: 1    fluconazole (DIFLUCAN) 200 MG Tab, Take 1 tablet (200 mg total) by mouth once daily. Take at first sign of yeast infection, Disp: 1 tablet, Rfl: 0    FLUoxetine 40 MG capsule, Take 2 capsules (80 mg total) by mouth once daily., Disp: 180 capsule, Rfl: 3    HYDROcodone-acetaminophen (NORCO) 5-325 mg per tablet, Take 1 tablet by mouth nightly as needed for Pain., Disp: 7 tablet, Rfl: 0    hydrOXYzine HCL (ATARAX) 10 MG Tab, Take 1 tablet (10 mg total) by mouth 2 (two) times daily as needed (anxiety)., Disp: 60 tablet, Rfl: 3    lisinopriL (PRINIVIL,ZESTRIL) 20 MG tablet, Take 1 tablet (20 mg total) by mouth nightly., Disp: 90 tablet, Rfl: 3    lurasidone (LATUDA) 20 mg Tab tablet, Take 1 tablet (20 mg total) by mouth once daily., Disp: 30 tablet, Rfl: 11    meloxicam (MOBIC) 15 MG tablet, Take 15 mg by mouth., Disp: , Rfl:     methocarbamoL (ROBAXIN) 500 MG Tab, Take 1-2 tablets (500-1,000 mg total) by mouth 3 (three) times daily as needed  (muscle spasms)., Disp: 90 tablet, Rfl: 5    multivitamin with minerals tablet, Take 1 tablet by mouth once daily., Disp: , Rfl:     ondansetron (ZOFRAN-ODT) 8 MG TbDL, Take 1 tablet (8 mg total) by mouth every 12 (twelve) hours as needed (nausea)., Disp: 30 tablet, Rfl: 0    pregabalin 165 mg Tb24, Take 165 mg by mouth every evening., Disp: 90 tablet, Rfl: 0    promethazine (PHENERGAN) 25 MG tablet, Take 1 tablet (25 mg total) by mouth every 8 (eight) hours as needed for Nausea., Disp: 30 tablet, Rfl: 0    omeprazole (PRILOSEC) 40 MG capsule, Take 1 capsule (40 mg total) by mouth every morning., Disp: 90 capsule, Rfl: 3    Past Medical History:   Diagnosis Date    Alcohol abuse     per chart, but patient denies today and cocaine per chart    Anxiety     Colon polyp     Depression     Hallucination     last couple of months started seeing someone standing in her room, saw boyfriend's reflection in glass    Headache     History of psychiatric hospitalization     age 13 [facility] for 2 months for acting out; 2018 for SI    Hx of psychiatric care     Hypertension     Psychiatric problem     Sleep difficulties     Suicide attempt     with knife by cutting stomach    Therapy        Active Problem List with Overview Notes    Diagnosis Date Noted    Hypertension, essential 02/18/2024    Generalized anxiety disorder 11/27/2022    Esophagogastric junction outflow obstruction 11/22/2022    Epigastric pain 06/07/2022    Major depressive disorder, recurrent, moderate 02/09/2022    Cervical spondylosis 08/11/2021    Hiatal hernia 07/09/2021    Irritable bowel syndrome with diarrhea 07/09/2021    SOB (shortness of breath)     Lumbosacral spondylosis 04/16/2021    Sacroiliac joint pain 03/31/2021    Spondylosis of lumbosacral region 03/31/2021    Chronic back pain greater than 3 months duration 07/16/2020    Decreased strength of lower extremity 06/01/2020    Decreased strength of trunk and back 06/01/2020    Pain in joint of right  hip 06/01/2020    Decreased range of motion of right lower extremity 06/01/2020    Bilateral primary osteoarthritis of knee 03/12/2020    Lumbar spondylosis 01/13/2020    DDD (degenerative disc disease), lumbar 01/13/2020    Psychosocial distress 12/20/2019    Spondylosis of cervical region without myelopathy or radiculopathy 07/09/2019    Primary osteoarthritis of right knee 06/24/2019    Neck pain 06/24/2019    Cervical radiculopathy 04/26/2013     X-ray cervical spine and EMG/nerve conduction studies      DDD (degenerative disc disease), cervical 04/26/2013    Cervicogenic headache 04/26/2013     Patient has cervicogenic headaches which we will address with a combination of heat/ice application, muscle relaxers, and potentially anti-inflammatories.          Past Surgical History:   Procedure Laterality Date    COLONOSCOPY N/A 05/07/2021    Procedure: COLONOSCOPY;  Surgeon: Prem Montana MD;  Location: [facility];  Service: Endoscopy;  Laterality: N/A;  COVID screening 5/4 LAPC-instr portal -ml    COLONOSCOPY N/A 06/06/2022    Procedure: COLONOSCOPY;  Surgeon: Fab Shepherd MD;  [facility]  Service: Endoscopy;  Laterality: N/A;  C-Diff neg  Rapid COVID    EPIDURAL STEROID INJECTION Bilateral 08/05/2020    Procedure: Bilateral MBB @ L3, L4, L5;  Surgeon: Jeremy Powell Jr., MD;  Location: [facility];  Service: Pain Management;  Laterality: Bilateral;  Bilat L3-5 MBB  Arrive @ 1315; No ATC or DM; Needs MD signature    EPIDURAL STEROID INJECTION Right 08/19/2020    Procedure: Lumbar Radiofrequency Thermocoagulation of Medial Branches;  Surgeon: Jeremy Powell Jr., MD;  Location: [facility];  Service: Pain Management;  Laterality: Right;  Right L3-5 RFA  Arrive @ 1045; No ATC or DM; Needs MD Signature    EPIDURAL STEROID INJECTION Left 09/04/2020    Procedure: Lumbar Radiofrequency Thermocoagulation of Medial Branches;  Surgeon: Jeremy Powell Jr., MD;  Location: [facility];   Service: Pain Management;  Laterality: Left;  Left L3-5 RFA  Arrive @ 0900 (requested); No ATC or DM; Needs MD signature    EPIDURAL STEROID INJECTION Bilateral 04/16/2021    Procedure: Sacroiliac Joint Steroid Injections @ Bilateral;  Surgeon: Jeremy Powell Jr., MD;  Location: [facility];  Service: Pain Management;  Laterality: Bilateral;  Arrive @ 1300; No ATC or DM    EPIDURAL STEROID INJECTION Bilateral 01/19/2022    Procedure: Bilateral Sacroiliac Joint Injections;  Surgeon: Jeremy Powell Jr., MD;  Location: [facility];  Service: Pain Management;  Laterality: Bilateral;  Arrive @ 1315; No ATC or DM; Needs Consent    EPIDURAL STEROID INJECTION Bilateral 07/28/2023    Procedure: Bilateral L2, L3, L4 Medial Branch Blocks #1;  Surgeon: Jeremy Powell Jr., MD;  Location: [facility];  Service: Pain Management;  Laterality: Bilateral;  @1200  No ATC or DM    EPIDURAL STEROID INJECTION Bilateral 08/11/2023    Procedure: Bilateral L2, L3, L4 Medial Branch Blocks #2;  Surgeon: Jeremy Powell Jr., MD;  Location: [facility];  Service: Pain Management;  Laterality: Bilateral;  @1330  No ATC or DM    ESOPHAGEAL MANOMETRY WITH MEASUREMENT OF IMPEDANCE N/A 10/20/2021    Procedure: MANOMETRY, ESOPHAGUS, WITH IMPEDANCE MEASUREMENT;  Surgeon: Anastacia Odonnell MD;  Location: [facility] (4TH FLR);  Service: Endoscopy;  Laterality: N/A;  Covid test 10/17 Alexander, instructions sent to [id number]    ESOPHAGOGASTRODUODENOSCOPY N/A 05/07/2021    Procedure: EGD (ESOPHAGOGASTRODUODENOSCOPY);  Surgeon: Prem Montana MD;  Location: [facility];  Service: Endoscopy;  Laterality: N/A;  added on per Dr. NOEL Shepherd    EYE SURGERY  6/6/2015    Lasik    HERNIA REPAIR  2/14/2022    Hiatal hernia    LAPAROSCOPIC TOUPET FUNDOPLICATION N/A 02/14/2022    Procedure: FUNDOPLICATION, LAPAROSCOPIC, TOUPET;  Surgeon: Christian Martinez MD;  Location: [facility] OR 2ND FLR;  Service: General;  Laterality: N/A;       Social History      Socioeconomic History    Marital status:     Number of children: 0   Occupational History    Occupation:      Employer: [employer]   Tobacco Use    Smoking status: Former     Current packs/day: 0.00     Average packs/day: 1 pack/day for 21.3 years (21.3 ttl pk-yrs)     Types: Cigarettes     Start date: 1985     Quit date: 2006     Years since quittin.8    Smokeless tobacco: Never    Tobacco comments:     It has been a very long time. I do not remember dates   Substance and Sexual Activity    Alcohol use: Yes     Comment: occasional    Drug use: Not Currently     Types: Marijuana     Comment: tried at age 14    Sexual activity: Yes     Partners: Male     Birth control/protection: Post-menopausal     Comment: going through menopause   Other Topics Concern    Patient feels they ought to cut down on drinking/drug use No    Patient annoyed by others criticizing their drinking/drug use No    Patient has felt bad or guilty about drinking/drug use No    Patient has had a drink/used drugs as an eye opener in the AM No   Social History Narrative     x 2.    Lives with boyfriend.     Works at [employer] as an .    Presently working on Bachelor's degree.    Has associates degree in criminal justice.     Social Determinants of Health     Financial Resource Strain: Low Risk  (2023)    Overall Financial Resource Strain (CARDIA)     Difficulty of Paying Living Expenses: Not hard at all   Recent Concern: Financial Resource Strain - Medium Risk (10/25/2023)    Overall Financial Resource Strain (CARDIA)     Difficulty of Paying Living Expenses: Somewhat hard   Food Insecurity: No Food Insecurity (2023)    Hunger Vital Sign     Worried About Running Out of Food in the Last Year: Never true     Ran Out of Food in the Last Year: Never true   Recent Concern: Food Insecurity - Food Insecurity Present (2023)    Hunger Vital Sign     Worried About Running Out of  Food in the Last Year: Sometimes true     Ran Out of Food in the Last Year: Sometimes true   Transportation Needs: No Transportation Needs (12/1/2023)    PRAPARE - Transportation     Lack of Transportation (Medical): No     Lack of Transportation (Non-Medical): No   Physical Activity: Insufficiently Active (12/1/2023)    Exercise Vital Sign     Days of Exercise per Week: 3 days     Minutes of Exercise per Session: 30 min   Stress: No Stress Concern Present (12/1/2023)    [place] [employer] - Occupational Stress Questionnaire     Feeling of Stress : Only a little   Social Connections: Moderately Integrated (12/1/2023)    Social Connection and Isolation Panel [NHANES]     Frequency of Communication with Friends and Family: More than three times a week     Frequency of Social Gatherings with Friends and Family: More than three times a week     Attends Church Services: 1 to 4 times per year     Active Member of Clubs or Organizations: No     Attends Club or Organization Meetings: Never     Marital Status: Living with partner   Housing Stability: Low Risk  (12/1/2023)    Housing Stability Vital Sign     Unable to Pay for Housing in the Last Year: No     Number of Places Lived in the Last Year: 1     Unstable Housing in the Last Year: No

## 2024-04-02 DIAGNOSIS — K52.9 ACUTE GASTROENTERITIS: ICD-10-CM

## 2024-04-03 RX ORDER — DICYCLOMINE HYDROCHLORIDE 20 MG/1
20 TABLET ORAL EVERY 6 HOURS
Qty: 120 TABLET | Refills: 0 | Status: SHIPPED | OUTPATIENT
Start: 2024-04-03 | End: 2024-04-30

## 2024-04-10 ENCOUNTER — PATIENT MESSAGE (OUTPATIENT)
Dept: FAMILY MEDICINE | Facility: CLINIC | Age: 59
End: 2024-04-10

## 2024-04-10 ENCOUNTER — OFFICE VISIT (OUTPATIENT)
Dept: FAMILY MEDICINE | Facility: CLINIC | Age: 59
End: 2024-04-10
Payer: COMMERCIAL

## 2024-04-10 DIAGNOSIS — N30.90 CYSTITIS: Primary | ICD-10-CM

## 2024-04-10 DIAGNOSIS — J01.10 ACUTE NON-RECURRENT FRONTAL SINUSITIS: ICD-10-CM

## 2024-04-10 PROCEDURE — 99213 OFFICE O/P EST LOW 20 MIN: CPT | Mod: 95,,, | Performed by: NURSE PRACTITIONER

## 2024-04-10 PROCEDURE — 4010F ACE/ARB THERAPY RXD/TAKEN: CPT | Mod: CPTII,95,, | Performed by: NURSE PRACTITIONER

## 2024-04-10 PROCEDURE — 1159F MED LIST DOCD IN RCRD: CPT | Mod: CPTII,95,, | Performed by: NURSE PRACTITIONER

## 2024-04-10 PROCEDURE — 1160F RVW MEDS BY RX/DR IN RCRD: CPT | Mod: CPTII,95,, | Performed by: NURSE PRACTITIONER

## 2024-04-10 PROCEDURE — 3044F HG A1C LEVEL LT 7.0%: CPT | Mod: CPTII,95,, | Performed by: NURSE PRACTITIONER

## 2024-04-10 RX ORDER — NITROFURANTOIN 25; 75 MG/1; MG/1
100 CAPSULE ORAL 2 TIMES DAILY
Qty: 10 CAPSULE | Refills: 0 | Status: SHIPPED | OUTPATIENT
Start: 2024-04-10 | End: 2024-04-22 | Stop reason: ALTCHOICE

## 2024-04-10 RX ORDER — FLUTICASONE PROPIONATE 50 MCG
1 SPRAY, SUSPENSION (ML) NASAL DAILY
Qty: 15.8 ML | Refills: 0 | Status: SHIPPED | OUTPATIENT
Start: 2024-04-10

## 2024-04-10 RX ORDER — AZELASTINE 1 MG/ML
1 SPRAY, METERED NASAL 2 TIMES DAILY
Qty: 30 ML | Refills: 1 | Status: SHIPPED | OUTPATIENT
Start: 2024-04-10 | End: 2025-04-10

## 2024-04-10 NOTE — LETTER
April 10, 2024      [facility]  [address]  [address]  [place] [address]  Phone: [phone]       Patient: Naomy Armstrong   YOB: 1965  Date of Visit: 04/10/2024    To Whom It May Concern:    Ronald Armstrong  was at [facility] on 04/10/2024. The patient may return to work/school on 4/11/24 with no restrictions. If you have any questions or concerns, or if I can be of further assistance, please do not hesitate to contact me.    Sincerely,    Dutch Estrada, NP

## 2024-04-10 NOTE — PROGRESS NOTES
FYI- 11/9/18 (also, cht reviewed and updated)    CPX- 8/6/18 (Dr. Moy)  Lipid- 8/18  FOBT- 8/18  Mammo- 8/18  DEXA- 4/17  Pap- 4/16      Electronically signed by:LORNA PERSON MD  Nov 9 2018  8:45AM CST     The patient location is: [place], [place]  The chief complaint leading to consultation is:  UTI, sinus pressure    Visit type: audiovisual    Face to Face time with patient: 8 minutes  20 minutes of total time spent on the encounter, which includes face to face time and non-face to face time preparing to see the patient (eg, review of tests), Obtaining and/or reviewing separately obtained history, Documenting clinical information in the electronic or other health record, Independently interpreting results (not separately reported) and communicating results to the patient/family/caregiver, or Care coordination (not separately reported).         Each patient to whom he or she provides medical services by telemedicine is:  (1) informed of the relationship between the physician and patient and the respective role of any other health care provider with respect to management of the patient; and (2) notified that he or she may decline to receive medical services by telemedicine and may withdraw from such care at any time.    Notes:       Patient Name: Naomy Armstrong    : 1965  MRN: [id number]    Chief Complaint:  UTI, sinus pressure    Subjective:  Naomy is a 58 y.o. female who presents today for:    UTI, sinus pressure - patient who is known to me reports today as an audiovisual visit for evaluation.  She has a history of UTIs after intercourse.  Reports yesterday developing symptoms of dysuria and urinary frequency.  She took some Macrobid at home and the symptoms have improved but she would like a formal prescription for Macrobid sent.  Denies any fevers or overt hematuria.  She has also been around a lot of cigarette smoke and reports some sinus irritation as of late in the last week especially on the left frontal sinus.  No significant congestion or postnasal drip or coughing.  No other reported meds tried.    Past Medical History  Past Medical History:   Diagnosis Date    Alcohol abuse     per chart, but  patient denies today and cocaine per chart    Anxiety     Colon polyp     Depression     Hallucination     last couple of months started seeing someone standing in her room, saw boyfriend's reflection in glass    Headache     History of psychiatric hospitalization     age 13 [facility] for 2 months for acting out; 2018 for SI    Hx of psychiatric care     Hypertension     Psychiatric problem     Sleep difficulties     Suicide attempt     with knife by cutting stomach    Therapy        Family History  Family History   Problem Relation Age of Onset    Anxiety disorder Sister     Depression Sister     Alcohol abuse Maternal Uncle     Alcohol abuse Maternal Grandfather        Current Medications  Current Outpatient Medications on File Prior to Visit   Medication Sig Dispense Refill    alosetron (LOTRONEX) 0.5 MG tablet Take 1 tablet (0.5 mg total) by mouth 2 (two) times daily. 180 tablet 0    amLODIPine (NORVASC) 5 MG tablet Take 1 tablet (5 mg total) by mouth once daily. 90 tablet 1    cyclobenzaprine (FLEXERIL) 5 MG tablet Take 2 tablets (10 mg total) by mouth 3 (three) times daily as needed for Muscle spasms. 90 tablet 0    dicyclomine (BENTYL) 20 mg tablet TAKE 1 TABLET BY MOUTH EVERY 6 HOURS 120 tablet 0    diphenoxylate-atropine 2.5-0.025 mg (LOMOTIL) 2.5-0.025 mg per tablet Take 1 tablet by mouth 4 (four) times daily as needed for Diarrhea. 60 tablet 1    fluconazole (DIFLUCAN) 200 MG Tab Take 1 tablet (200 mg total) by mouth once daily. Take at first sign of yeast infection 1 tablet 0    FLUoxetine 40 MG capsule Take 2 capsules (80 mg total) by mouth once daily. 180 capsule 3    HYDROcodone-acetaminophen (NORCO) 5-325 mg per tablet Take 1 tablet by mouth nightly as needed for Pain. 7 tablet 0    hydrOXYzine HCL (ATARAX) 10 MG Tab Take 1 tablet (10 mg total) by mouth 2 (two) times daily as needed (anxiety). 60 tablet 3    lisinopriL (PRINIVIL,ZESTRIL) 20 MG tablet Take 1 tablet (20 mg total) by mouth nightly. 90  tablet 3    lurasidone (LATUDA) 20 mg Tab tablet Take 1 tablet (20 mg total) by mouth once daily. 30 tablet 11    meloxicam (MOBIC) 15 MG tablet Take 15 mg by mouth.      methocarbamoL (ROBAXIN) 500 MG Tab Take 1-2 tablets (500-1,000 mg total) by mouth 3 (three) times daily as needed (muscle spasms). 90 tablet 5    multivitamin with minerals tablet Take 1 tablet by mouth once daily.      omeprazole (PRILOSEC) 40 MG capsule Take 1 capsule (40 mg total) by mouth every morning. 90 capsule 3    ondansetron (ZOFRAN-ODT) 8 MG TbDL Take 1 tablet (8 mg total) by mouth every 12 (twelve) hours as needed (nausea). 30 tablet 0    pregabalin 165 mg Tb24 Take 165 mg by mouth every evening. 90 tablet 0    promethazine (PHENERGAN) 25 MG tablet Take 1 tablet (25 mg total) by mouth every 8 (eight) hours as needed for Nausea. 30 tablet 0    [DISCONTINUED] amoxicillin-clavulanate 875-125mg (AUGMENTIN) 875-125 mg per tablet Take 1 tablet by mouth every 12 (twelve) hours. Take with food 20 tablet 0    [DISCONTINUED] azelastine (ASTELIN) 137 mcg (0.1 %) nasal spray 1 spray (137 mcg total) by Nasal route 2 (two) times daily. 30 mL 1     No current facility-administered medications on file prior to visit.       Allergies   Review of patient's allergies indicates:   Allergen Reactions    Effexor [venlafaxine]      Elevated her blood pressure    Zoloft [sertraline]     Paroxetine hcl      Made her feel drunk       Review of Systems (Pertinent positives)  Review of Systems   Constitutional:  Negative for chills, fever and weight loss.   HENT:  Positive for sinus pain. Negative for congestion, ear discharge, ear pain, nosebleeds and sore throat.    Eyes: Negative.    Respiratory: Negative.  Negative for stridor.    Cardiovascular: Negative.    Gastrointestinal: Negative.    Genitourinary:  Positive for dysuria and frequency. Negative for flank pain, hematuria and urgency.   Neurological:  Positive for headaches. Negative for dizziness, tingling  and tremors.       [facility] 09/15/2013     Physical Exam  Vitals reviewed.   Constitutional:       General: She is not in acute distress.     Appearance: Normal appearance. She is not ill-appearing, toxic-appearing or diaphoretic.   HENT:      Head: Normocephalic and atraumatic.   Pulmonary:      Effort: Pulmonary effort is normal. No respiratory distress.      Breath sounds: No wheezing.   Skin:     Findings: No bruising, erythema or lesion.   Neurological:      Mental Status: She is alert and oriented to person, place, and time.   Psychiatric:         Mood and Affect: Mood normal.         Behavior: Behavior normal.          Assessment/Plan:  Naomy Armstrong is a 58 y.o. female who presents today for :    Diagnoses and all orders for this visit:    Cystitis  -     Urinalysis; Future  -     Urine culture; Future  -     nitrofurantoin, macrocrystal-monohydrate, (MACROBID) 100 MG capsule; Take 1 capsule (100 mg total) by mouth 2 (two) times daily.    Will prescribe Macrobid for symptoms.  Patient will attempt to bring in urine culture today or tomorrow but may be skewed by current antibiotic regimen.    Acute non-recurrent frontal sinusitis  -     fluticasone propionate (FLONASE) 50 mcg/actuation nasal spray; 1 spray (50 mcg total) by Each Nostril route once daily.  -     azelastine (ASTELIN) 137 mcg (0.1 %) nasal spray; 1 spray (137 mcg total) by Nasal route 2 (two) times daily.    No need for antibiotics.  Treat with nasal sprays for sinus irritation.  Nasal saline recommended as well.  Proper method of use discussed with patient.        This office note has been dictated.  This dictation has been generated using M-Modal Fluency Direct dictation; some phonetic errors may occur.

## 2024-04-18 DIAGNOSIS — M62.830 MUSCLE SPASM OF BACK: Chronic | ICD-10-CM

## 2024-04-18 DIAGNOSIS — M47.816 LUMBAR SPONDYLOSIS: Chronic | ICD-10-CM

## 2024-04-18 DIAGNOSIS — M50.30 DDD (DEGENERATIVE DISC DISEASE), CERVICAL: Chronic | ICD-10-CM

## 2024-04-18 NOTE — TELEPHONE ENCOUNTER
No care due was identified.  Health [facility] Embedded Care Due Messages. Reference number: [id number].   4/18/2024 9:01:25 AM CDT

## 2024-04-22 ENCOUNTER — OFFICE VISIT (OUTPATIENT)
Dept: FAMILY MEDICINE | Facility: CLINIC | Age: 59
End: 2024-04-22
Payer: COMMERCIAL

## 2024-04-22 VITALS
SYSTOLIC BLOOD PRESSURE: 110 MMHG | TEMPERATURE: 98 F | DIASTOLIC BLOOD PRESSURE: 78 MMHG | HEART RATE: 94 BPM | OXYGEN SATURATION: 96 % | WEIGHT: 178.56 LBS | HEIGHT: 65 IN | BODY MASS INDEX: 29.75 KG/M2

## 2024-04-22 DIAGNOSIS — M50.30 DDD (DEGENERATIVE DISC DISEASE), CERVICAL: Chronic | ICD-10-CM

## 2024-04-22 DIAGNOSIS — I10 HYPERTENSION, ESSENTIAL: Chronic | ICD-10-CM

## 2024-04-22 DIAGNOSIS — M62.830 MUSCLE SPASM OF BACK: Chronic | ICD-10-CM

## 2024-04-22 DIAGNOSIS — R30.0 DYSURIA: Primary | ICD-10-CM

## 2024-04-22 DIAGNOSIS — R23.3 EASY BRUISING: ICD-10-CM

## 2024-04-22 DIAGNOSIS — N39.0 RECURRENT UTI: ICD-10-CM

## 2024-04-22 DIAGNOSIS — M47.816 LUMBAR SPONDYLOSIS: Chronic | ICD-10-CM

## 2024-04-22 PROCEDURE — 87086 URINE CULTURE/COLONY COUNT: CPT | Performed by: FAMILY MEDICINE

## 2024-04-22 PROCEDURE — 3008F BODY MASS INDEX DOCD: CPT | Mod: CPTII,S$GLB,, | Performed by: FAMILY MEDICINE

## 2024-04-22 PROCEDURE — 3078F DIAST BP <80 MM HG: CPT | Mod: CPTII,S$GLB,, | Performed by: FAMILY MEDICINE

## 2024-04-22 PROCEDURE — 1160F RVW MEDS BY RX/DR IN RCRD: CPT | Mod: CPTII,S$GLB,, | Performed by: FAMILY MEDICINE

## 2024-04-22 PROCEDURE — 4010F ACE/ARB THERAPY RXD/TAKEN: CPT | Mod: CPTII,S$GLB,, | Performed by: FAMILY MEDICINE

## 2024-04-22 PROCEDURE — 3074F SYST BP LT 130 MM HG: CPT | Mod: CPTII,S$GLB,, | Performed by: FAMILY MEDICINE

## 2024-04-22 PROCEDURE — 99214 OFFICE O/P EST MOD 30 MIN: CPT | Mod: S$GLB,,, | Performed by: FAMILY MEDICINE

## 2024-04-22 PROCEDURE — 3044F HG A1C LEVEL LT 7.0%: CPT | Mod: CPTII,S$GLB,, | Performed by: FAMILY MEDICINE

## 2024-04-22 PROCEDURE — 1159F MED LIST DOCD IN RCRD: CPT | Mod: CPTII,S$GLB,, | Performed by: FAMILY MEDICINE

## 2024-04-22 PROCEDURE — 99999 PR PBB SHADOW E&M-EST. PATIENT-LVL V: CPT | Mod: PBBFAC,,, | Performed by: FAMILY MEDICINE

## 2024-04-22 PROCEDURE — 81003 URINALYSIS AUTO W/O SCOPE: CPT | Performed by: FAMILY MEDICINE

## 2024-04-22 RX ORDER — NITROFURANTOIN 25; 75 MG/1; MG/1
100 CAPSULE ORAL DAILY
Qty: 30 CAPSULE | Refills: 5 | Status: SHIPPED | OUTPATIENT
Start: 2024-04-22

## 2024-04-22 NOTE — PROGRESS NOTES
Patient Name: Naomy Armstrong    : 1965  MRN: [id number]      Subjective:     Patient ID: Naomy is a 58 y.o. female    Chief Complaint:  Female  Problem (Started on 24)    History of Present Illness  The patient presents for evaluation to discuss urinary tract infection prophylaxis.    The patient recently received treatment for a urinary tract infection. The patient reports an overall improvement in her symptoms. Initially, she experienced symptoms consistent with a urinary tract infection (UTI), but currently, she experiences a burning sensation post-urination, albeit without associated pain. She describes a sensation of pressure during urination, akin to a full bladder, but was unable to fully empty her bladder. Her previous general practitioner prescribed nitrofurantoin, which she took two days prior to this visit. A urine sample was not collected during her virtual visit.     Prior to this her last UTI episode was approximately a year ago. She has a history of recurrent UTIs, which were previously managed with daily Macrobid by her urologist, which she tolerated well.  She states that she would frequently have urinary tract infection after sexual intercourse.     The patient reports several bruising, which she attributes to her dog bite and a fall in the shower door. She is not currently taking aspirin, but takes turmeric supplements. She had to discontinue plasma donation due to the marks on her skin.    She has noticed a significant improvement in her shoulder/muscle pain since starting pregabalin.      She is allergic to Paxil, Zoloft, and Effexor.      Review of Systems   Constitutional:  Negative for chills, diaphoresis and fever.   Respiratory:  Negative for shortness of breath.    Cardiovascular:  Negative for chest pain and palpitations.   Gastrointestinal:  Negative for abdominal pain, blood in stool and change in bowel habit.   Genitourinary:  Positive for dysuria. Negative for  "flank pain, frequency and hematuria.   Hematological:  Bruises/bleeds easily.        Objective:   /78 (BP Location: Right arm, Patient Position: Sitting, BP Method: Large (Manual))   Pulse 94   Temp 98 °F (36.7 °C) (Oral)   Ht 5' 5" (1.651 m)   Wt 81 kg (178 lb 9.2 oz)   LMP 09/15/2013   SpO2 96%   BMI 29.72 kg/m²     Physical Exam    Physical Exam  Vitals reviewed.   Constitutional:       Appearance: She is well-developed. She is not ill-appearing.   HENT:      Head: Normocephalic and atraumatic.      Right Ear: External ear normal.      Left Ear: External ear normal.      Nose: Nose normal.      Mouth/Throat:      Pharynx: No oropharyngeal exudate.   Eyes:      General:         Right eye: No discharge.         Left eye: No discharge.      Conjunctiva/sclera: Conjunctivae normal.      Pupils: Pupils are equal, round, and reactive to light.   Neck:      Trachea: No tracheal deviation.   Cardiovascular:      Rate and Rhythm: Normal rate and regular rhythm.      Heart sounds: Normal heart sounds. No murmur heard.  Pulmonary:      Effort: Pulmonary effort is normal.      Breath sounds: Normal breath sounds. No wheezing or rales.   Abdominal:      General: Bowel sounds are normal.      Palpations: Abdomen is soft. Abdomen is not rigid. There is no mass.      Tenderness: There is no abdominal tenderness. There is no right CVA tenderness, left CVA tenderness or guarding.   Musculoskeletal:      Cervical back: Normal range of motion and neck supple.   Lymphadenopathy:      Cervical: No cervical adenopathy.   Neurological:      Mental Status: She is alert and oriented to person, place, and time.      Gait: Gait normal.            Assessment        ICD-10-CM ICD-9-CM   1. Dysuria  R30.0 788.1   2. Recurrent UTI  N39.0 599.0   3. Easy bruising  R23.3 782.7   4. Hypertension, essential  I10 401.9   5. Muscle spasm of back  M62.830 724.8   6. DDD (degenerative disc disease), cervical  M50.30 722.4   7. Lumbar " spondylosis  M47.816 721.3         Plan:     Assessment & Plan  1. Urinary Tract Infection (UTI).  Will check urine studies today.  A prescription for Macrobid 100 mg, to be taken once daily, has been issued.  Will re-evaluate in a few months and if no urinary tract infections, can consider decreasing to 50 milligrams once a day. The patient is advised to take one tablet either prior to or within 2 hours post-coitus, or daily if necessary. Should the patient experience infections while on Macrobid, she is to inform me.    2. Easy Bruising.  Laboratory tests have been ordered to further investigate the cause of the patient's easy bruising.    3. Hypertension  Blood pressure appears controlled.  Continue current regimen.    4. Pain  Doing well with pregabalin extended-release.  Will continue.    Follow-up  The patient is scheduled for a follow-up visit in 6 months.     ORDERS  1. Dysuria  -     Urinalysis  -     Urine culture    2. Recurrent UTI  -     nitrofurantoin, macrocrystal-monohydrate, (MACROBID) 100 MG capsule; Take 1 capsule (100 mg total) by mouth once daily.  Dispense: 30 capsule; Refill: 5    3. Easy bruising  -     CBC Auto Differential; Future; Expected date: 04/22/2024  -     Protime-INR; Future; Expected date: 04/22/2024  -     von Willebrand Profile; Future; Expected date: 04/22/2024    4. Hypertension, essential  -     Lipid Panel; Future; Expected date: 10/22/2024  -     Comprehensive Metabolic Panel; Future; Expected date: 10/22/2024    5. Muscle spasm of back    6. DDD (degenerative disc disease), cervical    7. Lumbar spondylosis             -Eric Hernandes Jr., MD, AAHIVS      This note was generated with the assistance of ambient listening technology. Verbal consent was obtained by the patient and accompanying visitor(s) for the recording of patient appointment to facilitate this note. I attest to having reviewed and edited the generated note for accuracy, though some syntax or spelling  errors may persist. Please contact the author of this note for any clarification.          There are no Patient Instructions on file for this visit.    Follow Up  Follow up in about 6 months (around 10/22/2024) for Hypertension (FASTING LABS A WEEK PRIOR).    Future Appointments   Date Time Provider Department Center   5/6/2024 10:00 AM LAB, [facility] DRAW STATION [facility]   6/7/2024 10:30 AM Fab Shepherd MD [facility]   10/22/2024  8:00 AM LAB, [facility] DRAW STATION [facility] LAB [facility]   10/29/2024 10:40 AM Eric Hernandes Jr., MD [facility]

## 2024-04-23 LAB
BILIRUB UR QL STRIP: NEGATIVE
CLARITY UR: ABNORMAL
COLOR UR: YELLOW
GLUCOSE UR QL STRIP: NEGATIVE
HGB UR QL STRIP: NEGATIVE
KETONES UR QL STRIP: NEGATIVE
LEUKOCYTE ESTERASE UR QL STRIP: NEGATIVE
NITRITE UR QL STRIP: NEGATIVE
PH UR STRIP: 6 [PH] (ref 5–8)
PROT UR QL STRIP: NEGATIVE
SP GR UR STRIP: 1.01 (ref 1–1.03)
URN SPEC COLLECT METH UR: ABNORMAL
UROBILINOGEN UR STRIP-ACNC: NEGATIVE EU/DL

## 2024-04-24 ENCOUNTER — OFFICE VISIT (OUTPATIENT)
Dept: PSYCHIATRY | Facility: CLINIC | Age: 59
End: 2024-04-24
Payer: COMMERCIAL

## 2024-04-24 DIAGNOSIS — Z65.8 PSYCHOSOCIAL DISTRESS: ICD-10-CM

## 2024-04-24 DIAGNOSIS — F33.1 MODERATE EPISODE OF RECURRENT MAJOR DEPRESSIVE DISORDER: ICD-10-CM

## 2024-04-24 DIAGNOSIS — F41.1 GAD (GENERALIZED ANXIETY DISORDER): Primary | ICD-10-CM

## 2024-04-24 PROCEDURE — 90833 PSYTX W PT W E/M 30 MIN: CPT | Mod: S$GLB,,, | Performed by: STUDENT IN AN ORGANIZED HEALTH CARE EDUCATION/TRAINING PROGRAM

## 2024-04-24 PROCEDURE — 3044F HG A1C LEVEL LT 7.0%: CPT | Mod: CPTII,S$GLB,, | Performed by: STUDENT IN AN ORGANIZED HEALTH CARE EDUCATION/TRAINING PROGRAM

## 2024-04-24 PROCEDURE — 99999 PR PBB SHADOW E&M-EST. PATIENT-LVL I: CPT | Mod: PBBFAC,,, | Performed by: STUDENT IN AN ORGANIZED HEALTH CARE EDUCATION/TRAINING PROGRAM

## 2024-04-24 PROCEDURE — 99214 OFFICE O/P EST MOD 30 MIN: CPT | Mod: S$GLB,,, | Performed by: STUDENT IN AN ORGANIZED HEALTH CARE EDUCATION/TRAINING PROGRAM

## 2024-04-24 PROCEDURE — 4010F ACE/ARB THERAPY RXD/TAKEN: CPT | Mod: CPTII,S$GLB,, | Performed by: STUDENT IN AN ORGANIZED HEALTH CARE EDUCATION/TRAINING PROGRAM

## 2024-04-24 RX ORDER — FLUOXETINE HYDROCHLORIDE 40 MG/1
80 CAPSULE ORAL DAILY
Qty: 180 CAPSULE | Refills: 3 | Status: SHIPPED | OUTPATIENT
Start: 2024-04-24

## 2024-04-24 RX ORDER — LURASIDONE HYDROCHLORIDE 20 MG/1
20 TABLET, FILM COATED ORAL DAILY
Qty: 30 TABLET | Refills: 11 | Status: SHIPPED | OUTPATIENT
Start: 2024-04-24 | End: 2025-04-24

## 2024-04-24 NOTE — PROGRESS NOTES
"Outpatient Psychiatry Follow-Up Visit (MD/NP)    4/24/2024    Clinical Status of Patient:  Outpatient (Ambulatory)    Chief Complaint:  Naomy Armstrong is a 58 y.o. female who presents today for follow-up of depression and anxiety.  Met with patient.      Interval History and Content of Current Session:  Interim Events/Subjective Report/Content of Current Session:     Patient Naomy Armstrong presents to clinic for follow up. Things have been going "better" in general. She is dating someone new. She has broken up with the other person she had been living with. She states she has been so happy being with this man. "I have never been in such a good place."  She discusses the relationship at length and her complicated feelings towards her ex.   She is still taking the prozac and latuda- she feels like both have been helpful. Denies SE of these medications. She was starting to improve her mood even before she met Dereje. She is struggling dealing with her ex however.   EX is moving out soon.   No side effects of medication that she has noticed. No abnormal movements in face or mouth. Has recently had labwork.  She has some kind of rash on her arms - she is seeing her PCP.   Depression is a lot better.  NO thoughts of suicide or self harm.   She is still strugglign with stress at work.     PSYCHOTHERAPY ADD-ON +[id number] 30 minutes (range 16-37 minutes)  Therapeutic intervention type: supportive psychotherapy  Why chosen therapy is appropriate versus another modality: relevant to diagnosis  Target symptoms addressed: depression, anxiety , work stress  Topics and themes discussed: relationships difficulties, work stress  Primary focus: relationship changes, coping with stress at work  Psychotherapeutic techniques employed: active listening and empathic responses  Outcome monitoring methods: self-report  The patient's response to the intervention is: accepting.   The patient's progress toward treatment goals is: " fair.  Duration of intervention: 16 minutes    No SI, HI, AVH.      Review of Systems   PSYCHIATRIC: Pertinant items are noted in the narrative.  CONSTITUTIONAL: No weight gain or loss.   MUSCULOSKELETAL: No pain or stiffness of the joints.  NEUROLOGIC: No weakness, sensory changes, seizures, confusion, memory loss, tremor or other abnormal movements.  RESPIRATORY: No shortness of breath.  CARDIOVASCULAR: No tachycardia or chest pain.  GASTROINTESTINAL: No nausea, vomiting, pain, constipation or diarrhea.    Past Medical, Family and Social History: The patient's past medical, family and social history have been reviewed and updated as appropriate within the electronic medical record - see encounter notes.    Compliance: yes    Side effects: None    Risk Parameters:  Patient reports no suicidal ideation  Patient reports no homicidal ideation  Patient reports no self-injurious behavior  Patient reports no violent behavior    Exam (detailed: at least 9 elements; comprehensive: all 15 elements)   Constitutional  Vitals:  Most recent vital signs, dated less than 90 days prior to this appointment, were reviewed.   There were no vitals filed for this visit.       General:  unremarkable, age appropriate     Musculoskeletal  Muscle Strength/Tone:  no tremor, no tic   Gait & Station:  non-ataxic     Psychiatric  Speech:  no latency; no press   Mood & Affect:  Much better  congruent and appropriate   Thought Process:  normal and logical   Associations:  intact   Thought Content:  normal, no suicidality, no homicidality, delusions, or paranoia   Insight:  limited awareness of illness   Judgement: good   Orientation:  person, place, situation, time/date   Memory: able to remember recent events- yes, able to remember remote events- yes   Language: able to name, able to repeat   Attention Span & Concentration:  able to focus   Fund of Knowledge:  intact and appropriate to age and level of education     Assessment and Diagnosis    Status/Progress: Based on the examination today, the patient's problem(s) is/are adequately but not ideally controlled.  New problems have been presented today.   Co-morbidities are complicating management of the primary condition.  There are no active rule-out diagnoses for this patient at this time.     General Impression: We will continue pharmacological intervention and adjunctive therapy.     No diagnosis found.    Intervention/Counseling/Treatment Plan   Medication Management: Continue current medications. The risks and benefits of medication were discussed with the patient.  Counseling provided with patient as follows: importance of compliance with chosen treatment options was emphasized, risks and benefits of treatment options, including medications, were discussed with the patient, risk factor reduction, prognosis, patient education, instructions for  management, treatment and follow-up were reviewed  1.  Continue Prozac to 80 mg (2x40 mg) daily targeting depression and anxiety.  Warned of risk of alba, suicidality, serotonin syndrome.  2. Continue lurasidone 20 mg daily. Discussed taking with food. Discussed FDA approval for bipolar depression rather than unipolar MDD. Discussed r/b/SE including dysmetabolia/need for labwork, TD, NMS, dystonia, etc.   3.  Continue rare use of hydroxyzine 10 mg p.o. b.i.d. p.r.n. anxiety or insomnia.  Warned of risk of over-sedation.  4.  May have to consider monitoring for memory changes, given significant genetic history.   5. Reviewed ED precautions. No SI, HI, AVH.           Return to Clinic: 3 months or sooner PRN

## 2024-04-25 LAB — BACTERIA UR CULT: NORMAL

## 2024-04-26 RX ORDER — PREGABALIN 165 MG/1
165 TABLET, FILM COATED, EXTENDED RELEASE ORAL NIGHTLY
Qty: 90 TABLET | Refills: 0 | Status: SHIPPED | OUTPATIENT
Start: 2024-04-26

## 2024-04-29 ENCOUNTER — PATIENT MESSAGE (OUTPATIENT)
Dept: ORTHOPEDICS | Facility: CLINIC | Age: 59
End: 2024-04-29
Payer: COMMERCIAL

## 2024-04-30 ENCOUNTER — OFFICE VISIT (OUTPATIENT)
Dept: ORTHOPEDICS | Facility: CLINIC | Age: 59
End: 2024-04-30
Payer: COMMERCIAL

## 2024-04-30 DIAGNOSIS — M25.562 CHRONIC PAIN OF LEFT KNEE: ICD-10-CM

## 2024-04-30 DIAGNOSIS — G89.29 CHRONIC PAIN OF LEFT KNEE: ICD-10-CM

## 2024-04-30 DIAGNOSIS — M17.0 ARTHRITIS OF BOTH KNEES: Primary | ICD-10-CM

## 2024-04-30 DIAGNOSIS — K52.9 ACUTE GASTROENTERITIS: ICD-10-CM

## 2024-04-30 PROCEDURE — 1159F MED LIST DOCD IN RCRD: CPT | Mod: CPTII,S$GLB,, | Performed by: ORTHOPAEDIC SURGERY

## 2024-04-30 PROCEDURE — 3044F HG A1C LEVEL LT 7.0%: CPT | Mod: CPTII,S$GLB,, | Performed by: ORTHOPAEDIC SURGERY

## 2024-04-30 PROCEDURE — 99213 OFFICE O/P EST LOW 20 MIN: CPT | Mod: S$GLB,,, | Performed by: ORTHOPAEDIC SURGERY

## 2024-04-30 PROCEDURE — 1160F RVW MEDS BY RX/DR IN RCRD: CPT | Mod: CPTII,S$GLB,, | Performed by: ORTHOPAEDIC SURGERY

## 2024-04-30 PROCEDURE — 4010F ACE/ARB THERAPY RXD/TAKEN: CPT | Mod: CPTII,S$GLB,, | Performed by: ORTHOPAEDIC SURGERY

## 2024-04-30 PROCEDURE — 99999 PR PBB SHADOW E&M-EST. PATIENT-LVL III: CPT | Mod: PBBFAC,,, | Performed by: ORTHOPAEDIC SURGERY

## 2024-04-30 RX ORDER — DICYCLOMINE HYDROCHLORIDE 20 MG/1
20 TABLET ORAL EVERY 6 HOURS
Qty: 120 TABLET | Refills: 0 | Status: SHIPPED | OUTPATIENT
Start: 2024-04-30 | End: 2024-05-29

## 2024-04-30 NOTE — PROGRESS NOTES
Follow up visit    History of Present Illness:   Naomy comes to the office for follow up evaluation of bilateral knee pain due to osteoarthritis    ROS: unremarkable and no change since last visit    Physical Examination:    NAD  Left and Right knees  AROM  0 -130  TTP MJL on L   No effusion or soft tissue swelling     Radiographic imagin  views bilateral knee:  moderate tricompartmental OA with subchondral sclerosis, joint space narrowing, osteophyte formation. More pronounced changes seen in median compartment KL3    I personally reviewed and interpreted the patient's imaging obtained today in clinic    Assessment/Plan:  58 y.o. female  with Bilateral knee arthritis     We discussed the etiology of persistent pain and further treatment options.  Euflexxa prior auth  Return for follow up visit: PRN     All questions were answered in detail. The patient  verbalized the understanding of the treatment plan and is in full agreement with the treatment plan.

## 2024-05-03 DIAGNOSIS — R11.0 NAUSEA: ICD-10-CM

## 2024-05-03 RX ORDER — PROMETHAZINE HYDROCHLORIDE 25 MG/1
25 TABLET ORAL EVERY 8 HOURS PRN
Qty: 30 TABLET | Refills: 0 | Status: SHIPPED | OUTPATIENT
Start: 2024-05-03

## 2024-05-03 NOTE — TELEPHONE ENCOUNTER
No care due was identified.  Health [facility] Embedded Care Due Messages. Reference number: [id number].   5/03/2024 8:47:19 AM CDT

## 2024-05-03 NOTE — TELEPHONE ENCOUNTER
Last Office Visit Info:   The patient's last visit with Eric Hernandes Jr., MD was on 4/22/2024.

## 2024-05-06 ENCOUNTER — LAB VISIT (OUTPATIENT)
Dept: LAB | Facility: HOSPITAL | Age: 59
End: 2024-05-06
Attending: FAMILY MEDICINE
Payer: COMMERCIAL

## 2024-05-06 DIAGNOSIS — R23.3 EASY BRUISING: ICD-10-CM

## 2024-05-06 LAB
BASOPHILS # BLD AUTO: 0.03 K/UL (ref 0–0.2)
BASOPHILS NFR BLD: 0.4 % (ref 0–1.9)
DIFFERENTIAL METHOD BLD: ABNORMAL
EOSINOPHIL # BLD AUTO: 0 K/UL (ref 0–0.5)
EOSINOPHIL NFR BLD: 0.4 % (ref 0–8)
ERYTHROCYTE [DISTWIDTH] IN BLOOD BY AUTOMATED COUNT: 14 % (ref 11.5–14.5)
HCT VFR BLD AUTO: 42.3 % (ref 37–48.5)
HGB BLD-MCNC: 12.9 G/DL (ref 12–16)
IMM GRANULOCYTES # BLD AUTO: 0.02 K/UL (ref 0–0.04)
IMM GRANULOCYTES NFR BLD AUTO: 0.3 % (ref 0–0.5)
INR PPP: 0.9 (ref 0.8–1.2)
LYMPHOCYTES # BLD AUTO: 1.3 K/UL (ref 1–4.8)
LYMPHOCYTES NFR BLD: 17 % (ref 18–48)
MCH RBC QN AUTO: 28.4 PG (ref 27–31)
MCHC RBC AUTO-ENTMCNC: 30.5 G/DL (ref 32–36)
MCV RBC AUTO: 93 FL (ref 82–98)
MONOCYTES # BLD AUTO: 0.6 K/UL (ref 0.3–1)
MONOCYTES NFR BLD: 7.2 % (ref 4–15)
NEUTROPHILS # BLD AUTO: 5.9 K/UL (ref 1.8–7.7)
NEUTROPHILS NFR BLD: 74.7 % (ref 38–73)
NRBC BLD-RTO: 0 /100 WBC
PLATELET # BLD AUTO: 361 K/UL (ref 150–450)
PMV BLD AUTO: 9.5 FL (ref 9.2–12.9)
PROTHROMBIN TIME: 10.3 SEC (ref 9–12.5)
RBC # BLD AUTO: 4.54 M/UL (ref 4–5.4)
WBC # BLD AUTO: 7.9 K/UL (ref 3.9–12.7)

## 2024-05-06 PROCEDURE — 85025 COMPLETE CBC W/AUTO DIFF WBC: CPT | Performed by: FAMILY MEDICINE

## 2024-05-06 PROCEDURE — 85240 CLOT FACTOR VIII AHG 1 STAGE: CPT | Performed by: FAMILY MEDICINE

## 2024-05-06 PROCEDURE — 85610 PROTHROMBIN TIME: CPT | Performed by: FAMILY MEDICINE

## 2024-05-06 PROCEDURE — 85397 CLOTTING FUNCT ACTIVITY: CPT | Performed by: FAMILY MEDICINE

## 2024-05-06 PROCEDURE — 85390 FIBRINOLYSINS SCREEN I&R: CPT | Mod: 91 | Performed by: FAMILY MEDICINE

## 2024-05-06 PROCEDURE — 36415 COLL VENOUS BLD VENIPUNCTURE: CPT | Mod: PN | Performed by: FAMILY MEDICINE

## 2024-05-06 PROCEDURE — 85247 CLOT FACTOR VIII MULTIMETRIC: CPT | Performed by: FAMILY MEDICINE

## 2024-05-09 LAB
FACT VIII ACT/NOR PPP: 118 % (ref 55–200)
VON WILLEBRAND EVAL PPP-IMP: NORMAL
VWF AG ACT/NOR PPP IA: 175 % (ref 55–200)
VWF:AC ACT/NOR PPP IA: 120 % (ref 55–200)

## 2024-05-14 DIAGNOSIS — M25.552 PAIN OF LEFT HIP: Primary | ICD-10-CM

## 2024-05-14 DIAGNOSIS — G44.86 CERVICOGENIC HEADACHE: ICD-10-CM

## 2024-05-15 ENCOUNTER — OFFICE VISIT (OUTPATIENT)
Dept: ORTHOPEDICS | Facility: CLINIC | Age: 59
End: 2024-05-15
Attending: ORTHOPAEDIC SURGERY
Payer: COMMERCIAL

## 2024-05-15 DIAGNOSIS — M25.552 PAIN OF LEFT HIP: Primary | ICD-10-CM

## 2024-05-15 PROCEDURE — 99212 OFFICE O/P EST SF 10 MIN: CPT | Mod: S$GLB,,, | Performed by: ORTHOPAEDIC SURGERY

## 2024-05-15 PROCEDURE — 99999 PR PBB SHADOW E&M-EST. PATIENT-LVL III: CPT | Mod: PBBFAC,,, | Performed by: ORTHOPAEDIC SURGERY

## 2024-05-15 PROCEDURE — 3044F HG A1C LEVEL LT 7.0%: CPT | Mod: CPTII,S$GLB,, | Performed by: ORTHOPAEDIC SURGERY

## 2024-05-15 PROCEDURE — 4010F ACE/ARB THERAPY RXD/TAKEN: CPT | Mod: CPTII,S$GLB,, | Performed by: ORTHOPAEDIC SURGERY

## 2024-05-15 RX ORDER — MELOXICAM 15 MG/1
15 TABLET ORAL DAILY
Qty: 30 TABLET | Refills: 0 | Status: SHIPPED | OUTPATIENT
Start: 2024-05-15

## 2024-05-15 NOTE — PROGRESS NOTES
Follow up visit    History of Present Illness:   Naomy comes to the office for follow up evaluation of hip and leg pain  Starts in buttock and radiates down the leg with numbness to the foot    ROS: unremarkable and no change since last visit    Physical Examination:    NAD  Left leg  NT over GT   + SLR    Radiographic imagin views of the left hip negative for fractures, degenerative changes     Assessment/Plan:  58 y.o. female  with lumbar pathology     We discussed the etiology of persistent pain and further treatment options.  Discuss lyrica dosing with PCP   Follow up with pain  Return for follow up visit: PRN    All questions were answered in detail. The patient  verbalized the understanding of the treatment plan and is in full agreement with the treatment plan.

## 2024-05-15 NOTE — TELEPHONE ENCOUNTER
No care due was identified.  [facility] Embedded Care Due Messages. Reference number: [id number].   5/14/2024 8:23:40 PM CDT

## 2024-05-16 ENCOUNTER — OFFICE VISIT (OUTPATIENT)
Dept: PAIN MEDICINE | Facility: CLINIC | Age: 59
End: 2024-05-16
Payer: COMMERCIAL

## 2024-05-16 VITALS
DIASTOLIC BLOOD PRESSURE: 70 MMHG | HEART RATE: 82 BPM | BODY MASS INDEX: 29.4 KG/M2 | SYSTOLIC BLOOD PRESSURE: 148 MMHG | WEIGHT: 176.69 LBS

## 2024-05-16 DIAGNOSIS — M54.16 LUMBAR RADICULOPATHY: ICD-10-CM

## 2024-05-16 DIAGNOSIS — M47.816 LUMBAR SPONDYLOSIS: ICD-10-CM

## 2024-05-16 DIAGNOSIS — M51.36 DDD (DEGENERATIVE DISC DISEASE), LUMBAR: Primary | ICD-10-CM

## 2024-05-16 DIAGNOSIS — M54.9 DORSALGIA, UNSPECIFIED: ICD-10-CM

## 2024-05-16 LAB
PROVIDER SIGNING NAME: NORMAL
VON WILLEBRAND EVAL PPP-IMP: NORMAL
VON WILLEBRAND EVAL PPP-IMP: NORMAL
VWF MULTIMERS PPP QL: NORMAL

## 2024-05-16 PROCEDURE — 99999 PR PBB SHADOW E&M-EST. PATIENT-LVL III: CPT | Mod: PBBFAC,,, | Performed by: PAIN MEDICINE

## 2024-05-16 PROCEDURE — 1160F RVW MEDS BY RX/DR IN RCRD: CPT | Mod: CPTII,S$GLB,, | Performed by: PAIN MEDICINE

## 2024-05-16 PROCEDURE — 3077F SYST BP >= 140 MM HG: CPT | Mod: CPTII,S$GLB,, | Performed by: PAIN MEDICINE

## 2024-05-16 PROCEDURE — 3008F BODY MASS INDEX DOCD: CPT | Mod: CPTII,S$GLB,, | Performed by: PAIN MEDICINE

## 2024-05-16 PROCEDURE — 4010F ACE/ARB THERAPY RXD/TAKEN: CPT | Mod: CPTII,S$GLB,, | Performed by: PAIN MEDICINE

## 2024-05-16 PROCEDURE — 1159F MED LIST DOCD IN RCRD: CPT | Mod: CPTII,S$GLB,, | Performed by: PAIN MEDICINE

## 2024-05-16 PROCEDURE — 99214 OFFICE O/P EST MOD 30 MIN: CPT | Mod: S$GLB,,, | Performed by: PAIN MEDICINE

## 2024-05-16 PROCEDURE — 3044F HG A1C LEVEL LT 7.0%: CPT | Mod: CPTII,S$GLB,, | Performed by: PAIN MEDICINE

## 2024-05-16 PROCEDURE — 3078F DIAST BP <80 MM HG: CPT | Mod: CPTII,S$GLB,, | Performed by: PAIN MEDICINE

## 2024-05-16 NOTE — PROGRESS NOTES
Subjective:     Patient ID: Naomy Armstrong is a 58 y.o. female    Chief Complaint: Back Pain      Referred by: No ref. provider found    Disclaimer: This note was generated using voice recognition software.  There may be typographical errors that were missed during proofreading.    HPI:    Interval History (5/16/24):  She returns today for follow up.  She reports that she is having left-sided low back and lower extremity pain.  This pain has been present for roughly 1 month.  No specific inciting events or injuries noted.  Pain is located the left lower lumbar/lumbosacral region radiate to left lower extremity all the way to the bottom of her foot with associated paresthesias.  She denies any focal weakness or bowel bladder dysfunction.  Pain is constant and worsened with activity.  Particularly bad in the morning when getting out of bed.       Interval History (7/17/23):  She returns today for follow up.  She reports that she is having recurrent bilateral low back pain.  This pain is located across the lower lumbar region.  Pain will radiate to the bilateral buttocks, but not more distally than this.  She denies any associated numbness, tingling, weakness, bowel bladder dysfunction.  Pain is constant and worsened with activity.        Interval History (6/7/22):  She returns today for follow up.  She reports that she underwent surgery for hiatal hernia repair in February 2022.  Since his surgery she has had significant epigastric abdominal pain.  This pain is intermittent but is very severe when present.  No specific triggers identified.  Patient also reports worsening diarrhea since undergoing the surgery.  She has followed up with surgery and gastroenterology in no specific source has been identified as of yet.  States that she was provided tramadol which provided some relief.  States she was told to return to my clinic to discuss other options for pain management.       Interval History NP  (02/03/22):    The patient location is: work  The chief complaint leading to consultation is: follow-up  Visit type: Virtual visit with audio.  Patient verbally consented for audio visit.  Total time spent with patient: 10 minutes  Each patient to whom he or she provides medical services by telemedicine is:  (1) informed of the relationship between the physician and patient and the respective role of any other health care provider with respect to management of the patient; and (2) notified that he or she may decline to receive medical services by telemedicine and may withdraw from such care at any time.    Pt returns today for follow up of bilateral SIJ injections. She reports 100% relief of low back pain. She denies any new or worsening symptoms. She would like to discuss chronic headaches at next visit with Dr. Powell. She is otherwise well today.     Interval History (12/29/21):    The patient location is:  home  The chief complaint leading to consultation is:  Follow-up  Visit type: Virtual visit with synchronous audio and video  Total time spent with patient:  8 minutes  Each patient to whom he or she provides medical services by telemedicine is:  (1) informed of the relationship between the physician and patient and the respective role of any other health care provider with respect to management of the patient; and (2) notified that he or she may decline to receive medical services by telemedicine and may withdraw from such care at any time.      She returns today for follow up and MRI review.  She reports that she would like to schedule bilateral sacroiliac joint steroid injections discussed at last visit.  She is still having neck pain and headaches as well and would like to undergo cervical medial branch blocks/RFA, but her back pain is worse at this time.  Otherwise, she denies any changes in the quality or location of her pain.  She denies any new worsening symptoms.        Interval History  (8/11/21):    The patient location is:  home  The chief complaint leading to consultation is:  Follow-up  Visit type: Virtual visit with synchronous audio and video  Total time spent with patient:  15 minutes  Each patient to whom he or she provides medical services by telemedicine is:  (1) informed of the relationship between the physician and patient and the respective role of any other health care provider with respect to management of the patient; and (2) notified that he or she may decline to receive medical services by telemedicine and may withdraw from such care at any time.        She returns today for follow up.  She reports that relief from bilateral sacroiliac joint steroid injections has worn off.  She would like repeat sacroiliac joint steroid injections if appropriate.  Patient also has been having headaches chronically.  She has been evaluated thoroughly by Neurology without any specific source for of headache identified.  Suspicion for cervicogenic headaches.  Patient does have pain in the upper cervical region.  The pain will radiate to the occipital and parietal regions of the head.  She denies any pain radiating to the upper extremities.  She denies any associated bowel bladder dysfunction.      Interval History NP (5/5/21):    Pt returns today for follow up of bilateral SIJ injections. She reports at least 75% relief of low back pain. She can now stand for long periods of time without pain or having to sit for 30 minutes. She is able to cook and complete her ADL's with much improvement. Does report continued pain while bending over but it is now manageable. Denies new or worsening symptoms.      Interval History (3/31/21):  She returns today for follow up.  She reports that bilateral L3, L4, L5 radiofrequency ablation provided roughly 75% relief but for only 1 month.  She states that this.  Time her pain returned.  Today, she localizes pain across the lower lumbar/lumbosacral region.  The pain  "does not radiate.  The pain is equal bilaterally.  The pain is worse with activities such as mopping and cleaning.  She denies any associated numbness, tingling, weakness, bowel bladder dysfunction.      Interval History NP (7/16/20):  Pt returns today for follow up and MRI review. She states that her pain in unchanged in quality or location. Denies any new symptoms. No bladder or bowel dysfunction. She reports "missing more days of work than she attends". She is an  at [employer] which includes heavy manual lifting and moving boxes. Her pain is worsened with activity, especially after a shift at work or when she is home cleaning. She reports that gabapentin has helped, but takes 2 pills at night and 1 pill every morning because it makes her too drowsy during the day. She also takes 500mg Naproxen PRN for the pain, maybe every few days. She would like something else for the pain. States she "took valium in the past which helped her relax and get through the day".     Interval History (7/6/20):  She returns today for follow up.  She reports that she continues to have low back/hip pain.  This pain is worse with bending and lifting.  These are movements that are often required as a part of her job.  Overall she denies any changes in the quality or location of the pain since last encounter.  She denies any new symptoms.       Interval History (1/13/20):  She returns today for follow up.  She reports that her neck pain has improved with home exercise program.  She does not feel as though this needs any further attention.  She does state that her low back has been bothering her.  She localizes pain to the bilateral lower lumbar regions.  The pain does radiate to the bilateral hip regions.  She denies any associated numbness, tingling, weakness, bowel bladder dysfunction.  The pain is constant and worsened with activity.  She states that the pain is typically worse when initiating activity after rest.  "       Initial Encounter (7/9/19):  Naomy Armstrong is a 58 y.o. female who presents today with chronic neck pain. This pain has been present for years.  No specific inciting event or injury noted. She localizes the pain to the midline cervical region.  The pain does not radiate.  She denies any associated numbness, tingling, weakness, bowel bladder dysfunction.  She has had some radicular pain on the left side in the past this has resolved.  The pain is constant and worsened with activity.  She specifically states that the pain is worse when looking at screens.  She has been treated in the past by Dr. Bhavya Oneill at [facility].   This pain is described in detail below.    Physical Therapy:  Not for her low back    Non-pharmacologic Treatment:  Rest helps         TENS?  No    Pain Medications:         Currently taking:  Lyrica, meloxicam    Has tried in the past:  Opioids, muscle relaxers, Tylenol, NSAIDs, Amitriptyline, tizanidine, Celebrex, Robaxin, Flexeril    Has not tried:  SNRIs, topical creams    Blood thinners:  None    Interventional Therapies:   10/8/13- Left RFA C5, C6, C7  9/10/13- Left C5, C6, C7 MBB   8/13/13- C7-T1 IL EMMA  8/19/20 - right L3, L4, L5 radiofrequency ablation - 75% relief for 1 month  9/4/20 - left L3, L4, L5 radiofrequency ablation - 75% relief for 1 month  4/16/21 - bilateral SIJ injections - 75% relief  1/19/22 - bilateral SIJ injections - 100% relief    Relevant Surgeries:  None    Affecting sleep?  Yes    Affecting daily activities? yes    Depressive symptoms? yes          SI/HI? No    Work status: Employed    Pain Scores:    Best:       3/10  Worst:   9/10  Usually:   5/10  Today:   3/10    Pain Disability Index  Family/Home Responsibilities:: 6  Recreation:: 4  Social Activity:: 6  Occupation:: 8  Sexual Behavior:: 8  Self Care:: 7  Life-Support Activities:: 7  Pain Disability Index (PDI): 46      Review of Systems   Constitutional:  Negative for activity change,  appetite change, chills, fatigue, fever and unexpected weight change.   HENT:  Negative for hearing loss.    Eyes:  Negative for visual disturbance.   Respiratory:  Negative for chest tightness and shortness of breath.    Cardiovascular:  Negative for chest pain.   Gastrointestinal:  Negative for abdominal pain, constipation, diarrhea, nausea and vomiting.   Genitourinary:  Negative for difficulty urinating.   Musculoskeletal:  Positive for back pain, gait problem and myalgias. Negative for arthralgias, neck pain and neck stiffness.   Skin:  Negative for rash.   Neurological:  Positive for numbness. Negative for dizziness, weakness, light-headedness and headaches.   Psychiatric/Behavioral:  Negative for hallucinations, sleep disturbance and suicidal ideas. The patient is not nervous/anxious.        Past Medical History:   Diagnosis Date    Alcohol abuse     per chart, but patient denies today and cocaine per chart    Anxiety     Colon polyp     Depression     Hallucination     last couple of months started seeing someone standing in her room, saw boyfriend's reflection in glass    Headache     History of psychiatric hospitalization     age 13 [facility] for 2 months for acting out; 2018 for SI    Hx of psychiatric care     Hypertension     Psychiatric problem     Sleep difficulties     Suicide attempt     with knife by cutting stomach    Therapy        Past Surgical History:   Procedure Laterality Date    COLONOSCOPY N/A 05/07/2021    Procedure: COLONOSCOPY;  Surgeon: Prem Montana MD;  Location: [facility];  Service: Endoscopy;  Laterality: N/A;  COVID screening 5/4 LAP[facility]-instr portal -ml    COLONOSCOPY N/A 06/06/2022    Procedure: COLONOSCOPY;  Surgeon: Fab Shepherd MD;  Location: [address]);  Service: Endoscopy;  Laterality: N/A;  C-Diff neg  Rapid COVID    EPIDURAL STEROID INJECTION Bilateral 08/05/2020    Procedure: Bilateral MBB @ L3, L4, L5;  Surgeon: Jeremy Powell Jr., MD;  Location: [facility]  ENDO;  Service: Pain Management;  Laterality: Bilateral;  Bilat L3-5 MBB  Arrive @ 1315; No ATC or DM; Needs MD signature    EPIDURAL STEROID INJECTION Right 08/19/2020    Procedure: Lumbar Radiofrequency Thermocoagulation of Medial Branches;  Surgeon: Jeremy Powell Jr., MD;  Location: [facility];  Service: Pain Management;  Laterality: Right;  Right L3-5 RFA  Arrive @ 1045; No ATC or DM; Needs MD Signature    EPIDURAL STEROID INJECTION Left 09/04/2020    Procedure: Lumbar Radiofrequency Thermocoagulation of Medial Branches;  Surgeon: Jeremy Powell Jr., MD;  Location: [facility];  Service: Pain Management;  Laterality: Left;  Left L3-5 RFA  Arrive @ 0900 (requested); No ATC or DM; Needs MD signature    EPIDURAL STEROID INJECTION Bilateral 04/16/2021    Procedure: Sacroiliac Joint Steroid Injections @ Bilateral;  Surgeon: Jeremy Powell Jr., MD;  Location: [facility];  Service: Pain Management;  Laterality: Bilateral;  Arrive @ 1300; No ATC or DM    EPIDURAL STEROID INJECTION Bilateral 01/19/2022    Procedure: Bilateral Sacroiliac Joint Injections;  Surgeon: Jeremy Powell Jr., MD;  Location: [facility];  Service: Pain Management;  Laterality: Bilateral;  Arrive @ 1315; No ATC or DM; Needs Consent    EPIDURAL STEROID INJECTION Bilateral 07/28/2023    Procedure: Bilateral L2, L3, L4 Medial Branch Blocks #1;  Surgeon: Jeremy Powell Jr., MD;  Location: [facility];  Service: Pain Management;  Laterality: Bilateral;  @1200  No ATC or DM    EPIDURAL STEROID INJECTION Bilateral 08/11/2023    Procedure: Bilateral L2, L3, L4 Medial Branch Blocks #2;  Surgeon: Jeremy Powell Jr., MD;  Location: [facility];  Service: Pain Management;  Laterality: Bilateral;  @1330  No ATC or DM    ESOPHAGEAL MANOMETRY WITH MEASUREMENT OF IMPEDANCE N/A 10/20/2021    Procedure: MANOMETRY, ESOPHAGUS, WITH IMPEDANCE MEASUREMENT;  Surgeon: Anastacia Odonnell MD;  Location: [facility] ([address] [place] [address]);  Service: Endoscopy;   Laterality: N/A;  Covid test 10/17 [place], instructions sent to [id number]    ESOPHAGOGASTRODUODENOSCOPY N/A 2021    Procedure: EGD (ESOPHAGOGASTRODUODENOSCOPY);  Surgeon: Prem Montana MD;  Location: [facility];  Service: Endoscopy;  Laterality: N/A;  added on per Dr. NOEL Shepherd    EYE SURGERY  2015    Lasik    HERNIA REPAIR  2022    Hiatal hernia    HYSTERECTOMY      LAPAROSCOPIC TOUPET FUNDOPLICATION N/A 2022    Procedure: FUNDOPLICATION, LAPAROSCOPIC, TOUPET;  Surgeon: Christian Martinez MD;  Location: [facility] [place] [address] [place] [address];  Service: General;  Laterality: N/A;       Social History     Socioeconomic History    Marital status:     Number of children: 0   Occupational History    Occupation:      Employer: [employer]   Tobacco Use    Smoking status: Former     Current packs/day: 0.00     Average packs/day: 1 pack/day for 21.3 years (21.3 ttl pk-yrs)     Types: Cigarettes     Start date: 1985     Quit date: 2006     Years since quittin.0    Smokeless tobacco: Never    Tobacco comments:     It has been a very long time. I do not remember dates   Substance and Sexual Activity    Alcohol use: Yes     Comment: occasional    Drug use: Not Currently     Types: Marijuana     Comment: tried at age 14    Sexual activity: Yes     Partners: Male     Birth control/protection: Post-menopausal     Comment: going through menopause   Other Topics Concern    Patient feels they ought to cut down on drinking/drug use No    Patient annoyed by others criticizing their drinking/drug use No    Patient has felt bad or guilty about drinking/drug use No    Patient has had a drink/used drugs as an eye opener in the AM No   Social History Narrative     x 2.    Lives with boyfriend.     Works at [employer] as an .    Presently working on Bachelor's degree.    Has associates degree in criminal justice.     Social Determinants of Health     Financial Resource  Strain: Low Risk  (12/1/2023)    Overall Financial Resource Strain (CARDIA)     Difficulty of Paying Living Expenses: Not hard at all   Recent Concern: Financial Resource Strain - Medium Risk (10/25/2023)    Overall Financial Resource Strain (CARDIA)     Difficulty of Paying Living Expenses: Somewhat hard   Food Insecurity: No Food Insecurity (12/1/2023)    Hunger Vital Sign     Worried About Running Out of Food in the Last Year: Never true     Ran Out of Food in the Last Year: Never true   Recent Concern: Food Insecurity - Food Insecurity Present (9/21/2023)    Hunger Vital Sign     Worried About Running Out of Food in the Last Year: Sometimes true     Ran Out of Food in the Last Year: Sometimes true   Transportation Needs: No Transportation Needs (12/1/2023)    PRAPARE - Transportation     Lack of Transportation (Medical): No     Lack of Transportation (Non-Medical): No   Physical Activity: Insufficiently Active (12/1/2023)    Exercise Vital Sign     Days of Exercise per Week: 3 days     Minutes of Exercise per Session: 30 min   Stress: No Stress Concern Present (12/1/2023)    [place] [employer] - Occupational Stress Questionnaire     Feeling of Stress : Only a little   Housing Stability: Low Risk  (12/1/2023)    Housing Stability Vital Sign     Unable to Pay for Housing in the Last Year: No     Number of Places Lived in the Last Year: 1     Unstable Housing in the Last Year: No       Review of patient's allergies indicates:   Allergen Reactions    Effexor [venlafaxine]      Elevated her blood pressure    Zoloft [sertraline]     Paroxetine hcl      Made her feel drunk       Current Outpatient Medications on File Prior to Visit   Medication Sig Dispense Refill    alosetron (LOTRONEX) 0.5 MG tablet Take 1 tablet (0.5 mg total) by mouth 2 (two) times daily. 180 tablet 0    amLODIPine (NORVASC) 5 MG tablet Take 1 tablet (5 mg total) by mouth once daily. 90 tablet 1    azelastine (ASTELIN) 137 mcg  (0.1 %) nasal spray 1 spray (137 mcg total) by Nasal route 2 (two) times daily. 30 mL 1    cyclobenzaprine (FLEXERIL) 5 MG tablet Take 2 tablets (10 mg total) by mouth 3 (three) times daily as needed for Muscle spasms. 90 tablet 0    dicyclomine (BENTYL) 20 mg tablet TAKE 1 TABLET BY MOUTH EVERY 6 HOURS 120 tablet 0    diphenoxylate-atropine 2.5-0.025 mg (LOMOTIL) 2.5-0.025 mg per tablet Take 1 tablet by mouth 4 (four) times daily as needed for Diarrhea. 60 tablet 1    fluconazole (DIFLUCAN) 200 MG Tab Take 1 tablet (200 mg total) by mouth once daily. Take at first sign of yeast infection 1 tablet 0    FLUoxetine 40 MG capsule Take 2 capsules (80 mg total) by mouth once daily. 180 capsule 3    fluticasone propionate (FLONASE) 50 mcg/actuation nasal spray 1 spray (50 mcg total) by Each Nostril route once daily. 15.8 mL 0    hydrOXYzine HCL (ATARAX) 10 MG Tab Take 1 tablet (10 mg total) by mouth 2 (two) times daily as needed (anxiety). 60 tablet 3    lisinopriL (PRINIVIL,ZESTRIL) 20 MG tablet Take 1 tablet (20 mg total) by mouth nightly. 90 tablet 3    lurasidone (LATUDA) 20 mg Tab tablet Take 1 tablet (20 mg total) by mouth once daily. 30 tablet 11    meloxicam (MOBIC) 15 MG tablet Take 1 tablet (15 mg total) by mouth once daily. 30 tablet 0    methocarbamoL (ROBAXIN) 500 MG Tab Take 1-2 tablets (500-1,000 mg total) by mouth 3 (three) times daily as needed (muscle spasms). 90 tablet 5    multivitamin with minerals tablet Take 1 tablet by mouth once daily.      nitrofurantoin, macrocrystal-monohydrate, (MACROBID) 100 MG capsule Take 1 capsule (100 mg total) by mouth once daily. 30 capsule 5    omeprazole (PRILOSEC) 40 MG capsule Take 1 capsule (40 mg total) by mouth every morning. 90 capsule 3    ondansetron (ZOFRAN-ODT) 8 MG TbDL Take 1 tablet (8 mg total) by mouth every 12 (twelve) hours as needed (nausea). 30 tablet 0    pregabalin 165 mg Tb24 Take 165 mg by mouth every evening. 90 tablet 0    promethazine  (PHENERGAN) 25 MG tablet Take 1 tablet (25 mg total) by mouth every 8 (eight) hours as needed for Nausea. 30 tablet 0    [DISCONTINUED] HYDROcodone-acetaminophen (NORCO) 5-325 mg per tablet Take 1 tablet by mouth nightly as needed for Pain. 7 tablet 0     No current facility-administered medications on file prior to visit.       Objective:      BP (!) 148/70   Pulse 82   Wt 80.2 kg (176 lb 11.2 oz)   LMP 09/15/2013   BMI 29.40 kg/m²     Exam:  GEN:  Well developed, well nourished.  No acute distress.  Normal pain behavior.  HEENT:  No trauma.  Mucous membranes moist.  Nares patent bilaterally.  PSYCH: Normal affect. Thought content appropriate.  CHEST:  Breathing symmetric.  No audible wheezing.  ABD: Soft, non-distended.  SKIN:  Warm, pink, dry.  No rash on exposed areas.    EXT:  No cyanosis, clubbing, or edema.  No color change or changes in nail or hair growth.  NEURO/MUSCULOSKELETAL:  Fully alert, oriented, and appropriate. Speech normal indio. No cranial nerve deficits.   Gait:  Normal.  No trendelenburg sign bilaterally.   Motor Strength:  4+/5 left ankle dorsiflexion; otherwise 5/5 motor strength throughout lower extremities.   Sensory:  No sensory deficit in the lower extremities.   Reflexes:  2+ and symmetric throughout.  Downgoing Babinski's bilaterally.  No clonus or spasticity.  L-Spine:  Full ROM with pain on extension.  Negative pain with axial/facet loading bilaterally.  Negative SLR bilaterally.    Positive TTP over left lower lumbar paraspinals      Imaging:    Narrative & Impression    EXAMINATION:  MRI CERVICAL SPINE WITHOUT CONTRAST     CLINICAL HISTORY:  cervical DDD; Spondylosis without myelopathy or radiculopathy, cervical region     TECHNIQUE:  Multiplanar, multisequence MR images of the cervical spine were performed without the administration of contrast.     COMPARISON:  Radiograph 08/27/2020.     FINDINGS:  Alignment: Straightening of cervical spine with slight reversal at the level  of C5 through C7.  Minimal degenerative anterolisthesis C4 on C5..     Vertebrae: Normal marrow signal. No fracture.     Discs: Disc space narrowing C5-C6-C7 level.     Cord: At C3 through C6 level, STIR images, the cervical cord appears slightly indistinct and of minimally increased signal intensity.  This may be artifactual in nature is a cannot be confirmed on the axial or sagittal T2 weighted sequences with certainty.  Postcontrast repeat examination may be indicated to exclude subtle intrinsic cord finding at this level..     Skull base and craniocervical junction: Normal.     Degenerative findings:     C2-C3: There is no focal disc herniation.No significant central canal narrowing.  No significant neural foraminal narrowing.     C3-C4: There is no focal disc herniation.No significant central canal narrowing.  No significant neural foraminal narrowing.     C4-C5: There is no focal disc herniation.No significant central canal narrowing.  Moderate RIGHT neural foraminal narrowing.     C5-C6: There is no focal disc herniation.No significant central canal narrowing.  Moderate RIGHT and mild LEFT neural foraminal narrowing.     C6-C7: There is no focal disc herniation.No significant central canal narrowing.  Moderate LEFT neural foraminal narrowing.     C7-T1: There is no focal disc herniation.No significant central canal narrowing.  No significant neural foraminal narrowing.     T1-T2:     Paraspinal muscles & soft tissues: Unremarkable.     Impression:     Degenerative spondylosis as above predominantly C4-C5 through C6-C7.     Likely artifact identified level of the cord C3 through C6 STIR images only although at of abundance of caution, repeat examination with postcontrast MRI may be helpful to exclude underlying myelitis.     This report was flagged in Epic as abnormal.        Electronically signed by: Jerzy Rose MD  Date:                                            10/28/2021  Time:                                            06:44           Narrative & Impression    EXAMINATION:  XR CERVICAL SPINE 5 VIEW WITH FLEX AND EXT     CLINICAL HISTORY:  Headache     TECHNIQUE:  Five views of the cervical spine plus flexion and extension views were performed.     COMPARISON:  None 07/09/2019.     FINDINGS:  Mild DJD.  The disc spaces are narrowed between C5 and C7 vertebral segments.  Encroachment of the neural foramina identified at the C5-C6 level on the right and the C6/C7 level on the left.  There is a 2 mm C4/C5 anterolisthesis.  No fracture or dislocation.  No bone destruction identified.     Impression:     See above        Electronically signed by: Kaiser Ruiz MD  Date:                                            08/27/2020  Time:                                           14:12               Narrative & Impression     EXAMINATION:  MRI LUMBAR SPINE WITHOUT CONTRAST     CLINICAL HISTORY:  lumbar spinal stenosis; Other intervertebral disc degeneration, lumbar region     TECHNIQUE:  Multiplanar, multisequence MR images were acquired from the thoracolumbar junction to the sacrum without the administration of contrast.     COMPARISON:  Non plain film examination 01/13/2020 e.     FINDINGS:  Lumbar spine alignment is within normal limits. The vertebral body heights are well maintained, with no fracture.  No marrow signal abnormality suspicious for an infiltrative process.     The conus is normal in appearance, and terminates at the L1-L2 level.  The adjacent soft tissue structures show no significant abnormalities.     L1-L2: There is no focal disc herniation. No significant central canal or neural foraminal narrowing.     L2-L3: There is no focal disc herniation. No significant central canal or neural foraminal narrowing.     L3-L4: There is no focal disc herniation. No significant central canal or neural foraminal narrowing.     L4-L5: There is no focal disc herniation. No significant central canal or neural foraminal  narrowing.     L5-S1: There is no focal disc herniation. No significant central canal or neural foraminal narrowing.     Impression:     No significant central canal stenosis, focal protrusion of disc material or neural foraminal encroachment.        Electronically signed by: Jerzy Rose MD  Date:                                            07/14/2020  Time:                                           14:15         Narrative & Impression    EXAMINATION:  XR LUMBAR SPINE AP AND LAT WITH FLEX/EXT     CLINICAL HISTORY:  Other intervertebral disc degeneration, lumbar region     TECHNIQUE:  AP and lateral views as well as lateral flexion and extension images are performed through the lumbar spine.     COMPARISON:  None     FINDINGS:  Mild DJD and lumbar scoliosis.  No significant disc space narrowing identified.  No fracture, spondylolisthesis or bone destruction noted.  Spina bifida occulta involving the S1 vertebral segment noted.     Impression:     See above        Electronically signed by: Kaiser Ruiz MD  Date:                                            01/13/2020  Time:                                           09:22    Encounter    View Encounter                     Narrative     No significant alignment abnormality.  Vertebral body heights are normally maintained, without compression deformity at any level.  There is disc narrowing at C5-6 and C6-7, and each of these levels demonstrates posterior marginal vertebral endplate   spurring and accompanying disc bulging.  The spurring is more pronounced to the right of midline at C5-6 and to the left of midline at C6-7.  All other cervical and visualized upper thoracic levels have normal disc contour, and there is no evidence of a   significant focal disc herniation at any level.  AP diameter of the spinal canal is normally maintained at all levels with no canal stenosis/extrinsic cord compression observed.  The spinal cord is well delineated from the  cervicomedullary junction to   the T3 level, and appears normal in size and configuration without focal enlargement or irregularity.  No Chiari malformation or cord cyst or syrinx.  No abnormal signal from the substance of the cord on any of the pulse sequences generated.  There is   some prominent neural foraminal stenosis at C5-6 on the right and C6-7 on the left.  No significant signal abnormality referable to the osseous structures.   Impression      Cervical spine MRI examination demonstrates degenerative changes as discussed above involving the C5-6 and C6-7 levels, with right-sided foraminal stenosis at C5-6 and left-sided foraminal stenosis at C6-7.  No evidence of a significant focal soft is   herniation, canal stenosis/extrinsic cord compression, or intramedullary cord lesion seen at any level.      Electronically signed by: Kaiser Preston MD  Date: 08/01/13  Time: 06:40          Assessment:       Encounter Diagnoses   Name Primary?    DDD (degenerative disc disease), lumbar Yes    Lumbar spondylosis     Lumbar radiculopathy     Dorsalgia, unspecified      Plan:       Naomy was seen today for back pain.    Diagnoses and all orders for this visit:    DDD (degenerative disc disease), lumbar  -     Ambulatory referral/consult to Physical/Occupational Therapy; Future  -     MRI Lumbar Spine Without Contrast; Future    Lumbar spondylosis  -     Ambulatory referral/consult to Physical/Occupational Therapy; Future  -     MRI Lumbar Spine Without Contrast; Future    Lumbar radiculopathy  -     Ambulatory referral/consult to Physical/Occupational Therapy; Future  -     MRI Lumbar Spine Without Contrast; Future    Dorsalgia, unspecified  -     MRI Lumbar Spine Without Contrast; Future          Naomy Ana Armstrong is a 58 y.o. female with acute left-sided low back and lower extremity pain.  Concerning for lumbar radiculopathy.  Does have some left ankle dorsiflexion weakness.    Pertinent imaging studies reviewed by  me. Imaging results were discussed with patient.  Lumbar MRI to further evaluate for lumbar radiculopathy given presence of weakness on examination.    Refer to PT for ROM, strengthening, stretching and HEP.  Return to clinic after MRI to review results.  If patient unable to tolerate therapy, may consider epidural steroid injection depending on MRI results.

## 2024-05-17 RX ORDER — CYCLOBENZAPRINE HCL 5 MG
10 TABLET ORAL 3 TIMES DAILY PRN
Qty: 90 TABLET | Refills: 0 | Status: SHIPPED | OUTPATIENT
Start: 2024-05-17

## 2024-05-20 ENCOUNTER — TELEPHONE (OUTPATIENT)
Dept: ORTHOPEDICS | Facility: CLINIC | Age: 59
End: 2024-05-20
Payer: COMMERCIAL

## 2024-05-29 ENCOUNTER — OFFICE VISIT (OUTPATIENT)
Dept: ORTHOPEDICS | Facility: CLINIC | Age: 59
End: 2024-05-29
Payer: COMMERCIAL

## 2024-05-29 DIAGNOSIS — K52.9 ACUTE GASTROENTERITIS: ICD-10-CM

## 2024-05-29 DIAGNOSIS — M17.0 ARTHRITIS OF BOTH KNEES: Primary | ICD-10-CM

## 2024-05-29 PROCEDURE — 20610 DRAIN/INJ JOINT/BURSA W/O US: CPT | Mod: 50,S$GLB,, | Performed by: ORTHOPAEDIC SURGERY

## 2024-05-29 PROCEDURE — 99499 UNLISTED E&M SERVICE: CPT | Mod: S$GLB,,, | Performed by: ORTHOPAEDIC SURGERY

## 2024-05-29 PROCEDURE — 99999 PR PBB SHADOW E&M-EST. PATIENT-LVL III: CPT | Mod: PBBFAC,,, | Performed by: ORTHOPAEDIC SURGERY

## 2024-05-29 RX ORDER — DICYCLOMINE HYDROCHLORIDE 20 MG/1
20 TABLET ORAL EVERY 6 HOURS
Qty: 120 TABLET | Refills: 0 | Status: SHIPPED | OUTPATIENT
Start: 2024-05-29

## 2024-05-30 NOTE — PROGRESS NOTES
Follow up visit    History of Present Illness:   Naomy Armstrong comes to the office for follow up evaluation of bilateral knee arthritis   She has failed other treatment modalities including medications,  activity modification and steroid injection. She is here today for viscosupplementation - Euflexxa #1    MEDICAL NECESSITY FOR VISCOSUPPLEMENTATION:  A thorough evaluation of the patient, have determined that viscosupplementation is medically necessary.  The patient has painful DJD of the knee with failure of conservative therapy including lifestyle modifications and rehabilitation exercises.  Oral analgesics/NSAIDs have not adequately controlled symptoms and there is radiographic evidence of joint space narrowing, subchondral sclerosis, and osteophyte formation - Kellgren Gu[place]to grade 2 or greater.     ROS: unremarkable and no change since last visit    Physical Examination:    Vitals:    05/29/24 1114   PainSc:   5   PainLoc: Knee      NAD  bilateral knee  No change in exam     Radiographic imaging: no new imaging    Assessment/Plan:  59 y.o. female with bilateral  knee arthritis     Injection to bilateral knee  performed, please see procedure note for details.  We discussed the risks and benefits of injection including pain, infection, bleeding, failure to improve pain, temporary increase in pain, synovitis, and damage to surrounding structures including nerves, blood vessels, tendons and muscles.   2.   Return for follow up visit: 1 week for next injection    All questions were answered in detail. The patient  verbalized the understanding of the treatment plan and is in full agreement with the treatment plan.

## 2024-05-30 NOTE — PROCEDURES
Large Joint Aspiration/Injection: bilateral knee    Date/Time: 5/29/2024 11:00 AM    Performed by: Jessica Shay MD  Authorized by: Jessica Shay MD    Consent Done?:  Yes (Verbal)  Indications:  Arthritis  Timeout: prior to procedure the correct patient, procedure, and site was verified    Prep: patient was prepped and draped in usual sterile fashion      Local anesthesia used?: Yes    Local anesthetic:  Topical anesthetic    Details:  Needle Size:  22 G  Approach:  Anteromedial  Location:  Knee  Laterality:  Bilateral  Site:  Bilateral knee  Medications (Right):  20 mg sodium hyaluronate (EUFLEXXA) 10 mg/mL(mw 2.4 -3.6 million)  Medications (Left):  20 mg sodium hyaluronate (EUFLEXXA) 10 mg/mL(mw 2.4 -3.6 million)  Patient tolerance:  Patient tolerated the procedure well with no immediate complications

## 2024-05-31 ENCOUNTER — HOSPITAL ENCOUNTER (OUTPATIENT)
Dept: RADIOLOGY | Facility: HOSPITAL | Age: 59
Discharge: HOME | End: 2024-05-31
Attending: PAIN MEDICINE
Payer: COMMERCIAL

## 2024-05-31 DIAGNOSIS — M47.816 LUMBAR SPONDYLOSIS: ICD-10-CM

## 2024-05-31 DIAGNOSIS — M51.36 DDD (DEGENERATIVE DISC DISEASE), LUMBAR: ICD-10-CM

## 2024-05-31 DIAGNOSIS — M54.16 LUMBAR RADICULOPATHY: ICD-10-CM

## 2024-05-31 DIAGNOSIS — M54.9 DORSALGIA, UNSPECIFIED: ICD-10-CM

## 2024-05-31 PROCEDURE — 72148 MRI LUMBAR SPINE W/O DYE: CPT | Mod: TC

## 2024-05-31 PROCEDURE — 72148 MRI LUMBAR SPINE W/O DYE: CPT | Mod: 26,,, | Performed by: RADIOLOGY

## 2024-06-01 DIAGNOSIS — I10 HYPERTENSION, ESSENTIAL: Chronic | ICD-10-CM

## 2024-06-01 NOTE — TELEPHONE ENCOUNTER
No care due was identified.  [facility] Embedded Care Due Messages. Reference number: [id number].   6/01/2024 7:45:27 AM CDT

## 2024-06-03 ENCOUNTER — OFFICE VISIT (OUTPATIENT)
Dept: ORTHOPEDICS | Facility: CLINIC | Age: 59
End: 2024-06-03
Payer: COMMERCIAL

## 2024-06-03 ENCOUNTER — OFFICE VISIT (OUTPATIENT)
Dept: PAIN MEDICINE | Facility: CLINIC | Age: 59
End: 2024-06-03
Payer: COMMERCIAL

## 2024-06-03 VITALS — OXYGEN SATURATION: 95 % | HEART RATE: 85 BPM | SYSTOLIC BLOOD PRESSURE: 114 MMHG | DIASTOLIC BLOOD PRESSURE: 68 MMHG

## 2024-06-03 DIAGNOSIS — M54.9 DORSALGIA, UNSPECIFIED: ICD-10-CM

## 2024-06-03 DIAGNOSIS — M54.16 LUMBAR RADICULOPATHY: ICD-10-CM

## 2024-06-03 DIAGNOSIS — M51.36 DDD (DEGENERATIVE DISC DISEASE), LUMBAR: Primary | ICD-10-CM

## 2024-06-03 DIAGNOSIS — M47.816 LUMBAR SPONDYLOSIS: ICD-10-CM

## 2024-06-03 DIAGNOSIS — M17.0 ARTHRITIS OF BOTH KNEES: Primary | ICD-10-CM

## 2024-06-03 PROCEDURE — 1159F MED LIST DOCD IN RCRD: CPT | Mod: CPTII,S$GLB,,

## 2024-06-03 PROCEDURE — 99214 OFFICE O/P EST MOD 30 MIN: CPT | Mod: S$GLB,,,

## 2024-06-03 PROCEDURE — 4010F ACE/ARB THERAPY RXD/TAKEN: CPT | Mod: CPTII,S$GLB,,

## 2024-06-03 PROCEDURE — 3044F HG A1C LEVEL LT 7.0%: CPT | Mod: CPTII,S$GLB,,

## 2024-06-03 PROCEDURE — 3074F SYST BP LT 130 MM HG: CPT | Mod: CPTII,S$GLB,,

## 2024-06-03 PROCEDURE — 3078F DIAST BP <80 MM HG: CPT | Mod: CPTII,S$GLB,,

## 2024-06-03 PROCEDURE — 99999 PR PBB SHADOW E&M-EST. PATIENT-LVL III: CPT | Mod: PBBFAC,,, | Performed by: ORTHOPAEDIC SURGERY

## 2024-06-03 PROCEDURE — 99499 UNLISTED E&M SERVICE: CPT | Mod: S$GLB,,, | Performed by: ORTHOPAEDIC SURGERY

## 2024-06-03 PROCEDURE — 20610 DRAIN/INJ JOINT/BURSA W/O US: CPT | Mod: 50,S$GLB,, | Performed by: ORTHOPAEDIC SURGERY

## 2024-06-03 PROCEDURE — 1160F RVW MEDS BY RX/DR IN RCRD: CPT | Mod: CPTII,S$GLB,,

## 2024-06-03 PROCEDURE — 99999 PR PBB SHADOW E&M-EST. PATIENT-LVL V: CPT | Mod: PBBFAC,,,

## 2024-06-03 RX ORDER — HYDROCODONE BITARTRATE AND ACETAMINOPHEN 5; 325 MG/1; MG/1
1 TABLET ORAL EVERY 8 HOURS PRN
Qty: 21 TABLET | Refills: 0 | Status: SHIPPED | OUTPATIENT
Start: 2024-06-03 | End: 2024-06-09 | Stop reason: SDUPTHER

## 2024-06-03 RX ORDER — AMLODIPINE BESYLATE 5 MG/1
5 TABLET ORAL
Qty: 90 TABLET | Refills: 1 | Status: SHIPPED | OUTPATIENT
Start: 2024-06-03

## 2024-06-03 NOTE — PROCEDURES
Large Joint Aspiration/Injection: bilateral knee    Date/Time: 6/3/2024 8:30 AM    Performed by: Jessica Shay MD  Authorized by: Jessica Shay MD    Consent Done?:  Yes (Verbal)  Indications:  Arthritis  Timeout: prior to procedure the correct patient, procedure, and site was verified    Prep: patient was prepped and draped in usual sterile fashion      Local anesthesia used?: Yes    Local anesthetic:  Topical anesthetic    Details:  Needle Size:  22 G  Approach:  Anteromedial  Location:  Knee  Laterality:  Bilateral  Site:  Bilateral knee  Medications (Right):  20 mg sodium hyaluronate (EUFLEXXA) 10 mg/mL(mw 2.4 -3.6 million)  Medications (Left):  20 mg sodium hyaluronate (EUFLEXXA) 10 mg/mL(mw 2.4 -3.6 million)  Patient tolerance:  Patient tolerated the procedure well with no immediate complications

## 2024-06-03 NOTE — PROGRESS NOTES
Follow up visit    History of Present Illness:   Naomy Armstrong comes to the office for follow up evaluation of bilateral knee arthritis   She has failed other treatment modalities including medications,  activity modification and steroid injection. She is here today for viscosupplementation - Euflexxa #2  She reports some relief of pain    MEDICAL NECESSITY FOR VISCOSUPPLEMENTATION:  A thorough evaluation of the patient, have determined that viscosupplementation is medically necessary.  The patient has painful DJD of the knee with failure of conservative therapy including lifestyle modifications and rehabilitation exercises.  Oral analgesics/NSAIDs have not adequately controlled symptoms and there is radiographic evidence of joint space narrowing, subchondral sclerosis, and osteophyte formation - Kellgren Gu[place] grade 2 or greater.     ROS: unremarkable and no change since last visit    Physical Examination:    Vitals:    06/03/24 0812   PainSc:   5   PainLoc: Knee      NAD  bilateral knee  No change in exam   No evidence of adverse effect of injection     Radiographic imaging: no new imaging    Assessment/Plan:  59 y.o. female with bilateral  knee arthritis     Injection to bilateral knee  performed, please see procedure note for details.  We discussed the risks and benefits of injection including pain, infection, bleeding, failure to improve pain, temporary increase in pain, synovitis, and damage to surrounding structures including nerves, blood vessels, tendons and muscles.   2.   Return for follow up visit: 1 week for next injection    All questions were answered in detail. The patient  verbalized the understanding of the treatment plan and is in full agreement with the treatment plan.

## 2024-06-03 NOTE — PROGRESS NOTES
Subjective:     Patient ID: Naomy Armstrong is a 59 y.o. female    Chief Complaint: Follow-up      Referred by: No ref. provider found      HPI:    Interval History PA (06/03/2024):  Patient returns to clinic for follow up of left-sided lower back and extremity pain and MRI review.  She denies significant changes in the quality or location of pain since previous visit.  Denies new or worsening symptoms.  Continues to locate pain in her left lower lumbar/lumbosacral region into left lower extremity all the way down to her bottom of the foot with associated paresthesia.  Denies any focal weakness, bowel or bladder dysfunction.    Interval History (5/16/24):  She returns today for follow up.  She reports that she is having left-sided low back and lower extremity pain.  This pain has been present for roughly 1 month.  No specific inciting events or injuries noted.  Pain is located the left lower lumbar/lumbosacral region radiate to left lower extremity all the way to the bottom of her foot with associated paresthesias.  She denies any focal weakness or bowel bladder dysfunction.  Pain is constant and worsened with activity.  Particularly bad in the morning when getting out of bed.       Interval History (7/17/23):  She returns today for follow up.  She reports that she is having recurrent bilateral low back pain.  This pain is located across the lower lumbar region.  Pain will radiate to the bilateral buttocks, but not more distally than this.  She denies any associated numbness, tingling, weakness, bowel bladder dysfunction.  Pain is constant and worsened with activity.        Interval History (6/7/22):  She returns today for follow up.  She reports that she underwent surgery for hiatal hernia repair in February 2022.  Since his surgery she has had significant epigastric abdominal pain.  This pain is intermittent but is very severe when present.  No specific triggers identified.  Patient also reports worsening  diarrhea since undergoing the surgery.  She has followed up with surgery and gastroenterology in no specific source has been identified as of yet.  States that she was provided tramadol which provided some relief.  States she was told to return to my clinic to discuss other options for pain management.       Interval History NP (02/03/22):    The patient location is: work  The chief complaint leading to consultation is: follow-up  Visit type: Virtual visit with audio.  Patient verbally consented for audio visit.  Total time spent with patient: 10 minutes  Each patient to whom he or she provides medical services by telemedicine is:  (1) informed of the relationship between the physician and patient and the respective role of any other health care provider with respect to management of the patient; and (2) notified that he or she may decline to receive medical services by telemedicine and may withdraw from such care at any time.    Pt returns today for follow up of bilateral SIJ injections. She reports 100% relief of low back pain. She denies any new or worsening symptoms. She would like to discuss chronic headaches at next visit with Dr. Powell. She is otherwise well today.     Interval History (12/29/21):    The patient location is:  home  The chief complaint leading to consultation is:  Follow-up  Visit type: Virtual visit with synchronous audio and video  Total time spent with patient:  8 minutes  Each patient to whom he or she provides medical services by telemedicine is:  (1) informed of the relationship between the physician and patient and the respective role of any other health care provider with respect to management of the patient; and (2) notified that he or she may decline to receive medical services by telemedicine and may withdraw from such care at any time.      She returns today for follow up and MRI review.  She reports that she would like to schedule bilateral sacroiliac joint steroid injections  discussed at last visit.  She is still having neck pain and headaches as well and would like to undergo cervical medial branch blocks/RFA, but her back pain is worse at this time.  Otherwise, she denies any changes in the quality or location of her pain.  She denies any new worsening symptoms.        Interval History (8/11/21):    The patient location is:  home  The chief complaint leading to consultation is:  Follow-up  Visit type: Virtual visit with synchronous audio and video  Total time spent with patient:  15 minutes  Each patient to whom he or she provides medical services by telemedicine is:  (1) informed of the relationship between the physician and patient and the respective role of any other health care provider with respect to management of the patient; and (2) notified that he or she may decline to receive medical services by telemedicine and may withdraw from such care at any time.        She returns today for follow up.  She reports that relief from bilateral sacroiliac joint steroid injections has worn off.  She would like repeat sacroiliac joint steroid injections if appropriate.  Patient also has been having headaches chronically.  She has been evaluated thoroughly by Neurology without any specific source for of headache identified.  Suspicion for cervicogenic headaches.  Patient does have pain in the upper cervical region.  The pain will radiate to the occipital and parietal regions of the head.  She denies any pain radiating to the upper extremities.  She denies any associated bowel bladder dysfunction.      Interval History NP (5/5/21):    Pt returns today for follow up of bilateral SIJ injections. She reports at least 75% relief of low back pain. She can now stand for long periods of time without pain or having to sit for 30 minutes. She is able to cook and complete her ADL's with much improvement. Does report continued pain while bending over but it is now manageable. Denies new or worsening  "symptoms.      Interval History (3/31/21):  She returns today for follow up.  She reports that bilateral L3, L4, L5 radiofrequency ablation provided roughly 75% relief but for only 1 month.  She states that this.  Time her pain returned.  Today, she localizes pain across the lower lumbar/lumbosacral region.  The pain does not radiate.  The pain is equal bilaterally.  The pain is worse with activities such as mopping and cleaning.  She denies any associated numbness, tingling, weakness, bowel bladder dysfunction.      Interval History NP (7/16/20):  Pt returns today for follow up and MRI review. She states that her pain in unchanged in quality or location. Denies any new symptoms. No bladder or bowel dysfunction. She reports "missing more days of work than she attends". She is an  at [employer] which includes heavy manual lifting and moving boxes. Her pain is worsened with activity, especially after a shift at work or when she is home cleaning. She reports that gabapentin has helped, but takes 2 pills at night and 1 pill every morning because it makes her too drowsy during the day. She also takes 500mg Naproxen PRN for the pain, maybe every few days. She would like something else for the pain. States she "took valium in the past which helped her relax and get through the day".     Interval History (7/6/20):  She returns today for follow up.  She reports that she continues to have low back/hip pain.  This pain is worse with bending and lifting.  These are movements that are often required as a part of her job.  Overall she denies any changes in the quality or location of the pain since last encounter.  She denies any new symptoms.       Interval History (1/13/20):  She returns today for follow up.  She reports that her neck pain has improved with home exercise program.  She does not feel as though this needs any further attention.  She does state that her low back has been bothering her.  She localizes " pain to the bilateral lower lumbar regions.  The pain does radiate to the bilateral hip regions.  She denies any associated numbness, tingling, weakness, bowel bladder dysfunction.  The pain is constant and worsened with activity.  She states that the pain is typically worse when initiating activity after rest.        Initial Encounter (7/9/19):  Naomy Armstrong is a 59 y.o. female who presents today with chronic neck pain. This pain has been present for years.  No specific inciting event or injury noted. She localizes the pain to the midline cervical region.  The pain does not radiate.  She denies any associated numbness, tingling, weakness, bowel bladder dysfunction.  She has had some radicular pain on the left side in the past this has resolved.  The pain is constant and worsened with activity.  She specifically states that the pain is worse when looking at screens.  She has been treated in the past by Dr. Bhavya Oneill at [facility].   This pain is described in detail below.    Physical Therapy:  Not for her low back    Non-pharmacologic Treatment:  Rest helps         TENS?  No    Pain Medications:         Currently taking:  Lyrica, meloxicam    Has tried in the past:  Opioids, muscle relaxers, Tylenol, NSAIDs, Amitriptyline, tizanidine, Celebrex, Robaxin, Flexeril    Has not tried:  SNRIs, topical creams    Blood thinners:  None    Interventional Therapies:   10/8/13- Left RFA C5, C6, C7  9/10/13- Left C5, C6, C7 MBB   8/13/13- C7-T1 IL EMMA  8/19/20 - right L3, L4, L5 radiofrequency ablation - 75% relief for 1 month  9/4/20 - left L3, L4, L5 radiofrequency ablation - 75% relief for 1 month  4/16/21 - bilateral SIJ injections - 75% relief  1/19/22 - bilateral SIJ injections - 100% relief    Relevant Surgeries:  None    Affecting sleep?  Yes    Affecting daily activities? yes    Depressive symptoms? yes          SI/HI? No    Work status: Employed    Pain Scores:    Best:       5/10  Worst:    10/10  Usually:   8/10  Today:   5/10    Pain Disability Index  Family/Home Responsibilities:: 8  Recreation:: 10  Social Activity:: 5  Occupation:: 10  Sexual Behavior:: 10  Self Care:: 5  Life-Support Activities:: 0  Pain Disability Index (PDI): 48      Review of Systems   Constitutional:  Negative for activity change, appetite change, chills, fatigue, fever and unexpected weight change.   HENT:  Negative for hearing loss.    Eyes:  Negative for visual disturbance.   Respiratory:  Negative for chest tightness and shortness of breath.    Cardiovascular:  Negative for chest pain.   Gastrointestinal:  Negative for abdominal pain, constipation, diarrhea, nausea and vomiting.   Genitourinary:  Negative for difficulty urinating.   Musculoskeletal:  Positive for back pain, gait problem and myalgias. Negative for arthralgias, neck pain and neck stiffness.   Skin:  Negative for rash.   Neurological:  Positive for numbness. Negative for dizziness, weakness, light-headedness and headaches.   Psychiatric/Behavioral:  Negative for hallucinations, sleep disturbance and suicidal ideas. The patient is not nervous/anxious.        Past Medical History:   Diagnosis Date    Alcohol abuse     per chart, but patient denies today and cocaine per chart    Anxiety     Colon polyp     Depression     Hallucination     last couple of months started seeing someone standing in her room, saw boyfriend's reflection in glass    Headache     History of psychiatric hospitalization     age 13 [facility] for 2 months for acting out; 2018 for SI    Hx of psychiatric care     Hypertension     Psychiatric problem     Sleep difficulties     Suicide attempt     with knife by cutting stomach    Therapy        Past Surgical History:   Procedure Laterality Date    COLONOSCOPY N/A 05/07/2021    Procedure: COLONOSCOPY;  Surgeon: Prem Montana MD;  Location: [facility];  Service: Endoscopy;  Laterality: N/A;  COVID screening 5/4 LAPC-instr portal -ml     COLONOSCOPY N/A 06/06/2022    Procedure: COLONOSCOPY;  Surgeon: Fab Shepherd MD;  Location: [facility] ENDO ([address] [place] [address]);  Service: Endoscopy;  Laterality: N/A;  C-Diff neg  Rapid COVID    EPIDURAL STEROID INJECTION Bilateral 08/05/2020    Procedure: Bilateral MBB @ L3, L4, L5;  Surgeon: Jeremy Powell Jr., MD;  Location: [facility];  Service: Pain Management;  Laterality: Bilateral;  Bilat L3-5 MBB  Arrive @ 1315; No ATC or DM; Needs MD signature    EPIDURAL STEROID INJECTION Right 08/19/2020    Procedure: Lumbar Radiofrequency Thermocoagulation of Medial Branches;  Surgeon: Jeremy Powell Jr., MD;  Location: [facility] ENDO;  Service: Pain Management;  Laterality: Right;  Right L3-5 RFA  Arrive @ 1045; No ATC or DM; Needs MD Signature    EPIDURAL STEROID INJECTION Left 09/04/2020    Procedure: Lumbar Radiofrequency Thermocoagulation of Medial Branches;  Surgeon: Jeremy Powell Jr., MD;  Location: [facility];  Service: Pain Management;  Laterality: Left;  Left L3-5 RFA  Arrive @ 0900 (requested); No ATC or DM; Needs MD signature    EPIDURAL STEROID INJECTION Bilateral 04/16/2021    Procedure: Sacroiliac Joint Steroid Injections @ Bilateral;  Surgeon: Jeremy Powell Jr., MD;  Location: [facility];  Service: Pain Management;  Laterality: Bilateral;  Arrive @ 1300; No ATC or DM    EPIDURAL STEROID INJECTION Bilateral 01/19/2022    Procedure: Bilateral Sacroiliac Joint Injections;  Surgeon: Jeremy Powell Jr., MD;  Location: [facility];  Service: Pain Management;  Laterality: Bilateral;  Arrive @ 1315; No ATC or DM; Needs Consent    EPIDURAL STEROID INJECTION Bilateral 07/28/2023    Procedure: Bilateral L2, L3, L4 Medial Branch Blocks #1;  Surgeon: Jeremy Powell Jr., MD;  Location: [facility];  Service: Pain Management;  Laterality: Bilateral;  @1200  No ATC or DM    EPIDURAL STEROID INJECTION Bilateral 08/11/2023    Procedure: Bilateral L2, L3, L4 Medial Branch Blocks #2;  Surgeon: Jeremy Powell  MD Courtney;  Location: [facility];  Service: Pain Management;  Laterality: Bilateral;  @1330  No ATC or DM    ESOPHAGEAL MANOMETRY WITH MEASUREMENT OF IMPEDANCE N/A 10/20/2021    Procedure: MANOMETRY, ESOPHAGUS, WITH IMPEDANCE MEASUREMENT;  Surgeon: Anastacia Odonnell MD;  Location: [facility] (4TH FLR);  Service: Endoscopy;  Laterality: N/A;  Covid test 10/17 [place], instructions sent to [id number]    ESOPHAGOGASTRODUODENOSCOPY N/A 2021    Procedure: EGD (ESOPHAGOGASTRODUODENOSCOPY);  Surgeon: Prem Montana MD;  Location: [facility];  Service: Endoscopy;  Laterality: N/A;  added on per Dr. NOEL Shepherd    EYE SURGERY  2015    Lasik    HERNIA REPAIR  2022    Hiatal hernia    HYSTERECTOMY      LAPAROSCOPIC TOUPET FUNDOPLICATION N/A 2022    Procedure: FUNDOPLICATION, LAPAROSCOPIC, TOUPET;  Surgeon: Christian Martinez MD;  Location: [facility] OR 2ND FLR;  Service: General;  Laterality: N/A;       Social History     Socioeconomic History    Marital status:     Number of children: 0   Occupational History    Occupation: Overnight nuvia     Employer: [employer]   Tobacco Use    Smoking status: Former     Current packs/day: 0.00     Average packs/day: 1 pack/day for 21.3 years (21.3 ttl pk-yrs)     Types: Cigarettes     Start date: 1985     Quit date: 2006     Years since quittin.1    Smokeless tobacco: Never    Tobacco comments:     It has been a very long time. I do not remember dates   Substance and Sexual Activity    Alcohol use: Yes     Comment: occasional    Drug use: Not Currently     Types: Marijuana     Comment: tried at age 14    Sexual activity: Yes     Partners: Male     Birth control/protection: Post-menopausal     Comment: going through menopause   Other Topics Concern    Patient feels they ought to cut down on drinking/drug use No    Patient annoyed by others criticizing their drinking/drug use No    Patient has felt bad or guilty about drinking/drug use No    Patient  has had a drink/used drugs as an eye opener in the AM No   Social History Narrative     x 2.    Lives with boyfriend.     Works at [employer] as an .    Presently working on Bachelor's degree.    Has associates degree in criminal justice.     Social Determinants of Health     Financial Resource Strain: Low Risk  (12/1/2023)    Overall Financial Resource Strain (CARDIA)     Difficulty of Paying Living Expenses: Not hard at all   Recent Concern: Financial Resource Strain - Medium Risk (10/25/2023)    Overall Financial Resource Strain (CARDIA)     Difficulty of Paying Living Expenses: Somewhat hard   Food Insecurity: No Food Insecurity (12/1/2023)    Hunger Vital Sign     Worried About Running Out of Food in the Last Year: Never true     Ran Out of Food in the Last Year: Never true   Recent Concern: Food Insecurity - Food Insecurity Present (9/21/2023)    Hunger Vital Sign     Worried About Running Out of Food in the Last Year: Sometimes true     Ran Out of Food in the Last Year: Sometimes true   Transportation Needs: No Transportation Needs (12/1/2023)    PRAPARE - Transportation     Lack of Transportation (Medical): No     Lack of Transportation (Non-Medical): No   Physical Activity: Insufficiently Active (12/1/2023)    Exercise Vital Sign     Days of Exercise per Week: 3 days     Minutes of Exercise per Session: 30 min   Stress: No Stress Concern Present (12/1/2023)    [employer] - Occupational Stress Questionnaire     Feeling of Stress : Only a little   Housing Stability: Low Risk  (12/1/2023)    Housing Stability Vital Sign     Unable to Pay for Housing in the Last Year: No     Number of Places Lived in the Last Year: 1     Unstable Housing in the Last Year: No       Review of patient's allergies indicates:   Allergen Reactions    Effexor [venlafaxine]      Elevated her blood pressure    Zoloft [sertraline]     Paroxetine hcl      Made her feel drunk        Current Outpatient Medications on File Prior to Visit   Medication Sig Dispense Refill    alosetron (LOTRONEX) 0.5 MG tablet Take 1 tablet (0.5 mg total) by mouth 2 (two) times daily. 180 tablet 0    amLODIPine (NORVASC) 5 MG tablet Take 1 tablet by mouth once daily 90 tablet 1    cyclobenzaprine (FLEXERIL) 5 MG tablet Take 2 tablets (10 mg total) by mouth 3 (three) times daily as needed for Muscle spasms. 90 tablet 0    dicyclomine (BENTYL) 20 mg tablet TAKE 1 TABLET BY MOUTH EVERY 6 HOURS 120 tablet 0    diphenoxylate-atropine 2.5-0.025 mg (LOMOTIL) 2.5-0.025 mg per tablet Take 1 tablet by mouth 4 (four) times daily as needed for Diarrhea. 60 tablet 1    fluconazole (DIFLUCAN) 200 MG Tab Take 1 tablet (200 mg total) by mouth once daily. Take at first sign of yeast infection 1 tablet 0    FLUoxetine 40 MG capsule Take 2 capsules (80 mg total) by mouth once daily. 180 capsule 3    fluticasone propionate (FLONASE) 50 mcg/actuation nasal spray 1 spray (50 mcg total) by Each Nostril route once daily. 15.8 mL 0    hydrOXYzine HCL (ATARAX) 10 MG Tab Take 1 tablet (10 mg total) by mouth 2 (two) times daily as needed (anxiety). 60 tablet 3    lisinopriL (PRINIVIL,ZESTRIL) 20 MG tablet Take 1 tablet (20 mg total) by mouth nightly. 90 tablet 3    lurasidone (LATUDA) 20 mg Tab tablet Take 1 tablet (20 mg total) by mouth once daily. 30 tablet 11    meloxicam (MOBIC) 15 MG tablet Take 1 tablet (15 mg total) by mouth once daily. 30 tablet 0    multivitamin with minerals tablet Take 1 tablet by mouth once daily.      nitrofurantoin, macrocrystal-monohydrate, (MACROBID) 100 MG capsule Take 1 capsule (100 mg total) by mouth once daily. 30 capsule 5    ondansetron (ZOFRAN-ODT) 8 MG TbDL Take 1 tablet (8 mg total) by mouth every 12 (twelve) hours as needed (nausea). 30 tablet 0    pregabalin 165 mg Tb24 Take 165 mg by mouth every evening. 90 tablet 0    promethazine (PHENERGAN) 25 MG tablet Take 1 tablet (25 mg total) by mouth  every 8 (eight) hours as needed for Nausea. 30 tablet 0    azelastine (ASTELIN) 137 mcg (0.1 %) nasal spray 1 spray (137 mcg total) by Nasal route 2 (two) times daily. (Patient not taking: Reported on 6/3/2024) 30 mL 1    omeprazole (PRILOSEC) 40 MG capsule Take 1 capsule (40 mg total) by mouth every morning. 90 capsule 3    [DISCONTINUED] amLODIPine (NORVASC) 5 MG tablet Take 1 tablet (5 mg total) by mouth once daily. 90 tablet 1     Current Facility-Administered Medications on File Prior to Visit   Medication Dose Route Frequency Provider Last Rate Last Admin    [DISCONTINUED] sodium hyaluronate (EUFLEXXA) 10 mg/mL(mw 2.4 -3.6 million) injection 20 mg  20 mg Intra-articular  Jessica Shay MD   20 mg at 06/03/24 0830    [DISCONTINUED] sodium hyaluronate (EUFLEXXA) 10 mg/mL(mw 2.4 -3.6 million) injection 20 mg  20 mg Intra-articular  Jessica Shay MD   20 mg at 06/03/24 0830       Objective:      /68 (BP Location: Left arm, Patient Position: Sitting, BP Method: Medium (Manual))   Pulse 85   LMP 09/15/2013   SpO2 95%     Exam:  GEN:  Well developed, well nourished.  No acute distress.  Normal pain behavior.  HEENT:  No trauma.  Mucous membranes moist.  Nares patent bilaterally.  PSYCH: Normal affect. Thought content appropriate.  CHEST:  Breathing symmetric.  No audible wheezing.  ABD: Soft, non-distended.  SKIN:  Warm, pink, dry.  No rash on exposed areas.    EXT:  No cyanosis, clubbing, or edema.  No color change or changes in nail or hair growth.  NEURO/MUSCULOSKELETAL:  Fully alert, oriented, and appropriate. Speech normal indio. No cranial nerve deficits.   Gait:  Normal.  No trendelenburg sign bilaterally.         Imaging:  Narrative & Impression  EXAMINATION:  MRI LUMBAR SPINE WITHOUT CONTRAST     CLINICAL HISTORY:  Low back pain, progressive neurologic deficit; Other intervertebral disc degeneration, lumbar region left lower extremity pain.     TECHNIQUE:  Multiplanar, multisequence MR  images were acquired from the thoracolumbar junction to the sacrum without the administration of contrast.     COMPARISON:  07/14/2020     FINDINGS:  There are transitional vertebral bodies suspected at the lumbosacral junction.  For the purpose of labeling on this study, the last large disc space will be considered L5-S1 although a prominent S1-2 intervertebral disc is also noted.     There is no evidence of acute compression fracture or osseous destructive process.     The distal conus maintains normal signal intensity.     Mild degenerative disc space narrowing noted.     L1-2 through L3-4 no evidence of disc herniation or advanced stenosis.     L4-5, no evidence of disc herniation.  Facet hypertrophy but no advanced central or foraminal stenosis.     L5-S1, prominent fluid is identified within the right facet joint presumably degenerative in nature.  At the left facet joint a small ganglion cyst protrudes into the paravertebral soft tissues.  There is however an additional cystic structure protruding into the epidural space at the opening to the left L5-S1 neural foramen thought to represent a  9 mm synovial cyst.  There is marked facet hypertrophy left much greater than right resulting in severe left foraminal stenosis with mild foraminal stenosis on the right.  No advanced central stenosis.     Impression:     Please note transitional lumbosacral vertebral bodies are identified.     At what is labeled L5-S1 there is marked left facet hypertrophy resulting in severe left foraminal stenosis.  In addition a 9 mm apparently cystic lesion near the subarticular zone but protruding into the opening of the neural foramen is thought to most likely represent a synovial cyst with extruded disc fragment and schwannoma thought less likely.  A contrast study could be performed for further clarification but only if clinically warranted.  No advanced central or left lateral recess stenosis is suspected with the main  impingement likely being of the exiting left nerve root.        Electronically signed by:Gus Hernadez  Date:                                            05/31/2024  Time:                                           17:03    Narrative & Impression    EXAMINATION:  MRI CERVICAL SPINE WITHOUT CONTRAST     CLINICAL HISTORY:  cervical DDD; Spondylosis without myelopathy or radiculopathy, cervical region     TECHNIQUE:  Multiplanar, multisequence MR images of the cervical spine were performed without the administration of contrast.     COMPARISON:  Radiograph 08/27/2020.     FINDINGS:  Alignment: Straightening of cervical spine with slight reversal at the level of C5 through C7.  Minimal degenerative anterolisthesis C4 on C5..     Vertebrae: Normal marrow signal. No fracture.     Discs: Disc space narrowing C5-C6-C7 level.     Cord: At C3 through C6 level, STIR images, the cervical cord appears slightly indistinct and of minimally increased signal intensity.  This may be artifactual in nature is a cannot be confirmed on the axial or sagittal T2 weighted sequences with certainty.  Postcontrast repeat examination may be indicated to exclude subtle intrinsic cord finding at this level..     Skull base and craniocervical junction: Normal.     Degenerative findings:     C2-C3: There is no focal disc herniation.No significant central canal narrowing.  No significant neural foraminal narrowing.     C3-C4: There is no focal disc herniation.No significant central canal narrowing.  No significant neural foraminal narrowing.     C4-C5: There is no focal disc herniation.No significant central canal narrowing.  Moderate RIGHT neural foraminal narrowing.     C5-C6: There is no focal disc herniation.No significant central canal narrowing.  Moderate RIGHT and mild LEFT neural foraminal narrowing.     C6-C7: There is no focal disc herniation.No significant central canal narrowing.  Moderate LEFT neural foraminal narrowing.     C7-T1: There  is no focal disc herniation.No significant central canal narrowing.  No significant neural foraminal narrowing.     T1-T2:     Paraspinal muscles & soft tissues: Unremarkable.     Impression:     Degenerative spondylosis as above predominantly C4-C5 through C6-C7.     Likely artifact identified level of the cord C3 through C6 STIR images only although at of abundance of caution, repeat examination with postcontrast MRI may be helpful to exclude underlying myelitis.     This report was flagged in Epic as abnormal.        Electronically signed by: Jerzy Rose MD  Date:                                            10/28/2021  Time:                                           06:44           Narrative & Impression    EXAMINATION:  XR CERVICAL SPINE 5 VIEW WITH FLEX AND EXT     CLINICAL HISTORY:  Headache     TECHNIQUE:  Five views of the cervical spine plus flexion and extension views were performed.     COMPARISON:  None 07/09/2019.     FINDINGS:  Mild DJD.  The disc spaces are narrowed between C5 and C7 vertebral segments.  Encroachment of the neural foramina identified at the C5-C6 level on the right and the C6/C7 level on the left.  There is a 2 mm C4/C5 anterolisthesis.  No fracture or dislocation.  No bone destruction identified.     Impression:     See above        Electronically signed by: Kaiser Ruiz MD  Date:                                            08/27/2020  Time:                                           14:12               Narrative & Impression     EXAMINATION:  MRI LUMBAR SPINE WITHOUT CONTRAST     CLINICAL HISTORY:  lumbar spinal stenosis; Other intervertebral disc degeneration, lumbar region     TECHNIQUE:  Multiplanar, multisequence MR images were acquired from the thoracolumbar junction to the sacrum without the administration of contrast.     COMPARISON:  Non plain film examination 01/13/2020 e.     FINDINGS:  Lumbar spine alignment is within normal limits. The vertebral body heights are well  maintained, with no fracture.  No marrow signal abnormality suspicious for an infiltrative process.     The conus is normal in appearance, and terminates at the L1-L2 level.  The adjacent soft tissue structures show no significant abnormalities.     L1-L2: There is no focal disc herniation. No significant central canal or neural foraminal narrowing.     L2-L3: There is no focal disc herniation. No significant central canal or neural foraminal narrowing.     L3-L4: There is no focal disc herniation. No significant central canal or neural foraminal narrowing.     L4-L5: There is no focal disc herniation. No significant central canal or neural foraminal narrowing.     L5-S1: There is no focal disc herniation. No significant central canal or neural foraminal narrowing.     Impression:     No significant central canal stenosis, focal protrusion of disc material or neural foraminal encroachment.        Electronically signed by: Jerzy Rose MD  Date:                                            07/14/2020  Time:                                           14:15         Narrative & Impression    EXAMINATION:  XR LUMBAR SPINE AP AND LAT WITH FLEX/EXT     CLINICAL HISTORY:  Other intervertebral disc degeneration, lumbar region     TECHNIQUE:  AP and lateral views as well as lateral flexion and extension images are performed through the lumbar spine.     COMPARISON:  None     FINDINGS:  Mild DJD and lumbar scoliosis.  No significant disc space narrowing identified.  No fracture, spondylolisthesis or bone destruction noted.  Spina bifida occulta involving the S1 vertebral segment noted.     Impression:     See above        Electronically signed by: Kaiser Ruiz MD  Date:                                            01/13/2020  Time:                                           09:22    Encounter    View Encounter                     Narrative     No significant alignment abnormality.  Vertebral body heights are normally maintained,  without compression deformity at any level.  There is disc narrowing at C5-6 and C6-7, and each of these levels demonstrates posterior marginal vertebral endplate   spurring and accompanying disc bulging.  The spurring is more pronounced to the right of midline at C5-6 and to the left of midline at C6-7.  All other cervical and visualized upper thoracic levels have normal disc contour, and there is no evidence of a   significant focal disc herniation at any level.  AP diameter of the spinal canal is normally maintained at all levels with no canal stenosis/extrinsic cord compression observed.  The spinal cord is well delineated from the cervicomedullary junction to   the T3 level, and appears normal in size and configuration without focal enlargement or irregularity.  No Chiari malformation or cord cyst or syrinx.  No abnormal signal from the substance of the cord on any of the pulse sequences generated.  There is   some prominent neural foraminal stenosis at C5-6 on the right and C6-7 on the left.  No significant signal abnormality referable to the osseous structures.   Impression      Cervical spine MRI examination demonstrates degenerative changes as discussed above involving the C5-6 and C6-7 levels, with right-sided foraminal stenosis at C5-6 and left-sided foraminal stenosis at C6-7.  No evidence of a significant focal soft is   herniation, canal stenosis/extrinsic cord compression, or intramedullary cord lesion seen at any level.      Electronically signed by: Kaiser Preston MD  Date: 08/01/13  Time: 06:40          Assessment:       Encounter Diagnoses   Name Primary?    DDD (degenerative disc disease), lumbar Yes    Lumbar radiculopathy     Dorsalgia, unspecified     Lumbar spondylosis        Plan:       Naomy was seen today for follow-up.    Diagnoses and all orders for this visit:    DDD (degenerative disc disease), lumbar  -     Ambulatory referral/consult to Neurosurgery; Future    Lumbar radiculopathy  -      Ambulatory referral/consult to Neurosurgery; Future    Dorsalgia, unspecified    Lumbar spondylosis            Naomy Armstrong is a 59 y.o. female with acute left-sided low back and lower extremity pain.  Concerning for lumbar radiculopathy.  Does have some left ankle dorsiflexion weakness.  Lumbar MRI showing multilevel degenerative changes.  Most notable at L5-S1 with left sided joint arthropathy with a large 9 mm synovial cyst.  Severe left-sided foraminal stenosis at that level as well.  Advanced facet degeneration at the L5-S1 level.    Prior records reviewed.  Pertinent imaging studies reviewed by me. Imaging results were discussed with patient.  Referred to Neurosurgery for further evaluation.  We did discuss potentially performing a left L5-S1 facet joint injection with attempted cyst rupture with a left S1 transforaminal epidural steroid injection.  However given severity of foraminal stenosis at this level it is unclear how effective the cyst rupture would be.  Pending further evaluation by Neurosurgery we may consider proceeding with this option.  We did discuss formal physical therapy for ROM, strengthening, stretching and home exercise program.  Given severity of patient's pain likely unable to tolerate this time.  Start Norco 5-325 mg q.8 hours p.r.n..  One-week supply of 21 pills provided today.  Advised patient we will call next week to discuss efficacy before providing additional refills.  Discussed with patient that this medication is not intended to be provided chronically although okay to continue while we seek out more definitive means.  Return to clinic in 1 month or sooner if needed.  At that time we will discuss neurosurgical recommendations.    Neal Stephenson PA-C  [facility]  Interventional Pain Management   06/03/2024    This note was created by combination of typed  and M-Modal dictation.  Transcription and phonetic errors may be present.  If  there are any questions, please contact me.

## 2024-06-03 NOTE — H&P (VIEW-ONLY)
Subjective:     Patient ID: Naomy Armstrong is a 59 y.o. female    Chief Complaint: Follow-up      Referred by: No ref. provider found      HPI:    Interval History PA (06/03/2024):  Patient returns to clinic for follow up of left-sided lower back and extremity pain and MRI review.  She denies significant changes in the quality or location of pain since previous visit.  Denies new or worsening symptoms.  Continues to locate pain in her left lower lumbar/lumbosacral region into left lower extremity all the way down to her bottom of the foot with associated paresthesia.  Denies any focal weakness, bowel or bladder dysfunction.    Interval History (5/16/24):  She returns today for follow up.  She reports that she is having left-sided low back and lower extremity pain.  This pain has been present for roughly 1 month.  No specific inciting events or injuries noted.  Pain is located the left lower lumbar/lumbosacral region radiate to left lower extremity all the way to the bottom of her foot with associated paresthesias.  She denies any focal weakness or bowel bladder dysfunction.  Pain is constant and worsened with activity.  Particularly bad in the morning when getting out of bed.       Interval History (7/17/23):  She returns today for follow up.  She reports that she is having recurrent bilateral low back pain.  This pain is located across the lower lumbar region.  Pain will radiate to the bilateral buttocks, but not more distally than this.  She denies any associated numbness, tingling, weakness, bowel bladder dysfunction.  Pain is constant and worsened with activity.        Interval History (6/7/22):  She returns today for follow up.  She reports that she underwent surgery for hiatal hernia repair in February 2022.  Since his surgery she has had significant epigastric abdominal pain.  This pain is intermittent but is very severe when present.  No specific triggers identified.  Patient also reports worsening  diarrhea since undergoing the surgery.  She has followed up with surgery and gastroenterology in no specific source has been identified as of yet.  States that she was provided tramadol which provided some relief.  States she was told to return to my clinic to discuss other options for pain management.       Interval History NP (02/03/22):    The patient location is: work  The chief complaint leading to consultation is: follow-up  Visit type: Virtual visit with audio.  Patient verbally consented for audio visit.  Total time spent with patient: 10 minutes  Each patient to whom he or she provides medical services by telemedicine is:  (1) informed of the relationship between the physician and patient and the respective role of any other health care provider with respect to management of the patient; and (2) notified that he or she may decline to receive medical services by telemedicine and may withdraw from such care at any time.    Pt returns today for follow up of bilateral SIJ injections. She reports 100% relief of low back pain. She denies any new or worsening symptoms. She would like to discuss chronic headaches at next visit with Dr. Powell. She is otherwise well today.     Interval History (12/29/21):    The patient location is:  home  The chief complaint leading to consultation is:  Follow-up  Visit type: Virtual visit with synchronous audio and video  Total time spent with patient:  8 minutes  Each patient to whom he or she provides medical services by telemedicine is:  (1) informed of the relationship between the physician and patient and the respective role of any other health care provider with respect to management of the patient; and (2) notified that he or she may decline to receive medical services by telemedicine and may withdraw from such care at any time.      She returns today for follow up and MRI review.  She reports that she would like to schedule bilateral sacroiliac joint steroid injections  discussed at last visit.  She is still having neck pain and headaches as well and would like to undergo cervical medial branch blocks/RFA, but her back pain is worse at this time.  Otherwise, she denies any changes in the quality or location of her pain.  She denies any new worsening symptoms.        Interval History (8/11/21):    The patient location is:  home  The chief complaint leading to consultation is:  Follow-up  Visit type: Virtual visit with synchronous audio and video  Total time spent with patient:  15 minutes  Each patient to whom he or she provides medical services by telemedicine is:  (1) informed of the relationship between the physician and patient and the respective role of any other health care provider with respect to management of the patient; and (2) notified that he or she may decline to receive medical services by telemedicine and may withdraw from such care at any time.        She returns today for follow up.  She reports that relief from bilateral sacroiliac joint steroid injections has worn off.  She would like repeat sacroiliac joint steroid injections if appropriate.  Patient also has been having headaches chronically.  She has been evaluated thoroughly by Neurology without any specific source for of headache identified.  Suspicion for cervicogenic headaches.  Patient does have pain in the upper cervical region.  The pain will radiate to the occipital and parietal regions of the head.  She denies any pain radiating to the upper extremities.  She denies any associated bowel bladder dysfunction.      Interval History NP (5/5/21):    Pt returns today for follow up of bilateral SIJ injections. She reports at least 75% relief of low back pain. She can now stand for long periods of time without pain or having to sit for 30 minutes. She is able to cook and complete her ADL's with much improvement. Does report continued pain while bending over but it is now manageable. Denies new or worsening  "symptoms.      Interval History (3/31/21):  She returns today for follow up.  She reports that bilateral L3, L4, L5 radiofrequency ablation provided roughly 75% relief but for only 1 month.  She states that this.  Time her pain returned.  Today, she localizes pain across the lower lumbar/lumbosacral region.  The pain does not radiate.  The pain is equal bilaterally.  The pain is worse with activities such as mopping and cleaning.  She denies any associated numbness, tingling, weakness, bowel bladder dysfunction.      Interval History NP (7/16/20):  Pt returns today for follow up and MRI review. She states that her pain in unchanged in quality or location. Denies any new symptoms. No bladder or bowel dysfunction. She reports "missing more days of work than she attends". She is an  at [employer] which includes heavy manual lifting and moving boxes. Her pain is worsened with activity, especially after a shift at work or when she is home cleaning. She reports that gabapentin has helped, but takes 2 pills at night and 1 pill every morning because it makes her too drowsy during the day. She also takes 500mg Naproxen PRN for the pain, maybe every few days. She would like something else for the pain. States she "took valium in the past which helped her relax and get through the day".     Interval History (7/6/20):  She returns today for follow up.  She reports that she continues to have low back/hip pain.  This pain is worse with bending and lifting.  These are movements that are often required as a part of her job.  Overall she denies any changes in the quality or location of the pain since last encounter.  She denies any new symptoms.       Interval History (1/13/20):  She returns today for follow up.  She reports that her neck pain has improved with home exercise program.  She does not feel as though this needs any further attention.  She does state that her low back has been bothering her.  She localizes " pain to the bilateral lower lumbar regions.  The pain does radiate to the bilateral hip regions.  She denies any associated numbness, tingling, weakness, bowel bladder dysfunction.  The pain is constant and worsened with activity.  She states that the pain is typically worse when initiating activity after rest.        Initial Encounter (7/9/19):  Naomy Armstrong is a 59 y.o. female who presents today with chronic neck pain. This pain has been present for years.  No specific inciting event or injury noted. She localizes the pain to the midline cervical region.  The pain does not radiate.  She denies any associated numbness, tingling, weakness, bowel bladder dysfunction.  She has had some radicular pain on the left side in the past this has resolved.  The pain is constant and worsened with activity.  She specifically states that the pain is worse when looking at screens.  She has been treated in the past by Dr. Bhavya Oneill at [facility].   This pain is described in detail below.    Physical Therapy:  Not for her low back    Non-pharmacologic Treatment:  Rest helps         TENS?  No    Pain Medications:         Currently taking:  Lyrica, meloxicam    Has tried in the past:  Opioids, muscle relaxers, Tylenol, NSAIDs, Amitriptyline, tizanidine, Celebrex, Robaxin, Flexeril    Has not tried:  SNRIs, topical creams    Blood thinners:  None    Interventional Therapies:   10/8/13- Left RFA C5, C6, C7  9/10/13- Left C5, C6, C7 MBB   8/13/13- C7-T1 IL EMMA  8/19/20 - right L3, L4, L5 radiofrequency ablation - 75% relief for 1 month  9/4/20 - left L3, L4, L5 radiofrequency ablation - 75% relief for 1 month  4/16/21 - bilateral SIJ injections - 75% relief  1/19/22 - bilateral SIJ injections - 100% relief    Relevant Surgeries:  None    Affecting sleep?  Yes    Affecting daily activities? yes    Depressive symptoms? yes          SI/HI? No    Work status: Employed    Pain Scores:    Best:       5/10  Worst:    10/10  Usually:   8/10  Today:   5/10    Pain Disability Index  Family/Home Responsibilities:: 8  Recreation:: 10  Social Activity:: 5  Occupation:: 10  Sexual Behavior:: 10  Self Care:: 5  Life-Support Activities:: 0  Pain Disability Index (PDI): 48      Review of Systems   Constitutional:  Negative for activity change, appetite change, chills, fatigue, fever and unexpected weight change.   HENT:  Negative for hearing loss.    Eyes:  Negative for visual disturbance.   Respiratory:  Negative for chest tightness and shortness of breath.    Cardiovascular:  Negative for chest pain.   Gastrointestinal:  Negative for abdominal pain, constipation, diarrhea, nausea and vomiting.   Genitourinary:  Negative for difficulty urinating.   Musculoskeletal:  Positive for back pain, gait problem and myalgias. Negative for arthralgias, neck pain and neck stiffness.   Skin:  Negative for rash.   Neurological:  Positive for numbness. Negative for dizziness, weakness, light-headedness and headaches.   Psychiatric/Behavioral:  Negative for hallucinations, sleep disturbance and suicidal ideas. The patient is not nervous/anxious.        Past Medical History:   Diagnosis Date    Alcohol abuse     per chart, but patient denies today and cocaine per chart    Anxiety     Colon polyp     Depression     Hallucination     last couple of months started seeing someone standing in her room, saw boyfriend's reflection in glass    Headache     History of psychiatric hospitalization     age 13 [facility] for 2 months for acting out; 2018 for SI    Hx of psychiatric care     Hypertension     Psychiatric problem     Sleep difficulties     Suicide attempt     with knife by cutting stomach    Therapy        Past Surgical History:   Procedure Laterality Date    COLONOSCOPY N/A 05/07/2021    Procedure: COLONOSCOPY;  Surgeon: Prem Montana MD;  Location: [facility];  Service: Endoscopy;  Laterality: N/A;  COVID screening 5/4 LAPC-instr portal -ml     COLONOSCOPY N/A 06/06/2022    Procedure: COLONOSCOPY;  Surgeon: Fab Shepherd MD;  Location: [facility] ENDO ([address] [place] [address]);  Service: Endoscopy;  Laterality: N/A;  C-Diff neg  Rapid COVID    EPIDURAL STEROID INJECTION Bilateral 08/05/2020    Procedure: Bilateral MBB @ L3, L4, L5;  Surgeon: Jeremy Powell Jr., MD;  Location: [facility];  Service: Pain Management;  Laterality: Bilateral;  Bilat L3-5 MBB  Arrive @ 1315; No ATC or DM; Needs MD signature    EPIDURAL STEROID INJECTION Right 08/19/2020    Procedure: Lumbar Radiofrequency Thermocoagulation of Medial Branches;  Surgeon: Jeremy Powell Jr., MD;  Location: [facility] ENDO;  Service: Pain Management;  Laterality: Right;  Right L3-5 RFA  Arrive @ 1045; No ATC or DM; Needs MD Signature    EPIDURAL STEROID INJECTION Left 09/04/2020    Procedure: Lumbar Radiofrequency Thermocoagulation of Medial Branches;  Surgeon: Jeremy Powell Jr., MD;  Location: [facility];  Service: Pain Management;  Laterality: Left;  Left L3-5 RFA  Arrive @ 0900 (requested); No ATC or DM; Needs MD signature    EPIDURAL STEROID INJECTION Bilateral 04/16/2021    Procedure: Sacroiliac Joint Steroid Injections @ Bilateral;  Surgeon: Jeremy Powell Jr., MD;  Location: [facility];  Service: Pain Management;  Laterality: Bilateral;  Arrive @ 1300; No ATC or DM    EPIDURAL STEROID INJECTION Bilateral 01/19/2022    Procedure: Bilateral Sacroiliac Joint Injections;  Surgeon: Jeremy Powell Jr., MD;  Location: [facility];  Service: Pain Management;  Laterality: Bilateral;  Arrive @ 1315; No ATC or DM; Needs Consent    EPIDURAL STEROID INJECTION Bilateral 07/28/2023    Procedure: Bilateral L2, L3, L4 Medial Branch Blocks #1;  Surgeon: Jeremy Powell Jr., MD;  Location: [facility];  Service: Pain Management;  Laterality: Bilateral;  @1200  No ATC or DM    EPIDURAL STEROID INJECTION Bilateral 08/11/2023    Procedure: Bilateral L2, L3, L4 Medial Branch Blocks #2;  Surgeon: Jeremy Powell  MD Courtney;  Location: [facility];  Service: Pain Management;  Laterality: Bilateral;  @1330  No ATC or DM    ESOPHAGEAL MANOMETRY WITH MEASUREMENT OF IMPEDANCE N/A 10/20/2021    Procedure: MANOMETRY, ESOPHAGUS, WITH IMPEDANCE MEASUREMENT;  Surgeon: Anastacia Odonnell MD;  Location: [facility] (4TH FLR);  Service: Endoscopy;  Laterality: N/A;  Covid test 10/17 [facility], instructions sent to [id number]    ESOPHAGOGASTRODUODENOSCOPY N/A 2021    Procedure: EGD (ESOPHAGOGASTRODUODENOSCOPY);  Surgeon: Prem Montana MD;  Location: [facility];  Service: Endoscopy;  Laterality: N/A;  added on per Dr. NOEL Shepherd    EYE SURGERY  2015    Lasik    HERNIA REPAIR  2022    Hiatal hernia    HYSTERECTOMY      LAPAROSCOPIC TOUPET FUNDOPLICATION N/A 2022    Procedure: FUNDOPLICATION, LAPAROSCOPIC, TOUPET;  Surgeon: Christian Martinez MD;  Location: [facility] OR 2ND FLR;  Service: General;  Laterality: N/A;       Social History     Socioeconomic History    Marital status:     Number of children: 0   Occupational History    Occupation: Overnight nuvia     Employer: [employer]   Tobacco Use    Smoking status: Former     Current packs/day: 0.00     Average packs/day: 1 pack/day for 21.3 years (21.3 ttl pk-yrs)     Types: Cigarettes     Start date: 1985     Quit date: 2006     Years since quittin.1    Smokeless tobacco: Never    Tobacco comments:     It has been a very long time. I do not remember dates   Substance and Sexual Activity    Alcohol use: Yes     Comment: occasional    Drug use: Not Currently     Types: Marijuana     Comment: tried at age 14    Sexual activity: Yes     Partners: Male     Birth control/protection: Post-menopausal     Comment: going through menopause   Other Topics Concern    Patient feels they ought to cut down on drinking/drug use No    Patient annoyed by others criticizing their drinking/drug use No    Patient has felt bad or guilty about drinking/drug use No    Patient  has had a drink/used drugs as an eye opener in the AM No   Social History Narrative     x 2.    Lives with boyfriend.     Works at [employer] as an .    Presently working on Bachelor's degree.    Has associates degree in criminal justice.     Social Determinants of Health     Financial Resource Strain: Low Risk  (12/1/2023)    Overall Financial Resource Strain (CARDIA)     Difficulty of Paying Living Expenses: Not hard at all   Recent Concern: Financial Resource Strain - Medium Risk (10/25/2023)    Overall Financial Resource Strain (CARDIA)     Difficulty of Paying Living Expenses: Somewhat hard   Food Insecurity: No Food Insecurity (12/1/2023)    Hunger Vital Sign     Worried About Running Out of Food in the Last Year: Never true     Ran Out of Food in the Last Year: Never true   Recent Concern: Food Insecurity - Food Insecurity Present (9/21/2023)    Hunger Vital Sign     Worried About Running Out of Food in the Last Year: Sometimes true     Ran Out of Food in the Last Year: Sometimes true   Transportation Needs: No Transportation Needs (12/1/2023)    PRAPARE - Transportation     Lack of Transportation (Medical): No     Lack of Transportation (Non-Medical): No   Physical Activity: Insufficiently Active (12/1/2023)    Exercise Vital Sign     Days of Exercise per Week: 3 days     Minutes of Exercise per Session: 30 min   Stress: No Stress Concern Present (12/1/2023)    [employer] [employer] - Occupational Stress Questionnaire     Feeling of Stress : Only a little   Housing Stability: Low Risk  (12/1/2023)    Housing Stability Vital Sign     Unable to Pay for Housing in the Last Year: No     Number of Places Lived in the Last Year: 1     Unstable Housing in the Last Year: No       Review of patient's allergies indicates:   Allergen Reactions    Effexor [venlafaxine]      Elevated her blood pressure    Zoloft [sertraline]     Paroxetine hcl      Made her feel drunk        Current Outpatient Medications on File Prior to Visit   Medication Sig Dispense Refill    alosetron (LOTRONEX) 0.5 MG tablet Take 1 tablet (0.5 mg total) by mouth 2 (two) times daily. 180 tablet 0    amLODIPine (NORVASC) 5 MG tablet Take 1 tablet by mouth once daily 90 tablet 1    cyclobenzaprine (FLEXERIL) 5 MG tablet Take 2 tablets (10 mg total) by mouth 3 (three) times daily as needed for Muscle spasms. 90 tablet 0    dicyclomine (BENTYL) 20 mg tablet TAKE 1 TABLET BY MOUTH EVERY 6 HOURS 120 tablet 0    diphenoxylate-atropine 2.5-0.025 mg (LOMOTIL) 2.5-0.025 mg per tablet Take 1 tablet by mouth 4 (four) times daily as needed for Diarrhea. 60 tablet 1    fluconazole (DIFLUCAN) 200 MG Tab Take 1 tablet (200 mg total) by mouth once daily. Take at first sign of yeast infection 1 tablet 0    FLUoxetine 40 MG capsule Take 2 capsules (80 mg total) by mouth once daily. 180 capsule 3    fluticasone propionate (FLONASE) 50 mcg/actuation nasal spray 1 spray (50 mcg total) by Each Nostril route once daily. 15.8 mL 0    hydrOXYzine HCL (ATARAX) 10 MG Tab Take 1 tablet (10 mg total) by mouth 2 (two) times daily as needed (anxiety). 60 tablet 3    lisinopriL (PRINIVIL,ZESTRIL) 20 MG tablet Take 1 tablet (20 mg total) by mouth nightly. 90 tablet 3    lurasidone (LATUDA) 20 mg Tab tablet Take 1 tablet (20 mg total) by mouth once daily. 30 tablet 11    meloxicam (MOBIC) 15 MG tablet Take 1 tablet (15 mg total) by mouth once daily. 30 tablet 0    multivitamin with minerals tablet Take 1 tablet by mouth once daily.      nitrofurantoin, macrocrystal-monohydrate, (MACROBID) 100 MG capsule Take 1 capsule (100 mg total) by mouth once daily. 30 capsule 5    ondansetron (ZOFRAN-ODT) 8 MG TbDL Take 1 tablet (8 mg total) by mouth every 12 (twelve) hours as needed (nausea). 30 tablet 0    pregabalin 165 mg Tb24 Take 165 mg by mouth every evening. 90 tablet 0    promethazine (PHENERGAN) 25 MG tablet Take 1 tablet (25 mg total) by mouth  every 8 (eight) hours as needed for Nausea. 30 tablet 0    azelastine (ASTELIN) 137 mcg (0.1 %) nasal spray 1 spray (137 mcg total) by Nasal route 2 (two) times daily. (Patient not taking: Reported on 6/3/2024) 30 mL 1    omeprazole (PRILOSEC) 40 MG capsule Take 1 capsule (40 mg total) by mouth every morning. 90 capsule 3    [DISCONTINUED] amLODIPine (NORVASC) 5 MG tablet Take 1 tablet (5 mg total) by mouth once daily. 90 tablet 1     Current Facility-Administered Medications on File Prior to Visit   Medication Dose Route Frequency Provider Last Rate Last Admin    [DISCONTINUED] sodium hyaluronate (EUFLEXXA) 10 mg/mL(mw 2.4 -3.6 million) injection 20 mg  20 mg Intra-articular  Jessica Shay MD   20 mg at 06/03/24 0830    [DISCONTINUED] sodium hyaluronate (EUFLEXXA) 10 mg/mL(mw 2.4 -3.6 million) injection 20 mg  20 mg Intra-articular  Jessica Shay MD   20 mg at 06/03/24 0830       Objective:      /68 (BP Location: Left arm, Patient Position: Sitting, BP Method: Medium (Manual))   Pulse 85   LMP 09/15/2013   SpO2 95%     Exam:  GEN:  Well developed, well nourished.  No acute distress.  Normal pain behavior.  HEENT:  No trauma.  Mucous membranes moist.  Nares patent bilaterally.  PSYCH: Normal affect. Thought content appropriate.  CHEST:  Breathing symmetric.  No audible wheezing.  ABD: Soft, non-distended.  SKIN:  Warm, pink, dry.  No rash on exposed areas.    EXT:  No cyanosis, clubbing, or edema.  No color change or changes in nail or hair growth.  NEURO/MUSCULOSKELETAL:  Fully alert, oriented, and appropriate. Speech normal indio. No cranial nerve deficits.   Gait:  Normal.  No trendelenburg sign bilaterally.         Imaging:  Narrative & Impression  EXAMINATION:  MRI LUMBAR SPINE WITHOUT CONTRAST     CLINICAL HISTORY:  Low back pain, progressive neurologic deficit; Other intervertebral disc degeneration, lumbar region left lower extremity pain.     TECHNIQUE:  Multiplanar, multisequence MR  images were acquired from the thoracolumbar junction to the sacrum without the administration of contrast.     COMPARISON:  07/14/2020     FINDINGS:  There are transitional vertebral bodies suspected at the lumbosacral junction.  For the purpose of labeling on this study, the last large disc space will be considered L5-S1 although a prominent S1-2 intervertebral disc is also noted.     There is no evidence of acute compression fracture or osseous destructive process.     The distal conus maintains normal signal intensity.     Mild degenerative disc space narrowing noted.     L1-2 through L3-4 no evidence of disc herniation or advanced stenosis.     L4-5, no evidence of disc herniation.  Facet hypertrophy but no advanced central or foraminal stenosis.     L5-S1, prominent fluid is identified within the right facet joint presumably degenerative in nature.  At the left facet joint a small ganglion cyst protrudes into the paravertebral soft tissues.  There is however an additional cystic structure protruding into the epidural space at the opening to the left L5-S1 neural foramen thought to represent a  9 mm synovial cyst.  There is marked facet hypertrophy left much greater than right resulting in severe left foraminal stenosis with mild foraminal stenosis on the right.  No advanced central stenosis.     Impression:     Please note transitional lumbosacral vertebral bodies are identified.     At what is labeled L5-S1 there is marked left facet hypertrophy resulting in severe left foraminal stenosis.  In addition a 9 mm apparently cystic lesion near the subarticular zone but protruding into the opening of the neural foramen is thought to most likely represent a synovial cyst with extruded disc fragment and schwannoma thought less likely.  A contrast study could be performed for further clarification but only if clinically warranted.  No advanced central or left lateral recess stenosis is suspected with the main  impingement likely being of the exiting left nerve root.        Electronically signed by:Gus Hernadez  Date:                                            05/31/2024  Time:                                           17:03    Narrative & Impression    EXAMINATION:  MRI CERVICAL SPINE WITHOUT CONTRAST     CLINICAL HISTORY:  cervical DDD; Spondylosis without myelopathy or radiculopathy, cervical region     TECHNIQUE:  Multiplanar, multisequence MR images of the cervical spine were performed without the administration of contrast.     COMPARISON:  Radiograph 08/27/2020.     FINDINGS:  Alignment: Straightening of cervical spine with slight reversal at the level of C5 through C7.  Minimal degenerative anterolisthesis C4 on C5..     Vertebrae: Normal marrow signal. No fracture.     Discs: Disc space narrowing C5-C6-C7 level.     Cord: At C3 through C6 level, STIR images, the cervical cord appears slightly indistinct and of minimally increased signal intensity.  This may be artifactual in nature is a cannot be confirmed on the axial or sagittal T2 weighted sequences with certainty.  Postcontrast repeat examination may be indicated to exclude subtle intrinsic cord finding at this level..     Skull base and craniocervical junction: Normal.     Degenerative findings:     C2-C3: There is no focal disc herniation.No significant central canal narrowing.  No significant neural foraminal narrowing.     C3-C4: There is no focal disc herniation.No significant central canal narrowing.  No significant neural foraminal narrowing.     C4-C5: There is no focal disc herniation.No significant central canal narrowing.  Moderate RIGHT neural foraminal narrowing.     C5-C6: There is no focal disc herniation.No significant central canal narrowing.  Moderate RIGHT and mild LEFT neural foraminal narrowing.     C6-C7: There is no focal disc herniation.No significant central canal narrowing.  Moderate LEFT neural foraminal narrowing.     C7-T1: There  is no focal disc herniation.No significant central canal narrowing.  No significant neural foraminal narrowing.     T1-T2:     Paraspinal muscles & soft tissues: Unremarkable.     Impression:     Degenerative spondylosis as above predominantly C4-C5 through C6-C7.     Likely artifact identified level of the cord C3 through C6 STIR images only although at of abundance of caution, repeat examination with postcontrast MRI may be helpful to exclude underlying myelitis.     This report was flagged in Epic as abnormal.        Electronically signed by: Jerzy Rose MD  Date:                                            10/28/2021  Time:                                           06:44           Narrative & Impression    EXAMINATION:  XR CERVICAL SPINE 5 VIEW WITH FLEX AND EXT     CLINICAL HISTORY:  Headache     TECHNIQUE:  Five views of the cervical spine plus flexion and extension views were performed.     COMPARISON:  None 07/09/2019.     FINDINGS:  Mild DJD.  The disc spaces are narrowed between C5 and C7 vertebral segments.  Encroachment of the neural foramina identified at the C5-C6 level on the right and the C6/C7 level on the left.  There is a 2 mm C4/C5 anterolisthesis.  No fracture or dislocation.  No bone destruction identified.     Impression:     See above        Electronically signed by: Kaiser Ruiz MD  Date:                                            08/27/2020  Time:                                           14:12               Narrative & Impression     EXAMINATION:  MRI LUMBAR SPINE WITHOUT CONTRAST     CLINICAL HISTORY:  lumbar spinal stenosis; Other intervertebral disc degeneration, lumbar region     TECHNIQUE:  Multiplanar, multisequence MR images were acquired from the thoracolumbar junction to the sacrum without the administration of contrast.     COMPARISON:  Non plain film examination 01/13/2020 e.     FINDINGS:  Lumbar spine alignment is within normal limits. The vertebral body heights are well  maintained, with no fracture.  No marrow signal abnormality suspicious for an infiltrative process.     The conus is normal in appearance, and terminates at the L1-L2 level.  The adjacent soft tissue structures show no significant abnormalities.     L1-L2: There is no focal disc herniation. No significant central canal or neural foraminal narrowing.     L2-L3: There is no focal disc herniation. No significant central canal or neural foraminal narrowing.     L3-L4: There is no focal disc herniation. No significant central canal or neural foraminal narrowing.     L4-L5: There is no focal disc herniation. No significant central canal or neural foraminal narrowing.     L5-S1: There is no focal disc herniation. No significant central canal or neural foraminal narrowing.     Impression:     No significant central canal stenosis, focal protrusion of disc material or neural foraminal encroachment.        Electronically signed by: Jerzy Rose MD  Date:                                            07/14/2020  Time:                                           14:15         Narrative & Impression    EXAMINATION:  XR LUMBAR SPINE AP AND LAT WITH FLEX/EXT     CLINICAL HISTORY:  Other intervertebral disc degeneration, lumbar region     TECHNIQUE:  AP and lateral views as well as lateral flexion and extension images are performed through the lumbar spine.     COMPARISON:  None     FINDINGS:  Mild DJD and lumbar scoliosis.  No significant disc space narrowing identified.  No fracture, spondylolisthesis or bone destruction noted.  Spina bifida occulta involving the S1 vertebral segment noted.     Impression:     See above        Electronically signed by: Kaiser Ruiz MD  Date:                                            01/13/2020  Time:                                           09:22    Encounter    View Encounter                     Narrative     No significant alignment abnormality.  Vertebral body heights are normally maintained,  without compression deformity at any level.  There is disc narrowing at C5-6 and C6-7, and each of these levels demonstrates posterior marginal vertebral endplate   spurring and accompanying disc bulging.  The spurring is more pronounced to the right of midline at C5-6 and to the left of midline at C6-7.  All other cervical and visualized upper thoracic levels have normal disc contour, and there is no evidence of a   significant focal disc herniation at any level.  AP diameter of the spinal canal is normally maintained at all levels with no canal stenosis/extrinsic cord compression observed.  The spinal cord is well delineated from the cervicomedullary junction to   the T3 level, and appears normal in size and configuration without focal enlargement or irregularity.  No Chiari malformation or cord cyst or syrinx.  No abnormal signal from the substance of the cord on any of the pulse sequences generated.  There is   some prominent neural foraminal stenosis at C5-6 on the right and C6-7 on the left.  No significant signal abnormality referable to the osseous structures.   Impression      Cervical spine MRI examination demonstrates degenerative changes as discussed above involving the C5-6 and C6-7 levels, with right-sided foraminal stenosis at C5-6 and left-sided foraminal stenosis at C6-7.  No evidence of a significant focal soft is   herniation, canal stenosis/extrinsic cord compression, or intramedullary cord lesion seen at any level.      Electronically signed by: Kaiser Preston MD  Date: 08/01/13  Time: 06:40          Assessment:       Encounter Diagnoses   Name Primary?    DDD (degenerative disc disease), lumbar Yes    Lumbar radiculopathy     Dorsalgia, unspecified     Lumbar spondylosis        Plan:       Naomy was seen today for follow-up.    Diagnoses and all orders for this visit:    DDD (degenerative disc disease), lumbar  -     Ambulatory referral/consult to Neurosurgery; Future    Lumbar radiculopathy  -      Ambulatory referral/consult to Neurosurgery; Future    Dorsalgia, unspecified    Lumbar spondylosis            Naomy Armstrong is a 59 y.o. female with acute left-sided low back and lower extremity pain.  Concerning for lumbar radiculopathy.  Does have some left ankle dorsiflexion weakness.  Lumbar MRI showing multilevel degenerative changes.  Most notable at L5-S1 with left sided joint arthropathy with a large 9 mm synovial cyst.  Severe left-sided foraminal stenosis at that level as well.  Advanced facet degeneration at the L5-S1 level.    Prior records reviewed.  Pertinent imaging studies reviewed by me. Imaging results were discussed with patient.  Referred to Neurosurgery for further evaluation.  We did discuss potentially performing a left L5-S1 facet joint injection with attempted cyst rupture with a left S1 transforaminal epidural steroid injection.  However given severity of foraminal stenosis at this level it is unclear how effective the cyst rupture would be.  Pending further evaluation by Neurosurgery we may consider proceeding with this option.  We did discuss formal physical therapy for ROM, strengthening, stretching and home exercise program.  Given severity of patient's pain likely unable to tolerate this time.  Start Norco 5-325 mg q.8 hours p.r.n..  One-week supply of 21 pills provided today.  Advised patient we will call next week to discuss efficacy before providing additional refills.  Discussed with patient that this medication is not intended to be provided chronically although okay to continue while we seek out more definitive means.  Return to clinic in 1 month or sooner if needed.  At that time we will discuss neurosurgical recommendations.    Neal Stephenson PA-C  [facility]  Interventional Pain Management   06/03/2024    This note was created by combination of typed  and M-Modal dictation.  Transcription and phonetic errors may be present.  If  there are any questions, please contact me.

## 2024-06-04 ENCOUNTER — PATIENT MESSAGE (OUTPATIENT)
Dept: FAMILY MEDICINE | Facility: CLINIC | Age: 59
End: 2024-06-04

## 2024-06-04 ENCOUNTER — OFFICE VISIT (OUTPATIENT)
Dept: FAMILY MEDICINE | Facility: CLINIC | Age: 59
End: 2024-06-04
Payer: COMMERCIAL

## 2024-06-04 DIAGNOSIS — Z01.419 ENCOUNTER FOR GYNECOLOGICAL EXAMINATION: ICD-10-CM

## 2024-06-04 DIAGNOSIS — R30.0 DYSURIA: Primary | ICD-10-CM

## 2024-06-04 PROCEDURE — 3044F HG A1C LEVEL LT 7.0%: CPT | Mod: CPTII,95,, | Performed by: FAMILY MEDICINE

## 2024-06-04 PROCEDURE — 4010F ACE/ARB THERAPY RXD/TAKEN: CPT | Mod: CPTII,95,, | Performed by: FAMILY MEDICINE

## 2024-06-04 PROCEDURE — 99213 OFFICE O/P EST LOW 20 MIN: CPT | Mod: 95,,, | Performed by: FAMILY MEDICINE

## 2024-06-04 RX ORDER — PHENAZOPYRIDINE HYDROCHLORIDE 200 MG/1
200 TABLET, FILM COATED ORAL
Qty: 6 TABLET | Refills: 0 | Status: SHIPPED | OUTPATIENT
Start: 2024-06-04 | End: 2024-06-06

## 2024-06-04 NOTE — PROGRESS NOTES
Patient Name: Naomy Armstrong    : 1965  MRN: [id number]    The patient location is:  [place], [place]  The chief complaint leading to consultation is: UTI    Visit type: audiovisual    Face to Face time with patient: 8 minutes  9 minutes of total time spent on the encounter, which includes face to face time and non-face to face time preparing to see the patient (eg, review of tests), Obtaining and/or reviewing separately obtained history, Documenting clinical information in the electronic or other health record, Independently interpreting results (not separately reported) and communicating results to the patient/family/caregiver, or Care coordination (not separately reported).         Each patient to whom he or she provides medical services by telemedicine is:  (1) informed of the relationship between the physician and patient and the respective role of any other health care provider with respect to management of the patient; and (2) notified that he or she may decline to receive medical services by telemedicine and may withdraw from such care at any time.    Notes:     Subjective:     Patient ID: Naomy is a 59 y.o. female    Chief Complaint:  Urinary Tract Infection    History of Present Illness  The patient is a 58-year-old female who presents via virtual visit for evaluation of UTI.    The patient suspects a recurrent urinary tract infection (UTI). She has been self-medicating with nitrofurantoin, which has partially managed her symptoms, however, she continues to experience dysuria and pressure. She also reports abdominal discomfort, localized in the bladder region, but denies any febrile episodes. She has not consulted a gynecologist for an extended period. She has a few remaining doses of Pyridium, which she reports as beneficial.    Supplemental Information  She had an appointment yesterday and her blood pressure was 114/68.      Review of Systems   Constitutional:  Negative for chills  and fever.   Gastrointestinal:  Negative for abdominal pain.   Genitourinary:  Positive for dysuria, frequency and urgency. Negative for flank pain and hematuria.        Objective:   LMP 09/15/2013     Physical Exam    Physical Exam  Constitutional:       General: She is not in acute distress.  Pulmonary:      Effort: Pulmonary effort is normal.   Neurological:      Mental Status: She is alert.            Assessment        ICD-10-CM ICD-9-CM   1. Dysuria  R30.0 788.1   2. Encounter for gynecological examination  Z01.419 V72.31         Plan:     Assessment & Plan  1. Urinary tract infection.  A urine sample will be collected for further analysis. A referral to a urologist and a gynecologist will be made. A prescription for Pyridium will be provided, with instructions to take one tablet every 8 hours as needed for the ensuing 2 days.     ORDERS  1. Dysuria  -     Urine culture; Future; Expected date: 06/04/2024  -     Urinalysis; Future; Expected date: 06/04/2024  -     phenazopyridine (PYRIDIUM) 200 MG tablet; Take 1 tablet (200 mg total) by mouth 3 (three) times daily with meals. for 2 days  Dispense: 6 tablet; Refill: 0  -     Ambulatory referral/consult to Gynecology; Future; Expected date: 06/11/2024    2. Encounter for gynecological examination  -     Ambulatory referral/consult to Gynecology; Future; Expected date: 06/11/2024             -Eric Hernandes Jr., MD, AAHIVS      This note was generated with the assistance of ambient listening technology. Verbal consent was obtained by the patient and accompanying visitor(s) for the recording of patient appointment to facilitate this note. I attest to having reviewed and edited the generated note for accuracy, though some syntax or spelling errors may persist. Please contact the author of this note for any clarification.          There are no Patient Instructions on file for this visit.    Follow Up  No follow-ups on file.    Future Appointments   Date Time Provider  [facility]   6/7/2024 10:30 AM Fab Shepherd MD [facility] GASTRO [address]   6/10/2024  8:30 AM Jessica Shay [place] [facility] [place] [facility]   7/1/2024  9:20 AM Neal Stephenson PA-C [facility] [place] [facility]   7/10/2024  9:00 AM Christian King MD [facility]UR [facility]   10/2/2024  1:00 PM Christine Brunson MD [facility] PSYCH [address] [facility]   10/22/2024  8:00 AM LAB, [facility] DRAW STATION [facility] LAB [facility]   10/29/2024 10:40 AM Eric Hernandes Jr., MD [facility]

## 2024-06-05 ENCOUNTER — OFFICE VISIT (OUTPATIENT)
Dept: NEUROSURGERY | Facility: CLINIC | Age: 59
End: 2024-06-05
Payer: COMMERCIAL

## 2024-06-05 ENCOUNTER — HOSPITAL ENCOUNTER (OUTPATIENT)
Dept: RADIOLOGY | Facility: HOSPITAL | Age: 59
Discharge: HOME | End: 2024-06-05
Attending: PHYSICIAN ASSISTANT
Payer: COMMERCIAL

## 2024-06-05 VITALS
HEIGHT: 65 IN | BODY MASS INDEX: 29.31 KG/M2 | HEART RATE: 84 BPM | OXYGEN SATURATION: 100 % | WEIGHT: 175.94 LBS | SYSTOLIC BLOOD PRESSURE: 131 MMHG | DIASTOLIC BLOOD PRESSURE: 77 MMHG

## 2024-06-05 DIAGNOSIS — M54.16 LUMBAR RADICULOPATHY: ICD-10-CM

## 2024-06-05 DIAGNOSIS — M51.36 DDD (DEGENERATIVE DISC DISEASE), LUMBAR: ICD-10-CM

## 2024-06-05 PROBLEM — F60.7 DEPENDENT PERSONALITY DISORDER: Status: ACTIVE | Noted: 2018-02-09

## 2024-06-05 PROBLEM — R41.3 MEMORY IMPAIRMENT: Status: ACTIVE | Noted: 2023-12-05

## 2024-06-05 PROCEDURE — 4010F ACE/ARB THERAPY RXD/TAKEN: CPT | Mod: CPTII,S$GLB,, | Performed by: PHYSICIAN ASSISTANT

## 2024-06-05 PROCEDURE — 1160F RVW MEDS BY RX/DR IN RCRD: CPT | Mod: CPTII,S$GLB,, | Performed by: PHYSICIAN ASSISTANT

## 2024-06-05 PROCEDURE — 3008F BODY MASS INDEX DOCD: CPT | Mod: CPTII,S$GLB,, | Performed by: PHYSICIAN ASSISTANT

## 2024-06-05 PROCEDURE — 3078F DIAST BP <80 MM HG: CPT | Mod: CPTII,S$GLB,, | Performed by: PHYSICIAN ASSISTANT

## 2024-06-05 PROCEDURE — 1159F MED LIST DOCD IN RCRD: CPT | Mod: CPTII,S$GLB,, | Performed by: PHYSICIAN ASSISTANT

## 2024-06-05 PROCEDURE — 3075F SYST BP GE 130 - 139MM HG: CPT | Mod: CPTII,S$GLB,, | Performed by: PHYSICIAN ASSISTANT

## 2024-06-05 PROCEDURE — 3044F HG A1C LEVEL LT 7.0%: CPT | Mod: CPTII,S$GLB,, | Performed by: PHYSICIAN ASSISTANT

## 2024-06-05 PROCEDURE — 72114 X-RAY EXAM L-S SPINE BENDING: CPT | Mod: 26,,, | Performed by: RADIOLOGY

## 2024-06-05 PROCEDURE — 72114 X-RAY EXAM L-S SPINE BENDING: CPT | Mod: TC,FY

## 2024-06-05 PROCEDURE — 99205 OFFICE O/P NEW HI 60 MIN: CPT | Mod: S$GLB,,, | Performed by: PHYSICIAN ASSISTANT

## 2024-06-05 NOTE — Clinical Note
Theresa De Jesus,   I think we should try the cyst rupture for Ms. Armstrong before considering surgery. If Dr. Powell feels strongly against it, I can have her FU with Dr. King sooner, just let me know. Also, I would try a medrol dose pack unless you expect the procedure to be some time next week.   Thanks,  Magdalena

## 2024-06-05 NOTE — PROGRESS NOTES
[facility]  Neurosurgery    SUBJECTIVE:     History of Present Illness:  Naomy Armstrong is a 59 y.o. female with below listed PMH who presents with left lumbar radiculopathy with severe left foraminal stenosis at L5-S1 with a prominent synovial cyst displacing the left L5 nerve root.     Symptom onset: pain began about two months ago but seemed to worsen two weeks ago.   Location: low back pain is primary complaint at this time. Previously had leg pain but this resolved with knee injections. Back pain seemed to worsen after knee injections   Pain level: 7/10 today in the low back   Inciting factors: pain is near constant. Walking, changing positions, lying down can all increase pain   Relieving factors: none noted   Numbness: present in the left lateral leg   Weakness: Denies  Denies b/b dysfunction and saddle anesthesia    Denies neck or arm complaints     Treatments tried:  -PT: has not tried  -EMMA: has not tried for this episode   -Taking Lyrica nightly   -Muscle relaxer: flexeril   -Rx pain medications: no narcotic pain medication on ; prescribed Norco this month but denied by insurance   -Other: mobic,   -Spine surgery: never     Blood thinners: asa intermittently for pain     (Not in a hospital admission)      Review of patient's allergies indicates:   Allergen Reactions    Effexor [venlafaxine]      Elevated her blood pressure    Zoloft [sertraline]     Paroxetine hcl      Made her feel drunk       Past Medical History:   Diagnosis Date    Alcohol abuse     per chart, but patient denies today and cocaine per chart    Anxiety     Colon polyp     Depression     Hallucination     last couple of months started seeing someone standing in her room, saw boyfriend's reflection in glass    Headache     History of psychiatric hospitalization     age 13 [facility] for 2 months for acting out; 2018 for SI    Hx of psychiatric care     Hypertension     Psychiatric problem     Sleep difficulties      Suicide attempt     with knife by cutting stomach    Therapy      Past Surgical History:   Procedure Laterality Date    COLONOSCOPY N/A 05/07/2021    Procedure: COLONOSCOPY;  Surgeon: Prem Montana MD;  Location: [facility];  Service: Endoscopy;  Laterality: N/A;  COVID screening 5/4 LAPC-instr portal -ml    COLONOSCOPY N/A 06/06/2022    Procedure: COLONOSCOPY;  Surgeon: Fab Shepherd MD;  [facility]  Service: Endoscopy;  Laterality: N/A;  C-Diff neg  Rapid COVID    EPIDURAL STEROID INJECTION Bilateral 08/05/2020    Procedure: Bilateral MBB @ L3, L4, L5;  Surgeon: Jeremy Powell Jr., MD;  Location: [facility];  Service: Pain Management;  Laterality: Bilateral;  Bilat L3-5 MBB  Arrive @ 1315; No ATC or DM; Needs MD signature    EPIDURAL STEROID INJECTION Right 08/19/2020    Procedure: Lumbar Radiofrequency Thermocoagulation of Medial Branches;  Surgeon: Jeremy Powell Jr., MD;  Location: [facility];  Service: Pain Management;  Laterality: Right;  Right L3-5 RFA  Arrive @ 1045; No ATC or DM; Needs MD Signature    EPIDURAL STEROID INJECTION Left 09/04/2020    Procedure: Lumbar Radiofrequency Thermocoagulation of Medial Branches;  Surgeon: Jeremy Powell Jr., MD;  Location: [facility];  Service: Pain Management;  Laterality: Left;  Left L3-5 RFA  Arrive @ 0900 (requested); No ATC or DM; Needs MD signature    EPIDURAL STEROID INJECTION Bilateral 04/16/2021    Procedure: Sacroiliac Joint Steroid Injections @ Bilateral;  Surgeon: Jeremy Powell Jr., MD;  Location: [facility];  Service: Pain Management;  Laterality: Bilateral;  Arrive @ 1300; No ATC or DM    EPIDURAL STEROID INJECTION Bilateral 01/19/2022    Procedure: Bilateral Sacroiliac Joint Injections;  Surgeon: Jeremy Powell Jr., MD;  Location: [facility];  Service: Pain Management;  Laterality: Bilateral;  Arrive @ 1315; No ATC or DM; Needs Consent    EPIDURAL STEROID INJECTION Bilateral 07/28/2023    Procedure:  Bilateral L2, L3, L4 Medial Branch Blocks #1;  Surgeon: Jeremy Powell Jr., MD;  Location: [facility];  Service: Pain Management;  Laterality: Bilateral;  @1200  No ATC or DM    EPIDURAL STEROID INJECTION Bilateral 2023    Procedure: Bilateral L2, L3, L4 Medial Branch Blocks #2;  Surgeon: Jeremy Powell Jr., MD;  Location: [facility];  Service: Pain Management;  Laterality: Bilateral;  @1330  No ATC or DM    ESOPHAGEAL MANOMETRY WITH MEASUREMENT OF IMPEDANCE N/A 10/20/2021    Procedure: MANOMETRY, ESOPHAGUS, WITH IMPEDANCE MEASUREMENT;  Surgeon: Anastacia Odonnell MD;  Location: [facility] (4TH FLR);  Service: Endoscopy;  Laterality: N/A;  Covid test 10/17 [place], instructions [facility] to [id number]    ESOPHAGOGASTRODUODENOSCOPY N/A 2021    Procedure: EGD (ESOPHAGOGASTRODUODENOSCOPY);  Surgeon: Prem Montana MD;  Location: [facility];  Service: Endoscopy;  Laterality: N/A;  added on per Dr. NOEL Shepherd    EYE SURGERY  2015    Lasik    HERNIA REPAIR  2022    Hiatal hernia    HYSTERECTOMY      LAPAROSCOPIC TOUPET FUNDOPLICATION N/A 2022    Procedure: FUNDOPLICATION, LAPAROSCOPIC, TOUPET;  Surgeon: Christian Martinez MD;  Location: [facility] OR 2ND FLR;  Service: General;  Laterality: N/A;     Family History   Problem Relation Name Age of Onset    Anxiety disorder Sister Bev     Depression Sister Bev     Alcohol abuse Maternal Uncle      Alcohol abuse Maternal Grandfather       Social History     Tobacco Use    Smoking status: Former     Current packs/day: 0.00     Average packs/day: 1 pack/day for 21.3 years (21.3 ttl pk-yrs)     Types: Cigarettes     Start date: 1985     Quit date: 2006     Years since quittin.1    Smokeless tobacco: Never    Tobacco comments:     It has been a very long time. I do not remember dates   Substance Use Topics    Alcohol use: Yes     Comment: occasional    Drug use: Not Currently     Types: Marijuana     Comment: tried at age 14     "    Review of Systems:  As noted in HPI    OBJECTIVE:     Vital Signs (Most Recent):  Vitals:    06/05/24 0919   BP: 131/77   Pulse: 84   SpO2: 100%   Weight: 79.8 kg (175 lb 14.8 oz)   Height: 5' 5" (1.651 m)   PainSc:   7   PainLoc: Back         Physical Exam:  General: well developed, well nourished, no distress  Head: normocephalic, atraumatic  Neurologic: Alert and oriented. Thought content appropriate  GCS: Motor: 6/Verbal: 5/Eyes: 4 GCS Total: 15  Language: No aphasia  Speech: No dysarthria  Cranial nerves: face symmetric, tongue midline, CN II-XII grossly intact.   Eyes: pupils equal, round, reactive to light with accommodation, EOMI.   Pulmonary: normal respirations, not labored, no accessory muscles used    Sensory: intact to light touch throughout; no altered sensation to lateral leg or great toe webspace. Notes numbness to medial left thigh, medial left foot, and lateral left foot.     Motor Strength: Moves all extremities spontaneously with good tone.  Full strength upper and lower extremities. No abnormal movements seen.     Strength      Hand    Upper: R     5/5    L     5/5     Iliopsoas Quadriceps Tibialis  anterior Gastro- cnemius EHL   Lower: R 5/5 5/5 5/5 5/5 5/5    L 5/5 5/5 5/5 5/5 5/5     DTR's - 2 + and symmetric patellar  Matos: absent  Clonus: absent    Gait: normal    Lumbar ROM: full   SI Joint tenderness: Negative   Pain on Hip ROM: Negative  Midline Bony Tenderness: negative       Diagnostic Results:  I have personally reviewed imaging and agree with the findings.     MRI lumbar spine, 5/31/24:  Lumbarization of S1  At what is labeled L5-S1 there is marked left facet hypertrophy resulting in severe left foraminal stenosis. In addition a 9 mm apparently cystic lesion near the subarticular zone but protruding into the opening of the neural foramen is thought to most likely represent a synovial cyst with extruded disc fragment and schwannoma thought less likely.   No advanced central " or left lateral recess stenosis is suspected with the main impingement likely being of the exiting left nerve root.       Upright lumbar spine xray with flex/ext views, 6/5/24:  Mild grade 1 anterolisthesis at L5-S1 with no translational instability       ASSESSMENT/PLAN:     Naomy Armstrong is a 59 y.o. female who presents with acute L5 radiculopathy.  Imaging indicates severe left foraminal stenosis at L5-S1 along with a prominent synovial cyst at the same level.  Her primary complaint is of back pain.  Leg pain resolved after knee injections, though subjective numbness in an L5 distribution remains.  On exam, the patient is neurologically intact with the exception of some numbness in her left foot which does not correlate with the L5 dermatome.    I believe the patient would benefit from a trial synovial cyst rupture with Dr. Powell and PT. if these are not successful, we can consider surgical intervention such as a laminectomy with synovial cyst resection.  However, full decompression of the foramen at L5-S1 may require a lumbar fusion.     May benefit from medrol dose pack but will defer to pain mgmt given possible upcoming injection     FU with Dr. King in 1 month      Please feel free to call with any further questions      Time spent on this encounter: 63 minutes. This includes face-to-face time and non-face to face time preparing to see the patient (eg, review of tests), obtaining and/or reviewing separately obtained history, documenting clinical information in the electronic or other health record, independently interpreting results and communicating results to the patient/family/caregiver, or care coordinator.      Kimmie Adair PA-C  [facility]  Department of Neurosurgery  [phone]    Disclaimer: This note was dictated by speech recognition. Minor errors in transcription may be present.  Please call with any questions.

## 2024-06-05 NOTE — LETTER
June 5, 2024      [facility] - Neurosurgery  [address]   [place] [address]  Phone: [phone]  Fax: [phone]       Patient: Naomy Armstrong   YOB: 1965  Date of Visit: 06/05/2024    To Whom It May Concern:    Ronald Armstrong  was at [facility] on 06/05/2024. Please excuse from any work missed. The patient may return to work on 6/6/24. If you have any questions or concerns, or if I can be of further assistance, please do not hesitate to contact me.    Sincerely,    Kimmie Adair PA-C

## 2024-06-06 ENCOUNTER — OFFICE VISIT (OUTPATIENT)
Dept: FAMILY MEDICINE | Facility: CLINIC | Age: 59
End: 2024-06-06
Payer: COMMERCIAL

## 2024-06-06 ENCOUNTER — TELEPHONE (OUTPATIENT)
Dept: FAMILY MEDICINE | Facility: CLINIC | Age: 59
End: 2024-06-06
Payer: COMMERCIAL

## 2024-06-06 ENCOUNTER — LAB VISIT (OUTPATIENT)
Dept: LAB | Facility: HOSPITAL | Age: 59
End: 2024-06-06
Attending: FAMILY MEDICINE
Payer: COMMERCIAL

## 2024-06-06 DIAGNOSIS — R71.8 OTHER ABNORMALITY OF RED BLOOD CELLS: ICD-10-CM

## 2024-06-06 DIAGNOSIS — E53.8 LOW SERUM VITAMIN B12: ICD-10-CM

## 2024-06-06 DIAGNOSIS — I10 HYPERTENSION, ESSENTIAL: Chronic | ICD-10-CM

## 2024-06-06 DIAGNOSIS — E87.5 HYPERKALEMIA: ICD-10-CM

## 2024-06-06 DIAGNOSIS — M62.830 MUSCLE SPASM OF BACK: Chronic | ICD-10-CM

## 2024-06-06 DIAGNOSIS — M47.816 LUMBAR SPONDYLOSIS: Chronic | ICD-10-CM

## 2024-06-06 DIAGNOSIS — N39.0 URINARY TRACT INFECTION DUE TO PROTEUS: Primary | ICD-10-CM

## 2024-06-06 DIAGNOSIS — B96.4 URINARY TRACT INFECTION DUE TO PROTEUS: Primary | ICD-10-CM

## 2024-06-06 DIAGNOSIS — M50.30 DDD (DEGENERATIVE DISC DISEASE), CERVICAL: Chronic | ICD-10-CM

## 2024-06-06 LAB
ALBUMIN SERPL BCP-MCNC: 3.7 G/DL (ref 3.5–5.2)
ALP SERPL-CCNC: 122 U/L (ref 55–135)
ALT SERPL W/O P-5'-P-CCNC: 25 U/L (ref 10–44)
ANION GAP SERPL CALC-SCNC: 9 MMOL/L (ref 8–16)
AST SERPL-CCNC: 13 U/L (ref 10–40)
BILIRUB SERPL-MCNC: 0.2 MG/DL (ref 0.1–1)
BUN SERPL-MCNC: 20 MG/DL (ref 8–23)
CALCIUM SERPL-MCNC: 10.2 MG/DL (ref 8.7–10.5)
CHLORIDE SERPL-SCNC: 105 MMOL/L (ref 95–110)
CO2 SERPL-SCNC: 26 MMOL/L (ref 23–29)
CREAT SERPL-MCNC: 1 MG/DL (ref 0.5–1.4)
EST. GFR  (NO RACE VARIABLE): >60 ML/MIN/1.73 M^2
FERRITIN SERPL-MCNC: 58 NG/ML (ref 20–300)
GLUCOSE SERPL-MCNC: 73 MG/DL (ref 70–110)
IRON SERPL-MCNC: 127 UG/DL (ref 30–160)
POTASSIUM SERPL-SCNC: 4.7 MMOL/L (ref 3.5–5.1)
PROT SERPL-MCNC: 6.9 G/DL (ref 6–8.4)
SATURATED IRON: 26 % (ref 20–50)
SODIUM SERPL-SCNC: 140 MMOL/L (ref 136–145)
TOTAL IRON BINDING CAPACITY: 480 UG/DL (ref 250–450)
TRANSFERRIN SERPL-MCNC: 324 MG/DL (ref 200–375)

## 2024-06-06 PROCEDURE — 99214 OFFICE O/P EST MOD 30 MIN: CPT | Mod: 95,25,, | Performed by: FAMILY MEDICINE

## 2024-06-06 PROCEDURE — 36415 COLL VENOUS BLD VENIPUNCTURE: CPT | Mod: PN | Performed by: FAMILY MEDICINE

## 2024-06-06 PROCEDURE — 83540 ASSAY OF IRON: CPT | Performed by: FAMILY MEDICINE

## 2024-06-06 PROCEDURE — 82728 ASSAY OF FERRITIN: CPT | Performed by: FAMILY MEDICINE

## 2024-06-06 PROCEDURE — 96372 THER/PROPH/DIAG INJ SC/IM: CPT | Mod: NDTC,,, | Performed by: FAMILY MEDICINE

## 2024-06-06 PROCEDURE — 4010F ACE/ARB THERAPY RXD/TAKEN: CPT | Mod: CPTII,95,, | Performed by: FAMILY MEDICINE

## 2024-06-06 PROCEDURE — 80053 COMPREHEN METABOLIC PANEL: CPT | Performed by: FAMILY MEDICINE

## 2024-06-06 PROCEDURE — 3044F HG A1C LEVEL LT 7.0%: CPT | Mod: CPTII,95,, | Performed by: FAMILY MEDICINE

## 2024-06-06 RX ORDER — SULFAMETHOXAZOLE AND TRIMETHOPRIM 800; 160 MG/1; MG/1
1 TABLET ORAL 2 TIMES DAILY
Qty: 10 TABLET | Refills: 0 | Status: SHIPPED | OUTPATIENT
Start: 2024-06-06 | End: 2024-06-11

## 2024-06-06 RX ORDER — CYANOCOBALAMIN 1000 UG/ML
1000 INJECTION, SOLUTION INTRAMUSCULAR; SUBCUTANEOUS WEEKLY
Status: SHIPPED | OUTPATIENT
Start: 2024-06-06 | End: 2024-07-04

## 2024-06-06 RX ADMIN — CYANOCOBALAMIN 1000 MCG: 1000 INJECTION, SOLUTION INTRAMUSCULAR; SUBCUTANEOUS at 02:06

## 2024-06-06 NOTE — PROGRESS NOTES
Patient Name: Naomy Armstrong    : 1965  MRN: [id number]    The patient location is: [place], [place]  The chief complaint leading to consultation is: UTI    Visit type: audiovisual    Face to Face time with patient: 23 minutes  24 minutes of total time spent on the encounter, which includes face to face time and non-face to face time preparing to see the patient (eg, review of tests), Obtaining and/or reviewing separately obtained history, Documenting clinical information in the electronic or other health record, Independently interpreting results (not separately reported) and communicating results to the patient/family/caregiver, or Care coordination (not separately reported).         Each patient to whom he or she provides medical services by telemedicine is:  (1) informed of the relationship between the physician and patient and the respective role of any other health care provider with respect to management of the patient; and (2) notified that he or she may decline to receive medical services by telemedicine and may withdraw from such care at any time.    Notes:     Subjective:     Patient ID: Naomy is a 59 y.o. female    Chief Complaint:  Urinary Tract Infection    History of Present Illness  The patient is a 59-year-old female who presents via virtual visit for evaluation of UTI.  She reports discomfort and burning with urination.  She denies any blood in the urine.  She denies any rash in the genital area.  She denies any discharge.    Patient also follow-up on previous lab tests.  This showed low B12 as well as red blood cell abnormality     She is allergic to EFFEXOR, ZOLOFT, and PAROXETINE.      Review of Systems   Constitutional:  Positive for fatigue. Negative for chills and fever.   Respiratory:  Negative for shortness of breath.    Cardiovascular:  Negative for chest pain and palpitations.   Gastrointestinal:  Negative for abdominal pain.   Genitourinary:  Positive for dysuria,  frequency and urgency. Negative for flank pain and hematuria.        Objective:   LMP 09/15/2013     Physical Exam    Physical Exam  Pulmonary:      Effort: Pulmonary effort is normal.   Neurological:      Mental Status: She is alert.   Psychiatric:         Mood and Affect: Mood normal.         Behavior: Behavior normal.       Component Date Value Ref Range Status    Urine Culture, Routine 06/05/2024  (A)   Final                    Value:PROTEUS MIRABILIS  10,000 - 49,999 cfu/ml      Specimen UA 06/05/2024 Urine, Clean Catch   Final    Color, UA 06/05/2024 Yellow  Yellow, Straw, Anna Final    Appearance, UA 06/05/2024 Clear  Clear Final    pH, UA 06/05/2024 7.0  5.0 - 8.0 Final    Specific Gravity, UA 06/05/2024 1.010  1.005 - 1.030 Final    Protein, UA 06/05/2024 Negative  Negative Final    Comment: Recommend a 24 hour urine protein or a urine   protein/creatinine ratio if globulin induced proteinuria is  clinically suspected.      Glucose, UA 06/05/2024 Negative  Negative Final    Ketones, UA 06/05/2024 Negative  Negative Final    Bilirubin (UA) 06/05/2024 Negative  Negative Final    Occult Blood UA 06/05/2024 Negative  Negative Final    Nitrite, UA 06/05/2024 Negative  Negative Final    Urobilinogen, UA 06/05/2024 Negative  <2.0 EU/dL Final    Leukocytes, UA 06/05/2024 Negative  Negative Final         Assessment        ICD-10-CM ICD-9-CM   1. Urinary tract infection due to Proteus  N39.0 599.0    B96.4 041.6   2. Low serum vitamin B12  E53.8 266.2   3. Other abnormality of red blood cells  R71.8 790.09   4. Hyperkalemia  E87.5 276.7   5. Hypertension, essential  I10 401.9   6. Muscle spasm of back  M62.830 724.8   7. DDD (degenerative disc disease), cervical  M50.30 722.4   8. Lumbar spondylosis  M47.816 721.3         Plan:     Assessment & Plan  1. Urinary tract infection.  The patient's urine analysis indicates a growth of Proteus, albeit less than 50,000, which does not meet the criteria for a UTI. The  presence of Proteus in the urine, a common occurrence during sexual intercourse, is still present. Empirically, an antibiotic regimen will be initiated. Bactrim  will be initiated .    2. Lab abnormalities  Will schedule B12 for four weeks.  Recheck B12 level in six weeks.  Recheck potassium, iron, and ferritin level  Will recheck B12    3. Hypertension  Continue current regimen    4. Chronic Pain  Continue Lyrica as prescribed     ORDERS  1. Urinary tract infection due to Proteus  -     sulfamethoxazole-trimethoprim 800-160mg (BACTRIM DS) 800-160 mg Tab; Take 1 tablet by mouth 2 (two) times daily. for 5 days  Dispense: 10 tablet; Refill: 0    2. Low serum vitamin B12  -     cyanocobalamin injection 1,000 mcg  -     VITAMIN B12; Future; Expected date: 07/11/2024    3. Other abnormality of red blood cells  -     Iron and TIBC; Future; Expected date: 06/06/2024  -     Ferritin; Future; Expected date: 06/06/2024    4. Hyperkalemia  -     Comprehensive Metabolic Panel; Future; Expected date: 06/06/2024    5. Hypertension, essential    6. Muscle spasm of back    7. DDD (degenerative disc disease), cervical    8. Lumbar spondylosis             -Eric Hernandes Jr., MD, AAHIVS      This note was generated with the assistance of ambient listening technology. Verbal consent was obtained by the patient and accompanying visitor(s) for the recording of patient appointment to facilitate this note. I attest to having reviewed and edited the generated note for accuracy, though some syntax or spelling errors may persist. Please contact the author of this note for any clarification.          There are no Patient Instructions on file for this visit.    Follow Up  No follow-ups on file.    Future Appointments   Date Time Provider Department Center   6/26/2024 10:20 AM NURSE, [facility] MED/INT MED [facility]   7/1/2024  9:20 AM Neal Stephenson PA-C [facility]   7/1/2024 10:00 AM NURSE, [facility] MED/INT MED  [facility]   7/10/2024 10:05 AM LAB, [facility] LAB [facility]   10/2/2024  1:00 PM Christine Brunson MD [facility] PSYCH [facility]   10/22/2024  8:00 AM LAB, [facility] DRAW STATION [facility] LAB [facility]   10/29/2024 10:40 AM Eric Hernandes Jr., MD [facility]

## 2024-06-06 NOTE — LETTER
June 6, 2024      [address] [address]  Phone: [phone]       Patient: Naomy Armstrong   YOB: 1965  Date of Visit: 06/06/2024    To Whom It May Concern:    Naomy Armstrong  was at [facility] on 06/04/2024 and on 06/06/2024. Please excuse from work starting on 06/04/2024. The patient may return to work on 06/10/2024 with no restrictions. If you have any questions or concerns, or if I can be of further assistance, please do not hesitate to contact me.    Sincerely,     Eric Hernandes Jr., MD  [address] [place] [address]  Dept: [phone]

## 2024-06-07 ENCOUNTER — TELEPHONE (OUTPATIENT)
Dept: PAIN MEDICINE | Facility: CLINIC | Age: 59
End: 2024-06-07
Payer: COMMERCIAL

## 2024-06-07 ENCOUNTER — OFFICE VISIT (OUTPATIENT)
Dept: GASTROENTEROLOGY | Facility: CLINIC | Age: 59
End: 2024-06-07
Payer: COMMERCIAL

## 2024-06-07 DIAGNOSIS — M47.816 LUMBAR SPONDYLOSIS: ICD-10-CM

## 2024-06-07 DIAGNOSIS — M54.16 LUMBAR RADICULOPATHY: Primary | ICD-10-CM

## 2024-06-07 DIAGNOSIS — K58.0 IRRITABLE BOWEL SYNDROME WITH DIARRHEA: Primary | ICD-10-CM

## 2024-06-07 PROCEDURE — 1159F MED LIST DOCD IN RCRD: CPT | Mod: CPTII,95,, | Performed by: INTERNAL MEDICINE

## 2024-06-07 PROCEDURE — 4010F ACE/ARB THERAPY RXD/TAKEN: CPT | Mod: CPTII,95,, | Performed by: INTERNAL MEDICINE

## 2024-06-07 PROCEDURE — 99213 OFFICE O/P EST LOW 20 MIN: CPT | Mod: 95,,, | Performed by: INTERNAL MEDICINE

## 2024-06-07 PROCEDURE — 1160F RVW MEDS BY RX/DR IN RCRD: CPT | Mod: CPTII,95,, | Performed by: INTERNAL MEDICINE

## 2024-06-07 PROCEDURE — 3044F HG A1C LEVEL LT 7.0%: CPT | Mod: CPTII,95,, | Performed by: INTERNAL MEDICINE

## 2024-06-07 RX ORDER — ALOSETRON HYDROCHLORIDE 0.5 MG/1
0.5 TABLET ORAL 2 TIMES DAILY
Qty: 180 TABLET | Refills: 3 | Status: SHIPPED | OUTPATIENT
Start: 2024-06-07 | End: 2025-06-07

## 2024-06-07 NOTE — PROGRESS NOTES
Gastroenterology Telemedicine Virtual Visit    The patient location is:  Patient Home  The chief complaint leading to consultation is:  Follow-up IBS with diarrhea  Visit type: Virtual visit with synchronous audio and video      Narrative:  59 y.o. female here on a telemedicine visit for follow-up of IBS with diarrhea.  I last saw her in November 2022 for the same.  At our last visit I started her on alosetron.  She has been doing very well on this medication over the last year and a half.  She initially started with 0.5 mg once daily, but titrated up to 0.5 mg twice daily.  Twice daily dosing significantly improved her symptoms.  She intermittently uses Bentyl for abdominal pain.  She no longer uses Imodium or Lomotil.  She denies any episodes of constipation.  She occasionally gets nausea that is worse with anxiety.  She uses promethazine about once a month.  She is using Prilosec as needed now, which is usually about once a month.  She has no other new symptoms or complaints.          Assessment:  1. Irritable bowel syndrome with diarrhea      We had been unable to obtain rifaximin or Viberzi.  She was not able to tolerate cholestyramine.  Has some response to Imodium and Lomotil, but requiring large doses.  She has had significant improvement with use of Lotronex 0.5 mg twice daily.  She has no longer requiring Lomotil or Imodium.  She still uses Bentyl as needed for abdominal pain.  She has had no adverse side effects related to alosetron.         Recommendation:  Continue current dose of alosetron.  This was refilled today.  Continue to use Bentyl as needed.    Follow-up in 1 year.      Total time spent with patient:  20 min      Each patient to whom he or she provides medical services by telemedicine is:  (1) informed of the relationship between the physician and patient and the respective role of any other health care provider with respect to management of the patient; and (2) notified that he or she may  decline to receive medical services by telemedicine and may withdraw from such care at any time.

## 2024-06-07 NOTE — TELEPHONE ENCOUNTER
LVM asking pt to contact clinic to discuss scheduling procedure per PA Neal- phone number included.

## 2024-06-07 NOTE — TELEPHONE ENCOUNTER
Ms. Samueley would like to schedule left facet L5/S1 injection with cyst rupture and left S1 TF EMMA on 6/14/24- provider updated.     Reviewed pre-procedure instructions with her, as well as provided arrival time of 12:00p for 6/14/24.  All questions answered- verbalized understanding.

## 2024-06-07 NOTE — TELEPHONE ENCOUNTER
----- Message from Neal Stephenson PA-C sent at 6/7/2024  8:27 AM CDT -----  Regarding: procedure  Can you schedule her for a left L5-S1 facet injection with attempted cyst rupture + a left S1 TF EMMA     - I did already talk to her about this procedure we were just waiting to hear from NS  - If the patient wants to talk to me let her know I can call her if she has questions about the procedure

## 2024-06-09 ENCOUNTER — PATIENT MESSAGE (OUTPATIENT)
Dept: PAIN MEDICINE | Facility: CLINIC | Age: 59
End: 2024-06-09
Payer: COMMERCIAL

## 2024-06-09 DIAGNOSIS — M51.36 DDD (DEGENERATIVE DISC DISEASE), LUMBAR: ICD-10-CM

## 2024-06-09 DIAGNOSIS — M54.16 LUMBAR RADICULOPATHY: ICD-10-CM

## 2024-06-10 ENCOUNTER — OFFICE VISIT (OUTPATIENT)
Dept: ORTHOPEDICS | Facility: CLINIC | Age: 59
End: 2024-06-10
Payer: COMMERCIAL

## 2024-06-10 DIAGNOSIS — M17.0 ARTHRITIS OF BOTH KNEES: Primary | ICD-10-CM

## 2024-06-10 PROCEDURE — 99499 UNLISTED E&M SERVICE: CPT | Mod: S$GLB,,, | Performed by: ORTHOPAEDIC SURGERY

## 2024-06-10 PROCEDURE — 99999 PR PBB SHADOW E&M-EST. PATIENT-LVL III: CPT | Mod: PBBFAC,,, | Performed by: ORTHOPAEDIC SURGERY

## 2024-06-10 PROCEDURE — 20610 DRAIN/INJ JOINT/BURSA W/O US: CPT | Mod: 50,S$GLB,, | Performed by: ORTHOPAEDIC SURGERY

## 2024-06-10 NOTE — PROCEDURES
Large Joint Aspiration/Injection: bilateral knee    Date/Time: 6/10/2024 8:30 AM    Performed by: Jessica Shay MD  Authorized by: Jessica Shay MD    Consent Done?:  Yes (Verbal)  Indications:  Arthritis  Timeout: prior to procedure the correct patient, procedure, and site was verified    Prep: patient was prepped and draped in usual sterile fashion      Local anesthesia used?: Yes    Local anesthetic:  Topical anesthetic    Details:  Needle Size:  22 G  Approach:  Anteromedial  Location:  Knee  Laterality:  Bilateral  Site:  Bilateral knee  Medications (Right):  20 mg sodium hyaluronate (EUFLEXXA) 10 mg/mL(mw 2.4 -3.6 million)  Medications (Left):  20 mg sodium hyaluronate (EUFLEXXA) 10 mg/mL(mw 2.4 -3.6 million)  Patient tolerance:  Patient tolerated the procedure well with no immediate complications

## 2024-06-10 NOTE — PROGRESS NOTES
Follow up visit    History of Present Illness:   aNomy Armstrong comes to the office for follow up evaluation of bilateral knee arthritis   She has failed other treatment modalities including medications,  activity modification and steroid injection. She is here today for viscosupplementation - Euflexxa #3  She reports full relief of pain    MEDICAL NECESSITY FOR VISCOSUPPLEMENTATION:  A thorough evaluation of the patient, have determined that viscosupplementation is medically necessary.  The patient has painful DJD of the knee with failure of conservative therapy including lifestyle modifications and rehabilitation exercises.  Oral analgesics/NSAIDs have not adequately controlled symptoms and there is radiographic evidence of joint space narrowing, subchondral sclerosis, and osteophyte formation - Kellgren Guanakito grade 2 or greater.     ROS: unremarkable and no change since last visit    Physical Examination:    Vitals:    06/10/24 0810   PainSc:   8   PainLoc: Hip      NAD  bilateral knees  No change in exam   No evidence of adverse effect of injection     Radiographic imaging: no new imaging    Assessment/Plan:  59 y.o. female with bilateral  knee arthritis     Injection to bilateral knee  performed, please see procedure note for details.  We discussed the risks and benefits of injection including pain, infection, bleeding, failure to improve pain, temporary increase in pain, synovitis, and damage to surrounding structures including nerves, blood vessels, tendons and muscles.   2.   Return for follow up visit:  PRN    All questions were answered in detail. The patient  verbalized the understanding of the treatment plan and is in full agreement with the treatment plan.

## 2024-06-11 ENCOUNTER — PATIENT MESSAGE (OUTPATIENT)
Dept: GASTROENTEROLOGY | Facility: CLINIC | Age: 59
End: 2024-06-11
Payer: COMMERCIAL

## 2024-06-11 RX ORDER — HYDROCODONE BITARTRATE AND ACETAMINOPHEN 5; 325 MG/1; MG/1
1 TABLET ORAL EVERY 8 HOURS PRN
Qty: 21 TABLET | Refills: 0 | Status: SHIPPED | OUTPATIENT
Start: 2024-06-12 | End: 2024-06-18 | Stop reason: SDUPTHER

## 2024-06-13 NOTE — DISCHARGE INSTRUCTIONS
Home Care Instructions Pain Management:    1. DIET:   You may resume your normal diet today.   2. BATHING:   You may shower with luke warm water.  3. DRESSING:   You may remove your bandage today.   4. ACTIVITY LEVEL:   You may resume your normal activities 24 hrs after your procedure.  5. MEDICATIONS:   You may resume your normal medications today.   6. SPECIAL INSTRUCTIONS:   No heat to the injection site for 24 hrs including, bath or shower, heating pad, moist heat, or hot tubs.    Use ice pack to injection site for any pain or discomfort.  Apply ice packs for 20 minute intervals as needed.   If you have received any sedatives by mouth today you may not drive for 12 hours.    If you have received any sedation through your IV, you may not drive for 24 hrs.     PLEASE CALL YOUR DOCTOR IF:  1. Redness or swelling around the injection site.  2. Fever of 101 degrees  3. Drainage (pus) from the injection site.  4. For any continuous bleeding (some dried blood over the incision is normal.)    FOR EMERGENCIES:   If any unusual problems or difficulties occur during clinic hours, call [phone] or [phone].       Adult Procedural Sedation Instructions    Recovery After Procedural Sedation (Adult)  You have been given medicine by vein to make you sleep during your surgery. This may have included both a pain medicine and sleeping medicine. Most of the effects have worn off. But you may still have some drowsiness for the next 6 to 8 hours.  Home care  Follow these guidelines when you get home:  For the next 8 hours, you should be watched by a responsible adult. This person should make sure your condition is not getting worse.  Don't drink any alcohol for the next 24 hours.  Don't drive, operate dangerous machinery, or make important business or personal decisions during the next 24 hours.  Note: Your healthcare provider may tell you not to take any medicine by mouth for pain or sleep in the next 4 hours. These medicines may  react with the medicines you were given in the hospital. This could cause a much stronger response than usual.  Follow-up care  Follow up with your healthcare provider if you are not alert and back to your usual level of activity within 12 hours.  When to seek medical advice  Call your healthcare provider right away if any of these occur:  Drowsiness gets worse  Weakness or dizziness gets worse  Repeated vomiting  You can't be awakened   Date Last Reviewed: 10/18/2016  © 3028-3285 [employer]. [address] [place] [address], [place] [address]. All rights reserved. This information is not intended as a substitute for professional medical care. Always follow your healthcare professional's instructions.

## 2024-06-14 ENCOUNTER — HOSPITAL ENCOUNTER (OUTPATIENT)
Facility: HOSPITAL | Age: 59
Discharge: HOME | End: 2024-06-14
Attending: PAIN MEDICINE | Admitting: PAIN MEDICINE
Payer: COMMERCIAL

## 2024-06-14 VITALS
DIASTOLIC BLOOD PRESSURE: 68 MMHG | HEART RATE: 79 BPM | OXYGEN SATURATION: 96 % | SYSTOLIC BLOOD PRESSURE: 132 MMHG | TEMPERATURE: 99 F | RESPIRATION RATE: 16 BRPM

## 2024-06-14 DIAGNOSIS — M47.816 LUMBAR SPONDYLOSIS: ICD-10-CM

## 2024-06-14 DIAGNOSIS — M54.16 LUMBAR RADICULOPATHY: Primary | ICD-10-CM

## 2024-06-14 PROCEDURE — 64483 NJX AA&/STRD TFRM EPI L/S 1: CPT | Mod: LT,,, | Performed by: PAIN MEDICINE

## 2024-06-14 PROCEDURE — 63600175 PHARM REV CODE 636 W HCPCS: Performed by: PAIN MEDICINE

## 2024-06-14 PROCEDURE — 64483 NJX AA&/STRD TFRM EPI L/S 1: CPT | Mod: LT | Performed by: PAIN MEDICINE

## 2024-06-14 PROCEDURE — 25500020 PHARM REV CODE 255: Performed by: PAIN MEDICINE

## 2024-06-14 PROCEDURE — 20612 ASPIRATE/INJ GANGLION CYST: CPT | Mod: 59,LT,, | Performed by: PAIN MEDICINE

## 2024-06-14 PROCEDURE — 25000003 PHARM REV CODE 250: Performed by: PAIN MEDICINE

## 2024-06-14 PROCEDURE — 20612 ASPIRATE/INJ GANGLION CYST: CPT | Mod: 59,LT | Performed by: PAIN MEDICINE

## 2024-06-14 RX ORDER — LIDOCAINE HYDROCHLORIDE 10 MG/ML
20 INJECTION INFILTRATION; PERINEURAL ONCE
Status: COMPLETED | OUTPATIENT
Start: 2024-06-14 | End: 2024-06-14

## 2024-06-14 RX ORDER — FENTANYL CITRATE 50 UG/ML
INJECTION, SOLUTION INTRAMUSCULAR; INTRAVENOUS
Status: DISCONTINUED
Start: 2024-06-14 | End: 2024-06-14 | Stop reason: HOSPADM

## 2024-06-14 RX ORDER — LIDOCAINE HYDROCHLORIDE 10 MG/ML
1 INJECTION, SOLUTION EPIDURAL; INFILTRATION; INTRACAUDAL; PERINEURAL ONCE
Status: COMPLETED | OUTPATIENT
Start: 2024-06-14 | End: 2024-06-14

## 2024-06-14 RX ORDER — LIDOCAINE HYDROCHLORIDE 10 MG/ML
INJECTION, SOLUTION EPIDURAL; INFILTRATION; INTRACAUDAL; PERINEURAL
Status: DISCONTINUED
Start: 2024-06-14 | End: 2024-06-14 | Stop reason: HOSPADM

## 2024-06-14 RX ORDER — LIDOCAINE HYDROCHLORIDE 10 MG/ML
INJECTION INFILTRATION; PERINEURAL
Status: DISCONTINUED
Start: 2024-06-14 | End: 2024-06-14 | Stop reason: HOSPADM

## 2024-06-14 RX ORDER — SODIUM CHLORIDE 9 MG/ML
INJECTION, SOLUTION INTRAVENOUS CONTINUOUS
Status: DISCONTINUED | OUTPATIENT
Start: 2024-06-14 | End: 2024-06-14 | Stop reason: HOSPADM

## 2024-06-14 RX ORDER — DEXAMETHASONE SODIUM PHOSPHATE 10 MG/ML
10 INJECTION INTRAMUSCULAR; INTRAVENOUS ONCE
Status: COMPLETED | OUTPATIENT
Start: 2024-06-14 | End: 2024-06-14

## 2024-06-14 RX ORDER — DEXAMETHASONE SODIUM PHOSPHATE 10 MG/ML
INJECTION INTRAMUSCULAR; INTRAVENOUS
Status: DISCONTINUED
Start: 2024-06-14 | End: 2024-06-14 | Stop reason: HOSPADM

## 2024-06-14 RX ORDER — FENTANYL CITRATE 50 UG/ML
100 INJECTION, SOLUTION INTRAMUSCULAR; INTRAVENOUS
Status: DISCONTINUED | OUTPATIENT
Start: 2024-06-14 | End: 2024-06-14 | Stop reason: HOSPADM

## 2024-06-14 RX ORDER — MIDAZOLAM HYDROCHLORIDE 2 MG/2ML
INJECTION, SOLUTION INTRAMUSCULAR; INTRAVENOUS
Status: DISCONTINUED
Start: 2024-06-14 | End: 2024-06-14 | Stop reason: HOSPADM

## 2024-06-14 RX ORDER — MIDAZOLAM HYDROCHLORIDE 2 MG/2ML
5 INJECTION, SOLUTION INTRAMUSCULAR; INTRAVENOUS
Status: DISCONTINUED | OUTPATIENT
Start: 2024-06-14 | End: 2024-06-14 | Stop reason: HOSPADM

## 2024-06-14 NOTE — DISCHARGE SUMMARY
[facility] [place]  Discharge Note  Short Stay    Procedure(s) (LRB):  Left S1 Transforaminal Epidural Steroid Injection + Left L5-S1 Facet Joint Injection/Aspiration (Left)      OUTCOME: Patient tolerated treatment/procedure well without complication and is now ready for discharge.    DISPOSITION: Home or Self Care    FINAL DIAGNOSIS:  <principal problem not specified>    FOLLOWUP: In clinic    DISCHARGE INSTRUCTIONS:  No discharge procedures on file.       Discharge Diagnosis:Lumbar radiculopathy [M54.16]  Lumbar spondylosis [M47.816]  Condition on Discharge: Stable.  Diet on Discharge: Same as before.  Activity: as per instruction sheet.  Discharge to: Home with a responsible adult.  Follow up: as per Discharge instructions

## 2024-06-14 NOTE — PLAN OF CARE
Resting calmly, PRS remains 7/10, encouraged patient to reposition for comfort, will continue to monitor.

## 2024-06-14 NOTE — OP NOTE
Lumbar transforaminal EMMA  Lumbar facet joint aspiration/injection    Time-out taken to identify patient and procedure side prior to starting the procedure.   I attest that I have reviewed the patient's home medications prior to the procedure and no contraindication have been identified. I  re-evaluated the patient after the patient was positioned for the procedure in the procedure room immediately before the procedural time-out. The vital signs are current and represent the current state of the patient which has not significantly changed since the preprocedure assessment.                              Date of Service: 06/14/2024    PCP: Eric Hernandes Jr., MD    Referring Physician:                                   PROCEDURE:   Left S1 transforaminal epidural injection under fluoroscopy  Left L5-S1 facet joint aspiration/injection    REASON FOR PROCEDURE: Lumbar Radiculopathy    PHYSICIAN: Jeremy Powell MD    ASSISTANTS:None    MEDICATIONS INJECTED:  Preservative-free dexamethasone 10mg, Xylocaine 1% MPF 3-5ml.     LOCAL ANESTHETIC INJECTED:  Xylocaine 1% 3ml per site.    SEDATION MEDICATIONS:   Versed 1 mg and fentanyl 75 mcg IV.  Conscious sedation provided by MD and monitored by RN.  Total sedation time: 29 minutes (See nurse documentation and case log for sedation time)      ESTIMATED BLOOD LOSS:  None.    COMPLICATIONS:  None.    TECHNIQUE:   Laying in a prone position, the patient was prepped and draped in the usual sterile fashion using ChloraPrep and fenestrated drape.  The area to be injected was determined under fluoroscopic guidance.  Local anesthetic was given by raising a wheal and going down to the hub of a 27-gauge 1.25 inch needle.  The 4.75 inch 25-gauge spinal needle was introduced towards the left posterior S1 foramen..  The needle was advanced into the foramen.  Omnipaque was injected to confirm appropriate placement and that there was no vascular runoff.  Lidocaine 1% PF was then injected  after applying negative pressure. The needle was then removed and Band aid was applied.    The left L5-S1 facet joint was then visualized. The area of skin superficial to the join was anesthetized with lidocaine 1% using a 27 gauge 1.5 inch needle. A 25 gauge 4.75 inch spinal needle was then advanced into the left L5-S1 facet joint. Negative pressure was applied and approximately 0.5 cc clear synovial fluid was aspirated. Then 1cc dexamethasone 10mg/ml was injected. Patient began to feel worsening radicular pain, so dexamethasone was withdrawn from the joint space. Pain improved. The needle was then removed. And Bandaid was applied.    The patient tolerated the procedure well.    PAIN BEFORE THE PROCEDURE: 7/10.    PAIN AFTER THE PROCEDURE: 8/10.    The patient was monitored after the procedure.  Patient was given post procedure and discharge instructions to follow at home.  We will see the patient back in two weeks or the patient may call to inform of status. The patient was discharged in a stable condition.

## 2024-06-18 ENCOUNTER — PATIENT MESSAGE (OUTPATIENT)
Dept: PAIN MEDICINE | Facility: CLINIC | Age: 59
End: 2024-06-18
Payer: COMMERCIAL

## 2024-06-18 ENCOUNTER — PATIENT MESSAGE (OUTPATIENT)
Dept: FAMILY MEDICINE | Facility: CLINIC | Age: 59
End: 2024-06-18
Payer: COMMERCIAL

## 2024-06-18 ENCOUNTER — OFFICE VISIT (OUTPATIENT)
Dept: NEUROSURGERY | Facility: CLINIC | Age: 59
End: 2024-06-18
Attending: STUDENT IN AN ORGANIZED HEALTH CARE EDUCATION/TRAINING PROGRAM
Payer: COMMERCIAL

## 2024-06-18 DIAGNOSIS — M54.16 LUMBAR RADICULOPATHY: ICD-10-CM

## 2024-06-18 DIAGNOSIS — M51.36 DDD (DEGENERATIVE DISC DISEASE), LUMBAR: Primary | ICD-10-CM

## 2024-06-18 DIAGNOSIS — M71.38 SYNOVIAL CYST OF LUMBAR FACET JOINT: ICD-10-CM

## 2024-06-18 DIAGNOSIS — M51.36 DDD (DEGENERATIVE DISC DISEASE), LUMBAR: ICD-10-CM

## 2024-06-18 DIAGNOSIS — M47.816 LUMBAR SPONDYLOSIS: Primary | ICD-10-CM

## 2024-06-18 PROCEDURE — 4010F ACE/ARB THERAPY RXD/TAKEN: CPT | Mod: CPTII,S$GLB,, | Performed by: STUDENT IN AN ORGANIZED HEALTH CARE EDUCATION/TRAINING PROGRAM

## 2024-06-18 PROCEDURE — 3044F HG A1C LEVEL LT 7.0%: CPT | Mod: CPTII,S$GLB,, | Performed by: STUDENT IN AN ORGANIZED HEALTH CARE EDUCATION/TRAINING PROGRAM

## 2024-06-18 PROCEDURE — 99215 OFFICE O/P EST HI 40 MIN: CPT | Mod: S$GLB,,, | Performed by: STUDENT IN AN ORGANIZED HEALTH CARE EDUCATION/TRAINING PROGRAM

## 2024-06-18 RX ORDER — HYDROCODONE BITARTRATE AND ACETAMINOPHEN 5; 325 MG/1; MG/1
1 TABLET ORAL EVERY 8 HOURS PRN
Qty: 90 TABLET | Refills: 0 | Status: SHIPPED | OUTPATIENT
Start: 2024-06-18 | End: 2024-07-18

## 2024-06-18 NOTE — PROGRESS NOTES
[facility]  Neurosurgery    SUBJECTIVE:     History of Present Illness:  Naomy Armstrong is a 59 y.o. female past medical history significant for chronic low back pain, anxiety and depression who presents with left lower extremity radiculopathy which has been worsening for about 10 1/2 weeks, since the beginning of April.    She also has some knee issues, and it has been unclear if this pain has been related to the knee or the back.  She does endorse back pain but this is mainly on the left side of the back.  It shoots down the upper left lateral hip and upper left lateral thigh.  Previously, it radiated all the way down to the left foot.    She recently underwent an epidural steroid injection as well as a joint/cyst aspiration procedure.  This provided about 3 days of significant pain relief, however she woke up this morning with sudden return of her pain.  At the very least, this procedure demonstrated that the foraminal stenosis on the left and the synovial cyst are her likely pain generators.    In the past, she is undergone:  -bilateral SI joint injections- January 20, 2022  -bilateral L2/L3/L4 medial branch blocks- July 2023  -repeat bilateral L2/L3/L4 medial branch blocks- August 2023    (Not in a hospital admission)      Review of patient's allergies indicates:   Allergen Reactions    Effexor [venlafaxine]      Elevated her blood pressure    Zoloft [sertraline]     Paroxetine hcl      Made her feel drunk       Past Medical History:   Diagnosis Date    Alcohol abuse     per chart, but patient denies today and cocaine per chart    Anxiety     Colon polyp     Depression     Hallucination     last couple of months started seeing someone standing in her room, saw boyfriend's reflection in glass    Headache     History of psychiatric hospitalization     age 13 [facility] for 2 months for acting out; 2018 for SI    Hx of psychiatric care     Hypertension     Psychiatric problem     Sleep difficulties      Suicide attempt     with knife by cutting stomach    Therapy      Past Surgical History:   Procedure Laterality Date    COLONOSCOPY N/A 05/07/2021    Procedure: COLONOSCOPY;  Surgeon: Prem Montana MD;  Location: [facility];  Service: Endoscopy;  Laterality: N/A;  COVID screening 5/4 LAPC-instr portal -ml    COLONOSCOPY N/A 06/06/2022    Procedure: COLONOSCOPY;  Surgeon: Fab Shepherd MD;  [facility]  Service: Endoscopy;  Laterality: N/A;  C-Diff neg  Rapid COVID    EPIDURAL STEROID INJECTION Bilateral 08/05/2020    Procedure: Bilateral MBB @ L3, L4, L5;  Surgeon: Jeremy Powell Jr., MD;  Location: [facility];  Service: Pain Management;  Laterality: Bilateral;  Bilat L3-5 MBB  Arrive @ 1315; No ATC or DM; Needs MD signature    EPIDURAL STEROID INJECTION Right 08/19/2020    Procedure: Lumbar Radiofrequency Thermocoagulation of Medial Branches;  Surgeon: Jeremy Powell Jr., MD;  Location: [facility];  Service: Pain Management;  Laterality: Right;  Right L3-5 RFA  Arrive @ 1045; No ATC or DM; Needs MD Signature    EPIDURAL STEROID INJECTION Left 09/04/2020    Procedure: Lumbar Radiofrequency Thermocoagulation of Medial Branches;  Surgeon: Jeremy Powell Jr., MD;  Location: [facility];  Service: Pain Management;  Laterality: Left;  Left L3-5 RFA  Arrive @ 0900 (requested); No ATC or DM; Needs MD signature    EPIDURAL STEROID INJECTION Bilateral 04/16/2021    Procedure: Sacroiliac Joint Steroid Injections @ Bilateral;  Surgeon: Jeremy Powell Jr., MD;  Location: [facility];  Service: Pain Management;  Laterality: Bilateral;  Arrive @ 1300; No ATC or DM    EPIDURAL STEROID INJECTION Bilateral 01/19/2022    Procedure: Bilateral Sacroiliac Joint Injections;  Surgeon: Jeremy Powell Jr., MD;  Location: [facility];  Service: Pain Management;  Laterality: Bilateral;  Arrive @ 1315; No ATC or DM; Needs Consent    EPIDURAL STEROID INJECTION Bilateral 07/28/2023    Procedure:  Bilateral L2, L3, L4 Medial Branch Blocks #1;  Surgeon: Jeremy Powell Jr., MD;  Location: [facility] ENDO;  Service: Pain Management;  Laterality: Bilateral;  @1200  No ATC or DM    EPIDURAL STEROID INJECTION Bilateral 08/11/2023    Procedure: Bilateral L2, L3, L4 Medial Branch Blocks #2;  Surgeon: Jeremy Powell Jr., MD;  Location: [facility] ENDO;  Service: Pain Management;  Laterality: Bilateral;  @1330  No ATC or DM    ESOPHAGEAL MANOMETRY WITH MEASUREMENT OF IMPEDANCE N/A 10/20/2021    Procedure: MANOMETRY, ESOPHAGUS, WITH IMPEDANCE MEASUREMENT;  Surgeon: Anastacia Odonnell MD;  Location: [facility] ENDO (4TH FLR);  Service: Endoscopy;  Laterality: N/A;  Covid test 10/17 [place], instructions [facility] to [id number]    ESOPHAGOGASTRODUODENOSCOPY N/A 05/07/2021    Procedure: EGD (ESOPHAGOGASTRODUODENOSCOPY);  Surgeon: Prem Montana MD;  Location: [facility] ENDO;  Service: Endoscopy;  Laterality: N/A;  added on per Dr. NOEL Shepherd    EYE SURGERY  6/6/2015    Lasik    HERNIA REPAIR  2/14/2022    Hiatal hernia    HYSTERECTOMY      INJECTION, FACET JOINT, LUMBOSACRAL Left 6/14/2024    Procedure: Left S1 Transforaminal Epidural Steroid Injection + Left L5-S1 Facet Joint Injection/Aspiration;  Surgeon: Jeremy Powell Jr., MD;  Location: [facility] PAIN MANAGEMENT;  Service: Pain Management;  Laterality: Left;  @1200  No ATC or DM  Needs Consent  IV Sedation    LAPAROSCOPIC TOUPET FUNDOPLICATION N/A 02/14/2022    Procedure: FUNDOPLICATION, LAPAROSCOPIC, TOUPET;  Surgeon: Christian Martinez MD;  [facility];  Service: General;  Laterality: N/A;     Family History   Problem Relation Name Age of Onset    Anxiety disorder Sister Bev     Depression Sister Bev     Alcohol abuse Maternal Uncle      Alcohol abuse Maternal Grandfather       Social History     Tobacco Use    Smoking status: Former     Current packs/day: 0.00     Average packs/day: 1 pack/day for 21.3 years (21.3 ttl pk-yrs)     Types: Cigarettes      Start date: 1985     Quit date: 2006     Years since quittin.1    Smokeless tobacco: Never    Tobacco comments:     It has been a very long time. I do not remember dates   Substance Use Topics    Alcohol use: Yes     Comment: occasional    Drug use: Not Currently     Types: Marijuana     Comment: tried at age 14        Review of Systems:  As noted in HPI    OBJECTIVE:     Physical Exam:  General: well developed, well nourished, no distress  Head: normocephalic, atraumatic  Neurologic: Alert and oriented. Thought content appropriate  Speech: Fluent  Cranial nerves: face symmetric   Eyes: pupils equal  Pulmonary: normal respirations  Sensory: intact to light touch throughout  Motor Strength: Moves all extremities spontaneously with good tone.  Full strength upper and lower extremities. No abnormal movements seen.      Delt Bi Tri Wrist ext.  IO  RUE:   5    5   5        5          5    5  LUE:      5    5   5        5          5    5   HF KE EHL DF PF  RLE   5    5     5     5    5  LLE:  4+  4+  4+    4+  5 (weakness seems pain limited)    DTR's - 2 + and symmetric   Matos: absent  Clonus: absent    Gait: normal  Tandem Gait: No difficulty     No tenderness to palpation of lumbar spine    Diagnostic Results:  I have personally reviewed imaging. My impression is as follows.  MRI of the lumbar spine dated 2024 shows spondylosis throughout.  There is facet arthropathy most prominent at L4-5 and L5-S1.  There is transitional appearing sacral anatomy.  At L5-S1 on the right there is a enlarged facet joint.  On the left side at that level, there was an enlarged facet joint with a synovial cyst in the lateral recess adjacent to the exiting L5 root.  There is also severe facet arthropathy with collapse of the foramen on that side and severe impingement by the facet pinning the L5 root against the pedicle and vertebral body.  There is no other nerve compression of the lumbar  spine    Flexion-extension x-rays were obtained on 06/05/2024.  These do not show any abnormal movement between flexion-extension.    ASSESSMENT/PLAN:     Naomy Armstrong is a 59 y.o. 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  I counseled her that due to the combination of the cyst and the severe foraminal stenosis I am not sure this is going to benefit her  -patient is going to let us know how physical therapy goes, but we tentatively have her scheduled for a left-sided approach minimally invasive L5/S1 TLIF on July 3, 2024.  I do not see any way to decompress the foramen without removing the facet.  -I counseled the patient in detail about the risks benefits indications alternatives of this procedure.  She is very much interested in proceeding.    Please feel free to call with any further questions.    Time spent on this encounter: 40 minutes. This includes face-to-face time and non-face to face time preparing to see the patient (eg, review of tests), obtaining and/or reviewing separately obtained history, documenting clinical information in the electronic or other health record, independently interpreting results and communicating results to the patient/family/caregiver, or care coordinator.      Christian King  [facility]  Department of Neurosurgery  [phone]    Disclaimer: This note was dictated by speech recognition. Minor errors in transcription may be present.  Please call with any questions.

## 2024-06-18 NOTE — TELEPHONE ENCOUNTER
Informed pt of new prescription and r/s Neurosx appt to today at 4:00pm- verbalized understanding.

## 2024-06-21 ENCOUNTER — CLINICAL SUPPORT (OUTPATIENT)
Dept: FAMILY MEDICINE | Facility: CLINIC | Age: 59
End: 2024-06-21
Payer: COMMERCIAL

## 2024-06-21 ENCOUNTER — PATIENT MESSAGE (OUTPATIENT)
Dept: PAIN MEDICINE | Facility: CLINIC | Age: 59
End: 2024-06-21
Payer: COMMERCIAL

## 2024-06-21 DIAGNOSIS — E53.8 LOW SERUM VITAMIN B12: Primary | ICD-10-CM

## 2024-06-21 RX ADMIN — CYANOCOBALAMIN 1000 MCG: 1000 INJECTION, SOLUTION INTRAMUSCULAR; SUBCUTANEOUS at 03:06

## 2024-06-24 DIAGNOSIS — M54.16 LUMBAR RADICULOPATHY: ICD-10-CM

## 2024-06-24 DIAGNOSIS — M51.36 DDD (DEGENERATIVE DISC DISEASE), LUMBAR: ICD-10-CM

## 2024-06-24 RX ORDER — HYDROCODONE BITARTRATE AND ACETAMINOPHEN 5; 325 MG/1; MG/1
2 TABLET ORAL EVERY 8 HOURS PRN
Qty: 90 TABLET | Refills: 0 | Status: SHIPPED | OUTPATIENT
Start: 2024-06-24 | End: 2024-07-24

## 2024-06-26 ENCOUNTER — CLINICAL SUPPORT (OUTPATIENT)
Dept: FAMILY MEDICINE | Facility: CLINIC | Age: 59
End: 2024-06-26
Payer: COMMERCIAL

## 2024-06-26 DIAGNOSIS — E53.8 LOW SERUM VITAMIN B12: Primary | ICD-10-CM

## 2024-06-26 PROCEDURE — 96372 THER/PROPH/DIAG INJ SC/IM: CPT | Mod: S$GLB,,, | Performed by: FAMILY MEDICINE

## 2024-06-26 PROCEDURE — 99999 PR PBB SHADOW E&M-EST. PATIENT-LVL I: CPT | Mod: PBBFAC,,,

## 2024-06-26 RX ADMIN — CYANOCOBALAMIN 1000 MCG: 1000 INJECTION, SOLUTION INTRAMUSCULAR; SUBCUTANEOUS at 10:06

## 2024-06-26 NOTE — PROGRESS NOTES
Vitamin B-12 injection administered as ordered.  Tolerated well.  Told to wait in clinic for 15 mins.  Patient verbalized understanding.

## 2024-06-27 ENCOUNTER — PATIENT MESSAGE (OUTPATIENT)
Dept: NEUROSURGERY | Facility: CLINIC | Age: 59
End: 2024-06-27
Payer: COMMERCIAL

## 2024-06-27 DIAGNOSIS — K52.9 ACUTE GASTROENTERITIS: ICD-10-CM

## 2024-06-27 RX ORDER — DICYCLOMINE HYDROCHLORIDE 20 MG/1
20 TABLET ORAL EVERY 6 HOURS
Qty: 120 TABLET | Refills: 0 | Status: SHIPPED | OUTPATIENT
Start: 2024-06-27

## 2024-06-28 ENCOUNTER — HOSPITAL ENCOUNTER (OUTPATIENT)
Dept: PREADMISSION TESTING | Facility: HOSPITAL | Age: 59
Discharge: HOME | End: 2024-06-28
Attending: STUDENT IN AN ORGANIZED HEALTH CARE EDUCATION/TRAINING PROGRAM
Payer: COMMERCIAL

## 2024-06-28 ENCOUNTER — HOSPITAL ENCOUNTER (OUTPATIENT)
Dept: RADIOLOGY | Facility: HOSPITAL | Age: 59
Discharge: HOME | End: 2024-06-28
Attending: STUDENT IN AN ORGANIZED HEALTH CARE EDUCATION/TRAINING PROGRAM
Payer: COMMERCIAL

## 2024-06-28 VITALS
RESPIRATION RATE: 18 BRPM | OXYGEN SATURATION: 97 % | BODY MASS INDEX: 29.29 KG/M2 | HEIGHT: 65 IN | HEART RATE: 90 BPM | WEIGHT: 175.81 LBS | SYSTOLIC BLOOD PRESSURE: 133 MMHG | TEMPERATURE: 97 F | DIASTOLIC BLOOD PRESSURE: 77 MMHG

## 2024-06-28 DIAGNOSIS — Z01.818 PREOP TESTING: Primary | ICD-10-CM

## 2024-06-28 DIAGNOSIS — M51.36 DDD (DEGENERATIVE DISC DISEASE), LUMBAR: ICD-10-CM

## 2024-06-28 LAB
ALBUMIN SERPL BCP-MCNC: 3.6 G/DL (ref 3.5–5.2)
ALP SERPL-CCNC: 105 U/L (ref 55–135)
ALT SERPL W/O P-5'-P-CCNC: 22 U/L (ref 10–44)
ANION GAP SERPL CALC-SCNC: 12 MMOL/L (ref 8–16)
APTT PPP: 26.3 SEC (ref 21–32)
AST SERPL-CCNC: 17 U/L (ref 10–40)
BASOPHILS # BLD AUTO: 0.03 K/UL (ref 0–0.2)
BASOPHILS NFR BLD: 0.4 % (ref 0–1.9)
BILIRUB SERPL-MCNC: 0.3 MG/DL (ref 0.1–1)
BUN SERPL-MCNC: 19 MG/DL (ref 8–23)
CALCIUM SERPL-MCNC: 9.9 MG/DL (ref 8.7–10.5)
CHLORIDE SERPL-SCNC: 106 MMOL/L (ref 95–110)
CO2 SERPL-SCNC: 24 MMOL/L (ref 23–29)
CREAT SERPL-MCNC: 0.8 MG/DL (ref 0.5–1.4)
DIFFERENTIAL METHOD BLD: ABNORMAL
EOSINOPHIL # BLD AUTO: 0.1 K/UL (ref 0–0.5)
EOSINOPHIL NFR BLD: 1.7 % (ref 0–8)
ERYTHROCYTE [DISTWIDTH] IN BLOOD BY AUTOMATED COUNT: 13.5 % (ref 11.5–14.5)
EST. GFR  (NO RACE VARIABLE): >60 ML/MIN/1.73 M^2
GLUCOSE SERPL-MCNC: 77 MG/DL (ref 70–110)
HCT VFR BLD AUTO: 39.8 % (ref 37–48.5)
HGB BLD-MCNC: 12.4 G/DL (ref 12–16)
IMM GRANULOCYTES # BLD AUTO: 0.02 K/UL (ref 0–0.04)
IMM GRANULOCYTES NFR BLD AUTO: 0.3 % (ref 0–0.5)
INR PPP: 0.9 (ref 0.8–1.2)
LYMPHOCYTES # BLD AUTO: 1.5 K/UL (ref 1–4.8)
LYMPHOCYTES NFR BLD: 21.4 % (ref 18–48)
MCH RBC QN AUTO: 29.5 PG (ref 27–31)
MCHC RBC AUTO-ENTMCNC: 31.2 G/DL (ref 32–36)
MCV RBC AUTO: 95 FL (ref 82–98)
MONOCYTES # BLD AUTO: 0.5 K/UL (ref 0.3–1)
MONOCYTES NFR BLD: 6.7 % (ref 4–15)
NEUTROPHILS # BLD AUTO: 4.9 K/UL (ref 1.8–7.7)
NEUTROPHILS NFR BLD: 69.5 % (ref 38–73)
NRBC BLD-RTO: 0 /100 WBC
OHS QRS DURATION: 72 MS
OHS QTC CALCULATION: 428 MS
PLATELET # BLD AUTO: 283 K/UL (ref 150–450)
PMV BLD AUTO: 9.8 FL (ref 9.2–12.9)
POTASSIUM SERPL-SCNC: 4.9 MMOL/L (ref 3.5–5.1)
PROT SERPL-MCNC: 6.6 G/DL (ref 6–8.4)
PROTHROMBIN TIME: 10 SEC (ref 9–12.5)
RBC # BLD AUTO: 4.2 M/UL (ref 4–5.4)
SODIUM SERPL-SCNC: 142 MMOL/L (ref 136–145)
WBC # BLD AUTO: 7.05 K/UL (ref 3.9–12.7)

## 2024-06-28 PROCEDURE — 85610 PROTHROMBIN TIME: CPT | Performed by: STUDENT IN AN ORGANIZED HEALTH CARE EDUCATION/TRAINING PROGRAM

## 2024-06-28 PROCEDURE — 80053 COMPREHEN METABOLIC PANEL: CPT | Performed by: STUDENT IN AN ORGANIZED HEALTH CARE EDUCATION/TRAINING PROGRAM

## 2024-06-28 PROCEDURE — 85025 COMPLETE CBC W/AUTO DIFF WBC: CPT | Performed by: STUDENT IN AN ORGANIZED HEALTH CARE EDUCATION/TRAINING PROGRAM

## 2024-06-28 PROCEDURE — 71046 X-RAY EXAM CHEST 2 VIEWS: CPT | Mod: TC,FY

## 2024-06-28 PROCEDURE — 71046 X-RAY EXAM CHEST 2 VIEWS: CPT | Mod: 26,,, | Performed by: RADIOLOGY

## 2024-06-28 PROCEDURE — 93005 ELECTROCARDIOGRAM TRACING: CPT

## 2024-06-28 PROCEDURE — 93010 ELECTROCARDIOGRAM REPORT: CPT | Mod: ,,, | Performed by: INTERNAL MEDICINE

## 2024-06-28 PROCEDURE — 85730 THROMBOPLASTIN TIME PARTIAL: CPT | Performed by: STUDENT IN AN ORGANIZED HEALTH CARE EDUCATION/TRAINING PROGRAM

## 2024-06-28 PROCEDURE — 77080 DXA BONE DENSITY AXIAL: CPT | Mod: TC

## 2024-06-28 RX ORDER — UBROGEPANT 100 MG/1
TABLET ORAL
COMMUNITY

## 2024-06-28 NOTE — DISCHARGE INSTRUCTIONS
YOUR PROCEDURE WILL BE AT [facility] at [address] [place] [address]                  Before 7 AM, enter through the Emergency Entrance..   After 7 AM enter through the Main Entrance.                 Report to the Same Day Surgery Registration Desk in the hallway.(Just beside the Same Day Surgery Unit)      Your procedure  is scheduled for ____7/3/2024______.    Call [phone] between 2pm and 5pm on __7/2/2024_____to find out your arrival time for the day of surgery.    You may have two visitors.  No children under 12 years old.     You will be going to the Same Day Surgery Unit on the 2nd floor of the hospital.    Important instructions:  Do not eat anything after midnight.  You may have plain water, non carbonated.  You may also have Gatorade or Powerade after midnight.    Stop all fluids 2 hours before your surgery.    It is okay to brush your teeth.  Do not have gum, candy or mints.    SEE MEDICATION SHEET.   TAKE MEDICATIONS AS DIRECTED WITH SIPS OF WATER.      Do not take any diabetic medication on the morning of surgery unless instructed to do so by your doctor or pre op nurse.      All GLP-1 weekly diabetic/weight loss medications must not be taken for one week before your surgery, or your surgery could be canceled.      STOP taking Aspirin, Ibuprofen,  Advil, Motrin, Mobic(meloxicam), Aleve (naproxen), Fish oil, and Vitamin E for at least 7 days before your surgery.     You may take Tylenol if needed which is not a blood thinner.    Please shower the night before and the morning of your surgery.      Follow any Prep Instructions given by your surgeon.    Use Chlorhexidine soap as instructed by your pre op nurse.   Please place clean linens on your bed the night before surgery. Please wear fresh clean clothing after each shower.    No shaving of procedural area at least 4-5 days before surgery due to increased risk of skin irritation and/or possible  infection.    Female patients may be asked for a urine specimen on the morning of the surgery.  Please check with your nurse before using the restroom.    Contact lenses and removable denture work may not be worn during your procedure.    You may wear deodorant only. If you are having breast surgery, do not wear deodorant on the operative side.    Do not wear powder, body lotion, perfume/cologne or make-up.    Do not wear any jewelry or have any metal on your body.    You will be asked to remove any dentures or partials for the procedure.    If you are going home on the same day of surgery, you must arrange for a family member or a friend to drive you home.  Public transportation is prohibited.  You will not be able to drive home if you were given anesthesia or sedation.    Patients who want to have their Post-op prescriptions filled from our in-house [facility], bring a Credit/Debit Card or cash with you. A co-pay may be required.  The pharmacy closes at 5:30 pm.    Wear loose fitting clothes allowing for bandages.    Please leave money and valuables home.      You may bring your cell phone.    Call the doctor if fever or illness should occur before your surgery.    Call [phone] to contact us here if needed.                            CLOTHES ON DAY OF SURGERY    SHOULDER surgery:  you must have a very oversized shirt.  Very, Very large.  You will probably have a large sling on with your arm strapped to your chest.  You will not be able to put the arm of the operated shoulder into a sleeve.  You can put the arm of the un-operated shoulder into the sleeve, but the shirt will need to be draped over the operated shoulder.       ARM or HAND surgery:  make sure that your sleeves are large and loose enough to pass over large dressings or cast.      BREAST or UNDERARM surgery:  wear a loose, button down shirt so that you can dress without raising your arms over your head.    ABDOMINAL surgery:  wear loose,  comfortable clothing.  Nothing tight around the abdomen.  NO JEANS    PENIS or SCROTAL surgery:  loose comfortable clothing.  Large sweat pants, pajama pants or a robe.  ABSOLUTELY NO JEANS      LEG or FOOT surgery:  wear large loose pants that are able to pass over any large dressings or casts.  You could also wear loose shorts or a skirt.

## 2024-07-01 ENCOUNTER — OFFICE VISIT (OUTPATIENT)
Dept: FAMILY MEDICINE | Facility: CLINIC | Age: 59
End: 2024-07-01
Payer: COMMERCIAL

## 2024-07-01 ENCOUNTER — LAB VISIT (OUTPATIENT)
Dept: LAB | Facility: HOSPITAL | Age: 59
End: 2024-07-01
Attending: STUDENT IN AN ORGANIZED HEALTH CARE EDUCATION/TRAINING PROGRAM
Payer: COMMERCIAL

## 2024-07-01 VITALS
OXYGEN SATURATION: 99 % | DIASTOLIC BLOOD PRESSURE: 70 MMHG | RESPIRATION RATE: 18 BRPM | TEMPERATURE: 98 F | WEIGHT: 178.81 LBS | HEIGHT: 65 IN | HEART RATE: 80 BPM | SYSTOLIC BLOOD PRESSURE: 112 MMHG | BODY MASS INDEX: 29.79 KG/M2

## 2024-07-01 DIAGNOSIS — Z01.818 PREOP TESTING: ICD-10-CM

## 2024-07-01 DIAGNOSIS — Z01.818 PRE-OP EXAMINATION: Primary | ICD-10-CM

## 2024-07-01 DIAGNOSIS — I10 HYPERTENSION, ESSENTIAL: Chronic | ICD-10-CM

## 2024-07-01 LAB
ABO + RH BLD: NORMAL
BLD GP AB SCN CELLS X3 SERPL QL: NORMAL
SPECIMEN OUTDATE: NORMAL

## 2024-07-01 PROCEDURE — 99213 OFFICE O/P EST LOW 20 MIN: CPT | Mod: 25,S$GLB,, | Performed by: NURSE PRACTITIONER

## 2024-07-01 PROCEDURE — 36415 COLL VENOUS BLD VENIPUNCTURE: CPT | Performed by: STUDENT IN AN ORGANIZED HEALTH CARE EDUCATION/TRAINING PROGRAM

## 2024-07-01 PROCEDURE — 1159F MED LIST DOCD IN RCRD: CPT | Mod: CPTII,S$GLB,, | Performed by: NURSE PRACTITIONER

## 2024-07-01 PROCEDURE — 3044F HG A1C LEVEL LT 7.0%: CPT | Mod: CPTII,S$GLB,, | Performed by: NURSE PRACTITIONER

## 2024-07-01 PROCEDURE — 99999 PR PBB SHADOW E&M-EST. PATIENT-LVL V: CPT | Mod: PBBFAC,,, | Performed by: NURSE PRACTITIONER

## 2024-07-01 PROCEDURE — 3078F DIAST BP <80 MM HG: CPT | Mod: CPTII,S$GLB,, | Performed by: NURSE PRACTITIONER

## 2024-07-01 PROCEDURE — 4010F ACE/ARB THERAPY RXD/TAKEN: CPT | Mod: CPTII,S$GLB,, | Performed by: NURSE PRACTITIONER

## 2024-07-01 PROCEDURE — 3074F SYST BP LT 130 MM HG: CPT | Mod: CPTII,S$GLB,, | Performed by: NURSE PRACTITIONER

## 2024-07-01 PROCEDURE — 86900 BLOOD TYPING SEROLOGIC ABO: CPT | Performed by: STUDENT IN AN ORGANIZED HEALTH CARE EDUCATION/TRAINING PROGRAM

## 2024-07-01 PROCEDURE — 3008F BODY MASS INDEX DOCD: CPT | Mod: CPTII,S$GLB,, | Performed by: NURSE PRACTITIONER

## 2024-07-01 PROCEDURE — 96372 THER/PROPH/DIAG INJ SC/IM: CPT | Mod: S$GLB,,, | Performed by: NURSE PRACTITIONER

## 2024-07-01 PROCEDURE — 86850 RBC ANTIBODY SCREEN: CPT | Performed by: STUDENT IN AN ORGANIZED HEALTH CARE EDUCATION/TRAINING PROGRAM

## 2024-07-01 RX ADMIN — CYANOCOBALAMIN 1000 MCG: 1000 INJECTION, SOLUTION INTRAMUSCULAR; SUBCUTANEOUS at 10:07

## 2024-07-01 NOTE — PROGRESS NOTES
Routine Office Visit    Patient Name: Naomy Armstrong    : 1965  MRN: [id number]    Chief Complaint:  Preop exam    Subjective:  Naomy is a 59 y.o. female who presents today for:    Preop exam - patient who is known to me with history as documented below, hypertension reports today for evaluation.  She will be having a lumbar fusion later this week and needs clearance for this.  She had labs showing normal PTT and INR as well as CMP last week.  CBC without significant abnormality.  EKG last week showed normal sinus rhythm.    She does not take any blood thinners.  Has been holding NSAIDs prior to her procedure.  She denies any personal history of cardiac disease, CKD, diabetes, or stroke.  She states that she can exercise by walking for an hour in stores without any chest pain or shortness of breath.  She denies any problems with or reactions to anesthesia in the past.    Past Medical History  Past Medical History:   Diagnosis Date    Alcohol abuse     per chart, but patient denies today and cocaine per chart    Anxiety     Colon polyp     Depression     Hallucination     last couple of months started seeing someone standing in her room, saw boyfriend's reflection in glass    Headache     History of psychiatric hospitalization     age 13 [facility] for 2 months for acting out; 2018 for SI    Hx of psychiatric care     Hypertension     Psychiatric problem     Sleep difficulties     Suicide attempt     with knife by cutting stomach    Therapy        Family History  Family History   Problem Relation Name Age of Onset    Anxiety disorder Sister Bev     Depression Sister Bev     Alcohol abuse Maternal Uncle      Alcohol abuse Maternal Grandfather         Current Medications  Current Outpatient Medications on File Prior to Visit   Medication Sig Dispense Refill    alosetron (LOTRONEX) 0.5 MG tablet Take 1 tablet (0.5 mg total) by mouth 2 (two) times daily. 180 tablet 3    amLODIPine (NORVASC) 5 MG tablet  Take 1 tablet by mouth once daily 90 tablet 1    azelastine (ASTELIN) 137 mcg (0.1 %) nasal spray 1 spray (137 mcg total) by Nasal route 2 (two) times daily. 30 mL 1    cyclobenzaprine (FLEXERIL) 5 MG tablet Take 2 tablets (10 mg total) by mouth 3 (three) times daily as needed for Muscle spasms. 90 tablet 0    dicyclomine (BENTYL) 20 mg tablet TAKE 1 TABLET BY MOUTH EVERY 6 HOURS 120 tablet 0    fluconazole (DIFLUCAN) 200 MG Tab Take 1 tablet (200 mg total) by mouth once daily. Take at first sign of yeast infection 1 tablet 0    FLUoxetine 40 MG capsule Take 2 capsules (80 mg total) by mouth once daily. 180 capsule 3    fluticasone propionate (FLONASE) 50 mcg/actuation nasal spray 1 spray (50 mcg total) by Each Nostril route once daily. 15.8 mL 0    HYDROcodone-acetaminophen (NORCO) 5-325 mg per tablet Take 2 tablets by mouth every 8 (eight) hours as needed for Pain. 90 tablet 0    hydrOXYzine HCL (ATARAX) 10 MG Tab Take 1 tablet (10 mg total) by mouth 2 (two) times daily as needed (anxiety). 60 tablet 3    lisinopriL (PRINIVIL,ZESTRIL) 20 MG tablet Take 1 tablet (20 mg total) by mouth nightly. 90 tablet 3    lurasidone (LATUDA) 20 mg Tab tablet Take 1 tablet (20 mg total) by mouth once daily. 30 tablet 11    meloxicam (MOBIC) 15 MG tablet Take 1 tablet (15 mg total) by mouth once daily. 30 tablet 0    multivitamin with minerals tablet Take 1 tablet by mouth once daily.      nitrofurantoin, macrocrystal-monohydrate, (MACROBID) 100 MG capsule Take 1 capsule (100 mg total) by mouth once daily. 30 capsule 5    ondansetron (ZOFRAN-ODT) 8 MG TbDL Take 1 tablet (8 mg total) by mouth every 12 (twelve) hours as needed (nausea). 30 tablet 0    pregabalin 165 mg Tb24 Take 165 mg by mouth every evening. 90 tablet 0    promethazine (PHENERGAN) 25 MG tablet Take 1 tablet (25 mg total) by mouth every 8 (eight) hours as needed for Nausea. 30 tablet 0    ubrogepant (UBRELVY) 100 mg tablet TAKE 1 TABLET BY MOUTH AS NEEDED (MAY  "REPEAT 1 TIME IN TWO HOURS. DO NOT EXCEED TWO TABLETS IN 24 HOURS).      omeprazole (PRILOSEC) 40 MG capsule Take 1 capsule (40 mg total) by mouth every morning. 90 capsule 3     Current Facility-Administered Medications on File Prior to Visit   Medication Dose Route Frequency Provider Last Rate Last Admin    cyanocobalamin injection 1,000 mcg  1,000 mcg Intramuscular Weekly    1,000 mcg at 06/26/24 1043       Allergies   Review of patient's allergies indicates:   Allergen Reactions    Effexor [venlafaxine]      Elevated her blood pressure    Zoloft [sertraline]     Paroxetine hcl      Made her feel drunk       Review of Systems (Pertinent positives)  Review of Systems   Constitutional: Negative.  Negative for chills and fever.   HENT: Negative.  Negative for congestion, sinus pain and sore throat.    Eyes: Negative.    Respiratory:  Negative for cough, shortness of breath and wheezing.    Cardiovascular:  Negative for chest pain, palpitations, orthopnea and claudication.   Gastrointestinal: Negative.  Negative for abdominal pain, diarrhea, nausea and vomiting.   Genitourinary: Negative.  Negative for dysuria, frequency and urgency.   Musculoskeletal: Negative.  Negative for back pain, joint pain and neck pain.   Skin: Negative.    Neurological: Negative.  Negative for dizziness, tingling, loss of consciousness and headaches.   Endo/Heme/Allergies: Negative.    Psychiatric/Behavioral: Negative.         /70 (BP Location: Left arm, Patient Position: Sitting, BP Method: Medium (Manual))   Pulse 80   Temp 98 °F (36.7 °C) (Oral)   Resp 18   Ht 5' 5" (1.651 m)   Wt 81.1 kg (178 lb 12.7 oz)   LMP 09/15/2013   SpO2 99%   BMI 29.75 kg/m²     Physical Exam  Vitals reviewed.   Constitutional:       General: She is not in acute distress.     Appearance: Normal appearance. She is not ill-appearing, toxic-appearing or diaphoretic.   HENT:      Head: Normocephalic and atraumatic.   Cardiovascular:      Rate and " Rhythm: Normal rate and regular rhythm.      Pulses: Normal pulses.      Heart sounds: Normal heart sounds.   Pulmonary:      Effort: Pulmonary effort is normal. No respiratory distress.      Breath sounds: Normal breath sounds. No wheezing.   Abdominal:      General: Bowel sounds are normal. There is no distension.      Palpations: Abdomen is soft.      Tenderness: There is no abdominal tenderness.   Musculoskeletal:         General: No swelling, tenderness or deformity. Normal range of motion.   Skin:     General: Skin is warm and dry.      Capillary Refill: Capillary refill takes less than 2 seconds.   Neurological:      General: No focal deficit present.      Mental Status: She is alert and oriented to person, place, and time.   Psychiatric:         Mood and Affect: Mood normal.         Behavior: Behavior normal.          Assessment/Plan:  Naomy Armstrong is a 59 y.o. female who presents today for :    Naomy was seen today for pre-op exam.    Diagnoses and all orders for this visit:    Pre-op examination    Hypertension, essential    - BP under good control  - reviewed lab work  - RCRI class 1  - patient medically optimized at low risk for cardiac complications prior to lumbar procedure; no need for further cardiac workup prior  - all questions answered  - work note provided for patient due to anxiety prior to procedure        This office note has been dictated.  This dictation has been generated using M-Modal Fluency Direct dictation; some phonetic errors may occur.

## 2024-07-01 NOTE — LETTER
July 1, 2024      [facility]  [address]  [address]  [place] [address]  Phone: [phone]       Patient: Naomy Armstrong   YOB: 1965  Date of Visit: 07/01/2024    To Whom It May Concern:    Ronald Armstrong  was at [facility] on 07/01/2024. The patient may return to work/school on 7/3/24 with no restrictions. If you have any questions or concerns, or if I can be of further assistance, please do not hesitate to contact me.    Sincerely,    Dutch Estrada, NP

## 2024-07-02 ENCOUNTER — TELEPHONE (OUTPATIENT)
Dept: SURGERY | Facility: HOSPITAL | Age: 59
End: 2024-07-02
Payer: COMMERCIAL

## 2024-07-03 ENCOUNTER — PATIENT MESSAGE (OUTPATIENT)
Dept: NEUROSURGERY | Facility: CLINIC | Age: 59
End: 2024-07-03
Payer: COMMERCIAL

## 2024-07-03 ENCOUNTER — TELEPHONE (OUTPATIENT)
Dept: PAIN MEDICINE | Facility: CLINIC | Age: 59
End: 2024-07-03
Payer: COMMERCIAL

## 2024-07-03 DIAGNOSIS — M54.16 LUMBAR RADICULOPATHY: ICD-10-CM

## 2024-07-03 DIAGNOSIS — M51.36 DDD (DEGENERATIVE DISC DISEASE), LUMBAR: Primary | ICD-10-CM

## 2024-07-03 DIAGNOSIS — M71.38 SYNOVIAL CYST OF LUMBAR FACET JOINT: ICD-10-CM

## 2024-07-03 RX ORDER — HYDROCODONE BITARTRATE AND ACETAMINOPHEN 10; 325 MG/1; MG/1
1 TABLET ORAL EVERY 8 HOURS PRN
Qty: 90 TABLET | Refills: 0 | Status: SHIPPED | OUTPATIENT
Start: 2024-07-03 | End: 2024-07-05

## 2024-07-03 NOTE — TELEPHONE ENCOUNTER
Covering medication refills for colleague, Dr. Powell. The [place] Board [employer] website for prescription monitoring was reviewed for the patient today, and it does not suggest any signs of opioid diversion. Refill is slightly early due to unique circumstances around surgery being postponed. Discussed with Sachin MCLAUGHLIN, and will adjust prescription per his recommendations. Discussed case with Dr. Powell as well. Refill approved.

## 2024-07-03 NOTE — TELEPHONE ENCOUNTER
Called and spoke with patient regarding medication management.  Patient was scheduled for spinal fusion today however this was subsequently postponed due to low bone mineral density.  Patient plans on following up with her primary care physician to discuss osteopenia medications.  Patient's hope is to undergo surgery in the future.  In the meantime she has been taking opioid pain medications with benefit.  Currently taking Norco 5-325 mg 2 pills q.8 hours p.r.n..  Notes that she has had to take 2 pills for better relief.  Notes that it does not completely alleviate her pain but does bringing down to a more reasonable/tolerable level.  Currently taking about 6 pills per day.  We did discuss continuing opioid pain medication until she is able to undergo spinal surgery.  Advised I will send in a refill for Norco  mg q.8 hours p.r.n..  One-month supply of 90 pills provided today.  Advised patient to message or call via [employer] if refills are needed.  Likely we will plan on continuing medication until surgery.  Patient agrees with the plan of care.  All questions answered.

## 2024-07-05 ENCOUNTER — PATIENT MESSAGE (OUTPATIENT)
Dept: PAIN MEDICINE | Facility: CLINIC | Age: 59
End: 2024-07-05
Payer: COMMERCIAL

## 2024-07-05 DIAGNOSIS — M50.30 DDD (DEGENERATIVE DISC DISEASE), CERVICAL: Chronic | ICD-10-CM

## 2024-07-05 DIAGNOSIS — M71.38 SYNOVIAL CYST OF LUMBAR FACET JOINT: ICD-10-CM

## 2024-07-05 DIAGNOSIS — M62.830 MUSCLE SPASM OF BACK: Chronic | ICD-10-CM

## 2024-07-05 DIAGNOSIS — M47.816 LUMBAR SPONDYLOSIS: Chronic | ICD-10-CM

## 2024-07-05 DIAGNOSIS — M54.16 LUMBAR RADICULOPATHY: ICD-10-CM

## 2024-07-05 DIAGNOSIS — M51.36 DDD (DEGENERATIVE DISC DISEASE), LUMBAR: ICD-10-CM

## 2024-07-05 RX ORDER — HYDROCODONE BITARTRATE AND ACETAMINOPHEN 10; 325 MG/1; MG/1
1 TABLET ORAL EVERY 8 HOURS PRN
Qty: 90 TABLET | Refills: 0 | Status: SHIPPED | OUTPATIENT
Start: 2024-07-05 | End: 2024-08-04

## 2024-07-05 NOTE — TELEPHONE ENCOUNTER
No care due was identified.  [facility] Embedded Care Due Messages. Reference number: [id number].   7/05/2024 11:07:18 AM CDT

## 2024-07-08 ENCOUNTER — OFFICE VISIT (OUTPATIENT)
Dept: PAIN MEDICINE | Facility: CLINIC | Age: 59
End: 2024-07-08
Payer: COMMERCIAL

## 2024-07-08 VITALS — DIASTOLIC BLOOD PRESSURE: 68 MMHG | SYSTOLIC BLOOD PRESSURE: 136 MMHG | HEART RATE: 88 BPM | OXYGEN SATURATION: 100 %

## 2024-07-08 DIAGNOSIS — M51.36 DDD (DEGENERATIVE DISC DISEASE), LUMBAR: Primary | ICD-10-CM

## 2024-07-08 DIAGNOSIS — M47.816 LUMBAR SPONDYLOSIS: ICD-10-CM

## 2024-07-08 DIAGNOSIS — M25.521 RIGHT ELBOW PAIN: Primary | ICD-10-CM

## 2024-07-08 DIAGNOSIS — M54.9 DORSALGIA, UNSPECIFIED: ICD-10-CM

## 2024-07-08 DIAGNOSIS — M71.38 SYNOVIAL CYST OF LUMBAR FACET JOINT: ICD-10-CM

## 2024-07-08 DIAGNOSIS — M50.30 DDD (DEGENERATIVE DISC DISEASE), CERVICAL: ICD-10-CM

## 2024-07-08 DIAGNOSIS — M54.16 LUMBAR RADICULOPATHY: ICD-10-CM

## 2024-07-08 DIAGNOSIS — G56.01 CARPAL TUNNEL SYNDROME OF RIGHT WRIST: ICD-10-CM

## 2024-07-08 PROCEDURE — 4010F ACE/ARB THERAPY RXD/TAKEN: CPT | Mod: CPTII,S$GLB,,

## 2024-07-08 PROCEDURE — 99999 PR PBB SHADOW E&M-EST. PATIENT-LVL IV: CPT | Mod: PBBFAC,,,

## 2024-07-08 PROCEDURE — 99214 OFFICE O/P EST MOD 30 MIN: CPT | Mod: S$GLB,,,

## 2024-07-08 PROCEDURE — 1159F MED LIST DOCD IN RCRD: CPT | Mod: CPTII,S$GLB,,

## 2024-07-08 PROCEDURE — 1160F RVW MEDS BY RX/DR IN RCRD: CPT | Mod: CPTII,S$GLB,,

## 2024-07-08 PROCEDURE — 3075F SYST BP GE 130 - 139MM HG: CPT | Mod: CPTII,S$GLB,,

## 2024-07-08 PROCEDURE — 3044F HG A1C LEVEL LT 7.0%: CPT | Mod: CPTII,S$GLB,,

## 2024-07-08 PROCEDURE — 3078F DIAST BP <80 MM HG: CPT | Mod: CPTII,S$GLB,,

## 2024-07-08 RX ORDER — PREGABALIN 165 MG/1
165 TABLET, FILM COATED, EXTENDED RELEASE ORAL NIGHTLY
Qty: 90 TABLET | Refills: 1 | Status: SHIPPED | OUTPATIENT
Start: 2024-07-08

## 2024-07-08 NOTE — PROGRESS NOTES
Subjective:     Patient ID: Naomy Armstrong is a 59 y.o. female    Chief Complaint: Arm Pain (Right side radiating down to hand)      Referred by: No ref. provider found      HPI:    Interval History PA (07/08/2024):  Patient returns to clinic for follow up.  Patient with continued left-sided lower back and extremity pain as before.  Patient continues to follow up with Neurosurgery who is planning on performing a MIS L5-S1 TLIF.  Unfortunately this procedure has been postponed due to low bone mineral density.  Patient following up with primary care to work on bone mineral density before proceeding with spinal surgery.  In the meantime the patient has been taking Norco  mg q.8 hours p.r.n. with adequate relief.  She does note occasionally spitting the medication in half when her pain is not as severe.  Otherwise denies significant adverse effects from this medication.  Patient's concern today is a new onset of right-sided upper extremity/wrist pain.  Pain started about 1-2 weeks ago and has been constant and severe since.  Pain/burning over her right wrist and 1st 3 digits.  Describes as a numbness/burning sensation over her fingers.  Symptoms are intermittent, typically worsened at night causing difficulty sleeping.  She does note occasional radiation of pain from her elbow down to her wrist.  Denies any significant neck pain.  Patient does note history of carpal tunnel on the right which she has received injections for with benefit.  Also notes that she has recently started using a nighttime splint although unsure if this has been beneficial.    Interval History PA (06/03/2024):  Patient returns to clinic for follow up of left-sided lower back and extremity pain and MRI review.  She denies significant changes in the quality or location of pain since previous visit.  Denies new or worsening symptoms.  Continues to locate pain in her left lower lumbar/lumbosacral region into left lower extremity all the  way down to her bottom of the foot with associated paresthesia.  Denies any focal weakness, bowel or bladder dysfunction.    Interval History (5/16/24):  She returns today for follow up.  She reports that she is having left-sided low back and lower extremity pain.  This pain has been present for roughly 1 month.  No specific inciting events or injuries noted.  Pain is located the left lower lumbar/lumbosacral region radiate to left lower extremity all the way to the bottom of her foot with associated paresthesias.  She denies any focal weakness or bowel bladder dysfunction.  Pain is constant and worsened with activity.  Particularly bad in the morning when getting out of bed.       Interval History (7/17/23):  She returns today for follow up.  She reports that she is having recurrent bilateral low back pain.  This pain is located across the lower lumbar region.  Pain will radiate to the bilateral buttocks, but not more distally than this.  She denies any associated numbness, tingling, weakness, bowel bladder dysfunction.  Pain is constant and worsened with activity.        Interval History (6/7/22):  She returns today for follow up.  She reports that she underwent surgery for hiatal hernia repair in February 2022.  Since his surgery she has had significant epigastric abdominal pain.  This pain is intermittent but is very severe when present.  No specific triggers identified.  Patient also reports worsening diarrhea since undergoing the surgery.  She has followed up with surgery and gastroenterology in no specific source has been identified as of yet.  States that she was provided tramadol which provided some relief.  States she was told to return to my clinic to discuss other options for pain management.       Interval History NP (02/03/22):    The patient location is: work  The chief complaint leading to consultation is: follow-up  Visit type: Virtual visit with audio.  Patient verbally consented for audio  visit.  Total time spent with patient: 10 minutes  Each patient to whom he or she provides medical services by telemedicine is:  (1) informed of the relationship between the physician and patient and the respective role of any other health care provider with respect to management of the patient; and (2) notified that he or she may decline to receive medical services by telemedicine and may withdraw from such care at any time.    Pt returns today for follow up of bilateral SIJ injections. She reports 100% relief of low back pain. She denies any new or worsening symptoms. She would like to discuss chronic headaches at next visit with Dr. Powell. She is otherwise well today.     Interval History (12/29/21):    The patient location is:  home  The chief complaint leading to consultation is:  Follow-up  Visit type: Virtual visit with synchronous audio and video  Total time spent with patient:  8 minutes  Each patient to whom he or she provides medical services by telemedicine is:  (1) informed of the relationship between the physician and patient and the respective role of any other health care provider with respect to management of the patient; and (2) notified that he or she may decline to receive medical services by telemedicine and may withdraw from such care at any time.      She returns today for follow up and MRI review.  She reports that she would like to schedule bilateral sacroiliac joint steroid injections discussed at last visit.  She is still having neck pain and headaches as well and would like to undergo cervical medial branch blocks/RFA, but her back pain is worse at this time.  Otherwise, she denies any changes in the quality or location of her pain.  She denies any new worsening symptoms.        Interval History (8/11/21):    The patient location is:  home  The chief complaint leading to consultation is:  Follow-up  Visit type: Virtual visit with synchronous audio and video  Total time spent with  patient:  15 minutes  Each patient to whom he or she provides medical services by telemedicine is:  (1) informed of the relationship between the physician and patient and the respective role of any other health care provider with respect to management of the patient; and (2) notified that he or she may decline to receive medical services by telemedicine and may withdraw from such care at any time.        She returns today for follow up.  She reports that relief from bilateral sacroiliac joint steroid injections has worn off.  She would like repeat sacroiliac joint steroid injections if appropriate.  Patient also has been having headaches chronically.  She has been evaluated thoroughly by Neurology without any specific source for of headache identified.  Suspicion for cervicogenic headaches.  Patient does have pain in the upper cervical region.  The pain will radiate to the occipital and parietal regions of the head.  She denies any pain radiating to the upper extremities.  She denies any associated bowel bladder dysfunction.      Interval History NP (5/5/21):    Pt returns today for follow up of bilateral SIJ injections. She reports at least 75% relief of low back pain. She can now stand for long periods of time without pain or having to sit for 30 minutes. She is able to cook and complete her ADL's with much improvement. Does report continued pain while bending over but it is now manageable. Denies new or worsening symptoms.      Interval History (3/31/21):  She returns today for follow up.  She reports that bilateral L3, L4, L5 radiofrequency ablation provided roughly 75% relief but for only 1 month.  She states that this.  Time her pain returned.  Today, she localizes pain across the lower lumbar/lumbosacral region.  The pain does not radiate.  The pain is equal bilaterally.  The pain is worse with activities such as mopping and cleaning.  She denies any associated numbness, tingling, weakness, bowel bladder  "dysfunction.      Interval History NP (7/16/20):  Pt returns today for follow up and MRI review. She states that her pain in unchanged in quality or location. Denies any new symptoms. No bladder or bowel dysfunction. She reports "missing more days of work than she attends". She is an  at [employer] which includes heavy manual lifting and moving boxes. Her pain is worsened with activity, especially after a shift at work or when she is home cleaning. She reports that gabapentin has helped, but takes 2 pills at night and 1 pill every morning because it makes her too drowsy during the day. She also takes 500mg Naproxen PRN for the pain, maybe every few days. She would like something else for the pain. States she "took valium in the past which helped her relax and get through the day".     Interval History (7/6/20):  She returns today for follow up.  She reports that she continues to have low back/hip pain.  This pain is worse with bending and lifting.  These are movements that are often required as a part of her job.  Overall she denies any changes in the quality or location of the pain since last encounter.  She denies any new symptoms.       Interval History (1/13/20):  She returns today for follow up.  She reports that her neck pain has improved with home exercise program.  She does not feel as though this needs any further attention.  She does state that her low back has been bothering her.  She localizes pain to the bilateral lower lumbar regions.  The pain does radiate to the bilateral hip regions.  She denies any associated numbness, tingling, weakness, bowel bladder dysfunction.  The pain is constant and worsened with activity.  She states that the pain is typically worse when initiating activity after rest.        Initial Encounter (7/9/19):  Naomy Armstrong is a 59 y.o. female who presents today with chronic neck pain. This pain has been present for years.  No specific inciting event or " injury noted. She localizes the pain to the midline cervical region.  The pain does not radiate.  She denies any associated numbness, tingling, weakness, bowel bladder dysfunction.  She has had some radicular pain on the left side in the past this has resolved.  The pain is constant and worsened with activity.  She specifically states that the pain is worse when looking at screens.  She has been treated in the past by Dr. Bhavya Oneill at [facility].   This pain is described in detail below.    Physical Therapy:  Not for her low back    Non-pharmacologic Treatment:  Rest helps         TENS?  No    Pain Medications:         Currently taking:  Lyrica, meloxicam, Norco    Has tried in the past:  muscle relaxers, Tylenol, NSAIDs, Amitriptyline, tizanidine, Celebrex, Robaxin, Flexeril    Has not tried:  SNRIs, topical creams    Blood thinners:  None    Interventional Therapies:   10/8/13- Left RFA C5, C6, C7  9/10/13- Left C5, C6, C7 MBB   8/13/13- C7-T1 IL EMMA  8/19/20 - right L3, L4, L5 radiofrequency ablation - 75% relief for 1 month  9/4/20 - left L3, L4, L5 radiofrequency ablation - 75% relief for 1 month  4/16/21 - bilateral SIJ injections - 75% relief  1/19/22 - bilateral SIJ injections - 100% relief  06/14/2024 - left S1 transforaminal epidural steroid injection + left L5-S1 facet joint injection with aspiration - short-lived relief    Relevant Surgeries:  None    Affecting sleep?  Yes    Affecting daily activities? yes    Depressive symptoms? yes          SI/HI? No    Work status: Employed    Pain Scores:    Best:       8/10  Worst:   10/10  Usually:   10/10  Today:   6/10    Pain Disability Index  Family/Home Responsibilities:: 9  Recreation:: 9  Social Activity:: 9  Occupation:: 5  Sexual Behavior:: 0  Self Care:: 8  Life-Support Activities:: 10  Pain Disability Index (PDI): 50      Review of Systems   Constitutional:  Negative for activity change, appetite change, chills, fatigue, fever and unexpected  weight change.   HENT:  Negative for hearing loss.    Eyes:  Negative for visual disturbance.   Respiratory:  Negative for chest tightness and shortness of breath.    Cardiovascular:  Negative for chest pain.   Gastrointestinal:  Negative for abdominal pain, constipation, diarrhea, nausea and vomiting.   Genitourinary:  Negative for difficulty urinating.   Musculoskeletal:  Positive for back pain, gait problem and myalgias. Negative for arthralgias, neck pain and neck stiffness.   Skin:  Negative for rash.   Neurological:  Positive for numbness. Negative for dizziness, weakness, light-headedness and headaches.   Psychiatric/Behavioral:  Negative for hallucinations, sleep disturbance and suicidal ideas. The patient is not nervous/anxious.        Past Medical History:   Diagnosis Date    Alcohol abuse     per chart, but patient denies today and cocaine per chart    Anxiety     Colon polyp     Depression     Hallucination     last couple of months started seeing someone standing in her room, saw boyfriend's reflection in glass    Headache     History of psychiatric hospitalization     age 13 [facility] for 2 months for acting out; 2018 for SI    Hx of psychiatric care     Hypertension     Psychiatric problem     Sleep difficulties     Suicide attempt     with knife by cutting stomach    Therapy        Past Surgical History:   Procedure Laterality Date    COLONOSCOPY N/A 05/07/2021    Procedure: COLONOSCOPY;  Surgeon: Prem Montana MD;  Location: [facility];  Service: Endoscopy;  Laterality: N/A;  COVID screening 5/4 LAPC-instr portal -ml    COLONOSCOPY N/A 06/06/2022    Procedure: COLONOSCOPY;  Surgeon: Fab Shepherd MD;  Location: [facility] ([address] [place] [address]);  Service: Endoscopy;  Laterality: N/A;  C-Diff neg  Rapid COVID    EPIDURAL STEROID INJECTION Bilateral 08/05/2020    Procedure: Bilateral MBB @ L3, L4, L5;  Surgeon: Jeremy Powell Jr., MD;  Location: [facility];  Service: Pain Management;  Laterality: Bilateral;   Bilat L3-5 MBB  Arrive @ 1315; No ATC or DM; Needs MD signature    EPIDURAL STEROID INJECTION Right 08/19/2020    Procedure: Lumbar Radiofrequency Thermocoagulation of Medial Branches;  Surgeon: Jeremy Powell Jr., MD;  Location: [facility];  Service: Pain Management;  Laterality: Right;  Right L3-5 RFA  Arrive @ 1045; No ATC or DM; Needs MD Signature    EPIDURAL STEROID INJECTION Left 09/04/2020    Procedure: Lumbar Radiofrequency Thermocoagulation of Medial Branches;  Surgeon: Jeremy Powell Jr., MD;  Location: [facility];  Service: Pain Management;  Laterality: Left;  Left L3-5 RFA  Arrive @ 0900 (requested); No ATC or DM; Needs MD signature    EPIDURAL STEROID INJECTION Bilateral 04/16/2021    Procedure: Sacroiliac Joint Steroid Injections @ Bilateral;  Surgeon: Jeremy Powell Jr., MD;  Location: [facility];  Service: Pain Management;  Laterality: Bilateral;  Arrive @ 1300; No ATC or DM    EPIDURAL STEROID INJECTION Bilateral 01/19/2022    Procedure: Bilateral Sacroiliac Joint Injections;  Surgeon: Jeremy Powell Jr., MD;  Location: [facility];  Service: Pain Management;  Laterality: Bilateral;  Arrive @ 1315; No ATC or DM; Needs Consent    EPIDURAL STEROID INJECTION Bilateral 07/28/2023    Procedure: Bilateral L2, L3, L4 Medial Branch Blocks #1;  Surgeon: Jeremy Powell Jr., MD;  Location: [facility];  Service: Pain Management;  Laterality: Bilateral;  @1200  No ATC or DM    EPIDURAL STEROID INJECTION Bilateral 08/11/2023    Procedure: Bilateral L2, L3, L4 Medial Branch Blocks #2;  Surgeon: Jeremy Powell Jr., MD;  Location: [facility];  Service: Pain Management;  Laterality: Bilateral;  @1330  No ATC or DM    ESOPHAGEAL MANOMETRY WITH MEASUREMENT OF IMPEDANCE N/A 10/20/2021    Procedure: MANOMETRY, ESOPHAGUS, WITH IMPEDANCE MEASUREMENT;  Surgeon: Anastacia Odonnell MD;  Location: [facility] ([address] [place] [address]);  Service: Endoscopy;  Laterality: N/A;  Covid test 10/17 [place], instructions sent to  [email or web address]    ESOPHAGOGASTRODUODENOSCOPY N/A 2021    Procedure: EGD (ESOPHAGOGASTRODUODENOSCOPY);  Surgeon: Prem Montana MD;  Location: [facility];  Service: Endoscopy;  Laterality: N/A;  added on per Dr. NOEL Shepherd    EYE SURGERY  2015    Lasik    HERNIA REPAIR  2022    Hiatal hernia    HYSTERECTOMY      INJECTION, FACET JOINT, LUMBOSACRAL Left 2024    Procedure: Left S1 Transforaminal Epidural Steroid Injection + Left L5-S1 Facet Joint Injection/Aspiration;  Surgeon: Jeremy Powell Jr., MD;  Location: [facility];  Service: Pain Management;  Laterality: Left;  @1200  No ATC or DM  Needs Consent  IV Sedation    LAPAROSCOPIC TOUPET FUNDOPLICATION N/A 2022    Procedure: FUNDOPLICATION, LAPAROSCOPIC, TOUPET;  Surgeon: Christian Martinez MD;  Location: [address];  Service: General;  Laterality: N/A;       Social History     Socioeconomic History    Marital status:     Number of children: 0   Occupational History    Occupation: Overnight nuvia     Employer: [employer]   Tobacco Use    Smoking status: Former     Current packs/day: 0.00     Average packs/day: 1 pack/day for 21.3 years (21.3 ttl pk-yrs)     Types: Cigarettes     Start date: 1985     Quit date: 2006     Years since quittin.2    Smokeless tobacco: Never    Tobacco comments:     It has been a very long time. I do not remember dates   Substance and Sexual Activity    Alcohol use: Yes     Comment: occasional    Drug use: Not Currently     Types: Marijuana     Comment: tried at age 14    Sexual activity: Yes     Partners: Male     Birth control/protection: Post-menopausal     Comment: going through menopause   Other Topics Concern    Patient feels they ought to cut down on drinking/drug use No    Patient annoyed by others criticizing their drinking/drug use No    Patient has felt bad or guilty about drinking/drug use No    Patient has had a drink/used drugs as an eye opener in the AM No    Social History Narrative     x 2.    Lives with boyfriend.     Works at [employer] as an .    Presently working on Bachelor's degree.    Has associates degree in criminal justice.     Social Determinants of Health     Financial Resource Strain: Low Risk  (12/1/2023)    Overall Financial Resource Strain (CARDIA)     Difficulty of Paying Living Expenses: Not hard at all   Recent Concern: Financial Resource Strain - Medium Risk (10/25/2023)    Overall Financial Resource Strain (CARDIA)     Difficulty of Paying Living Expenses: Somewhat hard   Food Insecurity: No Food Insecurity (12/1/2023)    Hunger Vital Sign     Worried About Running Out of Food in the Last Year: Never true     Ran Out of Food in the Last Year: Never true   Recent Concern: Food Insecurity - Food Insecurity Present (9/21/2023)    Hunger Vital Sign     Worried About Running Out of Food in the Last Year: Sometimes true     Ran Out of Food in the Last Year: Sometimes true   Transportation Needs: No Transportation Needs (12/1/2023)    PRAPARE - Transportation     Lack of Transportation (Medical): No     Lack of Transportation (Non-Medical): No   Physical Activity: Insufficiently Active (12/1/2023)    Exercise Vital Sign     Days of Exercise per Week: 3 days     Minutes of Exercise per Session: 30 min   Stress: No Stress Concern Present (12/1/2023)    [place] [employer] - Occupational Stress Questionnaire     Feeling of Stress : Only a little   Housing Stability: Low Risk  (12/1/2023)    Housing Stability Vital Sign     Unable to Pay for Housing in the Last Year: No     Number of Places Lived in the Last Year: 1     Unstable Housing in the Last Year: No       Review of patient's allergies indicates:   Allergen Reactions    Effexor [venlafaxine]      Elevated her blood pressure    Zoloft [sertraline]     Paroxetine hcl      Made her feel drunk       Current Outpatient Medications on File Prior to Visit    Medication Sig Dispense Refill    alosetron (LOTRONEX) 0.5 MG tablet Take 1 tablet (0.5 mg total) by mouth 2 (two) times daily. 180 tablet 3    amLODIPine (NORVASC) 5 MG tablet Take 1 tablet by mouth once daily 90 tablet 1    azelastine (ASTELIN) 137 mcg (0.1 %) nasal spray 1 spray (137 mcg total) by Nasal route 2 (two) times daily. 30 mL 1    cyclobenzaprine (FLEXERIL) 5 MG tablet Take 2 tablets (10 mg total) by mouth 3 (three) times daily as needed for Muscle spasms. 90 tablet 0    dicyclomine (BENTYL) 20 mg tablet TAKE 1 TABLET BY MOUTH EVERY 6 HOURS 120 tablet 0    fluconazole (DIFLUCAN) 200 MG Tab Take 1 tablet (200 mg total) by mouth once daily. Take at first sign of yeast infection 1 tablet 0    FLUoxetine 40 MG capsule Take 2 capsules (80 mg total) by mouth once daily. 180 capsule 3    fluticasone propionate (FLONASE) 50 mcg/actuation nasal spray 1 spray (50 mcg total) by Each Nostril route once daily. 15.8 mL 0    HYDROcodone-acetaminophen (NORCO)  mg per tablet Take 1 tablet by mouth every 8 (eight) hours as needed for Pain. 90 tablet 0    hydrOXYzine HCL (ATARAX) 10 MG Tab Take 1 tablet (10 mg total) by mouth 2 (two) times daily as needed (anxiety). 60 tablet 3    lisinopriL (PRINIVIL,ZESTRIL) 20 MG tablet Take 1 tablet (20 mg total) by mouth nightly. 90 tablet 3    lurasidone (LATUDA) 20 mg Tab tablet Take 1 tablet (20 mg total) by mouth once daily. 30 tablet 11    meloxicam (MOBIC) 15 MG tablet Take 1 tablet (15 mg total) by mouth once daily. 30 tablet 0    multivitamin with minerals tablet Take 1 tablet by mouth once daily.      nitrofurantoin, macrocrystal-monohydrate, (MACROBID) 100 MG capsule Take 1 capsule (100 mg total) by mouth once daily. 30 capsule 5    ondansetron (ZOFRAN-ODT) 8 MG TbDL Take 1 tablet (8 mg total) by mouth every 12 (twelve) hours as needed (nausea). 30 tablet 0    pregabalin 165 mg Tb24 Take 165 mg by mouth every evening. 90 tablet 1    promethazine (PHENERGAN) 25 MG  tablet Take 1 tablet (25 mg total) by mouth every 8 (eight) hours as needed for Nausea. 30 tablet 0    ubrogepant (UBRELVY) 100 mg tablet TAKE 1 TABLET BY MOUTH AS NEEDED (MAY REPEAT 1 TIME IN TWO HOURS. DO NOT EXCEED TWO TABLETS IN 24 HOURS).      omeprazole (PRILOSEC) 40 MG capsule Take 1 capsule (40 mg total) by mouth every morning. 90 capsule 3     No current facility-administered medications on file prior to visit.       Objective:      /68 (BP Location: Left arm, Patient Position: Sitting, BP Method: Medium (Automatic))   Pulse 88   LMP 09/15/2013   SpO2 100%     Exam:  GEN:  Well developed, well nourished.  No acute distress.  Normal pain behavior.  HEENT:  No trauma.  Mucous membranes moist.  Nares patent bilaterally.  PSYCH: Normal affect. Thought content appropriate.  CHEST:  Breathing symmetric.  No audible wheezing.  ABD: Soft, non-distended.  SKIN:  Warm, pink, dry.  No rash on exposed areas.    EXT:  No cyanosis, clubbing, or edema.  No color change or changes in nail or hair growth.  NEURO/MUSCULOSKELETAL:  Fully alert, oriented, and appropriate. Speech normal indio. No cranial nerve deficits.   Gait:  Antalgic.  5/5 motor strength throughout upper extremities.   Sensory:  No sensory deficit in the upper extremities.   Reflexes:  1 + and symmetric throughout.  Absent Matos's bilaterally.  C-Spine:  Full ROM without pain on flexion/extension or rotation.  No facet loading bilaterally.  Negative Spurling's bilaterally.    No TTP over cervical facet joints, cervical paraspinal muscles, shoulders, elbows or hands.      Negative Tinel's on the right  Negative Phalen's bilaterally      Imaging:  Narrative & Impression  EXAMINATION:  MRI LUMBAR SPINE WITHOUT CONTRAST     CLINICAL HISTORY:  Low back pain, progressive neurologic deficit; Other intervertebral disc degeneration, lumbar region left lower extremity pain.     TECHNIQUE:  Multiplanar, multisequence MR images were acquired from the  thoracolumbar junction to the sacrum without the administration of contrast.     COMPARISON:  07/14/2020     FINDINGS:  There are transitional vertebral bodies suspected at the lumbosacral junction.  For the purpose of labeling on this study, the last large disc space will be considered L5-S1 although a prominent S1-2 intervertebral disc is also noted.     There is no evidence of acute compression fracture or osseous destructive process.     The distal conus maintains normal signal intensity.     Mild degenerative disc space narrowing noted.     L1-2 through L3-4 no evidence of disc herniation or advanced stenosis.     L4-5, no evidence of disc herniation.  Facet hypertrophy but no advanced central or foraminal stenosis.     L5-S1, prominent fluid is identified within the right facet joint presumably degenerative in nature.  At the left facet joint a small ganglion cyst protrudes into the paravertebral soft tissues.  There is however an additional cystic structure protruding into the epidural space at the opening to the left L5-S1 neural foramen thought to represent a  9 mm synovial cyst.  There is marked facet hypertrophy left much greater than right resulting in severe left foraminal stenosis with mild foraminal stenosis on the right.  No advanced central stenosis.     Impression:     Please note transitional lumbosacral vertebral bodies are identified.     At what is labeled L5-S1 there is marked left facet hypertrophy resulting in severe left foraminal stenosis.  In addition a 9 mm apparently cystic lesion near the subarticular zone but protruding into the opening of the neural foramen is thought to most likely represent a synovial cyst with extruded disc fragment and schwannoma thought less likely.  A contrast study could be performed for further clarification but only if clinically warranted.  No advanced central or left lateral recess stenosis is suspected with the main impingement likely being of the exiting  left nerve root.        Electronically signed by:Gus Hernadez  Date:                                            05/31/2024  Time:                                           17:03    Narrative & Impression    EXAMINATION:  MRI CERVICAL SPINE WITHOUT CONTRAST     CLINICAL HISTORY:  cervical DDD; Spondylosis without myelopathy or radiculopathy, cervical region     TECHNIQUE:  Multiplanar, multisequence MR images of the cervical spine were performed without the administration of contrast.     COMPARISON:  Radiograph 08/27/2020.     FINDINGS:  Alignment: Straightening of cervical spine with slight reversal at the level of C5 through C7.  Minimal degenerative anterolisthesis C4 on C5..     Vertebrae: Normal marrow signal. No fracture.     Discs: Disc space narrowing C5-C6-C7 level.     Cord: At C3 through C6 level, STIR images, the cervical cord appears slightly indistinct and of minimally increased signal intensity.  This may be artifactual in nature is a cannot be confirmed on the axial or sagittal T2 weighted sequences with certainty.  Postcontrast repeat examination may be indicated to exclude subtle intrinsic cord finding at this level..     Skull base and craniocervical junction: Normal.     Degenerative findings:     C2-C3: There is no focal disc herniation.No significant central canal narrowing.  No significant neural foraminal narrowing.     C3-C4: There is no focal disc herniation.No significant central canal narrowing.  No significant neural foraminal narrowing.     C4-C5: There is no focal disc herniation.No significant central canal narrowing.  Moderate RIGHT neural foraminal narrowing.     C5-C6: There is no focal disc herniation.No significant central canal narrowing.  Moderate RIGHT and mild LEFT neural foraminal narrowing.     C6-C7: There is no focal disc herniation.No significant central canal narrowing.  Moderate LEFT neural foraminal narrowing.     C7-T1: There is no focal disc herniation.No  significant central canal narrowing.  No significant neural foraminal narrowing.     T1-T2:     Paraspinal muscles & soft tissues: Unremarkable.     Impression:     Degenerative spondylosis as above predominantly C4-C5 through C6-C7.     Likely artifact identified level of the cord C3 through C6 STIR images only although at of abundance of caution, repeat examination with postcontrast MRI may be helpful to exclude underlying myelitis.     This report was flagged in Epic as abnormal.        Electronically signed by: Jerzy Rose MD  Date:                                            10/28/2021  Time:                                           06:44           Narrative & Impression    EXAMINATION:  XR CERVICAL SPINE 5 VIEW WITH FLEX AND EXT     CLINICAL HISTORY:  Headache     TECHNIQUE:  Five views of the cervical spine plus flexion and extension views were performed.     COMPARISON:  None 07/09/2019.     FINDINGS:  Mild DJD.  The disc spaces are narrowed between C5 and C7 vertebral segments.  Encroachment of the neural foramina identified at the C5-C6 level on the right and the C6/C7 level on the left.  There is a 2 mm C4/C5 anterolisthesis.  No fracture or dislocation.  No bone destruction identified.     Impression:     See above        Electronically signed by: Kaiser Ruiz MD  Date:                                            08/27/2020  Time:                                           14:12               Narrative & Impression     EXAMINATION:  MRI LUMBAR SPINE WITHOUT CONTRAST     CLINICAL HISTORY:  lumbar spinal stenosis; Other intervertebral disc degeneration, lumbar region     TECHNIQUE:  Multiplanar, multisequence MR images were acquired from the thoracolumbar junction to the sacrum without the administration of contrast.     COMPARISON:  Non plain film examination 01/13/2020 e.     FINDINGS:  Lumbar spine alignment is within normal limits. The vertebral body heights are well maintained, with no fracture.  No  marrow signal abnormality suspicious for an infiltrative process.     The conus is normal in appearance, and terminates at the L1-L2 level.  The adjacent soft tissue structures show no significant abnormalities.     L1-L2: There is no focal disc herniation. No significant central canal or neural foraminal narrowing.     L2-L3: There is no focal disc herniation. No significant central canal or neural foraminal narrowing.     L3-L4: There is no focal disc herniation. No significant central canal or neural foraminal narrowing.     L4-L5: There is no focal disc herniation. No significant central canal or neural foraminal narrowing.     L5-S1: There is no focal disc herniation. No significant central canal or neural foraminal narrowing.     Impression:     No significant central canal stenosis, focal protrusion of disc material or neural foraminal encroachment.        Electronically signed by: Jerzy Rose MD  Date:                                            07/14/2020  Time:                                           14:15         Narrative & Impression    EXAMINATION:  XR LUMBAR SPINE AP AND LAT WITH FLEX/EXT     CLINICAL HISTORY:  Other intervertebral disc degeneration, lumbar region     TECHNIQUE:  AP and lateral views as well as lateral flexion and extension images are performed through the lumbar spine.     COMPARISON:  None     FINDINGS:  Mild DJD and lumbar scoliosis.  No significant disc space narrowing identified.  No fracture, spondylolisthesis or bone destruction noted.  Spina bifida occulta involving the S1 vertebral segment noted.     Impression:     See above        Electronically signed by: Kaiser Ruiz MD  Date:                                            01/13/2020  Time:                                           09:22    Encounter    View Encounter                     Narrative     No significant alignment abnormality.  Vertebral body heights are normally maintained, without compression deformity at  any level.  There is disc narrowing at C5-6 and C6-7, and each of these levels demonstrates posterior marginal vertebral endplate   spurring and accompanying disc bulging.  The spurring is more pronounced to the right of midline at C5-6 and to the left of midline at C6-7.  All other cervical and visualized upper thoracic levels have normal disc contour, and there is no evidence of a   significant focal disc herniation at any level.  AP diameter of the spinal canal is normally maintained at all levels with no canal stenosis/extrinsic cord compression observed.  The spinal cord is well delineated from the cervicomedullary junction to   the T3 level, and appears normal in size and configuration without focal enlargement or irregularity.  No Chiari malformation or cord cyst or syrinx.  No abnormal signal from the substance of the cord on any of the pulse sequences generated.  There is   some prominent neural foraminal stenosis at C5-6 on the right and C6-7 on the left.  No significant signal abnormality referable to the osseous structures.   Impression      Cervical spine MRI examination demonstrates degenerative changes as discussed above involving the C5-6 and C6-7 levels, with right-sided foraminal stenosis at C5-6 and left-sided foraminal stenosis at C6-7.  No evidence of a significant focal soft is   herniation, canal stenosis/extrinsic cord compression, or intramedullary cord lesion seen at any level.      Electronically signed by: Kaiser Preston MD  Date: 08/01/13  Time: 06:40          Assessment:       Encounter Diagnoses   Name Primary?    DDD (degenerative disc disease), lumbar Yes    Lumbar radiculopathy     Synovial cyst of lumbar facet joint     Lumbar spondylosis     Dorsalgia, unspecified     Carpal tunnel syndrome of right wrist     DDD (degenerative disc disease), cervical          Plan:       Naomy was seen today for arm pain.    Diagnoses and all orders for this visit:    DDD (degenerative disc disease),  lumbar    Lumbar radiculopathy    Synovial cyst of lumbar facet joint    Lumbar spondylosis    Dorsalgia, unspecified    Carpal tunnel syndrome of right wrist    DDD (degenerative disc disease), cervical          Naomy Ana Armstrong is a 59 y.o. female with left-sided low back and lower extremity pain.  MRI most notable at L5-S1 with left-sided facet joint arthropathy with a large 9 mm synovial cyst.  Severe left-sided foraminal stenosis at this level as well.  Mild, short-lived relief from left S1 TF EMMA with left L5-S1 facet joint injection/aspiration.  Patient discussing L5-S1 TLIF with Neurosurgery.  Concern today is acute right wrist/hand pain which subjectively appears consistent with carpal/cubital tunnel syndrome.  Low suspicion for cervical pathology at this time.    Prior records reviewed.  Pertinent imaging studies reviewed by me. Imaging results were discussed with patient.  Continue to follow up with Neurosurgery.  Patient planning on undergoing a MIS L5-S1 TLIF however this has been postponed due to low bone mineral density.  Okay to continue with Norco  mg q.8 hours p.r.n..  Patient taking with benefit, denies any adverse effects.  Discussed that we do not plan on providing opioids chronically although okay to continue while we seek more definitive means.  Patient expressed understanding agreement.  No refills needed at this time.  Discussed acute right upper extremity/wrist pain with unclear etiology.  Subjectively symptoms consistent with peripheral nerve entrapment likely carpal tunnel syndrome, however examination negative for this.  Going to have patient follow up with Orthopedics for further evaluation.    Return to clinic TBD.      Neal Stephenson PA-C  [facility]  Interventional Pain Management   07/08/2024    This note was created by combination of typed  and M-Modal dictation.  Transcription and phonetic errors may be present.  If there are any  questions, please contact me.

## 2024-07-09 ENCOUNTER — APPOINTMENT (OUTPATIENT)
Dept: RADIOLOGY | Facility: HOSPITAL | Age: 59
End: 2024-07-09
Attending: ORTHOPAEDIC SURGERY
Payer: COMMERCIAL

## 2024-07-09 DIAGNOSIS — M25.521 RIGHT ELBOW PAIN: ICD-10-CM

## 2024-07-09 PROCEDURE — 73080 X-RAY EXAM OF ELBOW: CPT | Mod: 26,RT,, | Performed by: RADIOLOGY

## 2024-07-09 PROCEDURE — 73080 X-RAY EXAM OF ELBOW: CPT | Mod: TC,FY,PN,RT

## 2024-07-10 ENCOUNTER — OFFICE VISIT (OUTPATIENT)
Dept: ORTHOPEDICS | Facility: CLINIC | Age: 59
End: 2024-07-10
Payer: COMMERCIAL

## 2024-07-10 DIAGNOSIS — G56.01 RIGHT CARPAL TUNNEL SYNDROME: Primary | ICD-10-CM

## 2024-07-10 PROCEDURE — 99999 PR PBB SHADOW E&M-EST. PATIENT-LVL III: CPT | Mod: PBBFAC,,, | Performed by: ORTHOPAEDIC SURGERY

## 2024-07-10 RX ADMIN — TRIAMCINOLONE ACETONIDE 40 MG: 40 INJECTION, SUSPENSION INTRA-ARTICULAR; INTRAMUSCULAR at 07:07

## 2024-07-10 NOTE — PROGRESS NOTES
Assessment: 59 y.o. female with R hand numbness and pain    I explained my diagnostic impression and the reasoning behind it in detail, using layman's terms.       Plan:   - Etiology unclear - some symptoms consistent with CTS ( pain at night disruptive to sleep, pain worse with use of hand) but distrubution of pain is not limited to median nerve distribution. Having pain in elbow but does not have symptoms in ulnar n dist   - Messaged PCP about increasing lyrica  - Messaged pain to clarify possible cervical etiology with known compression on R of C5 and C6  - Will do diagnostic injection of R CT to see if she gets any relief  - Night splinting - recommended future night splint   - Return to clinic PRN    All questions were answered in detail. The patient is in full agreement with the treatment plan and will proceed accordingly.    Chief Complaint   Patient presents with    Right Elbow - Pain, Numbness, Swelling       Initial visit (7/10/24): Naomy Armstrong is a 59 y.o. female who presents today complaining of hand pain and numbness    Pain in R hand   Does have neck pain, pain radiating down the arm  Numbness and tingling in hand in C6 dist, no isolated to median n dist  But this pain is extremely disruptive to sleep which is preventing her from working  Is on Lyrica written by her primary care doctor.  She has on the extended release formula    This is the extent of the patient's complaints at this time.         Review of patient's allergies indicates:   Allergen Reactions    Effexor [venlafaxine]      Elevated her blood pressure    Zoloft [sertraline]     Paroxetine hcl      Made her feel drunk         Current Outpatient Medications:     alosetron (LOTRONEX) 0.5 MG tablet, Take 1 tablet (0.5 mg total) by mouth 2 (two) times daily., Disp: 180 tablet, Rfl: 3    amLODIPine (NORVASC) 5 MG tablet, Take 1 tablet by mouth once daily, Disp: 90 tablet, Rfl: 1    azelastine (ASTELIN) 137 mcg (0.1 %) nasal spray, 1  spray (137 mcg total) by Nasal route 2 (two) times daily., Disp: 30 mL, Rfl: 1    cyclobenzaprine (FLEXERIL) 5 MG tablet, Take 2 tablets (10 mg total) by mouth 3 (three) times daily as needed for Muscle spasms., Disp: 90 tablet, Rfl: 0    dicyclomine (BENTYL) 20 mg tablet, TAKE 1 TABLET BY MOUTH EVERY 6 HOURS, Disp: 120 tablet, Rfl: 0    fluconazole (DIFLUCAN) 200 MG Tab, Take 1 tablet (200 mg total) by mouth once daily. Take at first sign of yeast infection, Disp: 1 tablet, Rfl: 0    FLUoxetine 40 MG capsule, Take 2 capsules (80 mg total) by mouth once daily., Disp: 180 capsule, Rfl: 3    fluticasone propionate (FLONASE) 50 mcg/actuation nasal spray, 1 spray (50 mcg total) by Each Nostril route once daily., Disp: 15.8 mL, Rfl: 0    HYDROcodone-acetaminophen (NORCO)  mg per tablet, Take 1 tablet by mouth every 8 (eight) hours as needed for Pain., Disp: 90 tablet, Rfl: 0    hydrOXYzine HCL (ATARAX) 10 MG Tab, Take 1 tablet (10 mg total) by mouth 2 (two) times daily as needed (anxiety)., Disp: 60 tablet, Rfl: 3    lisinopriL (PRINIVIL,ZESTRIL) 20 MG tablet, Take 1 tablet (20 mg total) by mouth nightly., Disp: 90 tablet, Rfl: 3    lurasidone (LATUDA) 20 mg Tab tablet, Take 1 tablet (20 mg total) by mouth once daily., Disp: 30 tablet, Rfl: 11    meloxicam (MOBIC) 15 MG tablet, Take 1 tablet (15 mg total) by mouth once daily., Disp: 30 tablet, Rfl: 0    multivitamin with minerals tablet, Take 1 tablet by mouth once daily., Disp: , Rfl:     nitrofurantoin, macrocrystal-monohydrate, (MACROBID) 100 MG capsule, Take 1 capsule (100 mg total) by mouth once daily., Disp: 30 capsule, Rfl: 5    ondansetron (ZOFRAN-ODT) 8 MG TbDL, Take 1 tablet (8 mg total) by mouth every 12 (twelve) hours as needed (nausea)., Disp: 30 tablet, Rfl: 0    pregabalin 165 mg Tb24, Take 165 mg by mouth every evening., Disp: 90 tablet, Rfl: 1    promethazine (PHENERGAN) 25 MG tablet, Take 1 tablet (25 mg total) by mouth every 8 (eight) hours as  needed for Nausea., Disp: 30 tablet, Rfl: 0    ubrogepant (UBRELVY) 100 mg tablet, TAKE 1 TABLET BY MOUTH AS NEEDED (MAY REPEAT 1 TIME IN TWO HOURS. DO NOT EXCEED TWO TABLETS IN 24 HOURS)., Disp: , Rfl:     omeprazole (PRILOSEC) 40 MG capsule, Take 1 capsule (40 mg total) by mouth every morning., Disp: 90 capsule, Rfl: 3    Physical Exam:   Vitals:    07/10/24 0747   PainSc: 10-Worst pain ever   PainLoc: Elbow       General:  Patient is alert, awake and oriented to time, place and person. Mood and affect are appropriate.  Patient does not appear to be in any distress, denies any constitutional symptoms and appears stated age.   HEENT:  Pupils are equal and round, sclera are not injected. External examination of ears and nose reveals no abnormalities. Cranial nerves II-X are grossly intact  Skin:  no rashes, abrasions or open wounds on the affected extremity   Resp:  No respiratory distress or audible wheezing   CV: 2+  pulses, all extremities warm and well perfused   Right Hand/Wrist Examination:    Observation/Inspection:  Swelling  none    Deformity  none  Discoloration  none     Scars   none    Atrophy  none    HAND/WRIST EXAMINATION:  Finkelstein's Test   Neg  WHAT Test    Neg  Snuff box tenderness   Neg  Bello's Test    Neg  Hook of Hamate Tenderness  Neg  CMC grind    Neg  Circumduction test   Neg    Neurovascular Exam:  Digits WWP, brisk CR < 3s throughout  NVI motor/LTS to M/R/U nerves, radial pulse 2+  Tinel's Test - Carpal Tunnel  Neg  Tinel's Test - Cubital Tunnel  Neg  Phalen's Test    Neg  Median Nerve Compression Test positive    ROM hand/wrist/elbow full, painless       Imaging:  No new    Previous EMG shows mild carpal tunnel syndrome bilaterally, concern for radiculopathy     This note was created by combination of typed  and M-Modal dictation. Transcription and phonetic errors may be present.  If there are any questions, please contact me.    Past Medical History:   Diagnosis Date     Alcohol abuse     per chart, but patient denies today and cocaine per chart    Anxiety     Colon polyp     Depression     Hallucination     last couple of months started seeing someone standing in her room, saw boyfriend's reflection in glass    Headache     History of psychiatric hospitalization     age 13 [employer] for 2 months for acting out; 2018 for SI    Hx of psychiatric care     Hypertension     Psychiatric problem     Sleep difficulties     Suicide attempt     with knife by cutting stomach    Therapy        Active Problem List with Overview Notes    Diagnosis Date Noted    Lumbar radiculopathy 05/16/2024    Hypertension, essential 02/18/2024    Memory impairment 12/05/2023    Generalized anxiety disorder 11/27/2022    Esophagogastric junction outflow obstruction 11/22/2022    Epigastric pain 06/07/2022    Major depressive disorder, recurrent, moderate 02/09/2022    Cervical spondylosis 08/11/2021    Hiatal hernia 07/09/2021    Irritable bowel syndrome with diarrhea 07/09/2021    SOB (shortness of breath)     Lumbosacral spondylosis 04/16/2021    Sacroiliac joint pain 03/31/2021    Spondylosis of lumbosacral region 03/31/2021    Chronic back pain greater than 3 months duration 07/16/2020    Decreased strength of lower extremity 06/01/2020    Decreased strength of trunk and back 06/01/2020    Pain in joint of right hip 06/01/2020    Decreased range of motion of right lower extremity 06/01/2020    Bilateral primary osteoarthritis of knee 03/12/2020    Lumbar spondylosis 01/13/2020    DDD (degenerative disc disease), lumbar 01/13/2020    Psychosocial distress 12/20/2019    Spondylosis of cervical region without myelopathy or radiculopathy 07/09/2019    Primary osteoarthritis of right knee 06/24/2019    Neck pain 06/24/2019    Dependent personality disorder 02/09/2018    Cervical radiculopathy 04/26/2013     X-ray cervical spine and EMG/nerve conduction studies        DDD (degenerative disc disease), cervical  04/26/2013    Cervicogenic headache 04/26/2013     Patient has cervicogenic headaches which we will address with a combination of heat/ice application, muscle relaxers, and potentially anti-inflammatories.            Past Surgical History:   Procedure Laterality Date    COLONOSCOPY N/A 05/07/2021    Procedure: COLONOSCOPY;  Surgeon: Prem Montana MD;  Location: [facility];  Service: Endoscopy;  Laterality: N/A;  COVID screening 5/4 [facility]-instr portal -ml    COLONOSCOPY N/A 06/06/2022    Procedure: COLONOSCOPY;  Surgeon: Fab Shepherd MD;  [facility]  Service: Endoscopy;  Laterality: N/A;  C-Diff neg  Rapid COVID    EPIDURAL STEROID INJECTION Bilateral 08/05/2020    Procedure: Bilateral MBB @ L3, L4, L5;  Surgeon: Jeremy Powell Jr., MD;  Location: [facility];  Service: Pain Management;  Laterality: Bilateral;  Bilat L3-5 MBB  Arrive @ 1315; No ATC or DM; Needs MD signature    EPIDURAL STEROID INJECTION Right 08/19/2020    Procedure: Lumbar Radiofrequency Thermocoagulation of Medial Branches;  Surgeon: Jeremy Powell Jr., MD;  Location: [facility];  Service: Pain Management;  Laterality: Right;  Right L3-5 RFA  Arrive @ 1045; No ATC or DM; Needs MD Signature    EPIDURAL STEROID INJECTION Left 09/04/2020    Procedure: Lumbar Radiofrequency Thermocoagulation of Medial Branches;  Surgeon: Jeremy Powell Jr., MD;  Location: [facility];  Service: Pain Management;  Laterality: Left;  Left L3-5 RFA  Arrive @ 0900 (requested); No ATC or DM; Needs MD signature    EPIDURAL STEROID INJECTION Bilateral 04/16/2021    Procedure: Sacroiliac Joint Steroid Injections @ Bilateral;  Surgeon: Jeremy Powell Jr., MD;  Location: [facility];  Service: Pain Management;  Laterality: Bilateral;  Arrive @ 1300; No ATC or DM    EPIDURAL STEROID INJECTION Bilateral 01/19/2022    Procedure: Bilateral Sacroiliac Joint Injections;  Surgeon: Jeremy Powell Jr., MD;  Location: [facility];  Service: Pain  Management;  Laterality: Bilateral;  Arrive @ 1315; No ATC or DM; Needs Consent    EPIDURAL STEROID INJECTION Bilateral 07/28/2023    Procedure: Bilateral L2, L3, L4 Medial Branch Blocks #1;  Surgeon: Jeremy Powell Jr., MD;  Location: [facility];  Service: Pain Management;  Laterality: Bilateral;  @1200  No ATC or DM    EPIDURAL STEROID INJECTION Bilateral 08/11/2023    Procedure: Bilateral L2, L3, L4 Medial Branch Blocks #2;  Surgeon: Jeremy Powell Jr., MD;  Location: [facility];  Service: Pain Management;  Laterality: Bilateral;  @1330  No ATC or DM    ESOPHAGEAL MANOMETRY WITH MEASUREMENT OF IMPEDANCE N/A 10/20/2021    Procedure: MANOMETRY, ESOPHAGUS, WITH IMPEDANCE MEASUREMENT;  Surgeon: Anastacia Odonnell MD;  Location: [facility] (4TH FLR);  Service: Endoscopy;  Laterality: N/A;  Covid test 10/17 [place], instructions sent to [email or web address]    ESOPHAGOGASTRODUODENOSCOPY N/A 05/07/2021    Procedure: EGD (ESOPHAGOGASTRODUODENOSCOPY);  Surgeon: Prem Montana MD;  Location: [facility];  Service: Endoscopy;  Laterality: N/A;  added on per Dr. NOEL Shepherd    EYE SURGERY  6/6/2015    Lasik    HERNIA REPAIR  2/14/2022    Hiatal hernia    HYSTERECTOMY      INJECTION, FACET JOINT, LUMBOSACRAL Left 6/14/2024    Procedure: Left S1 Transforaminal Epidural Steroid Injection + Left L5-S1 Facet Joint Injection/Aspiration;  Surgeon: Jeremy Powell Jr., MD;  Location: [facility];  Service: Pain Management;  Laterality: Left;  @1200  No ATC or DM  Needs Consent  IV Sedation    LAPAROSCOPIC TOUPET FUNDOPLICATION N/A 02/14/2022    Procedure: FUNDOPLICATION, LAPAROSCOPIC, TOUPET;  Surgeon: Christian Martinez MD;  [facility];  Service: General;  Laterality: N/A;       Family History   Problem Relation Name Age of Onset    Anxiety disorder Sister Bev     Depression Sister Bev     Alcohol abuse Maternal Uncle      Alcohol abuse Maternal Grandfather

## 2024-07-11 DIAGNOSIS — G44.86 CERVICOGENIC HEADACHE: ICD-10-CM

## 2024-07-11 RX ORDER — TRIAMCINOLONE ACETONIDE 40 MG/ML
40 INJECTION, SUSPENSION INTRA-ARTICULAR; INTRAMUSCULAR
Status: DISCONTINUED | OUTPATIENT
Start: 2024-07-10 | End: 2024-07-11 | Stop reason: HOSPADM

## 2024-07-11 NOTE — TELEPHONE ENCOUNTER
No care due was identified.  Health [facility] Embedded Care Due Messages. Reference number: [id number].   7/11/2024 5:02:17 PM CDT

## 2024-07-11 NOTE — PROCEDURES
Carpal Tunnel    Date/Time: 7/10/2024 7:45 AM    Performed by: Jessica Shay MD  Authorized by: Jessica Shay MD    Consent Done?:  Yes (Verbal)  Indications:  Pain  Timeout: prior to procedure the correct patient, procedure, and site was verified    Location:  Wrist  Needle size:  22 G  Approach:  [place]  Medications:  40 mg triamcinolone acetonide 40 mg/mL  Patient tolerance:  Patient tolerated the procedure well with no immediate complications

## 2024-07-12 RX ORDER — CYCLOBENZAPRINE HCL 5 MG
10 TABLET ORAL 3 TIMES DAILY PRN
Qty: 90 TABLET | Refills: 0 | Status: SHIPPED | OUTPATIENT
Start: 2024-07-12

## 2024-07-24 ENCOUNTER — PATIENT MESSAGE (OUTPATIENT)
Dept: NEUROSURGERY | Facility: CLINIC | Age: 59
End: 2024-07-24
Payer: COMMERCIAL

## 2024-07-29 ENCOUNTER — PATIENT MESSAGE (OUTPATIENT)
Dept: FAMILY MEDICINE | Facility: CLINIC | Age: 59
End: 2024-07-29
Payer: COMMERCIAL

## 2024-07-30 ENCOUNTER — OFFICE VISIT (OUTPATIENT)
Dept: FAMILY MEDICINE | Facility: CLINIC | Age: 59
End: 2024-07-30
Payer: COMMERCIAL

## 2024-07-30 DIAGNOSIS — M50.30 DDD (DEGENERATIVE DISC DISEASE), CERVICAL: Chronic | ICD-10-CM

## 2024-07-30 DIAGNOSIS — M62.830 MUSCLE SPASM OF BACK: Chronic | ICD-10-CM

## 2024-07-30 DIAGNOSIS — M47.816 LUMBAR SPONDYLOSIS: Chronic | ICD-10-CM

## 2024-07-30 DIAGNOSIS — M85.80 OSTEOPENIA AFTER MENOPAUSE: Primary | Chronic | ICD-10-CM

## 2024-07-30 DIAGNOSIS — Z78.0 OSTEOPENIA AFTER MENOPAUSE: Primary | Chronic | ICD-10-CM

## 2024-07-30 PROBLEM — R10.13 EPIGASTRIC PAIN: Status: RESOLVED | Noted: 2022-06-07 | Resolved: 2024-07-30

## 2024-07-30 PROBLEM — F60.7 DEPENDENT PERSONALITY DISORDER: Status: RESOLVED | Noted: 2018-02-09 | Resolved: 2024-07-30

## 2024-07-30 PROBLEM — Z65.8 PSYCHOSOCIAL DISTRESS: Status: RESOLVED | Noted: 2019-12-20 | Resolved: 2024-07-30

## 2024-07-30 PROCEDURE — 3044F HG A1C LEVEL LT 7.0%: CPT | Mod: CPTII,95,, | Performed by: FAMILY MEDICINE

## 2024-07-30 PROCEDURE — 99214 OFFICE O/P EST MOD 30 MIN: CPT | Mod: 95,,, | Performed by: FAMILY MEDICINE

## 2024-07-30 PROCEDURE — 1160F RVW MEDS BY RX/DR IN RCRD: CPT | Mod: CPTII,95,, | Performed by: FAMILY MEDICINE

## 2024-07-30 PROCEDURE — G2211 COMPLEX E/M VISIT ADD ON: HCPCS | Mod: 95,,, | Performed by: FAMILY MEDICINE

## 2024-07-30 PROCEDURE — 1159F MED LIST DOCD IN RCRD: CPT | Mod: CPTII,95,, | Performed by: FAMILY MEDICINE

## 2024-07-30 PROCEDURE — 4010F ACE/ARB THERAPY RXD/TAKEN: CPT | Mod: CPTII,95,, | Performed by: FAMILY MEDICINE

## 2024-07-30 RX ORDER — PREGABALIN 330 MG/1
330 TABLET, FILM COATED, EXTENDED RELEASE ORAL
Qty: 30 TABLET | Refills: 0 | Status: SHIPPED | OUTPATIENT
Start: 2024-07-30

## 2024-07-30 NOTE — Clinical Note
Hi. Dr. King and Ms. Adair, I discussed with the patient's starting on bisphosphonate therapy recommended.  We discussed starting alendronate once weekly, and discussed risks and benefits of the medication.  She does have a procedure planned soon, a dental crown, and she is aware to delay starting the Fosamax until this is completed due to risk of osteonecrosis of the jaw.  She stated that she will reach out to the dentist within the week to get this scheduled.  Moni

## 2024-07-30 NOTE — PROGRESS NOTES
Patient Name: Naomy Armstrong    : 1965  MRN: [id number]    The patient location is: [place], [place]  The chief complaint leading to consultation is: Bone Density Review    Visit type: audiovisual    Face to Face time with patient: 25 minutes  30 minutes of total time spent on the encounter, which includes face to face time and non-face to face time preparing to see the patient (eg, review of tests), Obtaining and/or reviewing separately obtained history, Documenting clinical information in the electronic or other health record, Independently interpreting results (not separately reported) and communicating results to the patient/family/caregiver, or Care coordination (not separately reported).         Each patient to whom he or she provides medical services by telemedicine is:  (1) informed of the relationship between the physician and patient and the respective role of any other health care provider with respect to management of the patient; and (2) notified that he or she may decline to receive medical services by telemedicine and may withdraw from such care at any time.    Notes:       Subjective:     Patient ID: Naomy is a 59 y.o. female    Chief Complaint:  Bone Density Result    59-year old female makes virtual visit to discuss options for osteopenia.  Patient reports having seen Neurosurgery for chronic back pain in his recommending a surgical procedure to help in the management of her pain.  She states that neurosurgery is recommending however that patient for start on a bisphosphonate medication to help strengthen her bones prior to the procedure.  Review of Neurosurgery note shows that there was a plan for an L5/S1 transforaminal lumbar interbody fusion.    The patient would also like to discuss increasing her once daily pregabalin.  He reports that her current dose of pregabalin 165 milligrams extended release, once daily provides moderate relief but she would like to see if a higher dose can  provide better relief.  She reports no significant side effects from her current dose.         Review of Systems   Constitutional:  Negative for unexpected weight change.   Respiratory:  Negative for shortness of breath and wheezing.    Cardiovascular:  Negative for chest pain.   Gastrointestinal:  Negative for abdominal pain, blood in stool and change in bowel habit.   Musculoskeletal:  Positive for arthralgias, back pain, leg pain and myalgias.        Objective:   LMP 09/15/2013     Physical Exam  Pulmonary:      Effort: Pulmonary effort is normal.   Neurological:      Mental Status: She is alert.   Psychiatric:         Mood and Affect: Mood normal.         Behavior: Behavior normal.         Thought Content: Thought content normal.         Judgment: Judgment normal.        Assessment        ICD-10-CM ICD-9-CM   1. Osteopenia after menopause  M85.80 733.90    Z78.0 V49.81   2. Muscle spasm of back  M62.830 724.8   3. DDD (degenerative disc disease), cervical  M50.30 722.4   4. Lumbar spondylosis  M47.816 721.3         Plan:     1. Osteopenia after menopause  Overview:  06-  DEXA  Lumbar spine (L1-L4):  T-score is -1.6, and Z-score is -0.1.  Femoral neck:    T-score is -2.3, and Z-score is -1.0.  Total hip:    T-score is -1.4, and Z-score is -0.4.    Fracture Risk (FRAX)  18% risk of a major osteoporotic fracture in the next 10 years.  2.8% risk of hip fracture in the next 10 years.    Impression:  Low bone mass (Osteopenia); FRAX calculations do not support treatment as osteoporosis.    RECOMMENDATIONS:  *Daily calcium intake 4658-9792 mg, dietary sources preferred; Vitamin D 4676-1602 IU daily.  *Weight bearing exercise and fall precautions.  *No additional pharmacologic therapy recommended at this time.  *Repeat BMD in 2 years.    Assessment & Plan:  Reviewed with patient risks and benefits of bisphosphonate treatment.  Discussed importance of avoiding dental procedures while on medication secondary to  potential risk of osteonecrosis of the jaw.  She has a dental crown procedure planned with her dentist and will reach out to her dentist soon.  Patient agrees to delay starting the Fosamax until she completes her dental procedure and has clearance from her dentist that no further invasive dental procedures need to be scheduled.  Will also start on a calcium and vitamin-D supplementation and recommended patient to start on Citracal slow release,  two tablets once daily with a meal.  She may start Citracal as soon as possible even prior to initiating Fosamax.    Orders:  -     alendronate (FOSAMAX) 35 MG tablet; Take 1 tablet (35 mg total) by mouth every 7 days.  Dispense: 12 tablet; Refill: 1  -     calcium carb and citrate-vitD3 (CITRACAL-D3 SLOW RELEASE) 600 mg-12.5 mcg (500 unit) TbSR; Take 2 tablets by mouth once daily.  Dispense: 180 tablet; Refill: 3    2. Muscle spasm of back/  3. DDD (degenerative disc disease), cervical/  4. Lumbar spondylosis  Assessment & Plan:  Will increase pregabalin extended release to 330 milligrams on daily.  Patient will let me know in the next two weeks how she is doing on higher dose.  Potential side effects reviewed.    Orders:  -     pregabalin 330 mg Tb24; Take 330 mg by mouth after dinner.  Dispense: 30 tablet; Refill: 0           -Eric Hernandes Jr., MD, AAHIVS      This office note has been dictated.  This dictation has been generated using M-Modal Fluency Direct dictation; some phonetic errors may occur.         There are no Patient Instructions on file for this visit.      No follow-ups on file.   Future Appointments   Date Time Provider Department Center   10/2/2024  1:00 PM Christine Brunson MD [facility] PSYCH [facility]   10/22/2024  8:00 AM LAB, [facility] DRAW STATION [facility] LAB [facility]   10/29/2024 10:40 AM Eric Hernandes Jr., MD [facility]

## 2024-07-31 ENCOUNTER — PATIENT MESSAGE (OUTPATIENT)
Dept: FAMILY MEDICINE | Facility: CLINIC | Age: 59
End: 2024-07-31
Payer: COMMERCIAL

## 2024-07-31 RX ORDER — ALENDRONATE SODIUM 35 MG/1
35 TABLET ORAL
Qty: 12 TABLET | Refills: 1 | Status: SHIPPED | OUTPATIENT
Start: 2024-07-31 | End: 2025-07-31

## 2024-07-31 RX ORDER — VITAMIN E (DL,TOCOPHERYL ACET) 45 MG/0.25
2 DROPS ORAL DAILY
Qty: 180 TABLET | Refills: 3 | Status: SHIPPED | OUTPATIENT
Start: 2024-07-31

## 2024-08-01 NOTE — ASSESSMENT & PLAN NOTE
Will increase pregabalin release to 330 milligrams.  Patient will let me know in the next two weeks how she is doing dose.  Potential side effects reviewed.

## 2024-08-01 NOTE — ASSESSMENT & PLAN NOTE
Reviewed with patient risks and benefits of bisphosphonate treatment.  Discussed importance of avoiding dental procedures while on medication secondary to potential risk of osteonecrosis of the jaw.  She has a dental crown procedure planned with her dentist and will reach out to her dentist soon.  Patient agrees to delay starting the Fosamax until she completes her dental procedure and has clearance from her dentist that no further invasive dental procedures need to be scheduled.  Will also start on a calcium and vitamin-D supplementation and recommended patient to start on Citracal slow release,  two tablets once daily with a meal.  She may start Citracal as soon as possible even prior to initiating Fosamax.

## 2024-08-06 DIAGNOSIS — M62.830 MUSCLE SPASM OF BACK: Chronic | ICD-10-CM

## 2024-08-06 DIAGNOSIS — M51.36 DDD (DEGENERATIVE DISC DISEASE), LUMBAR: ICD-10-CM

## 2024-08-06 DIAGNOSIS — M50.30 DDD (DEGENERATIVE DISC DISEASE), CERVICAL: Chronic | ICD-10-CM

## 2024-08-06 DIAGNOSIS — M54.16 LUMBAR RADICULOPATHY: ICD-10-CM

## 2024-08-06 DIAGNOSIS — M47.816 LUMBAR SPONDYLOSIS: Chronic | ICD-10-CM

## 2024-08-06 DIAGNOSIS — M71.38 SYNOVIAL CYST OF LUMBAR FACET JOINT: ICD-10-CM

## 2024-08-06 RX ORDER — HYDROCODONE BITARTRATE AND ACETAMINOPHEN 10; 325 MG/1; MG/1
1 TABLET ORAL EVERY 8 HOURS PRN
Qty: 90 TABLET | Refills: 0 | Status: SHIPPED | OUTPATIENT
Start: 2024-08-06 | End: 2024-09-06

## 2024-08-07 ENCOUNTER — PATIENT MESSAGE (OUTPATIENT)
Dept: PAIN MEDICINE | Facility: CLINIC | Age: 59
End: 2024-08-07
Payer: COMMERCIAL

## 2024-08-07 RX ORDER — HYDROCODONE BITARTRATE AND ACETAMINOPHEN 10; 325 MG/1; MG/1
1 TABLET ORAL EVERY 8 HOURS PRN
Qty: 90 TABLET | Refills: 0 | OUTPATIENT
Start: 2024-08-07 | End: 2024-09-06

## 2024-08-08 RX ORDER — PREGABALIN 330 MG/1
330 TABLET, FILM COATED, EXTENDED RELEASE ORAL
Qty: 30 TABLET | Refills: 0 | Status: SHIPPED | OUTPATIENT
Start: 2024-08-08

## 2024-08-26 DIAGNOSIS — M50.30 DDD (DEGENERATIVE DISC DISEASE), CERVICAL: Chronic | ICD-10-CM

## 2024-08-26 DIAGNOSIS — M47.816 LUMBAR SPONDYLOSIS: Chronic | ICD-10-CM

## 2024-08-26 DIAGNOSIS — M62.830 MUSCLE SPASM OF BACK: Chronic | ICD-10-CM

## 2024-08-26 RX ORDER — PREGABALIN 330 MG/1
330 TABLET, FILM COATED, EXTENDED RELEASE ORAL
Qty: 30 TABLET | Refills: 5 | Status: SHIPPED | OUTPATIENT
Start: 2024-08-26

## 2024-08-27 ENCOUNTER — TELEPHONE (OUTPATIENT)
Dept: FAMILY MEDICINE | Facility: CLINIC | Age: 59
End: 2024-08-27
Payer: COMMERCIAL

## 2024-08-27 NOTE — TELEPHONE ENCOUNTER
----- Message from Sade Kowalski sent at 8/27/2024 11:01 AM CDT -----  Regarding: pharmacy call  Type:  Pharmacy Calling    Name of Caller: Max  Pharmacy Name:[facility]  Prescription Name: pregabalin 330 mg Tb24   What do they need to clarify?: out of 330mg meds, requesting alternative rx or a different dosage of medication   Best Call Back Number: [phone]  Additional Information:

## 2024-08-27 NOTE — TELEPHONE ENCOUNTER
Called [facility] back and they stated that they are out of stock with Pregabalin 330 mg.  They have  mg immediate release only.    Left voicemail to patient to call back to see what her preferences are.

## 2024-09-04 DIAGNOSIS — R11.0 NAUSEA: ICD-10-CM

## 2024-09-04 DIAGNOSIS — M51.36 DDD (DEGENERATIVE DISC DISEASE), LUMBAR: ICD-10-CM

## 2024-09-04 DIAGNOSIS — M71.38 SYNOVIAL CYST OF LUMBAR FACET JOINT: ICD-10-CM

## 2024-09-04 DIAGNOSIS — Z78.0 OSTEOPENIA AFTER MENOPAUSE: Chronic | ICD-10-CM

## 2024-09-04 DIAGNOSIS — M85.80 OSTEOPENIA AFTER MENOPAUSE: Chronic | ICD-10-CM

## 2024-09-04 DIAGNOSIS — G44.86 CERVICOGENIC HEADACHE: ICD-10-CM

## 2024-09-04 DIAGNOSIS — M54.16 LUMBAR RADICULOPATHY: ICD-10-CM

## 2024-09-04 RX ORDER — HYDROCODONE BITARTRATE AND ACETAMINOPHEN 10; 325 MG/1; MG/1
1 TABLET ORAL EVERY 8 HOURS PRN
Qty: 90 TABLET | Refills: 0 | Status: CANCELLED | OUTPATIENT
Start: 2024-09-04 | End: 2024-10-04

## 2024-09-05 ENCOUNTER — PATIENT MESSAGE (OUTPATIENT)
Dept: PAIN MEDICINE | Facility: CLINIC | Age: 59
End: 2024-09-05
Payer: COMMERCIAL

## 2024-09-05 ENCOUNTER — TELEPHONE (OUTPATIENT)
Dept: NEUROSURGERY | Facility: CLINIC | Age: 59
End: 2024-09-05
Payer: COMMERCIAL

## 2024-09-05 DIAGNOSIS — M71.38 SYNOVIAL CYST OF LUMBAR FACET JOINT: ICD-10-CM

## 2024-09-05 DIAGNOSIS — M51.36 DDD (DEGENERATIVE DISC DISEASE), LUMBAR: ICD-10-CM

## 2024-09-05 DIAGNOSIS — M54.16 LUMBAR RADICULOPATHY: ICD-10-CM

## 2024-09-05 RX ORDER — HYDROCODONE BITARTRATE AND ACETAMINOPHEN 10; 325 MG/1; MG/1
1 TABLET ORAL EVERY 8 HOURS PRN
Qty: 90 TABLET | Refills: 0 | Status: SHIPPED | OUTPATIENT
Start: 2024-09-06 | End: 2024-10-06

## 2024-09-05 NOTE — TELEPHONE ENCOUNTER
No care due was identified.  [facility] Embedded Care Due Messages. Reference number: [id number].   9/04/2024 9:04:54 PM CDT

## 2024-09-05 NOTE — TELEPHONE ENCOUNTER
Refill Routing Note   Medication(s) are not appropriate for processing by [facility] for the following reason(s):        Outside of protocol: these medications are non-delegated to the Refill Center    [facility] action(s):  Route      Medication Therapy Plan:         Appointments  past 12m or future 3m with PCP    Date Provider   Last Visit   7/30/2024 Eric Hernandes Jr., MD   Next Visit   10/29/2024 Eric Hernandes Jr., MD   ED visits in past 90 days: 0        Note composed:11:31 AM 09/05/2024

## 2024-09-05 NOTE — TELEPHONE ENCOUNTER
Spoke to patient by telephone.  Still having back pain and pain radiating down the hip and leg.  Had to take off work yesterday due to this pain.  She has been on osteoporosis treatment for about 3 weeks at this point.  We will try to get her back in for an office visit next week to discuss whether or not she wants to go forward with surgery.

## 2024-09-06 ENCOUNTER — PATIENT MESSAGE (OUTPATIENT)
Dept: NEUROSURGERY | Facility: CLINIC | Age: 59
End: 2024-09-06
Payer: COMMERCIAL

## 2024-09-06 RX ORDER — VITAMIN E (DL,TOCOPHERYL ACET) 45 MG/0.25
2 DROPS ORAL DAILY
Qty: 180 TABLET | Refills: 3 | Status: SHIPPED | OUTPATIENT
Start: 2024-09-06

## 2024-09-06 RX ORDER — CYCLOBENZAPRINE HCL 5 MG
10 TABLET ORAL 3 TIMES DAILY PRN
Qty: 90 TABLET | Refills: 0 | Status: SHIPPED | OUTPATIENT
Start: 2024-09-06

## 2024-09-06 RX ORDER — PROMETHAZINE HYDROCHLORIDE 25 MG/1
25 TABLET ORAL EVERY 8 HOURS PRN
Qty: 30 TABLET | Refills: 0 | Status: SHIPPED | OUTPATIENT
Start: 2024-09-06

## 2024-09-08 RX ORDER — MELOXICAM 15 MG/1
15 TABLET ORAL DAILY
Qty: 30 TABLET | Refills: 0 | Status: SHIPPED | OUTPATIENT
Start: 2024-09-08

## 2024-09-10 ENCOUNTER — PATIENT MESSAGE (OUTPATIENT)
Dept: NEUROSURGERY | Facility: CLINIC | Age: 59
End: 2024-09-10
Payer: COMMERCIAL

## 2024-09-10 ENCOUNTER — TELEPHONE (OUTPATIENT)
Dept: NEUROSURGERY | Facility: CLINIC | Age: 59
End: 2024-09-10
Payer: COMMERCIAL

## 2024-09-19 ENCOUNTER — TELEPHONE (OUTPATIENT)
Dept: NEUROSURGERY | Facility: CLINIC | Age: 59
End: 2024-09-19
Payer: COMMERCIAL

## 2024-09-19 NOTE — PROGRESS NOTES
[facility]  Neurosurgery    SUBJECTIVE:     Interval history 9/20/24:  Patient returns to clinic for FU of lumbar radiculopathy. She started fosamax treatment for osteopenia about 5 weeks ago. She reports a fall yesterday and of the car.  She stepped on a tree root and fell down hitting her back and left shoulder onto the root.    Although improved compared to her 1st visit in June, she does still report frequent severe pain in her back and left leg.  Left leg pain is located in her lateral thigh, stopping at the knee.  She denies numbness in her left foot.  Her left foot remains weak compared to the right but is slightly improved compared to her initial visit.  She would like to move forward with surgery in late October if possible      History of Present Illness 6/18/24:  Naomy Armstrong is a 59 y.o. female past medical history significant for chronic low back pain, anxiety and depression who presents with left lower extremity radiculopathy which has been worsening for about 10 1/2 weeks, since the beginning of April.    She also has some knee issues, and it has been unclear if this pain has been related to the knee or the back.  She does endorse back pain but this is mainly on the left side of the back.  It shoots down the upper left lateral hip and upper left lateral thigh.  Previously, it radiated all the way down to the left foot.    She recently underwent an epidural steroid injection as well as a joint/cyst aspiration procedure.  This provided about 3 days of significant pain relief, however she woke up this morning with sudden return of her pain.  At the very least, this procedure demonstrated that the foraminal stenosis on the left and the synovial cyst are her likely pain generators.    In the past, she is undergone:  -bilateral SI joint injections- January 20, 2022  -bilateral L2/L3/L4 medial branch blocks- July 2023  -repeat bilateral L2/L3/L4 medial branch blocks- August 2023    (Not  in a hospital admission)      Review of patient's allergies indicates:   Allergen Reactions    Effexor [venlafaxine]      Elevated her blood pressure    Zoloft [sertraline]     Paroxetine hcl      Made her feel drunk       Past Medical History:   Diagnosis Date    Alcohol abuse     per chart, but patient denies today and cocaine per chart    Anxiety     Colon polyp     Depression     Hallucination     last couple of months started seeing someone standing in her room, saw boyfriend's reflection in glass    Headache     History of psychiatric hospitalization     age 13 [employer] for 2 months for acting out; 2018 for SI    Hx of psychiatric care     Hypertension     Psychiatric problem     Sleep difficulties     Suicide attempt     with knife by cutting stomach    Therapy      Past Surgical History:   Procedure Laterality Date    COLONOSCOPY N/A 05/07/2021    Procedure: COLONOSCOPY;  Surgeon: Prem Montana MD;  Location: [facility];  Service: Endoscopy;  Laterality: N/A;  COVID screening 5/4 [facility]-instr portal -ml    COLONOSCOPY N/A 06/06/2022    Procedure: COLONOSCOPY;  Surgeon: Fab Shepherd MD;  [facility]  Service: Endoscopy;  Laterality: N/A;  C-Diff neg  Rapid COVID    EPIDURAL STEROID INJECTION Bilateral 08/05/2020    Procedure: Bilateral MBB @ L3, L4, L5;  Surgeon: Jeremy Powell Jr., MD;  Location: [facility] ENDO;  Service: Pain Management;  Laterality: Bilateral;  Bilat L3-5 MBB  Arrive @ 1315; No ATC or DM; Needs MD signature    EPIDURAL STEROID INJECTION Right 08/19/2020    Procedure: Lumbar Radiofrequency Thermocoagulation of Medial Branches;  Surgeon: Jeremy Powell Jr., MD;  Location: [facility] ENDO;  Service: Pain Management;  Laterality: Right;  Right L3-5 RFA  Arrive @ 1045; No ATC or DM; Needs MD Signature    EPIDURAL STEROID INJECTION Left 09/04/2020    Procedure: Lumbar Radiofrequency Thermocoagulation of Medial Branches;  Surgeon: Jeremy Powell Jr., MD;  Location: [facility]  ENDO;  Service: Pain Management;  Laterality: Left;  Left L3-5 RFA  Arrive @ 0900 (requested); No ATC or DM; Needs MD signature    EPIDURAL STEROID INJECTION Bilateral 04/16/2021    Procedure: Sacroiliac Joint Steroid Injections @ Bilateral;  Surgeon: Jeremy Powell Jr., MD;  Location: [facility];  Service: Pain Management;  Laterality: Bilateral;  Arrive @ 1300; No ATC or DM    EPIDURAL STEROID INJECTION Bilateral 01/19/2022    Procedure: Bilateral Sacroiliac Joint Injections;  Surgeon: Jeremy Powell Jr., MD;  Location: [facility];  Service: Pain Management;  Laterality: Bilateral;  Arrive @ 1315; No ATC or DM; Needs Consent    EPIDURAL STEROID INJECTION Bilateral 07/28/2023    Procedure: Bilateral L2, L3, L4 Medial Branch Blocks #1;  Surgeon: Jeremy Powell Jr., MD;  Location: [facility];  Service: Pain Management;  Laterality: Bilateral;  @1200  No ATC or DM    EPIDURAL STEROID INJECTION Bilateral 08/11/2023    Procedure: Bilateral L2, L3, L4 Medial Branch Blocks #2;  Surgeon: Jeremy Powell Jr., MD;  Location: [facility];  Service: Pain Management;  Laterality: Bilateral;  @1330  No ATC or DM    ESOPHAGEAL MANOMETRY WITH MEASUREMENT OF IMPEDANCE N/A 10/20/2021    Procedure: MANOMETRY, ESOPHAGUS, WITH IMPEDANCE MEASUREMENT;  Surgeon: Anastacia Odonnell MD;  Location: [facility] ([address] [place] [address]);  Service: Endoscopy;  Laterality: N/A;  Covid test 10/17 [place], instructions [facility] to [email or web address]    ESOPHAGOGASTRODUODENOSCOPY N/A 05/07/2021    Procedure: EGD (ESOPHAGOGASTRODUODENOSCOPY);  Surgeon: Prem Montana MD;  Location: [facility];  Service: Endoscopy;  Laterality: N/A;  added on per Dr. NOEL Shepherd    EYE SURGERY  6/6/2015    Lasik    HERNIA REPAIR  2/14/2022    Hiatal hernia    HYSTERECTOMY      INJECTION, FACET JOINT, LUMBOSACRAL Left 6/14/2024    Procedure: Left S1 Transforaminal Epidural Steroid Injection + Left L5-S1 Facet Joint Injection/Aspiration;  Surgeon: Jeremy Powell Jr., MD;   Location: [facility] PAIN [facility]NAGEMENT;  Service: Pain Management;  Laterality: Left;  @1200  No ATC or DM  Needs Consent  IV Sedation    LAPAROSCOPIC TOUPET FUNDOPLICATION N/A 2022    Procedure: FUNDOPLICATION, LAPAROSCOPIC, TOUPET;  Surgeon: Christian Martinez MD;  Location: [facility] OR [address] [place] [address];  Service: General;  Laterality: N/A;       Social History     Tobacco Use    Smoking status: Former     Current packs/day: 0.00     Average packs/day: 1 pack/day for 21.3 years (21.3 ttl pk-yrs)     Types: Cigarettes     Start date: 1985     Quit date: 2006     Years since quittin.4    Smokeless tobacco: Never    Tobacco comments:     It has been a very long time. I do not remember dates   Substance Use Topics    Alcohol use: Yes     Comment: occasional    Drug use: Not Currently     Types: Marijuana     Comment: tried at age 14        Review of Systems:  As noted in HPI    OBJECTIVE:     Physical Exam:  General: well developed, well nourished, no distress  Head: normocephalic, atraumatic  Neurologic: Alert and oriented. Thought content appropriate  Speech: Fluent  Cranial nerves: face symmetric   Eyes: pupils equal  Pulmonary: normal respirations  Sensory: intact to light touch throughout; decreased to medial left knee  Motor Strength: Moves all extremities spontaneously with good tone.  Full strength upper and lower extremities. No abnormal movements seen.      Delt Bi Tri Wrist ext.  IO  RUE:   5    5   5        5          5    5  LUE:      5    5   5        5          5    5   HF KE EHL DF PF  RLE   5    5     5     5    5  LLE:  5   5     5     4     5     DTR's - 2 + and symmetric   Matos: absent  Clonus: absent    Gait: normal  Tandem Gait: No difficulty     No tenderness to palpation of lumbar spine    Diagnostic Results:  I have personally reviewed imaging. My impression is as follows.    MRI of the lumbar spine dated 2024 shows spondylosis throughout.  There is facet arthropathy most  prominent at L4-5 and L5-S1.  There is transitional appearing sacral anatomy.  At L5-S1 on the right there is a enlarged facet joint.  On the left side at that level, there was an enlarged facet joint with a synovial cyst in the lateral recess adjacent to the exiting L5 root.  There is also severe facet arthropathy with collapse of the foramen on that side and severe impingement by the facet pinning the L5 root against the pedicle and vertebral body.  There is no other nerve compression of the lumbar spine    Flexion-extension x-rays were obtained on 06/05/2024.  These do not show any abnormal movement between flexion-extension.    ASSESSMENT/PLAN:     Naomy Armstrong is a 59 y.o. female who presents for FU of severe left lower extremity radiculopathy secondary to severe foraminal stenosis as well as a synovial cyst on the left at L5-S1  -at this point, patient has failed medical management as well as injections  - left-sided approach minimally invasive L5/S1 TLIF was scheduled on July 3, 2024 but postponed due to DXA scan showing osteopenia. 6 weeks of bisphosphonate therapy is indicated prior to proceeding with lumbar fusion.     Today she reports some improvement in her pain compared to her initial visit, but she still wishes to proceed with surgery due to frequent severe pain with any prolonged sitting, prolonged standing, and changing positions in bed.  She continues to have left dorsiflexion weakness.    We discussed October 28th as a potential surgery date.  I will review this with Dr. King and book the case if approved.  X-rays of her lumbar spine ordered due to recent fall    Please feel free to call with any further questions.      Kimmie Adair PA-C  [facility]  Department of Neurosurgery  [phone]      Disclaimer: This note was dictated by speech recognition. Minor errors in transcription may be present.  Please call with any questions.

## 2024-09-20 ENCOUNTER — OFFICE VISIT (OUTPATIENT)
Dept: NEUROSURGERY | Facility: CLINIC | Age: 59
End: 2024-09-20
Attending: STUDENT IN AN ORGANIZED HEALTH CARE EDUCATION/TRAINING PROGRAM
Payer: COMMERCIAL

## 2024-09-20 VITALS
DIASTOLIC BLOOD PRESSURE: 67 MMHG | BODY MASS INDEX: 28.79 KG/M2 | HEIGHT: 65 IN | SYSTOLIC BLOOD PRESSURE: 125 MMHG | OXYGEN SATURATION: 96 % | HEART RATE: 102 BPM | WEIGHT: 172.81 LBS
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
## 2024-09-20 DIAGNOSIS — M54.16 LUMBAR RADICULOPATHY: Primary | ICD-10-CM

## 2024-09-20 NOTE — Clinical Note
Please ask me Zoraida to get an x-ray of her back at any [facility] facility near her home in the next couple days.  Thanks,  Kimmie

## 2024-09-20 NOTE — Clinical Note
Can wait to read until you return  Pain and weakness are slightly improved compared to her last visit, but she does want to proceed with surgery.  I asked her multiple times if it was severe enough to want surgery as opposed to PT, she confirmed that it was.  She has been on Fosamax for the last 5 or 6 weeks We're thinking October 28th. if you agree, I can put in the case request

## 2024-09-23 ENCOUNTER — APPOINTMENT (OUTPATIENT)
Dept: RADIOLOGY | Facility: HOSPITAL | Age: 59
End: 2024-09-23
Attending: PHYSICIAN ASSISTANT
Payer: COMMERCIAL

## 2024-09-23 DIAGNOSIS — M54.16 LUMBAR RADICULOPATHY: ICD-10-CM
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
## 2024-09-23 PROCEDURE — 72114 X-RAY EXAM L-S SPINE BENDING: CPT | Mod: 26,,, | Performed by: RADIOLOGY

## 2024-09-23 PROCEDURE — 72114 X-RAY EXAM L-S SPINE BENDING: CPT | Mod: TC,FY,PN

## 2024-09-25 ENCOUNTER — ANESTHESIA EVENT (OUTPATIENT)
Dept: SURGERY | Facility: HOSPITAL | Age: 59
End: 2024-09-25
Payer: COMMERCIAL

## 2024-09-25 ENCOUNTER — PATIENT MESSAGE (OUTPATIENT)
Dept: NEUROSURGERY | Facility: CLINIC | Age: 59
End: 2024-09-25
Payer: COMMERCIAL

## 2024-09-25 ENCOUNTER — TELEPHONE (OUTPATIENT)
Dept: PREADMISSION TESTING | Facility: HOSPITAL | Age: 59
End: 2024-09-25
Payer: COMMERCIAL

## 2024-09-26 ENCOUNTER — TELEPHONE (OUTPATIENT)
Dept: PREADMISSION TESTING | Facility: HOSPITAL | Age: 59
End: 2024-09-26
Payer: COMMERCIAL

## 2024-09-27 ENCOUNTER — TELEPHONE (OUTPATIENT)
Dept: PREADMISSION TESTING | Facility: HOSPITAL | Age: 59
End: 2024-09-27
Payer: COMMERCIAL

## 2024-09-30 ENCOUNTER — PATIENT MESSAGE (OUTPATIENT)
Dept: PAIN MEDICINE | Facility: CLINIC | Age: 59
End: 2024-09-30
Payer: COMMERCIAL

## 2024-09-30 DIAGNOSIS — M54.16 LUMBAR RADICULOPATHY: ICD-10-CM

## 2024-09-30 DIAGNOSIS — M51.369 DDD (DEGENERATIVE DISC DISEASE), LUMBAR: ICD-10-CM

## 2024-09-30 DIAGNOSIS — M71.38 SYNOVIAL CYST OF LUMBAR FACET JOINT: ICD-10-CM

## 2024-09-30 RX ORDER — HYDROCODONE BITARTRATE AND ACETAMINOPHEN 10; 325 MG/1; MG/1
1 TABLET ORAL EVERY 8 HOURS PRN
Qty: 90 TABLET | Refills: 0 | Status: SHIPPED | OUTPATIENT
Start: 2024-10-06 | End: 2024-10-04

## 2024-10-02 ENCOUNTER — OFFICE VISIT (OUTPATIENT)
Dept: PSYCHIATRY | Facility: CLINIC | Age: 59
End: 2024-10-02
Payer: COMMERCIAL

## 2024-10-02 DIAGNOSIS — F33.1 MODERATE EPISODE OF RECURRENT MAJOR DEPRESSIVE DISORDER: ICD-10-CM

## 2024-10-02 DIAGNOSIS — Z65.8 PSYCHOSOCIAL DISTRESS: ICD-10-CM

## 2024-10-02 DIAGNOSIS — F41.1 GAD (GENERALIZED ANXIETY DISORDER): Primary | ICD-10-CM

## 2024-10-02 PROCEDURE — 4010F ACE/ARB THERAPY RXD/TAKEN: CPT | Mod: CPTII,S$GLB,, | Performed by: STUDENT IN AN ORGANIZED HEALTH CARE EDUCATION/TRAINING PROGRAM

## 2024-10-02 PROCEDURE — 99999 PR PBB SHADOW E&M-EST. PATIENT-LVL I: CPT | Mod: PBBFAC,,, | Performed by: STUDENT IN AN ORGANIZED HEALTH CARE EDUCATION/TRAINING PROGRAM

## 2024-10-02 PROCEDURE — 3044F HG A1C LEVEL LT 7.0%: CPT | Mod: CPTII,S$GLB,, | Performed by: STUDENT IN AN ORGANIZED HEALTH CARE EDUCATION/TRAINING PROGRAM

## 2024-10-02 PROCEDURE — 90833 PSYTX W PT W E/M 30 MIN: CPT | Mod: S$GLB,,, | Performed by: STUDENT IN AN ORGANIZED HEALTH CARE EDUCATION/TRAINING PROGRAM

## 2024-10-02 PROCEDURE — 99214 OFFICE O/P EST MOD 30 MIN: CPT | Mod: S$GLB,,, | Performed by: STUDENT IN AN ORGANIZED HEALTH CARE EDUCATION/TRAINING PROGRAM

## 2024-10-02 RX ORDER — FLUOXETINE HYDROCHLORIDE 40 MG/1
80 CAPSULE ORAL DAILY
Qty: 180 CAPSULE | Refills: 3 | Status: SHIPPED | OUTPATIENT
Start: 2024-10-02

## 2024-10-02 RX ORDER — LURASIDONE HYDROCHLORIDE 20 MG/1
20 TABLET, FILM COATED ORAL DAILY
Qty: 30 TABLET | Refills: 11 | Status: SHIPPED | OUTPATIENT
Start: 2024-10-02 | End: 2025-10-02

## 2024-10-03 ENCOUNTER — HOSPITAL ENCOUNTER (OUTPATIENT)
Dept: RADIOLOGY | Facility: HOSPITAL | Age: 59
Discharge: HOME | End: 2024-10-03
Attending: PHYSICIAN ASSISTANT
Payer: COMMERCIAL

## 2024-10-03 DIAGNOSIS — M43.16 SPONDYLOLISTHESIS OF LUMBAR REGION: ICD-10-CM

## 2024-10-03 PROCEDURE — 72131 CT LUMBAR SPINE W/O DYE: CPT | Mod: 26,,, | Performed by: RADIOLOGY

## 2024-10-03 PROCEDURE — 72131 CT LUMBAR SPINE W/O DYE: CPT | Mod: TC

## 2024-10-04 ENCOUNTER — PATIENT MESSAGE (OUTPATIENT)
Dept: PAIN MEDICINE | Facility: CLINIC | Age: 59
End: 2024-10-04
Payer: COMMERCIAL

## 2024-10-04 DIAGNOSIS — M51.369 DDD (DEGENERATIVE DISC DISEASE), LUMBAR: ICD-10-CM

## 2024-10-04 DIAGNOSIS — M71.38 SYNOVIAL CYST OF LUMBAR FACET JOINT: ICD-10-CM

## 2024-10-04 DIAGNOSIS — M54.16 LUMBAR RADICULOPATHY: ICD-10-CM

## 2024-10-04 RX ORDER — HYDROCODONE BITARTRATE AND ACETAMINOPHEN 10; 325 MG/1; MG/1
1 TABLET ORAL EVERY 8 HOURS PRN
Qty: 90 TABLET | Refills: 0 | Status: SHIPPED | OUTPATIENT
Start: 2024-10-06 | End: 2024-11-05

## 2024-10-06 DIAGNOSIS — R11.0 NAUSEA: ICD-10-CM

## 2024-10-06 NOTE — TELEPHONE ENCOUNTER
Care Due:                  Date            Visit Type   Department     Provider  --------------------------------------------------------------------------------                                ESTABLISHED   [facility]                              PATIENT -    MEDICINE/  Last Visit: 07-      VIRTUAL      INTERNAL MED   Eric Hernandes                               -         [facility]                              PRIMARY      MEDICINE/  Next Visit: 10-      CARE ([facility])   INTERNAL MED   Eric Hernandes                                                            Last  Test          Frequency    Reason                     Performed    Due Date  --------------------------------------------------------------------------------    Mg Level....  12 months..  alendronate..............  Not Found    Overdue    Phosphate...  12 months..  alendronate..............  Not Found    Overdue    [employer] Embedded Care Due Messages. Reference number: [id number].   10/06/2024 6:40:02 AM CDT

## 2024-10-07 NOTE — TELEPHONE ENCOUNTER
Refill Routing Note   Medication(s) are not appropriate for processing by [facility] for the following reason(s):        Outside of protocol    [facility] action(s):  Route     Requires labs : Yes             Appointments  past 12m or future 3m with PCP    Date Provider   Last Visit   7/30/2024 Eric Hernandes Jr., MD   Next Visit   10/29/2024 Eric Hernandes Jr., MD   ED visits in past 90 days: 0        Note composed:8:23 AM 10/07/2024

## 2024-10-09 ENCOUNTER — PATIENT MESSAGE (OUTPATIENT)
Dept: FAMILY MEDICINE | Facility: CLINIC | Age: 59
End: 2024-10-09
Payer: COMMERCIAL

## 2024-10-09 RX ORDER — PROMETHAZINE HYDROCHLORIDE 25 MG/1
25 TABLET ORAL EVERY 8 HOURS PRN
Qty: 30 TABLET | Refills: 0 | Status: SHIPPED | OUTPATIENT
Start: 2024-10-09

## 2024-10-14 ENCOUNTER — HOSPITAL ENCOUNTER (OUTPATIENT)
Dept: PREADMISSION TESTING | Facility: HOSPITAL | Age: 59
Discharge: HOME | End: 2024-10-14
Attending: STUDENT IN AN ORGANIZED HEALTH CARE EDUCATION/TRAINING PROGRAM
Payer: COMMERCIAL

## 2024-10-14 VITALS
WEIGHT: 173.5 LBS | OXYGEN SATURATION: 96 % | RESPIRATION RATE: 18 BRPM | DIASTOLIC BLOOD PRESSURE: 72 MMHG | BODY MASS INDEX: 28.91 KG/M2 | TEMPERATURE: 98 F | HEART RATE: 87 BPM | HEIGHT: 65 IN | SYSTOLIC BLOOD PRESSURE: 110 MMHG

## 2024-10-14 DIAGNOSIS — Z01.818 PREOPERATIVE TESTING: Primary | ICD-10-CM

## 2024-10-14 LAB
ALBUMIN SERPL BCP-MCNC: 3.8 G/DL (ref 3.5–5.2)
ALP SERPL-CCNC: 108 U/L (ref 55–135)
ALT SERPL W/O P-5'-P-CCNC: 13 U/L (ref 10–44)
ANION GAP SERPL CALC-SCNC: 11 MMOL/L (ref 8–16)
APTT PPP: 26.7 SEC (ref 21–32)
AST SERPL-CCNC: 15 U/L (ref 10–40)
BASOPHILS # BLD AUTO: 0.03 K/UL (ref 0–0.2)
BASOPHILS NFR BLD: 0.4 % (ref 0–1.9)
BILIRUB SERPL-MCNC: 0.2 MG/DL (ref 0.1–1)
BUN SERPL-MCNC: 12 MG/DL (ref 8–23)
CALCIUM SERPL-MCNC: 9.3 MG/DL (ref 8.7–10.5)
CHLORIDE SERPL-SCNC: 104 MMOL/L (ref 95–110)
CO2 SERPL-SCNC: 24 MMOL/L (ref 23–29)
CREAT SERPL-MCNC: 0.8 MG/DL (ref 0.5–1.4)
DIFFERENTIAL METHOD BLD: NORMAL
EOSINOPHIL # BLD AUTO: 0.1 K/UL (ref 0–0.5)
EOSINOPHIL NFR BLD: 1.7 % (ref 0–8)
ERYTHROCYTE [DISTWIDTH] IN BLOOD BY AUTOMATED COUNT: 13 % (ref 11.5–14.5)
EST. GFR  (NO RACE VARIABLE): >60 ML/MIN/1.73 M^2
GLUCOSE SERPL-MCNC: 64 MG/DL (ref 70–110)
HCT VFR BLD AUTO: 41.9 % (ref 37–48.5)
HGB BLD-MCNC: 13.5 G/DL (ref 12–16)
IMM GRANULOCYTES # BLD AUTO: 0.02 K/UL (ref 0–0.04)
IMM GRANULOCYTES NFR BLD AUTO: 0.3 % (ref 0–0.5)
INR PPP: 0.9 (ref 0.8–1.2)
LYMPHOCYTES # BLD AUTO: 1.7 K/UL (ref 1–4.8)
LYMPHOCYTES NFR BLD: 24.3 % (ref 18–48)
MCH RBC QN AUTO: 30.3 PG (ref 27–31)
MCHC RBC AUTO-ENTMCNC: 32.2 G/DL (ref 32–36)
MCV RBC AUTO: 94 FL (ref 82–98)
MONOCYTES # BLD AUTO: 0.5 K/UL (ref 0.3–1)
MONOCYTES NFR BLD: 7.8 % (ref 4–15)
NEUTROPHILS # BLD AUTO: 4.6 K/UL (ref 1.8–7.7)
NEUTROPHILS NFR BLD: 65.5 % (ref 38–73)
NRBC BLD-RTO: 0 /100 WBC
PLATELET # BLD AUTO: 310 K/UL (ref 150–450)
PMV BLD AUTO: 10.1 FL (ref 9.2–12.9)
POTASSIUM SERPL-SCNC: 4.5 MMOL/L (ref 3.5–5.1)
PROT SERPL-MCNC: 6.6 G/DL (ref 6–8.4)
PROTHROMBIN TIME: 9.9 SEC (ref 9–12.5)
RBC # BLD AUTO: 4.45 M/UL (ref 4–5.4)
SODIUM SERPL-SCNC: 139 MMOL/L (ref 136–145)
WBC # BLD AUTO: 6.95 K/UL (ref 3.9–12.7)

## 2024-10-14 PROCEDURE — 85025 COMPLETE CBC W/AUTO DIFF WBC: CPT | Performed by: STUDENT IN AN ORGANIZED HEALTH CARE EDUCATION/TRAINING PROGRAM

## 2024-10-14 PROCEDURE — 85610 PROTHROMBIN TIME: CPT | Performed by: STUDENT IN AN ORGANIZED HEALTH CARE EDUCATION/TRAINING PROGRAM

## 2024-10-14 PROCEDURE — 36415 COLL VENOUS BLD VENIPUNCTURE: CPT | Performed by: STUDENT IN AN ORGANIZED HEALTH CARE EDUCATION/TRAINING PROGRAM

## 2024-10-14 PROCEDURE — 80053 COMPREHEN METABOLIC PANEL: CPT | Performed by: STUDENT IN AN ORGANIZED HEALTH CARE EDUCATION/TRAINING PROGRAM

## 2024-10-14 PROCEDURE — 85730 THROMBOPLASTIN TIME PARTIAL: CPT | Performed by: STUDENT IN AN ORGANIZED HEALTH CARE EDUCATION/TRAINING PROGRAM

## 2024-10-14 NOTE — DISCHARGE INSTRUCTIONS
YOUR PROCEDURE WILL BE AT [facility] at [address] [address]                 Enter through the Main Entrance facing Rosette Mcnulty.                 Report to the Same Day Surgery Registration Desk in the hallway.(Just beside the Same Day Surgery Unit)      Your procedure  is scheduled for __10/28/2024________.    Call [phone] between 2pm and 5pm on _10/25/2024______to find out your arrival time for the day of surgery.    You may have two visitors.  No children under 12 years old.     You will be going to the Same Day Surgery Unit on the 2nd floor of the hospital.    Important instructions:  Do not eat anything after midnight.  You may have plain water, non carbonated.  You may also have Gatorade or Powerade after midnight.    Stop all fluids 2 hours before your surgery.    It is okay to brush your teeth.  Do not have gum, candy or mints.    SEE MEDICATION SHEET.   TAKE MEDICATIONS AS DIRECTED.      All GLP-1 weekly diabetic/weight loss medications must not be taken for one week before your surgery, or your surgery could be canceled.      STOP taking Aspirin, Ibuprofen,  Advil, Motrin, Mobic(meloxicam, celebrex), Aleve (naproxen), Headache Powders (BC powder, Goody's) ,Fish oil, and Vitamin E for at least 7 days before your surgery.     You may take Tylenol if needed which is not a blood thinner.    Please shower the night before and the morning of your surgery.      Use Chlorhexidine soap as instructed by your pre op nurse.   Please place clean linens on your bed the night before surgery. Please wear fresh clean clothing after each shower.    No shaving of procedural area at least 4-5 days before surgery due to increased risk of skin irritation and/or possible infection.    Contact lenses and removable denture work may not be worn during your procedure.    You may wear deodorant only. If you are having breast surgery, do not wear deodorant on the operative  side.    Do not wear powder, body lotion, perfume/cologne or make-up.    Do not wear any jewelry or have any metal on your body.    You will be asked to remove any dentures or partials for the procedure.    If you are going home on the same day of surgery, you must arrange for a family member or a friend to drive you home.  Public transportation is prohibited.  You will not be able to drive home if you were given anesthesia or sedation.    Patients who want to have their Post-op prescriptions filled from our in-house [facility], bring a Credit/Debit Card or cash with you. A co-pay may be required.  The pharmacy closes at 5:30 pm.    Wear loose fitting clothes allowing for bandages.    Please leave money and valuables home.      You may bring your cell phone.    Call the doctor if fever or illness should occur before your surgery.    Call [phone] to contact us here if needed.                            CLOTHES ON DAY OF SURGERY    SHOULDER surgery:  you must have a very oversized shirt.  Very, Very large.  You will probably have a large sling on with your arm strapped to your chest.  You will not be able to put the arm of the operated shoulder into a sleeve.  You can put the arm of the un-operated shoulder into the sleeve, but the shirt will need to be draped over the operated shoulder.       ARM or HAND surgery:  make sure that your sleeves are large and loose enough to pass over large dressings or cast.      BREAST or UNDERARM surgery:  wear a loose, button down shirt so that you can dress without raising your arms over your head.    ABDOMINAL surgery:  wear loose, comfortable clothing.  Nothing tight around the abdomen.  NO JEANS    PENIS or SCROTAL surgery:  loose comfortable clothing.  Large sweat pants, pajama pants or a robe.  ABSOLUTELY NO JEANS      LEG or FOOT surgery:  wear large loose pants that are able to pass over any large dressings or casts.  You could also wear loose shorts or a skirt.

## 2024-10-21 ENCOUNTER — PATIENT OUTREACH (OUTPATIENT)
Dept: ADMINISTRATIVE | Facility: HOSPITAL | Age: 59
End: 2024-10-21
Payer: COMMERCIAL

## 2024-10-22 ENCOUNTER — LAB VISIT (OUTPATIENT)
Dept: LAB | Facility: HOSPITAL | Age: 59
End: 2024-10-22
Attending: FAMILY MEDICINE
Payer: COMMERCIAL

## 2024-10-22 DIAGNOSIS — F33.1 MODERATE EPISODE OF RECURRENT MAJOR DEPRESSIVE DISORDER: ICD-10-CM

## 2024-10-22 DIAGNOSIS — I10 HYPERTENSION, ESSENTIAL: Chronic | ICD-10-CM

## 2024-10-22 DIAGNOSIS — E53.8 LOW SERUM VITAMIN B12: ICD-10-CM

## 2024-10-22 LAB
ALBUMIN SERPL BCP-MCNC: 3.8 G/DL (ref 3.5–5.2)
ALP SERPL-CCNC: 110 U/L (ref 40–150)
ALT SERPL W/O P-5'-P-CCNC: 13 U/L (ref 10–44)
ANION GAP SERPL CALC-SCNC: 8 MMOL/L (ref 8–16)
AST SERPL-CCNC: 11 U/L (ref 10–40)
BASOPHILS # BLD AUTO: 0.02 K/UL (ref 0–0.2)
BASOPHILS NFR BLD: 0.3 % (ref 0–1.9)
BILIRUB SERPL-MCNC: 0.3 MG/DL (ref 0.1–1)
BUN SERPL-MCNC: 9 MG/DL (ref 8–23)
CALCIUM SERPL-MCNC: 9.8 MG/DL (ref 8.7–10.5)
CHLORIDE SERPL-SCNC: 105 MMOL/L (ref 95–110)
CHOLEST SERPL-MCNC: 250 MG/DL (ref 120–199)
CHOLEST/HDLC SERPL: 3.5 {RATIO} (ref 2–5)
CO2 SERPL-SCNC: 28 MMOL/L (ref 23–29)
CREAT SERPL-MCNC: 0.8 MG/DL (ref 0.5–1.4)
DIFFERENTIAL METHOD BLD: ABNORMAL
EOSINOPHIL # BLD AUTO: 0.1 K/UL (ref 0–0.5)
EOSINOPHIL NFR BLD: 1.3 % (ref 0–8)
ERYTHROCYTE [DISTWIDTH] IN BLOOD BY AUTOMATED COUNT: 12.9 % (ref 11.5–14.5)
EST. GFR  (NO RACE VARIABLE): >60 ML/MIN/1.73 M^2
ESTIMATED AVG GLUCOSE: 103 MG/DL (ref 68–131)
GLUCOSE SERPL-MCNC: 92 MG/DL (ref 70–110)
HBA1C MFR BLD: 5.2 % (ref 4–5.6)
HCT VFR BLD AUTO: 45.9 % (ref 37–48.5)
HDLC SERPL-MCNC: 71 MG/DL (ref 40–75)
HDLC SERPL: 28.4 % (ref 20–50)
HGB BLD-MCNC: 14.4 G/DL (ref 12–16)
IMM GRANULOCYTES # BLD AUTO: 0.01 K/UL (ref 0–0.04)
IMM GRANULOCYTES NFR BLD AUTO: 0.2 % (ref 0–0.5)
LDLC SERPL CALC-MCNC: 135 MG/DL (ref 63–159)
LYMPHOCYTES # BLD AUTO: 1.2 K/UL (ref 1–4.8)
LYMPHOCYTES NFR BLD: 18.5 % (ref 18–48)
MCH RBC QN AUTO: 29.6 PG (ref 27–31)
MCHC RBC AUTO-ENTMCNC: 31.4 G/DL (ref 32–36)
MCV RBC AUTO: 94 FL (ref 82–98)
MONOCYTES # BLD AUTO: 0.4 K/UL (ref 0.3–1)
MONOCYTES NFR BLD: 6.6 % (ref 4–15)
NEUTROPHILS # BLD AUTO: 4.7 K/UL (ref 1.8–7.7)
NEUTROPHILS NFR BLD: 73.1 % (ref 38–73)
NONHDLC SERPL-MCNC: 179 MG/DL
NRBC BLD-RTO: 0 /100 WBC
PLATELET # BLD AUTO: 344 K/UL (ref 150–450)
PMV BLD AUTO: 9.6 FL (ref 9.2–12.9)
POTASSIUM SERPL-SCNC: 4.6 MMOL/L (ref 3.5–5.1)
PROT SERPL-MCNC: 6.8 G/DL (ref 6–8.4)
RBC # BLD AUTO: 4.86 M/UL (ref 4–5.4)
SODIUM SERPL-SCNC: 141 MMOL/L (ref 136–145)
TRIGL SERPL-MCNC: 220 MG/DL (ref 30–150)
TSH SERPL DL<=0.005 MIU/L-ACNC: 0.83 UIU/ML (ref 0.4–4)
VIT B12 SERPL-MCNC: 512 PG/ML (ref 210–950)
WBC # BLD AUTO: 6.38 K/UL (ref 3.9–12.7)

## 2024-10-22 PROCEDURE — 80061 LIPID PANEL: CPT | Performed by: FAMILY MEDICINE

## 2024-10-22 PROCEDURE — 80053 COMPREHEN METABOLIC PANEL: CPT | Performed by: FAMILY MEDICINE

## 2024-10-22 PROCEDURE — 82607 VITAMIN B-12: CPT | Performed by: FAMILY MEDICINE

## 2024-10-22 PROCEDURE — 85025 COMPLETE CBC W/AUTO DIFF WBC: CPT | Performed by: STUDENT IN AN ORGANIZED HEALTH CARE EDUCATION/TRAINING PROGRAM

## 2024-10-22 PROCEDURE — 84443 ASSAY THYROID STIM HORMONE: CPT | Performed by: STUDENT IN AN ORGANIZED HEALTH CARE EDUCATION/TRAINING PROGRAM

## 2024-10-22 PROCEDURE — 83036 HEMOGLOBIN GLYCOSYLATED A1C: CPT | Performed by: STUDENT IN AN ORGANIZED HEALTH CARE EDUCATION/TRAINING PROGRAM

## 2024-10-25 ENCOUNTER — OFFICE VISIT (OUTPATIENT)
Dept: FAMILY MEDICINE | Facility: CLINIC | Age: 59
End: 2024-10-25
Payer: COMMERCIAL

## 2024-10-25 ENCOUNTER — LAB VISIT (OUTPATIENT)
Dept: LAB | Facility: HOSPITAL | Age: 59
End: 2024-10-25
Attending: STUDENT IN AN ORGANIZED HEALTH CARE EDUCATION/TRAINING PROGRAM
Payer: COMMERCIAL

## 2024-10-25 ENCOUNTER — TELEPHONE (OUTPATIENT)
Dept: FAMILY MEDICINE | Facility: CLINIC | Age: 59
End: 2024-10-25

## 2024-10-25 ENCOUNTER — TELEPHONE (OUTPATIENT)
Dept: SURGERY | Facility: HOSPITAL | Age: 59
End: 2024-10-25
Payer: COMMERCIAL

## 2024-10-25 VITALS
BODY MASS INDEX: 28.32 KG/M2 | TEMPERATURE: 98 F | WEIGHT: 170 LBS | OXYGEN SATURATION: 98 % | RESPIRATION RATE: 18 BRPM | HEIGHT: 65 IN | SYSTOLIC BLOOD PRESSURE: 115 MMHG | DIASTOLIC BLOOD PRESSURE: 80 MMHG | HEART RATE: 78 BPM

## 2024-10-25 DIAGNOSIS — E78.5 HYPERLIPIDEMIA, UNSPECIFIED HYPERLIPIDEMIA TYPE: ICD-10-CM

## 2024-10-25 DIAGNOSIS — I10 HYPERTENSION, ESSENTIAL: Primary | Chronic | ICD-10-CM

## 2024-10-25 DIAGNOSIS — Z01.818 PRE-OP EXAMINATION: ICD-10-CM

## 2024-10-25 DIAGNOSIS — Z01.818 PREOPERATIVE TESTING: ICD-10-CM

## 2024-10-25 LAB
ABO + RH BLD: NORMAL
BLD GP AB SCN CELLS X3 SERPL QL: NORMAL
OHS QRS DURATION: 68 MS
OHS QTC CALCULATION: 441 MS
SPECIMEN OUTDATE: NORMAL

## 2024-10-25 PROCEDURE — 36415 COLL VENOUS BLD VENIPUNCTURE: CPT | Performed by: STUDENT IN AN ORGANIZED HEALTH CARE EDUCATION/TRAINING PROGRAM

## 2024-10-25 PROCEDURE — 86850 RBC ANTIBODY SCREEN: CPT | Performed by: STUDENT IN AN ORGANIZED HEALTH CARE EDUCATION/TRAINING PROGRAM

## 2024-10-25 PROCEDURE — 86901 BLOOD TYPING SEROLOGIC RH(D): CPT | Performed by: STUDENT IN AN ORGANIZED HEALTH CARE EDUCATION/TRAINING PROGRAM

## 2024-10-25 PROCEDURE — 86900 BLOOD TYPING SEROLOGIC ABO: CPT | Performed by: STUDENT IN AN ORGANIZED HEALTH CARE EDUCATION/TRAINING PROGRAM

## 2024-10-25 PROCEDURE — 99999 PR PBB SHADOW E&M-EST. PATIENT-LVL V: CPT | Mod: PBBFAC,,, | Performed by: NURSE PRACTITIONER

## 2024-10-25 NOTE — PROGRESS NOTES
Routine Office Visit    Patient Name: Naomy Armstrong    : 1965  MRN: [id number]    Chief Complaint:  Preop exam    Subjective:  Naomy is a 59 y.o. female who presents today for:    Preop exam - patient who is known to me with medical problems as documented below reports today for evaluation.  Here for preop exam for lumbar spinal fusion next week.  She denies any problems with or reactions to anesthesia in the past.  She does not smoke or take any blood thinners.  She has been holding her aspirin, meloxicam, and turmeric and other medications as directed per Anesthesiology.  Denies any history of SHANNAN, diabetes, CKD, or heart disease.  She can exercise by walking up multiple flights of stairs and do chores around her house without any exertional symptoms.  She did have labs done recently showing normal blood count, normal A1c, improved LDL, and normal CMP.    Past Medical History  Past Medical History:   Diagnosis Date    Alcohol abuse     per chart, but patient denies today and cocaine per chart    Anxiety     Colon polyp     Depression     Hallucination     last couple of months started seeing someone standing in her room, saw boyfriend's reflection in glass    Headache     History of psychiatric hospitalization     age 13 [facility] for 2 months for acting out; 2018 for SI    Hx of psychiatric care     Hypertension     Psychiatric problem     Sleep difficulties     Suicide attempt     with knife by cutting stomach    Therapy        Family History  Family History   Problem Relation Name Age of Onset    Anxiety disorder Sister Bev     Depression Sister Bev     Alcohol abuse Maternal Uncle      Alcohol abuse Maternal Grandfather         Current Medications  Current Outpatient Medications on File Prior to Visit   Medication Sig Dispense Refill    alendronate (FOSAMAX) 35 MG tablet Take 1 tablet (35 mg total) by mouth every 7 days. 12 tablet 1    alosetron (LOTRONEX) 0.5 MG tablet Take 1 tablet (0.5  mg total) by mouth 2 (two) times daily. 180 tablet 3    amLODIPine (NORVASC) 5 MG tablet Take 1 tablet by mouth once daily (Patient taking differently: Take 5 mg by mouth every evening.) 90 tablet 1    azelastine (ASTELIN) 137 mcg (0.1 %) nasal spray 1 spray (137 mcg total) by Nasal route 2 (two) times daily. 30 mL 1    calcium carb and citrate-vitD3 (CITRACAL-D3 SLOW RELEASE) 600 mg-12.5 mcg (500 unit) TbSR Take 2 tablets by mouth once daily. 180 tablet 3    cyclobenzaprine (FLEXERIL) 5 MG tablet Take 2 tablets (10 mg total) by mouth 3 (three) times daily as needed for Muscle spasms. 90 tablet 0    dicyclomine (BENTYL) 20 mg tablet TAKE 1 TABLET BY MOUTH EVERY 6 HOURS 120 tablet 0    fluconazole (DIFLUCAN) 200 MG Tab Take 1 tablet (200 mg total) by mouth once daily. Take at first sign of yeast infection 1 tablet 0    FLUoxetine 40 MG capsule Take 2 capsules (80 mg total) by mouth once daily. 180 capsule 3    fluticasone propionate (FLONASE) 50 mcg/actuation nasal spray 1 spray (50 mcg total) by Each Nostril route once daily. 15.8 mL 0    HYDROcodone-acetaminophen (NORCO)  mg per tablet Take 1 tablet by mouth every 8 (eight) hours as needed for Pain. 90 tablet 0    hydrOXYzine HCL (ATARAX) 10 MG Tab Take 1 tablet (10 mg total) by mouth 2 (two) times daily as needed (anxiety). 60 tablet 3    lisinopriL (PRINIVIL,ZESTRIL) 20 MG tablet Take 1 tablet (20 mg total) by mouth nightly. 90 tablet 3    lurasidone (LATUDA) 20 mg Tab tablet Take 1 tablet (20 mg total) by mouth once daily. 30 tablet 11    meloxicam (MOBIC) 15 MG tablet Take 1 tablet (15 mg total) by mouth once daily. 30 tablet 0    multivitamin with minerals tablet Take 1 tablet by mouth once daily.      nitrofurantoin, macrocrystal-monohydrate, (MACROBID) 100 MG capsule Take 1 capsule (100 mg total) by mouth once daily. 30 capsule 5    ondansetron (ZOFRAN-ODT) 8 MG TbDL Take 1 tablet (8 mg total) by mouth every 12 (twelve) hours as needed (nausea). 30  "tablet 0    pregabalin 330 mg Tb24 Take 330 mg by mouth after dinner. 30 tablet 5    promethazine (PHENERGAN) 25 MG tablet TAKE 1 TABLET BY MOUTH EVERY 8 HOURS AS NEEDED FOR NAUSEA 30 tablet 0    ubrogepant (UBRELVY) 100 mg tablet TAKE 1 TABLET BY MOUTH AS NEEDED (MAY REPEAT 1 TIME IN TWO HOURS. DO NOT EXCEED TWO TABLETS IN 24 HOURS).      omeprazole (PRILOSEC) 40 MG capsule Take 1 capsule (40 mg total) by mouth every morning. 90 capsule 3     No current facility-administered medications on file prior to visit.       Allergies   Review of patient's allergies indicates:   Allergen Reactions    Effexor [venlafaxine]      Elevated her blood pressure    Zoloft [sertraline]     Paroxetine hcl      Made her feel drunk       Review of Systems (Pertinent positives)  Review of Systems   Constitutional: Negative.  Negative for chills and fever.   HENT: Negative.  Negative for congestion, sinus pain and sore throat.    Eyes: Negative.    Respiratory:  Negative for cough, shortness of breath and wheezing.    Cardiovascular:  Negative for chest pain, palpitations, orthopnea and claudication.   Gastrointestinal: Negative.  Negative for abdominal pain, diarrhea, nausea and vomiting.   Genitourinary: Negative.  Negative for dysuria, frequency and urgency.   Musculoskeletal: Negative.  Negative for back pain, joint pain and neck pain.   Skin: Negative.    Neurological: Negative.  Negative for dizziness, tingling, loss of consciousness and headaches.   Endo/Heme/Allergies: Negative.    Psychiatric/Behavioral: Negative.         /80 (BP Location: Left arm, Patient Position: Sitting)   Pulse 78   Temp 98.2 °F (36.8 °C) (Oral)   Resp 18   Ht 5' 5" (1.651 m)   Wt 77.1 kg (169 lb 15.6 oz)   LMP 09/15/2013   SpO2 98%   BMI 28.29 kg/m²     Physical Exam  Vitals reviewed.   Constitutional:       General: She is not in acute distress.     Appearance: Normal appearance. She is not ill-appearing, toxic-appearing or diaphoretic. "   HENT:      Head: Normocephalic and atraumatic.   Cardiovascular:      Rate and Rhythm: Normal rate and regular rhythm.      Pulses: Normal pulses.      Heart sounds: Normal heart sounds.   Pulmonary:      Effort: Pulmonary effort is normal. No respiratory distress.      Breath sounds: Normal breath sounds. No wheezing.   Abdominal:      General: Bowel sounds are normal. There is no distension.      Palpations: Abdomen is soft.      Tenderness: There is no abdominal tenderness.   Musculoskeletal:         General: No swelling, tenderness or deformity. Normal range of motion.   Skin:     General: Skin is warm and dry.      Capillary Refill: Capillary refill takes less than 2 seconds.   Neurological:      General: No focal deficit present.      Mental Status: She is alert and oriented to person, place, and time.   Psychiatric:         Mood and Affect: Mood normal.         Behavior: Behavior normal.          The 10-year ASCVD risk score (Damaris GERARDO, et al., 2019) is: 4%    Values used to calculate the score:      Age: 59 years      Sex: Female      Is Non- : No      Diabetic: No      Tobacco smoker: No      Systolic Blood Pressure: 115 mmHg      Is BP treated: Yes      HDL Cholesterol: 71 mg/dL      Total Cholesterol: 250 mg/dL    Assessment/Plan:  Naomy Armstrong is a 59 y.o. female who presents today for :    Naomy was seen today for pre-op exam.    Diagnoses and all orders for this visit:    Hypertension, essential  -     Cancel: IN OFFICE EKG 12-LEAD (to [place])  -     IN OFFICE EKG 12-LEAD (to [place])    Pre-op examination  -     Cancel: IN OFFICE EKG 12-LEAD (to Muse)  -     IN OFFICE EKG 12-LEAD (to Muse)    Hyperlipidemia, unspecified hyperlipidemia type    Reviewed labs with patient.  EKG done initially showed lead reversal; repeat EKG showed normal sinus rhythm.  RCRI 0.  Patient medically optimized at low cardiac risk for surgical procedure.  Okay to proceed without further  cardiac evaluation.  ASCVD risk low.  No need for cholesterol medications at this time.  All questions answered.  Follow up with me as needed.        This office note has been dictated.  This dictation has been generated using M-Modal Fluency Direct dictation; some phonetic errors may occur.

## 2024-10-28 ENCOUNTER — ANESTHESIA (OUTPATIENT)
Dept: SURGERY | Facility: HOSPITAL | Age: 59
End: 2024-10-28
Payer: COMMERCIAL

## 2024-10-28 ENCOUNTER — HOSPITAL ENCOUNTER (OUTPATIENT)
Facility: HOSPITAL | Age: 59
Discharge: HOME | End: 2024-10-29
Attending: STUDENT IN AN ORGANIZED HEALTH CARE EDUCATION/TRAINING PROGRAM | Admitting: STUDENT IN AN ORGANIZED HEALTH CARE EDUCATION/TRAINING PROGRAM
Payer: COMMERCIAL

## 2024-10-28 DIAGNOSIS — M48.061 LUMBAR STENOSIS: ICD-10-CM

## 2024-10-28 DIAGNOSIS — G44.86 CERVICOGENIC HEADACHE: ICD-10-CM

## 2024-10-28 DIAGNOSIS — M54.16 LUMBAR RADICULOPATHY: ICD-10-CM

## 2024-10-28 DIAGNOSIS — I10 HYPERTENSION, ESSENTIAL: Chronic | ICD-10-CM

## 2024-10-28 DIAGNOSIS — Z98.1 S/P LUMBAR SPINAL FUSION: Primary | ICD-10-CM

## 2024-10-28 PROCEDURE — 63052 LAM FACETC/FRMT ARTHRD LUM 1: CPT | Mod: ,,, | Performed by: STUDENT IN AN ORGANIZED HEALTH CARE EDUCATION/TRAINING PROGRAM

## 2024-10-28 PROCEDURE — 25000003 PHARM REV CODE 250

## 2024-10-28 PROCEDURE — 63600175 PHARM REV CODE 636 W HCPCS

## 2024-10-28 PROCEDURE — 25000003 PHARM REV CODE 250: Performed by: PHYSICIAN ASSISTANT

## 2024-10-28 PROCEDURE — 27201423 OPTIME MED/SURG SUP & DEVICES STERILE SUPPLY: Performed by: STUDENT IN AN ORGANIZED HEALTH CARE EDUCATION/TRAINING PROGRAM

## 2024-10-28 PROCEDURE — 63600175 PHARM REV CODE 636 W HCPCS: Performed by: ANESTHESIOLOGY

## 2024-10-28 PROCEDURE — 25000003 PHARM REV CODE 250: Performed by: STUDENT IN AN ORGANIZED HEALTH CARE EDUCATION/TRAINING PROGRAM

## 2024-10-28 PROCEDURE — C1729 CATH, DRAINAGE: HCPCS | Performed by: STUDENT IN AN ORGANIZED HEALTH CARE EDUCATION/TRAINING PROGRAM

## 2024-10-28 PROCEDURE — 22840 INSERT SPINE FIXATION DEVICE: CPT | Mod: ,,, | Performed by: STUDENT IN AN ORGANIZED HEALTH CARE EDUCATION/TRAINING PROGRAM

## 2024-10-28 PROCEDURE — 27800903 OPTIME MED/SURG SUP & DEVICES OTHER IMPLANTS: Performed by: STUDENT IN AN ORGANIZED HEALTH CARE EDUCATION/TRAINING PROGRAM

## 2024-10-28 PROCEDURE — 36000710: Performed by: STUDENT IN AN ORGANIZED HEALTH CARE EDUCATION/TRAINING PROGRAM

## 2024-10-28 PROCEDURE — 71000039 HC RECOVERY, EACH ADD'L HOUR: Performed by: STUDENT IN AN ORGANIZED HEALTH CARE EDUCATION/TRAINING PROGRAM

## 2024-10-28 PROCEDURE — 20936 SP BONE AGRFT LOCAL ADD-ON: CPT | Mod: ,,, | Performed by: STUDENT IN AN ORGANIZED HEALTH CARE EDUCATION/TRAINING PROGRAM

## 2024-10-28 PROCEDURE — 63600175 PHARM REV CODE 636 W HCPCS: Performed by: STUDENT IN AN ORGANIZED HEALTH CARE EDUCATION/TRAINING PROGRAM

## 2024-10-28 PROCEDURE — 20930 SP BONE ALGRFT MORSEL ADD-ON: CPT | Mod: ,,, | Performed by: STUDENT IN AN ORGANIZED HEALTH CARE EDUCATION/TRAINING PROGRAM

## 2024-10-28 PROCEDURE — C1713 ANCHOR/SCREW BN/BN,TIS/BN: HCPCS | Performed by: STUDENT IN AN ORGANIZED HEALTH CARE EDUCATION/TRAINING PROGRAM

## 2024-10-28 PROCEDURE — 22630 ARTHRD PST TQ 1NTRSPC LUM: CPT | Mod: ,,, | Performed by: STUDENT IN AN ORGANIZED HEALTH CARE EDUCATION/TRAINING PROGRAM

## 2024-10-28 PROCEDURE — 36000711: Performed by: STUDENT IN AN ORGANIZED HEALTH CARE EDUCATION/TRAINING PROGRAM

## 2024-10-28 PROCEDURE — 71000033 HC RECOVERY, INTIAL HOUR: Performed by: STUDENT IN AN ORGANIZED HEALTH CARE EDUCATION/TRAINING PROGRAM

## 2024-10-28 PROCEDURE — 37000008 HC ANESTHESIA 1ST 15 MINUTES: Performed by: STUDENT IN AN ORGANIZED HEALTH CARE EDUCATION/TRAINING PROGRAM

## 2024-10-28 PROCEDURE — 37000009 HC ANESTHESIA EA ADD 15 MINS: Performed by: STUDENT IN AN ORGANIZED HEALTH CARE EDUCATION/TRAINING PROGRAM

## 2024-10-28 PROCEDURE — 22853 INSJ BIOMECHANICAL DEVICE: CPT | Mod: ,,, | Performed by: STUDENT IN AN ORGANIZED HEALTH CARE EDUCATION/TRAINING PROGRAM

## 2024-10-28 PROCEDURE — 63600175 PHARM REV CODE 636 W HCPCS: Performed by: PHYSICIAN ASSISTANT

## 2024-10-28 DEVICE — SCREW VIPER PRIME XTAB 6X45MM: Type: IMPLANTABLE DEVICE | Site: BACK | Status: FUNCTIONAL

## 2024-10-28 DEVICE — ROD SPINAL VIPER 2 6.5 X 45MM: Type: IMPLANTABLE DEVICE | Site: BACK | Status: FUNCTIONAL

## 2024-10-28 RX ORDER — FLUOXETINE HYDROCHLORIDE 20 MG/1
80 CAPSULE ORAL DAILY
Status: DISCONTINUED | OUTPATIENT
Start: 2024-10-29 | End: 2024-10-29 | Stop reason: HOSPADM

## 2024-10-28 RX ORDER — PROPOFOL 10 MG/ML
VIAL (ML) INTRAVENOUS
Status: DISCONTINUED | OUTPATIENT
Start: 2024-10-28 | End: 2024-10-28

## 2024-10-28 RX ORDER — FENTANYL CITRATE 50 UG/ML
INJECTION, SOLUTION INTRAMUSCULAR; INTRAVENOUS
Status: DISCONTINUED | OUTPATIENT
Start: 2024-10-28 | End: 2024-10-28

## 2024-10-28 RX ORDER — HYDROMORPHONE HYDROCHLORIDE 2 MG/ML
0.2 INJECTION, SOLUTION INTRAMUSCULAR; INTRAVENOUS; SUBCUTANEOUS EVERY 5 MIN PRN
Status: DISCONTINUED | OUTPATIENT
Start: 2024-10-28 | End: 2024-10-28 | Stop reason: HOSPADM

## 2024-10-28 RX ORDER — MIDAZOLAM HYDROCHLORIDE 1 MG/ML
INJECTION INTRAMUSCULAR; INTRAVENOUS
Status: DISCONTINUED | OUTPATIENT
Start: 2024-10-28 | End: 2024-10-28

## 2024-10-28 RX ORDER — LIDOCAINE HYDROCHLORIDE AND EPINEPHRINE 10; 10 MG/ML; UG/ML
INJECTION, SOLUTION INFILTRATION; PERINEURAL
Status: DISCONTINUED | OUTPATIENT
Start: 2024-10-28 | End: 2024-10-28 | Stop reason: HOSPADM

## 2024-10-28 RX ORDER — CYCLOBENZAPRINE HCL 10 MG
10 TABLET ORAL 3 TIMES DAILY PRN
Status: DISCONTINUED | OUTPATIENT
Start: 2024-10-28 | End: 2024-10-28

## 2024-10-28 RX ORDER — HYDROMORPHONE HYDROCHLORIDE 2 MG/ML
1 INJECTION, SOLUTION INTRAMUSCULAR; INTRAVENOUS; SUBCUTANEOUS
Status: DISCONTINUED | OUTPATIENT
Start: 2024-10-28 | End: 2024-10-29 | Stop reason: HOSPADM

## 2024-10-28 RX ORDER — ACETAMINOPHEN 500 MG
1000 TABLET ORAL
Status: DISCONTINUED | OUTPATIENT
Start: 2024-10-28 | End: 2024-10-28 | Stop reason: HOSPADM

## 2024-10-28 RX ORDER — BUPIVACAINE HYDROCHLORIDE 5 MG/ML
INJECTION, SOLUTION PERINEURAL
Status: DISCONTINUED | OUTPATIENT
Start: 2024-10-28 | End: 2024-10-28 | Stop reason: HOSPADM

## 2024-10-28 RX ORDER — PREGABALIN 330 MG/1
330 TABLET, FILM COATED, EXTENDED RELEASE ORAL
Status: DISCONTINUED | OUTPATIENT
Start: 2024-10-28 | End: 2024-10-28 | Stop reason: CLARIF

## 2024-10-28 RX ORDER — BACITRACIN 500 [USP'U]/G
OINTMENT TOPICAL
Status: DISCONTINUED | OUTPATIENT
Start: 2024-10-28 | End: 2024-10-28 | Stop reason: HOSPADM

## 2024-10-28 RX ORDER — EPHEDRINE SULFATE 50 MG/ML
INJECTION, SOLUTION INTRAVENOUS
Status: DISCONTINUED | OUTPATIENT
Start: 2024-10-28 | End: 2024-10-28

## 2024-10-28 RX ORDER — TALC
6 POWDER (GRAM) TOPICAL NIGHTLY PRN
Status: DISCONTINUED | OUTPATIENT
Start: 2024-10-28 | End: 2024-10-29 | Stop reason: HOSPADM

## 2024-10-28 RX ORDER — LABETALOL HYDROCHLORIDE 5 MG/ML
10 INJECTION, SOLUTION INTRAVENOUS EVERY 6 HOURS PRN
Status: DISCONTINUED | OUTPATIENT
Start: 2024-10-28 | End: 2024-10-29 | Stop reason: HOSPADM

## 2024-10-28 RX ORDER — DICYCLOMINE HYDROCHLORIDE 10 MG/1
20 CAPSULE ORAL 4 TIMES DAILY
Status: DISCONTINUED | OUTPATIENT
Start: 2024-10-28 | End: 2024-10-29 | Stop reason: HOSPADM

## 2024-10-28 RX ORDER — ONDANSETRON HYDROCHLORIDE 2 MG/ML
INJECTION, SOLUTION INTRAVENOUS
Status: DISCONTINUED | OUTPATIENT
Start: 2024-10-28 | End: 2024-10-28

## 2024-10-28 RX ORDER — ACETAMINOPHEN 325 MG/1
650 TABLET ORAL EVERY 6 HOURS
Status: DISCONTINUED | OUTPATIENT
Start: 2024-10-28 | End: 2024-10-29 | Stop reason: HOSPADM

## 2024-10-28 RX ORDER — MUPIROCIN 20 MG/G
1 OINTMENT TOPICAL
Status: COMPLETED | OUTPATIENT
Start: 2024-10-28 | End: 2024-10-28

## 2024-10-28 RX ORDER — ONDANSETRON HYDROCHLORIDE 2 MG/ML
4 INJECTION, SOLUTION INTRAVENOUS DAILY PRN
Status: DISCONTINUED | OUTPATIENT
Start: 2024-10-28 | End: 2024-10-28 | Stop reason: HOSPADM

## 2024-10-28 RX ORDER — PANTOPRAZOLE SODIUM 40 MG/1
40 TABLET, DELAYED RELEASE ORAL DAILY
Status: DISCONTINUED | OUTPATIENT
Start: 2024-10-28 | End: 2024-10-29 | Stop reason: HOSPADM

## 2024-10-28 RX ORDER — ALOSETRON HYDROCHLORIDE 0.5 MG/1
0.5 TABLET ORAL 2 TIMES DAILY
Status: DISCONTINUED | OUTPATIENT
Start: 2024-10-28 | End: 2024-10-29 | Stop reason: HOSPADM

## 2024-10-28 RX ORDER — MUPIROCIN 20 MG/G
OINTMENT TOPICAL
Status: DISCONTINUED | OUTPATIENT
Start: 2024-10-28 | End: 2024-10-28 | Stop reason: HOSPADM

## 2024-10-28 RX ORDER — ROCURONIUM BROMIDE 10 MG/ML
INJECTION, SOLUTION INTRAVENOUS
Status: DISCONTINUED | OUTPATIENT
Start: 2024-10-28 | End: 2024-10-28

## 2024-10-28 RX ORDER — ACETAMINOPHEN 10 MG/ML
INJECTION, SOLUTION INTRAVENOUS
Status: DISCONTINUED | OUTPATIENT
Start: 2024-10-28 | End: 2024-10-28

## 2024-10-28 RX ORDER — POLYETHYLENE GLYCOL 3350 17 G/17G
17 POWDER, FOR SOLUTION ORAL DAILY
Status: DISCONTINUED | OUTPATIENT
Start: 2024-10-29 | End: 2024-10-29 | Stop reason: HOSPADM

## 2024-10-28 RX ORDER — LURASIDONE HYDROCHLORIDE 20 MG/1
20 TABLET, FILM COATED ORAL DAILY
Status: DISCONTINUED | OUTPATIENT
Start: 2024-10-29 | End: 2024-10-29 | Stop reason: HOSPADM

## 2024-10-28 RX ORDER — LISINOPRIL 20 MG/1
20 TABLET ORAL NIGHTLY
Status: DISCONTINUED | OUTPATIENT
Start: 2024-10-28 | End: 2024-10-29 | Stop reason: HOSPADM

## 2024-10-28 RX ORDER — GLUCAGON 1 MG
1 KIT INJECTION
Status: DISCONTINUED | OUTPATIENT
Start: 2024-10-28 | End: 2024-10-28 | Stop reason: HOSPADM

## 2024-10-28 RX ORDER — LIDOCAINE HYDROCHLORIDE 10 MG/ML
1 INJECTION, SOLUTION EPIDURAL; INFILTRATION; INTRACAUDAL; PERINEURAL ONCE
Status: DISCONTINUED | OUTPATIENT
Start: 2024-10-28 | End: 2024-10-28 | Stop reason: HOSPADM

## 2024-10-28 RX ORDER — SODIUM CHLORIDE 9 MG/ML
INJECTION, SOLUTION INTRAVENOUS CONTINUOUS
Status: DISCONTINUED | OUTPATIENT
Start: 2024-10-28 | End: 2024-10-29

## 2024-10-28 RX ORDER — CEFAZOLIN SODIUM 1 G/3ML
1 INJECTION, POWDER, FOR SOLUTION INTRAMUSCULAR; INTRAVENOUS
Status: DISCONTINUED | OUTPATIENT
Start: 2024-10-28 | End: 2024-10-29 | Stop reason: HOSPADM

## 2024-10-28 RX ORDER — DIAZEPAM 5 MG/1
5 TABLET ORAL EVERY 8 HOURS
Status: COMPLETED | OUTPATIENT
Start: 2024-10-28 | End: 2024-10-29

## 2024-10-28 RX ORDER — KETAMINE HYDROCHLORIDE 100 MG/ML
INJECTION, SOLUTION INTRAMUSCULAR; INTRAVENOUS
Status: DISCONTINUED | OUTPATIENT
Start: 2024-10-28 | End: 2024-10-28

## 2024-10-28 RX ORDER — CYCLOBENZAPRINE HCL 5 MG
10 TABLET ORAL 3 TIMES DAILY PRN
Status: DISCONTINUED | OUTPATIENT
Start: 2024-10-28 | End: 2024-10-29 | Stop reason: HOSPADM

## 2024-10-28 RX ORDER — BISACODYL 10 MG/1
10 SUPPOSITORY RECTAL DAILY PRN
Status: DISCONTINUED | OUTPATIENT
Start: 2024-10-28 | End: 2024-10-29 | Stop reason: HOSPADM

## 2024-10-28 RX ORDER — GENTAMICIN 40 MG/ML
INJECTION, SOLUTION INTRAMUSCULAR; INTRAVENOUS
Status: DISCONTINUED | OUTPATIENT
Start: 2024-10-28 | End: 2024-10-28 | Stop reason: HOSPADM

## 2024-10-28 RX ORDER — CEFAZOLIN 2 G/1
2 INJECTION, POWDER, FOR SOLUTION INTRAMUSCULAR; INTRAVENOUS
Status: COMPLETED | OUTPATIENT
Start: 2024-10-28 | End: 2024-10-28

## 2024-10-28 RX ORDER — IBUPROFEN 200 MG
24 TABLET ORAL
Status: DISCONTINUED | OUTPATIENT
Start: 2024-10-28 | End: 2024-10-29 | Stop reason: HOSPADM

## 2024-10-28 RX ORDER — FLUTICASONE PROPIONATE 50 MCG
1 SPRAY, SUSPENSION (ML) NASAL DAILY
Status: DISCONTINUED | OUTPATIENT
Start: 2024-10-29 | End: 2024-10-29 | Stop reason: HOSPADM

## 2024-10-28 RX ORDER — PREGABALIN 50 MG/1
100 CAPSULE ORAL 3 TIMES DAILY
Status: DISCONTINUED | OUTPATIENT
Start: 2024-10-28 | End: 2024-10-29 | Stop reason: HOSPADM

## 2024-10-28 RX ORDER — SODIUM CHLORIDE 0.9 % (FLUSH) 0.9 %
10 SYRINGE (ML) INJECTION
Status: DISCONTINUED | OUTPATIENT
Start: 2024-10-28 | End: 2024-10-28 | Stop reason: HOSPADM

## 2024-10-28 RX ORDER — AMLODIPINE BESYLATE 5 MG/1
5 TABLET ORAL NIGHTLY
Status: DISCONTINUED | OUTPATIENT
Start: 2024-10-28 | End: 2024-10-29 | Stop reason: HOSPADM

## 2024-10-28 RX ORDER — ONDANSETRON HYDROCHLORIDE 2 MG/ML
4 INJECTION, SOLUTION INTRAVENOUS EVERY 6 HOURS PRN
Status: DISCONTINUED | OUTPATIENT
Start: 2024-10-28 | End: 2024-10-29 | Stop reason: HOSPADM

## 2024-10-28 RX ORDER — PHENYLEPHRINE HYDROCHLORIDE 10 MG/ML
INJECTION INTRAVENOUS
Status: DISCONTINUED | OUTPATIENT
Start: 2024-10-28 | End: 2024-10-28

## 2024-10-28 RX ORDER — IBUPROFEN 200 MG
16 TABLET ORAL
Status: DISCONTINUED | OUTPATIENT
Start: 2024-10-28 | End: 2024-10-29 | Stop reason: HOSPADM

## 2024-10-28 RX ORDER — LIDOCAINE HYDROCHLORIDE 20 MG/ML
INJECTION INTRAVENOUS
Status: DISCONTINUED | OUTPATIENT
Start: 2024-10-28 | End: 2024-10-28

## 2024-10-28 RX ORDER — DEXMEDETOMIDINE HYDROCHLORIDE 100 UG/ML
INJECTION, SOLUTION INTRAVENOUS
Status: DISCONTINUED | OUTPATIENT
Start: 2024-10-28 | End: 2024-10-28

## 2024-10-28 RX ORDER — OXYCODONE HYDROCHLORIDE 5 MG/1
10 TABLET ORAL EVERY 4 HOURS PRN
Status: DISCONTINUED | OUTPATIENT
Start: 2024-10-28 | End: 2024-10-29 | Stop reason: HOSPADM

## 2024-10-28 RX ORDER — DEXAMETHASONE SODIUM PHOSPHATE 4 MG/ML
INJECTION, SOLUTION INTRA-ARTICULAR; INTRALESIONAL; INTRAMUSCULAR; INTRAVENOUS; SOFT TISSUE
Status: DISCONTINUED | OUTPATIENT
Start: 2024-10-28 | End: 2024-10-28

## 2024-10-28 RX ADMIN — LISINOPRIL 20 MG: 20 TABLET ORAL at 08:10

## 2024-10-28 RX ADMIN — PHENYLEPHRINE HYDROCHLORIDE 100 MCG: 10 INJECTION INTRAVENOUS at 11:10

## 2024-10-28 RX ADMIN — CEFAZOLIN 2 G: 2 INJECTION, POWDER, FOR SOLUTION INTRAMUSCULAR; INTRAVENOUS at 03:10

## 2024-10-28 RX ADMIN — PHENYLEPHRINE HYDROCHLORIDE 200 MCG: 10 INJECTION INTRAVENOUS at 12:10

## 2024-10-28 RX ADMIN — SODIUM CHLORIDE: 9 INJECTION, SOLUTION INTRAVENOUS at 09:10

## 2024-10-28 RX ADMIN — CYCLOBENZAPRINE HYDROCHLORIDE 10 MG: 5 TABLET, FILM COATED ORAL at 08:10

## 2024-10-28 RX ADMIN — EPHEDRINE SULFATE 10 MG: 50 INJECTION INTRAVENOUS at 12:10

## 2024-10-28 RX ADMIN — PHENYLEPHRINE HYDROCHLORIDE 100 MCG: 10 INJECTION INTRAVENOUS at 12:10

## 2024-10-28 RX ADMIN — DEXMEDETOMIDINE HYDROCHLORIDE 4 MCG: 100 INJECTION, SOLUTION INTRAVENOUS at 03:10

## 2024-10-28 RX ADMIN — DICYCLOMINE HYDROCHLORIDE 20 MG: 10 CAPSULE ORAL at 06:10

## 2024-10-28 RX ADMIN — OXYCODONE 10 MG: 5 TABLET ORAL at 08:10

## 2024-10-28 RX ADMIN — FENTANYL CITRATE 100 MCG: 50 INJECTION, SOLUTION INTRAMUSCULAR; INTRAVENOUS at 11:10

## 2024-10-28 RX ADMIN — HYDROMORPHONE HYDROCHLORIDE 0.2 MG: 2 INJECTION INTRAMUSCULAR; INTRAVENOUS; SUBCUTANEOUS at 04:10

## 2024-10-28 RX ADMIN — ONDANSETRON 4 MG: 2 INJECTION, SOLUTION INTRAMUSCULAR; INTRAVENOUS at 03:10

## 2024-10-28 RX ADMIN — DIAZEPAM 5 MG: 5 TABLET ORAL at 09:10

## 2024-10-28 RX ADMIN — SUGAMMADEX 200 MG: 100 INJECTION, SOLUTION INTRAVENOUS at 03:10

## 2024-10-28 RX ADMIN — CEFAZOLIN 2 G: 2 INJECTION, POWDER, FOR SOLUTION INTRAMUSCULAR; INTRAVENOUS at 11:10

## 2024-10-28 RX ADMIN — EPHEDRINE SULFATE 10 MG: 50 INJECTION INTRAVENOUS at 01:10

## 2024-10-28 RX ADMIN — ACETAMINOPHEN 1000 MG: 10 INJECTION, SOLUTION INTRAVENOUS at 03:10

## 2024-10-28 RX ADMIN — PHENYLEPHRINE HYDROCHLORIDE 100 MCG: 10 INJECTION INTRAVENOUS at 01:10

## 2024-10-28 RX ADMIN — LIDOCAINE HYDROCHLORIDE 100 MG: 20 INJECTION, SOLUTION INTRAVENOUS at 11:10

## 2024-10-28 RX ADMIN — ROCURONIUM BROMIDE 20 MG: 10 INJECTION INTRAVENOUS at 12:10

## 2024-10-28 RX ADMIN — DICYCLOMINE HYDROCHLORIDE 20 MG: 10 CAPSULE ORAL at 08:10

## 2024-10-28 RX ADMIN — ALOSETRON HYDROCHLORIDE 0.5 MG: 0.5 TABLET ORAL at 09:10

## 2024-10-28 RX ADMIN — SODIUM CHLORIDE: 9 INJECTION, SOLUTION INTRAVENOUS at 06:10

## 2024-10-28 RX ADMIN — MIDAZOLAM HYDROCHLORIDE 2 MG: 1 INJECTION INTRAMUSCULAR; INTRAVENOUS at 11:10

## 2024-10-28 RX ADMIN — KETAMINE HYDROCHLORIDE 30 MG: 100 INJECTION, SOLUTION, CONCENTRATE INTRAMUSCULAR; INTRAVENOUS at 11:10

## 2024-10-28 RX ADMIN — AMLODIPINE BESYLATE 5 MG: 5 TABLET ORAL at 08:10

## 2024-10-28 RX ADMIN — ROCURONIUM BROMIDE 60 MG: 10 INJECTION INTRAVENOUS at 11:10

## 2024-10-28 RX ADMIN — DEXMEDETOMIDINE HYDROCHLORIDE 8 MCG: 100 INJECTION, SOLUTION INTRAVENOUS at 03:10

## 2024-10-28 RX ADMIN — ROCURONIUM BROMIDE 20 MG: 10 INJECTION INTRAVENOUS at 02:10

## 2024-10-28 RX ADMIN — PREGABALIN 100 MG: 50 CAPSULE ORAL at 08:10

## 2024-10-28 RX ADMIN — PROPOFOL 100 MG: 10 INJECTION, EMULSION INTRAVENOUS at 11:10

## 2024-10-28 RX ADMIN — MUPIROCIN 1 G: 20 OINTMENT TOPICAL at 08:10

## 2024-10-28 RX ADMIN — SODIUM CHLORIDE: 9 INJECTION, SOLUTION INTRAVENOUS at 11:10

## 2024-10-28 RX ADMIN — ROCURONIUM BROMIDE 20 MG: 10 INJECTION INTRAVENOUS at 01:10

## 2024-10-28 RX ADMIN — KETAMINE HYDROCHLORIDE 5 MG: 100 INJECTION, SOLUTION, CONCENTRATE INTRAMUSCULAR; INTRAVENOUS at 12:10

## 2024-10-28 RX ADMIN — PHENYLEPHRINE HYDROCHLORIDE 100 MCG: 10 INJECTION INTRAVENOUS at 02:10

## 2024-10-28 RX ADMIN — DEXAMETHASONE SODIUM PHOSPHATE 4 MG: 4 INJECTION, SOLUTION INTRAMUSCULAR; INTRAVENOUS at 11:10

## 2024-10-28 RX ADMIN — ACETAMINOPHEN 650 MG: 325 TABLET ORAL at 06:10

## 2024-10-28 NOTE — PT/OT/SLP PROGRESS
Occupational Therapy      Patient Name:  Naomy Armstrong   MRN:  [id number]    Patient not seen today secondary to  (patient was not ready when OT called PACU). Will follow-up tomorrow.    10/28/2024

## 2024-10-28 NOTE — H&P
[facility]  Neurosurgery     SUBJECTIVE:      Interval history 9/20/24:  Patient returns to clinic for FU of lumbar radiculopathy. She started fosamax treatment for osteopenia about 5 weeks ago. She reports a fall yesterday and of the car.  She stepped on a tree root and fell down hitting her back and left shoulder onto the root.     Although improved compared to her 1st visit in June, she does still report frequent severe pain in her back and left leg.  Left leg pain is located in her lateral thigh, stopping at the knee.  She denies numbness in her left foot.  Her left foot remains weak compared to the right but is slightly improved compared to her initial visit.  She would like to move forward with surgery in late October if possible        History of Present Illness 6/18/24:  Naomy Armstrong is a 59 y.o. female past medical history significant for chronic low back pain, anxiety and depression who presents with left lower extremity radiculopathy which has been worsening for about 10 1/2 weeks, since the beginning of April.     She also has some knee issues, and it has been unclear if this pain has been related to the knee or the back.  She does endorse back pain but this is mainly on the left side of the back.  It shoots down the upper left lateral hip and upper left lateral thigh.  Previously, it radiated all the way down to the left foot.     She recently underwent an epidural steroid injection as well as a joint/cyst aspiration procedure.  This provided about 3 days of significant pain relief, however she woke up this morning with sudden return of her pain.  At the very least, this procedure demonstrated that the foraminal stenosis on the left and the synovial cyst are her likely pain generators.     In the past, she is undergone:  -bilateral SI joint injections- January 20, 2022  -bilateral L2/L3/L4 medial branch blocks- July 2023  -repeat bilateral L2/L3/L4 medial branch blocks- August  2023     (Not in a hospital admission)              Review of patient's allergies indicates:   Allergen Reactions    Effexor [venlafaxine]         Elevated her blood pressure    Zoloft [sertraline]      Paroxetine hcl         Made her feel drunk              Past Medical History:   Diagnosis Date    Alcohol abuse       per chart, but patient denies today and cocaine per chart    Anxiety      Colon polyp      Depression      Hallucination       last couple of months started seeing someone standing in her room, saw boyfriend's reflection in glass    Headache      History of psychiatric hospitalization       age 13 [facility] for 2 months for acting out; 2018 for SI    Hx of psychiatric care      Hypertension      Psychiatric problem      Sleep difficulties      Suicide attempt       with knife by cutting stomach    Therapy              Past Surgical History:   Procedure Laterality Date    COLONOSCOPY N/A 05/07/2021     Procedure: COLONOSCOPY;  Surgeon: Prem Montana MD;  Location: [facility];  Service: Endoscopy;  Laterality: N/A;  COVID screening 5/4 LAPC-instr portal -ml    COLONOSCOPY N/A 06/06/2022     Procedure: COLONOSCOPY;  Surgeon: Fab Shepherd MD;  Location: [facility] ENDO ([facility] FLR);  Service: Endoscopy;  Laterality: N/A;  C-Diff neg  Rapid COVID    EPIDURAL STEROID INJECTION Bilateral 08/05/2020     Procedure: Bilateral MBB @ L3, L4, L5;  Surgeon: Jeremy Powell Jr., MD;  Location: [facility] ENDO;  Service: Pain Management;  Laterality: Bilateral;  Bilat L3-5 MBB  Arrive @ 1315; No ATC or DM; Needs MD signature    EPIDURAL STEROID INJECTION Right 08/19/2020     Procedure: Lumbar Radiofrequency Thermocoagulation of Medial Branches;  Surgeon: Jeremy Powell Jr., MD;  Location: [facility] ENDO;  Service: Pain Management;  Laterality: Right;  Right L3-5 RFA  Arrive @ 1045; No ATC or DM; Needs MD Signature    EPIDURAL STEROID INJECTION Left 09/04/2020     Procedure: Lumbar Radiofrequency Thermocoagulation of Medial  Branches;  Surgeon: Jeremy Powell Jr., MD;  Location: [facility];  Service: Pain Management;  Laterality: Left;  Left L3-5 RFA  Arrive @ 0900 (requested); No ATC or DM; Needs MD signature    EPIDURAL STEROID INJECTION Bilateral 04/16/2021     Procedure: Sacroiliac Joint Steroid Injections @ Bilateral;  Surgeon: Jeremy Powell Jr., MD;  Location: [facility] ENDO;  Service: Pain Management;  Laterality: Bilateral;  Arrive @ 1300; No ATC or DM    EPIDURAL STEROID INJECTION Bilateral 01/19/2022     Procedure: Bilateral Sacroiliac Joint Injections;  Surgeon: Jeremy Powell Jr., MD;  Location: [facility];  Service: Pain Management;  Laterality: Bilateral;  Arrive @ 1315; No ATC or DM; Needs Consent    EPIDURAL STEROID INJECTION Bilateral 07/28/2023     Procedure: Bilateral L2, L3, L4 Medial Branch Blocks #1;  Surgeon: Jeremy Powell Jr., MD;  Location: [facility];  Service: Pain Management;  Laterality: Bilateral;  @1200  No ATC or DM    EPIDURAL STEROID INJECTION Bilateral 08/11/2023     Procedure: Bilateral L2, L3, L4 Medial Branch Blocks #2;  Surgeon: Jeremy Powell Jr., MD;  Location: [facility];  Service: Pain Management;  Laterality: Bilateral;  @1330  No ATC or DM    ESOPHAGEAL MANOMETRY WITH MEASUREMENT OF IMPEDANCE N/A 10/20/2021     Procedure: MANOMETRY, ESOPHAGUS, WITH IMPEDANCE MEASUREMENT;  Surgeon: Anastacia Odonnell MD;  Location: [address]);  Service: Endoscopy;  Laterality: N/A;  Covid test 10/17 [place], instructions sent to [email or web address]    ESOPHAGOGASTRODUODENOSCOPY N/A 05/07/2021     Procedure: EGD (ESOPHAGOGASTRODUODENOSCOPY);  Surgeon: Prem Montana MD;  Location: [facility];  Service: Endoscopy;  Laterality: N/A;  added on per Dr. NOEL Shepherd    EYE SURGERY   6/6/2015     Lasik    HERNIA REPAIR   2/14/2022     Hiatal hernia    HYSTERECTOMY        INJECTION, FACET JOINT, LUMBOSACRAL Left 6/14/2024     Procedure: Left S1 Transforaminal Epidural Steroid Injection + Left L5-S1  Facet Joint Injection/Aspiration;  Surgeon: Jeremy Powell Jr., MD;  Location: [facility] PAIN [facility]NAGEMENT;  Service: Pain Management;  Laterality: Left;  @1200  No ATC or DM  Needs Consent  IV Sedation    LAPAROSCOPIC TOUPET FUNDOPLICATION N/A 2022     Procedure: FUNDOPLICATION, LAPAROSCOPIC, TOUPET;  Surgeon: Christian Martinez MD;  Location: [facility] OR [address] [place] [address];  Service: General;  Laterality: N/A;         Social History   Social History            Tobacco Use    Smoking status: Former       Current packs/day: 0.00       Average packs/day: 1 pack/day for 21.3 years (21.3 ttl pk-yrs)       Types: Cigarettes       Start date: 1985       Quit date: 2006       Years since quittin.4    Smokeless tobacco: Never    Tobacco comments:       It has been a very long time. I do not remember dates   Substance Use Topics    Alcohol use: Yes       Comment: occasional    Drug use: Not Currently       Types: Marijuana       Comment: tried at age 14            Review of Systems:  As noted in HPI     OBJECTIVE:      Physical Exam:  General: well developed, well nourished, no distress  Head: normocephalic, atraumatic  Neurologic: Alert and oriented. Thought content appropriate  Speech: Fluent  Cranial nerves: face symmetric   Eyes: pupils equal  Pulmonary: normal respirations  Sensory: intact to light touch throughout; decreased to medial left knee  Motor Strength: Moves all extremities spontaneously with good tone.  Full strength upper and lower extremities. No abnormal movements seen.                  Delt Bi Tri Wrist ext.  IO  RUE:      5    5   5        5          5    5  LUE:      5    5   5        5          5    5              HF KE EHL DF PF  RLE      5    5     5     5    5  LLE:      5   5     5     4     5      DTR's - 2 + and symmetric   Matos: absent  Clonus: absent     Gait: normal                Tandem Gait: No difficulty          No tenderness to palpation of lumbar spine     Diagnostic  Results:  I have personally reviewed imaging. My impression is as follows.     MRI of the lumbar spine dated 05/31/2024 shows spondylosis throughout.  There is facet arthropathy most prominent at L4-5 and L5-S1.  There is transitional appearing sacral anatomy.  At L5-S1 on the right there is a enlarged facet joint.  On the left side at that level, there was an enlarged facet joint with a synovial cyst in the lateral recess adjacent to the exiting L5 root.  There is also severe facet arthropathy with collapse of the foramen on that side and severe impingement by the facet pinning the L5 root against the pedicle and vertebral body.  There is no other nerve compression of the lumbar spine     Flexion-extension x-rays were obtained on 06/05/2024.  These do not show any abnormal movement between flexion-extension.     ASSESSMENT/PLAN:      Naomy Armstrong is a 59 y.o. female who presents for FU of severe left lower extremity radiculopathy secondary to severe foraminal stenosis as well as a synovial cyst on the left at L5-S1  -at this point, patient has failed medical management as well as injections  - left-sided approach minimally invasive L5/S1 TLIF was scheduled on July 3, 2024 but postponed due to DXA scan showing osteopenia. 6 weeks of bisphosphonate therapy is indicated prior to proceeding with lumbar fusion.      Today she reports some improvement in her pain compared to her initial visit, but she still wishes to proceed with surgery due to frequent severe pain with any prolonged sitting, prolonged standing, and changing positions in bed.  She continues to have left dorsiflexion weakness.     We discussed October 28th as a potential surgery date.  I will review this with Dr. King and book the case if approved.  X-rays of her lumbar spine ordered due to recent fall     10/28/24 UPDATE: full strength in BUE and BLE. Initially some pain limited left EHL weakness that resolved with increased effort. No  numbness on exam today.         Please feel free to call with any further questions.       Kimmie Adair PA-C  [facility]  Department of Neurosurgery  [phone]

## 2024-10-28 NOTE — BRIEF OP NOTE
[facility] - Surgery  Brief Operative Note  Neurosurgery    SUMMARY     Surgery Date: 10/28/2024     Surgeons and Role:     * Christian King MD - Primary    Assisting Surgeon: Dao Sesay MD (RES)    Pre-op Diagnosis:  Lumbar radiculopathy [M54.16]  Synovial cyst of lumbar facet joint [M71.38]  DDD (degenerative disc disease), lumbar [M51.369]    Post-op Diagnosis: Post-Op Diagnosis Codes:     * Lumbar radiculopathy [M54.16]     * Synovial cyst of lumbar facet joint [M71.38]     * DDD (degenerative disc disease), lumbar [M51.369]    Procedure Performed:     Procedure(s) (LRB):  FUSION, SPINE, LUMBAR, TLIF, MINIMALLY INVASIVE (Left)    Technical Procedures Used:   Left approach L5-S1 MIS TLIF  Synovial cyst resection  Expandable interbody cage placed with BMP    Operative Findings: See full op note for further details    Estimated Blood Loss: 50cc         Specimens:   Specimen (24h ago, onward)      None

## 2024-10-29 VITALS
RESPIRATION RATE: 18 BRPM | HEART RATE: 93 BPM | BODY MASS INDEX: 28.32 KG/M2 | HEIGHT: 65 IN | TEMPERATURE: 99 F | OXYGEN SATURATION: 95 % | SYSTOLIC BLOOD PRESSURE: 98 MMHG | DIASTOLIC BLOOD PRESSURE: 55 MMHG | WEIGHT: 170 LBS

## 2024-10-29 PROBLEM — Z98.1 S/P LUMBAR SPINAL FUSION: Status: ACTIVE | Noted: 2024-10-29

## 2024-10-29 LAB
ANION GAP SERPL CALC-SCNC: 5 MMOL/L (ref 8–16)
BASOPHILS # BLD AUTO: 0.01 K/UL (ref 0–0.2)
BASOPHILS NFR BLD: 0.1 % (ref 0–1.9)
BUN SERPL-MCNC: 8 MG/DL (ref 8–23)
CALCIUM SERPL-MCNC: 8.6 MG/DL (ref 8.7–10.5)
CHLORIDE SERPL-SCNC: 108 MMOL/L (ref 95–110)
CO2 SERPL-SCNC: 25 MMOL/L (ref 23–29)
CREAT SERPL-MCNC: 0.7 MG/DL (ref 0.5–1.4)
DIFFERENTIAL METHOD BLD: ABNORMAL
EOSINOPHIL # BLD AUTO: 0 K/UL (ref 0–0.5)
EOSINOPHIL NFR BLD: 0 % (ref 0–8)
ERYTHROCYTE [DISTWIDTH] IN BLOOD BY AUTOMATED COUNT: 12.8 % (ref 11.5–14.5)
EST. GFR  (NO RACE VARIABLE): >60 ML/MIN/1.73 M^2
GLUCOSE SERPL-MCNC: 122 MG/DL (ref 70–110)
HCT VFR BLD AUTO: 35.5 % (ref 37–48.5)
HGB BLD-MCNC: 11.3 G/DL (ref 12–16)
IMM GRANULOCYTES # BLD AUTO: 0.03 K/UL (ref 0–0.04)
IMM GRANULOCYTES NFR BLD AUTO: 0.3 % (ref 0–0.5)
LYMPHOCYTES # BLD AUTO: 1.2 K/UL (ref 1–4.8)
LYMPHOCYTES NFR BLD: 12.8 % (ref 18–48)
MCH RBC QN AUTO: 29.9 PG (ref 27–31)
MCHC RBC AUTO-ENTMCNC: 31.8 G/DL (ref 32–36)
MCV RBC AUTO: 94 FL (ref 82–98)
MONOCYTES # BLD AUTO: 0.8 K/UL (ref 0.3–1)
MONOCYTES NFR BLD: 8.3 % (ref 4–15)
NEUTROPHILS # BLD AUTO: 7.3 K/UL (ref 1.8–7.7)
NEUTROPHILS NFR BLD: 78.5 % (ref 38–73)
NRBC BLD-RTO: 0 /100 WBC
PLATELET # BLD AUTO: 256 K/UL (ref 150–450)
PMV BLD AUTO: 9.5 FL (ref 9.2–12.9)
POTASSIUM SERPL-SCNC: 4.4 MMOL/L (ref 3.5–5.1)
RBC # BLD AUTO: 3.78 M/UL (ref 4–5.4)
SODIUM SERPL-SCNC: 138 MMOL/L (ref 136–145)
WBC # BLD AUTO: 9.31 K/UL (ref 3.9–12.7)

## 2024-10-29 PROCEDURE — 80048 BASIC METABOLIC PNL TOTAL CA: CPT | Performed by: STUDENT IN AN ORGANIZED HEALTH CARE EDUCATION/TRAINING PROGRAM

## 2024-10-29 PROCEDURE — 63600175 PHARM REV CODE 636 W HCPCS: Performed by: PHYSICIAN ASSISTANT

## 2024-10-29 PROCEDURE — 25000003 PHARM REV CODE 250: Performed by: STUDENT IN AN ORGANIZED HEALTH CARE EDUCATION/TRAINING PROGRAM

## 2024-10-29 PROCEDURE — 97116 GAIT TRAINING THERAPY: CPT

## 2024-10-29 PROCEDURE — 97530 THERAPEUTIC ACTIVITIES: CPT

## 2024-10-29 PROCEDURE — 97165 OT EVAL LOW COMPLEX 30 MIN: CPT

## 2024-10-29 PROCEDURE — 63600175 PHARM REV CODE 636 W HCPCS: Performed by: STUDENT IN AN ORGANIZED HEALTH CARE EDUCATION/TRAINING PROGRAM

## 2024-10-29 PROCEDURE — 97161 PT EVAL LOW COMPLEX 20 MIN: CPT

## 2024-10-29 PROCEDURE — 85025 COMPLETE CBC W/AUTO DIFF WBC: CPT | Performed by: STUDENT IN AN ORGANIZED HEALTH CARE EDUCATION/TRAINING PROGRAM

## 2024-10-29 PROCEDURE — 25000003 PHARM REV CODE 250: Performed by: PHYSICIAN ASSISTANT

## 2024-10-29 PROCEDURE — 36415 COLL VENOUS BLD VENIPUNCTURE: CPT | Performed by: STUDENT IN AN ORGANIZED HEALTH CARE EDUCATION/TRAINING PROGRAM

## 2024-10-29 RX ORDER — CYCLOBENZAPRINE HCL 5 MG
10 TABLET ORAL 3 TIMES DAILY PRN
Qty: 90 TABLET | Refills: 0 | Status: SHIPPED | OUTPATIENT
Start: 2024-10-29

## 2024-10-29 RX ORDER — OXYCODONE AND ACETAMINOPHEN 10; 325 MG/1; MG/1
1 TABLET ORAL EVERY 4 HOURS PRN
Qty: 42 TABLET | Refills: 0 | Status: SHIPPED | OUTPATIENT
Start: 2024-10-29 | End: 2024-11-05 | Stop reason: SDUPTHER

## 2024-10-29 RX ORDER — DEXAMETHASONE SODIUM PHOSPHATE 4 MG/ML
4 INJECTION, SOLUTION INTRA-ARTICULAR; INTRALESIONAL; INTRAMUSCULAR; INTRAVENOUS; SOFT TISSUE EVERY 12 HOURS
Status: DISCONTINUED | OUTPATIENT
Start: 2024-10-29 | End: 2024-10-29 | Stop reason: HOSPADM

## 2024-10-29 RX ORDER — ONDANSETRON 4 MG/1
4 TABLET, ORALLY DISINTEGRATING ORAL EVERY 6 HOURS PRN
Qty: 15 TABLET | Refills: 0 | Status: SHIPPED | OUTPATIENT
Start: 2024-10-29

## 2024-10-29 RX ADMIN — SODIUM CHLORIDE: 9 INJECTION, SOLUTION INTRAVENOUS at 12:10

## 2024-10-29 RX ADMIN — DEXAMETHASONE SODIUM PHOSPHATE 4 MG: 4 INJECTION, SOLUTION INTRA-ARTICULAR; INTRALESIONAL; INTRAMUSCULAR; INTRAVENOUS; SOFT TISSUE at 11:10

## 2024-10-29 RX ADMIN — DICYCLOMINE HYDROCHLORIDE 20 MG: 10 CAPSULE ORAL at 01:10

## 2024-10-29 RX ADMIN — FLUOXETINE HYDROCHLORIDE 80 MG: 20 CAPSULE ORAL at 08:10

## 2024-10-29 RX ADMIN — PANTOPRAZOLE SODIUM 40 MG: 40 TABLET, DELAYED RELEASE ORAL at 08:10

## 2024-10-29 RX ADMIN — DIAZEPAM 5 MG: 5 TABLET ORAL at 01:10

## 2024-10-29 RX ADMIN — ACETAMINOPHEN 650 MG: 325 TABLET ORAL at 05:10

## 2024-10-29 RX ADMIN — ACETAMINOPHEN 650 MG: 325 TABLET ORAL at 12:10

## 2024-10-29 RX ADMIN — CEFAZOLIN 1 G: 330 INJECTION, POWDER, FOR SOLUTION INTRAMUSCULAR; INTRAVENOUS at 07:10

## 2024-10-29 RX ADMIN — DICYCLOMINE HYDROCHLORIDE 20 MG: 10 CAPSULE ORAL at 08:10

## 2024-10-29 RX ADMIN — LURASIDONE HYDROCHLORIDE 20 MG: 20 TABLET, FILM COATED ORAL at 08:10

## 2024-10-29 RX ADMIN — ACETAMINOPHEN 650 MG: 325 TABLET ORAL at 11:10

## 2024-10-29 RX ADMIN — SODIUM CHLORIDE: 9 INJECTION, SOLUTION INTRAVENOUS at 07:10

## 2024-10-29 RX ADMIN — OXYCODONE 10 MG: 5 TABLET ORAL at 08:10

## 2024-10-29 RX ADMIN — DIAZEPAM 5 MG: 5 TABLET ORAL at 05:10

## 2024-10-29 RX ADMIN — CEFAZOLIN 1 G: 330 INJECTION, POWDER, FOR SOLUTION INTRAMUSCULAR; INTRAVENOUS at 12:10

## 2024-10-29 RX ADMIN — OXYCODONE 10 MG: 5 TABLET ORAL at 01:10

## 2024-10-29 RX ADMIN — PREGABALIN 100 MG: 50 CAPSULE ORAL at 08:10

## 2024-10-29 RX ADMIN — SODIUM CHLORIDE: 9 INJECTION, SOLUTION INTRAVENOUS at 05:10

## 2024-10-29 RX ADMIN — PREGABALIN 100 MG: 50 CAPSULE ORAL at 02:10

## 2024-10-29 NOTE — PROGRESS NOTES
[facility] - [place] - Med Surg  Neurosurgery  Progress Note    Subjective:     History of Present Illness: Patient returns to clinic for FU of lumbar radiculopathy. She started fosamax treatment for osteopenia about 5 weeks ago. She reports a fall yesterday and of the car.  She stepped on a tree root and fell down hitting her back and left shoulder onto the root.     Although improved compared to her 1st visit in June, she does still report frequent severe pain in her back and left leg.  Left leg pain is located in her lateral thigh, stopping at the knee.  She denies numbness in her left foot.  Her left foot remains weak compared to the right but is slightly improved compared to her initial visit.  She would like to move forward with surgery in late October if possible     Admitted to [facility] on 10/28/24 for a Left MIS L5-S1 TLIF with Dr. King    Post-Op Info:  Procedure(s) (LRB):  FUSION, SPINE, LUMBAR, TLIF, MINIMALLY INVASIVE (Left)   1 Day Post-Op   Interval History: POD#1. C/o back pain as expected. Denies leg pain. Fletcher catheter still in place. Passing flatus.   Endorsed new onset right arm numbness last night along with some swelling. This has improved slightly, but it is still present. It feels like her arm is asleep from the middle of her biceps down into her hand, excluding the 5th finger.     Medications:  Continuous Infusions:   0.9% NaCl   Intravenous Continuous 100 mL/hr at 10/29/24 0747 New Bag at 10/29/24 0747     Scheduled Meds:   acetaminophen  650 mg Oral Q6H    alosetron  0.5 mg Oral BID    amLODIPine  5 mg Oral QHS    ceFAZolin (Ancef) IV (PEDS and ADULTS)  1 g Intravenous Q8H    diazePAM  5 mg Oral Q8H    dicyclomine  20 mg Oral QID    FLUoxetine  80 mg Oral Daily    fluticasone propionate  1 spray Each Nostril Daily    lisinopriL  20 mg Oral Nightly    lurasidone  20 mg Oral Daily    pantoprazole  40 mg Oral Daily    polyethylene glycol  17 g Oral Daily    pregabalin  100 mg Oral TID      PRN Meds:  Current Facility-Administered Medications:     bisacodyL, 10 mg, Rectal, Daily PRN    cyclobenzaprine, 10 mg, Oral, TID PRN    dextrose 10%, 12.5 g, Intravenous, PRN    dextrose 10%, 25 g, Intravenous, PRN    glucose, 16 g, Oral, PRN    glucose, 24 g, Oral, PRN    HYDROmorphone, 1 mg, Intravenous, Q3H PRN    labetalol, 10 mg, Intravenous, Q6H PRN    melatonin, 6 mg, Oral, Nightly PRN    ondansetron, 4 mg, Intravenous, Q6H PRN    oxyCODONE, 10 mg, Oral, Q4H PRN     Review of Systems  Objective:     Weight: 77.1 kg (169 lb 15.6 oz)  Body mass index is 28.29 kg/m².  Vital Signs (Most Recent):  Temp: 98.4 °F (36.9 °C) (10/29/24 0734)  Pulse: 77 (10/29/24 0734)  Resp: 18 (10/29/24 0734)  BP: 121/66 (10/29/24 0734)  SpO2: 99 % (10/29/24 0734) Vital Signs (24h Range):  Temp:  [97.8 °F (36.6 °C)-98.4 °F (36.9 °C)] 98.4 °F (36.9 °C)  Pulse:  [] 77  Resp:  [12-20] 18  SpO2:  [92 %-99 %] 99 %  BP: ()/(45-66) 121/66                              Closed/Suction Drain 10/28/24 1510 Tube - 1 10 Fr. (Active)   Site Description Unable to view 10/28/24 1720   Dressing Type Gauze;Transparent (Tegaderm) 10/28/24 1720   Drainage Serosanguineous 10/28/24 1720   Output (mL) 0 mL 10/28/24 2000            Urethral Catheter 10/28/24 1117 Double-lumen;Silicone 16 Fr. (Active)   Site Assessment Clean;Intact 10/28/24 1720   Collection Container Urimeter 10/28/24 1720   Output (mL) 1250 mL 10/29/24 0557          Physical Exam  General: well developed, well nourished, no distress.   Head: normocephalic, atraumatic  Neurologic: Awake, Alert, Oriented x 4. Thought content appropriate.  GCS: Motor: 6/Verbal: 5/Eyes: 4 GCS Total: 15  Language: No aphasia  Speech: No dysarthria  Cranial nerves: face symmetric, tongue midline, CN II-XII grossly intact.   Eyes: pupils equal, round, reactive to light with accomodation, EOMI.  Pulmonary: normal respirations, no signs of respiratory distress  Abdomen: soft, non-distended, not  "tender to palpation    Sensory: intact to light touch throughout    Motor Strength: Moves all extremities spontaneously with good tone.  Full strength upper and lower extremities. No abnormal movements seen.     Strength  Deltoids Triceps Biceps Wrist Extension Wrist Flexion Hand  FA   Upper: R 5/5 5/5 5/5 5/5 5/5 5/5 5/5    L 5/5 5/5 5/5 5/5 5/5 5/5 5/5     Iliopsoas Quadriceps  Tibialis  anterior Gastro- cnemius EHL    Lower: R 5/5 5/5  5/5 5/5 5/5     L 5/5 5/5  5/5 5/5 5/5      DTR's: 2 + and symmetric in patellar and brachioradialis    Matos: absent  Clonus: absent    Skin: Skin is warm, dry and intact.    Bilateral lumbar incisions: closed with staples and covered telfa and tegaderm. Mild-mod serosanguinous staining to dressing.     YOKASTA drain to full suction: no document drain output since surgery. 10cc sanguinous drainage present in drain at time of exam.     Significant Labs:  Recent Labs   Lab 10/29/24  0402   *      K 4.4      CO2 25   BUN 8   CREATININE 0.7   CALCIUM 8.6*     Recent Labs   Lab 10/29/24  0402   WBC 9.31   HGB 11.3*   HCT 35.5*        No results for input(s): "LABPT", "INR", "APTT" in the last 48 hours.  Microbiology Results (last 7 days)       ** No results found for the last 168 hours. **              Significant Diagnostics:  No new imaging for review at this time.   Assessment/Plan:     S/P lumbar spinal fusion  Naomy Armstrong is a  59 y.o.  female who presented to neurosurgery with lumbar radiculopathy. She is s/p Left MIS L5-S1 TLIF with Dr. King, POD#1.      Doing well in terms of the lumbar surgery with expected back pain and improved leg pain. Of concern is new right arm numbness that presented last night. She noted some improvement overnight but it is still present. STAT ultrasound pending to rule out DVT. If negative, we will start steroids for possible nerve irritation related to positioning during surgery, which typically improves with " time.     -Fletcher to be removed this AM. Monitor for independent urination   -Post-op xrays pending  -Neurologically stable with the exception of new right arm numbness. Ultrasound pending to rule out DVT    -PT/OT/OOB x 6hours per day; can be divided into 2 hour intervals in the chair  ---PT recs pending  -LSO brace whenever out of bed     -TEDs/SCDs/IS  -SQH to start at 24 hrs after surgery    -Regular diet  -Oxycodone prn for pain with IV dilaudid for breakthrough   -Robaxin for muscle spasms    -IV Abx until drain is removed   -Hemovac drain to full suction. Empty and record output every 4-6 hours  --Output=0cc  --continue for now     Dispo: this afternoon vs tomorrow morning, pending imaging results, PT/OT, xrays, and urination            Kimmie Adair PA-C  Neurosurgery  [facility] - [place] - Med Surg

## 2024-10-29 NOTE — SUBJECTIVE & OBJECTIVE
Interval History: 10/29: POD 1 s/p MIS TLIF. Doing well, LLE pain improved.  Back pain controlled with medication.  Fletcher removed, tolerating PO. Noticing some RUE distal hand numbness likely from surgical positioning.     Medications:  Continuous Infusions:  Scheduled Meds:   acetaminophen  650 mg Oral Q6H    alosetron  0.5 mg Oral BID    amLODIPine  5 mg Oral QHS    ceFAZolin (Ancef) IV (PEDS and ADULTS)  1 g Intravenous Q8H    diazePAM  5 mg Oral Q8H    dicyclomine  20 mg Oral QID    FLUoxetine  80 mg Oral Daily    fluticasone propionate  1 spray Each Nostril Daily    lisinopriL  20 mg Oral Nightly    lurasidone  20 mg Oral Daily    pantoprazole  40 mg Oral Daily    polyethylene glycol  17 g Oral Daily    pregabalin  100 mg Oral TID     PRN Meds:  Current Facility-Administered Medications:     bisacodyL, 10 mg, Rectal, Daily PRN    cyclobenzaprine, 10 mg, Oral, TID PRN    dextrose 10%, 12.5 g, Intravenous, PRN    dextrose 10%, 25 g, Intravenous, PRN    glucose, 16 g, Oral, PRN    glucose, 24 g, Oral, PRN    HYDROmorphone, 1 mg, Intravenous, Q3H PRN    labetalol, 10 mg, Intravenous, Q6H PRN    melatonin, 6 mg, Oral, Nightly PRN    ondansetron, 4 mg, Intravenous, Q6H PRN    oxyCODONE, 10 mg, Oral, Q4H PRN     Review of Systems  Objective:     Weight: 77.1 kg (169 lb 15.6 oz)  Body mass index is 28.29 kg/m².  Vital Signs (Most Recent):  Temp: 98.4 °F (36.9 °C) (10/29/24 0734)  Pulse: 77 (10/29/24 0734)  Resp: 18 (10/29/24 0840)  BP: 121/66 (10/29/24 0734)  SpO2: 99 % (10/29/24 0734) Vital Signs (24h Range):  Temp:  [97.8 °F (36.6 °C)-98.4 °F (36.9 °C)] 98.4 °F (36.9 °C)  Pulse:  [] 77  Resp:  [12-20] 18  SpO2:  [92 %-99 %] 99 %  BP: ()/(45-66) 121/66                              Closed/Suction Drain 10/28/24 1510 Tube - 1 10 Fr. (Active)   Site Description Unable to view 10/28/24 1720   Dressing Type Gauze;Transparent (Tegaderm) 10/28/24 1720   Drainage Serosanguineous 10/28/24 1720   Output (mL) 0 mL  "10/28/24 2000          Physical Exam         Neurosurgery Physical Exam  General: well developed, well nourished, no distress.   Head: normocephalic, atraumatic  Neurologic:   GCS 15, A+Ox3  CN 2-12 intact  Skin: Skin is warm, dry and intact.  Sensory: intact to light touch throughout, some RUE forearm / hand numbness    Strength  Deltoids Triceps Biceps Wrist Extension Wrist Flexion Hand    Upper: R 5/5 5/5 5/5 5/5 5/5 5/5    L 5/5 5/5 5/5 5/5 5/5 5/5     Iliopsoas Quadriceps Knee  Flexion Tibialis  anterior Gastro- cnemius EHL   Lower: R 5/5 5/5 5/5 5/5 5/5 5/5    L 5/5 5/5 5/5 5/5 5/5 5/5     Incision bandage dry      Significant Labs:  Recent Labs   Lab 10/29/24  0402   *      K 4.4      CO2 25   BUN 8   CREATININE 0.7   CALCIUM 8.6*     Recent Labs   Lab 10/29/24  0402   WBC 9.31   HGB 11.3*   HCT 35.5*        No results for input(s): "LABPT", "INR", "APTT" in the last 48 hours.  Microbiology Results (last 7 days)       ** No results found for the last 168 hours. **          All pertinent labs from the last 24 hours have been reviewed.    Significant Diagnostics:  I have reviewed all pertinent imaging results/findings within the past 24 hours.  "

## 2024-10-29 NOTE — NURSING
Pt c/o numbness to R hand that radiates to R upper arm. Cap refill <3 sec with skin cool to touch to bilat upper arms.  +2 equally w full sensation felt in R arm as stated by patient. Neurosurgery team contacted and STAT order for ultrasound placed. US tech stated STAT order may be delayed d/t workload of pts in ED. Reassessed pt and pt stated numbness and tingling is improving. Notified MD and US tech stated US will be placed as ASAP in the morning. Will continue to monitor.

## 2024-10-29 NOTE — OP NOTE
[facility] Surg  Neurosurgery  Operative Note    OP Note      Date of Procedure: 10/28/2024     Pre-Operative Diagnosis: Lumbar radiculopathy [M54.16]  Synovial cyst of lumbar facet joint [M71.38]  DDD (degenerative disc disease), lumbar [M51.369]    Post-Operative Diagnosis: Post-Op Diagnosis Codes:     * Lumbar radiculopathy [M54.16]     * Synovial cyst of lumbar facet joint [M71.38]     * DDD (degenerative disc disease), lumbar [M51.369]    Anesthesia: General    Procedures performed:  1) Left sided L5/S1 Laminectomy, total facetectomy, microdiscectomy  2) Left sided L5 and S1 foraminotomies  3) Left sided L5/S1 transforaminal lumbar interbody fusion using autograft and demineralized bone matrix allograft  4) Placement of expandable interbody cage L5/S1 through transforaminal approach- depuy X-pac [id number] cage  5) L5/S1 instrumentation using depuy viper prime screws and 40mm marielos on left, 45mm marielos on right  6) Use of operative microscope      Surgeon: Christian King MD    Assistant: Dao Sesay MD    Indication for Procedure:  Patient is a 59-year-old female who has a history of chronic low back pain as well as left-sided radiculopathy.  She was found to have significant facet degeneration at L5-S1 as well as a synovial cyst on the left side and severe foraminal stenosis on that side.  She had failed all conservative management for this.  She also had osteopenia for which she was pretreated before surgery.  Due to longstanding symptoms and failure conservative management, she was indicated for fusion at this level.  Risks benefits indications alternatives were discussed with the patient and she wanted to proceed.  Signed informed consent was documented.    Operative Note:   Patient was identified in the preoperative holding area using 2 independent patient identifiers.  Patient was taken to the operating room where general endotracheal anesthesia was induced without any problems.  Patient was turned prone  onto an open Torres table and all pressure points were padded.  The arms were in the up position and padded thoroughly.  The skin was cleansed thoroughly with rubbing alcohol and this was followed with DuraPrep x2.  The patient was then draped in the usual sterile manner.     A time-out was then held identifying the correct patient, side, site, procedure to be performed.  All parties agreed and imaging was displayed.  The fluoro unit was brought in the AP position and the lateral edge of the pedicles were marked at L5 and S1. We marked an incision on either side of the back giving us access to these pedicles and the disc space.  These incisions were injected with local anesthetic.  The incisions were then opened with a 10. Blade knife down through the dermis.  The Bovie was used to take these down to and through the fascia.     The fluoro unit was then lined up on the L5 vertebral body in AP orientation.  Jamshidi needles were used to access the pedicle and these were tapped into place until we were at the medial border of the pedicle on x-ray.  A lateral x-ray then confirmed that we were past the pedicle and K-wires were passed into the vertebral body bilaterally.     The fluoro unit was then lined up on the S1 vertebral body in AP orientation.  Jamshidi needles were used to access the pedicle and these were tapped into place until we were at the medial border of the pedicle on x-ray.  A lateral x-ray then confirmed that we were past the pedicle and K-wires were passed into the vertebral body bilaterally.     On the right side, we then used a 6 mm x 45 mm viper prime screw and placed this into the right-sided L5 pedicle under lateral fluoroscopic guidance until it was in the appropriate position in the vertebral body. Again on the right side, we used a 6 mm x 45 mm viper prime screw and placed this into the right-sided S1 pedicle under lateral fluoroscopic guidance until it was in the appropriate position in the  vertebral body. The left sided K wires were secured to the drapes with staples.     Then, using sequential dilation, we placed a 6mm x 26mm MetRx tube centered on the left L5/S1 facet. Placement was confirmed with lateral and AP fluoro.     The operative microscope was then brought into the field.  We used Bovie electrocautery to perform an exposure of the left L5 lamina and left L5/S1 facet. The left sided lamina of L5, as well as the entire left L5/S1 inferior articular process, was removed with the high speed drill and the bone graft was all saved to be used as autograft. A large portion of the left superior articular process of S1 was also removed to decompress the left S1 root and disc space. Using a combination of curettes and hooks we developed a plane under the ligamentum and above the dura and we identified the traversing S1 nerve/thecal sac and the exiting L5 root. We removed the remaining ligamentum flavum using a variety of Kerrison rongeurs. At this point we had an excellent decompression of the left L5 and S1 roots, and the medial thecal sac. Wide foraminotomies were accomplished of the left L5 and S1 roots. There was also a small synovial cyst visualized deep to the L5 root and thecal sac, and a portion of this was removed. The remainder was coagulated with bipolar electrocautery and did not appear to be compressive.     We then coagulated veins over the disc space and incised the L5/S1 disc space with a knife. The disc was removed with a combination of foster, upgoing curettes, rasps, and pituitary rongeurs. We used downgoing curettes to mobilize the medial portions of disc to the midline and beyond. At the completion of this step, we irrigated the disc space thoroughly.     We then placed autograft combined with an extra-small BMP into the L5/S1 disc space. Once we had an adequate amount, we proceeded to implant placement. Retracting the thecal sac medially and left L5 nerve superiorly, we placed a  [id number] XPAC expandable cage from [employer]. Under lateral fluoro, this was expanded to a physiologic height. Placement looked appropriate. We then used a funnel to backfull more autograft and allograft through the cage. We inspected the nerve roots and confirmed that no bone graft had egressed. All roots felt free.      Hemostasis was obtained with Surgiflo and Gelfoam soaked in thrombin.  We irrigated copiously with antibiotic infused saline. We then removed the metrx tube and obtained hemostasis along the muscles using bipolar electrocautery.   The operative microscope was then taken out of the field     Under lateral fluoro, we then used our pre-existing K-wires on the left side to place a 6 mm x 45 mm screw at L5 and a 6 mm x 40 mm screw on the left at S1.  We then placed a 40 mm titanium marielos on the left and a 45mm marielos on the right, between the screw heads.  We placed set screws bilaterally at both levels and then used the torque counter-torque device to final tighten the set screws.  Final AP and lateral x-rays were taken and everything appeared to be in appropriate position.     A 10 Indonesian YOKASTA drain was left in the epidural space on the left side and secured at the skin exit site with a vicryl suture. The wounds were then closed in layers including 0 Vicryl sutures for the fascia, inverted interrupted 2-0 Vicryl sutures for the dermis. 50cc of 0.25% plain marcaine combined with 30mg of ketorolac was then injected into the paraspinal muscles through the same incision. The skin was reapproximated with staples.  The wounds were then cleansed and dried and a sterile dressing was applied consisting of Telfa and Tegaderm.     All sponge and needle counts were correct x2 at the end of the case.  No complications.  I was scrubbed and present for the duration of the case.    EBL:100cc  Specimen Sent: None    Christian King  Neurosurgery

## 2024-10-29 NOTE — NURSING
AVS virtually reviewed with patient in its entirety with emphasis on diet, medications, follow-up appointments and reasons to return to the ED or contact the [facility] On [facility] Line. Patient also encouraged to utilize their patient portal. Ease and convenience of use reiterated. Education complete and patient voiced understanding. All questions answered. Discharge teaching complete

## 2024-10-29 NOTE — HOSPITAL COURSE
10/29: POD 1 s/p MIS TLIF. Doing well, LLE pain improved.  Back pain controlled with medication.  Fletcher removed, tolerating PO.

## 2024-10-29 NOTE — SUBJECTIVE & OBJECTIVE
Interval History: POD#1. C/o back pain as expected. Denies leg pain. Fletcher catheter still in place. Passing flatus.   Endorsed new onset right arm numbness last night along with some swelling. This has improved slightly, but it is still present. It feels like her arm is asleep from the middle of her biceps down into her hand, excluding the 5th finger.     Medications:  Continuous Infusions:   0.9% NaCl   Intravenous Continuous 100 mL/hr at 10/29/24 0747 New Bag at 10/29/24 0747     Scheduled Meds:   acetaminophen  650 mg Oral Q6H    alosetron  0.5 mg Oral BID    amLODIPine  5 mg Oral QHS    ceFAZolin (Ancef) IV (PEDS and ADULTS)  1 g Intravenous Q8H    diazePAM  5 mg Oral Q8H    dicyclomine  20 mg Oral QID    FLUoxetine  80 mg Oral Daily    fluticasone propionate  1 spray Each Nostril Daily    lisinopriL  20 mg Oral Nightly    lurasidone  20 mg Oral Daily    pantoprazole  40 mg Oral Daily    polyethylene glycol  17 g Oral Daily    pregabalin  100 mg Oral TID     PRN Meds:  Current Facility-Administered Medications:     bisacodyL, 10 mg, Rectal, Daily PRN    cyclobenzaprine, 10 mg, Oral, TID PRN    dextrose 10%, 12.5 g, Intravenous, PRN    dextrose 10%, 25 g, Intravenous, PRN    glucose, 16 g, Oral, PRN    glucose, 24 g, Oral, PRN    HYDROmorphone, 1 mg, Intravenous, Q3H PRN    labetalol, 10 mg, Intravenous, Q6H PRN    melatonin, 6 mg, Oral, Nightly PRN    ondansetron, 4 mg, Intravenous, Q6H PRN    oxyCODONE, 10 mg, Oral, Q4H PRN     Review of Systems  Objective:     Weight: 77.1 kg (169 lb 15.6 oz)  Body mass index is 28.29 kg/m².  Vital Signs (Most Recent):  Temp: 98.4 °F (36.9 °C) (10/29/24 0734)  Pulse: 77 (10/29/24 0734)  Resp: 18 (10/29/24 0734)  BP: 121/66 (10/29/24 0734)  SpO2: 99 % (10/29/24 0734) Vital Signs (24h Range):  Temp:  [97.8 °F (36.6 °C)-98.4 °F (36.9 °C)] 98.4 °F (36.9 °C)  Pulse:  [] 77  Resp:  [12-20] 18  SpO2:  [92 %-99 %] 99 %  BP: ()/(45-66) 121/66                               Closed/Suction Drain 10/28/24 1510 Tube - 1 10 Fr. (Active)   Site Description Unable to view 10/28/24 1720   Dressing Type Gauze;Transparent (Tegaderm) 10/28/24 1720   Drainage Serosanguineous 10/28/24 1720   Output (mL) 0 mL 10/28/24 2000            Urethral Catheter 10/28/24 1117 Double-lumen;Silicone 16 Fr. (Active)   Site Assessment Clean;Intact 10/28/24 1720   Collection Container Urimeter 10/28/24 1720   Output (mL) 1250 mL 10/29/24 0557          Physical Exam  General: well developed, well nourished, no distress.   Head: normocephalic, atraumatic  Neurologic: Awake, Alert, Oriented x 4. Thought content appropriate.  GCS: Motor: 6/Verbal: 5/Eyes: 4 GCS Total: 15  Language: No aphasia  Speech: No dysarthria  Cranial nerves: face symmetric, tongue midline, CN II-XII grossly intact.   Eyes: pupils equal, round, reactive to light with accomodation, EOMI.  Pulmonary: normal respirations, no signs of respiratory distress  Abdomen: soft, non-distended, not tender to palpation    Sensory: intact to light touch throughout    Motor Strength: Moves all extremities spontaneously with good tone.  Full strength upper and lower extremities. No abnormal movements seen.     Strength  Deltoids Triceps Biceps Wrist Extension Wrist Flexion Hand  FA   Upper: R 5/5 5/5 5/5 5/5 5/5 5/5 5/5    L 5/5 5/5 5/5 5/5 5/5 5/5 5/5     Iliopsoas Quadriceps  Tibialis  anterior Gastro- cnemius EHL    Lower: R 5/5 5/5  5/5 5/5 5/5     L 5/5 5/5  5/5 5/5 5/5      DTR's: 2 + and symmetric in patellar and brachioradialis    Matos: absent  Clonus: absent    Skin: Skin is warm, dry and intact.    Bilateral lumbar incisions: closed with staples and covered telfa and tegaderm. Mild-mod serosanguinous staining to dressing.     YOKASTA drain to full suction: no document drain output since surgery. 10cc sanguinous drainage present in drain at time of exam.     Significant Labs:  Recent Labs   Lab 10/29/24  0402   *      K 4.4      CO2  "25   BUN 8   CREATININE 0.7   CALCIUM 8.6*     Recent Labs   Lab 10/29/24  0402   WBC 9.31   HGB 11.3*   HCT 35.5*        No results for input(s): "LABPT", "INR", "APTT" in the last 48 hours.  Microbiology Results (last 7 days)       ** No results found for the last 168 hours. **              Significant Diagnostics:  No new imaging for review at this time.   "

## 2024-10-29 NOTE — PROGRESS NOTES
NEUROSURGERY UPDATE      Drain removed.   Discharge instructions reviewed with patient. All questions answered. Prescriptions sent to hospital pharmacy for bedside delivery.   RW ordered  Xrays show satisfactory placement of hardware  Fletcher removed this AM. Urinated this PM    Stable for discharge from neurosurgery standpoint      The mobility limitation cannot be sufficiently resolved by the use of a cane. Patient's functional mobility deficit can be sufficiently resolved with the use of a (Rollator, Rolling Walker or Walker). Patient's mobility limitation significantly impairs their ability to participate in one of more activities of daily living. The use of a (Rollator, RW or Walker) will significantly improve the patient's ability to participate in MRADLS and the patient will use it on regular basis in the home       Kimmie Adair PA-C  [facility]  Department of Neurosurgery  [phone]

## 2024-10-29 NOTE — PT/OT/SLP EVAL
"Physical Therapy Evaluation and Discharge Note    Patient Name:  Naomy Armstrong   MRN:  [id number]    Recommendations:     Discharge Recommendations: No Therapy Indicated  Discharge Equipment Recommendations: walker, rolling   Barriers to discharge:  Supervision/assist recommended 2/2 decreased safety awareness    Assessment:     Naomy Armstrong is a 59 y.o. female admitted with a medical diagnosis of <principal problem not specified>. .  At this time, patient is functioning at their prior level of function and does not require further acute PT services.     Recent Surgery: Procedure(s) (LRB):  FUSION, SPINE, LUMBAR, TLIF, MINIMALLY INVASIVE (Left) 1 Day Post-Op    Plan:     During this hospitalization, patient does not require further acute PT services.  Please re-consult if situation changes.      Subjective     Chief Complaint: pain  Patient/Family Comments/goals: pt agreeable to evaluation, per nursing and pt, pt got out of bed and transferred to procedure without LSO and pt has gotten out of be herself without LSO  Pain/Comfort:  Pain Rating 1:  (not rated)  Location 1: back  Pain Addressed 1: Pre-medicate for activity, Reposition, Distraction, Cessation of Activity    Patients cultural, spiritual, Gnosticism conflicts given the current situation: no    Living Environment:  Pt lives with her SO in a H with no concerns  Prior to admission, patients level of function was Independent.  Equipment used at home: none.  DME owned (not currently used): none.Does statede that she has an adjustable bed  Upon discharge, patient will have assistance from SO.    Objective:     Communicated with nsg prior to session.  Patient found HOB elevated with peripheral IV (YOKASTA drain intact, but pt's gown and sheets with bloody drainage from drain.  SO states that pt "got Up" and then sat on it) upon PT entry to room.    General Precautions: Standard, fall    Orthopedic Precautions:spinal precautions   Braces: " "LSO  Respiratory Status: Room air    Exams:  Cognitive Exam:  Patient is oriented to Person, Place, Time, and Situation  Gross Motor Coordination:  mildly impaired 2/2 generalized weakness and pain  Postural Exam:  Patient presented with the following abnormalities:    -       Rounded shoulders  -       Forward head  Sensation:    -       Intact  light/touch B LE"s  Skin Integrity/Edema:      -       Sx incision with dressing and drain intact but bloody drainage noted on dressing  -       Edema: None noted    RLE ROM: WFL  RLE Strength: WFL  LLE ROM: WFL  LLE Strength: WFL    Functional Mobility:  Bed Mobility:     Scooting: stand by assistance  Supine to Sit: contact guard assistance with HOB elevated and VC/TC for body mechanics  Transfers:     Sit to Stand:  contact guard assistance and with Vc/TC for hand placement and hip hinging  with rolling walker  Gait: Gait training with RW and CGA>Supervision with Vc/TC for walker management/safety, increased trunk ext and to scan environment with eyes approx 400'  Balance: Good-/fair+    AM-PAC 6 CLICK MOBILITY  Total Score:18       Treatment and Education:  Educated pt on LSO brace when out of bed, Spinal precautions, to "call before you fall"and to perform AP's while sitting up in chair.      AM-PAC 6 CLICK MOBILITY  Total Score:18     Patient left up in chair with all lines intact, call button in reach, nsg notified, and SO present.    GOALS:   Multidisciplinary Problems       Physical Therapy Goals       Not on file                    History:     Past Medical History:   Diagnosis Date    Alcohol abuse     per chart, but patient denies today and cocaine per chart    Anxiety     Colon polyp     Depression     Hallucination     last couple of months started seeing someone standing in her room, saw boyfriend's reflection in glass    Headache     History of psychiatric hospitalization     age 13 [facility] for 2 months for acting out; 2018 for SI    Hx of psychiatric care  "    Hypertension     Psychiatric problem     Sleep difficulties     Suicide attempt     with knife by cutting stomach    Therapy        Past Surgical History:   Procedure Laterality Date    COLONOSCOPY N/A 05/07/2021    Procedure: COLONOSCOPY;  Surgeon: Prem Montana MD;  Location: [facility];  Service: Endoscopy;  Laterality: N/A;  COVID screening 5/4 LAPC-instr portal -ml    COLONOSCOPY N/A 06/06/2022    Procedure: COLONOSCOPY;  Surgeon: Fab Shepherd MD;  [facility]  Service: Endoscopy;  Laterality: N/A;  C-Diff neg  Rapid COVID    EPIDURAL STEROID INJECTION Bilateral 08/05/2020    Procedure: Bilateral MBB @ L3, L4, L5;  Surgeon: Jeremy Powell Jr., MD;  Location: [facility];  Service: Pain Management;  Laterality: Bilateral;  Bilat L3-5 MBB  Arrive @ 1315; No ATC or DM; Needs MD signature    EPIDURAL STEROID INJECTION Right 08/19/2020    Procedure: Lumbar Radiofrequency Thermocoagulation of Medial Branches;  Surgeon: Jeremy Powell Jr., MD;  Location: [facility];  Service: Pain Management;  Laterality: Right;  Right L3-5 RFA  Arrive @ 1045; No ATC or DM; Needs MD Signature    EPIDURAL STEROID INJECTION Left 09/04/2020    Procedure: Lumbar Radiofrequency Thermocoagulation of Medial Branches;  Surgeon: Jeremy Powell Jr., MD;  Location: [facility];  Service: Pain Management;  Laterality: Left;  Left L3-5 RFA  Arrive @ 0900 (requested); No ATC or DM; Needs MD signature    EPIDURAL STEROID INJECTION Bilateral 04/16/2021    Procedure: Sacroiliac Joint Steroid Injections @ Bilateral;  Surgeon: Jeremy Powell Jr., MD;  Location: [facility];  Service: Pain Management;  Laterality: Bilateral;  Arrive @ 1300; No ATC or DM    EPIDURAL STEROID INJECTION Bilateral 01/19/2022    Procedure: Bilateral Sacroiliac Joint Injections;  Surgeon: Jeremy Powell Jr., MD;  Location: [facility];  Service: Pain Management;  Laterality: Bilateral;  Arrive @ 1315; No ATC or DM; Needs Consent     EPIDURAL STEROID INJECTION Bilateral 07/28/2023    Procedure: Bilateral L2, L3, L4 Medial Branch Blocks #1;  Surgeon: Jeremy Powell Jr., MD;  Location: [facility] ENDO;  Service: Pain Management;  Laterality: Bilateral;  @1200  No ATC or DM    EPIDURAL STEROID INJECTION Bilateral 08/11/2023    Procedure: Bilateral L2, L3, L4 Medial Branch Blocks #2;  Surgeon: Jeremy Powell Jr., MD;  Location: [facility] ENDO;  Service: Pain Management;  Laterality: Bilateral;  @1330  No ATC or DM    ESOPHAGEAL MANOMETRY WITH MEASUREMENT OF IMPEDANCE N/A 10/20/2021    Procedure: MANOMETRY, ESOPHAGUS, WITH IMPEDANCE MEASUREMENT;  Surgeon: Anastacia Odonnell MD;  Location: [facility] ENDO (4TH FLR);  Service: Endoscopy;  Laterality: N/A;  Covid test 10/17 [place], instructions [facility] to [facility]    ESOPHAGOGASTRODUODENOSCOPY N/A 05/07/2021    Procedure: EGD (ESOPHAGOGASTRODUODENOSCOPY);  Surgeon: Prem Montana MD;  Location: [facility] ENDO;  Service: Endoscopy;  Laterality: N/A;  added on per Dr. NOEL Shepherd    EYE SURGERY  6/6/2015    Lasik    GANGLION CYST EXCISION      HERNIA REPAIR  2/14/2022    Hiatal hernia    INJECTION, FACET JOINT, LUMBOSACRAL Left 06/14/2024    Procedure: Left S1 Transforaminal Epidural Steroid Injection + Left L5-S1 Facet Joint Injection/Aspiration;  Surgeon: Jeremy Powell Jr., MD;  Location: [facility] PAIN MANAGEMENT;  Service: Pain Management;  Laterality: Left;  @1200  No ATC or DM  Needs Consent  IV Sedation    LAPAROSCOPIC TOUPET FUNDOPLICATION N/A 02/14/2022    Procedure: FUNDOPLICATION, LAPAROSCOPIC, TOUPET;  Surgeon: Christian Martinez MD;  Location: [facility] OR 2ND FLR;  Service: General;  Laterality: N/A;    MINIMALLY INVASIVE TRANSFORAMINAL LUMBAR INTERBODY FUSION (TLIF) Left 10/28/2024    Procedure: FUSION, SPINE, LUMBAR, TLIF, MINIMALLY INVASIVE;  Surgeon: Christian King MD;  Location: [facility] OR;  Service: Neurosurgery;  Laterality: Left;  Left L5/S1, possible L4-5 minimally invasive TLIF  sravanthi 4  poster   fluoro  no neuromonitoring needed   Depuy  DEPUY CARMELA NOTIFIED-JERALD  RN PREOP 10/14/2024   T/S ON 10/25/2024----NEED H/P       Time Tracking:     PT Received On: 10/29/24  PT Start Time: 1116     PT Stop Time: 1147  PT Total Time (min): 31 min     Billable Minutes: Evaluation 15 and Gait Training 8 co-evaluation with OT      10/29/2024

## 2024-10-29 NOTE — PT/OT/SLP PROGRESS
Physical Therapy      Patient Name:  Naomy Armstrong   MRN:  [id number]    Patient not seen today secondary to  (Awaiting US/DVT results). Will follow-up .

## 2024-10-29 NOTE — PROGRESS NOTES
NEUROSURGERY UPDATE    Upper extremity ultrasound was negative for a DVT.  Patient is started on dexamethasone 4 mg b.i.d..    Drain output remains low.  She has been ambulating in the room independently.  She is hopeful to discharge home this afternoon if possible.    Plan:   Dexamethasone 4 mg b.i.d. while inpatient.  Will discharge with a Medrol Dosepak  I will set the patient up for possible discharge this afternoon, pending PT/OT sessions and lumbar x-rays  We will remove her drain this afternoon as long as the output remains low      Kimmie Adair PA-C  [facility]  Department of Neurosurgery  [phone]

## 2024-10-29 NOTE — NURSING
Pt discharged per MD order. IV removed. Catheter tip intact. No distress noted. AVS given to patient, discharge instructions explained per discharge nurse. Pt verbalized understanding. VSS. Afebrile. No complaints of pain, N/V, diarrhea, or SOB. Walker delivered from [facility]. Pt left with belongings to Main Entrance via wheelchair per transport. Family with patient.

## 2024-10-29 NOTE — DISCHARGE INSTRUCTIONS
"If you have any questions about this form, please call [phone] or send us a message on [employer].    Activity Restrictions:  [x]  Return to work will be determined on an individual basis.  [x]  No lifting greater than 10 pounds.  [x]  Avoid bending and twisting the area of your surgery. Do not "test" your range of motion  [x]  No driving or operating machinery:  [x]  until cleared by your surgeon.  [x]  while taking narcotic pain medications or muscle relaxants.    [x]  Wear brace/collar at all times except when lying flat in bed     [x]  Increase ambulation over the next 2 weeks so that you are walking 2 miles per day at 2 weeks post-operatively.  [x]  Make sure to take two dedicated 5-10 minute walks per day, along with short walks every 1-2 hours, even if just to walk to the restroom and back.   [x]  Walk on paved surfaces only. It is okay to walk up and down stairs while holding onto a side rail.  [x]  No sexual activity for 2-3 weeks.    Discharge Medication/Follow-up:  [x]  Please refer to discharge medication reconciliation form.  [x]  For the first week after surgery: Check your blood pressure before taking any blood pressure medications at home. If BP is below 120/80, do not take your blood pressure medication.   [x]  Do not take ANY non-steroidal anti-inflammatory drugs (NSAIDS), including the following: ibuprofen, naprosyn, Aleve, Advil, Indocin, Mobic, or Celebrex for:  [x]  12 weeks  [x]  Prescriptions for appropriate medication will be given upon discharge.   [x]  Pain control: Percocet as needed for severe pain only. Over the counter tylenol for mild pain   [x]  Muscle relaxer: Flexeril for muscle spasms. Can take every 8 hours if needed, but most helpful at bedtime.  [x]  Nausea: Zofran as needed for nausea     Bowel Regimen:  [x]  Take docusate (Colace 100 mg) and miralax daily until bowel activity returns to lety. You can get this over the counter.  [x]  If you have not had a bowel movement by " day 3 after surgery, try a fleet's enema or suppository, both over the counter. Call neurosurgery if you have not had a bowel movement by day 5 after surgery.   [x]  Follow-up appointment:  [x]  2 weeks post-op for wound check by PA/nurse  [x]  4-6 weeks with MD    Wound Care:  [x]  Remove dressing or bandaid in  2  days.  [x]  No bandage required once removed. Keep your incision open to the air.  [x]  You may shower on the 3rd day after your surgery. Have the force of water hit you opposite from the incision. Pat the incision dry after your shower; do not scrub the incision. Do not use Hibiclens after surgery.  [x]  You wound is closed with one of the following: skin glue, steri strips, OR staples. Allow skin glue/steri strips to fall off on their own over time (if applicable). If you have staples, these will be removed at your clinic appt in 2 weeks.   [x]  You cannot take a bath until 8 weeks after surgery.    Call your doctor or go to the Emergency Room for any signs of infection, including: increased redness, drainage, pain, or fever (temperature >=101.5 for 24 hours). Call your doctor or go to the Emergency Room if there are any localized neurological changes; problems with speech, vision, numbness, tingling, weakness, or severe headache; or for other concerns.    Special Instructions:  [x]  No use of tobacco products.  [x]  Diet: Please eat a regular diet as tolerated.  []  Other diet:              Specific physician instructions:           Physicians need 3 days' notice for pain medicine to be refilled. Pain medicine will only be refilled between 8 AM and 5 PM, Monday through Friday, due to Food and Drug Administration regulation of documentation.

## 2024-10-29 NOTE — HPI
Patient returns to clinic for FU of lumbar radiculopathy. She started fosamax treatment for osteopenia about 5 weeks ago. She reports a fall yesterday and of the car.  She stepped on a tree root and fell down hitting her back and left shoulder onto the root.     Although improved compared to her 1st visit in June, she does still report frequent severe pain in her back and left leg.  Left leg pain is located in her lateral thigh, stopping at the knee.  She denies numbness in her left foot.  Her left foot remains weak compared to the right but is slightly improved compared to her initial visit.  She would like to move forward with surgery in late October if possible     Admitted to [facility] on 10/28/24 for a Left MIS L5-S1 TLIF with Dr. King

## 2024-10-29 NOTE — ASSESSMENT & PLAN NOTE
Naomy Armstrong is a  59 y.o.  female who presented to neurosurgery with lumbar radiculopathy. She is s/p Left MIS L5-S1 TLIF with Dr. King, POD#1.      Doing well in terms of the lumbar surgery with expected back pain and improved leg pain. Of concern is new right arm numbness that presented last night. She noted some improvement overnight but it is still present. STAT ultrasound pending to rule out DVT. If negative, we will start steroids for possible nerve irritation related to positioning during surgery, which typically improves with time.     -Fletcher to be removed this AM. Monitor for independent urination   -Post-op xrays pending  -Neurologically stable with the exception of new right arm numbness. Ultrasound pending to rule out DVT    -PT/OT/OOB x 6hours per day; can be divided into 2 hour intervals in the chair  ---PT recs pending  -LSO brace whenever out of bed     -TEDs/SCDs/IS  -SQH to start at 24 hrs after surgery    -Regular diet  -Oxycodone prn for pain with IV dilaudid for breakthrough   -Robaxin for muscle spasms    -IV Abx until drain is removed   -Hemovac drain to full suction. Empty and record output every 4-6 hours  --Output=0cc  --continue for now     Dispo: this afternoon vs tomorrow morning, pending imaging results, PT/OT, xrays, and urination

## 2024-10-29 NOTE — PT/OT/SLP EVAL
Occupational Therapy Evaluation/Discharge and Treatment    Name: Naomy Armstrong  MRN: [id number]  Admitting Diagnosis: <principal problem not specified> 1 Day Post-Op  Recent Surgery: Procedure(s) (LRB):  FUSION, SPINE, LUMBAR, TLIF, MINIMALLY INVASIVE (Left) 1 Day Post-Op    Recommendations:     Discharge Recommendations: No Therapy Indicated  (w/ family assistance)  Level of Assistance Recommended: Intermittent assistance  Discharge Equipment Recommendations: walker, rolling (reacher, long-handled sponge, sock-aide)  Barriers to discharge:  None     Assessment:     Naomy Armstrong is a 59 y.o. female with a medical diagnosis of lumbar radiculopathy s/p left sided L5/S1 Laminectomy, total facetectomy, microdiscectom . She presents with performance deficits affecting function including pain, decreased ROM, orthopedic precautions, weakness, impaired endurance, impaired balance, decreased lower extremity function, decreased safety awareness, impaired skin, impaired self care skills, impaired functional mobility.     Rehab Prognosis: Good; patient would benefit from acute OT services to address these deficits and reach maximum level of function.    Plan:     Patient to be d/c POD1.     Plan of Care Expires: 10/29/24  Plan of Care Reviewed with: significant other    Subjective     Chief Complaint: pain w/ movement   Patient Comments/Goals: agreeable to participate   Pain/Comfort:  Pain Rating 1:  (Pt did not rate.)  Location - Orientation 1: lower  Location 1: back  Pain Addressed 1: Reposition, Distraction, Cessation of Activity    Patients cultural, spiritual, Rastafarian conflicts given the current situation: no    Social History:  Living Environment: Patient lives with their significant other in a single story home with number of outside stair(s): 0; pt has access to both WIS and t/s combo with SC.   Prior Level of Function: Prior to admission, patient was independent.  Roles and Routines: Patient was  driving and working fot [employer] prior to admission.  Equipment Used at Home: none  DME owned (not currently used): shower chair  Assistance Upon Discharge: significant other    Objective:     Communicated with Nurse prior to session. Patient found right sidelying with HOB elevated with YOKASTA drain, telemetry, peripheral IV upon OT entry to room.    General Precautions: Standard, fall   Orthopedic Precautions: spinal precautions   Braces: LSO    Respiratory Status: Room air    Occupational Performance    Gait belt applied - Yes    Bed Mobility: Pt educated on importance/benefits of log rolling technique to maintain spinal precautions. Pt reports she uses an adjustable bed at home; pt was able to safely transition to EOB while complying w/ precautions.   Supine to sit from left side of bed with stand by assistance and contact guard assistance    Functional Mobility/Transfers:  Sit <> Stand Transfer x 1 trial from EOB with contact guard assistance with rolling walker  Bed <> Chair Transfer using Step Transfer technique with contact guard assistance with rolling walker  Functional Mobility: Pt was able to ambulate functional distanced w/ closed CGA and use of RW    Activities of Daily Living:  Upper Body Dressing: minimum assistance for donning gown over back back     Cognitive/Visual Perceptual:  Cognitive/Psychosocial Skills:    -     Oriented to: Person, Place, Time, Situation  -     Follows Commands/attention: Follows multistep  commands  -     Communication: clear/fluent  -     Memory: No Deficits noted  -     Safety awareness/insight to disability: mildly impaired (ie. Getting OOB w/o LSO or assistance from staff)    Physical Exam:  Balance:    -     Sitting: stand by assistance  -     Standing: stand by assistance and contact guard assistance  Postural examination/scapula alignment:    -       No postural abnormalities identified  Skin integrity: drainage at site of dressing   Edema:  None noted  Sensation:  endorses  numbness at R bicep   Upper Extremity Range of Motion:     -       Right Upper Extremity: WFL  -       Left Upper Extremity: WFL  Upper Extremity Strength:    -       Right Upper Extremity: WFL  -       Left Upper Extremity: WFL   Strength:    -       Right Upper Extremity: WFL  -       Left Upper Extremity: WFL    AMPAC 6 Click ADL:  AMPAC Total Score: 20    Treatment & Education:  Educated on the importance of mobility to maximize recovery  Educated on the importance of OOB mobility within safe range in order to decrease adverse effects of prolonged bedrest  Educated about Spinal precautions including no bending, lifting, or twisting. Patient recalled 3/3 spinal precautions  Educated on use of hip kit during LB dressing  Educated on safety with functional mobility; hand placement to ensure safe transfers to various surfaces in prep for ADLs  Educated on performing functional mobility and ADLs in adherence to orthopedic precautions  Reinforced being OOB to chair x 6hours per day; can be divided into 2 hour intervals in the chair     Patient clear to ambulate to/from bathroom with RN/PCT, assist x1 and use of RW .    Patient left up in chair with all lines intact and call button in reach.    GOALS:   Multidisciplinary Problems       Occupational Therapy Goals          Problem: Occupational Therapy    Goal Priority Disciplines Outcome Interventions   Occupational Therapy Goal     OT, PT/OT Adequate for Care Transition                        History:     Past Medical History:   Diagnosis Date    Alcohol abuse     per chart, but patient denies today and cocaine per chart    Anxiety     Colon polyp     Depression     Hallucination     last couple of months started seeing someone standing in her room, saw boyfriend's reflection in glass    Headache     History of psychiatric hospitalization     age 13 [facility] for 2 months for acting out; 2018 for SI    Hx of psychiatric care     Hypertension     Psychiatric problem      Sleep difficulties     Suicide attempt     with knife by cutting stomach    Therapy          Past Surgical History:   Procedure Laterality Date    COLONOSCOPY N/A 05/07/2021    Procedure: COLONOSCOPY;  Surgeon: Prem Montana MD;  Location: [facility];  Service: Endoscopy;  Laterality: N/A;  COVID screening 5/4 LAPC-instr portal -ml    COLONOSCOPY N/A 06/06/2022    Procedure: COLONOSCOPY;  Surgeon: Fab Shepherd MD;  Location: [facility] ENDO ([facility] FLR);  Service: Endoscopy;  Laterality: N/A;  C-Diff neg  Rapid COVID    EPIDURAL STEROID INJECTION Bilateral 08/05/2020    Procedure: Bilateral MBB @ L3, L4, L5;  Surgeon: Jeremy Powell Jr., MD;  Location: [facility];  Service: Pain Management;  Laterality: Bilateral;  Bilat L3-5 MBB  Arrive @ 1315; No ATC or DM; Needs MD signature    EPIDURAL STEROID INJECTION Right 08/19/2020    Procedure: Lumbar Radiofrequency Thermocoagulation of Medial Branches;  Surgeon: Jeremy Powell Jr., MD;  Location: [facility];  Service: Pain Management;  Laterality: Right;  Right L3-5 RFA  Arrive @ 1045; No ATC or DM; Needs MD Signature    EPIDURAL STEROID INJECTION Left 09/04/2020    Procedure: Lumbar Radiofrequency Thermocoagulation of Medial Branches;  Surgeon: Jeremy Powell Jr., MD;  Location: [facility];  Service: Pain Management;  Laterality: Left;  Left L3-5 RFA  Arrive @ 0900 (requested); No ATC or DM; Needs MD signature    EPIDURAL STEROID INJECTION Bilateral 04/16/2021    Procedure: Sacroiliac Joint Steroid Injections @ Bilateral;  Surgeon: Jeremy Powell Jr., MD;  Location: [facility];  Service: Pain Management;  Laterality: Bilateral;  Arrive @ 1300; No ATC or DM    EPIDURAL STEROID INJECTION Bilateral 01/19/2022    Procedure: Bilateral Sacroiliac Joint Injections;  Surgeon: Jeremy Powell Jr., MD;  Location: [facility];  Service: Pain Management;  Laterality: Bilateral;  Arrive @ 1315; No ATC or DM; Needs Consent    EPIDURAL STEROID INJECTION Bilateral  07/28/2023    Procedure: Bilateral L2, L3, L4 Medial Branch Blocks #1;  Surgeon: Jeremy Powell Jr., MD;  Location: [facility] ENDO;  Service: Pain Management;  Laterality: Bilateral;  @1200  No ATC or DM    EPIDURAL STEROID INJECTION Bilateral 08/11/2023    Procedure: Bilateral L2, L3, L4 Medial Branch Blocks #2;  Surgeon: Jeremy Powell Jr., MD;  Location: [facility] ENDO;  Service: Pain Management;  Laterality: Bilateral;  @1330  No ATC or DM    ESOPHAGEAL MANOMETRY WITH MEASUREMENT OF IMPEDANCE N/A 10/20/2021    Procedure: MANOMETRY, ESOPHAGUS, WITH IMPEDANCE MEASUREMENT;  Surgeon: Anastacia Odonnell MD;  Location: [facility] ENDO (4TH FLR);  Service: Endoscopy;  Laterality: N/A;  Covid test 10/17 [place], instructions sent to [email or web address]    ESOPHAGOGASTRODUODENOSCOPY N/A 05/07/2021    Procedure: EGD (ESOPHAGOGASTRODUODENOSCOPY);  Surgeon: Prem Montana MD;  Location: [facility] ENDO;  Service: Endoscopy;  Laterality: N/A;  added on per Dr. NOEL Shepherd    EYE SURGERY  6/6/2015    Lasik    GANGLION CYST EXCISION      HERNIA REPAIR  2/14/2022    Hiatal hernia    INJECTION, FACET JOINT, LUMBOSACRAL Left 06/14/2024    Procedure: Left S1 Transforaminal Epidural Steroid Injection + Left L5-S1 Facet Joint Injection/Aspiration;  Surgeon: Jeremy Powell Jr., MD;  Location: [facility] PAIN MANAGEMENT;  Service: Pain Management;  Laterality: Left;  @1200  No ATC or DM  Needs Consent  IV Sedation    LAPAROSCOPIC TOUPET FUNDOPLICATION N/A 02/14/2022    Procedure: FUNDOPLICATION, LAPAROSCOPIC, TOUPET;  Surgeon: Christian Martinez MD;  Location: [facility] OR 2ND FLR;  Service: General;  Laterality: N/A;    MINIMALLY INVASIVE TRANSFORAMINAL LUMBAR INTERBODY FUSION (TLIF) Left 10/28/2024    Procedure: FUSION, SPINE, LUMBAR, TLIF, MINIMALLY INVASIVE;  Surgeon: Christian King MD;  Location: [facility] OR;  Service: Neurosurgery;  Laterality: Left;  Left L5/S1, possible L4-5 minimally invasive TLIF  sravanthi 4 poster   fluoro  no neuromonitoring  needed   Depuy  FIORDALIZA CASEY NOTIFIED-JERALD PILLAI PREOP 10/14/2024   T/S ON 10/25/2024----NEED H/P       Time Tracking:     OT Date of Treatment: 10/29/24  OT Start Time: 1116  OT Stop Time: 1147  OT Total Time (min): 31 min    Billable Minutes: Evaluation 15, Therapeutic Activity 16,    10/29/2024

## 2024-10-30 RX ORDER — METHYLPREDNISOLONE 4 MG/1
TABLET ORAL
Qty: 21 EACH | Refills: 0 | Status: SHIPPED | OUTPATIENT
Start: 2024-10-30 | End: 2024-11-20

## 2024-10-30 NOTE — DISCHARGE SUMMARY
[facility] Surg  Neurosurgery  Discharge Summary      Patient Name: Naomy Armstrong  MRN: [id number]  Admission Date: 10/28/2024  Hospital Length of Stay: 0 days  Discharge Date and Time: 10/29/2024  5:49 PM  Attending Physician: Christian King MD   Discharging Provider: Kimmie Adair PA-C  Primary Care Provider: Eric Hernandes Jr., MD     HPI:   Patient returns to clinic for FU of lumbar radiculopathy. She started fosamax treatment for osteopenia about 5 weeks ago. She reports a fall yesterday and of the car.  She stepped on a tree root and fell down hitting her back and left shoulder onto the root.     Although improved compared to her 1st visit in June, she does still report frequent severe pain in her back and left leg.  Left leg pain is located in her lateral thigh, stopping at the knee.  She denies numbness in her left foot.  Her left foot remains weak compared to the right but is slightly improved compared to her initial visit.  She would like to move forward with surgery in late October if possible     Admitted to [facility] on 10/28/24 for a Left MIS L5-S1 TLIF with Dr. King       Procedure(s) (LRB):  FUSION, SPINE, LUMBAR, TLIF, MINIMALLY INVASIVE (Left)     Hospital Course:   10/28: Left MIS L5-S1 TLIF with Dr. King  10/29: POD 1 s/p MIS TLIF. Doing well, LLE pain improved. Back pain controlled with medication. Fletcher removed this am with positive urination this afternoon. Passing flatus. Tolerating PO. New right arm numbness evaluated with an ultrasound and was negative for DVT.  Numbness is likely related to positioning.  We will treat with dexamethasone 4 b.i.d. while inpatient and discharge home with a Medrol Dosepak.  Discharge instructions reviewed with patient. All questions answered. Prescriptions sent to hospital pharmacy for bedside delivery. Discharged home.           Pending Diagnostic Studies:       None          Final Active Diagnoses:    Diagnosis Date Noted  "POA    S/P lumbar spinal fusion [Z98.1] 10/29/2024 Not Applicable      Problems Resolved During this Admission:      Discharged Condition: stable    Disposition: Home or Self Care    Follow Up:   Follow-up Information       Kimmie Adair PA-C Follow up on 11/13/2024.    Specialty: Neurosurgery  Why: For wound re-check at 9:40am  Contact information:  [address] [facility] [address]  [address]  [place] [address]  [phone]               [facility] Follow up.    Why: DME- call for questions or concerns regarding rolling walker  Contact information:  [address] [place] [id number]  [phone]                         Patient Instructions:      WALKER FOR HOME USE     Order Specific Question Answer Comments   Type of Walker: Adult (5'4"-6'6")    With wheels? Yes    Height: 5' 5" (1.651 m)    Weight: 77.1 kg (169 lb 15.6 oz)    Length of need (1-99 months): 99    Does patient have medical equipment at home? none    Please check all that apply: Patient's condition impairs ambulation.    Please check all that apply: Patient needs help to get in and out of chair.    Please check all that apply: Walker will be used for gait training.    Please check all that apply: Patient is unable to safely ambulate without equipment.      Diet Adult Regular     Lifting restrictions     Other restrictions (specify):     No driving until:     Notify your health care provider if you experience any of the following:  temperature >100.4     Notify your health care provider if you experience any of the following:  persistent nausea and vomiting or diarrhea     Notify your health care provider if you experience any of the following:  severe uncontrolled pain     Notify your health care provider if you experience any of the following:  redness, tenderness, or signs of infection (pain, swelling, redness, odor or green/yellow discharge around incision site)     Notify your health care provider if you experience any " of the following:  difficulty breathing or increased cough     Notify your health care provider if you experience any of the following:  severe persistent headache     Notify your health care provider if you experience any of the following:  worsening rash     Notify your health care provider if you experience any of the following:  persistent dizziness, light-headedness, or visual disturbances     Notify your health care provider if you experience any of the following:  increased confusion or weakness     Remove dressing in 48 hours     Shower on day dressing removed (No bath)     Medications:  Reconciled Home Medications:      Medication List        START taking these medications      oxyCODONE-acetaminophen  mg per tablet  Commonly known as: PERCOCET  Take 1 tablet by mouth every 4 (four) hours as needed for Pain (7-10/10 pain).            CHANGE how you take these medications      * ondansetron 8 MG Tbdl  Commonly known as: ZOFRAN-ODT  Take 1 tablet (8 mg total) by mouth every 12 (twelve) hours as needed (nausea).  What changed: Another medication with the same name was added. Make sure you understand how and when to take each.     * ondansetron 4 MG Tbdl  Commonly known as: ZOFRAN-ODT  Dissolve 1 tablet (4 mg total) by mouth every 6 (six) hours as needed (nausea).  What changed: You were already taking a medication with the same name, and this prescription was added. Make sure you understand how and when to take each.           * This list has 2 medication(s) that are the same as other medications prescribed for you. Read the directions carefully, and ask your doctor or other care provider to review them with you.                CONTINUE taking these medications      alendronate 35 MG tablet  Commonly known as: FOSAMAX  Take 1 tablet (35 mg total) by mouth every 7 days.     alosetron 0.5 MG tablet  Commonly known as: LOTRONEX  Take 1 tablet (0.5 mg total) by mouth 2 (two) times daily.     amLODIPine 5 MG  tablet  Commonly known as: NORVASC  Take 1 tablet by mouth once daily     azelastine 137 mcg (0.1 %) nasal spray  Commonly known as: ASTELIN  1 spray (137 mcg total) by Nasal route 2 (two) times daily.     calcium carb, citrate-vit D3 600 mg-12.5 mcg (500 unit) Tbsr  Commonly known as: CITRACAL-D3 SLOW RELEASE  Take 2 tablets by mouth once daily.     cyclobenzaprine 5 MG tablet  Commonly known as: FLEXERIL  Take 2 tablets (10 mg total) by mouth 3 (three) times daily as needed for Muscle spasms.     dicyclomine 20 mg tablet  Commonly known as: BENTYL  TAKE 1 TABLET BY MOUTH EVERY 6 HOURS     fluconazole 200 MG Tab  Commonly known as: DIFLUCAN  Take 1 tablet (200 mg total) by mouth once daily. Take at first sign of yeast infection     FLUoxetine 40 MG capsule  Take 2 capsules (80 mg total) by mouth once daily.     fluticasone propionate 50 mcg/actuation nasal spray  Commonly known as: FLONASE  1 spray (50 mcg total) by Each Nostril route once daily.     hydrOXYzine HCL 10 MG Tab  Commonly known as: ATARAX  Take 1 tablet (10 mg total) by mouth 2 (two) times daily as needed (anxiety).     lisinopriL 20 MG tablet  Commonly known as: PRINIVIL,ZESTRIL  Take 1 tablet (20 mg total) by mouth nightly.     lurasidone 20 mg Tab tablet  Commonly known as: LATUDA  Take 1 tablet (20 mg total) by mouth once daily.     multivitamin with minerals tablet  Take 1 tablet by mouth once daily.     nitrofurantoin (macrocrystal-monohydrate) 100 MG capsule  Commonly known as: MACROBID  Take 1 capsule (100 mg total) by mouth once daily.     omeprazole 40 MG capsule  Commonly known as: PRILOSEC  Take 1 capsule (40 mg total) by mouth every morning.     pregabalin 330 mg Tb24  Take 330 mg by mouth after dinner.     promethazine 25 MG tablet  Commonly known as: PHENERGAN  TAKE 1 TABLET BY MOUTH EVERY 8 HOURS AS NEEDED FOR NAUSEA     UBRELVY 100 mg tablet  Generic drug: ubrogepant  TAKE 1 TABLET BY MOUTH AS NEEDED (MAY REPEAT 1 TIME IN TWO HOURS.  DO NOT EXCEED TWO TABLETS IN 24 HOURS).            STOP taking these medications      HYDROcodone-acetaminophen  mg per tablet  Commonly known as: NORCO     meloxicam 15 MG tablet  Commonly known as: BARRINGTON Adair PA-C  Neurosurgery  [facility] Surg

## 2024-11-04 ENCOUNTER — TELEPHONE (OUTPATIENT)
Dept: FAMILY MEDICINE | Facility: CLINIC | Age: 59
End: 2024-11-04
Payer: COMMERCIAL

## 2024-11-04 NOTE — TELEPHONE ENCOUNTER
Left voice message for patient to return call to discuss lab results.  ----- Message from Eric Hernandes MD sent at 11/3/2024 10:54 PM CST -----  Patient missed her last appointment with me.  Please call patient and reschedule in the next 4 to 6 weeks

## 2024-11-05 ENCOUNTER — PATIENT MESSAGE (OUTPATIENT)
Dept: NEUROSURGERY | Facility: CLINIC | Age: 59
End: 2024-11-05
Payer: COMMERCIAL

## 2024-11-05 DIAGNOSIS — Z98.1 S/P LUMBAR SPINAL FUSION: ICD-10-CM

## 2024-11-05 DIAGNOSIS — Z98.1 S/P LUMBAR SPINAL FUSION: Primary | ICD-10-CM

## 2024-11-05 RX ORDER — OXYCODONE AND ACETAMINOPHEN 10; 325 MG/1; MG/1
1 TABLET ORAL EVERY 4 HOURS PRN
Qty: 42 TABLET | Refills: 0 | Status: SHIPPED | OUTPATIENT
Start: 2024-11-05 | End: 2024-11-13

## 2024-11-05 RX ORDER — NALOXONE HYDROCHLORIDE 4 MG/.1ML
SPRAY NASAL
Qty: 1 EACH | Refills: 11 | Status: SHIPPED | OUTPATIENT
Start: 2024-11-05

## 2024-11-06 ENCOUNTER — OFFICE VISIT (OUTPATIENT)
Dept: FAMILY MEDICINE | Facility: CLINIC | Age: 59
End: 2024-11-06
Payer: COMMERCIAL

## 2024-11-06 DIAGNOSIS — E78.2 MIXED DYSLIPIDEMIA: Primary | Chronic | ICD-10-CM

## 2024-11-06 DIAGNOSIS — E78.2 MIXED DYSLIPIDEMIA: Primary | ICD-10-CM

## 2024-11-06 DIAGNOSIS — I10 HYPERTENSION, ESSENTIAL: Chronic | ICD-10-CM

## 2024-11-06 DIAGNOSIS — M47.816 LUMBAR SPONDYLOSIS: Chronic | ICD-10-CM

## 2024-11-06 DIAGNOSIS — M85.80 OSTEOPENIA AFTER MENOPAUSE: Chronic | ICD-10-CM

## 2024-11-06 DIAGNOSIS — M62.830 MUSCLE SPASM OF BACK: Chronic | ICD-10-CM

## 2024-11-06 DIAGNOSIS — Z78.0 OSTEOPENIA AFTER MENOPAUSE: Chronic | ICD-10-CM

## 2024-11-06 DIAGNOSIS — M50.30 DDD (DEGENERATIVE DISC DISEASE), CERVICAL: Chronic | ICD-10-CM

## 2024-11-06 PROCEDURE — 3044F HG A1C LEVEL LT 7.0%: CPT | Mod: CPTII,95,, | Performed by: FAMILY MEDICINE

## 2024-11-06 PROCEDURE — G2211 COMPLEX E/M VISIT ADD ON: HCPCS | Mod: 95,,, | Performed by: FAMILY MEDICINE

## 2024-11-06 PROCEDURE — 99214 OFFICE O/P EST MOD 30 MIN: CPT | Mod: 95,,, | Performed by: FAMILY MEDICINE

## 2024-11-06 PROCEDURE — 1159F MED LIST DOCD IN RCRD: CPT | Mod: CPTII,95,, | Performed by: FAMILY MEDICINE

## 2024-11-06 PROCEDURE — 1160F RVW MEDS BY RX/DR IN RCRD: CPT | Mod: CPTII,95,, | Performed by: FAMILY MEDICINE

## 2024-11-06 PROCEDURE — 4010F ACE/ARB THERAPY RXD/TAKEN: CPT | Mod: CPTII,95,, | Performed by: FAMILY MEDICINE

## 2024-11-06 RX ORDER — ATORVASTATIN CALCIUM 10 MG/1
10 TABLET, FILM COATED ORAL DAILY
Qty: 90 TABLET | Refills: 3 | Status: SHIPPED | OUTPATIENT
Start: 2024-11-06

## 2024-11-06 RX ORDER — ALENDRONATE SODIUM TABLET 35 MG/1
35 TABLET ORAL
Qty: 12 TABLET | Refills: 1 | Status: SHIPPED | OUTPATIENT
Start: 2024-11-06 | End: 2025-11-06

## 2024-11-06 NOTE — PROGRESS NOTES
Patient Name: Naomy Armstrong    : 1965  MRN: [id number]    The patient location is: [place], [place]  The chief complaint leading to consultation is: Results and Hypertension     Visit type: audiovisual    Face to Face time with patient: 13 minutes  20 minutes of total time spent on the encounter, which includes face to face time and non-face to face time preparing to see the patient (eg, review of tests), Obtaining and/or reviewing separately obtained history, Documenting clinical information in the electronic or other health record, Independently interpreting results (not separately reported) and communicating results to the patient/family/caregiver, or Care coordination (not separately reported).     Each patient to whom he or she provides medical services by telemedicine is:  (1) informed of the relationship between the physician and patient and the respective role of any other health care provider with respect to management of the patient; and (2) notified that he or she may decline to receive medical services by telemedicine and may withdraw from such care at any time.    Notes:     Subjective:     Patient ID: Naomy is a 59 y.o. female    Chief Complaint:  Results and Hypertension    History of Present Illness    Naomy presents today for follow-up on hypertension and recent lab results.    On 10/28/2024 she underwent left-sided L5/S1 laminectomy, foraminotomies, and transforaminal lumbar interbody fusion.  She reports that since the procedure she has been doing okay overall.  She had been prescribed Percocet which she is weaning off of.  She continues to use Lyrica.    She is currently taking Lyrica (controlled medication requiring regular follow-ups for renewals), Alendronate weekly for osteopenia, Alosetron twice daily for IBS, Amlodipine, calcium and vitamin D supplements, Flexeril, Dicyclomine (Bentyl), Fluconazole weekly for yeast infection, Fluoxetine, Hydroxyzine as needed, Lisinopril  for blood pressure, Lurasidone (Latuda), and Macrobid as needed for UTI prevention. She discontinued Omeprazole due to intolerance following hernia surgery. She uses Ondansetron (Zofran) as needed for nausea but finds it less effective than other antiemetics. She is currently being weaned off Percocet and acknowledges the potential interaction between Percocet and Lyrica, which can increase the potency of both medications.    Her recent labs showed elevated triglycerides which she has had in the past.    She denies family history of heart disease. Grandmother had a stroke.      ROS:  General: -fever, -chills, -fatigue, -weight gain, -weight loss  Eyes: -vision changes, -redness, -discharge  ENT: -ear pain, -nasal congestion, -sore throat  Cardiovascular: -chest pain, -palpitations, -lower extremity edema  Respiratory: -cough, -shortness of breath  Musculoskeletal: -joint pain, +back pain  Psychiatric: -anxiety, -depression, -sleep difficulty         Objective:   LMP 09/15/2013         Physical Exam  Pulmonary:      Effort: Pulmonary effort is normal.   Neurological:      Mental Status: She is alert.   Psychiatric:         Mood and Affect: Mood normal.         Behavior: Behavior normal.       Admission on 10/28/2024, Discharged on 10/29/2024   Component Date Value Ref Range Status    WBC 10/29/2024 9.31  3.90 - 12.70 K/uL Final    RBC 10/29/2024 3.78 (L)  4.00 - 5.40 M/uL Final    Hemoglobin 10/29/2024 11.3 (L)  12.0 - 16.0 g/dL Final    Hematocrit 10/29/2024 35.5 (L)  37.0 - 48.5 % Final    MCV 10/29/2024 94  82 - 98 fL Final    MCH 10/29/2024 29.9  27.0 - 31.0 pg Final    MCHC 10/29/2024 31.8 (L)  32.0 - 36.0 g/dL Final    RDW 10/29/2024 12.8  11.5 - 14.5 % Final    Platelets 10/29/2024 256  150 - 450 K/uL Final    MPV 10/29/2024 9.5  9.2 - 12.9 fL Final    Immature Granulocytes 10/29/2024 0.3  0.0 - 0.5 % Final    Gran # (ANC) 10/29/2024 7.3  1.8 - 7.7 K/uL Final    Immature Grans (Abs) 10/29/2024 0.03  0.00 -  0.04 K/uL Final    Comment: Mild elevation in immature granulocytes is non specific and   can be seen in a variety of conditions including stress response,   acute inflammation, trauma and pregnancy. Correlation with other   laboratory and clinical findings is essential.      Lymph # 10/29/2024 1.2  1.0 - 4.8 K/uL Final    Mono # 10/29/2024 0.8  0.3 - 1.0 K/uL Final    Eos # 10/29/2024 0.0  0.0 - 0.5 K/uL Final    Baso # 10/29/2024 0.01  0.00 - 0.20 K/uL Final    nRBC 10/29/2024 0  0 /100 WBC Final    Gran % 10/29/2024 78.5 (H)  38.0 - 73.0 % Final    Lymph % 10/29/2024 12.8 (L)  18.0 - 48.0 % Final    Mono % 10/29/2024 8.3  4.0 - 15.0 % Final    Eosinophil % 10/29/2024 0.0  0.0 - 8.0 % Final    Basophil % 10/29/2024 0.1  0.0 - 1.9 % Final    Differential Method 10/29/2024 Automated   Final    Sodium 10/29/2024 138  136 - 145 mmol/L Final    Potassium 10/29/2024 4.4  3.5 - 5.1 mmol/L Final    Chloride 10/29/2024 108  95 - 110 mmol/L Final    CO2 10/29/2024 25  23 - 29 mmol/L Final    Glucose 10/29/2024 122 (H)  70 - 110 mg/dL Final    BUN 10/29/2024 8  8 - 23 mg/dL Final    Creatinine 10/29/2024 0.7  0.5 - 1.4 mg/dL Final    Calcium 10/29/2024 8.6 (L)  8.7 - 10.5 mg/dL Final    Anion Gap 10/29/2024 5 (L)  8 - 16 mmol/L Final    eGFR 10/29/2024 >60  >60 mL/min/1.73 m^2 Final   Lab Visit on 10/25/2024   Component Date Value Ref Range Status    Group & Rh 10/25/2024 O POS   Final    Indirect Donovan 10/25/2024 NEG   Final    Specimen Outdate 10/25/2024 10/28/2024 23:59   Final   Office Visit on 10/25/2024   Component Date Value Ref Range Status    QRS Duration 10/25/2024 68  ms Final    [facility] QTC Calculation 10/25/2024 441  ms Final   Lab Visit on 10/22/2024   Component Date Value Ref Range Status    Cholesterol 10/22/2024 250 (H)  120 - 199 mg/dL Final    Comment: The National Cholesterol Education Program (NCEP) has set the  following guidelines (reference ranges) for Cholesterol:  Optimal.....................<200  mg/dL  Borderline High.............200-239 mg/dL  High........................> or = 240 mg/dL      Triglycerides 10/22/2024 220 (H)  30 - 150 mg/dL Final    Comment: The National Cholesterol Education Program (NCEP) has set the  following guidelines (reference values) for triglycerides:  Normal......................<150 mg/dL  Borderline High.............150-199 mg/dL  High........................200-499 mg/dL      HDL 10/22/2024 71  40 - 75 mg/dL Final    Comment: The National Cholesterol Education Program (NCEP) has set the  following guidelines (reference values) for HDL Cholesterol:  Low...............<40 mg/dL  Optimal...........>60 mg/dL      LDL Cholesterol 10/22/2024 135.0  63.0 - 159.0 mg/dL Final    Comment: The National Cholesterol Education Program (NCEP) has set the  following guidelines (reference values) for LDL Cholesterol:  Optimal.......................<130 mg/dL  Borderline High...............130-159 mg/dL  High..........................160-189 mg/dL  Very High.....................>190 mg/dL      HDL/Cholesterol Ratio 10/22/2024 28.4  20.0 - 50.0 % Final    Total Cholesterol/HDL Ratio 10/22/2024 3.5  2.0 - 5.0 Final    Non-HDL Cholesterol 10/22/2024 179  mg/dL Final    Comment: Risk category and Non-HDL cholesterol goals:  Coronary heart disease (CHD)or equivalent (10-year risk of CHD >20%):  Non-HDL cholesterol goal     <130 mg/dL  Two or more CHD risk factors and 10-year risk of CHD <= 20%:  Non-HDL cholesterol goal     <160 mg/dL  0 to 1 CHD risk factor:  Non-HDL cholesterol goal     <190 mg/dL      Sodium 10/22/2024 141  136 - 145 mmol/L Final    Potassium 10/22/2024 4.6  3.5 - 5.1 mmol/L Final    Chloride 10/22/2024 105  95 - 110 mmol/L Final    CO2 10/22/2024 28  23 - 29 mmol/L Final    Glucose 10/22/2024 92  70 - 110 mg/dL Final    BUN 10/22/2024 9  8 - 23 mg/dL Final    Creatinine 10/22/2024 0.8  0.5 - 1.4 mg/dL Final    Calcium 10/22/2024 9.8  8.7 - 10.5 mg/dL Final    Total Protein  10/22/2024 6.8  6.0 - 8.4 g/dL Final    Albumin 10/22/2024 3.8  3.5 - 5.2 g/dL Final    Total Bilirubin 10/22/2024 0.3  0.1 - 1.0 mg/dL Final    Comment: For infants and newborns, interpretation of results should be based  on gestational age, weight and in agreement with clinical  observations.    Premature Infant recommended reference ranges:  Up to 24 hours.............<8.0 mg/dL  Up to 48 hours............<12.0 mg/dL  3-5 days..................<15.0 mg/dL  6-29 days.................<15.0 mg/dL      Alkaline Phosphatase 10/22/2024 110  40 - 150 U/L Final    AST 10/22/2024 11  10 - 40 U/L Final    ALT 10/22/2024 13  10 - 44 U/L Final    eGFR 10/22/2024 >60  >60 mL/min/1.73 m^2 Final    Anion Gap 10/22/2024 8  8 - 16 mmol/L Final    Vitamin B-12 10/22/2024 512  210 - 950 pg/mL Final    Hemoglobin A1C 10/22/2024 5.2  4.0 - 5.6 % Final    Comment: ADA Screening Guidelines:  5.7-6.4%  Consistent with prediabetes  >or=6.5%  Consistent with diabetes    High levels of fetal hemoglobin interfere with the HbA1C  assay. Heterozygous hemoglobin variants (HbS, HgC, etc)do  not significantly interfere with this assay.   However, presence of multiple variants may affect accuracy.      Estimated Avg Glucose 10/22/2024 103  68 - 131 mg/dL Final    TSH 10/22/2024 0.826  0.400 - 4.000 uIU/mL Final    WBC 10/22/2024 6.38  3.90 - 12.70 K/uL Final    RBC 10/22/2024 4.86  4.00 - 5.40 M/uL Final    Hemoglobin 10/22/2024 14.4  12.0 - 16.0 g/dL Final    Hematocrit 10/22/2024 45.9  37.0 - 48.5 % Final    MCV 10/22/2024 94  82 - 98 fL Final    MCH 10/22/2024 29.6  27.0 - 31.0 pg Final    MCHC 10/22/2024 31.4 (L)  32.0 - 36.0 g/dL Final    RDW 10/22/2024 12.9  11.5 - 14.5 % Final    Platelets 10/22/2024 344  150 - 450 K/uL Final    MPV 10/22/2024 9.6  9.2 - 12.9 fL Final    Immature Granulocytes 10/22/2024 0.2  0.0 - 0.5 % Final    Gran # (ANC) 10/22/2024 4.7  1.8 - 7.7 K/uL Final    Immature Grans (Abs) 10/22/2024 0.01  0.00 - 0.04 K/uL  Final    Comment: Mild elevation in immature granulocytes is non specific and   can be seen in a variety of conditions including stress response,   acute inflammation, trauma and pregnancy. Correlation with other   laboratory and clinical findings is essential.      Lymph # 10/22/2024 1.2  1.0 - 4.8 K/uL Final    Mono # 10/22/2024 0.4  0.3 - 1.0 K/uL Final    Eos # 10/22/2024 0.1  0.0 - 0.5 K/uL Final    Baso # 10/22/2024 0.02  0.00 - 0.20 K/uL Final    nRBC 10/22/2024 0  0 /100 WBC Final    Gran % 10/22/2024 73.1 (H)  38.0 - 73.0 % Final    Lymph % 10/22/2024 18.5  18.0 - 48.0 % Final    Mono % 10/22/2024 6.6  4.0 - 15.0 % Final    Eosinophil % 10/22/2024 1.3  0.0 - 8.0 % Final    Basophil % 10/22/2024 0.3  0.0 - 1.9 % Final    Differential Method 10/22/2024 Automated   Final   Hospital Outpatient Visit on 10/14/2024   Component Date Value Ref Range Status    WBC 10/14/2024 6.95  3.90 - 12.70 K/uL Final    RBC 10/14/2024 4.45  4.00 - 5.40 M/uL Final    Hemoglobin 10/14/2024 13.5  12.0 - 16.0 g/dL Final    Hematocrit 10/14/2024 41.9  37.0 - 48.5 % Final    MCV 10/14/2024 94  82 - 98 fL Final    MCH 10/14/2024 30.3  27.0 - 31.0 pg Final    MCHC 10/14/2024 32.2  32.0 - 36.0 g/dL Final    RDW 10/14/2024 13.0  11.5 - 14.5 % Final    Platelets 10/14/2024 310  150 - 450 K/uL Final    MPV 10/14/2024 10.1  9.2 - 12.9 fL Final    Immature Granulocytes 10/14/2024 0.3  0.0 - 0.5 % Final    Gran # (ANC) 10/14/2024 4.6  1.8 - 7.7 K/uL Final    Immature Grans (Abs) 10/14/2024 0.02  0.00 - 0.04 K/uL Final    Comment: Mild elevation in immature granulocytes is non specific and   can be seen in a variety of conditions including stress response,   acute inflammation, trauma and pregnancy. Correlation with other   laboratory and clinical findings is essential.      Lymph # 10/14/2024 1.7  1.0 - 4.8 K/uL Final    Mono # 10/14/2024 0.5  0.3 - 1.0 K/uL Final    Eos # 10/14/2024 0.1  0.0 - 0.5 K/uL Final    Baso # 10/14/2024 0.03  0.00 -  0.20 K/uL Final    nRBC 10/14/2024 0  0 /100 WBC Final    Gran % 10/14/2024 65.5  38.0 - 73.0 % Final    Lymph % 10/14/2024 24.3  18.0 - 48.0 % Final    Mono % 10/14/2024 7.8  4.0 - 15.0 % Final    Eosinophil % 10/14/2024 1.7  0.0 - 8.0 % Final    Basophil % 10/14/2024 0.4  0.0 - 1.9 % Final    Differential Method 10/14/2024 Automated   Final    Prothrombin Time 10/14/2024 9.9  9.0 - 12.5 sec Final    INR 10/14/2024 0.9  0.8 - 1.2 Final    Comment: Coumadin Therapy:  2.0 - 3.0 for INR for all indicators except mechanical heart valves  and antiphospholipid syndromes which should use 2.5 - 3.5.      Sodium 10/14/2024 139  136 - 145 mmol/L Final    Potassium 10/14/2024 4.5  3.5 - 5.1 mmol/L Final    Chloride 10/14/2024 104  95 - 110 mmol/L Final    CO2 10/14/2024 24  23 - 29 mmol/L Final    Glucose 10/14/2024 64 (L)  70 - 110 mg/dL Final    BUN 10/14/2024 12  8 - 23 mg/dL Final    Creatinine 10/14/2024 0.8  0.5 - 1.4 mg/dL Final    Calcium 10/14/2024 9.3  8.7 - 10.5 mg/dL Final    Total Protein 10/14/2024 6.6  6.0 - 8.4 g/dL Final    Albumin 10/14/2024 3.8  3.5 - 5.2 g/dL Final    Total Bilirubin 10/14/2024 0.2  0.1 - 1.0 mg/dL Final    Comment: For infants and newborns, interpretation of results should be based  on gestational age, weight and in agreement with clinical  observations.    Premature Infant recommended reference ranges:  Up to 24 hours.............<8.0 mg/dL  Up to 48 hours............<12.0 mg/dL  3-5 days..................<15.0 mg/dL  6-29 days.................<15.0 mg/dL      Alkaline Phosphatase 10/14/2024 108  55 - 135 U/L Final    AST 10/14/2024 15  10 - 40 U/L Final    ALT 10/14/2024 13  10 - 44 U/L Final    eGFR 10/14/2024 >60  >60 mL/min/1.73 m^2 Final    Anion Gap 10/14/2024 11  8 - 16 mmol/L Final    aPTT 10/14/2024 26.7  21.0 - 32.0 sec Final    Comment: Refer to local heparin nomogram for intensity/dose specific   therapeutic   range.          The 10-year ASCVD risk score (Damaris GERARDO, et al.,  2019) is: 2.9%    Values used to calculate the score:      Age: 59 years      Sex: Female      Is Non- : No      Diabetic: No      Tobacco smoker: No      Systolic Blood Pressure: 98 mmHg      Is BP treated: Yes      HDL Cholesterol: 71 mg/dL      Total Cholesterol: 250 mg/dL       Assessment        ICD-10-CM ICD-9-CM   1. Mixed dyslipidemia  E78.2 272.2   2. Hypertension, essential  I10 401.9   3. Muscle spasm of back  M62.830 724.8   4. DDD (degenerative disc disease), cervical  M50.30 722.4   5. Lumbar spondylosis  M47.816 721.3   6. Osteopenia after menopause  M85.80 733.90    Z78.0 V49.81         Plan:     Assessment & Plan    IMPRESSION:  Reviewed lab results: cholesterol panel shows elevated triglycerides, other values within normal range  Assessed cardiovascular risk factors: elevated cholesterol, family history of stroke  Will initiate statin therapy due to persistently elevated triglyceride levels  Evaluated current medication regimen    HYPERLIPIDEMIA:  Explained the risks associated with elevated cholesterol, including atherosclerosis and cardiovascular disease.  Started atorvastatin 10 milligrams daily at the same time each day for cholesterol management.  Lipid panel ordered to be repeated in 4-6 weeks to assess efficacy of atorvastatin and determine if dosage adjustment is needed.  Naomy to continue efforts to improve diet by reducing junk food intake and increasing consumption of fruits and lean proteins.    HYPERTENSION:   Continue current regimen.    BACK PAIN MANAGEMENT:  Continued Lyrica for pain management.  Informed about the potential interaction between Lyrica and Percocet.  She will not be on Percocet long-term and current regimen is appropriate.    She will continue to wean off Percocet.    OSTEOPENIA:   Continued Elundrinate (once weekly) for bone health post-surgery.      FOLLOW UP:  Follow up in 4-6 weeks for repeat lipid panel.  Follow up in 6 months for  in-person visit.  Maintain regular appointments due to controlled medication prescriptions, as missed appointments may result in interruption of pain medication.          ORDERS  1. Mixed dyslipidemia  Overview:  Lab Results   Component Value Date    CHOL 250 (H) 10/22/2024    CHOL 250 (H) 01/06/2024    CHOL 207 (H) 05/18/2022     Lab Results   Component Value Date    HDL 71 10/22/2024    HDL 68 01/06/2024    HDL 64 05/18/2022     Lab Results   Component Value Date    LDLCALC 135.0 10/22/2024    LDLCALC 161.4 (H) 01/06/2024    LDLCALC 100.0 05/18/2022     Lab Results   Component Value Date    TRIG 220 (H) 10/22/2024    TRIG 103 01/06/2024    TRIG 215 (H) 05/18/2022     Lab Results   Component Value Date    CHOLHDL 28.4 10/22/2024    CHOLHDL 27.2 01/06/2024    CHOLHDL 30.9 05/18/2022      The 10-year ASCVD risk score (Damaris GERARDO, et al., 2019) is: 2.9%    Values used to calculate the score:      Age: 59 years      Sex: Female      Is Non- : No      Diabetic: No      Tobacco smoker: No      Systolic Blood Pressure: 98 mmHg      Is BP treated: Yes      HDL Cholesterol: 71 mg/dL      Total Cholesterol: 250 mg/dL      Orders:  -     atorvastatin (LIPITOR) 10 MG tablet; Take 1 tablet (10 mg total) by mouth once daily.  Dispense: 90 tablet; Refill: 3  -     Lipid Panel; Future; Expected date: 12/06/2024  -     Hepatic Function Panel; Future; Expected date: 12/06/2024  -     Comprehensive Metabolic Panel; Future; Expected date: 05/06/2025  -     CBC Auto Differential; Future; Expected date: 05/06/2025  -     Lipid Panel; Future; Expected date: 05/06/2025    2. Hypertension, essential  -     Comprehensive Metabolic Panel; Future; Expected date: 05/06/2025  -     CBC Auto Differential; Future; Expected date: 05/06/2025  -     Lipid Panel; Future; Expected date: 05/06/2025    3. Muscle spasm of back    4. DDD (degenerative disc disease), cervical    5. Lumbar spondylosis    6. Osteopenia after  menopause  Overview:  06-  DEXA  Lumbar spine (L1-L4):  T-score is -1.6, and Z-score is -0.1.  Femoral neck:    T-score is -2.3, and Z-score is -1.0.  Total hip:    T-score is -1.4, and Z-score is -0.4.    Fracture Risk (FRAX)  18% risk of a major osteoporotic fracture in the next 10 years.  2.8% risk of hip fracture in the next 10 years.    Impression:  Low bone mass (Osteopenia); FRAX calculations do not support treatment as osteoporosis.    RECOMMENDATIONS:  *Daily calcium intake 9518-5611 mg, dietary sources preferred; Vitamin D 6396-5628 IU daily.  *Weight bearing exercise and fall precautions.  *No additional pharmacologic therapy recommended at this time.  *Repeat BMD in 2 years.    Orders:  -     alendronate (FOSAMAX) 35 MG tablet; Take 1 tablet (35 mg total) by mouth every 7 days.  Dispense: 12 tablet; Refill: 1             -Eric Hernandes Jr., MD, AAHIVS      This note was generated with the assistance of ambient listening technology. Verbal consent was obtained by the patient and accompanying visitor(s) for the recording of patient appointment to facilitate this note. I attest to having reviewed and edited the generated note for accuracy, though some syntax or spelling errors may persist. Please contact the author of this note for any clarification.          There are no Patient Instructions on file for this visit.    Follow Up  Follow up in about 1 month (around 12/6/2024) for Fasting labs (Hepatic Function/Lipids); 6 Months Annual, HTN, HLD f/u (fasting labs a week prior.    Future Appointments   Date Time Provider Department Center   11/14/2024  2:20 PM Kimmie Adair PA-C [facility]   12/9/2024  8:15 AM LAB, [facility]   1/13/2025 12:00 PM Christine Brunson MD [facility] PS[facility] [address]   5/6/2025  8:30 AM LAB, [facility]   5/16/2025  9:00 AM Eric Hernandes Jr., MD [facility]

## 2024-11-14 ENCOUNTER — OFFICE VISIT (OUTPATIENT)
Dept: NEUROSURGERY | Facility: CLINIC | Age: 59
End: 2024-11-14
Payer: COMMERCIAL

## 2024-11-14 VITALS
SYSTOLIC BLOOD PRESSURE: 119 MMHG | OXYGEN SATURATION: 99 % | BODY MASS INDEX: 27.81 KG/M2 | WEIGHT: 166.88 LBS | TEMPERATURE: 98 F | HEART RATE: 102 BPM | HEIGHT: 65 IN | DIASTOLIC BLOOD PRESSURE: 72 MMHG

## 2024-11-14 DIAGNOSIS — Z98.1 S/P LUMBAR SPINAL FUSION: ICD-10-CM

## 2024-11-14 DIAGNOSIS — G44.86 CERVICOGENIC HEADACHE: ICD-10-CM

## 2024-11-14 PROCEDURE — 1160F RVW MEDS BY RX/DR IN RCRD: CPT | Mod: CPTII,S$GLB,, | Performed by: PHYSICIAN ASSISTANT

## 2024-11-14 PROCEDURE — 3074F SYST BP LT 130 MM HG: CPT | Mod: CPTII,S$GLB,, | Performed by: PHYSICIAN ASSISTANT

## 2024-11-14 PROCEDURE — 3044F HG A1C LEVEL LT 7.0%: CPT | Mod: CPTII,S$GLB,, | Performed by: PHYSICIAN ASSISTANT

## 2024-11-14 PROCEDURE — 3078F DIAST BP <80 MM HG: CPT | Mod: CPTII,S$GLB,, | Performed by: PHYSICIAN ASSISTANT

## 2024-11-14 PROCEDURE — 99024 POSTOP FOLLOW-UP VISIT: CPT | Mod: S$GLB,,, | Performed by: PHYSICIAN ASSISTANT

## 2024-11-14 PROCEDURE — 4010F ACE/ARB THERAPY RXD/TAKEN: CPT | Mod: CPTII,S$GLB,, | Performed by: PHYSICIAN ASSISTANT

## 2024-11-14 PROCEDURE — 1159F MED LIST DOCD IN RCRD: CPT | Mod: CPTII,S$GLB,, | Performed by: PHYSICIAN ASSISTANT

## 2024-11-14 RX ORDER — OXYCODONE AND ACETAMINOPHEN 10; 325 MG/1; MG/1
1 TABLET ORAL EVERY 8 HOURS PRN
Qty: 21 TABLET | Refills: 0 | Status: CANCELLED | OUTPATIENT
Start: 2024-11-14 | End: 2024-11-21

## 2024-11-14 RX ORDER — OXYCODONE AND ACETAMINOPHEN 10; 325 MG/1; MG/1
1 TABLET ORAL EVERY 8 HOURS PRN
Qty: 21 TABLET | Refills: 0 | Status: SHIPPED | OUTPATIENT
Start: 2024-11-14 | End: 2024-11-21

## 2024-11-14 RX ORDER — CYCLOBENZAPRINE HCL 5 MG
10 TABLET ORAL 3 TIMES DAILY PRN
Qty: 90 TABLET | Refills: 0 | Status: SHIPPED | OUTPATIENT
Start: 2024-11-14

## 2024-11-14 NOTE — PATIENT INSTRUCTIONS
-Keep incision open to air   -Avoid anti-inflammatories for anther 10 weeks. These include ibuprofen, excedrin, mobic, celebrex, aleve, naproxen, etc. Tylenol may be taken for mild-moderate pain.   -OK to resume home blood thinner, if applicable.   -continue lso brace when out of bed  -refill for percocet and flexeril sent  -Can shower and get incision wet, just pat dry and no vigorous scrubbing. Do not submerge incision for another 4 weeks.   -No lifting more than 10 lbs or excessive bending/twisting.   -Can drive after 4 weeks when no longer taking narcotics   -Follow up with Dr. King in 2 weeks with new xrays  -Please call with any questions or concerns prior to your next appointment.

## 2024-11-14 NOTE — PROGRESS NOTES
Wound Check   Neurosurgery     Naomy Armstrong is a 59 y.o. female who presents to clinic today for 2 week wound check, s/p left MIS TLIF with Dr. King.  Denies fevers, chills, night sweats or N/V. Further denies wound drainage or swelling. Pt has been taking Percocet 3x per day with good relief.  Unfortunately, it sounds like she has been overdoing it at home.  She was walking for 6 hours per day, and she has been cleaning her house including mopping and washing the dishes.      Physical Exam:   General: well developed, well nourished, no distress  Neurologic: Alert and oriented. Thought content appropriate.   GCS: Motor: 6/Verbal: 5/Eyes: 4 GCS Total: 15   Mental Status: Awake, Alert, Oriented x3   Cranial nerves: face symmetric, tongue midline, pupils equal, round, reactive to light with accomodation, EOMI.   Motor Strength: moves all extremities with good strength and tone 5/5 BLE  Sensation: response to light touch throughout upper and lower extremities  No gait disturbances     Incision is clean, dry and intact with no signs of swelling or purulent drainage. Staples are intact on exam and removed in clinic. All skin edges are completely approximated.  Mild erythema at the staple edges      Vitals:    11/14/24 1328   BP: 119/72   Pulse: 102   Temp: 97.9 °F (36.6 °C)             Assessment/Plan:   Naomy Armstrong is a 59 y.o. female who presents for 2 week wound check, s/p left MIS TLIF with Dr. King.  I believe she has been overdoing it at home, causing more back pain.  We reviewed activity restrictions in great detail today.  While we advise her to walk frequently, she does not need to set a goal of 6 hours per day.  She also needs to reduce her participation in household chores, particularly mopping and sweeping.  She had restarted Celebrex for pain relief, but we reviewed the importance of avoiding NSAIDs.  She will discontinue it.    -Keep incision open to air   -Avoid  anti-inflammatories for anther 10 weeks. These include ibuprofen, excedrin, mobic, celebrex, aleve, naproxen, etc. Tylenol may be taken for mild-moderate pain.   -OK to resume home blood thinner, if applicable.   -continue lso brace when out of bed  -refill for percocet and flexeril sent  -Can shower and get incision wet, just pat dry and no vigorous scrubbing. Do not submerge incision for another 4 weeks.   -No lifting more than 10 lbs or excessive bending/twisting.   -Can drive after 4 weeks when no longer taking narcotics   -Follow up with Dr. King in 2 weeks with new xrays  -Encouraged patient to call if they have any questions or concerns prior to next follow up appt        Kimmie Adair PA-C  [facility]  Department of Neurosurgery  [phone]

## 2024-11-18 ENCOUNTER — TELEPHONE (OUTPATIENT)
Dept: NEUROSURGERY | Facility: CLINIC | Age: 59
End: 2024-11-18
Payer: COMMERCIAL

## 2024-11-21 RX ORDER — OXYCODONE AND ACETAMINOPHEN 5; 325 MG/1; MG/1
1 TABLET ORAL EVERY 8 HOURS PRN
Qty: 21 TABLET | Refills: 0 | Status: SHIPPED | OUTPATIENT
Start: 2024-11-21

## 2024-12-03 ENCOUNTER — HOSPITAL ENCOUNTER (OUTPATIENT)
Dept: RADIOLOGY | Facility: HOSPITAL | Age: 59
Discharge: HOME | End: 2024-12-03
Attending: PHYSICIAN ASSISTANT
Payer: COMMERCIAL

## 2024-12-03 ENCOUNTER — OFFICE VISIT (OUTPATIENT)
Dept: NEUROSURGERY | Facility: CLINIC | Age: 59
End: 2024-12-03
Attending: STUDENT IN AN ORGANIZED HEALTH CARE EDUCATION/TRAINING PROGRAM
Payer: COMMERCIAL

## 2024-12-03 VITALS
DIASTOLIC BLOOD PRESSURE: 86 MMHG | TEMPERATURE: 98 F | BODY MASS INDEX: 27.81 KG/M2 | HEIGHT: 65 IN | SYSTOLIC BLOOD PRESSURE: 129 MMHG | HEART RATE: 86 BPM | WEIGHT: 166.88 LBS | OXYGEN SATURATION: 98 %

## 2024-12-03 DIAGNOSIS — Z98.1 S/P LUMBAR SPINAL FUSION: Primary | ICD-10-CM

## 2024-12-03 DIAGNOSIS — Z98.1 S/P LUMBAR SPINAL FUSION: ICD-10-CM

## 2024-12-03 PROCEDURE — 3079F DIAST BP 80-89 MM HG: CPT | Mod: CPTII,S$GLB,, | Performed by: STUDENT IN AN ORGANIZED HEALTH CARE EDUCATION/TRAINING PROGRAM

## 2024-12-03 PROCEDURE — 1159F MED LIST DOCD IN RCRD: CPT | Mod: CPTII,S$GLB,, | Performed by: STUDENT IN AN ORGANIZED HEALTH CARE EDUCATION/TRAINING PROGRAM

## 2024-12-03 PROCEDURE — 3074F SYST BP LT 130 MM HG: CPT | Mod: CPTII,S$GLB,, | Performed by: STUDENT IN AN ORGANIZED HEALTH CARE EDUCATION/TRAINING PROGRAM

## 2024-12-03 PROCEDURE — 4010F ACE/ARB THERAPY RXD/TAKEN: CPT | Mod: CPTII,S$GLB,, | Performed by: STUDENT IN AN ORGANIZED HEALTH CARE EDUCATION/TRAINING PROGRAM

## 2024-12-03 PROCEDURE — 72100 X-RAY EXAM L-S SPINE 2/3 VWS: CPT | Mod: 26,,, | Performed by: RADIOLOGY

## 2024-12-03 PROCEDURE — 3044F HG A1C LEVEL LT 7.0%: CPT | Mod: CPTII,S$GLB,, | Performed by: STUDENT IN AN ORGANIZED HEALTH CARE EDUCATION/TRAINING PROGRAM

## 2024-12-03 PROCEDURE — 72100 X-RAY EXAM L-S SPINE 2/3 VWS: CPT | Mod: TC,FY

## 2024-12-03 PROCEDURE — 99024 POSTOP FOLLOW-UP VISIT: CPT | Mod: S$GLB,,, | Performed by: STUDENT IN AN ORGANIZED HEALTH CARE EDUCATION/TRAINING PROGRAM

## 2024-12-03 RX ORDER — GABAPENTIN 600 MG/1
600 TABLET ORAL 3 TIMES DAILY
Qty: 90 TABLET | Refills: 11 | Status: SHIPPED | OUTPATIENT
Start: 2024-12-03 | End: 2025-12-03

## 2024-12-03 RX ORDER — TIZANIDINE HYDROCHLORIDE 6 MG/1
6 CAPSULE, GELATIN COATED ORAL 3 TIMES DAILY
Qty: 90 CAPSULE | Refills: 0 | Status: SHIPPED | OUTPATIENT
Start: 2024-12-03 | End: 2025-01-02

## 2024-12-03 NOTE — PROGRESS NOTES
[facility]  Neurosurgery    SUBJECTIVE:     History of Present Illness:  Naomy Armstrong is a 59 y.o. female who is now one-month status post minimally invasive TLIF left approach L5-S1 with resection of synovial cyst.    Patient states her left-sided pain has been better ever since surgery.  Unfortunately, patient took a road trip across multiple states and this seemed to exacerbate her low back pain particularly on the right side, which is not the side where she had pain before surgery.  She is accompanied by her fiance who also says that she has a tendency to over do things with household chores.    She has required refills of her narcotic pain medicine.  She has been using ice which does seem to help.        (Not in a hospital admission)      Review of patient's allergies indicates:   Allergen Reactions    Effexor [venlafaxine]      Elevated her blood pressure    Zoloft [sertraline]     Paroxetine hcl      Made her feel drunk       Past Medical History:   Diagnosis Date    Alcohol abuse     per chart, but patient denies today and cocaine per chart    Anxiety     Colon polyp     Depression     Hallucination     last couple of months started seeing someone standing in her room, saw boyfriend's reflection in glass    Headache     History of psychiatric hospitalization     age 13 [facility] for 2 months for acting out; 2018 for SI    Hx of psychiatric care     Hypertension     Psychiatric problem     Sleep difficulties     Suicide attempt     with knife by cutting stomach    Therapy      Past Surgical History:   Procedure Laterality Date    COLONOSCOPY N/A 05/07/2021    Procedure: COLONOSCOPY;  Surgeon: Prem Montana MD;  Location: [facility];  Service: Endoscopy;  Laterality: N/A;  COVID screening 5/4 [facility]-instr portal -ml    COLONOSCOPY N/A 06/06/2022    Procedure: COLONOSCOPY;  Surgeon: Fab Shepherd MD;  Location: [facility] ([address] [place] [address]);  Service: Endoscopy;  Laterality: N/A;  C-Diff neg  Rapid  COVID    EPIDURAL STEROID INJECTION Bilateral 08/05/2020    Procedure: Bilateral MBB @ L3, L4, L5;  Surgeon: Jeremy Powell Jr., MD;  Location: [facility] ENDO;  Service: Pain Management;  Laterality: Bilateral;  Bilat L3-5 MBB  Arrive @ 1315; No ATC or DM; Needs MD signature    EPIDURAL STEROID INJECTION Right 08/19/2020    Procedure: Lumbar Radiofrequency Thermocoagulation of Medial Branches;  Surgeon: Jeremy Powell Jr., MD;  Location: [facility] ENDO;  Service: Pain Management;  Laterality: Right;  Right L3-5 RFA  Arrive @ 1045; No ATC or DM; Needs MD Signature    EPIDURAL STEROID INJECTION Left 09/04/2020    Procedure: Lumbar Radiofrequency Thermocoagulation of Medial Branches;  Surgeon: Jeremy Powell Jr., MD;  Location: [facility] ENDO;  Service: Pain Management;  Laterality: Left;  Left L3-5 RFA  Arrive @ 0900 (requested); No ATC or DM; Needs MD signature    EPIDURAL STEROID INJECTION Bilateral 04/16/2021    Procedure: Sacroiliac Joint Steroid Injections @ Bilateral;  Surgeon: Jeremy Powell Jr., MD;  Location: [facility] ENDO;  Service: Pain Management;  Laterality: Bilateral;  Arrive @ 1300; No ATC or DM    EPIDURAL STEROID INJECTION Bilateral 01/19/2022    Procedure: Bilateral Sacroiliac Joint Injections;  Surgeon: Jeremy Powell Jr., MD;  Location: [facility] ENDO;  Service: Pain Management;  Laterality: Bilateral;  Arrive @ 1315; No ATC or DM; Needs Consent    EPIDURAL STEROID INJECTION Bilateral 07/28/2023    Procedure: Bilateral L2, L3, L4 Medial Branch Blocks #1;  Surgeon: Jeremy Powell Jr., MD;  Location: [facility] ENDO;  Service: Pain Management;  Laterality: Bilateral;  @1200  No ATC or DM    EPIDURAL STEROID INJECTION Bilateral 08/11/2023    Procedure: Bilateral L2, L3, L4 Medial Branch Blocks #2;  Surgeon: Jeremy Powell Jr., MD;  Location: [facility] ENDO;  Service: Pain Management;  Laterality: Bilateral;  @1330  No ATC or DM    ESOPHAGEAL MANOMETRY WITH MEASUREMENT OF IMPEDANCE N/A 10/20/2021     Procedure: MANOMETRY, ESOPHAGUS, WITH IMPEDANCE MEASUREMENT;  Surgeon: Anastacia Odonnell MD;  Location: [facility] ENDO (4TH FLR);  Service: Endoscopy;  Laterality: N/A;  Covid test 10/17 [place], instructions sent to [email or web address]    ESOPHAGOGASTRODUODENOSCOPY N/A 05/07/2021    Procedure: EGD (ESOPHAGOGASTRODUODENOSCOPY);  Surgeon: Prem Montana MD;  Location: [facility] ENDO;  Service: Endoscopy;  Laterality: N/A;  added on per Dr. NOEL Shepherd    EYE SURGERY  6/6/2015    Lasik    GANGLION CYST EXCISION      HERNIA REPAIR  2/14/2022    Hiatal hernia    INJECTION, FACET JOINT, LUMBOSACRAL Left 06/14/2024    Procedure: Left S1 Transforaminal Epidural Steroid Injection + Left L5-S1 Facet Joint Injection/Aspiration;  Surgeon: Jeremy Powell Jr., MD;  Location: [facility] PAIN MANAGEMENT;  Service: Pain Management;  Laterality: Left;  @1200  No ATC or DM  Needs Consent  IV Sedation    LAPAROSCOPIC TOUPET FUNDOPLICATION N/A 02/14/2022    Procedure: FUNDOPLICATION, LAPAROSCOPIC, TOUPET;  Surgeon: Christian Martinez MD;  Location: [facility] OR 2ND FLR;  Service: General;  Laterality: N/A;    MINIMALLY INVASIVE TRANSFORAMINAL LUMBAR INTERBODY FUSION (TLIF) Left 10/28/2024    Procedure: FUSION, SPINE, LUMBAR, TLIF, MINIMALLY INVASIVE;  Surgeon: Christian King MD;  Location: [facility] OR;  Service: Neurosurgery;  Laterality: Left;  Left L5/S1, possible L4-5 minimally invasive TLIF  sravanthi 4 poster   fluoro  no neuromonitoring needed   [employer] NOTIFIED-GG  RN PREOP 10/14/2024   T/S ON 10/25/2024----NEED H/P     Family History   Problem Relation Name Age of Onset    Anxiety disorder Sister Bev     Depression Sister Bev     Alcohol abuse Maternal Uncle      Alcohol abuse Maternal Grandfather       Social History     Tobacco Use    Smoking status: Former     Current packs/day: 0.00     Average packs/day: 1 pack/day for 21.3 years (21.3 ttl pk-yrs)     Types: Cigarettes     Start date: 1/1/1985     Quit date: 4/26/2006      Years since quittin.6    Smokeless tobacco: Never    Tobacco comments:     It has been a very long time. I do not remember dates   Substance Use Topics    Alcohol use: Not Currently     Comment: occasional    Drug use: Not Currently     Types: Marijuana     Comment: tried at age 14        Review of Systems:  As noted in HPI    OBJECTIVE:     Vital Signs (Most Recent):  Temp: 98.2 °F (36.8 °C) (24 1443)  Pulse: 86 (24 1443)  BP: 129/86 (24 1443)  SpO2: 98 % (24 1443)    Physical Exam:  General: well developed, well nourished, no distress  Head: normocephalic, atraumatic  Neurologic: Alert and oriented. Thought content appropriate  Speech: Fluent  Cranial nerves: face symmetric   Eyes: pupils equal  Pulmonary: normal respirations  Sensory: intact to light touch throughout  Motor Strength: Moves all extremities spontaneously with good tone.  Full strength upper and lower extremities. No abnormal movements seen.   Bilateral spinal incisions are well healed    DTR's - 2 + and symmetric   Matos: absent  Clonus: absent    Gait: normal  Tandem Gait: No difficulty       Midline Bony Tenderness: none  Paraspinous muscle tenderness: none    Diagnostic Results:  I have personally reviewed imaging. My impression is as follows.    AP and lateral x-rays were obtained today.  These show appropriate hardware placement at L5 and S1 with bilateral pedicle screws at L5 and S1 as well as an intervertebral spacer from a left-sided approach at L5-S1.  Hardware is stable with no change in projection, no evidence of hardware failure      ASSESSMENT/PLAN:     Naomy Armstrong is a 59 y.o. female who presents one-month status post L5-S1 TLIF  -patient is doing well.  I suspect she is overdoing it with her activities  -I emphasized with the patient the importance of maintaining good neutral back posture when bending or lifting, avoiding bending or twisting while carrying weight. These motions place  inherent stress across the spine, and are exacerbated under load.  -encouraged icing as needed  -encouraged reduction of narcotic pain medication usage  -prescribed new prescription for gabapentin as well as tizanidine  -follow up at the three-month semaj with a another set of x-rays.  Patient is currently living near [place] and prefers to do this virtually    Please feel free to call with any further questions.    Time spent on this encounter: 30 minutes. This includes face-to-face time and non-face to face time preparing to see the patient (eg, review of tests), obtaining and/or reviewing separately obtained history, documenting clinical information in the electronic or other health record, independently interpreting results and communicating results to the patient/family/caregiver, or care coordinator.      Christian COHEN [facility]  Department of Neurosurgery  [phone]    Disclaimer: This note was dictated by speech recognition. Minor errors in transcription may be present.  Please call with any questions.

## 2024-12-06 ENCOUNTER — PATIENT OUTREACH (OUTPATIENT)
Dept: ADMINISTRATIVE | Facility: HOSPITAL | Age: 59
End: 2024-12-06
Payer: COMMERCIAL

## 2024-12-06 ENCOUNTER — PATIENT MESSAGE (OUTPATIENT)
Dept: ADMINISTRATIVE | Facility: HOSPITAL | Age: 59
End: 2024-12-06
Payer: COMMERCIAL

## 2024-12-06 NOTE — PROGRESS NOTES
Portal message sent in regards to pt being overdue for her pap smear and mammogram. Immunization's updated/triggered.

## 2024-12-08 ENCOUNTER — PATIENT MESSAGE (OUTPATIENT)
Dept: FAMILY MEDICINE | Facility: CLINIC | Age: 59
End: 2024-12-08
Payer: COMMERCIAL

## 2024-12-08 DIAGNOSIS — E78.2 MIXED DYSLIPIDEMIA: Chronic | ICD-10-CM

## 2024-12-09 NOTE — TELEPHONE ENCOUNTER
Care Due:                  Date            Visit Type   Department     Provider  --------------------------------------------------------------------------------                                ESTABLISHED   [facility]                              PATIENT -    MEDICINE/  Last Visit: 11-      VIRTUAL      INTERNAL MED   Eric Hernandes                               -         [facility]                              PRIMARY      MEDICINE/  Next Visit: 05-      CARE ([facility])   INTERNAL MED   Eric Hernandes                                                            Last  Test          Frequency    Reason                     Performed    Due Date  --------------------------------------------------------------------------------    Mg Level....  12 months..  alendronate..............  Not Found    Overdue    Phosphate...  12 months..  alendronate..............  Not Found    Overdue    [employer] Embedded Care Due Messages. Reference number: [id number].   12/09/2024 11:12:32 AM CST

## 2024-12-11 RX ORDER — ATORVASTATIN CALCIUM 10 MG/1
10 TABLET, FILM COATED ORAL DAILY
Qty: 90 TABLET | Refills: 3 | Status: SHIPPED | OUTPATIENT
Start: 2024-12-11

## 2024-12-12 ENCOUNTER — PATIENT MESSAGE (OUTPATIENT)
Dept: NEUROSURGERY | Facility: CLINIC | Age: 59
End: 2024-12-12
Payer: COMMERCIAL

## 2024-12-12 DIAGNOSIS — Z98.1 S/P LUMBAR SPINAL FUSION: Primary | ICD-10-CM

## 2024-12-12 RX ORDER — GABAPENTIN 300 MG/1
900 CAPSULE ORAL 3 TIMES DAILY
Qty: 270 CAPSULE | Refills: 11 | Status: SHIPPED | OUTPATIENT
Start: 2024-12-12 | End: 2025-12-12

## 2024-12-12 RX ORDER — HYDROCODONE BITARTRATE AND ACETAMINOPHEN 5; 325 MG/1; MG/1
1 TABLET ORAL EVERY 6 HOURS PRN
Qty: 14 TABLET | Refills: 0 | Status: SHIPPED | OUTPATIENT
Start: 2024-12-12

## 2024-12-13 NOTE — PROGRESS NOTES
Assessment: 59 y.o. female with R hand numbness and pain    I explained my diagnostic impression and the reasoning behind it in detail, using layman's terms.       Plan:   - Etiology remains unclear - some symptoms consistent with CTS ( pain at night disruptive to sleep, pain worse with use of hand) but distrubution of pain is not limited to median nerve distribution. Did report relief for around 5 months with previous R CT injection.  -  Injection of the right  carpal tunnel  performed, please see procedure note for more details.  Prior to the injection risks and benefits of corticosteroid injection were discussed with the patient including pain, infection, bleeding, skin color changes, swelling, steroid flare. We discussed that over time injections can result in chondral damage, acceleration of arthritis formation, damage to tendons and damage to joints.  The patient consented for the procedure.    - Discussed night splinting again to help with pain disruptive to sleep   - Return to clinic PRN    All questions were answered in detail. The patient is in full agreement with the treatment plan and will proceed accordingly.    Chief Complaint   Patient presents with    Hand Pain     Right hand pain        Initial visit (7/10/24): Naomy Armstrong is a 59 y.o. female who presents today complaining of hand pain and numbness    Pain in R hand   Does have neck pain, pain radiating down the arm  Numbness and tingling in hand in C6 dist, no isolated to median n dist  But this pain is extremely disruptive to sleep which is preventing her from working  Is on Lyrica written by her primary care doctor.  She has on the extended release formula    Interval history PA 12/17/24:  Patient here today with complaint of return of numbness and tingling into the hand. Again not exclusively isolated to the median n distribution, but she does report significant relief from previous carpal tunnel injection. She is requesting another  injection today as this helped for over 5 months.   Pain is mostly bothersome at night. She does not wear night splint consistently.   Reporting some intermittent neck pain and radicular symptoms, but this is not bothersome today.  Also reporting R shoulder pain with ROM, but she would like to come back for a separate appointment to address this after the holidays     This is the extent of the patient's complaints at this time.         Review of patient's allergies indicates:   Allergen Reactions    Effexor [venlafaxine]      Elevated her blood pressure    Zoloft [sertraline]     Paroxetine hcl      Made her feel drunk         Current Outpatient Medications:     alendronate (FOSAMAX) 35 MG tablet, Take 1 tablet (35 mg total) by mouth every 7 days., Disp: 12 tablet, Rfl: 1    alosetron (LOTRONEX) 0.5 MG tablet, Take 1 tablet (0.5 mg total) by mouth 2 (two) times daily., Disp: 180 tablet, Rfl: 3    amLODIPine (NORVASC) 5 MG tablet, Take 1 tablet by mouth once daily, Disp: 90 tablet, Rfl: 1    atorvastatin (LIPITOR) 10 MG tablet, Take 1 tablet (10 mg total) by mouth once daily., Disp: 90 tablet, Rfl: 3    calcium carb and citrate-vitD3 (CITRACAL-D3 SLOW RELEASE) 600 mg-12.5 mcg (500 unit) TbSR, Take 2 tablets by mouth once daily., Disp: 180 tablet, Rfl: 3    cyclobenzaprine (FLEXERIL) 5 MG tablet, Take 2 tablets (10 mg total) by mouth 3 (three) times daily as needed for muscle spasms, Disp: 90 tablet, Rfl: 0    dicyclomine (BENTYL) 20 mg tablet, TAKE 1 TABLET BY MOUTH EVERY 6 HOURS, Disp: 120 tablet, Rfl: 0    fluconazole (DIFLUCAN) 200 MG Tab, Take 1 tablet (200 mg total) by mouth once daily. Take at first sign of yeast infection, Disp: 1 tablet, Rfl: 0    FLUoxetine 40 MG capsule, Take 2 capsules (80 mg total) by mouth once daily., Disp: 180 capsule, Rfl: 3    gabapentin (NEURONTIN) 300 MG capsule, Take 3 capsules (900 mg total) by mouth 3 (three) times daily., Disp: 270 capsule, Rfl: 11    HYDROcodone-acetaminophen  "(NORCO) 5-325 mg per tablet, Take 1 tablet by mouth every 6 (six) hours as needed for Pain., Disp: 14 tablet, Rfl: 0    hydrOXYzine HCL (ATARAX) 10 MG Tab, Take 1 tablet (10 mg total) by mouth 2 (two) times daily as needed (anxiety)., Disp: 60 tablet, Rfl: 3    lisinopriL (PRINIVIL,ZESTRIL) 20 MG tablet, Take 1 tablet (20 mg total) by mouth nightly., Disp: 90 tablet, Rfl: 3    lurasidone (LATUDA) 20 mg Tab tablet, Take 1 tablet (20 mg total) by mouth once daily., Disp: 30 tablet, Rfl: 11    multivitamin with minerals tablet, Take 1 tablet by mouth once daily., Disp: , Rfl:     naloxone (NARCAN) 4 mg/actuation Spry, 4mg by nasal route as needed for opioid overdose; may repeat every 2-3 minutes in alternating nostrils until medical help arrives. Call 911, Disp: 1 each, Rfl: 11    nitrofurantoin, macrocrystal-monohydrate, (MACROBID) 100 MG capsule, Take 1 capsule (100 mg total) by mouth once daily., Disp: 30 capsule, Rfl: 5    ondansetron (ZOFRAN-ODT) 4 MG TbDL, Dissolve 1 tablet (4 mg total) by mouth every 6 (six) hours as needed (nausea)., Disp: 15 tablet, Rfl: 0    ondansetron (ZOFRAN-ODT) 8 MG TbDL, Take 1 tablet (8 mg total) by mouth every 12 (twelve) hours as needed (nausea)., Disp: 30 tablet, Rfl: 0    pregabalin 330 mg Tb24, Take 330 mg by mouth after dinner., Disp: 30 tablet, Rfl: 5    promethazine (PHENERGAN) 25 MG tablet, TAKE 1 TABLET BY MOUTH EVERY 8 HOURS AS NEEDED FOR NAUSEA, Disp: 30 tablet, Rfl: 0    tiZANidine (ZANAFLEX) 6 mg capsule, Take 1 capsule (6 mg total) by mouth 3 (three) times daily., Disp: 90 capsule, Rfl: 0    Physical Exam:   Vitals:    12/17/24 0817   Weight: 75.7 kg (166 lb 14.2 oz)   Height: 5' 5" (1.651 m)   PainSc:   9         General:  Patient is alert, awake and oriented to time, place and person. Mood and affect are appropriate.  Patient does not appear to be in any distress, denies any constitutional symptoms and appears stated age.   HEENT:  Pupils are equal and round, sclera " are not injected. External examination of ears and nose reveals no abnormalities. Cranial nerves II-X are grossly intact  Skin:  no rashes, abrasions or open wounds on the affected extremity   Resp:  No respiratory distress or audible wheezing   CV: 2+  pulses, all extremities warm and well perfused   Right Hand/Wrist Examination:    Observation/Inspection:  Swelling  none    Deformity  none  Discoloration  none     Scars   none    Atrophy  none    HAND/WRIST EXAMINATION:  Finkelstein's Test   Neg  WHAT Test    Neg  Snuff box tenderness   Neg  Bello's Test    Neg  Hook of Hamate Tenderness  Neg  CMC grind    Neg  Circumduction test   Neg    Neurovascular Exam:  Digits WWP, brisk CR < 3s throughout  NVI motor/LTS to M/R/U nerves, radial pulse 2+  Tinel's Test - Carpal Tunnel  Neg  Tinel's Test - Cubital Tunnel  Neg  Phalen's Test    Neg  Median Nerve Compression Test positive    ROM hand/wrist/elbow full, painless       Imaging:  No new imaging obtained today     Previous EMG shows mild carpal tunnel syndrome bilaterally, concern for radiculopathy     This note was created by combination of typed  and M-Modal dictation. Transcription and phonetic errors may be present.  If there are any questions, please contact me.    Past Medical History:   Diagnosis Date    Alcohol abuse     per chart, but patient denies today and cocaine per chart    Anxiety     Colon polyp     Depression     Hallucination     last couple of months started seeing someone standing in her room, saw boyfriend's reflection in glass    Headache     History of psychiatric hospitalization     age 13 [facility] for 2 months for acting out; 2018 for SI    Hx of psychiatric care     Hypertension     Psychiatric problem     Sleep difficulties     Suicide attempt     with knife by cutting stomach    Therapy        Active Problem List with Overview Notes    Diagnosis Date Noted    Mixed dyslipidemia 11/06/2024     Lab Results   Component Value Date     CHOL 250 (H) 10/22/2024    CHOL 250 (H) 01/06/2024    CHOL 207 (H) 05/18/2022     Lab Results   Component Value Date    HDL 71 10/22/2024    HDL 68 01/06/2024    HDL 64 05/18/2022     Lab Results   Component Value Date    LDLCALC 135.0 10/22/2024    LDLCALC 161.4 (H) 01/06/2024    LDLCALC 100.0 05/18/2022     Lab Results   Component Value Date    TRIG 220 (H) 10/22/2024    TRIG 103 01/06/2024    TRIG 215 (H) 05/18/2022     Lab Results   Component Value Date    CHOLHDL 28.4 10/22/2024    CHOLHDL 27.2 01/06/2024    CHOLHDL 30.9 05/18/2022      The 10-year ASCVD risk score (Damaris GERARDO, et al., 2019) is: 2.9%    Values used to calculate the score:      Age: 59 years      Sex: Female      Is Non- : No      Diabetic: No      Tobacco smoker: No      Systolic Blood Pressure: 98 mmHg      Is BP treated: Yes      HDL Cholesterol: 71 mg/dL      Total Cholesterol: 250 mg/dL        S/P lumbar spinal fusion 10/29/2024     10/28/24: Left MIS L5-S1 TLIF with Dr. King      Osteopenia after menopause 07/30/2024 06-  DEXA  Lumbar spine (L1-L4):  T-score is -1.6, and Z-score is -0.1.  Femoral neck:    T-score is -2.3, and Z-score is -1.0.  Total hip:    T-score is -1.4, and Z-score is -0.4.    Fracture Risk (FRAX)  18% risk of a major osteoporotic fracture in the next 10 years.  2.8% risk of hip fracture in the next 10 years.    Impression:  Low bone mass (Osteopenia); FRAX calculations do not support treatment as osteoporosis.    RECOMMENDATIONS:  *Daily calcium intake 5406-2155 mg, dietary sources preferred; Vitamin D 6651-5793 IU daily.  *Weight bearing exercise and fall precautions.  *No additional pharmacologic therapy recommended at this time.  *Repeat BMD in 2 years.      Lumbar radiculopathy 05/16/2024    Hypertension, essential 02/18/2024    Memory impairment 12/05/2023    Generalized anxiety disorder 11/27/2022    Esophagogastric junction outflow obstruction 11/22/2022    Major depressive  disorder, recurrent, moderate 02/09/2022    Cervical spondylosis 08/11/2021    Hiatal hernia 07/09/2021    Irritable bowel syndrome with diarrhea 07/09/2021    Lumbosacral spondylosis 04/16/2021    Sacroiliac joint pain 03/31/2021    Spondylosis of lumbosacral region 03/31/2021    Chronic back pain greater than 3 months duration 07/16/2020    Decreased strength of lower extremity 06/01/2020    Decreased strength of trunk and back 06/01/2020    Pain in joint of right hip 06/01/2020    Decreased range of motion of right lower extremity 06/01/2020    Bilateral primary osteoarthritis of knee 03/12/2020    Lumbar spondylosis 01/13/2020    DDD (degenerative disc disease), lumbar 01/13/2020    Spondylosis of cervical region without myelopathy or radiculopathy 07/09/2019    Primary osteoarthritis of right knee 06/24/2019    Neck pain 06/24/2019    Cervical radiculopathy 04/26/2013     X-ray cervical spine and EMG/nerve conduction studies      DDD (degenerative disc disease), cervical 04/26/2013    Cervicogenic headache 04/26/2013     Patient has cervicogenic headaches which we will address with a combination of heat/ice application, muscle relaxers, and potentially anti-inflammatories.          Past Surgical History:   Procedure Laterality Date    COLONOSCOPY N/A 05/07/2021    Procedure: COLONOSCOPY;  Surgeon: Prem Montana MD;  Location: [facility];  Service: Endoscopy;  Laterality: N/A;  COVID screening 5/4 [facility]-instr portal -ml    COLONOSCOPY N/A 06/06/2022    Procedure: COLONOSCOPY;  Surgeon: Fab Shepherd MD;  Location: [facility] ([address] [place] [address]);  Service: Endoscopy;  Laterality: N/A;  C-Diff neg  Rapid COVID    EPIDURAL STEROID INJECTION Bilateral 08/05/2020    Procedure: Bilateral MBB @ L3, L4, L5;  Surgeon: Jeremy Powell Jr., MD;  Location: [facility];  Service: Pain Management;  Laterality: Bilateral;  Bilat L3-5 MBB  Arrive @ 1315; No ATC or DM; Needs MD signature    EPIDURAL STEROID INJECTION Right  08/19/2020    Procedure: Lumbar Radiofrequency Thermocoagulation of Medial Branches;  Surgeon: Jeremy Powell Jr., MD;  Location: [facility] ENDO;  Service: Pain Management;  Laterality: Right;  Right L3-5 RFA  Arrive @ 1045; No ATC or DM; Needs MD Signature    EPIDURAL STEROID INJECTION Left 09/04/2020    Procedure: Lumbar Radiofrequency Thermocoagulation of Medial Branches;  Surgeon: Jeremy Powell Jr., MD;  Location: [facility] ENDO;  Service: Pain Management;  Laterality: Left;  Left L3-5 RFA  Arrive @ 0900 (requested); No ATC or DM; Needs MD signature    EPIDURAL STEROID INJECTION Bilateral 04/16/2021    Procedure: Sacroiliac Joint Steroid Injections @ Bilateral;  Surgeon: Jeremy Powell Jr., MD;  Location: [facility] ENDO;  Service: Pain Management;  Laterality: Bilateral;  Arrive @ 1300; No ATC or DM    EPIDURAL STEROID INJECTION Bilateral 01/19/2022    Procedure: Bilateral Sacroiliac Joint Injections;  Surgeon: Jeremy Powell Jr., MD;  Location: [facility];  Service: Pain Management;  Laterality: Bilateral;  Arrive @ 1315; No ATC or DM; Needs Consent    EPIDURAL STEROID INJECTION Bilateral 07/28/2023    Procedure: Bilateral L2, L3, L4 Medial Branch Blocks #1;  Surgeon: Jeremy Powell Jr., MD;  Location: [facility];  Service: Pain Management;  Laterality: Bilateral;  @1200  No ATC or DM    EPIDURAL STEROID INJECTION Bilateral 08/11/2023    Procedure: Bilateral L2, L3, L4 Medial Branch Blocks #2;  Surgeon: Jeremy Powell Jr., MD;  Location: [facility];  Service: Pain Management;  Laterality: Bilateral;  @1330  No ATC or DM    ESOPHAGEAL MANOMETRY WITH MEASUREMENT OF IMPEDANCE N/A 10/20/2021    Procedure: MANOMETRY, ESOPHAGUS, WITH IMPEDANCE MEASUREMENT;  Surgeon: Anastacia Odonnell MD;  [facility]  Service: Endoscopy;  Laterality: N/A;  Covid test 10/17 [place], instructions sent to [email or web address]    ESOPHAGOGASTRODUODENOSCOPY N/A 05/07/2021    Procedure: EGD  (ESOPHAGOGASTRODUODENOSCOPY);  Surgeon: Prem Montana MD;  Location: [facility] ENDO;  Service: Endoscopy;  Laterality: N/A;  added on per Dr. NOEL Shepherd    EYE SURGERY  6/6/2015    Lasik    GANGLION CYST EXCISION      HERNIA REPAIR  2/14/2022    Hiatal hernia    INJECTION, FACET JOINT, LUMBOSACRAL Left 06/14/2024    Procedure: Left S1 Transforaminal Epidural Steroid Injection + Left L5-S1 Facet Joint Injection/Aspiration;  Surgeon: Jeremy Powell Jr., MD;  Location: [facility] PAIN MANAGEMENT;  Service: Pain Management;  Laterality: Left;  @1200  No ATC or DM  Needs Consent  IV Sedation    LAPAROSCOPIC TOUPET FUNDOPLICATION N/A 02/14/2022    Procedure: FUNDOPLICATION, LAPAROSCOPIC, TOUPET;  Surgeon: Christian Martinez MD;  Location: [address];  Service: General;  Laterality: N/A;    MINIMALLY INVASIVE TRANSFORAMINAL LUMBAR INTERBODY FUSION (TLIF) Left 10/28/2024    Procedure: FUSION, SPINE, LUMBAR, TLIF, MINIMALLY INVASIVE;  Surgeon: Christian King MD;  Location: [facility] OR;  Service: Neurosurgery;  Laterality: Left;  Left L5/S1, possible L4-5 minimally invasive TLIF  sravanthi 4 poster   fluoro  no neuromonitoring needed   Vessel  FIORDALIZA CASEY NOTIFIED-GG  RN PREOP 10/14/2024   T/S ON 10/25/2024----NEED H/P       Family History   Problem Relation Name Age of Onset    Anxiety disorder Sister Bev     Depression Sister Bev     Alcohol abuse Maternal Uncle      Alcohol abuse Maternal Grandfather

## 2024-12-17 ENCOUNTER — PATIENT MESSAGE (OUTPATIENT)
Dept: ORTHOPEDICS | Facility: CLINIC | Age: 59
End: 2024-12-17

## 2024-12-17 ENCOUNTER — OFFICE VISIT (OUTPATIENT)
Dept: ORTHOPEDICS | Facility: CLINIC | Age: 59
End: 2024-12-17
Payer: COMMERCIAL

## 2024-12-17 VITALS — HEIGHT: 65 IN | BODY MASS INDEX: 27.81 KG/M2 | WEIGHT: 166.88 LBS

## 2024-12-17 DIAGNOSIS — G56.01 RIGHT CARPAL TUNNEL SYNDROME: Primary | ICD-10-CM

## 2024-12-17 PROCEDURE — 3044F HG A1C LEVEL LT 7.0%: CPT | Mod: CPTII,S$GLB,,

## 2024-12-17 PROCEDURE — 1159F MED LIST DOCD IN RCRD: CPT | Mod: CPTII,S$GLB,,

## 2024-12-17 PROCEDURE — 99213 OFFICE O/P EST LOW 20 MIN: CPT | Mod: 25,S$GLB,,

## 2024-12-17 PROCEDURE — 3008F BODY MASS INDEX DOCD: CPT | Mod: CPTII,S$GLB,,

## 2024-12-17 PROCEDURE — 4010F ACE/ARB THERAPY RXD/TAKEN: CPT | Mod: CPTII,S$GLB,,

## 2024-12-17 PROCEDURE — 99999 PR PBB SHADOW E&M-EST. PATIENT-LVL IV: CPT | Mod: PBBFAC,,,

## 2024-12-17 PROCEDURE — 20526 THER INJECTION CARP TUNNEL: CPT | Mod: RT,S$GLB,,

## 2024-12-17 RX ORDER — TRIAMCINOLONE ACETONIDE 40 MG/ML
40 INJECTION, SUSPENSION INTRA-ARTICULAR; INTRAMUSCULAR
Status: DISCONTINUED | OUTPATIENT
Start: 2024-12-17 | End: 2024-12-17 | Stop reason: HOSPADM

## 2024-12-17 RX ADMIN — TRIAMCINOLONE ACETONIDE 40 MG: 40 INJECTION, SUSPENSION INTRA-ARTICULAR; INTRAMUSCULAR at 08:12

## 2024-12-17 NOTE — PROCEDURES
Carpal Tunnel: R carpal tunnel    Date/Time: 12/17/2024 8:00 AM    Performed by: Jayne Pretty PA-C  Authorized by: Jayne Pretty PA-C    Consent Done?:  Yes (Verbal)  Indications:  Pain  Prep: patient was prepped and draped in usual sterile fashion      Local anesthesia used?: Yes    Anesthesia:  Local infiltration  Local anesthetic:  Lidocaine 2% without epinephrine  Location:  Wrist  Site:  R carpal tunnel  Ultrasonic Guidance for Needle Placement?: No    Needle size:  22 G  Approach:  Volar  Medications:  40 mg triamcinolone acetonide 40 mg/mL  Patient tolerance:  Patient tolerated the procedure well with no immediate complications

## 2024-12-19 ENCOUNTER — PATIENT MESSAGE (OUTPATIENT)
Dept: NEUROSURGERY | Facility: CLINIC | Age: 59
End: 2024-12-19
Payer: COMMERCIAL

## 2024-12-20 ENCOUNTER — LAB VISIT (OUTPATIENT)
Dept: LAB | Facility: HOSPITAL | Age: 59
End: 2024-12-20
Attending: FAMILY MEDICINE
Payer: COMMERCIAL

## 2024-12-20 ENCOUNTER — OFFICE VISIT (OUTPATIENT)
Dept: FAMILY MEDICINE | Facility: CLINIC | Age: 59
End: 2024-12-20
Payer: COMMERCIAL

## 2024-12-20 ENCOUNTER — PATIENT MESSAGE (OUTPATIENT)
Dept: FAMILY MEDICINE | Facility: CLINIC | Age: 59
End: 2024-12-20

## 2024-12-20 DIAGNOSIS — Z98.1 S/P LUMBAR SPINAL FUSION: Primary | ICD-10-CM

## 2024-12-20 DIAGNOSIS — N39.0 RECURRENT UTI: ICD-10-CM

## 2024-12-20 DIAGNOSIS — G62.9 NEUROPATHY: Primary | ICD-10-CM

## 2024-12-20 DIAGNOSIS — Z98.1 S/P LUMBAR SPINAL FUSION: ICD-10-CM

## 2024-12-20 DIAGNOSIS — N39.0 RECURRENT UTI: Primary | ICD-10-CM

## 2024-12-20 PROCEDURE — 81003 URINALYSIS AUTO W/O SCOPE: CPT | Performed by: FAMILY MEDICINE

## 2024-12-20 PROCEDURE — 87086 URINE CULTURE/COLONY COUNT: CPT | Performed by: FAMILY MEDICINE

## 2024-12-20 RX ORDER — HYDROCODONE BITARTRATE AND ACETAMINOPHEN 5; 325 MG/1; MG/1
1 TABLET ORAL EVERY 8 HOURS PRN
Qty: 14 TABLET | Refills: 0 | Status: SHIPPED | OUTPATIENT
Start: 2024-12-20 | End: 2025-01-03

## 2024-12-20 RX ORDER — SULFAMETHOXAZOLE AND TRIMETHOPRIM 800; 160 MG/1; MG/1
1 TABLET ORAL 2 TIMES DAILY
Qty: 10 TABLET | Refills: 0 | Status: SHIPPED | OUTPATIENT
Start: 2024-12-20 | End: 2024-12-25

## 2024-12-20 RX ORDER — HYDROCODONE BITARTRATE AND ACETAMINOPHEN 5; 325 MG/1; MG/1
1 TABLET ORAL EVERY 8 HOURS PRN
Qty: 14 TABLET | Refills: 0 | Status: SHIPPED | OUTPATIENT
Start: 2024-12-20 | End: 2024-12-20

## 2024-12-20 NOTE — PROGRESS NOTES
Patient Name: Naomy Armstrong    : 1965  MRN: [id number]    The patient location is: [place], [place]  The chief complaint leading to consultation is: Urinary Tract Infection       Visit type: audiovisual    Face to Face time with patient: 12 minutes  13 minutes of total time spent on the encounter, which includes face to face time and non-face to face time preparing to see the patient (eg, review of tests), Obtaining and/or reviewing separately obtained history, Documenting clinical information in the electronic or other health record, Independently interpreting results (not separately reported) and communicating results to the patient/family/caregiver, or Care coordination (not separately reported).     Each patient to whom he or she provides medical services by telemedicine is:  (1) informed of the relationship between the physician and patient and the respective role of any other health care provider with respect to management of the patient; and (2) notified that he or she may decline to receive medical services by telemedicine and may withdraw from such care at any time.    Notes:     Subjective:     Patient ID: Naomy is a 59 y.o. female    Chief Complaint:  Urinary Tract Infection    History of Present Illness    Naomy presents today with symptoms of urinary tract infection.    She experiences burning and urgency with urination, along with a sensation of pressure and minimal urine output. She also reports nausea and generally not feeling well.    She has a history of recurrent urinary tract infections with two episodes this year. She takes prophylactic nitrofurantoin post-coital but reports occasional non-compliance with this regimen, which she associates with subsequent UTI development.      ROS:  General: -fever, -chills  Gastrointestinal: -abdominal pain, +nausea, -vomiting, -diarrhea, -constipation, -blood in stool  Genitourinary: -dysuria, -hematuria, +frequency, +urgency         Objective:   LMP 09/15/2013     Physical Exam  Pulmonary:      Effort: Pulmonary effort is normal.   Neurological:      Mental Status: She is alert.            Assessment        ICD-10-CM ICD-9-CM   1. Recurrent UTI  N39.0 599.0         Plan:     Assessment & Plan    IMPRESSION:  Suspected urinary tract infection based on patient's reported symptoms  Considering referral to urology due to recurrent UTIs  Planned to prescribe antibiotic based on patient's last culture results  Noted patient's history of forgetting to take prophylactic nitrofurantoin after sexual intercourse, leading to UTIs    URINARY TRACT INFECTION:  Discussed the importance of taking prophylactic nitrofurantoin after sexual intercourse to prevent UTIs.  Start antibiotic after urine sample collection, based on previous culture results.  Continued nitrofurantoin as prophylaxis after sexual intercourse.  Urinalysis and urine culture ordered.  Referred to urogynecology for evaluation of recurrent urinary tract infections.    FOLLOW-UP:  Check portal for lab appointment details.          ORDERS  1. Recurrent UTI  -     Urinalysis; Future; Expected date: 12/20/2024  -     CULTURE, URINE; Future; Expected date: 12/20/2024  -     sulfamethoxazole-trimethoprim 800-160mg (BACTRIM DS) 800-160 mg Tab; Take 1 tablet by mouth 2 (two) times daily. for 5 days  Dispense: 10 tablet; Refill: 0  -     Ambulatory referral/consult to Urogynecology; Future; Expected date: 12/27/2024             -Eric Hernandes Jr., MD, AAHIVS      This note was generated with the assistance of ambient listening technology. Verbal consent was obtained by the patient and accompanying visitor(s) for the recording of patient appointment to facilitate this note. I attest to having reviewed and edited the generated note for accuracy, though some syntax or spelling errors may persist. Please contact the author of this note for any clarification.          There are no Patient Instructions  on file for this visit.    Follow Up  No follow-ups on file.    Future Appointments   Date Time Provider Department Center   12/20/2024 10:00 AM SPECIMEN LAB, [facility] SPECLAB [place]   12/30/2024  9:00 AM Jayne Pretty PA-C [facility]   1/13/2025 12:00 PM Christine Brunson MD [facility] PSYCH [address]   2/3/2025  9:00 AM [facility] XR2 [facility] XRAY [place]   2/4/2025 10:40 AM Kimmie Adair PA-C [facility]   5/6/2025  8:30 AM LAB, [facility] DRAW STATION [facility] LAB [facility]   5/16/2025  9:00 AM Eric Hernandes Jr., MD [facility]

## 2024-12-21 LAB
BILIRUB UR QL STRIP: NEGATIVE
CLARITY UR REFRACT.AUTO: CLEAR
COLOR UR AUTO: YELLOW
GLUCOSE UR QL STRIP: NEGATIVE
HGB UR QL STRIP: ABNORMAL
KETONES UR QL STRIP: NEGATIVE
LEUKOCYTE ESTERASE UR QL STRIP: NEGATIVE
NITRITE UR QL STRIP: NEGATIVE
PH UR STRIP: 7 [PH] (ref 5–8)
PROT UR QL STRIP: NEGATIVE
SP GR UR STRIP: 1.02 (ref 1–1.03)
URN SPEC COLLECT METH UR: ABNORMAL

## 2024-12-22 ENCOUNTER — PATIENT MESSAGE (OUTPATIENT)
Dept: FAMILY MEDICINE | Facility: CLINIC | Age: 59
End: 2024-12-22
Payer: COMMERCIAL

## 2024-12-22 LAB — BACTERIA UR CULT: NO GROWTH

## 2024-12-26 DIAGNOSIS — Z98.1 S/P LUMBAR SPINAL FUSION: Primary | ICD-10-CM

## 2024-12-26 DIAGNOSIS — G44.86 CERVICOGENIC HEADACHE: ICD-10-CM

## 2024-12-27 DIAGNOSIS — Z98.1 S/P LUMBAR SPINAL FUSION: Primary | ICD-10-CM

## 2024-12-27 RX ORDER — CYCLOBENZAPRINE HCL 5 MG
10 TABLET ORAL 3 TIMES DAILY PRN
Qty: 90 TABLET | Refills: 0 | OUTPATIENT
Start: 2024-12-27

## 2024-12-27 RX ORDER — HYDROCODONE BITARTRATE AND ACETAMINOPHEN 5; 325 MG/1; MG/1
1 TABLET ORAL EVERY 8 HOURS PRN
Qty: 10 TABLET | Refills: 0 | Status: SHIPPED | OUTPATIENT
Start: 2024-12-27

## 2024-12-27 RX ORDER — TIZANIDINE HYDROCHLORIDE 6 MG/1
6 CAPSULE, GELATIN COATED ORAL 3 TIMES DAILY
Qty: 90 CAPSULE | Refills: 0 | Status: SHIPPED | OUTPATIENT
Start: 2024-12-27 | End: 2025-01-26

## 2024-12-27 NOTE — PROGRESS NOTES
Assessment: 59 y.o. female with Bilateral knee OA    I explained my diagnostic impression and the reasoning behind it in detail, using layman's terms.       Plan:   - Injection of the bilateral knee  performed, please see procedure note for more details.  Prior to the injection risks and benefits of corticosteroid injection were discussed with the patient including pain, infection, bleeding, skin color changes, swelling, steroid flare. We discussed that over time injections can result in chondral damage, acceleration of arthritis formation, damage to tendons and damage to joints.  The patient consented for the procedure.    - Advised her to call and let us know if steroid injection does not provide significant relief - can reorder euflexxa injections  - Return to clinic PRN    All questions were answered in detail. The patient is in full agreement with the treatment plan and will proceed accordingly.    Chief Complaint   Patient presents with    Knee Pain     Right knee pain        Initial visit (7/10/24): Naomy Armstrong is a 59 y.o. female who presents today complaining of hand pain and numbness    Pain in R hand   Does have neck pain, pain radiating down the arm  Numbness and tingling in hand in C6 dist, no isolated to median n dist  But this pain is extremely disruptive to sleep which is preventing her from working  Is on Lyrica written by her primary care doctor.  She has on the extended release formula    Interval history PA 12/17/24:  Patient here today with complaint of return of numbness and tingling into the hand. Again not exclusively isolated to the median n distribution, but she does report significant relief from previous carpal tunnel injection. She is requesting another injection today as this helped for over 5 months.   Pain is mostly bothersome at night. She does not wear night splint consistently.   Reporting some intermittent neck pain and radicular symptoms, but this is not bothersome  today.  Also reporting R shoulder pain with ROM, but she would like to come back for a separate appointment to address this after the holidays     Interval history PA 12/30/24:  Patient returns to clinic today for bilateral knee pain, R > L. Originally scheduled this appt for her R shoulder, but this is not bothering her today. Carpal tunnel symptoms improved.   Knee pain returned about 1 week ago. Last had euflexxa injections 6/10/24 and reports she did well for about 6 months  Pain has been severe over the last week - has had to use ambulatory devices to get around  Has taken tylenol with no relief - reports she cannot take NSAIDs  Requesting bilateral knee steroid injections today     This is the extent of the patient's complaints at this time.         Review of patient's allergies indicates:   Allergen Reactions    Effexor [venlafaxine]      Elevated her blood pressure    Zoloft [sertraline]     Paroxetine hcl      Made her feel drunk         Current Outpatient Medications:     alendronate (FOSAMAX) 35 MG tablet, Take 1 tablet (35 mg total) by mouth every 7 days., Disp: 12 tablet, Rfl: 1    alosetron (LOTRONEX) 0.5 MG tablet, Take 1 tablet (0.5 mg total) by mouth 2 (two) times daily., Disp: 180 tablet, Rfl: 3    amLODIPine (NORVASC) 5 MG tablet, Take 1 tablet by mouth once daily, Disp: 90 tablet, Rfl: 1    atorvastatin (LIPITOR) 10 MG tablet, Take 1 tablet (10 mg total) by mouth once daily., Disp: 90 tablet, Rfl: 3    calcium carb and citrate-vitD3 (CITRACAL-D3 SLOW RELEASE) 600 mg-12.5 mcg (500 unit) TbSR, Take 2 tablets by mouth once daily., Disp: 180 tablet, Rfl: 3    cyclobenzaprine (FLEXERIL) 5 MG tablet, Take 2 tablets (10 mg total) by mouth 3 (three) times daily as needed for muscle spasms, Disp: 90 tablet, Rfl: 0    dicyclomine (BENTYL) 20 mg tablet, TAKE 1 TABLET BY MOUTH EVERY 6 HOURS, Disp: 120 tablet, Rfl: 0    fluconazole (DIFLUCAN) 200 MG Tab, Take 1 tablet (200 mg total) by mouth once daily. Take  at first sign of yeast infection, Disp: 1 tablet, Rfl: 0    FLUoxetine 40 MG capsule, Take 2 capsules (80 mg total) by mouth once daily., Disp: 180 capsule, Rfl: 3    gabapentin (NEURONTIN) 300 MG capsule, Take 3 capsules (900 mg total) by mouth 3 (three) times daily., Disp: 270 capsule, Rfl: 11    HYDROcodone-acetaminophen (NORCO) 5-325 mg per tablet, Take 1 tablet by mouth every 8 (eight) hours as needed for Pain., Disp: 10 tablet, Rfl: 0    hydrOXYzine HCL (ATARAX) 10 MG Tab, Take 1 tablet (10 mg total) by mouth 2 (two) times daily as needed (anxiety). (Patient not taking: Reported on 12/17/2024), Disp: 60 tablet, Rfl: 3    lisinopriL (PRINIVIL,ZESTRIL) 20 MG tablet, Take 1 tablet (20 mg total) by mouth nightly., Disp: 90 tablet, Rfl: 3    lurasidone (LATUDA) 20 mg Tab tablet, Take 1 tablet (20 mg total) by mouth once daily., Disp: 30 tablet, Rfl: 11    multivitamin with minerals tablet, Take 1 tablet by mouth once daily., Disp: , Rfl:     naloxone (NARCAN) 4 mg/actuation Spry, 4mg by nasal route as needed for opioid overdose; may repeat every 2-3 minutes in alternating nostrils until medical help arrives. Call 911, Disp: 1 each, Rfl: 11    nitrofurantoin, macrocrystal-monohydrate, (MACROBID) 100 MG capsule, Take 1 capsule (100 mg total) by mouth once daily., Disp: 30 capsule, Rfl: 5    ondansetron (ZOFRAN-ODT) 4 MG TbDL, Dissolve 1 tablet (4 mg total) by mouth every 6 (six) hours as needed (nausea)., Disp: 15 tablet, Rfl: 0    ondansetron (ZOFRAN-ODT) 8 MG TbDL, Take 1 tablet (8 mg total) by mouth every 12 (twelve) hours as needed (nausea)., Disp: 30 tablet, Rfl: 0    pregabalin 330 mg Tb24, Take 330 mg by mouth after dinner., Disp: 30 tablet, Rfl: 5    promethazine (PHENERGAN) 25 MG tablet, TAKE 1 TABLET BY MOUTH EVERY 8 HOURS AS NEEDED FOR NAUSEA, Disp: 30 tablet, Rfl: 0    tiZANidine (ZANAFLEX) 6 mg capsule, Take 1 capsule (6 mg total) by mouth 3 (three) times daily., Disp: 90 capsule, Rfl: 0    Physical  "Exam:   PHYSICAL EXAM   height is 5' 5" (1.651 m) and weight is 75.7 kg (166 lb 14.2 oz).     All other systems deferred.  GENERAL:  No acute distress  HABITUS: Normal  GAIT: Non-antalgic   SKIN: . No erythema, warmth, fluctuance. Swelling absent     KNEE EXAM:    bilateral:   Effusion: Mild joint effusion on right, minimal on left   TTP: No tenderness to palpation over medial, lateral joint lines. No MCL/LCL tenderness.   Passive ROM: Extension 0, Flexion 120  No crepitus with passive knee ROM  No pain with manipulation of patella  Stable to varus/valgus stress. No increased laxity to anterior/posterior drawer testing  Negative Aliyah's test  No pain with IR/ER rotation of the hip  5/5 strength in knee flexion and extension, ankle plantarflexion and dorsiflexion  Neurovascular Status: Sensation intact to light touch in Sural, saphenous, SPN, DPN, Tibial nerve distribution  2+ pulse DP/PT, normal capillary refill, foot has normal warmth    Imaging:  No new imaging obtained today      This note was created by combination of typed  and M-Modal dictation. Transcription and phonetic errors may be present.  If there are any questions, please contact me.    Past Medical History:   Diagnosis Date    Alcohol abuse     per chart, but patient denies today and cocaine per chart    Anxiety     Colon polyp     Depression     Hallucination     last couple of months started seeing someone standing in her room, saw boyfriend's reflection in glass    Headache     History of psychiatric hospitalization     age 13 [facility] for 2 months for acting out; 2018 for SI    Hx of psychiatric care     Hypertension     Psychiatric problem     Sleep difficulties     Suicide attempt     with knife by cutting stomach    Therapy        Active Problem List with Overview Notes    Diagnosis Date Noted    Mixed dyslipidemia 11/06/2024     Lab Results   Component Value Date    CHOL 250 (H) 10/22/2024    CHOL 250 (H) 01/06/2024    CHOL 207 (H) " 05/18/2022     Lab Results   Component Value Date    HDL 71 10/22/2024    HDL 68 01/06/2024    HDL 64 05/18/2022     Lab Results   Component Value Date    LDLCALC 135.0 10/22/2024    LDLCALC 161.4 (H) 01/06/2024    LDLCALC 100.0 05/18/2022     Lab Results   Component Value Date    TRIG 220 (H) 10/22/2024    TRIG 103 01/06/2024    TRIG 215 (H) 05/18/2022     Lab Results   Component Value Date    CHOLHDL 28.4 10/22/2024    CHOLHDL 27.2 01/06/2024    CHOLHDL 30.9 05/18/2022      The 10-year ASCVD risk score (Damaris GERARDO, et al., 2019) is: 2.9%    Values used to calculate the score:      Age: 59 years      Sex: Female      Is Non- : No      Diabetic: No      Tobacco smoker: No      Systolic Blood Pressure: 98 mmHg      Is BP treated: Yes      HDL Cholesterol: 71 mg/dL      Total Cholesterol: 250 mg/dL        S/P lumbar spinal fusion 10/29/2024     10/28/24: Left MIS L5-S1 TLIF with Dr. King      Osteopenia after menopause 07/30/2024 06-  DEXA  Lumbar spine (L1-L4):  T-score is -1.6, and Z-score is -0.1.  Femoral neck:    T-score is -2.3, and Z-score is -1.0.  Total hip:    T-score is -1.4, and Z-score is -0.4.    Fracture Risk (FRAX)  18% risk of a major osteoporotic fracture in the next 10 years.  2.8% risk of hip fracture in the next 10 years.    Impression:  Low bone mass (Osteopenia); FRAX calculations do not support treatment as osteoporosis.    RECOMMENDATIONS:  *Daily calcium intake 6614-4632 mg, dietary sources preferred; Vitamin D 5845-5933 IU daily.  *Weight bearing exercise and fall precautions.  *No additional pharmacologic therapy recommended at this time.  *Repeat BMD in 2 years.      Lumbar radiculopathy 05/16/2024    Hypertension, essential 02/18/2024    Memory impairment 12/05/2023    Generalized anxiety disorder 11/27/2022    Esophagogastric junction outflow obstruction 11/22/2022    Major depressive disorder, recurrent, moderate 02/09/2022    Cervical spondylosis  08/11/2021    Hiatal hernia 07/09/2021    Irritable bowel syndrome with diarrhea 07/09/2021    Lumbosacral spondylosis 04/16/2021    Sacroiliac joint pain 03/31/2021    Spondylosis of lumbosacral region 03/31/2021    Chronic back pain greater than 3 months duration 07/16/2020    Decreased strength of lower extremity 06/01/2020    Decreased strength of trunk and back 06/01/2020    Pain in joint of right hip 06/01/2020    Decreased range of motion of right lower extremity 06/01/2020    Bilateral primary osteoarthritis of knee 03/12/2020    Lumbar spondylosis 01/13/2020    DDD (degenerative disc disease), lumbar 01/13/2020    Spondylosis of cervical region without myelopathy or radiculopathy 07/09/2019    Primary osteoarthritis of right knee 06/24/2019    Neck pain 06/24/2019    Cervical radiculopathy 04/26/2013     X-ray cervical spine and EMG/nerve conduction studies      DDD (degenerative disc disease), cervical 04/26/2013    Cervicogenic headache 04/26/2013     Patient has cervicogenic headaches which we will address with a combination of heat/ice application, muscle relaxers, and potentially anti-inflammatories.          Past Surgical History:   Procedure Laterality Date    COLONOSCOPY N/A 05/07/2021    Procedure: COLONOSCOPY;  Surgeon: Prem Montana MD;  Location: [facility];  Service: Endoscopy;  Laterality: N/A;  COVID screening 5/4 LAPC-instr portal -ml    COLONOSCOPY N/A 06/06/2022    Procedure: COLONOSCOPY;  Surgeon: Fab Shepherd MD;  Location: [facility] ([address] [place] [address]);  Service: Endoscopy;  Laterality: N/A;  C-Diff neg  Rapid COVID    EPIDURAL STEROID INJECTION Bilateral 08/05/2020    Procedure: Bilateral MBB @ L3, L4, L5;  Surgeon: Jeremy Powell Jr., MD;  Location: [facility];  Service: Pain Management;  Laterality: Bilateral;  Bilat L3-5 MBB  Arrive @ 1315; No ATC or DM; Needs MD signature    EPIDURAL STEROID INJECTION Right 08/19/2020    Procedure: Lumbar Radiofrequency Thermocoagulation of Medial  Branches;  Surgeon: Jeremy Powell Jr., MD;  Location: [facility];  Service: Pain Management;  Laterality: Right;  Right L3-5 RFA  Arrive @ 1045; No ATC or DM; Needs MD Signature    EPIDURAL STEROID INJECTION Left 09/04/2020    Procedure: Lumbar Radiofrequency Thermocoagulation of Medial Branches;  Surgeon: Jeremy Powell Jr., MD;  Location: [facility];  Service: Pain Management;  Laterality: Left;  Left L3-5 RFA  Arrive @ 0900 (requested); No ATC or DM; Needs MD signature    EPIDURAL STEROID INJECTION Bilateral 04/16/2021    Procedure: Sacroiliac Joint Steroid Injections @ Bilateral;  Surgeon: Jeremy Powell Jr., MD;  Location: [facility];  Service: Pain Management;  Laterality: Bilateral;  Arrive @ 1300; No ATC or DM    EPIDURAL STEROID INJECTION Bilateral 01/19/2022    Procedure: Bilateral Sacroiliac Joint Injections;  Surgeon: Jeremy Powell Jr., MD;  Location: [facility];  Service: Pain Management;  Laterality: Bilateral;  Arrive @ 1315; No ATC or DM; Needs Consent    EPIDURAL STEROID INJECTION Bilateral 07/28/2023    Procedure: Bilateral L2, L3, L4 Medial Branch Blocks #1;  Surgeon: Jeremy Powell Jr., MD;  Location: [facility];  Service: Pain Management;  Laterality: Bilateral;  @1200  No ATC or DM    EPIDURAL STEROID INJECTION Bilateral 08/11/2023    Procedure: Bilateral L2, L3, L4 Medial Branch Blocks #2;  Surgeon: Jeremy Powell Jr., MD;  Location: [facility];  Service: Pain Management;  Laterality: Bilateral;  @1330  No ATC or DM    ESOPHAGEAL MANOMETRY WITH MEASUREMENT OF IMPEDANCE N/A 10/20/2021    Procedure: MANOMETRY, ESOPHAGUS, WITH IMPEDANCE MEASUREMENT;  Surgeon: Anastacia Odonnell MD;  [facility]  Service: Endoscopy;  Laterality: N/A;  Covid test 10/17 [place], instructions sent to [email or web address]    ESOPHAGOGASTRODUODENOSCOPY N/A 05/07/2021    Procedure: EGD (ESOPHAGOGASTRODUODENOSCOPY);  Surgeon: Prem Montana MD;  Location: [facility];  Service:  Endoscopy;  Laterality: N/A;  added on per Dr. NOEL Shephedr    EYE SURGERY  6/6/2015    Lasik    GANGLION CYST EXCISION      HERNIA REPAIR  2/14/2022    Hiatal hernia    INJECTION, FACET JOINT, LUMBOSACRAL Left 06/14/2024    Procedure: Left S1 Transforaminal Epidural Steroid Injection + Left L5-S1 Facet Joint Injection/Aspiration;  Surgeon: Jeremy Powell Jr., MD;  Location: [facility];  Service: Pain Management;  Laterality: Left;  @1200  No ATC or DM  Needs Consent  IV Sedation    LAPAROSCOPIC TOUPET FUNDOPLICATION N/A 02/14/2022    Procedure: FUNDOPLICATION, LAPAROSCOPIC, TOUPET;  Surgeon: Christian Martinez MD;  Location: [facility] OR [address] [place] [address];  Service: General;  Laterality: N/A;    MINIMALLY INVASIVE TRANSFORAMINAL LUMBAR INTERBODY FUSION (TLIF) Left 10/28/2024    Procedure: FUSION, SPINE, LUMBAR, TLIF, MINIMALLY INVASIVE;  Surgeon: Christian King MD;  Location: [facility] OR;  Service: Neurosurgery;  Laterality: Left;  Left L5/S1, possible L4-5 minimally invasive TLIF  sravanthi 4 poster   fluoro  no neuromonitoring needed   Depuy  [employer] CARMELA NOTIFIED-GG  RN PREOP 10/14/2024   T/S ON 10/25/2024----NEED H/P       Family History   Problem Relation Name Age of Onset    Anxiety disorder Sister Bev     Depression Sister Bev     Alcohol abuse Maternal Uncle      Alcohol abuse Maternal Grandfather

## 2024-12-30 ENCOUNTER — OFFICE VISIT (OUTPATIENT)
Dept: ORTHOPEDICS | Facility: CLINIC | Age: 59
End: 2024-12-30
Payer: COMMERCIAL

## 2024-12-30 VITALS — BODY MASS INDEX: 27.81 KG/M2 | HEIGHT: 65 IN | WEIGHT: 166.88 LBS

## 2024-12-30 DIAGNOSIS — M17.0 ARTHRITIS OF BOTH KNEES: Primary | ICD-10-CM

## 2024-12-30 PROCEDURE — 99999 PR PBB SHADOW E&M-EST. PATIENT-LVL IV: CPT | Mod: PBBFAC,,,

## 2024-12-30 PROCEDURE — 20610 DRAIN/INJ JOINT/BURSA W/O US: CPT | Mod: 50,S$GLB,,

## 2024-12-30 PROCEDURE — 99499 UNLISTED E&M SERVICE: CPT | Mod: S$GLB,,,

## 2024-12-30 RX ORDER — TRIAMCINOLONE ACETONIDE 40 MG/ML
40 INJECTION, SUSPENSION INTRA-ARTICULAR; INTRAMUSCULAR
Status: DISCONTINUED | OUTPATIENT
Start: 2024-12-30 | End: 2024-12-30 | Stop reason: HOSPADM

## 2024-12-30 RX ADMIN — TRIAMCINOLONE ACETONIDE 40 MG: 40 INJECTION, SUSPENSION INTRA-ARTICULAR; INTRAMUSCULAR at 09:12

## 2024-12-30 NOTE — PROCEDURES
Large Joint Aspiration/Injection: bilateral knee    Date/Time: 12/30/2024 9:00 AM    Performed by: Jayne Pertty PA-C  Authorized by: Jayne Pretty PA-C    Consent Done?:  Yes (Verbal)  Indications:  Arthritis, pain and joint swelling  Prep: patient was prepped and draped in usual sterile fashion      Local anesthesia used?: Yes    Anesthesia:  Local infiltration  Local anesthetic:  Topical anesthetic and lidocaine 2% without epinephrine    Details:  Needle Size:  22 G  Ultrasonic Guidance for needle placement?: No    Approach:  Anterolateral  Location:  Knee  Laterality:  Bilateral  Site:  Bilateral knee  Medications (Right):  40 mg triamcinolone acetonide 40 mg/mL  Medications (Left):  40 mg triamcinolone acetonide 40 mg/mL  Patient tolerance:  Patient tolerated the procedure well with no immediate complications

## 2025-01-02 DIAGNOSIS — Z98.1 S/P LUMBAR SPINAL FUSION: Primary | ICD-10-CM

## 2025-01-02 RX ORDER — HYDROCODONE BITARTRATE AND ACETAMINOPHEN 5; 325 MG/1; MG/1
1 TABLET ORAL EVERY 8 HOURS PRN
Qty: 10 TABLET | Refills: 0 | Status: SHIPPED | OUTPATIENT
Start: 2025-01-02

## 2025-01-04 ENCOUNTER — PATIENT MESSAGE (OUTPATIENT)
Dept: ORTHOPEDICS | Facility: CLINIC | Age: 60
End: 2025-01-04
Payer: COMMERCIAL

## 2025-01-06 ENCOUNTER — TELEPHONE (OUTPATIENT)
Dept: ORTHOPEDICS | Facility: CLINIC | Age: 60
End: 2025-01-06
Payer: COMMERCIAL

## 2025-01-06 DIAGNOSIS — M17.0 ARTHRITIS OF BOTH KNEES: Primary | ICD-10-CM

## 2025-01-06 NOTE — TELEPHONE ENCOUNTER
Called pt to let her know it is to soon for her to get injections we can offer her gel injections but they will take some time to get approved from her ins. I advised her to call us back and let us know what she would like to do.

## 2025-01-07 ENCOUNTER — PATIENT MESSAGE (OUTPATIENT)
Dept: NEUROSURGERY | Facility: CLINIC | Age: 60
End: 2025-01-07
Payer: COMMERCIAL

## 2025-01-07 DIAGNOSIS — Z98.1 S/P LUMBAR SPINAL FUSION: Primary | ICD-10-CM

## 2025-01-08 ENCOUNTER — HOSPITAL ENCOUNTER (OUTPATIENT)
Dept: RADIOLOGY | Facility: HOSPITAL | Age: 60
Discharge: HOME | End: 2025-01-08
Attending: STUDENT IN AN ORGANIZED HEALTH CARE EDUCATION/TRAINING PROGRAM
Payer: COMMERCIAL

## 2025-01-08 ENCOUNTER — TELEPHONE (OUTPATIENT)
Dept: ORTHOPEDICS | Facility: CLINIC | Age: 60
End: 2025-01-08
Payer: COMMERCIAL

## 2025-01-08 ENCOUNTER — OFFICE VISIT (OUTPATIENT)
Dept: ORTHOPEDICS | Facility: CLINIC | Age: 60
End: 2025-01-08
Payer: COMMERCIAL

## 2025-01-08 ENCOUNTER — OFFICE VISIT (OUTPATIENT)
Dept: NEUROSURGERY | Facility: CLINIC | Age: 60
End: 2025-01-08
Payer: COMMERCIAL

## 2025-01-08 VITALS — BODY MASS INDEX: 27.81 KG/M2 | HEIGHT: 65 IN | WEIGHT: 166.88 LBS

## 2025-01-08 VITALS
HEIGHT: 65 IN | SYSTOLIC BLOOD PRESSURE: 140 MMHG | HEART RATE: 88 BPM | DIASTOLIC BLOOD PRESSURE: 80 MMHG | OXYGEN SATURATION: 100 % | BODY MASS INDEX: 27.77 KG/M2

## 2025-01-08 DIAGNOSIS — M17.0 ARTHRITIS OF BOTH KNEES: ICD-10-CM

## 2025-01-08 DIAGNOSIS — M25.511 CHRONIC RIGHT SHOULDER PAIN: Primary | ICD-10-CM

## 2025-01-08 DIAGNOSIS — G89.29 CHRONIC RIGHT SHOULDER PAIN: Primary | ICD-10-CM

## 2025-01-08 DIAGNOSIS — Z98.1 S/P LUMBAR SPINAL FUSION: Primary | ICD-10-CM

## 2025-01-08 DIAGNOSIS — M54.9 BACK PAIN, UNSPECIFIED BACK LOCATION, UNSPECIFIED BACK PAIN LATERALITY, UNSPECIFIED CHRONICITY: ICD-10-CM

## 2025-01-08 DIAGNOSIS — Z98.1 S/P LUMBAR SPINAL FUSION: ICD-10-CM

## 2025-01-08 PROCEDURE — 1160F RVW MEDS BY RX/DR IN RCRD: CPT | Mod: CPTII,S$GLB,,

## 2025-01-08 PROCEDURE — 72100 X-RAY EXAM L-S SPINE 2/3 VWS: CPT | Mod: 26,,, | Performed by: RADIOLOGY

## 2025-01-08 PROCEDURE — 72100 X-RAY EXAM L-S SPINE 2/3 VWS: CPT | Mod: TC,FY

## 2025-01-08 PROCEDURE — 3077F SYST BP >= 140 MM HG: CPT | Mod: CPTII,S$GLB,, | Performed by: PHYSICIAN ASSISTANT

## 2025-01-08 PROCEDURE — 1159F MED LIST DOCD IN RCRD: CPT | Mod: CPTII,S$GLB,, | Performed by: PHYSICIAN ASSISTANT

## 2025-01-08 PROCEDURE — 1159F MED LIST DOCD IN RCRD: CPT | Mod: CPTII,S$GLB,,

## 2025-01-08 PROCEDURE — 99999 PR PBB SHADOW E&M-EST. PATIENT-LVL IV: CPT | Mod: PBBFAC,,,

## 2025-01-08 PROCEDURE — 3079F DIAST BP 80-89 MM HG: CPT | Mod: CPTII,S$GLB,, | Performed by: PHYSICIAN ASSISTANT

## 2025-01-08 PROCEDURE — 99213 OFFICE O/P EST LOW 20 MIN: CPT | Mod: S$GLB,,,

## 2025-01-08 PROCEDURE — 3008F BODY MASS INDEX DOCD: CPT | Mod: CPTII,S$GLB,,

## 2025-01-08 PROCEDURE — 99024 POSTOP FOLLOW-UP VISIT: CPT | Mod: S$GLB,,, | Performed by: PHYSICIAN ASSISTANT

## 2025-01-08 PROCEDURE — 1160F RVW MEDS BY RX/DR IN RCRD: CPT | Mod: CPTII,S$GLB,, | Performed by: PHYSICIAN ASSISTANT

## 2025-01-08 NOTE — TELEPHONE ENCOUNTER
Called patient to let her know that it to early for the to get the steroid injection to her knee patient had injection in Dec and the shots are given every 3 mths. Patient is set up to come back in Feb for her injection to her knee . Thanks

## 2025-01-08 NOTE — PROGRESS NOTES
Assessment: 59 y.o. female with right biceps tendinitis , bilateral knee osteoarthritis     I explained my diagnostic impression and the reasoning behind it in detail, using layman's terms.      Plan:   - MRI R shoulder put in for [facility] location in [place]   - Patient cannot tolerate NSAIDs - she has pain medication prescribed from [facility] to take prn   - Return to clinic when MRI complete for shoulder. Also, planning to see her back for euflexxa injections once approved by insurance.     All questions were answered in detail. The patient is in full agreement with the treatment plan and will proceed accordingly.    Chief Complaint   Patient presents with    Shoulder Pain     Right shoulder pain        Initial visit (9/20/23): Naomy Armstrong is a 59 y.o. female who presents today complaining of right shoulder pain  Duration of symptoms:  several years- pain has been intermittent. She has had constant severe pain for the last two weeks  Trauma or new activity: no  Pain is constant  Aggravating factors: Reaching overhead is somewhat painful, abduction, lifting, turning over at night  Relieving factors: rst  Night pain is present and is disruptive to sleep  Radicular symptoms: no numbness, paresthesias   Associated symptoms:  limited range of motion.    Prior treatment:  meloxicam without relief, celebrex, advil without improvement in pain.     Pain does interfere with sleep and activities of daily living .  All pain the last to the anterior shoulder, no pain localized over the subdeltoid fossa.  Does not radiate to the lateral upper arm    10/26/23  Had a lot of pain to the bilateral shoulders yesterday. Took a Meloxicam last night and her pain is somewhat better   Still has pain in the right shoulder - never got better with injection  PT starts soon - she was not able to get in sooner because of her work schedule     3/13/24  Pain in both shoulders has returned   Did PT with epic and it did not help  "  R is more bothersome today, usually left is very painful as well   Pushing and motion with the shoulder are painful  More painful in morning  Pain is anterior     1/8/25 PA:  Patient returns today with pain in the right shoulder. Left shoulder not bothersome today.   Pain is located anterior.  Worse with reaching overhead - most noticeable when she is opening/closing trunk of her car or lifting items.  Last time patient was seen for R shoulder, MRI was ordered. She did not have this done. Currently living in [place] and has had difficulty scheduling appointments   Of note, had a fall on Saturday at home. She was wearing slippers and was stepping backwards and fell onto her backside and hit her right shoulder on some furniture/items on the way down.     She is also having ongoing bilateral knee pain despite most recent steroid injections on 12/30/24. We have already put in the prior authorization for euflexxa injections and are planning to have her follow up for these once approved by insurance. She is using a cane to ambulate today due to knee pain.       This is the extent of the patient's complaints at this time.      Review of patient's allergies indicates:   Allergen Reactions    Effexor [venlafaxine]      Elevated her blood pressure    Zoloft [sertraline]     Paroxetine hcl      Made her feel drunk         Physical Exam:   Vitals:    01/08/25 1323   Weight: 75.7 kg (166 lb 14.2 oz)   Height: 5' 5" (1.651 m)   PainSc:   3       General: Patient is alert, awake and oriented to time, place and person. Mood and affect are appropriate.  Patient does not appear to be in any distress, denies any constitutional symptoms and appears stated age.   HEENT: Pupils are equal and round, sclera are not injected. External examination of ears and nose reveals no abnormalities. Cranial nerves II-X are grossly intact  Skin: no rashes, abrasions or open wounds on the affected extremity. She does have some bruising related to " recent fall over the lateral aspect of the right shoulder  Resp: No respiratory distress or audible wheezing   CV: 2+  pulses, all extremities warm and well perfused   Bilateral Shoulder    Shoulder Range of Motion    Right     Left   (Active/Passive)       Forward Elevation     165/165         165/165  External rotation (arm at side)  45/45             45/45   Internal rotation behind the back  L3             L3     Range of motion is painful  - anterior pain    Acromioclavicular joint is not tender  Crossbody test: negative    Neer's negative  Hawkin's negative    Kofi's negative  Drop arm negative  Belly press negative      Cuff Strength     Right     Left   Supraspinatus        4/5    5/5  Infraspinatus     5/5    5/5  Subscapularis     5/5    5/5    Deltoid testing            5/5    5/5    Stephens's test positive  Speeds positive  Yergasons positive  TTP over bicipital groove on R    Elbow examination demonstrates no tenderness to palpation and has normal range of motion.     ltsi C5-T1  + epl, io, fds, fdp   2+ RP      Imaging: no new imaging obtained today       This note was created by combination of typed  and M-Modal dictation. Transcription and phonetic errors may be present.  If there are any questions, please contact me.      Current Outpatient Medications:     alendronate (FOSAMAX) 35 MG tablet, Take 1 tablet (35 mg total) by mouth every 7 days., Disp: 12 tablet, Rfl: 1    alosetron (LOTRONEX) 0.5 MG tablet, Take 1 tablet (0.5 mg total) by mouth 2 (two) times daily., Disp: 180 tablet, Rfl: 3    amLODIPine (NORVASC) 5 MG tablet, Take 1 tablet by mouth once daily, Disp: 90 tablet, Rfl: 1    atorvastatin (LIPITOR) 10 MG tablet, Take 1 tablet (10 mg total) by mouth once daily., Disp: 90 tablet, Rfl: 3    calcium carb and citrate-vitD3 (CITRACAL-D3 SLOW RELEASE) 600 mg-12.5 mcg (500 unit) TbSR, Take 2 tablets by mouth once daily., Disp: 180 tablet, Rfl: 3    cyclobenzaprine (FLEXERIL) 5 MG tablet,  Take 2 tablets (10 mg total) by mouth 3 (three) times daily as needed for muscle spasms, Disp: 90 tablet, Rfl: 0    dicyclomine (BENTYL) 20 mg tablet, TAKE 1 TABLET BY MOUTH EVERY 6 HOURS, Disp: 120 tablet, Rfl: 0    fluconazole (DIFLUCAN) 200 MG Tab, Take 1 tablet (200 mg total) by mouth once daily. Take at first sign of yeast infection, Disp: 1 tablet, Rfl: 0    FLUoxetine 40 MG capsule, Take 2 capsules (80 mg total) by mouth once daily., Disp: 180 capsule, Rfl: 3    gabapentin (NEURONTIN) 300 MG capsule, Take 3 capsules (900 mg total) by mouth 3 (three) times daily., Disp: 270 capsule, Rfl: 11    HYDROcodone-acetaminophen (NORCO) 5-325 mg per tablet, Take 1 tablet by mouth every 8 (eight) hours as needed for Pain., Disp: 10 tablet, Rfl: 0    HYDROcodone-acetaminophen (NORCO) 5-325 mg per tablet, Take 1 tablet by mouth every 8 (eight) hours as needed for Pain., Disp: 10 tablet, Rfl: 0    hydrOXYzine HCL (ATARAX) 10 MG Tab, Take 1 tablet (10 mg total) by mouth 2 (two) times daily as needed (anxiety)., Disp: 60 tablet, Rfl: 3    lisinopriL (PRINIVIL,ZESTRIL) 20 MG tablet, Take 1 tablet (20 mg total) by mouth nightly., Disp: 90 tablet, Rfl: 3    lurasidone (LATUDA) 20 mg Tab tablet, Take 1 tablet (20 mg total) by mouth once daily., Disp: 30 tablet, Rfl: 11    multivitamin with minerals tablet, Take 1 tablet by mouth once daily., Disp: , Rfl:     naloxone (NARCAN) 4 mg/actuation Spry, 4mg by nasal route as needed for opioid overdose; may repeat every 2-3 minutes in alternating nostrils until medical help arrives. Call 911, Disp: 1 each, Rfl: 11    nitrofurantoin, macrocrystal-monohydrate, (MACROBID) 100 MG capsule, Take 1 capsule (100 mg total) by mouth once daily., Disp: 30 capsule, Rfl: 5    pregabalin 330 mg Tb24, Take 330 mg by mouth after dinner., Disp: 30 tablet, Rfl: 5    promethazine (PHENERGAN) 25 MG tablet, TAKE 1 TABLET BY MOUTH EVERY 8 HOURS AS NEEDED FOR NAUSEA, Disp: 30 tablet, Rfl: 0    tiZANidine  (ZANAFLEX) 6 mg capsule, Take 1 capsule (6 mg total) by mouth 3 (three) times daily., Disp: 90 capsule, Rfl: 0    Past Medical History:   Diagnosis Date    Alcohol abuse     per chart, but patient denies today and cocaine per chart    Anxiety     Colon polyp     Depression     Hallucination     last couple of months started seeing someone standing in her room, saw boyfriend's reflection in glass    Headache     History of psychiatric hospitalization     age 13 [facility] for 2 months for acting out; 2018 for SI    Hx of psychiatric care     Hypertension     Psychiatric problem     Sleep difficulties     Suicide attempt     with knife by cutting stomach    Therapy        Active Problem List with Overview Notes    Diagnosis Date Noted    Mixed dyslipidemia 11/06/2024     Lab Results   Component Value Date    CHOL 250 (H) 10/22/2024    CHOL 250 (H) 01/06/2024    CHOL 207 (H) 05/18/2022     Lab Results   Component Value Date    HDL 71 10/22/2024    HDL 68 01/06/2024    HDL 64 05/18/2022     Lab Results   Component Value Date    LDLCALC 135.0 10/22/2024    LDLCALC 161.4 (H) 01/06/2024    LDLCALC 100.0 05/18/2022     Lab Results   Component Value Date    TRIG 220 (H) 10/22/2024    TRIG 103 01/06/2024    TRIG 215 (H) 05/18/2022     Lab Results   Component Value Date    CHOLHDL 28.4 10/22/2024    CHOLHDL 27.2 01/06/2024    CHOLHDL 30.9 05/18/2022      The 10-year ASCVD risk score (Damaris GERARDO, et al., 2019) is: 2.9%    Values used to calculate the score:      Age: 59 years      Sex: Female      Is Non- : No      Diabetic: No      Tobacco smoker: No      Systolic Blood Pressure: 98 mmHg      Is BP treated: Yes      HDL Cholesterol: 71 mg/dL      Total Cholesterol: 250 mg/dL        S/P lumbar spinal fusion 10/29/2024     10/28/24: Left MIS L5-S1 TLIF with Dr. King      Osteopenia after menopause 07/30/2024 06-  DEXA  Lumbar spine (L1-L4):  T-score is -1.6, and Z-score is -0.1.  Femoral  neck:    T-score is -2.3, and Z-score is -1.0.  Total hip:    T-score is -1.4, and Z-score is -0.4.    Fracture Risk (FRAX)  18% risk of a major osteoporotic fracture in the next 10 years.  2.8% risk of hip fracture in the next 10 years.    Impression:  Low bone mass (Osteopenia); FRAX calculations do not support treatment as osteoporosis.    RECOMMENDATIONS:  *Daily calcium intake 9802-7985 mg, dietary sources preferred; Vitamin D 9502-9213 IU daily.  *Weight bearing exercise and fall precautions.  *No additional pharmacologic therapy recommended at this time.  *Repeat BMD in 2 years.      Lumbar radiculopathy 05/16/2024    Hypertension, essential 02/18/2024    Memory impairment 12/05/2023    Generalized anxiety disorder 11/27/2022    Esophagogastric junction outflow obstruction 11/22/2022    Major depressive disorder, recurrent, moderate 02/09/2022    Cervical spondylosis 08/11/2021    Hiatal hernia 07/09/2021    Irritable bowel syndrome with diarrhea 07/09/2021    Lumbosacral spondylosis 04/16/2021    Sacroiliac joint pain 03/31/2021    Spondylosis of lumbosacral region 03/31/2021    Chronic back pain greater than 3 months duration 07/16/2020    Decreased strength of lower extremity 06/01/2020    Decreased strength of trunk and back 06/01/2020    Pain in joint of right hip 06/01/2020    Decreased range of motion of right lower extremity 06/01/2020    Bilateral primary osteoarthritis of knee 03/12/2020    Lumbar spondylosis 01/13/2020    DDD (degenerative disc disease), lumbar 01/13/2020    Spondylosis of cervical region without myelopathy or radiculopathy 07/09/2019    Primary osteoarthritis of right knee 06/24/2019    Neck pain 06/24/2019    Cervical radiculopathy 04/26/2013     X-ray cervical spine and EMG/nerve conduction studies      DDD (degenerative disc disease), cervical 04/26/2013    Cervicogenic headache 04/26/2013     Patient has cervicogenic headaches which we will address with a combination of  heat/ice application, muscle relaxers, and potentially anti-inflammatories.          Past Surgical History:   Procedure Laterality Date    COLONOSCOPY N/A 05/07/2021    Procedure: COLONOSCOPY;  Surgeon: Prem Montana MD;  Location: [facility];  Service: Endoscopy;  Laterality: N/A;  COVID screening 5/4 LAP[facility]-instr portal -ml    COLONOSCOPY N/A 06/06/2022    Procedure: COLONOSCOPY;  Surgeon: Fab Shepherd MD;  Location: [facility] ([address] [place] [address]);  Service: Endoscopy;  Laterality: N/A;  C-Diff neg  Rapid COVID    EPIDURAL STEROID INJECTION Bilateral 08/05/2020    Procedure: Bilateral MBB @ L3, L4, L5;  Surgeon: Jeremy Powell Jr., MD;  Location: [facility];  Service: Pain Management;  Laterality: Bilateral;  Bilat L3-5 MBB  Arrive @ 1315; No ATC or DM; Needs MD signature    EPIDURAL STEROID INJECTION Right 08/19/2020    Procedure: Lumbar Radiofrequency Thermocoagulation of Medial Branches;  Surgeon: Jeremy Powell Jr., MD;  Location: [facility];  Service: Pain Management;  Laterality: Right;  Right L3-5 RFA  Arrive @ 1045; No ATC or DM; Needs MD Signature    EPIDURAL STEROID INJECTION Left 09/04/2020    Procedure: Lumbar Radiofrequency Thermocoagulation of Medial Branches;  Surgeon: Jeremy Powell Jr., MD;  Location: [facility];  Service: Pain Management;  Laterality: Left;  Left L3-5 RFA  Arrive @ 0900 (requested); No ATC or DM; Needs MD signature    EPIDURAL STEROID INJECTION Bilateral 04/16/2021    Procedure: Sacroiliac Joint Steroid Injections @ Bilateral;  Surgeon: Jeremy Powell Jr., MD;  Location: [facility];  Service: Pain Management;  Laterality: Bilateral;  Arrive @ 1300; No ATC or DM    EPIDURAL STEROID INJECTION Bilateral 01/19/2022    Procedure: Bilateral Sacroiliac Joint Injections;  Surgeon: Jeremy Powell Jr., MD;  Location: [facility];  Service: Pain Management;  Laterality: Bilateral;  Arrive @ 1315; No ATC or DM; Needs Consent    EPIDURAL STEROID INJECTION Bilateral 07/28/2023     Procedure: Bilateral L2, L3, L4 Medial Branch Blocks #1;  Surgeon: Jeremy Powell Jr., MD;  Location: [facility] ENDO;  Service: Pain Management;  Laterality: Bilateral;  @1200  No ATC or DM    EPIDURAL STEROID INJECTION Bilateral 08/11/2023    Procedure: Bilateral L2, L3, L4 Medial Branch Blocks #2;  Surgeon: Jeremy Powell Jr., MD;  Location: [facility] ENDO;  Service: Pain Management;  Laterality: Bilateral;  @1330  No ATC or DM    ESOPHAGEAL MANOMETRY WITH MEASUREMENT OF IMPEDANCE N/A 10/20/2021    Procedure: MANOMETRY, ESOPHAGUS, WITH IMPEDANCE MEASUREMENT;  Surgeon: Anastacia Odonnell MD;  Location: [facility] ENDO (4TH FLR);  Service: Endoscopy;  Laterality: N/A;  Covid test 10/17 [place], instructions sent to [id number]    ESOPHAGOGASTRODUODENOSCOPY N/A 05/07/2021    Procedure: EGD (ESOPHAGOGASTRODUODENOSCOPY);  Surgeon: Prem Montana MD;  Location: [facility];  Service: Endoscopy;  Laterality: N/A;  added on per Dr. NOEL Shepherd    EYE SURGERY  6/6/2015    Lasik    GANGLION CYST EXCISION      HERNIA REPAIR  2/14/2022    Hiatal hernia    INJECTION, FACET JOINT, LUMBOSACRAL Left 06/14/2024    Procedure: Left S1 Transforaminal Epidural Steroid Injection + Left L5-S1 Facet Joint Injection/Aspiration;  Surgeon: Jeremy Powell Jr., MD;  Location: [facility] PAIN MANAGEMENT;  Service: Pain Management;  Laterality: Left;  @1200  No ATC or DM  Needs Consent  IV Sedation    LAPAROSCOPIC TOUPET FUNDOPLICATION N/A 02/14/2022    Procedure: FUNDOPLICATION, LAPAROSCOPIC, TOUPET;  Surgeon: Christian Martinez MD;  Location: [facility] OR 2ND FLR;  Service: General;  Laterality: N/A;    MINIMALLY INVASIVE TRANSFORAMINAL LUMBAR INTERBODY FUSION (TLIF) Left 10/28/2024    Procedure: FUSION, SPINE, LUMBAR, TLIF, MINIMALLY INVASIVE;  Surgeon: Christian King MD;  Location: [facility] OR;  Service: Neurosurgery;  Laterality: Left;  Left L5/S1, possible L4-5 minimally invasive TLIF  sravanthi 4 poster   fluoro  no neuromonitoring needed    Mike CASEY NOTIFIED-GG  RN PREOP 10/14/2024   T/S ON 10/25/2024----NEED H/P       Social History     Socioeconomic History    Marital status:     Number of children: 0   Occupational History    Occupation:      Employer: [employer]   Tobacco Use    Smoking status: Former     Current packs/day: 0.00     Average packs/day: 1 pack/day for 21.3 years (21.3 ttl pk-yrs)     Types: Cigarettes     Start date: 1985     Quit date: 2006     Years since quittin.7    Smokeless tobacco: Never    Tobacco comments:     It has been a very long time. I do not remember dates   Substance and Sexual Activity    Alcohol use: Not Currently     Comment: occasional    Drug use: Not Currently     Types: Marijuana     Comment: tried at age 14    Sexual activity: Yes     Partners: Male     Birth control/protection: Post-menopausal     Comment: going through menopause   Other Topics Concern    Patient feels they ought to cut down on drinking/drug use No    Patient annoyed by others criticizing their drinking/drug use No    Patient has felt bad or guilty about drinking/drug use No    Patient has had a drink/used drugs as an eye opener in the AM No   Social History Narrative     x 2.    Lives with boyfriend.     Works at [employer] as an .    Presently working on Bachelor's degree.    Has associates degree in criminal justice.     Social Drivers of Health     Financial Resource Strain: Medium Risk (2024)    Overall Financial Resource Strain (CARDIA)     Difficulty of Paying Living Expenses: Somewhat hard   Food Insecurity: Patient Declined (2024)    Hunger Vital Sign     Worried About Running Out of Food in the Last Year: Patient declined     Ran Out of Food in the Last Year: Patient declined   Transportation Needs: Patient Declined (10/28/2024)    TRANSPORTATION NEEDS     Transportation : Patient declined   Physical Activity: Unknown (2024)    Exercise Vital  Sign     Days of Exercise per Week: Patient declined     Minutes of Exercise per Session: 0 min   Stress: No Stress Concern Present (12/2/2024)    [employer] [place] [employer] [place] [employer] - Occupational Stress Questionnaire     Feeling of Stress : Only a little   Housing Stability: High Risk (12/2/2024)    Housing Stability Vital Sign     Unable to Pay for Housing in the Last Year: Yes     Homeless in the Last Year: Patient declined

## 2025-01-08 NOTE — PROGRESS NOTES
"Wound Check   Neurosurgery     Naomy Armstrong is a 59 y.o. female who presents to clinic today for evaluation after a fall 4 days ago.  She is 6 weeks status post MIS L5-S1 TLIF with Dr. King.  Last Saturday she was cleaning up her home and reportedly walking backwards while wearing slippers and carrying a bulky box.  As a result, she fell directly down onto her buttocks and was afraid she may have disrupted her hardware.  She has 5/10 left-sided low back pain today with a small bruise.  No new numbness or weakness.  No radicular pain down her legs.     Aside from this acute incident, she also notes continued post-op back pain, particularly when standing from a seating position.     Physical Exam:   General: well developed, well nourished, no distress  Neurologic: Alert and oriented. Thought content appropriate.   GCS: Motor: 6/Verbal: 5/Eyes: 4 GCS Total: 15   Mental Status: Awake, Alert, Oriented x3   Cranial nerves: face symmetric, tongue midline, pupils equal, round, reactive to light with accomodation, EOMI.   Motor Strength: moves all extremities with good strength and tone 5/5 b/l lower extremity  Sensation: response to light touch throughout BLE  No gait disturbances     Incisions are well healed      Vitals:    01/08/25 0959   BP: (!) 140/80   Pulse: 88   SpO2: 100%   Height: 5' 5" (1.651 m)   PainSc:   5   PainLoc: Back       Imaging:   I have personally reviewed imaging and agree with the findings.     Upright lumbar spine x-ray 1/8/25  Stable hardware placement         Assessment/Plan:   Naomy Armstrong is a 59 y.o. female who presents for acute evaluation following a fall 4 days ago. She is 6 weeks s/p MIS L5-S1 TLIF with Dr. Lewis.      -ok to lift up to 20lbs  -can gradually increase ROM but no extreme twisting or bending  -ok to d/c brace   -FU as scheduled in 1 month with xrays   -Referral to PT placed  -Encouraged patient to call if they have any questions or concerns prior to " next follow up appt        Kimmie Adair PA-C  [facility]  Department of Neurosurgery  [phone]

## 2025-01-10 DIAGNOSIS — Z98.1 S/P LUMBAR SPINAL FUSION: Primary | ICD-10-CM

## 2025-01-10 RX ORDER — HYDROCODONE BITARTRATE AND ACETAMINOPHEN 5; 325 MG/1; MG/1
1 TABLET ORAL EVERY 6 HOURS PRN
Qty: 8 TABLET | Refills: 0 | Status: SHIPPED | OUTPATIENT
Start: 2025-01-10

## 2025-01-15 DIAGNOSIS — Z98.1 S/P LUMBAR SPINAL FUSION: Primary | ICD-10-CM

## 2025-01-20 ENCOUNTER — PATIENT MESSAGE (OUTPATIENT)
Dept: NEUROSURGERY | Facility: CLINIC | Age: 60
End: 2025-01-20
Payer: COMMERCIAL

## 2025-01-20 DIAGNOSIS — Z98.1 S/P LUMBAR SPINAL FUSION: Primary | ICD-10-CM

## 2025-01-20 RX ORDER — HYDROCODONE BITARTRATE AND ACETAMINOPHEN 5; 325 MG/1; MG/1
1 TABLET ORAL EVERY 12 HOURS PRN
Qty: 5 TABLET | Refills: 0 | Status: SHIPPED | OUTPATIENT
Start: 2025-01-20

## 2025-01-23 ENCOUNTER — PATIENT MESSAGE (OUTPATIENT)
Dept: NEUROSURGERY | Facility: CLINIC | Age: 60
End: 2025-01-23
Payer: COMMERCIAL

## 2025-01-24 ENCOUNTER — HOSPITAL ENCOUNTER (OUTPATIENT)
Dept: RADIOLOGY | Facility: HOSPITAL | Age: 60
Discharge: HOME | End: 2025-01-24
Payer: COMMERCIAL

## 2025-01-24 DIAGNOSIS — M25.511 CHRONIC RIGHT SHOULDER PAIN: ICD-10-CM

## 2025-01-24 DIAGNOSIS — G89.29 CHRONIC RIGHT SHOULDER PAIN: ICD-10-CM

## 2025-01-24 PROCEDURE — 73221 MRI JOINT UPR EXTREM W/O DYE: CPT | Mod: TC,RT

## 2025-01-24 PROCEDURE — 73221 MRI JOINT UPR EXTREM W/O DYE: CPT | Mod: 26,RT,, | Performed by: RADIOLOGY

## 2025-01-24 NOTE — TELEPHONE ENCOUNTER
Can we please draft patient a letter saying ok to work on Monday 1/27?  Restrictions include using caution when bending/twisting. No lifting over 20 pounds. Should be allowed to take breaks or stop activities that hurt her back. Should not be made to stay in positions that hurt her back. Thanks.

## 2025-01-27 DIAGNOSIS — Z98.1 S/P LUMBAR SPINAL FUSION: ICD-10-CM

## 2025-01-28 ENCOUNTER — PATIENT MESSAGE (OUTPATIENT)
Dept: PSYCHIATRY | Facility: CLINIC | Age: 60
End: 2025-01-28
Payer: COMMERCIAL

## 2025-01-28 RX ORDER — HYDROCODONE BITARTRATE AND ACETAMINOPHEN 5; 325 MG/1; MG/1
1 TABLET ORAL EVERY 12 HOURS PRN
Qty: 5 TABLET | Refills: 0 | OUTPATIENT
Start: 2025-01-28

## 2025-01-29 DIAGNOSIS — R11.0 NAUSEA: ICD-10-CM

## 2025-01-29 DIAGNOSIS — I10 HYPERTENSION, ESSENTIAL: Chronic | ICD-10-CM

## 2025-01-29 DIAGNOSIS — M85.80 OSTEOPENIA AFTER MENOPAUSE: Chronic | ICD-10-CM

## 2025-01-29 DIAGNOSIS — Z78.0 OSTEOPENIA AFTER MENOPAUSE: Chronic | ICD-10-CM

## 2025-01-29 DIAGNOSIS — K52.9 ACUTE GASTROENTERITIS: ICD-10-CM

## 2025-01-29 NOTE — PROGRESS NOTES
Assessment: 59 y.o. female with right biceps tendinitis , bilateral knee osteoarthritis     I explained my diagnostic impression and the reasoning behind it in detail, using layman's terms.      Plan:   - Can consider repeat injection for shoulder in the future   - Return to clinic next week for 2/3 bilateral euflexxa injections     All questions were answered in detail. The patient is in full agreement with the treatment plan and will proceed accordingly.    Chief Complaint   Patient presents with    Knee Pain     Justen knee pain 1st euflexxa        Initial visit (9/20/23): Naomy Armstrong is a 59 y.o. female who presents today complaining of right shoulder pain  Duration of symptoms:  several years- pain has been intermittent. She has had constant severe pain for the last two weeks  Trauma or new activity: no  Pain is constant  Aggravating factors: Reaching overhead is somewhat painful, abduction, lifting, turning over at night  Relieving factors: rst  Night pain is present and is disruptive to sleep  Radicular symptoms: no numbness, paresthesias   Associated symptoms:  limited range of motion.    Prior treatment:  meloxicam without relief, celebrex, advil without improvement in pain.     Pain does interfere with sleep and activities of daily living .  All pain the last to the anterior shoulder, no pain localized over the subdeltoid fossa.  Does not radiate to the lateral upper arm    10/26/23  Had a lot of pain to the bilateral shoulders yesterday. Took a Meloxicam last night and her pain is somewhat better   Still has pain in the right shoulder - never got better with injection  PT starts soon - she was not able to get in sooner because of her work schedule     3/13/24  Pain in both shoulders has returned   Did PT with epic and it did not help   R is more bothersome today, usually left is very painful as well   Pushing and motion with the shoulder are painful  More painful in morning  Pain is anterior  "    1/8/25 PA:  Patient returns today with pain in the right shoulder. Left shoulder not bothersome today.   Pain is located anterior.  Worse with reaching overhead - most noticeable when she is opening/closing trunk of her car or lifting items.  Last time patient was seen for R shoulder, MRI was ordered. She did not have this done. Currently living in [place] and has had difficulty scheduling appointments   Of note, had a fall on Saturday at home. She was wearing slippers and was stepping backwards and fell onto her backside and hit her right shoulder on some furniture/items on the way down.     She is also having ongoing bilateral knee pain despite most recent steroid injections on 12/30/24. We have already put in the prior authorization for euflexxa injections and are planning to have her follow up for these once approved by insurance. She is using a cane to ambulate today due to knee pain.     2/4/25 PA:  Patient returns today for bilateral knee pain and initiation of euflexxa injections as well as right shoulder MRI.   Reviewed MRI results - shoulder not as bothersome as knees today      This is the extent of the patient's complaints at this time.      Review of patient's allergies indicates:   Allergen Reactions    Effexor [venlafaxine]      Elevated her blood pressure    Zoloft [sertraline]     Paroxetine hcl      Made her feel drunk         Physical Exam:   Vitals:    02/04/25 0904   Weight: 75.7 kg (166 lb 14.2 oz)   Height: 5' 5" (1.651 m)   PainSc:   6         General: Patient is alert, awake and oriented to time, place and person. Mood and affect are appropriate.  Patient does not appear to be in any distress, denies any constitutional symptoms and appears stated age.   HEENT: Pupils are equal and round, sclera are not injected. External examination of ears and nose reveals no abnormalities. Cranial nerves II-X are grossly intact  Skin: no rashes, abrasions or open wounds on the affected extremity. " She does have some bruising related to recent fall over the lateral aspect of the right shoulder  Resp: No respiratory distress or audible wheezing   CV: 2+  pulses, all extremities warm and well perfused   Bilateral Shoulder    Shoulder Range of Motion    Right     Left   (Active/Passive)       Forward Elevation     165/165         165/165  External rotation (arm at side)  45/45             45/45   Internal rotation behind the back  L3             L3     Range of motion is painful  - anterior pain    Acromioclavicular joint is not tender  Crossbody test: negative    Neer's negative  Hawkin's negative    Kofi's negative  Drop arm negative  Belly press negative      Cuff Strength     Right     Left   Supraspinatus        4/5    5/5  Infraspinatus     5/5    5/5  Subscapularis     5/5    5/5    Deltoid testing            5/5    5/5    Stephens's test positive  Speeds positive  Yergasons positive  TTP over bicipital groove on R    Elbow examination demonstrates no tenderness to palpation and has normal range of motion.     ltsi C5-T1  + epl, io, fds, fdp   2+ RP      Imaging:   MRI R shoulder: rotator cuff tendinosis with high-grade partial superior subscapularis tendon tear, medial perching of a split long head biceps tendon, and small full-thickness anterior supraspinatus insertional tear. Additional areas of partial-thickness tearing of the supraspinatus and infraspinatus. Biceps tenosynovitis. Multifocal grades 1-3 glenohumeral chondromalacia. Subacromial and subcoracoid bursitis        Current Outpatient Medications:     alendronate (FOSAMAX) 35 MG tablet, Take 1 tablet (35 mg total) by mouth every 7 days., Disp: 12 tablet, Rfl: 1    alosetron (LOTRONEX) 0.5 MG tablet, Take 1 tablet (0.5 mg total) by mouth 2 (two) times daily., Disp: 180 tablet, Rfl: 3    amLODIPine (NORVASC) 5 MG tablet, Take 1 tablet by mouth once daily, Disp: 90 tablet, Rfl: 1    atorvastatin (LIPITOR) 10 MG tablet, Take 1 tablet (10 mg total) by  mouth once daily., Disp: 90 tablet, Rfl: 3    calcium carb and citrate-vitD3 (CITRACAL-D3 SLOW RELEASE) 600 mg-12.5 mcg (500 unit) TbSR, Take 2 tablets by mouth once daily., Disp: 180 tablet, Rfl: 3    cyclobenzaprine (FLEXERIL) 5 MG tablet, Take 2 tablets (10 mg total) by mouth 3 (three) times daily as needed for muscle spasms, Disp: 90 tablet, Rfl: 0    dicyclomine (BENTYL) 20 mg tablet, TAKE 1 TABLET BY MOUTH EVERY 6 HOURS, Disp: 120 tablet, Rfl: 0    fluconazole (DIFLUCAN) 200 MG Tab, Take 1 tablet (200 mg total) by mouth once daily. Take at first sign of yeast infection, Disp: 1 tablet, Rfl: 0    FLUoxetine 40 MG capsule, Take 2 capsules (80 mg total) by mouth once daily., Disp: 180 capsule, Rfl: 3    gabapentin (NEURONTIN) 300 MG capsule, Take 3 capsules (900 mg total) by mouth 3 (three) times daily., Disp: 270 capsule, Rfl: 11    HYDROcodone-acetaminophen (NORCO) 5-325 mg per tablet, Take 1 tablet by mouth every 8 (eight) hours as needed for Pain., Disp: 10 tablet, Rfl: 0    HYDROcodone-acetaminophen (NORCO) 5-325 mg per tablet, Take 1 tablet by mouth every 8 (eight) hours as needed for Pain., Disp: 10 tablet, Rfl: 0    HYDROcodone-acetaminophen (NORCO) 5-325 mg per tablet, Take 1 tablet by mouth every 6 (six) hours as needed for Pain., Disp: 8 tablet, Rfl: 0    HYDROcodone-acetaminophen (NORCO) 5-325 mg per tablet, Take 1 tablet by mouth every 12 (twelve) hours as needed for Pain., Disp: 5 tablet, Rfl: 0    hydrOXYzine HCL (ATARAX) 10 MG Tab, Take 1 tablet (10 mg total) by mouth 2 (two) times daily as needed (anxiety)., Disp: 60 tablet, Rfl: 3    lisinopriL (PRINIVIL,ZESTRIL) 20 MG tablet, Take 1 tablet (20 mg total) by mouth nightly., Disp: 90 tablet, Rfl: 3    lurasidone (LATUDA) 20 mg Tab tablet, Take 1 tablet (20 mg total) by mouth once daily., Disp: 30 tablet, Rfl: 11    multivitamin with minerals tablet, Take 1 tablet by mouth once daily., Disp: , Rfl:     naloxone (NARCAN) 4 mg/actuation Spry, 4mg by  nasal route as needed for opioid overdose; may repeat every 2-3 minutes in alternating nostrils until medical help arrives. Call 911, Disp: 1 each, Rfl: 11    nitrofurantoin, macrocrystal-monohydrate, (MACROBID) 100 MG capsule, Take 1 capsule (100 mg total) by mouth once daily., Disp: 30 capsule, Rfl: 5    pregabalin 330 mg Tb24, Take 330 mg by mouth after dinner., Disp: 30 tablet, Rfl: 5    promethazine (PHENERGAN) 25 MG tablet, TAKE 1 TABLET BY MOUTH EVERY 8 HOURS AS NEEDED FOR NAUSEA, Disp: 30 tablet, Rfl: 0    Past Medical History:   Diagnosis Date    Alcohol abuse     per chart, but patient denies today and cocaine per chart    Anxiety     Colon polyp     Depression     Hallucination     last couple of months started seeing someone standing in her room, saw boyfriend's reflection in glass    Headache     History of psychiatric hospitalization     age 13 [facility] for 2 months for acting out; 2018 for SI    Hx of psychiatric care     Hypertension     Psychiatric problem     Sleep difficulties     Suicide attempt     with knife by cutting stomach    Therapy        Active Problem List with Overview Notes    Diagnosis Date Noted    Mixed dyslipidemia 11/06/2024     Lab Results   Component Value Date    CHOL 250 (H) 10/22/2024    CHOL 250 (H) 01/06/2024    CHOL 207 (H) 05/18/2022     Lab Results   Component Value Date    HDL 71 10/22/2024    HDL 68 01/06/2024    HDL 64 05/18/2022     Lab Results   Component Value Date    LDLCALC 135.0 10/22/2024    LDLCALC 161.4 (H) 01/06/2024    LDLCALC 100.0 05/18/2022     Lab Results   Component Value Date    TRIG 220 (H) 10/22/2024    TRIG 103 01/06/2024    TRIG 215 (H) 05/18/2022     Lab Results   Component Value Date    CHOLHDL 28.4 10/22/2024    CHOLHDL 27.2 01/06/2024    CHOLHDL 30.9 05/18/2022      The 10-year ASCVD risk score (Damaris GERARDO, et al., 2019) is: 2.9%    Values used to calculate the score:      Age: 59 years      Sex: Female      Is Non- :  No      Diabetic: No      Tobacco smoker: No      Systolic Blood Pressure: 98 mmHg      Is BP treated: Yes      HDL Cholesterol: 71 mg/dL      Total Cholesterol: 250 mg/dL        S/P lumbar spinal fusion 10/29/2024     10/28/24: Left MIS L5-S1 TLIF with Dr. King      Osteopenia after menopause 07/30/2024 06-  DEXA  Lumbar spine (L1-L4):  T-score is -1.6, and Z-score is -0.1.  Femoral neck:    T-score is -2.3, and Z-score is -1.0.  Total hip:    T-score is -1.4, and Z-score is -0.4.    Fracture Risk (FRAX)  18% risk of a major osteoporotic fracture in the next 10 years.  2.8% risk of hip fracture in the next 10 years.    Impression:  Low bone mass (Osteopenia); FRAX calculations do not support treatment as osteoporosis.    RECOMMENDATIONS:  *Daily calcium intake 9145-9309 mg, dietary sources preferred; Vitamin D 0498-4081 IU daily.  *Weight bearing exercise and fall precautions.  *No additional pharmacologic therapy recommended at this time.  *Repeat BMD in 2 years.      Lumbar radiculopathy 05/16/2024    Hypertension, essential 02/18/2024    Memory impairment 12/05/2023    Generalized anxiety disorder 11/27/2022    Esophagogastric junction outflow obstruction 11/22/2022    Major depressive disorder, recurrent, moderate 02/09/2022    Cervical spondylosis 08/11/2021    Hiatal hernia 07/09/2021    Irritable bowel syndrome with diarrhea 07/09/2021    Lumbosacral spondylosis 04/16/2021    Sacroiliac joint pain 03/31/2021    Spondylosis of lumbosacral region 03/31/2021    Chronic back pain greater than 3 months duration 07/16/2020    Decreased strength of lower extremity 06/01/2020    Decreased strength of trunk and back 06/01/2020    Pain in joint of right hip 06/01/2020    Decreased range of motion of right lower extremity 06/01/2020    Bilateral primary osteoarthritis of knee 03/12/2020    Lumbar spondylosis 01/13/2020    DDD (degenerative disc disease), lumbar 01/13/2020    Spondylosis of cervical region  without myelopathy or radiculopathy 07/09/2019    Primary osteoarthritis of right knee 06/24/2019    Neck pain 06/24/2019    Cervical radiculopathy 04/26/2013     X-ray cervical spine and EMG/nerve conduction studies      DDD (degenerative disc disease), cervical 04/26/2013    Cervicogenic headache 04/26/2013     Patient has cervicogenic headaches which we will address with a combination of heat/ice application, muscle relaxers, and potentially anti-inflammatories.          Past Surgical History:   Procedure Laterality Date    COLONOSCOPY N/A 05/07/2021    Procedure: COLONOSCOPY;  Surgeon: Prem Montana MD;  Location: [facility];  Service: Endoscopy;  Laterality: N/A;  COVID screening 5/4 [facility]-instr portal -ml    COLONOSCOPY N/A 06/06/2022    Procedure: COLONOSCOPY;  Surgeon: Fab Shepherd MD;  Location: [facility] ([address] [place] [address]);  Service: Endoscopy;  Laterality: N/A;  C-Diff neg  Rapid COVID    EPIDURAL STEROID INJECTION Bilateral 08/05/2020    Procedure: Bilateral MBB @ L3, L4, L5;  Surgeon: Jeremy Powell Jr., MD;  Location: [facility];  Service: Pain Management;  Laterality: Bilateral;  Bilat L3-5 MBB  Arrive @ 1315; No ATC or DM; Needs MD signature    EPIDURAL STEROID INJECTION Right 08/19/2020    Procedure: Lumbar Radiofrequency Thermocoagulation of Medial Branches;  Surgeon: Jeremy Powell Jr., MD;  Location: [facility];  Service: Pain Management;  Laterality: Right;  Right L3-5 RFA  Arrive @ 1045; No ATC or DM; Needs MD Signature    EPIDURAL STEROID INJECTION Left 09/04/2020    Procedure: Lumbar Radiofrequency Thermocoagulation of Medial Branches;  Surgeon: Jeremy Powell Jr., MD;  Location: [facility];  Service: Pain Management;  Laterality: Left;  Left L3-5 RFA  Arrive @ 0900 (requested); No ATC or DM; Needs MD signature    EPIDURAL STEROID INJECTION Bilateral 04/16/2021    Procedure: Sacroiliac Joint Steroid Injections @ Bilateral;  Surgeon: Jeremy Powell Jr., MD;  Location: [facility];   Service: Pain Management;  Laterality: Bilateral;  Arrive @ 1300; No ATC or DM    EPIDURAL STEROID INJECTION Bilateral 01/19/2022    Procedure: Bilateral Sacroiliac Joint Injections;  Surgeon: Jeremy Powell Jr., MD;  Location: [facility];  Service: Pain Management;  Laterality: Bilateral;  Arrive @ 1315; No ATC or DM; Needs Consent    EPIDURAL STEROID INJECTION Bilateral 07/28/2023    Procedure: Bilateral L2, L3, L4 Medial Branch Blocks #1;  Surgeon: Jeremy Powell Jr., MD;  Location: [facility] ENDO;  Service: Pain Management;  Laterality: Bilateral;  @1200  No ATC or DM    EPIDURAL STEROID INJECTION Bilateral 08/11/2023    Procedure: Bilateral L2, L3, L4 Medial Branch Blocks #2;  Surgeon: Jeremy Powell Jr., MD;  Location: [facility];  Service: Pain Management;  Laterality: Bilateral;  @1330  No ATC or DM    ESOPHAGEAL MANOMETRY WITH MEASUREMENT OF IMPEDANCE N/A 10/20/2021    Procedure: MANOMETRY, ESOPHAGUS, WITH IMPEDANCE MEASUREMENT;  Surgeon: Anastacia Odonnell MD;  Location: [facility] (4TH FLR);  Service: Endoscopy;  Laterality: N/A;  Covid test 10/17 [place], instructions sent to [email or web address]    ESOPHAGOGASTRODUODENOSCOPY N/A 05/07/2021    Procedure: EGD (ESOPHAGOGASTRODUODENOSCOPY);  Surgeon: Prem Montana MD;  Location: [facility];  Service: Endoscopy;  Laterality: N/A;  added on per Dr. NOEL Shepherd    EYE SURGERY  6/6/2015    Lasik    GANGLION CYST EXCISION      HERNIA REPAIR  2/14/2022    Hiatal hernia    INJECTION, FACET JOINT, LUMBOSACRAL Left 06/14/2024    Procedure: Left S1 Transforaminal Epidural Steroid Injection + Left L5-S1 Facet Joint Injection/Aspiration;  Surgeon: Jeremy Powell Jr., MD;  Location: [facility] PAIN MANAGEMENT;  Service: Pain Management;  Laterality: Left;  @1200  No ATC or DM  Needs Consent  IV Sedation    LAPAROSCOPIC TOUPET FUNDOPLICATION N/A 02/14/2022    Procedure: FUNDOPLICATION, LAPAROSCOPIC, TOUPET;  Surgeon: Christian Martinez MD;  Location: [facility] OR 2ND FLR;   Service: General;  Laterality: N/A;    MINIMALLY INVASIVE TRANSFORAMINAL LUMBAR INTERBODY FUSION (TLIF) Left 10/28/2024    Procedure: FUSION, SPINE, LUMBAR, TLIF, MINIMALLY INVASIVE;  Surgeon: Christian King MD;  Location: [facility];  Service: Neurosurgery;  Laterality: Left;  Left L5/S1, possible L4-5 minimally invasive TLIF  sravanthi 4 poster   fluoro  no neuromonitoring needed   Depuy  DEVONUY CARMELA NOTIFIED-GG  RN PREOP 10/14/2024   T/S ON 10/25/2024----NEED H/P       Social History     Socioeconomic History    Marital status:     Number of children: 0   Occupational History    Occupation:      Employer: [employer]   Tobacco Use    Smoking status: Former     Current packs/day: 0.00     Average packs/day: 1 pack/day for 21.3 years (21.3 ttl pk-yrs)     Types: Cigarettes     Start date: 1985     Quit date: 2006     Years since quittin.7    Smokeless tobacco: Never    Tobacco comments:     It has been a very long time. I do not remember dates   Substance and Sexual Activity    Alcohol use: Not Currently     Comment: occasional    Drug use: Not Currently     Types: Marijuana     Comment: tried at age 14    Sexual activity: Yes     Partners: Male     Birth control/protection: Post-menopausal     Comment: going through menopause   Other Topics Concern    Patient feels they ought to cut down on drinking/drug use No    Patient annoyed by others criticizing their drinking/drug use No    Patient has felt bad or guilty about drinking/drug use No    Patient has had a drink/used drugs as an eye opener in the AM No   Social History Narrative     x 2.    Lives with boyfriend.     Works at [employer] as an .    Presently working on Bachelor's degree.    Has associates degree in criminal justice.     Social Drivers of Health     Financial Resource Strain: Medium Risk (2024)    Overall Financial Resource Strain (CARDIA)     Difficulty of Paying Living Expenses:  Somewhat hard   Food Insecurity: Patient Declined (12/2/2024)    Hunger Vital Sign     Worried About Running Out of Food in the Last Year: Patient declined     Ran Out of Food in the Last Year: Patient declined   Transportation Needs: Patient Declined (10/28/2024)    TRANSPORTATION NEEDS     Transportation : Patient declined   Physical Activity: Unknown (12/2/2024)    Exercise Vital Sign     Days of Exercise per Week: Patient declined     Minutes of Exercise per Session: 0 min   Stress: No Stress Concern Present (12/2/2024)    [place] citizen [place] [employer] - Occupational Stress Questionnaire     Feeling of Stress : Only a little   Housing Stability: High Risk (12/2/2024)    Housing Stability Vital Sign     Unable to Pay for Housing in the Last Year: Yes     Homeless in the Last Year: Patient declined

## 2025-01-30 ENCOUNTER — PATIENT MESSAGE (OUTPATIENT)
Dept: NEUROSURGERY | Facility: CLINIC | Age: 60
End: 2025-01-30
Payer: COMMERCIAL

## 2025-01-30 RX ORDER — PROMETHAZINE HYDROCHLORIDE 25 MG/1
25 TABLET ORAL EVERY 8 HOURS PRN
Qty: 30 TABLET | Refills: 0 | Status: SHIPPED | OUTPATIENT
Start: 2025-01-30

## 2025-01-30 RX ORDER — GABAPENTIN 300 MG/1
300 CAPSULE ORAL 3 TIMES DAILY
Qty: 90 CAPSULE | Refills: 11 | Status: SHIPPED | OUTPATIENT
Start: 2025-01-30 | End: 2026-01-30

## 2025-01-30 RX ORDER — TIZANIDINE 4 MG/1
4 TABLET ORAL EVERY 6 HOURS PRN
Qty: 120 TABLET | Refills: 0 | Status: SHIPPED | OUTPATIENT
Start: 2025-01-30 | End: 2025-03-01

## 2025-01-30 NOTE — TELEPHONE ENCOUNTER
Last Office Visit Info:   The patient's last visit with Eric Hernandes Jr., MD was on 12/20/2024.

## 2025-01-30 NOTE — TELEPHONE ENCOUNTER
No care due was identified.  [facility] Embedded Care Due Messages. Reference number: [id number].   1/29/2025 8:21:21 PM CST

## 2025-01-30 NOTE — TELEPHONE ENCOUNTER
No care due was identified.  [facility] Embedded Care Due Messages. Reference number: [id number].   1/29/2025 8:20:20 PM CST

## 2025-01-31 RX ORDER — DICYCLOMINE HYDROCHLORIDE 20 MG/1
20 TABLET ORAL EVERY 6 HOURS
Qty: 120 TABLET | Refills: 0 | Status: SHIPPED | OUTPATIENT
Start: 2025-01-31

## 2025-02-03 ENCOUNTER — HOSPITAL ENCOUNTER (OUTPATIENT)
Dept: RADIOLOGY | Facility: HOSPITAL | Age: 60
Discharge: HOME | End: 2025-02-03
Attending: STUDENT IN AN ORGANIZED HEALTH CARE EDUCATION/TRAINING PROGRAM
Payer: COMMERCIAL

## 2025-02-03 DIAGNOSIS — Z98.1 S/P LUMBAR SPINAL FUSION: ICD-10-CM

## 2025-02-03 PROCEDURE — 72100 X-RAY EXAM L-S SPINE 2/3 VWS: CPT | Mod: TC

## 2025-02-03 PROCEDURE — 72100 X-RAY EXAM L-S SPINE 2/3 VWS: CPT | Mod: 26,,, | Performed by: RADIOLOGY

## 2025-02-04 ENCOUNTER — OFFICE VISIT (OUTPATIENT)
Dept: PSYCHIATRY | Facility: CLINIC | Age: 60
End: 2025-02-04
Payer: COMMERCIAL

## 2025-02-04 ENCOUNTER — OFFICE VISIT (OUTPATIENT)
Dept: ORTHOPEDICS | Facility: CLINIC | Age: 60
End: 2025-02-04
Payer: COMMERCIAL

## 2025-02-04 ENCOUNTER — OFFICE VISIT (OUTPATIENT)
Dept: NEUROSURGERY | Facility: CLINIC | Age: 60
End: 2025-02-04
Attending: STUDENT IN AN ORGANIZED HEALTH CARE EDUCATION/TRAINING PROGRAM
Payer: COMMERCIAL

## 2025-02-04 VITALS — HEIGHT: 65 IN | WEIGHT: 166.88 LBS | BODY MASS INDEX: 27.81 KG/M2

## 2025-02-04 DIAGNOSIS — F33.1 MODERATE EPISODE OF RECURRENT MAJOR DEPRESSIVE DISORDER: ICD-10-CM

## 2025-02-04 DIAGNOSIS — M43.27 FUSION OF SPINE, LUMBOSACRAL REGION: Primary | ICD-10-CM

## 2025-02-04 DIAGNOSIS — F41.1 GAD (GENERALIZED ANXIETY DISORDER): Primary | ICD-10-CM

## 2025-02-04 DIAGNOSIS — M17.0 ARTHRITIS OF BOTH KNEES: Primary | ICD-10-CM

## 2025-02-04 DIAGNOSIS — G89.29 CHRONIC RIGHT SHOULDER PAIN: ICD-10-CM

## 2025-02-04 DIAGNOSIS — M25.511 CHRONIC RIGHT SHOULDER PAIN: ICD-10-CM

## 2025-02-04 PROCEDURE — 99024 POSTOP FOLLOW-UP VISIT: CPT | Mod: 95,,, | Performed by: PHYSICIAN ASSISTANT

## 2025-02-04 PROCEDURE — 4010F ACE/ARB THERAPY RXD/TAKEN: CPT | Mod: CPTII,95,, | Performed by: PHYSICIAN ASSISTANT

## 2025-02-04 PROCEDURE — 1159F MED LIST DOCD IN RCRD: CPT | Mod: CPTII,S$GLB,,

## 2025-02-04 PROCEDURE — 20610 DRAIN/INJ JOINT/BURSA W/O US: CPT | Mod: 50,S$GLB,,

## 2025-02-04 PROCEDURE — 1160F RVW MEDS BY RX/DR IN RCRD: CPT | Mod: CPTII,S$GLB,,

## 2025-02-04 PROCEDURE — 98006 SYNCH AUDIO-VIDEO EST MOD 30: CPT | Mod: 95,,, | Performed by: STUDENT IN AN ORGANIZED HEALTH CARE EDUCATION/TRAINING PROGRAM

## 2025-02-04 PROCEDURE — 1160F RVW MEDS BY RX/DR IN RCRD: CPT | Mod: CPTII,95,, | Performed by: PHYSICIAN ASSISTANT

## 2025-02-04 PROCEDURE — 99999 PR PBB SHADOW E&M-EST. PATIENT-LVL IV: CPT | Mod: PBBFAC,,,

## 2025-02-04 PROCEDURE — 4010F ACE/ARB THERAPY RXD/TAKEN: CPT | Mod: CPTII,S$GLB,,

## 2025-02-04 PROCEDURE — 1159F MED LIST DOCD IN RCRD: CPT | Mod: CPTII,95,, | Performed by: PHYSICIAN ASSISTANT

## 2025-02-04 PROCEDURE — 4010F ACE/ARB THERAPY RXD/TAKEN: CPT | Mod: CPTII,95,, | Performed by: STUDENT IN AN ORGANIZED HEALTH CARE EDUCATION/TRAINING PROGRAM

## 2025-02-04 PROCEDURE — 3008F BODY MASS INDEX DOCD: CPT | Mod: CPTII,S$GLB,,

## 2025-02-04 PROCEDURE — 99213 OFFICE O/P EST LOW 20 MIN: CPT | Mod: 25,S$GLB,,

## 2025-02-04 RX ORDER — ALENDRONATE SODIUM TABLET 35 MG/1
35 TABLET ORAL
Qty: 12 TABLET | Refills: 1 | Status: SHIPPED | OUTPATIENT
Start: 2025-02-04 | End: 2026-02-04

## 2025-02-04 RX ORDER — LISINOPRIL 20 MG/1
20 TABLET ORAL NIGHTLY
Qty: 90 TABLET | Refills: 3 | Status: SHIPPED | OUTPATIENT
Start: 2025-02-04

## 2025-02-04 RX ORDER — MELOXICAM 15 MG/1
15 TABLET ORAL DAILY
Qty: 30 TABLET | Refills: 1 | Status: SHIPPED | OUTPATIENT
Start: 2025-02-04

## 2025-02-04 RX ORDER — FLUOXETINE HYDROCHLORIDE 40 MG/1
80 CAPSULE ORAL DAILY
Qty: 180 CAPSULE | Refills: 3 | Status: SHIPPED | OUTPATIENT
Start: 2025-02-04

## 2025-02-04 RX ORDER — LURASIDONE HYDROCHLORIDE 20 MG/1
40 TABLET, FILM COATED ORAL DAILY
Qty: 60 TABLET | Refills: 11 | Status: SHIPPED | OUTPATIENT
Start: 2025-02-04 | End: 2026-02-04

## 2025-02-04 NOTE — Clinical Note
Please schedule a visit with Dr. King in 3 months with an updated CT for her six-month postop evaluation  Thanks,  Kimmie

## 2025-02-04 NOTE — PROGRESS NOTES
Outpatient Psychiatry Follow-Up Visit (MD/NP)    2/4/2025    Clinical Status of Patient:  Outpatient (Ambulatory)    Chief Complaint:  Naomy Armstrong is a 59 y.o. female who presents today for follow-up of depression and anxiety.  Met with patient.      The patient location is: [place], [place]  The chief complaint leading to consultation is: f/u MDD    Visit type: audiovisual          Each patient to whom he or she provides medical services by telemedicine is:  (1) informed of the relationship between the physician and patient and the respective role of any other health care provider with respect to management of the patient; and (2) notified that he or she may decline to receive medical services by telemedicine and may withdraw from such care at any time.    Notes:      Interval History and Content of Current Session:  Interim Events/Subjective Report/Content of Current Session:     Patient Naomy Armstrong presents to clinic for follow up. Last seen 10/24.  Has been on prozac and lurasidone. She feels like she has been getting more depressed, sensitive, and emotional. Just went back to work and is recovering from surgery. She denies any side effects from her medication other than sleepiness, it makes her sleep so she takes it before she goes to bed. She is working, taking care of herself, sleeping well. Boyfriend is still great.     No thoughts of suicide or self harm. No HI, no AVH. Needs her labwork and it has been placed.       No abnormal movements in face or mouth. No thoughts of suicide or self harm.       No SI, HI, AVH.      Review of Systems   PSYCHIATRIC: Pertinant items are noted in the narrative.  CONSTITUTIONAL: No weight gain or loss.   MUSCULOSKELETAL: No pain or stiffness of the joints.  NEUROLOGIC: No weakness, sensory changes, seizures, confusion, memory loss, tremor or other abnormal movements.  RESPIRATORY: No shortness of breath.  CARDIOVASCULAR: No tachycardia or chest  pain.  GASTROINTESTINAL: No nausea, vomiting, pain, constipation or diarrhea.    Past Medical, Family and Social History: The patient's past medical, family and social history have been reviewed and updated as appropriate within the electronic medical record - see encounter notes.    Compliance: yes    Side effects: None    Risk Parameters:  Patient reports no suicidal ideation  Patient reports no homicidal ideation  Patient reports no self-injurious behavior  Patient reports no violent behavior    Exam (detailed: at least 9 elements; comprehensive: all 15 elements)   Constitutional  Vitals:  Most recent vital signs, dated less than 90 days prior to this appointment, were reviewed.   There were no vitals filed for this visit.       General:  unremarkable, age appropriate     Musculoskeletal  Muscle Strength/Tone:  no tremor, no tic   Gait & Station:  non-ataxic     Psychiatric  Speech:  no latency; no press   Mood & Affect:  I was doing better but lately I've been more sequeira  congruent and appropriate   Thought Process:  normal and logical   Associations:  intact   Thought Content:  normal, no suicidality, no homicidality, delusions, or paranoia   Insight:  Insight is good    Judgement: good   Orientation:  person, place, situation, time/date   Memory: able to remember recent events- yes, able to remember remote events- yes   Language: able to name, able to repeat   Attention Span & Concentration:  able to focus   Fund of Knowledge:  intact and appropriate to age and level of education     Assessment and Diagnosis   Status/Progress: Based on the examination today, the patient's problem(s) is/are adequately but not ideally controlled.  New problems have been presented today.   Co-morbidities are complicating management of the primary condition.  There are no active rule-out diagnoses for this patient at this time.     General Impression: We will continue pharmacological intervention and adjunctive therapy.   Major  depressive disorder, recurrent, in partial remission  Generalized anxiety disorder  Psychosocial distress     Intervention/Counseling/Treatment Plan   Medication Management: Continue current medications. The risks and benefits of medication were discussed with the patient.  Counseling provided with patient as follows: importance of compliance with chosen treatment options was emphasized, risks and benefits of treatment options, including medications, were discussed with the patient, risk factor reduction, prognosis, patient education, instructions for  management, treatment and follow-up were reviewed  1.  Continue Prozac to 80 mg (2x40 mg) daily targeting depression and anxiety.  Warned of risk of alba, suicidality, serotonin syndrome.  2. At pt's request for wrsening depression and moodiness, increase lurasidone to 40 mg daily. Discussed taking with food. Discussed FDA approval for bipolar depression rather than unipolar MDD. Discussed r/b/SE including dysmetabolia/need for labwork, TD, NMS, dystonia, etc.  She has labwork pending. No abnormal movements noted or reported today.   3.  Continue rare use of hydroxyzine 10 mg p.o. b.i.d. p.r.n. anxiety or insomnia.  Warned of risk of over-sedation.  4.  May have to consider monitoring for memory changes, given significant genetic history.   5. Reviewed ED precautions. No SI, HI, AVH.           Return to Clinic: 3 months or sooner PRN

## 2025-02-04 NOTE — PROCEDURES
Large Joint Aspiration/Injection: bilateral knee    Date/Time: 2/4/2025 9:00 AM    Performed by: Jayne Pretty PA-C  Authorized by: Jayne Pretty PA-C    Consent Done?:  Yes (Verbal)  Indications:  Arthritis and pain  Prep: patient was prepped and draped in usual sterile fashion      Local anesthesia used?: Yes    Anesthesia:  Local infiltration  Local anesthetic:  Topical anesthetic    Details:  Needle Size:  22 G  Ultrasonic Guidance for needle placement?: No    Approach:  Anterolateral  Location:  Knee  Laterality:  Bilateral  Site:  Bilateral knee  Medications (Right):  10 mg sodium hyaluronate (EUFLEXXA) 10 mg/mL(mw 2.4 -3.6 million)  Medications (Left):  10 mg sodium hyaluronate (EUFLEXXA) 10 mg/mL(mw 2.4 -3.6 million)  Patient tolerance:  Patient tolerated the procedure well with no immediate complications

## 2025-02-04 NOTE — PROGRESS NOTES
The patient location is:  [place]  The chief complaint leading to consultation is:  3 month postop evaluation    Visit type: audiovisual    Face to Face time with patient: 10 min  15 minutes of total time spent on the encounter, which includes face to face time and non-face to face time preparing to see the patient (eg, review of tests), Obtaining and/or reviewing separately obtained history, Documenting clinical information in the electronic or other health record, Independently interpreting results (not separately reported) and communicating results to the patient/family/caregiver, or Care coordination (not separately reported).         Each patient to whom he or she provides medical services by telemedicine is:  (1) informed of the relationship between the physician and patient and the respective role of any other health care provider with respect to management of the patient; and (2) notified that he or she may decline to receive medical services by telemedicine and may withdraw from such care at any time.    Notes:     [facility]     Interval history 02/04/2025:   Patient returns via virtual visit for routine 3 month postop evaluation.  She continues to report full resolution of her preop radicular pain.  However, she remains frustrated with ongoing back pain.  She is no longer a candidate for narcotic pain relief from Neurosurgery given that we are now 3 months postop.  She is seeing pain management tomorrow to further discuss her ongoing pain.  She has been using ice intermittently with no significant relief.  She is hopeful to add NSAIDs to her regimen due to significant arthritic pain throughout her body.  PT just recently began in his not provided significant relief at this time    HPI 01/08/2025  Naomy Armstrong is a 59 y.o. female who presents to clinic today for evaluation after a fall 4 days ago.  She is 6 weeks status post MIS L5-S1 TLIF with Dr. King.  Last Saturday she was  cleaning up her home and reportedly walking backwards while wearing slippers and carrying a bulky box.  As a result, she fell directly down onto her buttocks and was afraid she may have disrupted her hardware.  She has 5/10 left-sided low back pain today with a small bruise.  No new numbness or weakness.  No radicular pain down her legs.     Aside from this acute incident, she also notes continued post-op back pain, particularly when standing from a seating position.     Physical Exam:   General: well developed, well nourished, no distress  Neurologic: Alert and oriented. Thought content appropriate.   GCS: Motor: 6/Verbal: 5/Eyes: 4 GCS Total: 15   Mental Status: Awake, Alert, Oriented x3   Cranial nerves: face symmetric, tongue midline, pupils equal, round, reactive to light with accomodation, EOMI.   Motor Strength: moves all extremities with good strength and tone      There were no vitals filed for this visit.      Imaging:   I have personally reviewed imaging and agree with the findings.     Upright lumbar spine x-rays 02/03/2025   Stable hardware placement    Upright lumbar spine x-ray 1/8/25  Stable hardware placement         Assessment/Plan:   Naomy Armstrong is a 59 y.o. female who presents for routine 3 month postop evaluation, s/p MIS L5-S1 TLIF with Dr. King.      Recommend adding heat and/or lidocaine patches  Okay to resume NSAIDs.  Mobic prescribed   Continue with PT   Keep appointment with Dr. Andre Phelps to advance activities as tolerated  Follow up in 3 months with Dr. King for routine six-month postop evaluation with updated CT.    -Encouraged patient to call if they have any questions or concerns prior to next follow up appt        Kimmie Adair PA-C  [facility]  Department of Neurosurgery  [phone]

## 2025-02-05 ENCOUNTER — OFFICE VISIT (OUTPATIENT)
Dept: PAIN MEDICINE | Facility: CLINIC | Age: 60
End: 2025-02-05
Payer: COMMERCIAL

## 2025-02-05 VITALS — OXYGEN SATURATION: 99 % | HEART RATE: 86 BPM | DIASTOLIC BLOOD PRESSURE: 86 MMHG | SYSTOLIC BLOOD PRESSURE: 163 MMHG

## 2025-02-05 DIAGNOSIS — Z98.1 S/P LUMBAR SPINAL FUSION: Primary | ICD-10-CM

## 2025-02-05 DIAGNOSIS — Z98.1 S/P LUMBAR SPINAL FUSION: ICD-10-CM

## 2025-02-05 DIAGNOSIS — M51.362 DEGENERATION OF INTERVERTEBRAL DISC OF LUMBAR REGION WITH DISCOGENIC BACK PAIN AND LOWER EXTREMITY PAIN: ICD-10-CM

## 2025-02-05 DIAGNOSIS — M54.16 LUMBAR RADICULOPATHY: ICD-10-CM

## 2025-02-05 DIAGNOSIS — M54.9 DORSALGIA, UNSPECIFIED: ICD-10-CM

## 2025-02-05 DIAGNOSIS — M47.816 LUMBAR SPONDYLOSIS: ICD-10-CM

## 2025-02-05 PROCEDURE — 3079F DIAST BP 80-89 MM HG: CPT | Mod: CPTII,S$GLB,,

## 2025-02-05 PROCEDURE — 99999 PR PBB SHADOW E&M-EST. PATIENT-LVL V: CPT | Mod: PBBFAC,,,

## 2025-02-05 PROCEDURE — 3077F SYST BP >= 140 MM HG: CPT | Mod: CPTII,S$GLB,,

## 2025-02-05 PROCEDURE — 1160F RVW MEDS BY RX/DR IN RCRD: CPT | Mod: CPTII,S$GLB,,

## 2025-02-05 PROCEDURE — 99214 OFFICE O/P EST MOD 30 MIN: CPT | Mod: S$GLB,,,

## 2025-02-05 PROCEDURE — 1159F MED LIST DOCD IN RCRD: CPT | Mod: CPTII,S$GLB,,

## 2025-02-05 PROCEDURE — 4010F ACE/ARB THERAPY RXD/TAKEN: CPT | Mod: CPTII,S$GLB,,

## 2025-02-05 NOTE — PROGRESS NOTES
Subjective:     Patient ID: Naomy Armstrong is a 59 y.o. female    Chief Complaint: Follow-up      Referred by: No ref. provider found      HPI:    Interval History PA (02/05/2025):  Patient returns to clinic for follow up.  Since previous visit patient has undergone MIS L5-S1 TLIF done on 10/28/2024.  Following surgery patient reports significant improvement in her radicular pain.  No longer having pain radiating into her lower extremities.  However continues to note significant pain located across her lower lumbar/lumbosacral region.  Pain is worse in the right compared to the left.  She continues to follow up with Neurosurgery postop.  Recently started physical therapy without benefit.  Patient also recently discontinued opioid pain medications.  She was taking Norco 5-325 mg b.i.d. p.r.n. with adequate relief, denies adverse effects from this medication.  Notes the medication mainly was providing benefit    Interval History PA (07/08/2024):  Patient returns to clinic for follow up.  Patient with continued left-sided lower back and extremity pain as before.  Patient continues to follow up with Neurosurgery who is planning on performing a MIS L5-S1 TLIF.  Unfortunately this procedure has been postponed due to low bone mineral density.  Patient following up with primary care to work on bone mineral density before proceeding with spinal surgery.  In the meantime the patient has been taking Norco  mg q.8 hours p.r.n. with adequate relief.  She does note occasionally spitting the medication in half when her pain is not as severe.  Otherwise denies significant adverse effects from this medication.  Patient's concern today is a new onset of right-sided upper extremity/wrist pain.  Pain started about 1-2 weeks ago and has been constant and severe since.  Pain/burning over her right wrist and 1st 3 digits.  Describes as a numbness/burning sensation over her fingers.  Symptoms are intermittent, typically  worsened at night causing difficulty sleeping.  She does note occasional radiation of pain from her elbow down to her wrist.  Denies any significant neck pain.  Patient does note history of carpal tunnel on the right which she has received injections for with benefit.  Also notes that she has recently started using a nighttime splint although unsure if this has been beneficial.    Interval History PA (06/03/2024):  Patient returns to clinic for follow up of left-sided lower back and extremity pain and MRI review.  She denies significant changes in the quality or location of pain since previous visit.  Denies new or worsening symptoms.  Continues to locate pain in her left lower lumbar/lumbosacral region into left lower extremity all the way down to her bottom of the foot with associated paresthesia.  Denies any focal weakness, bowel or bladder dysfunction.    Interval History (5/16/24):  She returns today for follow up.  She reports that she is having left-sided low back and lower extremity pain.  This pain has been present for roughly 1 month.  No specific inciting events or injuries noted.  Pain is located the left lower lumbar/lumbosacral region radiate to left lower extremity all the way to the bottom of her foot with associated paresthesias.  She denies any focal weakness or bowel bladder dysfunction.  Pain is constant and worsened with activity.  Particularly bad in the morning when getting out of bed.       Interval History (7/17/23):  She returns today for follow up.  She reports that she is having recurrent bilateral low back pain.  This pain is located across the lower lumbar region.  Pain will radiate to the bilateral buttocks, but not more distally than this.  She denies any associated numbness, tingling, weakness, bowel bladder dysfunction.  Pain is constant and worsened with activity.        Interval History (6/7/22):  She returns today for follow up.  She reports that she underwent surgery for hiatal  hernia repair in February 2022.  Since his surgery she has had significant epigastric abdominal pain.  This pain is intermittent but is very severe when present.  No specific triggers identified.  Patient also reports worsening diarrhea since undergoing the surgery.  She has followed up with surgery and gastroenterology in no specific source has been identified as of yet.  States that she was provided tramadol which provided some relief.  States she was told to return to my clinic to discuss other options for pain management.       Interval History NP (02/03/22):    The patient location is: work  The chief complaint leading to consultation is: follow-up  Visit type: Virtual visit with audio.  Patient verbally consented for audio visit.  Total time spent with patient: 10 minutes  Each patient to whom he or she provides medical services by telemedicine is:  (1) informed of the relationship between the physician and patient and the respective role of any other health care provider with respect to management of the patient; and (2) notified that he or she may decline to receive medical services by telemedicine and may withdraw from such care at any time.    Pt returns today for follow up of bilateral SIJ injections. She reports 100% relief of low back pain. She denies any new or worsening symptoms. She would like to discuss chronic headaches at next visit with Dr. Powell. She is otherwise well today.     Interval History (12/29/21):    The patient location is:  home  The chief complaint leading to consultation is:  Follow-up  Visit type: Virtual visit with synchronous audio and video  Total time spent with patient:  8 minutes  Each patient to whom he or she provides medical services by telemedicine is:  (1) informed of the relationship between the physician and patient and the respective role of any other health care provider with respect to management of the patient; and (2) notified that he or she may decline to  receive medical services by telemedicine and may withdraw from such care at any time.      She returns today for follow up and MRI review.  She reports that she would like to schedule bilateral sacroiliac joint steroid injections discussed at last visit.  She is still having neck pain and headaches as well and would like to undergo cervical medial branch blocks/RFA, but her back pain is worse at this time.  Otherwise, she denies any changes in the quality or location of her pain.  She denies any new worsening symptoms.        Interval History (8/11/21):    The patient location is:  home  The chief complaint leading to consultation is:  Follow-up  Visit type: Virtual visit with synchronous audio and video  Total time spent with patient:  15 minutes  Each patient to whom he or she provides medical services by telemedicine is:  (1) informed of the relationship between the physician and patient and the respective role of any other health care provider with respect to management of the patient; and (2) notified that he or she may decline to receive medical services by telemedicine and may withdraw from such care at any time.        She returns today for follow up.  She reports that relief from bilateral sacroiliac joint steroid injections has worn off.  She would like repeat sacroiliac joint steroid injections if appropriate.  Patient also has been having headaches chronically.  She has been evaluated thoroughly by Neurology without any specific source for of headache identified.  Suspicion for cervicogenic headaches.  Patient does have pain in the upper cervical region.  The pain will radiate to the occipital and parietal regions of the head.  She denies any pain radiating to the upper extremities.  She denies any associated bowel bladder dysfunction.      Interval History NP (5/5/21):    Pt returns today for follow up of bilateral SIJ injections. She reports at least 75% relief of low back pain. She can now stand for  "long periods of time without pain or having to sit for 30 minutes. She is able to cook and complete her ADL's with much improvement. Does report continued pain while bending over but it is now manageable. Denies new or worsening symptoms.      Interval History (3/31/21):  She returns today for follow up.  She reports that bilateral L3, L4, L5 radiofrequency ablation provided roughly 75% relief but for only 1 month.  She states that this.  Time her pain returned.  Today, she localizes pain across the lower lumbar/lumbosacral region.  The pain does not radiate.  The pain is equal bilaterally.  The pain is worse with activities such as mopping and cleaning.  She denies any associated numbness, tingling, weakness, bowel bladder dysfunction.      Interval History NP (7/16/20):  Pt returns today for follow up and MRI review. She states that her pain in unchanged in quality or location. Denies any new symptoms. No bladder or bowel dysfunction. She reports "missing more days of work than she attends". She is an  at [employer] which includes heavy manual lifting and moving boxes. Her pain is worsened with activity, especially after a shift at work or when she is home cleaning. She reports that gabapentin has helped, but takes 2 pills at night and 1 pill every morning because it makes her too drowsy during the day. She also takes 500mg Naproxen PRN for the pain, maybe every few days. She would like something else for the pain. States she "took valium in the past which helped her relax and get through the day".     Interval History (7/6/20):  She returns today for follow up.  She reports that she continues to have low back/hip pain.  This pain is worse with bending and lifting.  These are movements that are often required as a part of her job.  Overall she denies any changes in the quality or location of the pain since last encounter.  She denies any new symptoms.       Interval History (1/13/20):  She returns " today for follow up.  She reports that her neck pain has improved with home exercise program.  She does not feel as though this needs any further attention.  She does state that her low back has been bothering her.  She localizes pain to the bilateral lower lumbar regions.  The pain does radiate to the bilateral hip regions.  She denies any associated numbness, tingling, weakness, bowel bladder dysfunction.  The pain is constant and worsened with activity.  She states that the pain is typically worse when initiating activity after rest.        Initial Encounter (7/9/19):  Naomy Armstrong is a 59 y.o. female who presents today with chronic neck pain. This pain has been present for years.  No specific inciting event or injury noted. She localizes the pain to the midline cervical region.  The pain does not radiate.  She denies any associated numbness, tingling, weakness, bowel bladder dysfunction.  She has had some radicular pain on the left side in the past this has resolved.  The pain is constant and worsened with activity.  She specifically states that the pain is worse when looking at screens.  She has been treated in the past by Dr. Bhavya Oneill at [facility].   This pain is described in detail below.    Physical Therapy:  Not for her low back    Non-pharmacologic Treatment:  Rest helps         TENS?  No    Pain Medications:         Currently taking:  Lyrica, meloxicam, Norco    Has tried in the past:  muscle relaxers, Tylenol, NSAIDs, Amitriptyline, tizanidine, Celebrex, Robaxin, Flexeril    Has not tried:  SNRIs, topical creams    Blood thinners:  None    Interventional Therapies:   10/8/13- Left RFA C5, C6, C7  9/10/13- Left C5, C6, C7 MBB   8/13/13- C7-T1 IL EMMA  8/19/20 - right L3, L4, L5 radiofrequency ablation - 75% relief for 1 month  9/4/20 - left L3, L4, L5 radiofrequency ablation - 75% relief for 1 month  4/16/21 - bilateral SIJ injections - 75% relief  1/19/22 - bilateral SIJ injections -  100% relief  06/14/2024 - left S1 transforaminal epidural steroid injection + left L5-S1 facet joint injection with aspiration - short-lived relief    Relevant Surgeries:  None    Affecting sleep?  Yes    Affecting daily activities? yes    Depressive symptoms? yes          SI/HI? No    Work status: Employed    Pain Scores:    Best:       7/10  Worst:   8/10  Usually:   8/10  Today:   8/10    Pain Disability Index  Family/Home Responsibilities:: 7  Recreation:: 5  Social Activity:: 5  Occupation:: 6  Sexual Behavior:: 6  Self Care:: 6  Life-Support Activities:: 3  Pain Disability Index (PDI): 38      Review of Systems   Constitutional:  Negative for activity change, appetite change, chills, fatigue, fever and unexpected weight change.   HENT:  Negative for hearing loss.    Eyes:  Negative for visual disturbance.   Respiratory:  Negative for chest tightness and shortness of breath.    Cardiovascular:  Negative for chest pain.   Gastrointestinal:  Negative for abdominal pain, constipation, diarrhea, nausea and vomiting.   Genitourinary:  Negative for difficulty urinating.   Musculoskeletal:  Positive for back pain, gait problem and myalgias. Negative for arthralgias, neck pain and neck stiffness.   Skin:  Negative for rash.   Neurological:  Positive for numbness. Negative for dizziness, weakness, light-headedness and headaches.   Psychiatric/Behavioral:  Negative for hallucinations, sleep disturbance and suicidal ideas. The patient is not nervous/anxious.        Past Medical History:   Diagnosis Date    Alcohol abuse     per chart, but patient denies today and cocaine per chart    Anxiety     Colon polyp     Depression     Hallucination     last couple of months started seeing someone standing in her room, saw boyfriend's reflection in glass    Headache     History of psychiatric hospitalization     age 13 [employer] for 2 months for acting out; 2018 for SI    Hx of psychiatric care     Hypertension     Psychiatric problem      Sleep difficulties     Suicide attempt     with knife by cutting stomach    Therapy        Past Surgical History:   Procedure Laterality Date    COLONOSCOPY N/A 05/07/2021    Procedure: COLONOSCOPY;  Surgeon: Prem Montana MD;  Location: [facility];  Service: Endoscopy;  Laterality: N/A;  COVID screening 5/4 LAPC-instr portal -ml    COLONOSCOPY N/A 06/06/2022    Procedure: COLONOSCOPY;  Surgeon: Fab Shepherd MD;  Location: [facility] ENDO ([facility] FLR);  Service: Endoscopy;  Laterality: N/A;  C-Diff neg  Rapid COVID    EPIDURAL STEROID INJECTION Bilateral 08/05/2020    Procedure: Bilateral MBB @ L3, L4, L5;  Surgeon: Jeremy Powell Jr., MD;  Location: [facility];  Service: Pain Management;  Laterality: Bilateral;  Bilat L3-5 MBB  Arrive @ 1315; No ATC or DM; Needs MD signature    EPIDURAL STEROID INJECTION Right 08/19/2020    Procedure: Lumbar Radiofrequency Thermocoagulation of Medial Branches;  Surgeon: Jeremy Powell Jr., MD;  Location: [facility];  Service: Pain Management;  Laterality: Right;  Right L3-5 RFA  Arrive @ 1045; No ATC or DM; Needs MD Signature    EPIDURAL STEROID INJECTION Left 09/04/2020    Procedure: Lumbar Radiofrequency Thermocoagulation of Medial Branches;  Surgeon: Jeremy Powell Jr., MD;  Location: [facility];  Service: Pain Management;  Laterality: Left;  Left L3-5 RFA  Arrive @ 0900 (requested); No ATC or DM; Needs MD signature    EPIDURAL STEROID INJECTION Bilateral 04/16/2021    Procedure: Sacroiliac Joint Steroid Injections @ Bilateral;  Surgeon: Jeremy Powell Jr., MD;  Location: [facility];  Service: Pain Management;  Laterality: Bilateral;  Arrive @ 1300; No ATC or DM    EPIDURAL STEROID INJECTION Bilateral 01/19/2022    Procedure: Bilateral Sacroiliac Joint Injections;  Surgeon: Jeremy Powell Jr., MD;  Location: [facility];  Service: Pain Management;  Laterality: Bilateral;  Arrive @ 1315; No ATC or DM; Needs Consent    EPIDURAL STEROID INJECTION Bilateral  07/28/2023    Procedure: Bilateral L2, L3, L4 Medial Branch Blocks #1;  Surgeon: Jeremy Powell Jr., MD;  Location: [facility] ENDO;  Service: Pain Management;  Laterality: Bilateral;  @1200  No ATC or DM    EPIDURAL STEROID INJECTION Bilateral 08/11/2023    Procedure: Bilateral L2, L3, L4 Medial Branch Blocks #2;  Surgeon: Jeremy Powell Jr., MD;  Location: [facility] ENDO;  Service: Pain Management;  Laterality: Bilateral;  @1330  No ATC or DM    ESOPHAGEAL MANOMETRY WITH MEASUREMENT OF IMPEDANCE N/A 10/20/2021    Procedure: MANOMETRY, ESOPHAGUS, WITH IMPEDANCE MEASUREMENT;  Surgeon: Anastacia Odonnell MD;  Location: [facility] ENDO (4TH FLR);  Service: Endoscopy;  Laterality: N/A;  Covid test 10/17 [facility], instructions sent to [id number]    ESOPHAGOGASTRODUODENOSCOPY N/A 05/07/2021    Procedure: EGD (ESOPHAGOGASTRODUODENOSCOPY);  Surgeon: Prem Montana MD;  Location: [facility] ENDO;  Service: Endoscopy;  Laterality: N/A;  added on per Dr. NOEL Shepherd    EYE SURGERY  6/6/2015    Lasik    GANGLION CYST EXCISION      HERNIA REPAIR  2/14/2022    Hiatal hernia    INJECTION, FACET JOINT, LUMBOSACRAL Left 06/14/2024    Procedure: Left S1 Transforaminal Epidural Steroid Injection + Left L5-S1 Facet Joint Injection/Aspiration;  Surgeon: Jeremy Powell Jr., MD;  Location: [facility] PAIN MANAGEMENT;  Service: Pain Management;  Laterality: Left;  @1200  No ATC or DM  Needs Consent  IV Sedation    LAPAROSCOPIC TOUPET FUNDOPLICATION N/A 02/14/2022    Procedure: FUNDOPLICATION, LAPAROSCOPIC, TOUPET;  Surgeon: Christian Martinez MD;  Location: [facility] OR 2ND FLR;  Service: General;  Laterality: N/A;    MINIMALLY INVASIVE TRANSFORAMINAL LUMBAR INTERBODY FUSION (TLIF) Left 10/28/2024    Procedure: FUSION, SPINE, LUMBAR, TLIF, MINIMALLY INVASIVE;  Surgeon: Christian King MD;  Location: [facility] OR;  Service: Neurosurgery;  Laterality: Left;  Left L5/S1, possible L4-5 minimally invasive TLIF  sravanthi 4 poster   fluoro  no neuromonitoring  needed   Mike OSORIOAMADOU CASEY NOTIFIED-GG  RN PREOP 10/14/2024   T/S ON 10/25/2024----NEED H/P       Social History     Socioeconomic History    Marital status:     Number of children: 0   Occupational History    Occupation:      Employer: [employer]   Tobacco Use    Smoking status: Former     Current packs/day: 0.00     Average packs/day: 1 pack/day for 21.3 years (21.3 ttl pk-yrs)     Types: Cigarettes     Start date: 1985     Quit date: 2006     Years since quittin.7    Smokeless tobacco: Never    Tobacco comments:     It has been a very long time. I do not remember dates   Substance and Sexual Activity    Alcohol use: Not Currently     Comment: occasional    Drug use: Not Currently     Types: Marijuana     Comment: tried at age 14    Sexual activity: Yes     Partners: Male     Birth control/protection: Post-menopausal     Comment: going through menopause   Other Topics Concern    Patient feels they ought to cut down on drinking/drug use No    Patient annoyed by others criticizing their drinking/drug use No    Patient has felt bad or guilty about drinking/drug use No    Patient has had a drink/used drugs as an eye opener in the AM No   Social History Narrative     x 2.    Lives with boyfriend.     Works at [employer] as an .    Presently working on Bachelor's degree.    Has associates degree in criminal justice.     Social Drivers of Health     Financial Resource Strain: Medium Risk (2024)    Overall Financial Resource Strain (CARDIA)     Difficulty of Paying Living Expenses: Somewhat hard   Food Insecurity: Patient Declined (2024)    Hunger Vital Sign     Worried About Running Out of Food in the Last Year: Patient declined     Ran Out of Food in the Last Year: Patient declined   Transportation Needs: Patient Declined (10/28/2024)    TRANSPORTATION NEEDS     Transportation : Patient declined   Physical Activity: Unknown (2024)    Exercise  Vital Sign     Days of Exercise per Week: Patient declined     Minutes of Exercise per Session: 0 min   Stress: No Stress Concern Present (12/2/2024)    [employer] [place] [employer] - Occupational Stress Questionnaire     Feeling of Stress : Only a little   Housing Stability: High Risk (12/2/2024)    Housing Stability Vital Sign     Unable to Pay for Housing in the Last Year: Yes     Homeless in the Last Year: Patient declined       Review of patient's allergies indicates:   Allergen Reactions    Effexor [venlafaxine]      Elevated her blood pressure    Zoloft [sertraline]     Paroxetine hcl      Made her feel drunk       Current Outpatient Medications on File Prior to Visit   Medication Sig Dispense Refill    alendronate (FOSAMAX) 35 MG tablet Take 1 tablet (35 mg total) by mouth every 7 days. 12 tablet 1    alosetron (LOTRONEX) 0.5 MG tablet Take 1 tablet (0.5 mg total) by mouth 2 (two) times daily. 180 tablet 3    amLODIPine (NORVASC) 5 MG tablet Take 1 tablet by mouth once daily 90 tablet 1    atorvastatin (LIPITOR) 10 MG tablet Take 1 tablet (10 mg total) by mouth once daily. 90 tablet 3    calcium carb and citrate-vitD3 (CITRACAL-D3 SLOW RELEASE) 600 mg-12.5 mcg (500 unit) TbSR Take 2 tablets by mouth once daily. 180 tablet 3    cyclobenzaprine (FLEXERIL) 5 MG tablet Take 2 tablets (10 mg total) by mouth 3 (three) times daily as needed for muscle spasms 90 tablet 0    dicyclomine (BENTYL) 20 mg tablet Take 1 tablet (20 mg total) by mouth every 6 (six) hours. 120 tablet 0    fluconazole (DIFLUCAN) 200 MG Tab Take 1 tablet (200 mg total) by mouth once daily. Take at first sign of yeast infection 1 tablet 0    FLUoxetine 40 MG capsule Take 2 capsules (80 mg total) by mouth once daily. 180 capsule 3    gabapentin (NEURONTIN) 300 MG capsule Take 3 capsules (900 mg total) by mouth 3 (three) times daily. 270 capsule 11    gabapentin (NEURONTIN) 300 MG capsule Take 1 capsule (300 mg total) by mouth 3  (three) times daily. 90 capsule 11    HYDROcodone-acetaminophen (NORCO) 5-325 mg per tablet Take 1 tablet by mouth every 8 (eight) hours as needed for Pain. 10 tablet 0    HYDROcodone-acetaminophen (NORCO) 5-325 mg per tablet Take 1 tablet by mouth every 8 (eight) hours as needed for Pain. 10 tablet 0    HYDROcodone-acetaminophen (NORCO) 5-325 mg per tablet Take 1 tablet by mouth every 6 (six) hours as needed for Pain. 8 tablet 0    HYDROcodone-acetaminophen (NORCO) 5-325 mg per tablet Take 1 tablet by mouth every 12 (twelve) hours as needed for Pain. 5 tablet 0    lisinopriL (PRINIVIL,ZESTRIL) 20 MG tablet Take 1 tablet (20 mg total) by mouth nightly. 90 tablet 3    lurasidone (LATUDA) 20 mg Tab tablet Take 2 tablets (40 mg total) by mouth once daily. 60 tablet 11    meloxicam (MOBIC) 15 MG tablet Take 1 tablet (15 mg total) by mouth once daily. 30 tablet 1    multivitamin with minerals tablet Take 1 tablet by mouth once daily.      naloxone (NARCAN) 4 mg/actuation Spry 4mg by nasal route as needed for opioid overdose; may repeat every 2-3 minutes in alternating nostrils until medical help arrives. Call 911 1 each 11    nitrofurantoin, macrocrystal-monohydrate, (MACROBID) 100 MG capsule Take 1 capsule (100 mg total) by mouth once daily. 30 capsule 5    pregabalin 330 mg Tb24 Take 330 mg by mouth after dinner. 30 tablet 5    promethazine (PHENERGAN) 25 MG tablet Take 1 tablet (25 mg total) by mouth every 8 (eight) hours as needed. 30 tablet 0    tiZANidine (ZANAFLEX) 4 MG tablet Take 1 tablet (4 mg total) by mouth every 6 (six) hours as needed (muscle spasm). 120 tablet 0    hydrOXYzine HCL (ATARAX) 10 MG Tab Take 1 tablet (10 mg total) by mouth 2 (two) times daily as needed (anxiety). (Patient not taking: Reported on 2/5/2025) 60 tablet 3     No current facility-administered medications on file prior to visit.       Objective:      BP (!) 163/86 (BP Location: Left arm, Patient Position: Sitting)   Pulse 86   LMP  09/15/2013   SpO2 99%     Exam:  GEN:  Well developed, well nourished.  No acute distress.  Normal pain behavior.  HEENT:  No trauma.  Mucous membranes moist.  Nares patent bilaterally.  PSYCH: Normal affect. Thought content appropriate.  CHEST:  Breathing symmetric.  No audible wheezing.  ABD: Soft, non-distended.  SKIN:  Warm, pink, dry.  No rash on exposed areas.    EXT:  No cyanosis, clubbing, or edema.  No color change or changes in nail or hair growth.  NEURO/MUSCULOSKELETAL:  Fully alert, oriented, and appropriate. Speech normal indio. No cranial nerve deficits.   Gait:  Antalgic.  No trendelenburg sign bilaterally.   Motor Strength:  5/5 motor strength throughout lower extremities.   Sensory:  No sensory deficit in the lower extremities.   L-Spine:  Limited ROM with pain on extension.  No pain with axial/facet loading bilaterally.  Negative SLR bilaterally.    SI Joint/Hip:  Negative MOHIT bilaterally.  Negative Gaenslen's bilaterally.    Positive TTP over bilateral lumbar paraspinal        Imaging:  Narrative & Impression  EXAMINATION:  MRI LUMBAR SPINE WITHOUT CONTRAST     CLINICAL HISTORY:  Low back pain, progressive neurologic deficit; Other intervertebral disc degeneration, lumbar region left lower extremity pain.     TECHNIQUE:  Multiplanar, multisequence MR images were acquired from the thoracolumbar junction to the sacrum without the administration of contrast.     COMPARISON:  07/14/2020     FINDINGS:  There are transitional vertebral bodies suspected at the lumbosacral junction.  For the purpose of labeling on this study, the last large disc space will be considered L5-S1 although a prominent S1-2 intervertebral disc is also noted.     There is no evidence of acute compression fracture or osseous destructive process.     The distal conus maintains normal signal intensity.     Mild degenerative disc space narrowing noted.     L1-2 through L3-4 no evidence of disc herniation or advanced  stenosis.     L4-5, no evidence of disc herniation.  Facet hypertrophy but no advanced central or foraminal stenosis.     L5-S1, prominent fluid is identified within the right facet joint presumably degenerative in nature.  At the left facet joint a small ganglion cyst protrudes into the paravertebral soft tissues.  There is however an additional cystic structure protruding into the epidural space at the opening to the left L5-S1 neural foramen thought to represent a  9 mm synovial cyst.  There is marked facet hypertrophy left much greater than right resulting in severe left foraminal stenosis with mild foraminal stenosis on the right.  No advanced central stenosis.     Impression:     Please note transitional lumbosacral vertebral bodies are identified.     At what is labeled L5-S1 there is marked left facet hypertrophy resulting in severe left foraminal stenosis.  In addition a 9 mm apparently cystic lesion near the subarticular zone but protruding into the opening of the neural foramen is thought to most likely represent a synovial cyst with extruded disc fragment and schwannoma thought less likely.  A contrast study could be performed for further clarification but only if clinically warranted.  No advanced central or left lateral recess stenosis is suspected with the main impingement likely being of the exiting left nerve root.        Electronically signed by:Gus Hernadez  Date:                                            05/31/2024  Time:                                           17:03    Narrative & Impression    EXAMINATION:  MRI CERVICAL SPINE WITHOUT CONTRAST     CLINICAL HISTORY:  cervical DDD; Spondylosis without myelopathy or radiculopathy, cervical region     TECHNIQUE:  Multiplanar, multisequence MR images of the cervical spine were performed without the administration of contrast.     COMPARISON:  Radiograph 08/27/2020.     FINDINGS:  Alignment: Straightening of cervical spine with slight reversal at  the level of C5 through C7.  Minimal degenerative anterolisthesis C4 on C5..     Vertebrae: Normal marrow signal. No fracture.     Discs: Disc space narrowing C5-C6-C7 level.     Cord: At C3 through C6 level, STIR images, the cervical cord appears slightly indistinct and of minimally increased signal intensity.  This may be artifactual in nature is a cannot be confirmed on the axial or sagittal T2 weighted sequences with certainty.  Postcontrast repeat examination may be indicated to exclude subtle intrinsic cord finding at this level..     Skull base and craniocervical junction: Normal.     Degenerative findings:     C2-C3: There is no focal disc herniation.No significant central canal narrowing.  No significant neural foraminal narrowing.     C3-C4: There is no focal disc herniation.No significant central canal narrowing.  No significant neural foraminal narrowing.     C4-C5: There is no focal disc herniation.No significant central canal narrowing.  Moderate RIGHT neural foraminal narrowing.     C5-C6: There is no focal disc herniation.No significant central canal narrowing.  Moderate RIGHT and mild LEFT neural foraminal narrowing.     C6-C7: There is no focal disc herniation.No significant central canal narrowing.  Moderate LEFT neural foraminal narrowing.     C7-T1: There is no focal disc herniation.No significant central canal narrowing.  No significant neural foraminal narrowing.     T1-T2:     Paraspinal muscles & soft tissues: Unremarkable.     Impression:     Degenerative spondylosis as above predominantly C4-C5 through C6-C7.     Likely artifact identified level of the cord C3 through C6 STIR images only although at of abundance of caution, repeat examination with postcontrast MRI may be helpful to exclude underlying myelitis.     This report was flagged in Epic as abnormal.        Electronically signed by: Jerzy Rose MD  Date:                                            10/28/2021  Time:                                            06:44           Narrative & Impression    EXAMINATION:  XR CERVICAL SPINE 5 VIEW WITH FLEX AND EXT     CLINICAL HISTORY:  Headache     TECHNIQUE:  Five views of the cervical spine plus flexion and extension views were performed.     COMPARISON:  None 07/09/2019.     FINDINGS:  Mild DJD.  The disc spaces are narrowed between C5 and C7 vertebral segments.  Encroachment of the neural foramina identified at the C5-C6 level on the right and the C6/C7 level on the left.  There is a 2 mm C4/C5 anterolisthesis.  No fracture or dislocation.  No bone destruction identified.     Impression:     See above        Electronically signed by: Kaiser Ruiz MD  Date:                                            08/27/2020  Time:                                           14:12               Narrative & Impression     EXAMINATION:  MRI LUMBAR SPINE WITHOUT CONTRAST     CLINICAL HISTORY:  lumbar spinal stenosis; Other intervertebral disc degeneration, lumbar region     TECHNIQUE:  Multiplanar, multisequence MR images were acquired from the thoracolumbar junction to the sacrum without the administration of contrast.     COMPARISON:  Non plain film examination 01/13/2020 e.     FINDINGS:  Lumbar spine alignment is within normal limits. The vertebral body heights are well maintained, with no fracture.  No marrow signal abnormality suspicious for an infiltrative process.     The conus is normal in appearance, and terminates at the L1-L2 level.  The adjacent soft tissue structures show no significant abnormalities.     L1-L2: There is no focal disc herniation. No significant central canal or neural foraminal narrowing.     L2-L3: There is no focal disc herniation. No significant central canal or neural foraminal narrowing.     L3-L4: There is no focal disc herniation. No significant central canal or neural foraminal narrowing.     L4-L5: There is no focal disc herniation. No significant central canal or neural  foraminal narrowing.     L5-S1: There is no focal disc herniation. No significant central canal or neural foraminal narrowing.     Impression:     No significant central canal stenosis, focal protrusion of disc material or neural foraminal encroachment.        Electronically signed by: Jerzy Rose MD  Date:                                            07/14/2020  Time:                                           14:15         Narrative & Impression    EXAMINATION:  XR LUMBAR SPINE AP AND LAT WITH FLEX/EXT     CLINICAL HISTORY:  Other intervertebral disc degeneration, lumbar region     TECHNIQUE:  AP and lateral views as well as lateral flexion and extension images are performed through the lumbar spine.     COMPARISON:  None     FINDINGS:  Mild DJD and lumbar scoliosis.  No significant disc space narrowing identified.  No fracture, spondylolisthesis or bone destruction noted.  Spina bifida occulta involving the S1 vertebral segment noted.     Impression:     See above        Electronically signed by: Kaiser Ruiz MD  Date:                                            01/13/2020  Time:                                           09:22    Encounter    View Encounter                     Narrative     No significant alignment abnormality.  Vertebral body heights are normally maintained, without compression deformity at any level.  There is disc narrowing at C5-6 and C6-7, and each of these levels demonstrates posterior marginal vertebral endplate   spurring and accompanying disc bulging.  The spurring is more pronounced to the right of midline at C5-6 and to the left of midline at C6-7.  All other cervical and visualized upper thoracic levels have normal disc contour, and there is no evidence of a   significant focal disc herniation at any level.  AP diameter of the spinal canal is normally maintained at all levels with no canal stenosis/extrinsic cord compression observed.  The spinal cord is well delineated from the  cervicomedullary junction to   the T3 level, and appears normal in size and configuration without focal enlargement or irregularity.  No Chiari malformation or cord cyst or syrinx.  No abnormal signal from the substance of the cord on any of the pulse sequences generated.  There is   some prominent neural foraminal stenosis at C5-6 on the right and C6-7 on the left.  No significant signal abnormality referable to the osseous structures.   Impression      Cervical spine MRI examination demonstrates degenerative changes as discussed above involving the C5-6 and C6-7 levels, with right-sided foraminal stenosis at C5-6 and left-sided foraminal stenosis at C6-7.  No evidence of a significant focal soft is   herniation, canal stenosis/extrinsic cord compression, or intramedullary cord lesion seen at any level.      Electronically signed by: Kaiser Preston MD  Date: 08/01/13  Time: 06:40          Assessment:       Encounter Diagnoses   Name Primary?    Dorsalgia, unspecified     Degeneration of intervertebral disc of lumbar region with discogenic back pain and lower extremity pain     Lumbar radiculopathy     Lumbar spondylosis     S/P lumbar spinal fusion Yes           Plan:       Naomy was seen today for follow-up.    Diagnoses and all orders for this visit:    S/P lumbar spinal fusion    Dorsalgia, unspecified  -     MRI Lumbar Spine Without Contrast; Future    Degeneration of intervertebral disc of lumbar region with discogenic back pain and lower extremity pain    Lumbar radiculopathy    Lumbar spondylosis            Naomy Armstrong is a 59 y.o. female with left-sided low back and lower extremity pain.  S/p MIS L5-S1 TLIF done in October 2024.  Significant, complete relief of radicular pain following this procedure.  However continues to note pain across her lower lumbar paraspinal region.  Exact etiology unclear, can not exclude discogenic versus vertebral genic sources.    Prior records review.  Pertinent  imaging studies reviewed by me. Imaging results were discussed with patient.  Ordered lumbar MRI to further evaluate.  Start Norco 5-325 mg daily p.r.n..  One-month supply of 30 pills provided today.  Advised patient we do not intend on providing opioid pain medication chronically.  Okay to continue while we seek for more definitive treatment options.  Continue with physical therapy and home exercise program.  Stressed the importance of maintaining regular home exercise program and being mindful of how they use their back throughout the day.  Patient expressed understanding and agreement.  Continue to follow up with Neurosurgery postop.  Return to clinic after imaging to discuss results.      Neal Stephenson PA-C  [facility]  Interventional Pain Management   02/05/2025    This note was created by combination of typed  and M-Modal dictation.  Transcription and phonetic errors may be present.  If there are any questions, please contact me.

## 2025-02-06 RX ORDER — HYDROCODONE BITARTRATE AND ACETAMINOPHEN 5; 325 MG/1; MG/1
1 TABLET ORAL
Qty: 30 TABLET | Refills: 0 | Status: SHIPPED | OUTPATIENT
Start: 2025-02-06 | End: 2025-03-08

## 2025-02-11 ENCOUNTER — OFFICE VISIT (OUTPATIENT)
Dept: ORTHOPEDICS | Facility: CLINIC | Age: 60
End: 2025-02-11
Payer: COMMERCIAL

## 2025-02-11 VITALS — HEIGHT: 65 IN | BODY MASS INDEX: 27.81 KG/M2 | WEIGHT: 166.88 LBS

## 2025-02-11 DIAGNOSIS — M17.0 ARTHRITIS OF BOTH KNEES: Primary | ICD-10-CM

## 2025-02-11 DIAGNOSIS — K52.9 ACUTE GASTROENTERITIS: ICD-10-CM

## 2025-02-11 PROCEDURE — 20610 DRAIN/INJ JOINT/BURSA W/O US: CPT | Mod: 50,S$GLB,,

## 2025-02-11 PROCEDURE — 99999 PR PBB SHADOW E&M-EST. PATIENT-LVL IV: CPT | Mod: PBBFAC,,,

## 2025-02-11 PROCEDURE — 99499 UNLISTED E&M SERVICE: CPT | Mod: S$GLB,,,

## 2025-02-11 NOTE — PROGRESS NOTES
Assessment: 59 y.o. female with right biceps tendinitis , bilateral knee osteoarthritis     I explained my diagnostic impression and the reasoning behind it in detail, using layman's terms.      Plan:   - Can consider repeat injection for shoulder in the future   - Return to clinic next week for 3/3 bilateral euflexxa injections     All questions were answered in detail. The patient is in full agreement with the treatment plan and will proceed accordingly.  [facility]     Chief Complaint   Patient presents with    Knee Pain     Justen knee pain 2nd euflexxa        Initial visit (9/20/23): Naomy Armstrong is a 59 y.o. female who presents today complaining of right shoulder pain  Duration of symptoms:  several years- pain has been intermittent. She has had constant severe pain for the last two weeks  Trauma or new activity: no  Pain is constant  Aggravating factors: Reaching overhead is somewhat painful, abduction, lifting, turning over at night  Relieving factors: rst  Night pain is present and is disruptive to sleep  Radicular symptoms: no numbness, paresthesias   Associated symptoms:  limited range of motion.    Prior treatment:  meloxicam without relief, celebrex, advil without improvement in pain.     Pain does interfere with sleep and activities of daily living .  All pain the last to the anterior shoulder, no pain localized over the subdeltoid fossa.  Does not radiate to the lateral upper arm    10/26/23  Had a lot of pain to the bilateral shoulders yesterday. Took a Meloxicam last night and her pain is somewhat better   Still has pain in the right shoulder - never got better with injection  PT starts soon - she was not able to get in sooner because of her work schedule     3/13/24  Pain in both shoulders has returned   Did PT with epic and it did not help   R is more bothersome today, usually left is very painful as well   Pushing and motion with the shoulder are  painful  More painful in morning  Pain is anterior     1/8/25 PA:  Patient returns today with pain in the right shoulder. Left shoulder not bothersome today.   Pain is located anterior.  Worse with reaching overhead - most noticeable when she is opening/closing trunk of her car or lifting items.  Last time patient was seen for R shoulder, MRI was ordered. She did not have this done. Currently living in [place] and has had difficulty scheduling appointments   Of note, had a fall on Saturday at home. She was wearing slippers and was stepping backwards and fell onto her backside and hit her right shoulder on some furniture/items on the way down.     She is also having ongoing bilateral knee pain despite most recent steroid injections on 12/30/24. We have already put in the prior authorization for euflexxa injections and are planning to have her follow up for these once approved by insurance. She is using a cane to ambulate today due to knee pain.     2/4/25 PA:  Patient returns today for bilateral knee pain and initiation of euflexxa injections as well as right shoulder MRI.   Reviewed MRI results - shoulder not as bothersome as knees today    2/11/25 PA:  Patient returns today for bilateral knee pain and 2/3 euflexxa injections.   Reports some improvement pain levels after first injection       This is the extent of the patient's complaints at this time.      Review of patient's allergies indicates:   Allergen Reactions    Effexor [venlafaxine]      Elevated her blood pressure    Zoloft [sertraline]     Paroxetine hcl      Made her feel drunk         Physical Exam:   There were no vitals filed for this visit.        General: Patient is alert, awake and oriented to time, place and person. Mood and affect are appropriate.  Patient does not appear to be in any distress, denies any constitutional symptoms and appears stated age.   HEENT: Pupils are equal and round, sclera are not injected. External examination of ears  and nose reveals no abnormalities. Cranial nerves II-X are grossly intact  Skin: no rashes, abrasions or open wounds on the affected extremity. She does have some bruising related to recent fall over the lateral aspect of the right shoulder  Resp: No respiratory distress or audible wheezing   CV: 2+  pulses, all extremities warm and well perfused   Bilateral Shoulder    Shoulder Range of Motion    Right     Left   (Active/Passive)       Forward Elevation     165/165         165/165  External rotation (arm at side)  45/45             45/45   Internal rotation behind the back  L3             L3     Range of motion is painful  - anterior pain    Acromioclavicular joint is not tender  Crossbody test: negative    Neer's negative  Hawkin's negative    Kofi's negative  Drop arm negative  Belly press negative      Cuff Strength     Right     Left   Supraspinatus        4/5    5/5  Infraspinatus     5/5    5/5  Subscapularis     5/5    5/5    Deltoid testing            5/5    5/5    Stephens's test positive  Speeds positive  Yergasons positive  TTP over bicipital groove on R    Elbow examination demonstrates no tenderness to palpation and has normal range of motion.     ltsi C5-T1  + epl, io, fds, fdp   2+ RP      Imaging:   MRI R shoulder: rotator cuff tendinosis with high-grade partial superior subscapularis tendon tear, medial perching of a split long head biceps tendon, and small full-thickness anterior supraspinatus insertional tear. Additional areas of partial-thickness tearing of the supraspinatus and infraspinatus. Biceps tenosynovitis. Multifocal grades 1-3 glenohumeral chondromalacia. Subacromial and subcoracoid bursitis        Current Outpatient Medications:     alendronate (FOSAMAX) 35 MG tablet, Take 1 tablet (35 mg total) by mouth every 7 days., Disp: 12 tablet, Rfl: 1    alosetron (LOTRONEX) 0.5 MG tablet, Take 1 tablet (0.5 mg total) by mouth 2 (two) times daily., Disp: 180 tablet, Rfl: 3    amLODIPine  (NORVASC) 5 MG tablet, Take 1 tablet by mouth once daily, Disp: 90 tablet, Rfl: 1    atorvastatin (LIPITOR) 10 MG tablet, Take 1 tablet (10 mg total) by mouth once daily., Disp: 90 tablet, Rfl: 3    calcium carb and citrate-vitD3 (CITRACAL-D3 SLOW RELEASE) 600 mg-12.5 mcg (500 unit) TbSR, Take 2 tablets by mouth once daily., Disp: 180 tablet, Rfl: 3    cyclobenzaprine (FLEXERIL) 5 MG tablet, Take 2 tablets (10 mg total) by mouth 3 (three) times daily as needed for muscle spasms, Disp: 90 tablet, Rfl: 0    dicyclomine (BENTYL) 20 mg tablet, Take 1 tablet (20 mg total) by mouth every 6 (six) hours., Disp: 120 tablet, Rfl: 0    fluconazole (DIFLUCAN) 200 MG Tab, Take 1 tablet (200 mg total) by mouth once daily. Take at first sign of yeast infection, Disp: 1 tablet, Rfl: 0    FLUoxetine 40 MG capsule, Take 2 capsules (80 mg total) by mouth once daily., Disp: 180 capsule, Rfl: 3    gabapentin (NEURONTIN) 300 MG capsule, Take 3 capsules (900 mg total) by mouth 3 (three) times daily., Disp: 270 capsule, Rfl: 11    gabapentin (NEURONTIN) 300 MG capsule, Take 1 capsule (300 mg total) by mouth 3 (three) times daily., Disp: 90 capsule, Rfl: 11    HYDROcodone-acetaminophen (NORCO) 5-325 mg per tablet, Take 1 tablet by mouth every 24 hours as needed for Pain., Disp: 30 tablet, Rfl: 0    hydrOXYzine HCL (ATARAX) 10 MG Tab, Take 1 tablet (10 mg total) by mouth 2 (two) times daily as needed (anxiety). (Patient not taking: Reported on 2/5/2025), Disp: 60 tablet, Rfl: 3    lisinopriL (PRINIVIL,ZESTRIL) 20 MG tablet, Take 1 tablet (20 mg total) by mouth nightly., Disp: 90 tablet, Rfl: 3    lurasidone (LATUDA) 20 mg Tab tablet, Take 2 tablets (40 mg total) by mouth once daily., Disp: 60 tablet, Rfl: 11    meloxicam (MOBIC) 15 MG tablet, Take 1 tablet (15 mg total) by mouth once daily., Disp: 30 tablet, Rfl: 1    multivitamin with minerals tablet, Take 1 tablet by mouth once daily., Disp: , Rfl:     naloxone (NARCAN) 4 mg/actuation  Spry, 4mg by nasal route as needed for opioid overdose; may repeat every 2-3 minutes in alternating nostrils until medical help arrives. Call 911, Disp: 1 each, Rfl: 11    nitrofurantoin, macrocrystal-monohydrate, (MACROBID) 100 MG capsule, Take 1 capsule (100 mg total) by mouth once daily., Disp: 30 capsule, Rfl: 5    pregabalin 330 mg Tb24, Take 330 mg by mouth after dinner., Disp: 30 tablet, Rfl: 5    promethazine (PHENERGAN) 25 MG tablet, Take 1 tablet (25 mg total) by mouth every 8 (eight) hours as needed., Disp: 30 tablet, Rfl: 0    tiZANidine (ZANAFLEX) 4 MG tablet, Take 1 tablet (4 mg total) by mouth every 6 (six) hours as needed (muscle spasm)., Disp: 120 tablet, Rfl: 0    Past Medical History:   Diagnosis Date    Alcohol abuse     per chart, but patient denies today and cocaine per chart    Anxiety     Colon polyp     Depression     Hallucination     last couple of months started seeing someone standing in her room, saw boyfriend's reflection in glass    Headache     History of psychiatric hospitalization     age 13 [facility] for 2 months for acting out; 2018 for SI    Hx of psychiatric care     Hypertension     Psychiatric problem     Sleep difficulties     Suicide attempt     with knife by cutting stomach    Therapy        Active Problem List with Overview Notes    Diagnosis Date Noted    Mixed dyslipidemia 11/06/2024     Lab Results   Component Value Date    CHOL 250 (H) 10/22/2024    CHOL 250 (H) 01/06/2024    CHOL 207 (H) 05/18/2022     Lab Results   Component Value Date    HDL 71 10/22/2024    HDL 68 01/06/2024    HDL 64 05/18/2022     Lab Results   Component Value Date    LDLCALC 135.0 10/22/2024    LDLCALC 161.4 (H) 01/06/2024    LDLCALC 100.0 05/18/2022     Lab Results   Component Value Date    TRIG 220 (H) 10/22/2024    TRIG 103 01/06/2024    TRIG 215 (H) 05/18/2022     Lab Results   Component Value Date    CHOLHDL 28.4 10/22/2024    CHOLHDL 27.2 01/06/2024    CHOLHDL 30.9 05/18/2022      The  10-year ASCVD risk score (Damaris GERARDO, et al., 2019) is: 2.9%    Values used to calculate the score:      Age: 59 years      Sex: Female      Is Non- : No      Diabetic: No      Tobacco smoker: No      Systolic Blood Pressure: 98 mmHg      Is BP treated: Yes      HDL Cholesterol: 71 mg/dL      Total Cholesterol: 250 mg/dL        S/P lumbar spinal fusion 10/29/2024     10/28/24: Left MIS L5-S1 TLIF with Dr. King      Osteopenia after menopause 07/30/2024 06-  DEXA  Lumbar spine (L1-L4):  T-score is -1.6, and Z-score is -0.1.  Femoral neck:    T-score is -2.3, and Z-score is -1.0.  Total hip:    T-score is -1.4, and Z-score is -0.4.    Fracture Risk (FRAX)  18% risk of a major osteoporotic fracture in the next 10 years.  2.8% risk of hip fracture in the next 10 years.    Impression:  Low bone mass (Osteopenia); FRAX calculations do not support treatment as osteoporosis.    RECOMMENDATIONS:  *Daily calcium intake 4404-1056 mg, dietary sources preferred; Vitamin D 3474-1800 IU daily.  *Weight bearing exercise and fall precautions.  *No additional pharmacologic therapy recommended at this time.  *Repeat BMD in 2 years.      Lumbar radiculopathy 05/16/2024    Hypertension, essential 02/18/2024    Memory impairment 12/05/2023    Generalized anxiety disorder 11/27/2022    Esophagogastric junction outflow obstruction 11/22/2022    Major depressive disorder, recurrent, moderate 02/09/2022    Cervical spondylosis 08/11/2021    Hiatal hernia 07/09/2021    Irritable bowel syndrome with diarrhea 07/09/2021    Lumbosacral spondylosis 04/16/2021    Sacroiliac joint pain 03/31/2021    Spondylosis of lumbosacral region 03/31/2021    Chronic back pain greater than 3 months duration 07/16/2020    Decreased strength of lower extremity 06/01/2020    Decreased strength of trunk and back 06/01/2020    Pain in joint of right hip 06/01/2020    Decreased range of motion of right lower extremity 06/01/2020     Bilateral primary osteoarthritis of knee 03/12/2020    Lumbar spondylosis 01/13/2020    DDD (degenerative disc disease), lumbar 01/13/2020    Spondylosis of cervical region without myelopathy or radiculopathy 07/09/2019    Primary osteoarthritis of right knee 06/24/2019    Neck pain 06/24/2019    Cervical radiculopathy 04/26/2013     X-ray cervical spine and EMG/nerve conduction studies      DDD (degenerative disc disease), cervical 04/26/2013    Cervicogenic headache 04/26/2013     Patient has cervicogenic headaches which we will address with a combination of heat/ice application, muscle relaxers, and potentially anti-inflammatories.          Past Surgical History:   Procedure Laterality Date    COLONOSCOPY N/A 05/07/2021    Procedure: COLONOSCOPY;  Surgeon: Prem Montana MD;  Location: [facility];  Service: Endoscopy;  Laterality: N/A;  COVID screening 5/4 LAPC-instr portal -ml    COLONOSCOPY N/A 06/06/2022    Procedure: COLONOSCOPY;  Surgeon: Fab Shepherd MD;  Location: [facility] ([address] [place] [address]);  Service: Endoscopy;  Laterality: N/A;  C-Diff neg  Rapid COVID    EPIDURAL STEROID INJECTION Bilateral 08/05/2020    Procedure: Bilateral MBB @ L3, L4, L5;  Surgeon: Jeremy Powell Jr., MD;  Location: [facility];  Service: Pain Management;  Laterality: Bilateral;  Bilat L3-5 MBB  Arrive @ 1315; No ATC or DM; Needs MD signature    EPIDURAL STEROID INJECTION Right 08/19/2020    Procedure: Lumbar Radiofrequency Thermocoagulation of Medial Branches;  Surgeon: Jeremy Powell Jr., MD;  Location: [facility];  Service: Pain Management;  Laterality: Right;  Right L3-5 RFA  Arrive @ 1045; No ATC or DM; Needs MD Signature    EPIDURAL STEROID INJECTION Left 09/04/2020    Procedure: Lumbar Radiofrequency Thermocoagulation of Medial Branches;  Surgeon: Jeremy Powell Jr., MD;  Location: [facility];  Service: Pain Management;  Laterality: Left;  Left L3-5 RFA  Arrive @ 0900 (requested); No ATC or DM; Needs MD signature     EPIDURAL STEROID INJECTION Bilateral 04/16/2021    Procedure: Sacroiliac Joint Steroid Injections @ Bilateral;  Surgeon: Jeremy Powell Jr., MD;  Location: [facility];  Service: Pain Management;  Laterality: Bilateral;  Arrive @ 1300; No ATC or DM    EPIDURAL STEROID INJECTION Bilateral 01/19/2022    Procedure: Bilateral Sacroiliac Joint Injections;  Surgeon: Jeremy Powell Jr., MD;  Location: [facility];  Service: Pain Management;  Laterality: Bilateral;  Arrive @ 1315; No ATC or DM; Needs Consent    EPIDURAL STEROID INJECTION Bilateral 07/28/2023    Procedure: Bilateral L2, L3, L4 Medial Branch Blocks #1;  Surgeon: Jeremy Powell Jr., MD;  Location: [facility];  Service: Pain Management;  Laterality: Bilateral;  @1200  No ATC or DM    EPIDURAL STEROID INJECTION Bilateral 08/11/2023    Procedure: Bilateral L2, L3, L4 Medial Branch Blocks #2;  Surgeon: Jeremy Powell Jr., MD;  Location: [facility];  Service: Pain Management;  Laterality: Bilateral;  @1330  No ATC or DM    ESOPHAGEAL MANOMETRY WITH MEASUREMENT OF IMPEDANCE N/A 10/20/2021    Procedure: MANOMETRY, ESOPHAGUS, WITH IMPEDANCE MEASUREMENT;  Surgeon: Anastacia Odonnell MD;  Location: [facility] ([address] [place] [address]);  Service: Endoscopy;  Laterality: N/A;  Covid test 10/17 Jean, instructions sent to [email or web address]    ESOPHAGOGASTRODUODENOSCOPY N/A 05/07/2021    Procedure: EGD (ESOPHAGOGASTRODUODENOSCOPY);  Surgeon: Prem Montana MD;  Location: [facility];  Service: Endoscopy;  Laterality: N/A;  added on per Dr. NOEL Shepherd    EYE SURGERY  6/6/2015    Lasik    GANGLION CYST EXCISION      HERNIA REPAIR  2/14/2022    Hiatal hernia    INJECTION, FACET JOINT, LUMBOSACRAL Left 06/14/2024    Procedure: Left S1 Transforaminal Epidural Steroid Injection + Left L5-S1 Facet Joint Injection/Aspiration;  Surgeon: Jeremy Powell Jr., MD;  Location: [facility];  Service: Pain Management;  Laterality: Left;  @1200  No ATC or DM  Needs Consent  IV  Sedation    LAPAROSCOPIC TOUPET FUNDOPLICATION N/A 2022    Procedure: FUNDOPLICATION, LAPAROSCOPIC, TOUPET;  Surgeon: Christian Martinez MD;  Location: [address];  Service: General;  Laterality: N/A;    MINIMALLY INVASIVE TRANSFORAMINAL LUMBAR INTERBODY FUSION (TLIF) Left 10/28/2024    Procedure: FUSION, SPINE, LUMBAR, TLIF, MINIMALLY INVASIVE;  Surgeon: Christian King MD;  Location: [facility];  Service: Neurosurgery;  Laterality: Left;  Left L5/S1, possible L4-5 minimally invasive TLIF  sravanthi 4 poster   fluoro  no neuromonitoring needed   Depuy  DEPUY CARMELA NOTIFIED-GG  RN PREOP 10/14/2024   T/S ON 10/25/2024----NEED H/P       Social History     Socioeconomic History    Marital status:     Number of children: 0   Occupational History    Occupation:      Employer: [employer]   Tobacco Use    Smoking status: Former     Current packs/day: 0.00     Average packs/day: 1 pack/day for 21.3 years (21.3 ttl pk-yrs)     Types: Cigarettes     Start date: 1985     Quit date: 2006     Years since quittin.8    Smokeless tobacco: Never    Tobacco comments:     It has been a very long time. I do not remember dates   Substance and Sexual Activity    Alcohol use: Not Currently     Comment: occasional    Drug use: Not Currently     Types: Marijuana     Comment: tried at age 14    Sexual activity: Yes     Partners: Male     Birth control/protection: Post-menopausal     Comment: going through menopause   Other Topics Concern    Patient feels they ought to cut down on drinking/drug use No    Patient annoyed by others criticizing their drinking/drug use No    Patient has felt bad or guilty about drinking/drug use No    Patient has had a drink/used drugs as an eye opener in the AM No   Social History Narrative     x 2.    Lives with boyfriend.     Works at [employer] as an .    Presently working on Bachelor's degree.    Has associates degree in criminal  justice.     Social Drivers of Health     Financial Resource Strain: Medium Risk (12/2/2024)    Overall Financial Resource Strain (CARDIA)     Difficulty of Paying Living Expenses: Somewhat hard   Food Insecurity: Patient Declined (12/2/2024)    Hunger Vital Sign     Worried About Running Out of Food in the Last Year: Patient declined     Ran Out of Food in the Last Year: Patient declined   Transportation Needs: Patient Declined (10/28/2024)    TRANSPORTATION NEEDS     Transportation : Patient declined   Physical Activity: Unknown (12/2/2024)    Exercise Vital Sign     Days of Exercise per Week: Patient declined     Minutes of Exercise per Session: 0 min   Stress: No Stress Concern Present (12/2/2024)    [employer] - Occupational Stress Questionnaire     Feeling of Stress : Only a little   Housing Stability: High Risk (12/2/2024)    Housing Stability Vital Sign     Unable to Pay for Housing in the Last Year: Yes     Homeless in the Last Year: Patient declined

## 2025-02-11 NOTE — PROCEDURES
Large Joint Aspiration/Injection: bilateral knee    Date/Time: 2/11/2025 11:00 AM    Performed by: Jayne Pretty PA-C  Authorized by: Jayne Pretty PA-C    Consent Done?:  Yes (Verbal)  Indications:  Arthritis and pain  Prep: patient was prepped and draped in usual sterile fashion      Local anesthesia used?: Yes    Anesthesia:  Local infiltration  Local anesthetic:  Topical anesthetic    Details:  Needle Size:  22 G  Ultrasonic Guidance for needle placement?: No    Approach:  Anterolateral  Location:  Knee  Laterality:  Bilateral  Site:  Bilateral knee  Medications (Right):  10 mg sodium hyaluronate (EUFLEXXA) 10 mg/mL(mw 2.4 -3.6 million)  Medications (Left):  10 mg sodium hyaluronate (EUFLEXXA) 10 mg/mL(mw 2.4 -3.6 million)  Patient tolerance:  Patient tolerated the procedure well with no immediate complications

## 2025-02-12 RX ORDER — DICYCLOMINE HYDROCHLORIDE 20 MG/1
20 TABLET ORAL EVERY 6 HOURS
Qty: 120 TABLET | Refills: 0 | Status: SHIPPED | OUTPATIENT
Start: 2025-02-12

## 2025-02-13 ENCOUNTER — PATIENT MESSAGE (OUTPATIENT)
Dept: PAIN MEDICINE | Facility: CLINIC | Age: 60
End: 2025-02-13
Payer: COMMERCIAL

## 2025-02-18 ENCOUNTER — OFFICE VISIT (OUTPATIENT)
Dept: FAMILY MEDICINE | Facility: CLINIC | Age: 60
End: 2025-02-18
Payer: COMMERCIAL

## 2025-02-18 DIAGNOSIS — J06.9 ACUTE URI: Primary | ICD-10-CM

## 2025-02-18 RX ORDER — BUPROPION HYDROCHLORIDE 150 MG/1
1 TABLET ORAL DAILY
COMMUNITY

## 2025-02-18 RX ORDER — AZELASTINE 1 MG/ML
SPRAY, METERED NASAL
COMMUNITY

## 2025-02-18 RX ORDER — IPRATROPIUM BROMIDE 42 UG/1
SPRAY, METERED NASAL
COMMUNITY

## 2025-02-18 RX ORDER — MOMETASONE FUROATE 1 MG/G
OINTMENT TOPICAL
COMMUNITY

## 2025-02-18 RX ORDER — BENZONATATE 200 MG/1
CAPSULE ORAL
COMMUNITY
End: 2025-02-18 | Stop reason: SDUPTHER

## 2025-02-18 RX ORDER — ATOGEPANT 60 MG/1
1 TABLET ORAL DAILY
COMMUNITY
Start: 2024-04-24

## 2025-02-18 RX ORDER — RIMEGEPANT SULFATE 75 MG/75MG
TABLET, ORALLY DISINTEGRATING ORAL
COMMUNITY
Start: 2024-02-07

## 2025-02-18 RX ORDER — DOXYCYCLINE 100 MG/1
CAPSULE ORAL
COMMUNITY

## 2025-02-18 RX ORDER — BENZONATATE 200 MG/1
200 CAPSULE ORAL 3 TIMES DAILY PRN
Qty: 30 CAPSULE | Refills: 0 | Status: SHIPPED | OUTPATIENT
Start: 2025-02-18

## 2025-02-18 RX ORDER — CELECOXIB 100 MG/1
1 CAPSULE ORAL 2 TIMES DAILY
COMMUNITY

## 2025-02-18 RX ORDER — AMOXICILLIN AND CLAVULANATE POTASSIUM 875; 125 MG/1; MG/1
TABLET, FILM COATED ORAL
COMMUNITY

## 2025-02-18 RX ORDER — GABAPENTIN 600 MG/1
1 TABLET ORAL 3 TIMES DAILY
COMMUNITY

## 2025-02-18 RX ORDER — PROMETHAZINE HYDROCHLORIDE AND DEXTROMETHORPHAN HYDROBROMIDE 6.25; 15 MG/5ML; MG/5ML
5 SYRUP ORAL NIGHTLY PRN
Qty: 118 ML | Refills: 0 | Status: SHIPPED | OUTPATIENT
Start: 2025-02-18 | End: 2025-03-14

## 2025-02-18 RX ORDER — METHOCARBAMOL 500 MG/1
1 TABLET, FILM COATED ORAL 3 TIMES DAILY PRN
COMMUNITY

## 2025-02-18 RX ORDER — TIZANIDINE HYDROCHLORIDE 6 MG/1
6 CAPSULE, GELATIN COATED ORAL 3 TIMES DAILY
COMMUNITY
Start: 2024-12-27

## 2025-02-18 RX ORDER — OMEPRAZOLE 40 MG/1
1 CAPSULE, DELAYED RELEASE ORAL EVERY MORNING
COMMUNITY

## 2025-02-18 NOTE — LETTER
February 18, 2025      [facility]  [address] [place] [address]  Phone: [phone]  Fax: [phone]       Patient: Naomy Armstrong   YOB: 1965  Date of Visit: 02/18/2025    To Whom It May Concern:    Ronald Armstrong  was at [facility] on 02/18/2025. The patient may return to work/school on 2/19/25. If you have any questions or concerns, or if I can be of further assistance, please do not hesitate to contact me.    Sincerely,        Mumtaz Anderson MD

## 2025-02-18 NOTE — TELEPHONE ENCOUNTER
LVM with new MRI appt information.  [facility] and F/U appt r/s dates.

## 2025-02-18 NOTE — PROGRESS NOTES
The patient location is: [place]  The chief complaint leading to consultation is: Sore Throat, Nasal Congestion, and Cough    Visit type: audiovisual    Each patient to whom he or she provides medical services by telemedicine is:  (1) informed of the relationship between the physician and patient and the respective role of any other health care provider with respect to management of the patient; and (2) notified that he or she may decline to receive medical services by telemedicine and may withdraw from such care at any time.      Subjective:       Patient ID: Naomy Armstrong is a 59 y.o. female.    Chief Complaint: Sore Throat, Nasal Congestion, and Cough      Sore Throat   This is a new problem. The current episode started 1 to 4 weeks ago. The problem has been unchanged. There has been no fever. Associated symptoms include congestion and coughing.   Cough  Associated symptoms include a sore throat.       Review of Systems   Constitutional: Negative.    HENT:  Positive for congestion and sore throat.    Respiratory:  Positive for cough and chest tightness.    Cardiovascular: Negative.    Gastrointestinal: Negative.    Endocrine: Negative.    Genitourinary: Negative.    Musculoskeletal: Negative.    Neurological: Negative.    Psychiatric/Behavioral: Negative.            Past Medical History:   Diagnosis Date    Alcohol abuse     per chart, but patient denies today and cocaine per chart    Anxiety     Colon polyp     Depression     Hallucination     last couple of months started seeing someone standing in her room, saw boyfriend's reflection in glass    Headache     History of psychiatric hospitalization     age 13 [facility] for 2 months for acting out; 2018 for SI    Hx of psychiatric care     Hypertension     Psychiatric problem     Sleep difficulties     Suicide attempt     with knife by cutting stomach    Therapy      Past Surgical History:   Procedure Laterality Date    COLONOSCOPY N/A 05/07/2021    Procedure:  COLONOSCOPY;  Surgeon: Prem Montana MD;  Location: [facility];  Service: Endoscopy;  Laterality: N/A;  COVID screening 5/4 [facility]-instr portal -ml    COLONOSCOPY N/A 06/06/2022    Procedure: COLONOSCOPY;  Surgeon: Fab Shepherd MD;  Location: [facility] ([address] [place] [address]);  Service: Endoscopy;  Laterality: N/A;  C-Diff neg  Rapid COVID    EPIDURAL STEROID INJECTION Bilateral 08/05/2020    Procedure: Bilateral MBB @ L3, L4, L5;  Surgeon: Jeremy Powell Jr., MD;  Location: [facility];  Service: Pain Management;  Laterality: Bilateral;  Bilat L3-5 MBB  Arrive @ 1315; No ATC or DM; Needs MD signature    EPIDURAL STEROID INJECTION Right 08/19/2020    Procedure: Lumbar Radiofrequency Thermocoagulation of Medial Branches;  Surgeon: Jeremy Powell Jr., MD;  Location: [facility];  Service: Pain Management;  Laterality: Right;  Right L3-5 RFA  Arrive @ 1045; No ATC or DM; Needs MD Signature    EPIDURAL STEROID INJECTION Left 09/04/2020    Procedure: Lumbar Radiofrequency Thermocoagulation of Medial Branches;  Surgeon: Jeremy Powell Jr., MD;  Location: [facility];  Service: Pain Management;  Laterality: Left;  Left L3-5 RFA  Arrive @ 0900 (requested); No ATC or DM; Needs MD signature    EPIDURAL STEROID INJECTION Bilateral 04/16/2021    Procedure: Sacroiliac Joint Steroid Injections @ Bilateral;  Surgeon: Jeremy Powell Jr., MD;  Location: [facility];  Service: Pain Management;  Laterality: Bilateral;  Arrive @ 1300; No ATC or DM    EPIDURAL STEROID INJECTION Bilateral 01/19/2022    Procedure: Bilateral Sacroiliac Joint Injections;  Surgeon: Jeremy Powell Jr., MD;  Location: [facility];  Service: Pain Management;  Laterality: Bilateral;  Arrive @ 1315; No ATC or DM; Needs Consent    EPIDURAL STEROID INJECTION Bilateral 07/28/2023    Procedure: Bilateral L2, L3, L4 Medial Branch Blocks #1;  Surgeon: Jeremy Powell Jr., MD;  Location: [facility];  Service: Pain Management;  Laterality: Bilateral;  @1200  No  ATC or DM    EPIDURAL STEROID INJECTION Bilateral 08/11/2023    Procedure: Bilateral L2, L3, L4 Medial Branch Blocks #2;  Surgeon: Jeremy Powell Jr., MD;  Location: [facility] ENDO;  Service: Pain Management;  Laterality: Bilateral;  @1330  No ATC or DM    ESOPHAGEAL MANOMETRY WITH MEASUREMENT OF IMPEDANCE N/A 10/20/2021    Procedure: MANOMETRY, ESOPHAGUS, WITH IMPEDANCE MEASUREMENT;  Surgeon: Anastacia Odonnell MD;  Location: [facility] ENDO (4TH FLR);  Service: Endoscopy;  Laterality: N/A;  Covid test 10/17 [place], instructions sent to [id number]    ESOPHAGOGASTRODUODENOSCOPY N/A 05/07/2021    Procedure: EGD (ESOPHAGOGASTRODUODENOSCOPY);  Surgeon: Prem Montana MD;  Location: [facility];  Service: Endoscopy;  Laterality: N/A;  added on per Dr. NOEL Shepherd    EYE SURGERY  6/6/2015    Lasik    GANGLION CYST EXCISION      HERNIA REPAIR  2/14/2022    Hiatal hernia    INJECTION, FACET JOINT, LUMBOSACRAL Left 06/14/2024    Procedure: Left S1 Transforaminal Epidural Steroid Injection + Left L5-S1 Facet Joint Injection/Aspiration;  Surgeon: Jeremy Powell Jr., MD;  Location: [facility] PAIN MANAGEMENT;  Service: Pain Management;  Laterality: Left;  @1200  No ATC or DM  Needs Consent  IV Sedation    LAPAROSCOPIC TOUPET FUNDOPLICATION N/A 02/14/2022    Procedure: FUNDOPLICATION, LAPAROSCOPIC, TOUPET;  Surgeon: Christian Martinez MD;  Location: [facility] 2ND FLR;  Service: General;  Laterality: N/A;    MINIMALLY INVASIVE TRANSFORAMINAL LUMBAR INTERBODY FUSION (TLIF) Left 10/28/2024    Procedure: FUSION, SPINE, LUMBAR, TLIF, MINIMALLY INVASIVE;  Surgeon: Christian King MD;  Location: [facility] OR;  Service: Neurosurgery;  Laterality: Left;  Left L5/S1, possible L4-5 minimally invasive TLIF  sravanthi 4 poster   fluoro  no neuromonitoring needed   [employer]  FIORDALIZA CASEY NOTIFIED-GG  RN PREOP 10/14/2024   T/S ON 10/25/2024----NEED H/P     Family History   Problem Relation Name Age of Onset    Anxiety disorder Sister Bev      Depression Sister Bev     Alcohol abuse Maternal Uncle      Alcohol abuse Maternal Grandfather       Social History     Socioeconomic History    Marital status:     Number of children: 0   Occupational History    Occupation:      Employer: [employer]   Tobacco Use    Smoking status: Former     Current packs/day: 0.00     Average packs/day: 1 pack/day for 21.3 years (21.3 ttl pk-yrs)     Types: Cigarettes     Start date: 1985     Quit date: 2006     Years since quittin.8    Smokeless tobacco: Never    Tobacco comments:     It has been a very long time. I do not remember dates   Substance and Sexual Activity    Alcohol use: Not Currently     Comment: occasional    Drug use: Not Currently     Types: Marijuana     Comment: tried at age 14    Sexual activity: Yes     Partners: Male     Birth control/protection: Post-menopausal     Comment: going through menopause   Other Topics Concern    Patient feels they ought to cut down on drinking/drug use No    Patient annoyed by others criticizing their drinking/drug use No    Patient has felt bad or guilty about drinking/drug use No    Patient has had a drink/used drugs as an eye opener in the AM No   Social History Narrative     x 2.    Lives with boyfriend.     Works at [employer] as an .    Presently working on Bachelor's degree.    Has associates degree in criminal justice.     Social Drivers of Health     Financial Resource Strain: Medium Risk (2024)    Overall Financial Resource Strain (CARDIA)     Difficulty of Paying Living Expenses: Somewhat hard   Food Insecurity: Patient Declined (2024)    Hunger Vital Sign     Worried About Running Out of Food in the Last Year: Patient declined     Ran Out of Food in the Last Year: Patient declined   Transportation Needs: Patient Declined (10/28/2024)    TRANSPORTATION NEEDS     Transportation : Patient declined   Physical Activity: Unknown (2024)    Exercise  Vital Sign     Days of Exercise per Week: Patient declined     Minutes of Exercise per Session: 0 min   Stress: No Stress Concern Present (12/2/2024)    [place] [employer] - Occupational Stress Questionnaire     Feeling of Stress : Only a little   Housing Stability: High Risk (12/2/2024)    Housing Stability Vital Sign     Unable to Pay for Housing in the Last Year: Yes     Homeless in the Last Year: Patient declined     Current Medications[1]   Objective:      There were no vitals filed for this visit.    Physical Exam  Constitutional:       General: She is not in acute distress.     Appearance: She is not diaphoretic.   HENT:      Head: Normocephalic and atraumatic.   Eyes:      Conjunctiva/sclera: Conjunctivae normal.   Pulmonary:      Effort: Pulmonary effort is normal.   Musculoskeletal:      Cervical back: Neck supple.   Skin:     Findings: No rash.   Neurological:      Mental Status: She is alert and oriented to person, place, and time.   Psychiatric:         Behavior: Behavior normal.         Thought Content: Thought content normal.         Judgment: Judgment normal.            Assessment:       1. Acute URI        Plan:       Acute URI  -     benzonatate (TESSALON) 200 MG capsule; Take 1 capsule (200 mg total) by mouth 3 (three) times daily as needed for Cough.  Dispense: 30 capsule; Refill: 0  -     promethazine-dextromethorphan (PROMETHAZINE-DM) 6.25-15 mg/5 mL Syrp; Take 5 mLs by mouth nightly as needed.  Dispense: 118 mL; Refill: 0      Future Appointments   Date Time Provider Department Center   2/18/2025  9:30 AM Jayne Pretty PA-C [facility] [place] [facility] [place]   2/26/2025  6:00 PM [facility] MRI1 [facility]   2/27/2025 11:40 AM Neal Stephenson PA-C [facility] [place]   5/6/2025  8:30 AM LAB, [facility] DRAW STATION [facility] LAB [facility]   5/7/2025 10:20 AM WB CT2 LIMIT 500 LBS [facility] CT SCAN [facility]   5/7/2025 11:00 AM Christian King MD [facility]    5/16/2025  9:00 AM Eric Hernandes Jr., MD [facility]                   Patient note was created using [employer].  Any errors in syntax or even information may not have been identified and edited on initial review prior to signing this note.         [1]   Current Outpatient Medications:     alendronate (FOSAMAX) 35 MG tablet, Take 1 tablet (35 mg total) by mouth every 7 days., Disp: 12 tablet, Rfl: 1    alosetron (LOTRONEX) 0.5 MG tablet, Take 1 tablet (0.5 mg total) by mouth 2 (two) times daily., Disp: 180 tablet, Rfl: 3    amLODIPine (NORVASC) 5 MG tablet, Take 1 tablet by mouth once daily, Disp: 90 tablet, Rfl: 1    atorvastatin (LIPITOR) 10 MG tablet, Take 1 tablet (10 mg total) by mouth once daily., Disp: 90 tablet, Rfl: 3    buPROPion (WELLBUTRIN XL) 150 MG TB24 tablet, Take 1 tablet by mouth once daily., Disp: , Rfl:     calcium carb and citrate-vitD3 (CITRACAL-D3 SLOW RELEASE) 600 mg-12.5 mcg (500 unit) TbSR, Take 2 tablets by mouth once daily., Disp: 180 tablet, Rfl: 3    celecoxib (CELEBREX) 100 MG capsule, Take 1 capsule by mouth 2 (two) times daily., Disp: , Rfl:     cyclobenzaprine (FLEXERIL) 5 MG tablet, Take 2 tablets (10 mg total) by mouth 3 (three) times daily as needed for muscle spasms, Disp: 90 tablet, Rfl: 0    dicyclomine (BENTYL) 20 mg tablet, TAKE 1 TABLET BY MOUTH EVERY 6 HOURS., Disp: 120 tablet, Rfl: 0    FLUoxetine 40 MG capsule, Take 2 capsules (80 mg total) by mouth once daily., Disp: 180 capsule, Rfl: 3    gabapentin (NEURONTIN) 300 MG capsule, Take 3 capsules (900 mg total) by mouth 3 (three) times daily., Disp: 270 capsule, Rfl: 11    gabapentin (NEURONTIN) 600 MG tablet, Take 1 tablet by mouth 3 (three) times daily., Disp: , Rfl:     HYDROcodone-acetaminophen (NORCO) 5-325 mg per tablet, Take 1 tablet by mouth every 24 hours as needed for Pain., Disp: 30 tablet, Rfl: 0    hydrOXYzine HCL (ATARAX) 10 MG Tab, Take 1 tablet (10 mg total) by mouth 2 (two) times daily as  needed (anxiety)., Disp: 60 tablet, Rfl: 3    lisinopriL (PRINIVIL,ZESTRIL) 20 MG tablet, Take 1 tablet (20 mg total) by mouth nightly., Disp: 90 tablet, Rfl: 3    lurasidone (LATUDA) 20 mg Tab tablet, Take 2 tablets (40 mg total) by mouth once daily., Disp: 60 tablet, Rfl: 11    meloxicam (MOBIC) 15 MG tablet, Take 1 tablet (15 mg total) by mouth once daily., Disp: 30 tablet, Rfl: 1    methocarbamoL (ROBAXIN) 500 MG Tab, Take 1 tablet by mouth 3 times daily as needed., Disp: , Rfl:     mometasone (ELOCON) 0.1 % ointment, APPLY OINTMENT TOPICALLY TO LEGS TWICE DAILY FOR 2 WEEKS, Disp: , Rfl:     multivitamin with minerals tablet, Take 1 tablet by mouth once daily., Disp: , Rfl:     naloxone (NARCAN) 4 mg/actuation [place], 4mg by nasal route as needed for opioid overdose; may repeat every 2-3 minutes in alternating nostrils until medical help arrives. Call 911, Disp: 1 each, Rfl: 11    omeprazole (PRILOSEC) 40 MG capsule, Take 1 capsule by mouth every morning., Disp: , Rfl:     pregabalin 330 mg Tb24, Take 330 mg by mouth after dinner., Disp: 30 tablet, Rfl: 5    promethazine (PHENERGAN) 25 MG tablet, Take 1 tablet (25 mg total) by mouth every 8 (eight) hours as needed., Disp: 30 tablet, Rfl: 0    QULIPTA 60 mg Tab, Take 1 tablet by mouth once daily., Disp: , Rfl:     rimegepant (NURTEC) 75 mg odt, DISSOLVE 1 TABLET BY MOUTH AS NEEDED FOR MIGRAINE HEADACHE, Disp: , Rfl:     tiZANidine (ZANAFLEX) 4 MG tablet, Take 1 tablet (4 mg total) by mouth every 6 (six) hours as needed (muscle spasm)., Disp: 120 tablet, Rfl: 0    amoxicillin-clavulanate 875-125mg (AUGMENTIN) 875-125 mg per tablet, TAKE 1 TABLET BY MOUTH EVERY 12 HOURS WITH FOOD FOR 7 DAYS (Patient not taking: Reported on 2/18/2025), Disp: , Rfl:     azelastine (ASTELIN) 137 mcg (0.1 %) nasal spray, , Disp: , Rfl:     benzonatate (TESSALON) 200 MG capsule, Take 1 capsule (200 mg total) by mouth 3 (three) times daily as needed for Cough., Disp: 30 capsule, Rfl: 0     doxycycline (VIBRAMYCIN) 100 MG Cap, , Disp: , Rfl:     fluconazole (DIFLUCAN) 200 MG Tab, Take 1 tablet (200 mg total) by mouth once daily. Take at first sign of yeast infection (Patient not taking: Reported on 2/18/2025), Disp: 1 tablet, Rfl: 0    gabapentin (NEURONTIN) 300 MG capsule, Take 1 capsule (300 mg total) by mouth 3 (three) times daily. (Patient not taking: Reported on 2/18/2025), Disp: 90 capsule, Rfl: 11    ipratropium (ATROVENT) 42 mcg (0.06 %) nasal spray, USE 2 SPRAY(S) IN EACH NOSTRIL 4 TIMES DAILY (Patient not taking: Reported on 2/18/2025), Disp: , Rfl:     nitrofurantoin, macrocrystal-monohydrate, (MACROBID) 100 MG capsule, Take 1 capsule (100 mg total) by mouth once daily. (Patient not taking: Reported on 2/18/2025), Disp: 30 capsule, Rfl: 5    promethazine-dextromethorphan (PROMETHAZINE-DM) 6.25-15 mg/5 mL Syrp, Take 5 mLs by mouth nightly as needed., Disp: 118 mL, Rfl: 0    tiZANidine (ZANAFLEX) 6 mg capsule, Take 6 mg by mouth 3 (three) times daily. (Patient not taking: Reported on 2/18/2025), Disp: , Rfl:

## 2025-02-19 NOTE — PROGRESS NOTES
Assessment: 59 y.o. female with right biceps tendinitis , bilateral knee osteoarthritis     I explained my diagnostic impression and the reasoning behind it in detail, using layman's terms.      Plan:   - 3/3 euflexxa injections performed for bilateral knees today  - Right shoulder continues to not be as bothersome, will plan to have her follow up with Dr. Shay in regards to her shoulder if it becomes more symptomatic.   - Return to clinic PRN     All questions were answered in detail. The patient is in full agreement with the treatment plan and will proceed accordingly.  [facility]     Chief Complaint   Patient presents with    Knee Pain     Justen knee 3rd euflexxa        Initial visit (9/20/23): Naomy Armstrong is a 59 y.o. female who presents today complaining of right shoulder pain  Duration of symptoms:  several years- pain has been intermittent. She has had constant severe pain for the last two weeks  Trauma or new activity: no  Pain is constant  Aggravating factors: Reaching overhead is somewhat painful, abduction, lifting, turning over at night  Relieving factors: rst  Night pain is present and is disruptive to sleep  Radicular symptoms: no numbness, paresthesias   Associated symptoms:  limited range of motion.    Prior treatment:  meloxicam without relief, celebrex, advil without improvement in pain.     Pain does interfere with sleep and activities of daily living .  All pain the last to the anterior shoulder, no pain localized over the subdeltoid fossa.  Does not radiate to the lateral upper arm    10/26/23  Had a lot of pain to the bilateral shoulders yesterday. Took a Meloxicam last night and her pain is somewhat better   Still has pain in the right shoulder - never got better with injection  PT starts soon - she was not able to get in sooner because of her work schedule     3/13/24  Pain in both shoulders has returned   Did PT with epic and it did not help   R  is more bothersome today, usually left is very painful as well   Pushing and motion with the shoulder are painful  More painful in morning  Pain is anterior     1/8/25 PA:  Patient returns today with pain in the right shoulder. Left shoulder not bothersome today.   Pain is located anterior.  Worse with reaching overhead - most noticeable when she is opening/closing trunk of her car or lifting items.  Last time patient was seen for R shoulder, MRI was ordered. She did not have this done. Currently living in [place] and has had difficulty scheduling appointments   Of note, had a fall on Saturday at home. She was wearing slippers and was stepping backwards and fell onto her backside and hit her right shoulder on some furniture/items on the way down.     She is also having ongoing bilateral knee pain despite most recent steroid injections on 12/30/24. We have already put in the prior authorization for euflexxa injections and are planning to have her follow up for these once approved by insurance. She is using a cane to ambulate today due to knee pain.     2/4/25 PA:  Patient returns today for bilateral knee pain and initiation of euflexxa injections as well as right shoulder MRI.   Reviewed MRI results - shoulder not as bothersome as knees today    2/11/25 PA:  Patient returns today for bilateral knee pain and 2/3 euflexxa injections.   Reports some improvement pain levels after first injection     2/19/25 PA:   Patient returns toady for 3/3 bilateral euflexxa injections. Reports her left knee is doing much better, not bothersome at all. Right knee is doing much better since starting this euflexxa series, but is still bothersome when ambulating. She is able to ambulate today with only minimal pain. Not using her cane today.   R shoulder continues to be not as bothersome. Only has pain with specific movements, lifting       This is the extent of the patient's complaints at this time.      Review of patient's  "allergies indicates:   Allergen Reactions    Effexor [venlafaxine]      Elevated her blood pressure    Zoloft [sertraline]     Paroxetine hcl      Made her feel drunk         Physical Exam: No changes in exam, previous exam below  Vitals:    02/20/25 0758   Weight: 75.7 kg (166 lb 14.2 oz)   Height: 5' 5" (1.651 m)   PainSc:   2     General: Patient is alert, awake and oriented to time, place and person. Mood and affect are appropriate.  Patient does not appear to be in any distress, denies any constitutional symptoms and appears stated age.   HEENT: Pupils are equal and round, sclera are not injected. External examination of ears and nose reveals no abnormalities. Cranial nerves II-X are grossly intact  Skin: no rashes, abrasions or open wounds on the affected extremity. She does have some bruising related to recent fall over the lateral aspect of the right shoulder  Resp: No respiratory distress or audible wheezing   CV: 2+  pulses, all extremities warm and well perfused   Bilateral Shoulder    Shoulder Range of Motion    Right     Left   (Active/Passive)       Forward Elevation     165/165         165/165  External rotation (arm at side)  45/45             45/45   Internal rotation behind the back  L3             L3     Range of motion is painful  - anterior pain    Acromioclavicular joint is not tender  Crossbody test: negative    Neer's negative  Hawkin's negative    Kofi's negative  Drop arm negative  Belly press negative      Cuff Strength     Right     Left   Supraspinatus        4/5    5/5  Infraspinatus     5/5    5/5  Subscapularis     5/5    5/5    Deltoid testing            5/5    5/5    Stephens's test positive  Speeds positive  Yergasons positive  TTP over bicipital groove on R    Elbow examination demonstrates no tenderness to palpation and has normal range of motion.     ltsi C5-T1  + epl, io, fds, fdp   2+ RP     Knee Exam  No change in knee exam      Imaging:   MRI R shoulder: rotator cuff " tendinosis with high-grade partial superior subscapularis tendon tear, medial perching of a split long head biceps tendon, and small full-thickness anterior supraspinatus insertional tear. Additional areas of partial-thickness tearing of the supraspinatus and infraspinatus. Biceps tenosynovitis. Multifocal grades 1-3 glenohumeral chondromalacia. Subacromial and subcoracoid bursitis        Current Outpatient Medications:     alendronate (FOSAMAX) 35 MG tablet, Take 1 tablet (35 mg total) by mouth every 7 days., Disp: 12 tablet, Rfl: 1    alosetron (LOTRONEX) 0.5 MG tablet, Take 1 tablet (0.5 mg total) by mouth 2 (two) times daily., Disp: 180 tablet, Rfl: 3    amLODIPine (NORVASC) 5 MG tablet, Take 1 tablet by mouth once daily, Disp: 90 tablet, Rfl: 1    amoxicillin-clavulanate 875-125mg (AUGMENTIN) 875-125 mg per tablet, TAKE 1 TABLET BY MOUTH EVERY 12 HOURS WITH FOOD FOR 7 DAYS (Patient not taking: Reported on 2/18/2025), Disp: , Rfl:     atorvastatin (LIPITOR) 10 MG tablet, Take 1 tablet (10 mg total) by mouth once daily., Disp: 90 tablet, Rfl: 3    azelastine (ASTELIN) 137 mcg (0.1 %) nasal spray, , Disp: , Rfl:     benzonatate (TESSALON) 200 MG capsule, Take 1 capsule (200 mg total) by mouth 3 (three) times daily as needed for Cough., Disp: 30 capsule, Rfl: 0    buPROPion (WELLBUTRIN XL) 150 MG TB24 tablet, Take 1 tablet by mouth once daily., Disp: , Rfl:     calcium carb and citrate-vitD3 (CITRACAL-D3 SLOW RELEASE) 600 mg-12.5 mcg (500 unit) TbSR, Take 2 tablets by mouth once daily., Disp: 180 tablet, Rfl: 3    celecoxib (CELEBREX) 100 MG capsule, Take 1 capsule by mouth 2 (two) times daily., Disp: , Rfl:     cyclobenzaprine (FLEXERIL) 5 MG tablet, Take 2 tablets (10 mg total) by mouth 3 (three) times daily as needed for muscle spasms, Disp: 90 tablet, Rfl: 0    dicyclomine (BENTYL) 20 mg tablet, TAKE 1 TABLET BY MOUTH EVERY 6 HOURS., Disp: 120 tablet, Rfl: 0    doxycycline (VIBRAMYCIN) 100 MG Cap, , Disp: ,  Rfl:     fluconazole (DIFLUCAN) 200 MG Tab, Take 1 tablet (200 mg total) by mouth once daily. Take at first sign of yeast infection (Patient not taking: Reported on 2/18/2025), Disp: 1 tablet, Rfl: 0    FLUoxetine 40 MG capsule, Take 2 capsules (80 mg total) by mouth once daily., Disp: 180 capsule, Rfl: 3    gabapentin (NEURONTIN) 300 MG capsule, Take 3 capsules (900 mg total) by mouth 3 (three) times daily., Disp: 270 capsule, Rfl: 11    gabapentin (NEURONTIN) 300 MG capsule, Take 1 capsule (300 mg total) by mouth 3 (three) times daily. (Patient not taking: Reported on 2/18/2025), Disp: 90 capsule, Rfl: 11    gabapentin (NEURONTIN) 600 MG tablet, Take 1 tablet by mouth 3 (three) times daily., Disp: , Rfl:     HYDROcodone-acetaminophen (NORCO) 5-325 mg per tablet, Take 1 tablet by mouth every 24 hours as needed for Pain., Disp: 30 tablet, Rfl: 0    hydrOXYzine HCL (ATARAX) 10 MG Tab, Take 1 tablet (10 mg total) by mouth 2 (two) times daily as needed (anxiety)., Disp: 60 tablet, Rfl: 3    ipratropium (ATROVENT) 42 mcg (0.06 %) nasal spray, USE 2 SPRAY(S) IN EACH NOSTRIL 4 TIMES DAILY (Patient not taking: Reported on 2/18/2025), Disp: , Rfl:     lisinopriL (PRINIVIL,ZESTRIL) 20 MG tablet, Take 1 tablet (20 mg total) by mouth nightly., Disp: 90 tablet, Rfl: 3    lurasidone (LATUDA) 20 mg Tab tablet, Take 2 tablets (40 mg total) by mouth once daily., Disp: 60 tablet, Rfl: 11    meloxicam (MOBIC) 15 MG tablet, Take 1 tablet (15 mg total) by mouth once daily., Disp: 30 tablet, Rfl: 1    methocarbamoL (ROBAXIN) 500 MG Tab, Take 1 tablet by mouth 3 times daily as needed., Disp: , Rfl:     mometasone (ELOCON) 0.1 % ointment, APPLY OINTMENT TOPICALLY TO LEGS TWICE DAILY FOR 2 WEEKS, Disp: , Rfl:     multivitamin with minerals tablet, Take 1 tablet by mouth once daily., Disp: , Rfl:     naloxone (NARCAN) 4 mg/actuation Spry, 4mg by nasal route as needed for opioid overdose; may repeat every 2-3 minutes in alternating nostrils  until medical help arrives. Call 911, Disp: 1 each, Rfl: 11    nitrofurantoin, macrocrystal-monohydrate, (MACROBID) 100 MG capsule, Take 1 capsule (100 mg total) by mouth once daily. (Patient not taking: Reported on 2/18/2025), Disp: 30 capsule, Rfl: 5    omeprazole (PRILOSEC) 40 MG capsule, Take 1 capsule by mouth every morning., Disp: , Rfl:     pregabalin 330 mg Tb24, Take 330 mg by mouth after dinner., Disp: 30 tablet, Rfl: 5    promethazine (PHENERGAN) 25 MG tablet, Take 1 tablet (25 mg total) by mouth every 8 (eight) hours as needed., Disp: 30 tablet, Rfl: 0    promethazine-dextromethorphan (PROMETHAZINE-DM) 6.25-15 mg/5 mL Syrp, Take 5 mLs by mouth nightly as needed., Disp: 118 mL, Rfl: 0    QULIPTA 60 mg Tab, Take 1 tablet by mouth once daily., Disp: , Rfl:     rimegepant (NURTEC) 75 mg odt, DISSOLVE 1 TABLET BY MOUTH AS NEEDED FOR MIGRAINE HEADACHE, Disp: , Rfl:     tiZANidine (ZANAFLEX) 4 MG tablet, Take 1 tablet (4 mg total) by mouth every 6 (six) hours as needed (muscle spasm)., Disp: 120 tablet, Rfl: 0    tiZANidine (ZANAFLEX) 6 mg capsule, Take 6 mg by mouth 3 (three) times daily. (Patient not taking: Reported on 2/18/2025), Disp: , Rfl:     Past Medical History:   Diagnosis Date    Alcohol abuse     per chart, but patient denies today and cocaine per chart    Anxiety     Colon polyp     Depression     Hallucination     last couple of months started seeing someone standing in her room, saw boyfriend's reflection in glass    Headache     History of psychiatric hospitalization     age 13 [facility] for 2 months for acting out; 2018 for SI    Hx of psychiatric care     Hypertension     Psychiatric problem     Sleep difficulties     Suicide attempt     with knife by cutting stomach    Therapy        Active Problem List with Overview Notes    Diagnosis Date Noted    Mixed dyslipidemia 11/06/2024     Lab Results   Component Value Date    CHOL 250 (H) 10/22/2024    CHOL 250 (H) 01/06/2024    CHOL 207 (H)  05/18/2022     Lab Results   Component Value Date    HDL 71 10/22/2024    HDL 68 01/06/2024    HDL 64 05/18/2022     Lab Results   Component Value Date    LDLCALC 135.0 10/22/2024    LDLCALC 161.4 (H) 01/06/2024    LDLCALC 100.0 05/18/2022     Lab Results   Component Value Date    TRIG 220 (H) 10/22/2024    TRIG 103 01/06/2024    TRIG 215 (H) 05/18/2022     Lab Results   Component Value Date    CHOLHDL 28.4 10/22/2024    CHOLHDL 27.2 01/06/2024    CHOLHDL 30.9 05/18/2022      The 10-year ASCVD risk score (Damaris GERARDO, et al., 2019) is: 2.9%    Values used to calculate the score:      Age: 59 years      Sex: Female      Is Non- : No      Diabetic: No      Tobacco smoker: No      Systolic Blood Pressure: 98 mmHg      Is BP treated: Yes      HDL Cholesterol: 71 mg/dL      Total Cholesterol: 250 mg/dL        S/P lumbar spinal fusion 10/29/2024     10/28/24: Left MIS L5-S1 TLIF with Dr. King      Osteopenia after menopause 07/30/2024 06-  DEXA  Lumbar spine (L1-L4):  T-score is -1.6, and Z-score is -0.1.  Femoral neck:    T-score is -2.3, and Z-score is -1.0.  Total hip:    T-score is -1.4, and Z-score is -0.4.    Fracture Risk (FRAX)  18% risk of a major osteoporotic fracture in the next 10 years.  2.8% risk of hip fracture in the next 10 years.    Impression:  Low bone mass (Osteopenia); FRAX calculations do not support treatment as osteoporosis.    RECOMMENDATIONS:  *Daily calcium intake 6837-1478 mg, dietary sources preferred; Vitamin D 9385-5319 IU daily.  *Weight bearing exercise and fall precautions.  *No additional pharmacologic therapy recommended at this time.  *Repeat BMD in 2 years.      Lumbar radiculopathy 05/16/2024    Hypertension, essential 02/18/2024    Memory impairment 12/05/2023    Generalized anxiety disorder 11/27/2022    Esophagogastric junction outflow obstruction 11/22/2022    Major depressive disorder, recurrent, moderate 02/09/2022    Cervical spondylosis  08/11/2021    Hiatal hernia 07/09/2021    Irritable bowel syndrome with diarrhea 07/09/2021    Lumbosacral spondylosis 04/16/2021    Sacroiliac joint pain 03/31/2021    Spondylosis of lumbosacral region 03/31/2021    Chronic back pain greater than 3 months duration 07/16/2020    Decreased strength of lower extremity 06/01/2020    Decreased strength of trunk and back 06/01/2020    Pain in joint of right hip 06/01/2020    Decreased range of motion of right lower extremity 06/01/2020    Bilateral primary osteoarthritis of knee 03/12/2020    Lumbar spondylosis 01/13/2020    DDD (degenerative disc disease), lumbar 01/13/2020    Spondylosis of cervical region without myelopathy or radiculopathy 07/09/2019    Primary osteoarthritis of right knee 06/24/2019    Neck pain 06/24/2019    Cervical radiculopathy 04/26/2013     X-ray cervical spine and EMG/nerve conduction studies      DDD (degenerative disc disease), cervical 04/26/2013    Cervicogenic headache 04/26/2013     Patient has cervicogenic headaches which we will address with a combination of heat/ice application, muscle relaxers, and potentially anti-inflammatories.          Past Surgical History:   Procedure Laterality Date    COLONOSCOPY N/A 05/07/2021    Procedure: COLONOSCOPY;  Surgeon: Prem Montana MD;  Location: [facility];  Service: Endoscopy;  Laterality: N/A;  COVID screening 5/4 LAPC-instr portal -ml    COLONOSCOPY N/A 06/06/2022    Procedure: COLONOSCOPY;  Surgeon: Fab Shepherd MD;  Location: [facility] ([address] [place] [address]);  Service: Endoscopy;  Laterality: N/A;  C-Diff neg  Rapid COVID    EPIDURAL STEROID INJECTION Bilateral 08/05/2020    Procedure: Bilateral MBB @ L3, L4, L5;  Surgeon: Jeremy Powell Jr., MD;  Location: [facility];  Service: Pain Management;  Laterality: Bilateral;  Bilat L3-5 MBB  Arrive @ 1315; No ATC or DM; Needs MD signature    EPIDURAL STEROID INJECTION Right 08/19/2020    Procedure: Lumbar Radiofrequency Thermocoagulation of Medial  Branches;  Surgeon: Jeremy Powell Jr., MD;  Location: [facility];  Service: Pain Management;  Laterality: Right;  Right L3-5 RFA  Arrive @ 1045; No ATC or DM; Needs MD Signature    EPIDURAL STEROID INJECTION Left 09/04/2020    Procedure: Lumbar Radiofrequency Thermocoagulation of Medial Branches;  Surgeon: Jeremy Powell Jr., MD;  Location: [facility];  Service: Pain Management;  Laterality: Left;  Left L3-5 RFA  Arrive @ 0900 (requested); No ATC or DM; Needs MD signature    EPIDURAL STEROID INJECTION Bilateral 04/16/2021    Procedure: Sacroiliac Joint Steroid Injections @ Bilateral;  Surgeon: Jeremy Powell Jr., MD;  Location: [facility];  Service: Pain Management;  Laterality: Bilateral;  Arrive @ 1300; No ATC or DM    EPIDURAL STEROID INJECTION Bilateral 01/19/2022    Procedure: Bilateral Sacroiliac Joint Injections;  Surgeon: Jeremy Powell Jr., MD;  Location: [facility];  Service: Pain Management;  Laterality: Bilateral;  Arrive @ 1315; No ATC or DM; Needs Consent    EPIDURAL STEROID INJECTION Bilateral 07/28/2023    Procedure: Bilateral L2, L3, L4 Medial Branch Blocks #1;  Surgeon: Jeremy Powell Jr., MD;  Location: [facility];  Service: Pain Management;  Laterality: Bilateral;  @1200  No ATC or DM    EPIDURAL STEROID INJECTION Bilateral 08/11/2023    Procedure: Bilateral L2, L3, L4 Medial Branch Blocks #2;  Surgeon: Jeremy Powell Jr., MD;  Location: [facility];  Service: Pain Management;  Laterality: Bilateral;  @1330  No ATC or DM    ESOPHAGEAL MANOMETRY WITH MEASUREMENT OF IMPEDANCE N/A 10/20/2021    Procedure: MANOMETRY, ESOPHAGUS, WITH IMPEDANCE MEASUREMENT;  Surgeon: Anastacia Odonnell MD;  [facility]  Service: Endoscopy;  Laterality: N/A;  Covid test 10/17 Vest, instructions sent to [email or web address]    ESOPHAGOGASTRODUODENOSCOPY N/A 05/07/2021    Procedure: EGD (ESOPHAGOGASTRODUODENOSCOPY);  Surgeon: Prem Montana MD;  Location: [facility];  Service:  Endoscopy;  Laterality: N/A;  added on per Dr. NOEL Shepherd    EYE SURGERY  2015    Lasik    GANGLION CYST EXCISION      HERNIA REPAIR  2022    Hiatal hernia    INJECTION, FACET JOINT, LUMBOSACRAL Left 2024    Procedure: Left S1 Transforaminal Epidural Steroid Injection + Left L5-S1 Facet Joint Injection/Aspiration;  Surgeon: Jeremy Powell Jr., MD;  Location: [facility];  Service: Pain Management;  Laterality: Left;  @1200  No ATC or DM  Needs Consent  IV Sedation    LAPAROSCOPIC TOUPET FUNDOPLICATION N/A 2022    Procedure: FUNDOPLICATION, LAPAROSCOPIC, TOUPET;  Surgeon: Christian Martinez MD;  Location: [facility] OR [address] [place] [address];  Service: General;  Laterality: N/A;    MINIMALLY INVASIVE TRANSFORAMINAL LUMBAR INTERBODY FUSION (TLIF) Left 10/28/2024    Procedure: FUSION, SPINE, LUMBAR, TLIF, MINIMALLY INVASIVE;  Surgeon: Christian King MD;  Location: [facility] OR;  Service: Neurosurgery;  Laterality: Left;  Left L5/S1, possible L4-5 minimally invasive TLIF  sravanthi 4 poster   fluoro  no neuromonitoring needed   Depuy  [email or web address] CARMELA NOTIFIED-GG  RN PREOP 10/14/2024   T/S ON 10/25/2024----NEED H/P       Social History     Socioeconomic History    Marital status:     Number of children: 0   Occupational History    Occupation: Overnight nuvia     Employer: [employer]   Tobacco Use    Smoking status: Former     Current packs/day: 0.00     Average packs/day: 1 pack/day for 21.3 years (21.3 ttl pk-yrs)     Types: Cigarettes     Start date: 1985     Quit date: 2006     Years since quittin.8    Smokeless tobacco: Never    Tobacco comments:     It has been a very long time. I do not remember dates   Substance and Sexual Activity    Alcohol use: Not Currently     Comment: occasional    Drug use: Not Currently     Types: Marijuana     Comment: tried at age 14    Sexual activity: Yes     Partners: Male     Birth control/protection: Post-menopausal     Comment: going through menopause    Other Topics Concern    Patient feels they ought to cut down on drinking/drug use No    Patient annoyed by others criticizing their drinking/drug use No    Patient has felt bad or guilty about drinking/drug use No    Patient has had a drink/used drugs as an eye opener in the AM No   Social History Narrative     x 2.    Lives with boyfriend.     Works at [employer] as an .    Presently working on Bachelor's degree.    Has associates degree in criminal justice.     Social Drivers of Health     Financial Resource Strain: Medium Risk (12/2/2024)    Overall Financial Resource Strain (CARDIA)     Difficulty of Paying Living Expenses: Somewhat hard   Food Insecurity: Patient Declined (12/2/2024)    Hunger Vital Sign     Worried About Running Out of Food in the Last Year: Patient declined     Ran Out of Food in the Last Year: Patient declined   Transportation Needs: Patient Declined (10/28/2024)    TRANSPORTATION NEEDS     Transportation : Patient declined   Physical Activity: Unknown (12/2/2024)    Exercise Vital Sign     Days of Exercise per Week: Patient declined     Minutes of Exercise per Session: 0 min   Stress: No Stress Concern Present (12/2/2024)    [employer] - Occupational Stress Questionnaire     Feeling of Stress : Only a little   Housing Stability: High Risk (12/2/2024)    Housing Stability Vital Sign     Unable to Pay for Housing in the Last Year: Yes     Homeless in the Last Year: Patient declined

## 2025-02-20 ENCOUNTER — OFFICE VISIT (OUTPATIENT)
Dept: ORTHOPEDICS | Facility: CLINIC | Age: 60
End: 2025-02-20
Payer: COMMERCIAL

## 2025-02-20 VITALS — WEIGHT: 166.88 LBS | HEIGHT: 65 IN | BODY MASS INDEX: 27.81 KG/M2

## 2025-02-20 DIAGNOSIS — M17.0 ARTHRITIS OF BOTH KNEES: Primary | ICD-10-CM

## 2025-02-20 NOTE — PROCEDURES
Large Joint Aspiration/Injection: bilateral knee    Date/Time: 2/20/2025 8:00 AM    Performed by: Jayne Pretty PA-C  Authorized by: Jayne Pretty PA-C    Consent Done?:  Yes (Verbal)  Indications:  Arthritis and pain  Prep: patient was prepped and draped in usual sterile fashion      Local anesthesia used?: Yes    Anesthesia:  Local infiltration  Local anesthetic:  Topical anesthetic    Details:  Needle Size:  22 G  Ultrasonic Guidance for needle placement?: No    Approach:  Anterolateral  Location:  Knee  Laterality:  Bilateral  Site:  Bilateral knee  Medications (Right):  10 mg sodium hyaluronate (EUFLEXXA) 10 mg/mL(mw 2.4 -3.6 million)  Medications (Left):  10 mg sodium hyaluronate (EUFLEXXA) 10 mg/mL(mw 2.4 -3.6 million)  Patient tolerance:  Patient tolerated the procedure well with no immediate complications

## 2025-02-23 ENCOUNTER — PATIENT MESSAGE (OUTPATIENT)
Dept: ORTHOPEDICS | Facility: CLINIC | Age: 60
End: 2025-02-23
Payer: COMMERCIAL

## 2025-02-24 DIAGNOSIS — M75.21 BICEPS TENDINITIS OF RIGHT SHOULDER: Primary | ICD-10-CM

## 2025-02-25 ENCOUNTER — OFFICE VISIT (OUTPATIENT)
Dept: FAMILY MEDICINE | Facility: CLINIC | Age: 60
End: 2025-02-25
Payer: COMMERCIAL

## 2025-02-25 VITALS
TEMPERATURE: 99 F | HEART RATE: 101 BPM | BODY MASS INDEX: 27.92 KG/M2 | DIASTOLIC BLOOD PRESSURE: 72 MMHG | HEIGHT: 65 IN | OXYGEN SATURATION: 98 % | WEIGHT: 167.56 LBS | SYSTOLIC BLOOD PRESSURE: 126 MMHG

## 2025-02-25 DIAGNOSIS — B96.89 ACUTE BACTERIAL SINUSITIS: Primary | ICD-10-CM

## 2025-02-25 DIAGNOSIS — I10 HYPERTENSION, ESSENTIAL: Chronic | ICD-10-CM

## 2025-02-25 DIAGNOSIS — E78.2 MIXED DYSLIPIDEMIA: Chronic | ICD-10-CM

## 2025-02-25 DIAGNOSIS — J01.90 ACUTE BACTERIAL SINUSITIS: Primary | ICD-10-CM

## 2025-02-25 PROCEDURE — 1159F MED LIST DOCD IN RCRD: CPT | Mod: CPTII,S$GLB,, | Performed by: FAMILY MEDICINE

## 2025-02-25 PROCEDURE — 4010F ACE/ARB THERAPY RXD/TAKEN: CPT | Mod: CPTII,S$GLB,, | Performed by: FAMILY MEDICINE

## 2025-02-25 PROCEDURE — 3074F SYST BP LT 130 MM HG: CPT | Mod: CPTII,S$GLB,, | Performed by: FAMILY MEDICINE

## 2025-02-25 PROCEDURE — 3008F BODY MASS INDEX DOCD: CPT | Mod: CPTII,S$GLB,, | Performed by: FAMILY MEDICINE

## 2025-02-25 PROCEDURE — 3078F DIAST BP <80 MM HG: CPT | Mod: CPTII,S$GLB,, | Performed by: FAMILY MEDICINE

## 2025-02-25 PROCEDURE — 99999 PR PBB SHADOW E&M-EST. PATIENT-LVL V: CPT | Mod: PBBFAC,,, | Performed by: FAMILY MEDICINE

## 2025-02-25 PROCEDURE — G2211 COMPLEX E/M VISIT ADD ON: HCPCS | Mod: S$GLB,,, | Performed by: FAMILY MEDICINE

## 2025-02-25 PROCEDURE — 1160F RVW MEDS BY RX/DR IN RCRD: CPT | Mod: CPTII,S$GLB,, | Performed by: FAMILY MEDICINE

## 2025-02-25 PROCEDURE — 99214 OFFICE O/P EST MOD 30 MIN: CPT | Mod: S$GLB,,, | Performed by: FAMILY MEDICINE

## 2025-02-25 RX ORDER — AMOXICILLIN AND CLAVULANATE POTASSIUM 875; 125 MG/1; MG/1
1 TABLET, FILM COATED ORAL EVERY 12 HOURS
Qty: 20 TABLET | Refills: 0 | Status: SHIPPED | OUTPATIENT
Start: 2025-02-25 | End: 2025-03-07

## 2025-02-25 RX ORDER — FLUCONAZOLE 150 MG/1
150 TABLET ORAL
Qty: 2 TABLET | Refills: 0 | Status: SHIPPED | OUTPATIENT
Start: 2025-02-25 | End: 2025-03-01

## 2025-02-25 RX ORDER — BROMPHENIRAMINE MALEATE, PSEUDOEPHEDRINE HYDROCHLORIDE, AND DEXTROMETHORPHAN HYDROBROMIDE 2; 30; 10 MG/5ML; MG/5ML; MG/5ML
10 SYRUP ORAL
Qty: 473 ML | Refills: 0 | Status: SHIPPED | OUTPATIENT
Start: 2025-02-25 | End: 2025-03-07

## 2025-02-25 RX ORDER — IPRATROPIUM BROMIDE 42 UG/1
2 SPRAY, METERED NASAL 4 TIMES DAILY
Qty: 15 ML | Refills: 0 | Status: SHIPPED | OUTPATIENT
Start: 2025-02-25

## 2025-02-25 NOTE — LETTER
February 25, 2025      [address] [place] [address]  Phone: [phone]       Patient: Naomy Armstrong   YOB: 1965  Date of Visit: 02/25/2025    To Whom It May Concern:    Naomy Armstrong  was at [facility] on 02/25/2025. The patient may return to work on 02/26/2025 with no restrictions. If you have any questions or concerns, or if I can be of further assistance, please do not hesitate to contact me.    Sincerely,    Eric Hernandes Jr., MD, AAHIVS   [facility]  [address]  [address]  [place] [address]  Dept: [phone]

## 2025-03-02 DIAGNOSIS — B96.89 ACUTE BACTERIAL SINUSITIS: ICD-10-CM

## 2025-03-02 DIAGNOSIS — J01.90 ACUTE BACTERIAL SINUSITIS: ICD-10-CM

## 2025-03-02 RX ORDER — IPRATROPIUM BROMIDE 42 UG/1
SPRAY, METERED NASAL
OUTPATIENT
Start: 2025-03-02

## 2025-03-02 NOTE — TELEPHONE ENCOUNTER
Care Due:                  Date            Visit Type   Department     Provider  --------------------------------------------------------------------------------                                GUILLERMO      [facility]/OF  MEDICINE/  Last Visit: 02-      FICE VISIT   INTERNAL MED   Eric Hernandes                               -         [facility]                              PRIMARY      MEDICINE/  Next Visit: 05-      CARE ([facility])   INTERNAL MED   Eric Hernandes                                                            Last  Test          Frequency    Reason                     Performed    Due Date  --------------------------------------------------------------------------------    Mg Level....  12 months..  alendronate..............  Not Found    Overdue    Phosphate...  12 months..  alendronate..............  Not Found    Overdue    [employer] Embedded Care Due Messages. Reference number: [id number].   3/02/2025 5:42:33 PM CST

## 2025-03-03 NOTE — TELEPHONE ENCOUNTER
Refill Decision Note   Naomy Armstrong  is requesting a refill authorization.  Brief Assessment and Rationale for Refill:  Quick Discontinue     Medication Therapy Plan:  Receipt confirmed by pharmacy (2/25/2025  2:50 PM CST)      Comments:     Note composed:9:39 PM 03/02/2025

## 2025-03-16 NOTE — PROGRESS NOTES
"  Patient Name: Naomy Armstrong    : 1965  MRN: [id number]      Subjective:     Patient ID: Naomy is a 59 y.o. female    Chief Complaint:  Cough (Pt c/o cough, nausea, headache, runny nose and fatigue x 2 weeks)    History of Present Illness    Naomy presents today with persistent sinus and throat symptoms after previous treatment was ineffective    She reports severe throat pain that began 10 days ago, initially described as feeling like razor blades for three consecutive days. Symptoms showed temporary improvement before significant worsening. She experiences pressure on top of the eyes, night sweats, and significant nasal discharge requiring frequent nose blowing. She has a non-productive cough causing chest pain and headache with sensation of trapped phlegm without expectoration. She denies wheezing or shortness of breath.    She has allergies to Effexor, Zoloft, and Paroxetine. She denies any antibiotic allergies.    She has a history of developing yeast infections when taking antibiotics.    Review of Systems   Constitutional:  Positive for fever. Negative for chills.   HENT:  Positive for postnasal drip, rhinorrhea and sore throat. Negative for ear pain.    Respiratory:  Positive for cough. Negative for shortness of breath and wheezing.    Cardiovascular:  Positive for chest pain.   Musculoskeletal:  Negative for myalgias.   Integumentary:  Negative for rash.   Allergic/Immunologic: Negative for environmental allergies.   Neurological:  Positive for headaches.        Objective:   /72 (BP Location: Right arm, Patient Position: Sitting)   Pulse 101   Temp 98.6 °F (37 °C) (Oral)   Ht 5' 5" (1.651 m)   Wt 76 kg (167 lb 8.8 oz)   LMP 09/15/2013   SpO2 98%   BMI 27.88 kg/m²     Physical Exam  Constitutional:       General: She is not in acute distress.     Appearance: She is ill-appearing. She is not toxic-appearing.   HENT:      Head: Normocephalic and atraumatic.      Right Ear: " External ear normal.      Left Ear: External ear normal.      Nose: Mucosal edema and rhinorrhea present.      Right Sinus: Maxillary sinus tenderness present.      Left Sinus: Maxillary sinus tenderness present.      Mouth/Throat:      Pharynx: Uvula midline. Posterior oropharyngeal erythema present. No oropharyngeal exudate.   Eyes:      General: Lids are normal.      Conjunctiva/sclera:      Right eye: Right conjunctiva is injected.      Left eye: Left conjunctiva is injected.      Pupils: Pupils are equal, round, and reactive to light.   Neck:      Trachea: Trachea normal.   Cardiovascular:      Rate and Rhythm: Normal rate and regular rhythm.      Heart sounds: S1 normal and S2 normal.   Pulmonary:      Effort: Pulmonary effort is normal. No respiratory distress.      Breath sounds: Normal breath sounds. No wheezing, rhonchi or rales.   Abdominal:      General: Bowel sounds are normal. There is no distension.      Palpations: Abdomen is soft.      Tenderness: There is no abdominal tenderness.   Musculoskeletal:      Cervical back: Normal range of motion and neck supple.   Neurological:      Mental Status: She is alert.   Psychiatric:         Behavior: Behavior is cooperative.            Assessment        ICD-10-CM ICD-9-CM   1. Acute bacterial sinusitis  J01.90 461.9    B96.89    2. Hypertension, essential  I10 401.9   3. Mixed dyslipidemia  E78.2 272.2         Plan:   Assessment & Plan    IMPRESSION:  Diagnosed sinus infection based on symptoms and duration (2 weeks)  Determined bacterial etiology due to persistence beyond typical viral course  Considered patient's history of yeast infections with antibiotics  Selected Augmentin as antibiotic of choice  Chose Bromphed DM for cough management  Selected Atrovent nasal spray for faster-acting symptom relief compared to Flonase          1. Acute bacterial sinusitis  -     amoxicillin-clavulanate 875-125mg (AUGMENTIN) 875-125 mg per tablet; Take 1 tablet by mouth  every 12 (twelve) hours. for 10 days  Dispense: 20 tablet; Refill: 0  -     brompheniramine-pseudoeph-DM (BROMFED DM) 2-30-10 mg/5 mL Syrp; Take 10 mLs by mouth every 4 to 6 hours as needed (cough). Maximum of 4 doses in 24 hours  Dispense: 473 mL; Refill: 0  -     ipratropium (ATROVENT) 42 mcg (0.06 %) nasal spray; 2 sprays by Each Nostril route 4 (four) times daily.  Dispense: 15 mL; Refill: 0  -     fluconazole (DIFLUCAN) 150 MG Tab; Take 1 tablet (150 mg total) by mouth every 72 hours. for 2 doses  Dispense: 2 tablet; Refill: 0  Advised patient to use an over the counter nasal saline every 1-2 hours.  Patient instructed on how to use the nasal saline.   Use prescribed nasal spray as instructed.  Given severity of symptoms will start on antibiotic regimen.  Advised using a probiotic or eating a yogurt every day to minimize GI side effects of antibiotics.    2. Hypertension, essential  Blood pressure fairly well controlled.    Continue current regimen.      3. Mixed dyslipidemia  Overview:  Lab Results   Component Value Date    CHOL 250 (H) 10/22/2024    CHOL 250 (H) 01/06/2024    CHOL 207 (H) 05/18/2022     Lab Results   Component Value Date    HDL 71 10/22/2024    HDL 68 01/06/2024    HDL 64 05/18/2022     Lab Results   Component Value Date    LDLCALC 135.0 10/22/2024    LDLCALC 161.4 (H) 01/06/2024    LDLCALC 100.0 05/18/2022     Lab Results   Component Value Date    TRIG 220 (H) 10/22/2024    TRIG 103 01/06/2024    TRIG 215 (H) 05/18/2022     Lab Results   Component Value Date    CHOLHDL 28.4 10/22/2024    CHOLHDL 27.2 01/06/2024    CHOLHDL 30.9 05/18/2022      The 10-year ASCVD risk score (Damaris GERARDO, et al., 2019) is: 4.8%    Values used to calculate the score:      Age: 59 years      Sex: Female      Is Non- : No      Diabetic: No      Tobacco smoker: No      Systolic Blood Pressure: 126 mmHg      Is BP treated: Yes      HDL Cholesterol: 71 mg/dL      Total Cholesterol: 250 mg/dL     Continue atorvastatin 10 milligrams daily.             -Eric Hernandes Jr., MD, AAHIVS      This note was generated with the assistance of ambient listening technology. Verbal consent was obtained by the patient and accompanying visitor(s) for the recording of patient appointment to facilitate this note. I attest to having reviewed and edited the generated note for accuracy, though some syntax or spelling errors may persist. Please contact the author of this note for any clarification.      There are no Patient Instructions on file for this visit.      No follow-ups on file.   Future Appointments   Date Time Provider Department Center   5/5/2025  8:45 AM Jessica Shay MD [facility]   5/6/2025  8:30 AM LAB, [facility] DRAW STATION [facility] LAB ST [facility] [place]   5/7/2025 10:20 AM [facility] CT2 LIMIT 500 LBS [facility] CT SCAN [facility] [place]   5/7/2025 11:00 AM Christian King MD [facility] NEUSUR [facility]   5/16/2025  9:00 AM Eric Hernandes Jr., MD [facility] IM [facility] [place]   5/19/2025 12:00 PM Christine Brunson MD [facility]

## 2025-03-18 ENCOUNTER — PATIENT MESSAGE (OUTPATIENT)
Dept: PAIN MEDICINE | Facility: CLINIC | Age: 60
End: 2025-03-18
Payer: COMMERCIAL

## 2025-03-21 ENCOUNTER — HOSPITAL ENCOUNTER (OUTPATIENT)
Dept: RADIOLOGY | Facility: HOSPITAL | Age: 60
Discharge: HOME | End: 2025-03-21
Payer: COMMERCIAL

## 2025-03-21 DIAGNOSIS — M54.9 DORSALGIA, UNSPECIFIED: ICD-10-CM

## 2025-03-21 PROCEDURE — 72148 MRI LUMBAR SPINE W/O DYE: CPT | Mod: 26,,, | Performed by: RADIOLOGY

## 2025-03-21 PROCEDURE — 72148 MRI LUMBAR SPINE W/O DYE: CPT | Mod: TC

## 2025-03-24 ENCOUNTER — OFFICE VISIT (OUTPATIENT)
Dept: PAIN MEDICINE | Facility: CLINIC | Age: 60
End: 2025-03-24
Payer: COMMERCIAL

## 2025-03-24 VITALS — DIASTOLIC BLOOD PRESSURE: 82 MMHG | SYSTOLIC BLOOD PRESSURE: 128 MMHG

## 2025-03-24 DIAGNOSIS — M47.816 LUMBAR SPONDYLOSIS: ICD-10-CM

## 2025-03-24 DIAGNOSIS — Z98.1 S/P LUMBAR SPINAL FUSION: Primary | ICD-10-CM

## 2025-03-24 DIAGNOSIS — M51.362 DEGENERATION OF INTERVERTEBRAL DISC OF LUMBAR REGION WITH DISCOGENIC BACK PAIN AND LOWER EXTREMITY PAIN: ICD-10-CM

## 2025-03-24 DIAGNOSIS — M54.16 LUMBAR RADICULOPATHY: ICD-10-CM

## 2025-03-24 DIAGNOSIS — B96.89 ACUTE BACTERIAL SINUSITIS: ICD-10-CM

## 2025-03-24 DIAGNOSIS — M54.9 DORSALGIA, UNSPECIFIED: ICD-10-CM

## 2025-03-24 DIAGNOSIS — J01.90 ACUTE BACTERIAL SINUSITIS: ICD-10-CM

## 2025-03-24 PROCEDURE — 99214 OFFICE O/P EST MOD 30 MIN: CPT | Mod: S$GLB,,,

## 2025-03-24 PROCEDURE — 4010F ACE/ARB THERAPY RXD/TAKEN: CPT | Mod: CPTII,S$GLB,,

## 2025-03-24 PROCEDURE — 1160F RVW MEDS BY RX/DR IN RCRD: CPT | Mod: CPTII,S$GLB,,

## 2025-03-24 PROCEDURE — 1159F MED LIST DOCD IN RCRD: CPT | Mod: CPTII,S$GLB,,

## 2025-03-24 PROCEDURE — 3079F DIAST BP 80-89 MM HG: CPT | Mod: CPTII,S$GLB,,

## 2025-03-24 PROCEDURE — 99999 PR PBB SHADOW E&M-EST. PATIENT-LVL IV: CPT | Mod: PBBFAC,,,

## 2025-03-24 PROCEDURE — 3074F SYST BP LT 130 MM HG: CPT | Mod: CPTII,S$GLB,,

## 2025-03-24 RX ORDER — LIDOCAINE 50 MG/G
1 PATCH TOPICAL DAILY
Qty: 30 PATCH | Refills: 0 | Status: SHIPPED | OUTPATIENT
Start: 2025-03-24 | End: 2025-04-23

## 2025-03-24 RX ORDER — IPRATROPIUM BROMIDE 42 UG/1
SPRAY, METERED NASAL
Qty: 45 ML | Refills: 0 | Status: SHIPPED | OUTPATIENT
Start: 2025-03-24

## 2025-03-24 NOTE — PROGRESS NOTES
Subjective:     Patient ID: Naomy Armstrong is a 59 y.o. female    Chief Complaint: Follow-up      Referred by: No ref. provider found      HPI:    Interval History PA (03/24/2025):  Patient returns to clinic for follow up and MRI review.  Denies changes in the quality or location of her pain since previous visit.  Denies new or worsening symptoms.    Interval History PA (02/05/2025):  Patient returns to clinic for follow up.  Since previous visit patient has undergone MIS L5-S1 TLIF done on 10/28/2024.  Following surgery patient reports significant improvement in her radicular pain.  No longer having pain radiating into her lower extremities.  However continues to note significant pain located across her lower lumbar/lumbosacral region.  Pain is worse in the right compared to the left.  She continues to follow up with Neurosurgery postop.  Recently started physical therapy without benefit.  Patient also recently discontinued opioid pain medications.  She was taking Norco 5-325 mg b.i.d. p.r.n. with adequate relief, denies adverse effects from this medication.  Notes the medication mainly was providing benefit    Interval History PA (07/08/2024):  Patient returns to clinic for follow up.  Patient with continued left-sided lower back and extremity pain as before.  Patient continues to follow up with Neurosurgery who is planning on performing a MIS L5-S1 TLIF.  Unfortunately this procedure has been postponed due to low bone mineral density.  Patient following up with primary care to work on bone mineral density before proceeding with spinal surgery.  In the meantime the patient has been taking Norco  mg q.8 hours p.r.n. with adequate relief.  She does note occasionally spitting the medication in half when her pain is not as severe.  Otherwise denies significant adverse effects from this medication.  Patient's concern today is a new onset of right-sided upper extremity/wrist pain.  Pain started about 1-2  weeks ago and has been constant and severe since.  Pain/burning over her right wrist and 1st 3 digits.  Describes as a numbness/burning sensation over her fingers.  Symptoms are intermittent, typically worsened at night causing difficulty sleeping.  She does note occasional radiation of pain from her elbow down to her wrist.  Denies any significant neck pain.  Patient does note history of carpal tunnel on the right which she has received injections for with benefit.  Also notes that she has recently started using a nighttime splint although unsure if this has been beneficial.    Interval History PA (06/03/2024):  Patient returns to clinic for follow up of left-sided lower back and extremity pain and MRI review.  She denies significant changes in the quality or location of pain since previous visit.  Denies new or worsening symptoms.  Continues to locate pain in her left lower lumbar/lumbosacral region into left lower extremity all the way down to her bottom of the foot with associated paresthesia.  Denies any focal weakness, bowel or bladder dysfunction.    Interval History (5/16/24):  She returns today for follow up.  She reports that she is having left-sided low back and lower extremity pain.  This pain has been present for roughly 1 month.  No specific inciting events or injuries noted.  Pain is located the left lower lumbar/lumbosacral region radiate to left lower extremity all the way to the bottom of her foot with associated paresthesias.  She denies any focal weakness or bowel bladder dysfunction.  Pain is constant and worsened with activity.  Particularly bad in the morning when getting out of bed.       Interval History (7/17/23):  She returns today for follow up.  She reports that she is having recurrent bilateral low back pain.  This pain is located across the lower lumbar region.  Pain will radiate to the bilateral buttocks, but not more distally than this.  She denies any associated numbness, tingling,  weakness, bowel bladder dysfunction.  Pain is constant and worsened with activity.        Interval History (6/7/22):  She returns today for follow up.  She reports that she underwent surgery for hiatal hernia repair in February 2022.  Since his surgery she has had significant epigastric abdominal pain.  This pain is intermittent but is very severe when present.  No specific triggers identified.  Patient also reports worsening diarrhea since undergoing the surgery.  She has followed up with surgery and gastroenterology in no specific source has been identified as of yet.  States that she was provided tramadol which provided some relief.  States she was told to return to my clinic to discuss other options for pain management.       Interval History NP (02/03/22):    The patient location is: work  The chief complaint leading to consultation is: follow-up  Visit type: Virtual visit with audio.  Patient verbally consented for audio visit.  Total time spent with patient: 10 minutes  Each patient to whom he or she provides medical services by telemedicine is:  (1) informed of the relationship between the physician and patient and the respective role of any other health care provider with respect to management of the patient; and (2) notified that he or she may decline to receive medical services by telemedicine and may withdraw from such care at any time.    Pt returns today for follow up of bilateral SIJ injections. She reports 100% relief of low back pain. She denies any new or worsening symptoms. She would like to discuss chronic headaches at next visit with Dr. Powell. She is otherwise well today.     Interval History (12/29/21):    The patient location is:  home  The chief complaint leading to consultation is:  Follow-up  Visit type: Virtual visit with synchronous audio and video  Total time spent with patient:  8 minutes  Each patient to whom he or she provides medical services by telemedicine is:  (1) informed of  the relationship between the physician and patient and the respective role of any other health care provider with respect to management of the patient; and (2) notified that he or she may decline to receive medical services by telemedicine and may withdraw from such care at any time.      She returns today for follow up and MRI review.  She reports that she would like to schedule bilateral sacroiliac joint steroid injections discussed at last visit.  She is still having neck pain and headaches as well and would like to undergo cervical medial branch blocks/RFA, but her back pain is worse at this time.  Otherwise, she denies any changes in the quality or location of her pain.  She denies any new worsening symptoms.        Interval History (8/11/21):    The patient location is:  home  The chief complaint leading to consultation is:  Follow-up  Visit type: Virtual visit with synchronous audio and video  Total time spent with patient:  15 minutes  Each patient to whom he or she provides medical services by telemedicine is:  (1) informed of the relationship between the physician and patient and the respective role of any other health care provider with respect to management of the patient; and (2) notified that he or she may decline to receive medical services by telemedicine and may withdraw from such care at any time.        She returns today for follow up.  She reports that relief from bilateral sacroiliac joint steroid injections has worn off.  She would like repeat sacroiliac joint steroid injections if appropriate.  Patient also has been having headaches chronically.  She has been evaluated thoroughly by Neurology without any specific source for of headache identified.  Suspicion for cervicogenic headaches.  Patient does have pain in the upper cervical region.  The pain will radiate to the occipital and parietal regions of the head.  She denies any pain radiating to the upper extremities.  She denies any associated  "bowel bladder dysfunction.      Interval History NP (5/5/21):    Pt returns today for follow up of bilateral SIJ injections. She reports at least 75% relief of low back pain. She can now stand for long periods of time without pain or having to sit for 30 minutes. She is able to cook and complete her ADL's with much improvement. Does report continued pain while bending over but it is now manageable. Denies new or worsening symptoms.      Interval History (3/31/21):  She returns today for follow up.  She reports that bilateral L3, L4, L5 radiofrequency ablation provided roughly 75% relief but for only 1 month.  She states that this.  Time her pain returned.  Today, she localizes pain across the lower lumbar/lumbosacral region.  The pain does not radiate.  The pain is equal bilaterally.  The pain is worse with activities such as mopping and cleaning.  She denies any associated numbness, tingling, weakness, bowel bladder dysfunction.      Interval History NP (7/16/20):  Pt returns today for follow up and MRI review. She states that her pain in unchanged in quality or location. Denies any new symptoms. No bladder or bowel dysfunction. She reports "missing more days of work than she attends". She is an  at [employer] which includes heavy manual lifting and moving boxes. Her pain is worsened with activity, especially after a shift at work or when she is home cleaning. She reports that gabapentin has helped, but takes 2 pills at night and 1 pill every morning because it makes her too drowsy during the day. She also takes 500mg Naproxen PRN for the pain, maybe every few days. She would like something else for the pain. States she "took valium in the past which helped her relax and get through the day".     Interval History (7/6/20):  She returns today for follow up.  She reports that she continues to have low back/hip pain.  This pain is worse with bending and lifting.  These are movements that are often " required as a part of her job.  Overall she denies any changes in the quality or location of the pain since last encounter.  She denies any new symptoms.       Interval History (1/13/20):  She returns today for follow up.  She reports that her neck pain has improved with home exercise program.  She does not feel as though this needs any further attention.  She does state that her low back has been bothering her.  She localizes pain to the bilateral lower lumbar regions.  The pain does radiate to the bilateral hip regions.  She denies any associated numbness, tingling, weakness, bowel bladder dysfunction.  The pain is constant and worsened with activity.  She states that the pain is typically worse when initiating activity after rest.        Initial Encounter (7/9/19):  Naomy Armstrong is a 59 y.o. female who presents today with chronic neck pain. This pain has been present for years.  No specific inciting event or injury noted. She localizes the pain to the midline cervical region.  The pain does not radiate.  She denies any associated numbness, tingling, weakness, bowel bladder dysfunction.  She has had some radicular pain on the left side in the past this has resolved.  The pain is constant and worsened with activity.  She specifically states that the pain is worse when looking at screens.  She has been treated in the past by Dr. Bhavya Oneill at [facility].   This pain is described in detail below.    Physical Therapy:  Yes, currently; ineffective    Non-pharmacologic Treatment:  Rest helps         TENS?  No    Pain Medications:         Currently taking:  Lyrica, meloxicam, Norco    Has tried in the past:  muscle relaxers, Tylenol, NSAIDs, Amitriptyline, tizanidine, Celebrex, Robaxin, Flexeril    Has not tried:  SNRIs, topical creams    Blood thinners:  None    Interventional Therapies:   10/8/13- Left RFA C5, C6, C7  9/10/13- Left C5, C6, C7 MBB   8/13/13- C7-T1 IL EMMA  8/19/20 - right L3, L4, L5  radiofrequency ablation - 75% relief for 1 month  9/4/20 - left L3, L4, L5 radiofrequency ablation - 75% relief for 1 month  4/16/21 - bilateral SIJ injections - 75% relief  1/19/22 - bilateral SIJ injections - 100% relief  06/14/2024 - left S1 transforaminal epidural steroid injection + left L5-S1 facet joint injection with aspiration - short-lived relief    Relevant Surgeries:  None    Affecting sleep?  Yes    Affecting daily activities? yes    Depressive symptoms? yes          SI/HI? No    Work status: Employed    Pain Scores:    Best:       2/10  Worst:   8/10  Usually:   8/10  Today:   7/10    Pain Disability Index  Family/Home Responsibilities:: 8  Recreation:: 8  Social Activity:: 8  Occupation:: 8  Sexual Behavior:: 8  Self Care:: 8  Life-Support Activities:: 8  Pain Disability Index (PDI): 56      Review of Systems   Constitutional:  Negative for activity change, appetite change, chills, fatigue, fever and unexpected weight change.   HENT:  Negative for hearing loss.    Eyes:  Negative for visual disturbance.   Respiratory:  Negative for chest tightness and shortness of breath.    Cardiovascular:  Negative for chest pain.   Gastrointestinal:  Negative for abdominal pain, constipation, diarrhea, nausea and vomiting.   Genitourinary:  Negative for difficulty urinating.   Musculoskeletal:  Positive for back pain, gait problem and myalgias. Negative for arthralgias, neck pain and neck stiffness.   Skin:  Negative for rash.   Neurological:  Positive for numbness. Negative for dizziness, weakness, light-headedness and headaches.   Psychiatric/Behavioral:  Negative for hallucinations, sleep disturbance and suicidal ideas. The patient is not nervous/anxious.        Past Medical History:   Diagnosis Date    Alcohol abuse     per chart, but patient denies today and cocaine per chart    Anxiety     Colon polyp     Depression     Hallucination     last couple of months started seeing someone standing in her room, saw  boyfriend's reflection in glass    Headache     History of psychiatric hospitalization     age 13 [facility] for 2 months for acting out; 2018 for SI    Hx of psychiatric care     Hypertension     Psychiatric problem     Sleep difficulties     Suicide attempt     with knife by cutting stomach    Therapy        Past Surgical History:   Procedure Laterality Date    COLONOSCOPY N/A 05/07/2021    Procedure: COLONOSCOPY;  Surgeon: Prem Montana MD;  Location: [facility];  Service: Endoscopy;  Laterality: N/A;  COVID screening 5/4 LAPC-instr portal -ml    COLONOSCOPY N/A 06/06/2022    Procedure: COLONOSCOPY;  Surgeon: Fab Shepherd MD;  Location: [facility] ([facility] FLR);  Service: Endoscopy;  Laterality: N/A;  C-Diff neg  Rapid COVID    EPIDURAL STEROID INJECTION Bilateral 08/05/2020    Procedure: Bilateral MBB @ L3, L4, L5;  Surgeon: Jeremy Powell Jr., MD;  Location: [facility];  Service: Pain Management;  Laterality: Bilateral;  Bilat L3-5 MBB  Arrive @ 1315; No ATC or DM; Needs MD signature    EPIDURAL STEROID INJECTION Right 08/19/2020    Procedure: Lumbar Radiofrequency Thermocoagulation of Medial Branches;  Surgeon: Jeremy Powell Jr., MD;  Location: [facility];  Service: Pain Management;  Laterality: Right;  Right L3-5 RFA  Arrive @ 1045; No ATC or DM; Needs MD Signature    EPIDURAL STEROID INJECTION Left 09/04/2020    Procedure: Lumbar Radiofrequency Thermocoagulation of Medial Branches;  Surgeon: Jeremy Powell Jr., MD;  Location: [facility];  Service: Pain Management;  Laterality: Left;  Left L3-5 RFA  Arrive @ 0900 (requested); No ATC or DM; Needs MD signature    EPIDURAL STEROID INJECTION Bilateral 04/16/2021    Procedure: Sacroiliac Joint Steroid Injections @ Bilateral;  Surgeon: Jeremy Powell Jr., MD;  Location: [facility];  Service: Pain Management;  Laterality: Bilateral;  Arrive @ 1300; No ATC or DM    EPIDURAL STEROID INJECTION Bilateral 01/19/2022    Procedure: Bilateral Sacroiliac Joint  Injections;  Surgeon: Jeremy Powell Jr., MD;  Location: [facility];  Service: Pain Management;  Laterality: Bilateral;  Arrive @ 1315; No ATC or DM; Needs Consent    EPIDURAL STEROID INJECTION Bilateral 07/28/2023    Procedure: Bilateral L2, L3, L4 Medial Branch Blocks #1;  Surgeon: Jeremy Powell Jr., MD;  Location: [facility];  Service: Pain Management;  Laterality: Bilateral;  @1200  No ATC or DM    EPIDURAL STEROID INJECTION Bilateral 08/11/2023    Procedure: Bilateral L2, L3, L4 Medial Branch Blocks #2;  Surgeon: Jeremy Powell Jr., MD;  Location: [facility];  Service: Pain Management;  Laterality: Bilateral;  @1330  No ATC or DM    ESOPHAGEAL MANOMETRY WITH MEASUREMENT OF IMPEDANCE N/A 10/20/2021    Procedure: MANOMETRY, ESOPHAGUS, WITH IMPEDANCE MEASUREMENT;  Surgeon: Anastacia Odonnell MD;  Location: [facility] (4TH FLR);  Service: Endoscopy;  Laterality: N/A;  Covid test 10/17 [place], instructions sent to [email or web address]    ESOPHAGOGASTRODUODENOSCOPY N/A 05/07/2021    Procedure: EGD (ESOPHAGOGASTRODUODENOSCOPY);  Surgeon: Prem Montana MD;  Location: [facility];  Service: Endoscopy;  Laterality: N/A;  added on per Dr. NOEL Shepherd    EYE SURGERY  6/6/2015    Lasik    GANGLION CYST EXCISION      HERNIA REPAIR  2/14/2022    Hiatal hernia    INJECTION, FACET JOINT, LUMBOSACRAL Left 06/14/2024    Procedure: Left S1 Transforaminal Epidural Steroid Injection + Left L5-S1 Facet Joint Injection/Aspiration;  Surgeon: Jeremy Powell Jr., MD;  Location: [facility];  Service: Pain Management;  Laterality: Left;  @1200  No ATC or DM  Needs Consent  IV Sedation    LAPAROSCOPIC TOUPET FUNDOPLICATION N/A 02/14/2022    Procedure: FUNDOPLICATION, LAPAROSCOPIC, TOUPET;  Surgeon: Christian Martinez MD;  [facility];  Service: General;  Laterality: N/A;    MINIMALLY INVASIVE TRANSFORAMINAL LUMBAR INTERBODY FUSION (TLIF) Left 10/28/2024    Procedure: FUSION, SPINE, LUMBAR, TLIF,  MINIMALLY INVASIVE;  Surgeon: Christian King MD;  Location: [facility] OR;  Service: Neurosurgery;  Laterality: Left;  Left L5/S1, possible L4-5 minimally invasive TLIF  sravanthi 4 poster   fluoro  no neuromonitoring needed   Depuy  DEPUY CARMELA NOTIFIED-GG  RN PREOP 10/14/2024   T/S ON 10/25/2024----NEED H/P       Social History     Socioeconomic History    Marital status:     Number of children: 0   Occupational History    Occupation:      Employer: [employer]   Tobacco Use    Smoking status: Former     Current packs/day: 0.00     Average packs/day: 1 pack/day for 21.3 years (21.3 ttl pk-yrs)     Types: Cigarettes     Start date: 1985     Quit date: 2006     Years since quittin.9    Smokeless tobacco: Never    Tobacco comments:     It has been a very long time. I do not remember dates   Substance and Sexual Activity    Alcohol use: Not Currently     Comment: occasional    Drug use: Not Currently     Types: Marijuana     Comment: tried at age 14    Sexual activity: Yes     Partners: Male     Birth control/protection: Post-menopausal     Comment: going through menopause   Other Topics Concern    Patient feels they ought to cut down on drinking/drug use No    Patient annoyed by others criticizing their drinking/drug use No    Patient has felt bad or guilty about drinking/drug use No    Patient has had a drink/used drugs as an eye opener in the AM No   Social History Narrative     x 2.    Lives with boyfriend.     Works at [employer] as an .    Presently working on Bachelor's degree.    Has associates degree in criminal justice.     Social Drivers of Health     Financial Resource Strain: Medium Risk (2024)    Overall Financial Resource Strain (CARDIA)     Difficulty of Paying Living Expenses: Somewhat hard   Food Insecurity: Patient Declined (2024)    Hunger Vital Sign     Worried About Running Out of Food in the Last Year: Patient declined     Ran Out  of Food in the Last Year: Patient declined   Transportation Needs: Patient Declined (10/28/2024)    TRANSPORTATION NEEDS     Transportation : Patient declined   Physical Activity: Unknown (12/2/2024)    Exercise Vital Sign     Days of Exercise per Week: Patient declined     Minutes of Exercise per Session: 0 min   Stress: No Stress Concern Present (12/2/2024)    [place] [employer] - Occupational Stress Questionnaire     Feeling of Stress : Only a little   Housing Stability: High Risk (12/2/2024)    Housing Stability Vital Sign     Unable to Pay for Housing in the Last Year: Yes     Homeless in the Last Year: Patient declined       Review of patient's allergies indicates:   Allergen Reactions    Effexor [venlafaxine]      Elevated her blood pressure    Zoloft [sertraline]     Paroxetine hcl      Made her feel drunk       Current Outpatient Medications on File Prior to Visit   Medication Sig Dispense Refill    alendronate (FOSAMAX) 35 MG tablet Take 1 tablet (35 mg total) by mouth every 7 days. 12 tablet 1    alosetron (LOTRONEX) 0.5 MG tablet Take 1 tablet (0.5 mg total) by mouth 2 (two) times daily. 180 tablet 3    amLODIPine (NORVASC) 5 MG tablet Take 1 tablet by mouth once daily 90 tablet 1    atorvastatin (LIPITOR) 10 MG tablet Take 1 tablet (10 mg total) by mouth once daily. 90 tablet 3    benzonatate (TESSALON) 200 MG capsule Take 1 capsule (200 mg total) by mouth 3 (three) times daily as needed for Cough. 30 capsule 0    buPROPion (WELLBUTRIN XL) 150 MG TB24 tablet Take 1 tablet by mouth once daily.      calcium carb and citrate-vitD3 (CITRACAL-D3 SLOW RELEASE) 600 mg-12.5 mcg (500 unit) TbSR Take 2 tablets by mouth once daily. 180 tablet 3    cyclobenzaprine (FLEXERIL) 5 MG tablet Take 2 tablets (10 mg total) by mouth 3 (three) times daily as needed for muscle spasms 90 tablet 0    dicyclomine (BENTYL) 20 mg tablet TAKE 1 TABLET BY MOUTH EVERY 6 HOURS. 120 tablet 0    FLUoxetine 40 MG  capsule Take 2 capsules (80 mg total) by mouth once daily. 180 capsule 3    gabapentin (NEURONTIN) 300 MG capsule Take 3 capsules (900 mg total) by mouth 3 (three) times daily. 270 capsule 11    gabapentin (NEURONTIN) 600 MG tablet Take 1 tablet by mouth 3 (three) times daily.      lisinopriL (PRINIVIL,ZESTRIL) 20 MG tablet Take 1 tablet (20 mg total) by mouth nightly. 90 tablet 3    lurasidone (LATUDA) 20 mg Tab tablet Take 2 tablets (40 mg total) by mouth once daily. 60 tablet 11    meloxicam (MOBIC) 15 MG tablet Take 1 tablet (15 mg total) by mouth once daily. 30 tablet 1    methocarbamoL (ROBAXIN) 500 MG Tab Take 1 tablet by mouth 3 times daily as needed.      mometasone (ELOCON) 0.1 % ointment APPLY OINTMENT TOPICALLY TO LEGS TWICE DAILY FOR 2 WEEKS      multivitamin with minerals tablet Take 1 tablet by mouth once daily.      naloxone (NARCAN) 4 mg/actuation Spry 4mg by nasal route as needed for opioid overdose; may repeat every 2-3 minutes in alternating nostrils until medical help arrives. Call 911 1 each 11    omeprazole (PRILOSEC) 40 MG capsule Take 1 capsule by mouth every morning.      pregabalin 330 mg Tb24 Take 330 mg by mouth after dinner. 30 tablet 5    promethazine (PHENERGAN) 25 MG tablet Take 1 tablet (25 mg total) by mouth every 8 (eight) hours as needed. 30 tablet 0    QULIPTA 60 mg Tab Take 1 tablet by mouth once daily.      rimegepant (NURTEC) 75 mg odt DISSOLVE 1 TABLET BY MOUTH AS NEEDED FOR MIGRAINE HEADACHE      azelastine (ASTELIN) 137 mcg (0.1 %) nasal spray  (Patient not taking: Reported on 3/24/2025)      celecoxib (CELEBREX) 100 MG capsule Take 1 capsule by mouth 2 (two) times daily.      gabapentin (NEURONTIN) 300 MG capsule Take 1 capsule (300 mg total) by mouth 3 (three) times daily. (Patient not taking: Reported on 3/24/2025) 90 capsule 11    hydrOXYzine HCL (ATARAX) 10 MG Tab Take 1 tablet (10 mg total) by mouth 2 (two) times daily as needed (anxiety). (Patient not taking:  Reported on 2/20/2025) 60 tablet 3    nitrofurantoin, macrocrystal-monohydrate, (MACROBID) 100 MG capsule Take 1 capsule (100 mg total) by mouth once daily. (Patient not taking: Reported on 3/24/2025) 30 capsule 5    tiZANidine (ZANAFLEX) 6 mg capsule Take 6 mg by mouth 3 (three) times daily. (Patient not taking: Reported on 3/24/2025)      [DISCONTINUED] ipratropium (ATROVENT) 42 mcg (0.06 %) nasal spray 2 sprays by Each Nostril route 4 (four) times daily. 15 mL 0     No current facility-administered medications on file prior to visit.       Objective:      /82 (BP Location: Left arm, Patient Position: Sitting)   LMP 09/15/2013     Exam:  GEN:  Well developed, well nourished.  No acute distress.  Normal pain behavior.  HEENT:  No trauma.  Mucous membranes moist.  Nares patent bilaterally.  PSYCH: Normal affect. Thought content appropriate.  CHEST:  Breathing symmetric.  No audible wheezing.  ABD: Soft, non-distended.  SKIN:  Warm, pink, dry.  No rash on exposed areas.    EXT:  No cyanosis, clubbing, or edema.  No color change or changes in nail or hair growth.  NEURO/MUSCULOSKELETAL:  Fully alert, oriented, and appropriate. Speech normal indio. No cranial nerve deficits.   Gait:  Antalgic.  No trendelenburg sign bilaterally.   Motor Strength:  5/5 motor strength throughout lower extremities.   L-Spine:  Limited ROM with pain on extension.  Positive pain with axial/facet loading bilaterally.  Negative SLR bilaterally.    Positive TTP over bilateral lumbar paraspinal        Imaging:  Narrative & Impression  EXAMINATION:  MRI LUMBAR SPINE WITHOUT CONTRAST     CLINICAL HISTORY:  Low back pain, symptoms persist with > 6wks conservative treatment; Dorsalgia, unspecified     TECHNIQUE:  Multiplanar, multisequence MR images were acquired from the thoracolumbar junction to the sacrum without the administration of contrast.     COMPARISON:  Lumbar spine radiograph dated 02/03/2025 and CT lumbar spine dated  10/03/2024; MRI lumbar spine dated 05/31/2024     FINDINGS:  Lumbosacral transitional vertebra with partial lumbarization of S1 and rudimentary S1-S2 disc.  Prior surgical change to include posterior instrumented fusion with disc spacer at L5-S1.  Minimal grade 1 anterolisthesis at L5-S1 and L4-L5.  Remaining lumbar spine vertebral body heights appear maintained.  Modest Modic type II endplate change at L2.  No discrete infiltrative T1 marrow process.  Multilevel disc desiccation.  Spinal cord terminus lies near L1-L2.  Remaining visualized prevertebral and paraspinal soft tissues demonstrate no acute abnormality.     T12-L1: No significant spinal canal stenosis or neural foraminal impingement.     L1-L2: No significant posterior disc herniation, spinal canal stenosis, or neural foraminal impingement.     L2-L3: Modest circumferential bulge and facet hypertrophy with mild flavum thickening.  No significant spinal canal stenosis or neural foraminal encroachment.     L3-L4: Modest circumferential bulge and facet hypertrophy with mild flavum thickening.  Far left posterior annular fissure.  No significant spinal canal stenosis or neural foraminal impingement.     L4-L5: Mild circumferential bulge with partial uncovering of the posterior disc.  Far right posterior annular fissure.  Facet arthropathy with trace effusion on the right.  No significant spinal canal stenosis or neural foraminal impingement.     L5-S1: Prior surgical change with facet DJD.  Partially obscured neural foramina given artifact without evident impingement.  No significant spinal canal stenosis.     Impression:     Prior surgical change at L5-S1 with posterior instrumented fusion and disc spacer.  Additional mild lumbar spondylosis at the non-operative levels with detailed findings as above.        Electronically signed by:Luis Deng  Date:                                            03/21/2025  Time:                                            11:22    Narrative & Impression  EXAMINATION:  MRI LUMBAR SPINE WITHOUT CONTRAST     CLINICAL HISTORY:  Low back pain, progressive neurologic deficit; Other intervertebral disc degeneration, lumbar region left lower extremity pain.     TECHNIQUE:  Multiplanar, multisequence MR images were acquired from the thoracolumbar junction to the sacrum without the administration of contrast.     COMPARISON:  07/14/2020     FINDINGS:  There are transitional vertebral bodies suspected at the lumbosacral junction.  For the purpose of labeling on this study, the last large disc space will be considered L5-S1 although a prominent S1-2 intervertebral disc is also noted.     There is no evidence of acute compression fracture or osseous destructive process.     The distal conus maintains normal signal intensity.     Mild degenerative disc space narrowing noted.     L1-2 through L3-4 no evidence of disc herniation or advanced stenosis.     L4-5, no evidence of disc herniation.  Facet hypertrophy but no advanced central or foraminal stenosis.     L5-S1, prominent fluid is identified within the right facet joint presumably degenerative in nature.  At the left facet joint a small ganglion cyst protrudes into the paravertebral soft tissues.  There is however an additional cystic structure protruding into the epidural space at the opening to the left L5-S1 neural foramen thought to represent a  9 mm synovial cyst.  There is marked facet hypertrophy left much greater than right resulting in severe left foraminal stenosis with mild foraminal stenosis on the right.  No advanced central stenosis.     Impression:     Please note transitional lumbosacral vertebral bodies are identified.     At what is labeled L5-S1 there is marked left facet hypertrophy resulting in severe left foraminal stenosis.  In addition a 9 mm apparently cystic lesion near the subarticular zone but protruding into the opening of the neural foramen is thought to most  likely represent a synovial cyst with extruded disc fragment and schwannoma thought less likely.  A contrast study could be performed for further clarification but only if clinically warranted.  No advanced central or left lateral recess stenosis is suspected with the main impingement likely being of the exiting left nerve root.        Electronically signed by:Gus Hernadez  Date:                                            05/31/2024  Time:                                           17:03    Narrative & Impression    EXAMINATION:  MRI CERVICAL SPINE WITHOUT CONTRAST     CLINICAL HISTORY:  cervical DDD; Spondylosis without myelopathy or radiculopathy, cervical region     TECHNIQUE:  Multiplanar, multisequence MR images of the cervical spine were performed without the administration of contrast.     COMPARISON:  Radiograph 08/27/2020.     FINDINGS:  Alignment: Straightening of cervical spine with slight reversal at the level of C5 through C7.  Minimal degenerative anterolisthesis C4 on C5..     Vertebrae: Normal marrow signal. No fracture.     Discs: Disc space narrowing C5-C6-C7 level.     Cord: At C3 through C6 level, STIR images, the cervical cord appears slightly indistinct and of minimally increased signal intensity.  This may be artifactual in nature is a cannot be confirmed on the axial or sagittal T2 weighted sequences with certainty.  Postcontrast repeat examination may be indicated to exclude subtle intrinsic cord finding at this level..     Skull base and craniocervical junction: Normal.     Degenerative findings:     C2-C3: There is no focal disc herniation.No significant central canal narrowing.  No significant neural foraminal narrowing.     C3-C4: There is no focal disc herniation.No significant central canal narrowing.  No significant neural foraminal narrowing.     C4-C5: There is no focal disc herniation.No significant central canal narrowing.  Moderate RIGHT neural foraminal narrowing.     C5-C6:  There is no focal disc herniation.No significant central canal narrowing.  Moderate RIGHT and mild LEFT neural foraminal narrowing.     C6-C7: There is no focal disc herniation.No significant central canal narrowing.  Moderate LEFT neural foraminal narrowing.     C7-T1: There is no focal disc herniation.No significant central canal narrowing.  No significant neural foraminal narrowing.     T1-T2:     Paraspinal muscles & soft tissues: Unremarkable.     Impression:     Degenerative spondylosis as above predominantly C4-C5 through C6-C7.     Likely artifact identified level of the cord C3 through C6 STIR images only although at of abundance of caution, repeat examination with postcontrast MRI may be helpful to exclude underlying myelitis.     This report was flagged in Epic as abnormal.        Electronically signed by: Jerzy Rose MD  Date:                                            10/28/2021  Time:                                           06:44           Narrative & Impression    EXAMINATION:  XR CERVICAL SPINE 5 VIEW WITH FLEX AND EXT     CLINICAL HISTORY:  Headache     TECHNIQUE:  Five views of the cervical spine plus flexion and extension views were performed.     COMPARISON:  None 07/09/2019.     FINDINGS:  Mild DJD.  The disc spaces are narrowed between C5 and C7 vertebral segments.  Encroachment of the neural foramina identified at the C5-C6 level on the right and the C6/C7 level on the left.  There is a 2 mm C4/C5 anterolisthesis.  No fracture or dislocation.  No bone destruction identified.     Impression:     See above        Electronically signed by: Kaiser Ruiz MD  Date:                                            08/27/2020  Time:                                           14:12               Narrative & Impression     EXAMINATION:  MRI LUMBAR SPINE WITHOUT CONTRAST     CLINICAL HISTORY:  lumbar spinal stenosis; Other intervertebral disc degeneration, lumbar region     TECHNIQUE:  Multiplanar,  multisequence MR images were acquired from the thoracolumbar junction to the sacrum without the administration of contrast.     COMPARISON:  Non plain film examination 01/13/2020 e.     FINDINGS:  Lumbar spine alignment is within normal limits. The vertebral body heights are well maintained, with no fracture.  No marrow signal abnormality suspicious for an infiltrative process.     The conus is normal in appearance, and terminates at the L1-L2 level.  The adjacent soft tissue structures show no significant abnormalities.     L1-L2: There is no focal disc herniation. No significant central canal or neural foraminal narrowing.     L2-L3: There is no focal disc herniation. No significant central canal or neural foraminal narrowing.     L3-L4: There is no focal disc herniation. No significant central canal or neural foraminal narrowing.     L4-L5: There is no focal disc herniation. No significant central canal or neural foraminal narrowing.     L5-S1: There is no focal disc herniation. No significant central canal or neural foraminal narrowing.     Impression:     No significant central canal stenosis, focal protrusion of disc material or neural foraminal encroachment.        Electronically signed by: Jerzy Rose MD  Date:                                            07/14/2020  Time:                                           14:15         Narrative & Impression    EXAMINATION:  XR LUMBAR SPINE AP AND LAT WITH FLEX/EXT     CLINICAL HISTORY:  Other intervertebral disc degeneration, lumbar region     TECHNIQUE:  AP and lateral views as well as lateral flexion and extension images are performed through the lumbar spine.     COMPARISON:  None     FINDINGS:  Mild DJD and lumbar scoliosis.  No significant disc space narrowing identified.  No fracture, spondylolisthesis or bone destruction noted.  Spina bifida occulta involving the S1 vertebral segment noted.     Impression:     See above        Electronically signed by:  Kaiser Ruiz MD  Date:                                            01/13/2020  Time:                                           09:22    Encounter    View Encounter                     Narrative     No significant alignment abnormality.  Vertebral body heights are normally maintained, without compression deformity at any level.  There is disc narrowing at C5-6 and C6-7, and each of these levels demonstrates posterior marginal vertebral endplate   spurring and accompanying disc bulging.  The spurring is more pronounced to the right of midline at C5-6 and to the left of midline at C6-7.  All other cervical and visualized upper thoracic levels have normal disc contour, and there is no evidence of a   significant focal disc herniation at any level.  AP diameter of the spinal canal is normally maintained at all levels with no canal stenosis/extrinsic cord compression observed.  The spinal cord is well delineated from the cervicomedullary junction to   the T3 level, and appears normal in size and configuration without focal enlargement or irregularity.  No Chiari malformation or cord cyst or syrinx.  No abnormal signal from the substance of the cord on any of the pulse sequences generated.  There is   some prominent neural foraminal stenosis at C5-6 on the right and C6-7 on the left.  No significant signal abnormality referable to the osseous structures.   Impression      Cervical spine MRI examination demonstrates degenerative changes as discussed above involving the C5-6 and C6-7 levels, with right-sided foraminal stenosis at C5-6 and left-sided foraminal stenosis at C6-7.  No evidence of a significant focal soft is   herniation, canal stenosis/extrinsic cord compression, or intramedullary cord lesion seen at any level.      Electronically signed by: Kaiser Preston MD  Date: 08/01/13  Time: 06:40          Assessment:       Encounter Diagnoses   Name Primary?    S/P lumbar spinal fusion Yes    Dorsalgia, unspecified      Degeneration of intervertebral disc of lumbar region with discogenic back pain and lower extremity pain     Lumbar radiculopathy     Lumbar spondylosis        Plan:       Naomy was seen today for follow-up.    Diagnoses and all orders for this visit:    S/P lumbar spinal fusion  -     LIDOcaine (LIDODERM) 5 %; Place 1 patch onto the skin once daily. Remove & Discard patch within 12 hours or as directed by MD    Dorsalgia, unspecified  -     LIDOcaine (LIDODERM) 5 %; Place 1 patch onto the skin once daily. Remove & Discard patch within 12 hours or as directed by MD    Degeneration of intervertebral disc of lumbar region with discogenic back pain and lower extremity pain    Lumbar radiculopathy    Lumbar spondylosis        Naomy Armstrong is a 59 y.o. female with left-sided low back and lower extremity pain.  S/p MIS L5-S1 TLIF done in October 2024.  Significant, complete relief of radicular pain following this procedure.  However continues to note pain across her lower lumbar paraspinal region.  Exact etiology unclear although appears to be mostly axial.  Updated more MRI showing multilevel degenerative changes, most notable at L4-5 with advanced facet degeneration and effusion on the right.  Also annular tear at L4-5.  Otherwise without significant neurocompressive findings.    Prior records review.  Pertinent imaging studies reviewed by me. Imaging results were discussed with patient.  Schedule for bilateral L3, L4 medial branch blocks.  If successful can proceed with radiofrequency ablation.  Discontinue Norco.  Start lidocaine patches.  Refills provided  Continue with physical therapy and home exercise.  Stressed the importance of maintaining regular home exercise program and being mindful of how they use their back throughout the day.  Patient expressed understanding and agreement.  Return to clinic after procedure to discuss efficacy.          Neal Stephenson PA-C  [facility]  Interventional Pain Management   03/24/2025    This note was created by combination of typed  and M-Modal dictation.  Transcription and phonetic errors may be present.  If there are any questions, please contact me.

## 2025-03-24 NOTE — PROGRESS NOTES
Ms. Moralez will be scheduled for bilat L3, L4 MBB #1 on 4/04/25- checking in at 11:15am.  Reviewed the pre-procedure instructions listed below with the patient- copy also provided.  Instructed patient to notify clinic if she begins feeling ill, runs a fever, is prescribed antibiotics, and/or has any outpatient procedures (colonoscopy, endoscopy, OBGYN, dental work, etc.), and/or any vaccinations or booster shots within the two weeks leading up to this procedure.  Instructed patient to report to [facility], 2nd Floor Endoscopy Registration Desk.  All questions answered- patient verbalized understanding.      Day of Procedure  Ensure you have obtained an arrival time from the Pain Management Staff  You will be contacted the week before your scheduled date to review these instructions and receive your arrival time.  Please check any voicemails received.  If you arrive past your scheduled procedure time, you may be asked to reschedule your procedure.     Ensure you have a  with you.  If you arrive without a responsible adult to stay with you and drive you home, you may be asked to reschedule your procedure.      Take all of your prescribed medications that you routinely take for blood pressure, heart medications, thyroid, cholesterol, etc.  You will be informed if blood thinners should be held.     This is NOT a fasting procedure, you may eat the day of your procedure.     Wear comfortable clothing (loose fitting pants).    You may wear glasses, dentures, contact lenses, and/or hearing aids.      Notify the nurse during the intake process if you are allergic to any medications, if you are diabetic, or if you are not feeling well        Diabetic Patients  Please check your blood sugar prior to coming in for your procedure.  If the value is greater than 200, contact the clinic [phone] as the procedure may need to be rescheduled.  The procedure may not be performed if your blood sugar is above  200 even after checking into the hospital.        IF you are taking blood thinners, please make sure our staff is aware so that we can obtain clearance and have you hold the medication accordingly.

## 2025-03-24 NOTE — TELEPHONE ENCOUNTER
No care due was identified.  [facility] Embedded Care Due Messages. Reference number: [id number].   3/24/2025 8:25:28 AM CDT  
Refill Routing Note   Medication(s) are not appropriate for processing by [facility] for the following reason(s):        New or recently adjusted medication    [facility] action(s):  Defer             Appointments  past 12m or future 3m with PCP    Date Provider   Last Visit   2/25/2025 Eric Hernandes Jr., MD   Next Visit   5/16/2025 Eric Hernandes Jr., MD   ED visits in past 90 days: 0        Note composed:8:42 AM 03/24/2025                          
Jo Alvarez

## 2025-03-24 NOTE — LETTER
March 24, 2025      [facility]  [address]  [address]  [place] [address]  Phone: [phone]  Fax: [phone]       Patient: Naomy Armstrong   YOB: 1965  Date of Visit: 03/24/2025    To Whom It May Concern:    Ronald Armstrong  was at [facility] on 03/24/2025. The patient may return to work/school on 3/24/25 with no restrictions. If you have any questions or concerns, or if I can be of further assistance, please do not hesitate to contact me.    Sincerely,    Carlene Narayan RN

## 2025-03-24 NOTE — H&P (VIEW-ONLY)
Subjective:     Patient ID: Naomy Armstrong is a 59 y.o. female    Chief Complaint: Follow-up      Referred by: No ref. provider found      HPI:    Interval History PA (03/24/2025):  Patient returns to clinic for follow up and MRI review.  Denies changes in the quality or location of her pain since previous visit.  Denies new or worsening symptoms.    Interval History PA (02/05/2025):  Patient returns to clinic for follow up.  Since previous visit patient has undergone MIS L5-S1 TLIF done on 10/28/2024.  Following surgery patient reports significant improvement in her radicular pain.  No longer having pain radiating into her lower extremities.  However continues to note significant pain located across her lower lumbar/lumbosacral region.  Pain is worse in the right compared to the left.  She continues to follow up with Neurosurgery postop.  Recently started physical therapy without benefit.  Patient also recently discontinued opioid pain medications.  She was taking Norco 5-325 mg b.i.d. p.r.n. with adequate relief, denies adverse effects from this medication.  Notes the medication mainly was providing benefit    Interval History PA (07/08/2024):  Patient returns to clinic for follow up.  Patient with continued left-sided lower back and extremity pain as before.  Patient continues to follow up with Neurosurgery who is planning on performing a MIS L5-S1 TLIF.  Unfortunately this procedure has been postponed due to low bone mineral density.  Patient following up with primary care to work on bone mineral density before proceeding with spinal surgery.  In the meantime the patient has been taking Norco  mg q.8 hours p.r.n. with adequate relief.  She does note occasionally spitting the medication in half when her pain is not as severe.  Otherwise denies significant adverse effects from this medication.  Patient's concern today is a new onset of right-sided upper extremity/wrist pain.  Pain started about 1-2  weeks ago and has been constant and severe since.  Pain/burning over her right wrist and 1st 3 digits.  Describes as a numbness/burning sensation over her fingers.  Symptoms are intermittent, typically worsened at night causing difficulty sleeping.  She does note occasional radiation of pain from her elbow down to her wrist.  Denies any significant neck pain.  Patient does note history of carpal tunnel on the right which she has received injections for with benefit.  Also notes that she has recently started using a nighttime splint although unsure if this has been beneficial.    Interval History PA (06/03/2024):  Patient returns to clinic for follow up of left-sided lower back and extremity pain and MRI review.  She denies significant changes in the quality or location of pain since previous visit.  Denies new or worsening symptoms.  Continues to locate pain in her left lower lumbar/lumbosacral region into left lower extremity all the way down to her bottom of the foot with associated paresthesia.  Denies any focal weakness, bowel or bladder dysfunction.    Interval History (5/16/24):  She returns today for follow up.  She reports that she is having left-sided low back and lower extremity pain.  This pain has been present for roughly 1 month.  No specific inciting events or injuries noted.  Pain is located the left lower lumbar/lumbosacral region radiate to left lower extremity all the way to the bottom of her foot with associated paresthesias.  She denies any focal weakness or bowel bladder dysfunction.  Pain is constant and worsened with activity.  Particularly bad in the morning when getting out of bed.       Interval History (7/17/23):  She returns today for follow up.  She reports that she is having recurrent bilateral low back pain.  This pain is located across the lower lumbar region.  Pain will radiate to the bilateral buttocks, but not more distally than this.  She denies any associated numbness, tingling,  weakness, bowel bladder dysfunction.  Pain is constant and worsened with activity.        Interval History (6/7/22):  She returns today for follow up.  She reports that she underwent surgery for hiatal hernia repair in February 2022.  Since his surgery she has had significant epigastric abdominal pain.  This pain is intermittent but is very severe when present.  No specific triggers identified.  Patient also reports worsening diarrhea since undergoing the surgery.  She has followed up with surgery and gastroenterology in no specific source has been identified as of yet.  States that she was provided tramadol which provided some relief.  States she was told to return to my clinic to discuss other options for pain management.       Interval History NP (02/03/22):    The patient location is: work  The chief complaint leading to consultation is: follow-up  Visit type: Virtual visit with audio.  Patient verbally consented for audio visit.  Total time spent with patient: 10 minutes  Each patient to whom he or she provides medical services by telemedicine is:  (1) informed of the relationship between the physician and patient and the respective role of any other health care provider with respect to management of the patient; and (2) notified that he or she may decline to receive medical services by telemedicine and may withdraw from such care at any time.    Pt returns today for follow up of bilateral SIJ injections. She reports 100% relief of low back pain. She denies any new or worsening symptoms. She would like to discuss chronic headaches at next visit with Dr. Powell. She is otherwise well today.     Interval History (12/29/21):    The patient location is:  home  The chief complaint leading to consultation is:  Follow-up  Visit type: Virtual visit with synchronous audio and video  Total time spent with patient:  8 minutes  Each patient to whom he or she provides medical services by telemedicine is:  (1) informed of  the relationship between the physician and patient and the respective role of any other health care provider with respect to management of the patient; and (2) notified that he or she may decline to receive medical services by telemedicine and may withdraw from such care at any time.      She returns today for follow up and MRI review.  She reports that she would like to schedule bilateral sacroiliac joint steroid injections discussed at last visit.  She is still having neck pain and headaches as well and would like to undergo cervical medial branch blocks/RFA, but her back pain is worse at this time.  Otherwise, she denies any changes in the quality or location of her pain.  She denies any new worsening symptoms.        Interval History (8/11/21):    The patient location is:  home  The chief complaint leading to consultation is:  Follow-up  Visit type: Virtual visit with synchronous audio and video  Total time spent with patient:  15 minutes  Each patient to whom he or she provides medical services by telemedicine is:  (1) informed of the relationship between the physician and patient and the respective role of any other health care provider with respect to management of the patient; and (2) notified that he or she may decline to receive medical services by telemedicine and may withdraw from such care at any time.        She returns today for follow up.  She reports that relief from bilateral sacroiliac joint steroid injections has worn off.  She would like repeat sacroiliac joint steroid injections if appropriate.  Patient also has been having headaches chronically.  She has been evaluated thoroughly by Neurology without any specific source for of headache identified.  Suspicion for cervicogenic headaches.  Patient does have pain in the upper cervical region.  The pain will radiate to the occipital and parietal regions of the head.  She denies any pain radiating to the upper extremities.  She denies any associated  "bowel bladder dysfunction.      Interval History NP (5/5/21):    Pt returns today for follow up of bilateral SIJ injections. She reports at least 75% relief of low back pain. She can now stand for long periods of time without pain or having to sit for 30 minutes. She is able to cook and complete her ADL's with much improvement. Does report continued pain while bending over but it is now manageable. Denies new or worsening symptoms.      Interval History (3/31/21):  She returns today for follow up.  She reports that bilateral L3, L4, L5 radiofrequency ablation provided roughly 75% relief but for only 1 month.  She states that this.  Time her pain returned.  Today, she localizes pain across the lower lumbar/lumbosacral region.  The pain does not radiate.  The pain is equal bilaterally.  The pain is worse with activities such as mopping and cleaning.  She denies any associated numbness, tingling, weakness, bowel bladder dysfunction.      Interval History NP (7/16/20):  Pt returns today for follow up and MRI review. She states that her pain in unchanged in quality or location. Denies any new symptoms. No bladder or bowel dysfunction. She reports "missing more days of work than she attends". She is an  at [employer] which includes heavy manual lifting and moving boxes. Her pain is worsened with activity, especially after a shift at work or when she is home cleaning. She reports that gabapentin has helped, but takes 2 pills at night and 1 pill every morning because it makes her too drowsy during the day. She also takes 500mg Naproxen PRN for the pain, maybe every few days. She would like something else for the pain. States she "took valium in the past which helped her relax and get through the day".     Interval History (7/6/20):  She returns today for follow up.  She reports that she continues to have low back/hip pain.  This pain is worse with bending and lifting.  These are movements that are often " required as a part of her job.  Overall she denies any changes in the quality or location of the pain since last encounter.  She denies any new symptoms.       Interval History (1/13/20):  She returns today for follow up.  She reports that her neck pain has improved with home exercise program.  She does not feel as though this needs any further attention.  She does state that her low back has been bothering her.  She localizes pain to the bilateral lower lumbar regions.  The pain does radiate to the bilateral hip regions.  She denies any associated numbness, tingling, weakness, bowel bladder dysfunction.  The pain is constant and worsened with activity.  She states that the pain is typically worse when initiating activity after rest.        Initial Encounter (7/9/19):  Naomy Armstrong is a 59 y.o. female who presents today with chronic neck pain. This pain has been present for years.  No specific inciting event or injury noted. She localizes the pain to the midline cervical region.  The pain does not radiate.  She denies any associated numbness, tingling, weakness, bowel bladder dysfunction.  She has had some radicular pain on the left side in the past this has resolved.  The pain is constant and worsened with activity.  She specifically states that the pain is worse when looking at screens.  She has been treated in the past by Dr. Bhavya Oneill at [facility].   This pain is described in detail below.    Physical Therapy:  Yes, currently; ineffective    Non-pharmacologic Treatment:  Rest helps         TENS?  No    Pain Medications:         Currently taking:  Lyrica, meloxicam, Norco    Has tried in the past:  muscle relaxers, Tylenol, NSAIDs, Amitriptyline, tizanidine, Celebrex, Robaxin, Flexeril    Has not tried:  SNRIs, topical creams    Blood thinners:  None    Interventional Therapies:   10/8/13- Left RFA C5, C6, C7  9/10/13- Left C5, C6, C7 MBB   8/13/13- C7-T1 IL EMMA  8/19/20 - right L3, L4, L5  radiofrequency ablation - 75% relief for 1 month  9/4/20 - left L3, L4, L5 radiofrequency ablation - 75% relief for 1 month  4/16/21 - bilateral SIJ injections - 75% relief  1/19/22 - bilateral SIJ injections - 100% relief  06/14/2024 - left S1 transforaminal epidural steroid injection + left L5-S1 facet joint injection with aspiration - short-lived relief    Relevant Surgeries:  None    Affecting sleep?  Yes    Affecting daily activities? yes    Depressive symptoms? yes          SI/HI? No    Work status: Employed    Pain Scores:    Best:       2/10  Worst:   8/10  Usually:   8/10  Today:   7/10    Pain Disability Index  Family/Home Responsibilities:: 8  Recreation:: 8  Social Activity:: 8  Occupation:: 8  Sexual Behavior:: 8  Self Care:: 8  Life-Support Activities:: 8  Pain Disability Index (PDI): 56      Review of Systems   Constitutional:  Negative for activity change, appetite change, chills, fatigue, fever and unexpected weight change.   HENT:  Negative for hearing loss.    Eyes:  Negative for visual disturbance.   Respiratory:  Negative for chest tightness and shortness of breath.    Cardiovascular:  Negative for chest pain.   Gastrointestinal:  Negative for abdominal pain, constipation, diarrhea, nausea and vomiting.   Genitourinary:  Negative for difficulty urinating.   Musculoskeletal:  Positive for back pain, gait problem and myalgias. Negative for arthralgias, neck pain and neck stiffness.   Skin:  Negative for rash.   Neurological:  Positive for numbness. Negative for dizziness, weakness, light-headedness and headaches.   Psychiatric/Behavioral:  Negative for hallucinations, sleep disturbance and suicidal ideas. The patient is not nervous/anxious.        Past Medical History:   Diagnosis Date    Alcohol abuse     per chart, but patient denies today and cocaine per chart    Anxiety     Colon polyp     Depression     Hallucination     last couple of months started seeing someone standing in her room, saw  boyfriend's reflection in glass    Headache     History of psychiatric hospitalization     age 13 [facility] for 2 months for acting out; 2018 for SI    Hx of psychiatric care     Hypertension     Psychiatric problem     Sleep difficulties     Suicide attempt     with knife by cutting stomach    Therapy        Past Surgical History:   Procedure Laterality Date    COLONOSCOPY N/A 05/07/2021    Procedure: COLONOSCOPY;  Surgeon: Prem Montana MD;  Location: [facility];  Service: Endoscopy;  Laterality: N/A;  COVID screening 5/4 LAPC-instr portal -ml    COLONOSCOPY N/A 06/06/2022    Procedure: COLONOSCOPY;  Surgeon: Fab Shepherd MD;  Location: [facility] ([facility] FLR);  Service: Endoscopy;  Laterality: N/A;  C-Diff neg  Rapid COVID    EPIDURAL STEROID INJECTION Bilateral 08/05/2020    Procedure: Bilateral MBB @ L3, L4, L5;  Surgeon: Jeremy Powell Jr., MD;  Location: [facility];  Service: Pain Management;  Laterality: Bilateral;  Bilat L3-5 MBB  Arrive @ 1315; No ATC or DM; Needs MD signature    EPIDURAL STEROID INJECTION Right 08/19/2020    Procedure: Lumbar Radiofrequency Thermocoagulation of Medial Branches;  Surgeon: Jeremy Powell Jr., MD;  Location: [facility];  Service: Pain Management;  Laterality: Right;  Right L3-5 RFA  Arrive @ 1045; No ATC or DM; Needs MD Signature    EPIDURAL STEROID INJECTION Left 09/04/2020    Procedure: Lumbar Radiofrequency Thermocoagulation of Medial Branches;  Surgeon: Jeremy Powell Jr., MD;  Location: [facility];  Service: Pain Management;  Laterality: Left;  Left L3-5 RFA  Arrive @ 0900 (requested); No ATC or DM; Needs MD signature    EPIDURAL STEROID INJECTION Bilateral 04/16/2021    Procedure: Sacroiliac Joint Steroid Injections @ Bilateral;  Surgeon: Jeremy Powell Jr., MD;  Location: [facility];  Service: Pain Management;  Laterality: Bilateral;  Arrive @ 1300; No ATC or DM    EPIDURAL STEROID INJECTION Bilateral 01/19/2022    Procedure: Bilateral Sacroiliac Joint  Injections;  Surgeon: Jeremy Powell Jr., MD;  Location: [facility];  Service: Pain Management;  Laterality: Bilateral;  Arrive @ 1315; No ATC or DM; Needs Consent    EPIDURAL STEROID INJECTION Bilateral 07/28/2023    Procedure: Bilateral L2, L3, L4 Medial Branch Blocks #1;  Surgeon: Jeremy Powell Jr., MD;  Location: [facility] ENDO;  Service: Pain Management;  Laterality: Bilateral;  @1200  No ATC or DM    EPIDURAL STEROID INJECTION Bilateral 08/11/2023    Procedure: Bilateral L2, L3, L4 Medial Branch Blocks #2;  Surgeon: Jeremy Powell Jr., MD;  Location: [facility];  Service: Pain Management;  Laterality: Bilateral;  @1330  No ATC or DM    ESOPHAGEAL MANOMETRY WITH MEASUREMENT OF IMPEDANCE N/A 10/20/2021    Procedure: MANOMETRY, ESOPHAGUS, WITH IMPEDANCE MEASUREMENT;  Surgeon: Anastacia Odonnell MD;  Location: [facility] (4TH FLR);  Service: Endoscopy;  Laterality: N/A;  Covid test 10/17 [place], instructions [facility] to [id number]    ESOPHAGOGASTRODUODENOSCOPY N/A 05/07/2021    Procedure: EGD (ESOPHAGOGASTRODUODENOSCOPY);  Surgeon: Prem Montana MD;  Location: [facility];  Service: Endoscopy;  Laterality: N/A;  added on per Dr. NOEL Shepherd    EYE SURGERY  6/6/2015    Lasik    GANGLION CYST EXCISION      HERNIA REPAIR  2/14/2022    Hiatal hernia    INJECTION, FACET JOINT, LUMBOSACRAL Left 06/14/2024    Procedure: Left S1 Transforaminal Epidural Steroid Injection + Left L5-S1 Facet Joint Injection/Aspiration;  Surgeon: Jeremy Powell Jr., MD;  Location: [facility] PAIN MANAGEMENT;  Service: Pain Management;  Laterality: Left;  @1200  No ATC or DM  Needs Consent  IV Sedation    LAPAROSCOPIC TOUPET FUNDOPLICATION N/A 02/14/2022    Procedure: FUNDOPLICATION, LAPAROSCOPIC, TOUPET;  Surgeon: Christian Martinez MD;  [facility];  Service: General;  Laterality: N/A;    MINIMALLY INVASIVE TRANSFORAMINAL LUMBAR INTERBODY FUSION (TLIF) Left 10/28/2024    Procedure: FUSION, SPINE, LUMBAR, TLIF,  MINIMALLY INVASIVE;  Surgeon: Christian King MD;  Location: [facility] OR;  Service: Neurosurgery;  Laterality: Left;  Left L5/S1, possible L4-5 minimally invasive TLIF  sravanthi 4 poster   fluoro  no neuromonitoring needed   Depuy  DEPUY CARMELA NOTIFIED-GG  RN PREOP 10/14/2024   T/S ON 10/25/2024----NEED H/P       Social History     Socioeconomic History    Marital status:     Number of children: 0   Occupational History    Occupation:      Employer: [employer]   Tobacco Use    Smoking status: Former     Current packs/day: 0.00     Average packs/day: 1 pack/day for 21.3 years (21.3 ttl pk-yrs)     Types: Cigarettes     Start date: 1985     Quit date: 2006     Years since quittin.9    Smokeless tobacco: Never    Tobacco comments:     It has been a very long time. I do not remember dates   Substance and Sexual Activity    Alcohol use: Not Currently     Comment: occasional    Drug use: Not Currently     Types: Marijuana     Comment: tried at age 14    Sexual activity: Yes     Partners: Male     Birth control/protection: Post-menopausal     Comment: going through menopause   Other Topics Concern    Patient feels they ought to cut down on drinking/drug use No    Patient annoyed by others criticizing their drinking/drug use No    Patient has felt bad or guilty about drinking/drug use No    Patient has had a drink/used drugs as an eye opener in the AM No   Social History Narrative     x 2.    Lives with boyfriend.     Works at [employer] as an .    Presently working on Bachelor's degree.    Has associates degree in criminal justice.     Social Drivers of Health     Financial Resource Strain: Medium Risk (2024)    Overall Financial Resource Strain (CARDIA)     Difficulty of Paying Living Expenses: Somewhat hard   Food Insecurity: Patient Declined (2024)    Hunger Vital Sign     Worried About Running Out of Food in the Last Year: Patient declined     Ran Out  of Food in the Last Year: Patient declined   Transportation Needs: Patient Declined (10/28/2024)    TRANSPORTATION NEEDS     Transportation : Patient declined   Physical Activity: Unknown (12/2/2024)    Exercise Vital Sign     Days of Exercise per Week: Patient declined     Minutes of Exercise per Session: 0 min   Stress: No Stress Concern Present (12/2/2024)    [employer] - Occupational Stress Questionnaire     Feeling of Stress : Only a little   Housing Stability: High Risk (12/2/2024)    Housing Stability Vital Sign     Unable to Pay for Housing in the Last Year: Yes     Homeless in the Last Year: Patient declined       Review of patient's allergies indicates:   Allergen Reactions    Effexor [venlafaxine]      Elevated her blood pressure    Zoloft [sertraline]     Paroxetine hcl      Made her feel drunk       Current Outpatient Medications on File Prior to Visit   Medication Sig Dispense Refill    alendronate (FOSAMAX) 35 MG tablet Take 1 tablet (35 mg total) by mouth every 7 days. 12 tablet 1    alosetron (LOTRONEX) 0.5 MG tablet Take 1 tablet (0.5 mg total) by mouth 2 (two) times daily. 180 tablet 3    amLODIPine (NORVASC) 5 MG tablet Take 1 tablet by mouth once daily 90 tablet 1    atorvastatin (LIPITOR) 10 MG tablet Take 1 tablet (10 mg total) by mouth once daily. 90 tablet 3    benzonatate (TESSALON) 200 MG capsule Take 1 capsule (200 mg total) by mouth 3 (three) times daily as needed for Cough. 30 capsule 0    buPROPion (WELLBUTRIN XL) 150 MG TB24 tablet Take 1 tablet by mouth once daily.      calcium carb and citrate-vitD3 (CITRACAL-D3 SLOW RELEASE) 600 mg-12.5 mcg (500 unit) TbSR Take 2 tablets by mouth once daily. 180 tablet 3    cyclobenzaprine (FLEXERIL) 5 MG tablet Take 2 tablets (10 mg total) by mouth 3 (three) times daily as needed for muscle spasms 90 tablet 0    dicyclomine (BENTYL) 20 mg tablet TAKE 1 TABLET BY MOUTH EVERY 6 HOURS. 120 tablet 0    FLUoxetine 40 MG  capsule Take 2 capsules (80 mg total) by mouth once daily. 180 capsule 3    gabapentin (NEURONTIN) 300 MG capsule Take 3 capsules (900 mg total) by mouth 3 (three) times daily. 270 capsule 11    gabapentin (NEURONTIN) 600 MG tablet Take 1 tablet by mouth 3 (three) times daily.      lisinopriL (PRINIVIL,ZESTRIL) 20 MG tablet Take 1 tablet (20 mg total) by mouth nightly. 90 tablet 3    lurasidone (LATUDA) 20 mg Tab tablet Take 2 tablets (40 mg total) by mouth once daily. 60 tablet 11    meloxicam (MOBIC) 15 MG tablet Take 1 tablet (15 mg total) by mouth once daily. 30 tablet 1    methocarbamoL (ROBAXIN) 500 MG Tab Take 1 tablet by mouth 3 times daily as needed.      mometasone (ELOCON) 0.1 % ointment APPLY OINTMENT TOPICALLY TO LEGS TWICE DAILY FOR 2 WEEKS      multivitamin with minerals tablet Take 1 tablet by mouth once daily.      naloxone (NARCAN) 4 mg/actuation Spry 4mg by nasal route as needed for opioid overdose; may repeat every 2-3 minutes in alternating nostrils until medical help arrives. Call 911 1 each 11    omeprazole (PRILOSEC) 40 MG capsule Take 1 capsule by mouth every morning.      pregabalin 330 mg Tb24 Take 330 mg by mouth after dinner. 30 tablet 5    promethazine (PHENERGAN) 25 MG tablet Take 1 tablet (25 mg total) by mouth every 8 (eight) hours as needed. 30 tablet 0    QULIPTA 60 mg Tab Take 1 tablet by mouth once daily.      rimegepant (NURTEC) 75 mg odt DISSOLVE 1 TABLET BY MOUTH AS NEEDED FOR MIGRAINE HEADACHE      azelastine (ASTELIN) 137 mcg (0.1 %) nasal spray  (Patient not taking: Reported on 3/24/2025)      celecoxib (CELEBREX) 100 MG capsule Take 1 capsule by mouth 2 (two) times daily.      gabapentin (NEURONTIN) 300 MG capsule Take 1 capsule (300 mg total) by mouth 3 (three) times daily. (Patient not taking: Reported on 3/24/2025) 90 capsule 11    hydrOXYzine HCL (ATARAX) 10 MG Tab Take 1 tablet (10 mg total) by mouth 2 (two) times daily as needed (anxiety). (Patient not taking:  Reported on 2/20/2025) 60 tablet 3    nitrofurantoin, macrocrystal-monohydrate, (MACROBID) 100 MG capsule Take 1 capsule (100 mg total) by mouth once daily. (Patient not taking: Reported on 3/24/2025) 30 capsule 5    tiZANidine (ZANAFLEX) 6 mg capsule Take 6 mg by mouth 3 (three) times daily. (Patient not taking: Reported on 3/24/2025)      [DISCONTINUED] ipratropium (ATROVENT) 42 mcg (0.06 %) nasal spray 2 sprays by Each Nostril route 4 (four) times daily. 15 mL 0     No current facility-administered medications on file prior to visit.       Objective:      /82 (BP Location: Left arm, Patient Position: Sitting)   LMP 09/15/2013     Exam:  GEN:  Well developed, well nourished.  No acute distress.  Normal pain behavior.  HEENT:  No trauma.  Mucous membranes moist.  Nares patent bilaterally.  PSYCH: Normal affect. Thought content appropriate.  CHEST:  Breathing symmetric.  No audible wheezing.  ABD: Soft, non-distended.  SKIN:  Warm, pink, dry.  No rash on exposed areas.    EXT:  No cyanosis, clubbing, or edema.  No color change or changes in nail or hair growth.  NEURO/MUSCULOSKELETAL:  Fully alert, oriented, and appropriate. Speech normal indio. No cranial nerve deficits.   Gait:  Antalgic.  No trendelenburg sign bilaterally.   Motor Strength:  5/5 motor strength throughout lower extremities.   L-Spine:  Limited ROM with pain on extension.  Positive pain with axial/facet loading bilaterally.  Negative SLR bilaterally.    Positive TTP over bilateral lumbar paraspinal        Imaging:  Narrative & Impression  EXAMINATION:  MRI LUMBAR SPINE WITHOUT CONTRAST     CLINICAL HISTORY:  Low back pain, symptoms persist with > 6wks conservative treatment; Dorsalgia, unspecified     TECHNIQUE:  Multiplanar, multisequence MR images were acquired from the thoracolumbar junction to the sacrum without the administration of contrast.     COMPARISON:  Lumbar spine radiograph dated 02/03/2025 and CT lumbar spine dated  10/03/2024; MRI lumbar spine dated 05/31/2024     FINDINGS:  Lumbosacral transitional vertebra with partial lumbarization of S1 and rudimentary S1-S2 disc.  Prior surgical change to include posterior instrumented fusion with disc spacer at L5-S1.  Minimal grade 1 anterolisthesis at L5-S1 and L4-L5.  Remaining lumbar spine vertebral body heights appear maintained.  Modest Modic type II endplate change at L2.  No discrete infiltrative T1 marrow process.  Multilevel disc desiccation.  Spinal cord terminus lies near L1-L2.  Remaining visualized prevertebral and paraspinal soft tissues demonstrate no acute abnormality.     T12-L1: No significant spinal canal stenosis or neural foraminal impingement.     L1-L2: No significant posterior disc herniation, spinal canal stenosis, or neural foraminal impingement.     L2-L3: Modest circumferential bulge and facet hypertrophy with mild flavum thickening.  No significant spinal canal stenosis or neural foraminal encroachment.     L3-L4: Modest circumferential bulge and facet hypertrophy with mild flavum thickening.  Far left posterior annular fissure.  No significant spinal canal stenosis or neural foraminal impingement.     L4-L5: Mild circumferential bulge with partial uncovering of the posterior disc.  Far right posterior annular fissure.  Facet arthropathy with trace effusion on the right.  No significant spinal canal stenosis or neural foraminal impingement.     L5-S1: Prior surgical change with facet DJD.  Partially obscured neural foramina given artifact without evident impingement.  No significant spinal canal stenosis.     Impression:     Prior surgical change at L5-S1 with posterior instrumented fusion and disc spacer.  Additional mild lumbar spondylosis at the non-operative levels with detailed findings as above.        Electronically signed by:Luis Deng  Date:                                            03/21/2025  Time:                                            11:22    Narrative & Impression  EXAMINATION:  MRI LUMBAR SPINE WITHOUT CONTRAST     CLINICAL HISTORY:  Low back pain, progressive neurologic deficit; Other intervertebral disc degeneration, lumbar region left lower extremity pain.     TECHNIQUE:  Multiplanar, multisequence MR images were acquired from the thoracolumbar junction to the sacrum without the administration of contrast.     COMPARISON:  07/14/2020     FINDINGS:  There are transitional vertebral bodies suspected at the lumbosacral junction.  For the purpose of labeling on this study, the last large disc space will be considered L5-S1 although a prominent S1-2 intervertebral disc is also noted.     There is no evidence of acute compression fracture or osseous destructive process.     The distal conus maintains normal signal intensity.     Mild degenerative disc space narrowing noted.     L1-2 through L3-4 no evidence of disc herniation or advanced stenosis.     L4-5, no evidence of disc herniation.  Facet hypertrophy but no advanced central or foraminal stenosis.     L5-S1, prominent fluid is identified within the right facet joint presumably degenerative in nature.  At the left facet joint a small ganglion cyst protrudes into the paravertebral soft tissues.  There is however an additional cystic structure protruding into the epidural space at the opening to the left L5-S1 neural foramen thought to represent a  9 mm synovial cyst.  There is marked facet hypertrophy left much greater than right resulting in severe left foraminal stenosis with mild foraminal stenosis on the right.  No advanced central stenosis.     Impression:     Please note transitional lumbosacral vertebral bodies are identified.     At what is labeled L5-S1 there is marked left facet hypertrophy resulting in severe left foraminal stenosis.  In addition a 9 mm apparently cystic lesion near the subarticular zone but protruding into the opening of the neural foramen is thought to most  likely represent a synovial cyst with extruded disc fragment and schwannoma thought less likely.  A contrast study could be performed for further clarification but only if clinically warranted.  No advanced central or left lateral recess stenosis is suspected with the main impingement likely being of the exiting left nerve root.        Electronically signed by:Gus Hernadez  Date:                                            05/31/2024  Time:                                           17:03    Narrative & Impression    EXAMINATION:  MRI CERVICAL SPINE WITHOUT CONTRAST     CLINICAL HISTORY:  cervical DDD; Spondylosis without myelopathy or radiculopathy, cervical region     TECHNIQUE:  Multiplanar, multisequence MR images of the cervical spine were performed without the administration of contrast.     COMPARISON:  Radiograph 08/27/2020.     FINDINGS:  Alignment: Straightening of cervical spine with slight reversal at the level of C5 through C7.  Minimal degenerative anterolisthesis C4 on C5..     Vertebrae: Normal marrow signal. No fracture.     Discs: Disc space narrowing C5-C6-C7 level.     Cord: At C3 through C6 level, STIR images, the cervical cord appears slightly indistinct and of minimally increased signal intensity.  This may be artifactual in nature is a cannot be confirmed on the axial or sagittal T2 weighted sequences with certainty.  Postcontrast repeat examination may be indicated to exclude subtle intrinsic cord finding at this level..     Skull base and craniocervical junction: Normal.     Degenerative findings:     C2-C3: There is no focal disc herniation.No significant central canal narrowing.  No significant neural foraminal narrowing.     C3-C4: There is no focal disc herniation.No significant central canal narrowing.  No significant neural foraminal narrowing.     C4-C5: There is no focal disc herniation.No significant central canal narrowing.  Moderate RIGHT neural foraminal narrowing.     C5-C6:  There is no focal disc herniation.No significant central canal narrowing.  Moderate RIGHT and mild LEFT neural foraminal narrowing.     C6-C7: There is no focal disc herniation.No significant central canal narrowing.  Moderate LEFT neural foraminal narrowing.     C7-T1: There is no focal disc herniation.No significant central canal narrowing.  No significant neural foraminal narrowing.     T1-T2:     Paraspinal muscles & soft tissues: Unremarkable.     Impression:     Degenerative spondylosis as above predominantly C4-C5 through C6-C7.     Likely artifact identified level of the cord C3 through C6 STIR images only although at of abundance of caution, repeat examination with postcontrast MRI may be helpful to exclude underlying myelitis.     This report was flagged in Epic as abnormal.        Electronically signed by: Jerzy Rose MD  Date:                                            10/28/2021  Time:                                           06:44           Narrative & Impression    EXAMINATION:  XR CERVICAL SPINE 5 VIEW WITH FLEX AND EXT     CLINICAL HISTORY:  Headache     TECHNIQUE:  Five views of the cervical spine plus flexion and extension views were performed.     COMPARISON:  None 07/09/2019.     FINDINGS:  Mild DJD.  The disc spaces are narrowed between C5 and C7 vertebral segments.  Encroachment of the neural foramina identified at the C5-C6 level on the right and the C6/C7 level on the left.  There is a 2 mm C4/C5 anterolisthesis.  No fracture or dislocation.  No bone destruction identified.     Impression:     See above        Electronically signed by: Kaiser Ruiz MD  Date:                                            08/27/2020  Time:                                           14:12               Narrative & Impression     EXAMINATION:  MRI LUMBAR SPINE WITHOUT CONTRAST     CLINICAL HISTORY:  lumbar spinal stenosis; Other intervertebral disc degeneration, lumbar region     TECHNIQUE:  Multiplanar,  multisequence MR images were acquired from the thoracolumbar junction to the sacrum without the administration of contrast.     COMPARISON:  Non plain film examination 01/13/2020 e.     FINDINGS:  Lumbar spine alignment is within normal limits. The vertebral body heights are well maintained, with no fracture.  No marrow signal abnormality suspicious for an infiltrative process.     The conus is normal in appearance, and terminates at the L1-L2 level.  The adjacent soft tissue structures show no significant abnormalities.     L1-L2: There is no focal disc herniation. No significant central canal or neural foraminal narrowing.     L2-L3: There is no focal disc herniation. No significant central canal or neural foraminal narrowing.     L3-L4: There is no focal disc herniation. No significant central canal or neural foraminal narrowing.     L4-L5: There is no focal disc herniation. No significant central canal or neural foraminal narrowing.     L5-S1: There is no focal disc herniation. No significant central canal or neural foraminal narrowing.     Impression:     No significant central canal stenosis, focal protrusion of disc material or neural foraminal encroachment.        Electronically signed by: Jerzy Rose MD  Date:                                            07/14/2020  Time:                                           14:15         Narrative & Impression    EXAMINATION:  XR LUMBAR SPINE AP AND LAT WITH FLEX/EXT     CLINICAL HISTORY:  Other intervertebral disc degeneration, lumbar region     TECHNIQUE:  AP and lateral views as well as lateral flexion and extension images are performed through the lumbar spine.     COMPARISON:  None     FINDINGS:  Mild DJD and lumbar scoliosis.  No significant disc space narrowing identified.  No fracture, spondylolisthesis or bone destruction noted.  Spina bifida occulta involving the S1 vertebral segment noted.     Impression:     See above        Electronically signed by:  Kaiser Ruiz MD  Date:                                            01/13/2020  Time:                                           09:22    Encounter    View Encounter                     Narrative     No significant alignment abnormality.  Vertebral body heights are normally maintained, without compression deformity at any level.  There is disc narrowing at C5-6 and C6-7, and each of these levels demonstrates posterior marginal vertebral endplate   spurring and accompanying disc bulging.  The spurring is more pronounced to the right of midline at C5-6 and to the left of midline at C6-7.  All other cervical and visualized upper thoracic levels have normal disc contour, and there is no evidence of a   significant focal disc herniation at any level.  AP diameter of the spinal canal is normally maintained at all levels with no canal stenosis/extrinsic cord compression observed.  The spinal cord is well delineated from the cervicomedullary junction to   the T3 level, and appears normal in size and configuration without focal enlargement or irregularity.  No Chiari malformation or cord cyst or syrinx.  No abnormal signal from the substance of the cord on any of the pulse sequences generated.  There is   some prominent neural foraminal stenosis at C5-6 on the right and C6-7 on the left.  No significant signal abnormality referable to the osseous structures.   Impression      Cervical spine MRI examination demonstrates degenerative changes as discussed above involving the C5-6 and C6-7 levels, with right-sided foraminal stenosis at C5-6 and left-sided foraminal stenosis at C6-7.  No evidence of a significant focal soft is   herniation, canal stenosis/extrinsic cord compression, or intramedullary cord lesion seen at any level.      Electronically signed by: Kaiser Preston MD  Date: 08/01/13  Time: 06:40          Assessment:       Encounter Diagnoses   Name Primary?    S/P lumbar spinal fusion Yes    Dorsalgia, unspecified      Degeneration of intervertebral disc of lumbar region with discogenic back pain and lower extremity pain     Lumbar radiculopathy     Lumbar spondylosis        Plan:       Naomy was seen today for follow-up.    Diagnoses and all orders for this visit:    S/P lumbar spinal fusion  -     LIDOcaine (LIDODERM) 5 %; Place 1 patch onto the skin once daily. Remove & Discard patch within 12 hours or as directed by MD    Dorsalgia, unspecified  -     LIDOcaine (LIDODERM) 5 %; Place 1 patch onto the skin once daily. Remove & Discard patch within 12 hours or as directed by MD    Degeneration of intervertebral disc of lumbar region with discogenic back pain and lower extremity pain    Lumbar radiculopathy    Lumbar spondylosis        Naomy Armstrong is a 59 y.o. female with left-sided low back and lower extremity pain.  S/p MIS L5-S1 TLIF done in October 2024.  Significant, complete relief of radicular pain following this procedure.  However continues to note pain across her lower lumbar paraspinal region.  Exact etiology unclear although appears to be mostly axial.  Updated more MRI showing multilevel degenerative changes, most notable at L4-5 with advanced facet degeneration and effusion on the right.  Also annular tear at L4-5.  Otherwise without significant neurocompressive findings.    Prior records review.  Pertinent imaging studies reviewed by me. Imaging results were discussed with patient.  Schedule for bilateral L3, L4 medial branch blocks.  If successful can proceed with radiofrequency ablation.  Discontinue Norco.  Start lidocaine patches.  Refills provided  Continue with physical therapy and home exercise.  Stressed the importance of maintaining regular home exercise program and being mindful of how they use their back throughout the day.  Patient expressed understanding and agreement.  Return to clinic after procedure to discuss efficacy.          Neal Stephenson PA-C  [facility]  Interventional Pain Management   03/24/2025    This note was created by combination of typed  and M-Modal dictation.  Transcription and phonetic errors may be present.  If there are any questions, please contact me.

## 2025-03-31 DIAGNOSIS — K52.9 ACUTE GASTROENTERITIS: ICD-10-CM

## 2025-03-31 RX ORDER — DICYCLOMINE HYDROCHLORIDE 20 MG/1
20 TABLET ORAL EVERY 6 HOURS
Qty: 120 TABLET | Refills: 0 | Status: SHIPPED | OUTPATIENT
Start: 2025-03-31

## 2025-04-03 ENCOUNTER — PATIENT MESSAGE (OUTPATIENT)
Dept: PAIN MEDICINE | Facility: CLINIC | Age: 60
End: 2025-04-03
Payer: COMMERCIAL

## 2025-04-03 NOTE — DISCHARGE INSTRUCTIONS
Lumbar/Cervical/Joint Medial Branch Block Pain Diary    Patient Name:  :    Pre-Procedure Pain Score: _____/10    Post-Procedure Pain Score:_____/10    Please fill out the chart below to the best of your ability. We need to know how much percentage of relief in your pain that you have while the numbing medication is active. Please be mindful that this is a diagnostic test and may not last for a long time. If you are asleep during one of the times listed below, you do not have to record that time.     Time After the   Procedure % of pain relief   achieved Pain Score (0-10)   1 hour post-procedure     2 hours post-procedure     3 hours post-procedure     4 hours post-procedure     5 hours post-procedure     6 hours post-procedure     12 hours post-procedure     24 hours post-procedure         The staff will be calling the day following your procedure to discuss your pain score post procedure and to answer any questions or concerns.    If you have any questions or concerns please call-  [phone]    2. If you have any questions about completing this diary, please call us at [phone]    Home Care Instructions Pain Management:    1. DIET:   You may resume your normal diet today.   2. BATHING:   You may shower with luke warm water.  3. DRESSING:   You may remove your bandage today.   4. ACTIVITY LEVEL:   You may resume your normal activities 24 hrs after your procedure.  5. MEDICATIONS:   You may resume your normal medications today.   6. SPECIAL INSTRUCTIONS:   No heat to the injection site for 24 hrs including, bath or shower, heating pad, moist heat, or hot tubs.    Use ice pack to injection site for any pain or discomfort.  Apply ice packs for 20 minute intervals as needed.   If you have received any sedatives by mouth today you may not drive for 12 hours.    If you have received any sedation through your IV, you may not drive for 24 hrs.     PLEASE CALL YOUR DOCTOR IF:  1. Redness or swelling around the  injection site.  2. Fever of 101 degrees  3. Drainage (pus) from the injection site.  4. For any continuous bleeding (some dried blood over the incision is normal.)    FOR EMERGENCIES:   If any unusual problems or difficulties occur during clinic hours, call [phone] or [phone].

## 2025-04-04 ENCOUNTER — HOSPITAL ENCOUNTER (OUTPATIENT)
Facility: HOSPITAL | Age: 60
Discharge: HOME | End: 2025-04-04
Attending: PAIN MEDICINE | Admitting: PAIN MEDICINE
Payer: COMMERCIAL

## 2025-04-04 VITALS
OXYGEN SATURATION: 100 % | HEART RATE: 64 BPM | TEMPERATURE: 98 F | RESPIRATION RATE: 15 BRPM | SYSTOLIC BLOOD PRESSURE: 159 MMHG | DIASTOLIC BLOOD PRESSURE: 75 MMHG

## 2025-04-04 DIAGNOSIS — M47.816 LUMBAR SPONDYLOSIS: Primary | ICD-10-CM

## 2025-04-04 PROCEDURE — 64493 INJ PARAVERT F JNT L/S 1 LEV: CPT | Mod: 50,,, | Performed by: PAIN MEDICINE

## 2025-04-04 PROCEDURE — 63600175 PHARM REV CODE 636 W HCPCS: Performed by: PAIN MEDICINE

## 2025-04-04 PROCEDURE — 64493 INJ PARAVERT F JNT L/S 1 LEV: CPT | Mod: 50 | Performed by: PAIN MEDICINE

## 2025-04-04 RX ORDER — LIDOCAINE HYDROCHLORIDE 10 MG/ML
INJECTION, SOLUTION INFILTRATION; PERINEURAL
Status: DISCONTINUED
Start: 2025-04-04 | End: 2025-04-04 | Stop reason: HOSPADM

## 2025-04-04 RX ORDER — BUPIVACAINE HYDROCHLORIDE 2.5 MG/ML
INJECTION, SOLUTION EPIDURAL; INFILTRATION; INTRACAUDAL; PERINEURAL
Status: DISCONTINUED
Start: 2025-04-04 | End: 2025-04-04 | Stop reason: HOSPADM

## 2025-04-04 RX ORDER — BUPIVACAINE HYDROCHLORIDE 2.5 MG/ML
10 INJECTION, SOLUTION EPIDURAL; INFILTRATION; INTRACAUDAL; PERINEURAL ONCE
Status: COMPLETED | OUTPATIENT
Start: 2025-04-04 | End: 2025-04-04

## 2025-04-04 RX ORDER — LIDOCAINE HYDROCHLORIDE 10 MG/ML
20 INJECTION, SOLUTION INFILTRATION; PERINEURAL ONCE
Status: COMPLETED | OUTPATIENT
Start: 2025-04-04 | End: 2025-04-04

## 2025-04-04 NOTE — PLAN OF CARE
Pt arrived to post procedure. VSS, alert, awake and well oriented. PRS of 6/10 and will continue to monitor.

## 2025-04-04 NOTE — DISCHARGE SUMMARY
[facility] - Pain Management  Discharge Note  Short Stay    Procedure(s) (LRB):  Bilateral L3, L4 Medial Branch Blocks #1 (Bilateral)      OUTCOME: Patient tolerated treatment/procedure well without complication and is now ready for discharge.    DISPOSITION: Home or Self Care    FINAL DIAGNOSIS:  <principal problem not specified>    FOLLOWUP: In clinic    DISCHARGE INSTRUCTIONS:  No discharge procedures on file.       Discharge Diagnosis:Lumbar spondylosis [M47.816]  Condition on Discharge: Stable.  Diet on Discharge: Same as before.  Activity: as per instruction sheet.  Discharge to: Home with a responsible adult.  Follow up: as per Discharge instructions

## 2025-04-04 NOTE — OP NOTE
LUMBAR Medial Branch Block Under Fluoroscopy  Time-out taken to identify patient and procedure side prior to starting the procedure.   I attest that I have reviewed the patient's home medications prior to the procedure and no contraindication have been identified. I  re-evaluated the patient after the patient was positioned for the procedure in the procedure room immediately before the procedural time-out. The vital signs are current and represent the current state of the patient which has not significantly changed since the preprocedure assessment.            Date of Service: 04/04/2025    PCP: Eric Hernandes Jr., MD          Referring Physician:                                                           PROCEDURE:  Bilateral  L3 and L4 medial branch block    REASON FOR PROCEDURE: Lumbar Spondylosis    PHYSICIAN: Jeremy Powell MD    ASSISTANTS: None    MEDICATIONS INJECTED: Bupivicaine 0.25% 1.0ml at each level    LOCAL ANESTHETIC USED: Xylocaine 1% 3ml each site.    SEDATION MEDICATIONS: None    ESTIMATED BLOOD LOSS:  None.    COMPLICATIONS:  None.    TECHNIQUE: Laying in a prone position, the patient was prepped and draped in the usual sterile fashion using ChloraPrep and fenestrated drape.  The level was determined under fluoroscopic guidance.  Local anesthetic was given by going down to the hub of the 27-gauge 1.25in needle and raising a wheal.  A 25-gauge 4.75 inch needle was introduced to the anatomic site of the medial branch at the lateral mass utilizing live fluoroscopy. Medication was then injected slowly at each level as stated above. The patient tolerated the procedure well.       The patient was monitored after the procedure.  Patient was given post procedure and discharge instructions to follow at home.  We will see the patient back in two weeks or the patient may call to inform of status. The patient was discharged in a stable condition

## 2025-04-07 ENCOUNTER — TELEPHONE (OUTPATIENT)
Dept: PAIN MEDICINE | Facility: CLINIC | Age: 60
End: 2025-04-07
Payer: COMMERCIAL

## 2025-04-07 DIAGNOSIS — M47.816 LUMBAR SPONDYLOSIS: Primary | ICD-10-CM

## 2025-04-07 NOTE — TELEPHONE ENCOUNTER
[facility] to discuss efficacy of bilat L3,L4 MBB #1 performed Friday- phone number included.

## 2025-04-08 NOTE — TELEPHONE ENCOUNTER
Mrs. Jimenes reports 100% reduction in pain and improvement in ADLs following the bilat L3, L4 MBB #1 performed Friday.  Pt reports pain 0/10 during the post-procedure time period. Her pain has since returned to baseline 8/10. She would like to proceed with the 2nd MBB- provider updated.

## 2025-04-10 ENCOUNTER — OFFICE VISIT (OUTPATIENT)
Dept: ORTHOPEDICS | Facility: CLINIC | Age: 60
End: 2025-04-10
Payer: COMMERCIAL

## 2025-04-10 ENCOUNTER — APPOINTMENT (OUTPATIENT)
Dept: RADIOLOGY | Facility: HOSPITAL | Age: 60
End: 2025-04-10
Payer: COMMERCIAL

## 2025-04-10 VITALS — BODY MASS INDEX: 27.92 KG/M2 | HEIGHT: 65 IN | WEIGHT: 167.56 LBS

## 2025-04-10 DIAGNOSIS — M25.511 CHRONIC RIGHT SHOULDER PAIN: ICD-10-CM

## 2025-04-10 DIAGNOSIS — R52 PAIN: Primary | ICD-10-CM

## 2025-04-10 DIAGNOSIS — G89.29 CHRONIC RIGHT SHOULDER PAIN: ICD-10-CM

## 2025-04-10 DIAGNOSIS — R52 PAIN: ICD-10-CM

## 2025-04-10 DIAGNOSIS — M43.27 FUSION OF SPINE, LUMBOSACRAL REGION: Primary | ICD-10-CM

## 2025-04-10 DIAGNOSIS — M17.0 BILATERAL PRIMARY OSTEOARTHRITIS OF KNEE: Primary | ICD-10-CM

## 2025-04-10 PROCEDURE — 99999 PR PBB SHADOW E&M-EST. PATIENT-LVL IV: CPT | Mod: PBBFAC,,,

## 2025-04-10 PROCEDURE — 73564 X-RAY EXAM KNEE 4 OR MORE: CPT | Mod: 26,,, | Performed by: RADIOLOGY

## 2025-04-10 PROCEDURE — 73564 X-RAY EXAM KNEE 4 OR MORE: CPT | Mod: TC,50,FY,PN

## 2025-04-10 RX ORDER — TRIAMCINOLONE ACETONIDE 40 MG/ML
40 INJECTION, SUSPENSION INTRA-ARTICULAR; INTRAMUSCULAR
Status: DISCONTINUED | OUTPATIENT
Start: 2025-04-10 | End: 2025-04-10 | Stop reason: HOSPADM

## 2025-04-10 RX ORDER — MELOXICAM 15 MG/1
15 TABLET ORAL
Qty: 30 TABLET | Refills: 1 | Status: SHIPPED | OUTPATIENT
Start: 2025-04-10

## 2025-04-10 RX ADMIN — TRIAMCINOLONE ACETONIDE 40 MG: 40 INJECTION, SUSPENSION INTRA-ARTICULAR; INTRAMUSCULAR at 08:04

## 2025-04-10 NOTE — PROCEDURES
Large Joint Aspiration/Injection: R subacromial bursa    Date/Time: 4/10/2025 8:30 AM    Performed by: Jayne Pretty PA-C  Authorized by: Jayne Pretty PA-C    Consent Done?:  Yes (Verbal)  Indications:  Pain  Prep: patient was prepped and draped in usual sterile fashion      Local anesthesia used?: Yes    Anesthesia:  Local infiltration  Local anesthetic:  Lidocaine 2% without epinephrine and topical anesthetic    Details:  Needle Size:  22 G  Ultrasonic Guidance for needle placement?: No    Approach:  Posterior  Location:  Shoulder  Site:  R subacromial bursa  Medications:  40 mg triamcinolone acetonide 40 mg/mL  Patient tolerance:  Patient tolerated the procedure well with no immediate complications

## 2025-04-10 NOTE — PROGRESS NOTES
Assessment: 59 y.o. female with right rotator cuff tendinitis, right biceps tendinitis , bilateral knee osteoarthritis     I explained my diagnostic impression and the reasoning behind it in detail, using layman's terms.      Plan:   -Injection of the right subacromial space  performed, please see procedure note for more details.  Prior to the injection risks and benefits of corticosteroid injection were discussed with the patient including pain, infection, bleeding, skin color changes, swelling, steroid flare. We discussed that over time injections can result in chondral damage, acceleration of arthritis formation, damage to tendons and damage to joints.  The patient consented for the procedure.  Post-injection instructions were given to the patient in writing.  - In regards to continuing right knee pain, I will have her follow up with Dr. Arias regarding this.  It seems like majority of her pain is related to her significant patellofemoral arthritis.  At this point she has tried and failed multiple oral medications, multiple intra-articular steroid injections, and multiple rounds of Euflexxa for her right knee.  I would like for her to consult with Dr. Arias to discuss further intervention with possible surgical consideration. Of note, her left knee has responded relatively well to recent Euflexxa series.  - Return to clinic as needed for right shoulder, and with Dr. Arias regarding right knee.    All questions were answered in detail. The patient is in full agreement with the treatment plan and will proceed accordingly.  NIDIA Lacysantonio [facility]     Chief Complaint   Patient presents with    Knee Pain     Justen knee pain        Initial visit (9/20/23): Naomy Armstrong is a 59 y.o. female who presents today complaining of right shoulder pain  Duration of symptoms:  several years- pain has been intermittent. She has had constant severe pain for the last two weeks  Trauma or new  activity: no  Pain is constant  Aggravating factors: Reaching overhead is somewhat painful, abduction, lifting, turning over at night  Relieving factors: rst  Night pain is present and is disruptive to sleep  Radicular symptoms: no numbness, paresthesias   Associated symptoms:  limited range of motion.    Prior treatment:  meloxicam without relief, celebrex, advil without improvement in pain.     Pain does interfere with sleep and activities of daily living .  All pain the last to the anterior shoulder, no pain localized over the subdeltoid fossa.  Does not radiate to the lateral upper arm    10/26/23  Had a lot of pain to the bilateral shoulders yesterday. Took a Meloxicam last night and her pain is somewhat better   Still has pain in the right shoulder - never got better with injection  PT starts soon - she was not able to get in sooner because of her work schedule     3/13/24  Pain in both shoulders has returned   Did PT with epic and it did not help   R is more bothersome today, usually left is very painful as well   Pushing and motion with the shoulder are painful  More painful in morning  Pain is anterior     1/8/25 PA:  Patient returns today with pain in the right shoulder. Left shoulder not bothersome today.   Pain is located anterior.  Worse with reaching overhead - most noticeable when she is opening/closing trunk of her car or lifting items.  Last time patient was seen for R shoulder, MRI was ordered. She did not have this done. Currently living in [place] and has had difficulty scheduling appointments   Of note, had a fall on Saturday at home. She was wearing slippers and was stepping backwards and fell onto her backside and hit her right shoulder on some furniture/items on the way down.     She is also having ongoing bilateral knee pain despite most recent steroid injections on 12/30/24. We have already put in the prior authorization for euflexxa injections and are planning to have her follow up for  these once approved by insurance. She is using a cane to ambulate today due to knee pain.     2/4/25 PA:  Patient returns today for bilateral knee pain and initiation of euflexxa injections as well as right shoulder MRI.   Reviewed MRI results - shoulder not as bothersome as knees today    2/11/25 PA:  Patient returns today for bilateral knee pain and 2/3 euflexxa injections.   Reports some improvement pain levels after first injection     2/19/25 PA:   Patient returns toady for 3/3 bilateral euflexxa injections. Reports her left knee is doing much better, not bothersome at all. Right knee is doing much better since starting this euflexxa series, but is still bothersome when ambulating. She is able to ambulate today with only minimal pain. Not using her cane today.   R shoulder continues to be not as bothersome. Only has pain with specific movements, lifting     04/10/2025 PA:  Patient returns today in regards to bilateral knee pain, right knee worse than left.  Previous Euflexxa series helped somewhat for roughly 1 week after her 3rd injection, but the pain never fully resolved in the right knee.  Over the last few weeks the pain has acutely gotten worse, specifically in the right knee.  Pain is worse with stairs, twisting movements, and rising from a seated position.  She feels the pain primarily anterior along both joint lines.  She has been on pain medications pain management including Norco, which has recently been discontinued and switched to lidocaine patches.  She has also tried meloxicam, various muscle relaxers, and gabapentin. She is back to using her cane today for ambulation.  At this point, we have tried multiple rounds of steroid and gel injections, which have not provided significant relief.  She is wondering what next step is in regards to tackling this ongoing, chronic knee pain.    Of note, she is additionally reporting intermittent flares of right shoulder pain.  She feels his pain primarily  "superior, anterior and lateral.      This is the extent of the patient's complaints at this time.      Review of patient's allergies indicates:   Allergen Reactions    Effexor [venlafaxine]      Elevated her blood pressure    Zoloft [sertraline]     Paroxetine hcl      Made her feel drunk         Physical Exam: No changes in exam, previous exam below  Vitals:    04/10/25 0826   Weight: 76 kg (167 lb 8.8 oz)   Height: 5' 5" (1.651 m)   PainSc:   5   PainLoc: Knee       General: Patient is alert, awake and oriented to time, place and person. Mood and affect are appropriate.  Patient does not appear to be in any distress, denies any constitutional symptoms and appears stated age.   HEENT: Pupils are equal and round, sclera are not injected. External examination of ears and nose reveals no abnormalities. Cranial nerves II-X are grossly intact  Skin: no rashes, abrasions or open wounds on the affected extremity. She does have some bruising related to recent fall over the lateral aspect of the right shoulder  Resp: No respiratory distress or audible wheezing   CV: 2+  pulses, all extremities warm and well perfused   PHYSICAL EXAM   height is 5' 5" (1.651 m) and weight is 76 kg (167 lb 8.8 oz).     All other systems deferred.  GENERAL:  No acute distress  HABITUS:  Normal  GAIT:  Antalgic  SKIN:  No erythema, warmth, fluctuance.     KNEE EXAM:    bilateral:    Effusion:  Small joint effusion on right  TTP:  Mild tenderness to palpation over medial joint line of right knee  Passive ROM: Extension 0, Flexion 130  Yes crepitus with passive knee ROM  No pain with manipulation of patella  Stable to varus/valgus stress. No increased laxity to anterior/posterior drawer testing  Negative Aliyah's test  No pain with IR/ER rotation of the hip  5/5 strength in knee flexion and extension, ankle plantarflexion and dorsiflexion  Neurovascular Status: Sensation intact to light touch in Sural, saphenous, SPN, DPN, Tibial nerve " distribution  2+ pulse DP/PT, normal capillary refill, foot has normal warmth   Imaginv bilateral knee radiographs reveal tricompartment degenerative changes, most prominent in the patellofemoral compartment of the right knee and medial compartment of the left knee, noting moderate joint space loss with osteophytosis. No fractures. No marrow replacement process. Mild lateral patellar subluxation.     MRI R shoulder: rotator cuff tendinosis with high-grade partial superior subscapularis tendon tear, medial perching of a split long head biceps tendon, and small full-thickness anterior supraspinatus insertional tear. Additional areas of partial-thickness tearing of the supraspinatus and infraspinatus. Biceps tenosynovitis. Multifocal grades 1-3 glenohumeral chondromalacia. Subacromial and subcoracoid bursitis        Current Outpatient Medications:     alendronate (FOSAMAX) 35 MG tablet, Take 1 tablet (35 mg total) by mouth every 7 days., Disp: 12 tablet, Rfl: 1    alosetron (LOTRONEX) 0.5 MG tablet, Take 1 tablet (0.5 mg total) by mouth 2 (two) times daily., Disp: 180 tablet, Rfl: 3    amLODIPine (NORVASC) 5 MG tablet, Take 1 tablet by mouth once daily, Disp: 90 tablet, Rfl: 1    atorvastatin (LIPITOR) 10 MG tablet, Take 1 tablet (10 mg total) by mouth once daily., Disp: 90 tablet, Rfl: 3    azelastine (ASTELIN) 137 mcg (0.1 %) nasal spray, , Disp: , Rfl:     benzonatate (TESSALON) 200 MG capsule, Take 1 capsule (200 mg total) by mouth 3 (three) times daily as needed for Cough., Disp: 30 capsule, Rfl: 0    buPROPion (WELLBUTRIN XL) 150 MG TB24 tablet, Take 1 tablet by mouth once daily., Disp: , Rfl:     calcium carb and citrate-vitD3 (CITRACAL-D3 SLOW RELEASE) 600 mg-12.5 mcg (500 unit) TbSR, Take 2 tablets by mouth once daily., Disp: 180 tablet, Rfl: 3    cyclobenzaprine (FLEXERIL) 5 MG tablet, Take 2 tablets (10 mg total) by mouth 3 (three) times daily as needed for muscle spasms, Disp: 90 tablet, Rfl: 0     dicyclomine (BENTYL) 20 mg tablet, TAKE 1 TABLET BY MOUTH EVERY 6 HOURS., Disp: 120 tablet, Rfl: 0    FLUoxetine 40 MG capsule, Take 2 capsules (80 mg total) by mouth once daily., Disp: 180 capsule, Rfl: 3    gabapentin (NEURONTIN) 300 MG capsule, Take 3 capsules (900 mg total) by mouth 3 (three) times daily., Disp: 270 capsule, Rfl: 11    hydrOXYzine HCL (ATARAX) 10 MG Tab, Take 1 tablet (10 mg total) by mouth 2 (two) times daily as needed (anxiety). (Patient not taking: Reported on 2/20/2025), Disp: 60 tablet, Rfl: 3    ipratropium (ATROVENT) 42 mcg (0.06 %) nasal spray, SRAY 2 SPRAYS BY EACH NOSTRIL ROUTE 4 (FOUR) TIMES DAILY., Disp: 45 mL, Rfl: 0    LIDOcaine (LIDODERM) 5 %, Place 1 patch onto the skin once daily. Remove & Discard patch within 12 hours or as directed by MD, Disp: 30 patch, Rfl: 0    lisinopriL (PRINIVIL,ZESTRIL) 20 MG tablet, Take 1 tablet (20 mg total) by mouth nightly., Disp: 90 tablet, Rfl: 3    lurasidone (LATUDA) 20 mg Tab tablet, Take 2 tablets (40 mg total) by mouth once daily., Disp: 60 tablet, Rfl: 11    meloxicam (MOBIC) 15 MG tablet, TAKE 1 TABLET BY MOUTH EVERY DAY, Disp: 30 tablet, Rfl: 1    methocarbamoL (ROBAXIN) 500 MG Tab, Take 1 tablet by mouth 3 times daily as needed., Disp: , Rfl:     mometasone (ELOCON) 0.1 % ointment, APPLY OINTMENT TOPICALLY TO LEGS TWICE DAILY FOR 2 WEEKS, Disp: , Rfl:     multivitamin with minerals tablet, Take 1 tablet by mouth once daily., Disp: , Rfl:     naloxone (NARCAN) 4 mg/actuation Spry, 4mg by nasal route as needed for opioid overdose; may repeat every 2-3 minutes in alternating nostrils until medical help arrives. Call 911, Disp: 1 each, Rfl: 11    nitrofurantoin, macrocrystal-monohydrate, (MACROBID) 100 MG capsule, Take 1 capsule (100 mg total) by mouth once daily. (Patient not taking: Reported on 3/24/2025), Disp: 30 capsule, Rfl: 5    omeprazole (PRILOSEC) 40 MG capsule, Take 1 capsule by mouth every morning., Disp: , Rfl:     pregabalin  330 mg Tb24, Take 330 mg by mouth after dinner., Disp: 30 tablet, Rfl: 5    promethazine (PHENERGAN) 25 MG tablet, Take 1 tablet (25 mg total) by mouth every 8 (eight) hours as needed., Disp: 30 tablet, Rfl: 0    QULIPTA 60 mg Tab, Take 1 tablet by mouth once daily., Disp: , Rfl:     rimegepant (NURTEC) 75 mg odt, DISSOLVE 1 TABLET BY MOUTH AS NEEDED FOR MIGRAINE HEADACHE, Disp: , Rfl:     tiZANidine (ZANAFLEX) 6 mg capsule, Take 6 mg by mouth 3 (three) times daily. (Patient not taking: Reported on 3/24/2025), Disp: , Rfl:     Past Medical History:   Diagnosis Date    Alcohol abuse     per chart, but patient denies today and cocaine per chart    Anxiety     Colon polyp     Depression     Hallucination     last couple of months started seeing someone standing in her room, saw boyfriend's reflection in glass    Headache     History of psychiatric hospitalization     age 13 [facility] for 2 months for acting out; 2018 for SI    Hx of psychiatric care     Hypertension     Psychiatric problem     Sleep difficulties     Suicide attempt     with knife by cutting stomach    Therapy        Active Problem List with Overview Notes    Diagnosis Date Noted    Mixed dyslipidemia 11/06/2024     Lab Results   Component Value Date    CHOL 250 (H) 10/22/2024    CHOL 250 (H) 01/06/2024    CHOL 207 (H) 05/18/2022     Lab Results   Component Value Date    HDL 71 10/22/2024    HDL 68 01/06/2024    HDL 64 05/18/2022     Lab Results   Component Value Date    LDLCALC 135.0 10/22/2024    LDLCALC 161.4 (H) 01/06/2024    LDLCALC 100.0 05/18/2022     Lab Results   Component Value Date    TRIG 220 (H) 10/22/2024    TRIG 103 01/06/2024    TRIG 215 (H) 05/18/2022     Lab Results   Component Value Date    CHOLHDL 28.4 10/22/2024    CHOLHDL 27.2 01/06/2024    CHOLHDL 30.9 05/18/2022      The 10-year ASCVD risk score (Damaris GERARDO, et al., 2019) is: 4.8%    Values used to calculate the score:      Age: 59 years      Sex: Female      Is Non-   American: No      Diabetic: No      Tobacco smoker: No      Systolic Blood Pressure: 126 mmHg      Is BP treated: Yes      HDL Cholesterol: 71 mg/dL      Total Cholesterol: 250 mg/dL        S/P lumbar spinal fusion 10/29/2024     10/28/24: Left MIS L5-S1 TLIF with Dr. King      Osteopenia after menopause 07/30/2024 06-  DEXA  Lumbar spine (L1-L4):  T-score is -1.6, and Z-score is -0.1.  Femoral neck:    T-score is -2.3, and Z-score is -1.0.  Total hip:    T-score is -1.4, and Z-score is -0.4.    Fracture Risk (FRAX)  18% risk of a major osteoporotic fracture in the next 10 years.  2.8% risk of hip fracture in the next 10 years.    Impression:  Low bone mass (Osteopenia); FRAX calculations do not support treatment as osteoporosis.    RECOMMENDATIONS:  *Daily calcium intake 1144-6013 mg, dietary sources preferred; Vitamin D 5627-4718 IU daily.  *Weight bearing exercise and fall precautions.  *No additional pharmacologic therapy recommended at this time.  *Repeat BMD in 2 years.      Lumbar radiculopathy 05/16/2024    Hypertension, essential 02/18/2024    Memory impairment 12/05/2023    Generalized anxiety disorder 11/27/2022    Esophagogastric junction outflow obstruction 11/22/2022    Major depressive disorder, recurrent, moderate 02/09/2022    Cervical spondylosis 08/11/2021    Hiatal hernia 07/09/2021    Irritable bowel syndrome with diarrhea 07/09/2021    Lumbosacral spondylosis 04/16/2021    Sacroiliac joint pain 03/31/2021    Spondylosis of lumbosacral region 03/31/2021    Chronic back pain greater than 3 months duration 07/16/2020    Decreased strength of lower extremity 06/01/2020    Decreased strength of trunk and back 06/01/2020    Pain in joint of right hip 06/01/2020    Decreased range of motion of right lower extremity 06/01/2020    Bilateral primary osteoarthritis of knee 03/12/2020    Lumbar spondylosis 01/13/2020    DDD (degenerative disc disease), lumbar 01/13/2020    Spondylosis of  cervical region without myelopathy or radiculopathy 07/09/2019    Primary osteoarthritis of right knee 06/24/2019    Neck pain 06/24/2019    Cervical radiculopathy 04/26/2013     X-ray cervical spine and EMG/nerve conduction studies      DDD (degenerative disc disease), cervical 04/26/2013    Cervicogenic headache 04/26/2013     Patient has cervicogenic headaches which we will address with a combination of heat/ice application, muscle relaxers, and potentially anti-inflammatories.          Past Surgical History:   Procedure Laterality Date    COLONOSCOPY N/A 05/07/2021    Procedure: COLONOSCOPY;  Surgeon: Prem Montana MD;  Location: [facility];  Service: Endoscopy;  Laterality: N/A;  COVID screening 5/4 LAP[facility]-instr portal -ml    COLONOSCOPY N/A 06/06/2022    Procedure: COLONOSCOPY;  Surgeon: Fab Shepherd MD;  Location: [facility] ([address] [place] [address]);  Service: Endoscopy;  Laterality: N/A;  C-Diff neg  Rapid COVID    EPIDURAL STEROID INJECTION Bilateral 08/05/2020    Procedure: Bilateral MBB @ L3, L4, L5;  Surgeon: Jeremy Powell Jr., MD;  Location: [facility];  Service: Pain Management;  Laterality: Bilateral;  Bilat L3-5 MBB  Arrive @ 1315; No ATC or DM; Needs MD signature    EPIDURAL STEROID INJECTION Right 08/19/2020    Procedure: Lumbar Radiofrequency Thermocoagulation of Medial Branches;  Surgeon: Jeremy Powell Jr., MD;  Location: [facility];  Service: Pain Management;  Laterality: Right;  Right L3-5 RFA  Arrive @ 1045; No ATC or DM; Needs MD Signature    EPIDURAL STEROID INJECTION Left 09/04/2020    Procedure: Lumbar Radiofrequency Thermocoagulation of Medial Branches;  Surgeon: Jeremy Powell Jr., MD;  Location: [facility];  Service: Pain Management;  Laterality: Left;  Left L3-5 RFA  Arrive @ 0900 (requested); No ATC or DM; Needs MD signature    EPIDURAL STEROID INJECTION Bilateral 04/16/2021    Procedure: Sacroiliac Joint Steroid Injections @ Bilateral;  Surgeon: Jeremy Powell Jr., MD;   Location: [facility];  Service: Pain Management;  Laterality: Bilateral;  Arrive @ 1300; No ATC or DM    EPIDURAL STEROID INJECTION Bilateral 01/19/2022    Procedure: Bilateral Sacroiliac Joint Injections;  Surgeon: Jeremy Powell Jr., MD;  Location: [facility];  Service: Pain Management;  Laterality: Bilateral;  Arrive @ 1315; No ATC or DM; Needs Consent    EPIDURAL STEROID INJECTION Bilateral 07/28/2023    Procedure: Bilateral L2, L3, L4 Medial Branch Blocks #1;  Surgeon: Jeremy Powell Jr., MD;  Location: [facility];  Service: Pain Management;  Laterality: Bilateral;  @1200  No ATC or DM    EPIDURAL STEROID INJECTION Bilateral 08/11/2023    Procedure: Bilateral L2, L3, L4 Medial Branch Blocks #2;  Surgeon: Jeremy Powell Jr., MD;  Location: [facility];  Service: Pain Management;  Laterality: Bilateral;  @1330  No ATC or DM    ESOPHAGEAL MANOMETRY WITH MEASUREMENT OF IMPEDANCE N/A 10/20/2021    Procedure: MANOMETRY, ESOPHAGUS, WITH IMPEDANCE MEASUREMENT;  Surgeon: Anastacia Odonnell MD;  [facility]  Service: Endoscopy;  Laterality: N/A;  Covid test 10/17 [place], instructions sent to [email or web address]    ESOPHAGOGASTRODUODENOSCOPY N/A 05/07/2021    Procedure: EGD (ESOPHAGOGASTRODUODENOSCOPY);  Surgeon: Prem Montana MD;  Location: [facility];  Service: Endoscopy;  Laterality: N/A;  added on per Dr. NOEL Shepherd    EYE SURGERY  6/6/2015    Lasik    GANGLION CYST EXCISION      HERNIA REPAIR  2/14/2022    Hiatal hernia    INJECTION OF ANESTHETIC AGENT AROUND MEDIAL BRANCH NERVES INNERVATING LUMBAR FACET JOINT Bilateral 4/4/2025    Procedure: Bilateral L3, L4 Medial Branch Blocks #1;  Surgeon: Jeremy Powell Jr., MD;  Location: [facility];  Service: Pain Management;  Laterality: Bilateral;  @1115(given)  No ATC or DM  MD Sign.    INJECTION, FACET JOINT, LUMBOSACRAL Left 06/14/2024    Procedure: Left S1 Transforaminal Epidural Steroid Injection + Left L5-S1 Facet Joint  Injection/Aspiration;  Surgeon: Jeremy Powell Jr., MD;  Location: [facility];  Service: Pain Management;  Laterality: Left;  @1200  No ATC or DM  Needs Consent  IV Sedation    LAPAROSCOPIC TOUPET FUNDOPLICATION N/A 2022    Procedure: FUNDOPLICATION, LAPAROSCOPIC, TOUPET;  Surgeon: Christian Martinez MD;  Location: [facility] OR [address] [place] [address];  Service: General;  Laterality: N/A;    MINIMALLY INVASIVE TRANSFORAMINAL LUMBAR INTERBODY FUSION (TLIF) Left 10/28/2024    Procedure: FUSION, SPINE, LUMBAR, TLIF, MINIMALLY INVASIVE;  Surgeon: Christian King MD;  Location: [facility] OR;  Service: Neurosurgery;  Laterality: Left;  Left L5/S1, possible L4-5 minimally invasive TLIF  sravanthi 4 poster   fluoro  no neuromonitoring needed   Depuy  [employer] CARMELA NOTIFIED-GG  RN PREOP 10/14/2024   T/S ON 10/25/2024----NEED H/P       Social History     Socioeconomic History    Marital status:     Number of children: 0   Occupational History    Occupation: Overnight nuvia     Employer: [employer]   Tobacco Use    Smoking status: Former     Current packs/day: 0.00     Average packs/day: 1 pack/day for 21.3 years (21.3 ttl pk-yrs)     Types: Cigarettes     Start date: 1985     Quit date: 2006     Years since quittin.9    Smokeless tobacco: Never    Tobacco comments:     It has been a very long time. I do not remember dates   Substance and Sexual Activity    Alcohol use: Not Currently     Comment: occasional    Drug use: Not Currently     Types: Marijuana     Comment: tried at age 14    Sexual activity: Yes     Partners: Male     Birth control/protection: Post-menopausal     Comment: going through menopause   Other Topics Concern    Patient feels they ought to cut down on drinking/drug use No    Patient annoyed by others criticizing their drinking/drug use No    Patient has felt bad or guilty about drinking/drug use No    Patient has had a drink/used drugs as an eye opener in the AM No   Social History  Narrative     x 2.    Lives with boyfriend.     Works at [employer] as an .    Presently working on Bachelor's degree.    Has associates degree in criminal justice.     Social Drivers of Health     Financial Resource Strain: Medium Risk (12/2/2024)    Overall Financial Resource Strain (CARDIA)     Difficulty of Paying Living Expenses: Somewhat hard   Food Insecurity: Patient Declined (12/2/2024)    Hunger Vital Sign     Worried About Running Out of Food in the Last Year: Patient declined     Ran Out of Food in the Last Year: Patient declined   Transportation Needs: Patient Declined (10/28/2024)    TRANSPORTATION NEEDS     Transportation : Patient declined   Physical Activity: Unknown (12/2/2024)    Exercise Vital Sign     Days of Exercise per Week: Patient declined     Minutes of Exercise per Session: 0 min   Stress: No Stress Concern Present (12/2/2024)    [employer] - Occupational Stress Questionnaire     Feeling of Stress : Only a little   Housing Stability: High Risk (12/2/2024)    Housing Stability Vital Sign     Unable to Pay for Housing in the Last Year: Yes     Homeless in the Last Year: Patient declined

## 2025-04-14 ENCOUNTER — OFFICE VISIT (OUTPATIENT)
Dept: FAMILY MEDICINE | Facility: CLINIC | Age: 60
End: 2025-04-14
Payer: COMMERCIAL

## 2025-04-14 DIAGNOSIS — G44.86 CERVICOGENIC HEADACHE: ICD-10-CM

## 2025-04-14 DIAGNOSIS — K58.0 IRRITABLE BOWEL SYNDROME WITH DIARRHEA: Primary | ICD-10-CM

## 2025-04-14 DIAGNOSIS — K52.9 ACUTE GASTROENTERITIS: ICD-10-CM

## 2025-04-14 PROCEDURE — 98006 SYNCH AUDIO-VIDEO EST MOD 30: CPT | Mod: 95,,, | Performed by: NURSE PRACTITIONER

## 2025-04-14 PROCEDURE — G2211 COMPLEX E/M VISIT ADD ON: HCPCS | Mod: 95,,, | Performed by: NURSE PRACTITIONER

## 2025-04-14 PROCEDURE — 1160F RVW MEDS BY RX/DR IN RCRD: CPT | Mod: CPTII,95,, | Performed by: NURSE PRACTITIONER

## 2025-04-14 PROCEDURE — 1159F MED LIST DOCD IN RCRD: CPT | Mod: CPTII,95,, | Performed by: NURSE PRACTITIONER

## 2025-04-14 PROCEDURE — 4010F ACE/ARB THERAPY RXD/TAKEN: CPT | Mod: CPTII,95,, | Performed by: NURSE PRACTITIONER

## 2025-04-14 RX ORDER — CHOLESTYRAMINE 4 G/9G
4 POWDER, FOR SUSPENSION ORAL DAILY
Qty: 30 PACKET | Refills: 0 | Status: SHIPPED | OUTPATIENT
Start: 2025-04-14 | End: 2026-04-14

## 2025-04-14 NOTE — LETTER
April 14, 2025      [facility]  [address]  [address]  [place] [address]  Phone: [phone]       Patient: Naomy Armstrong   YOB: 1965  Date of Visit: 04/14/2025    To Whom It May Concern:    Ronald Armstrong  was at [facility] on 04/14/2025. The patient may return to work/school on 4/15/25 with no restrictions. If you have any questions or concerns, or if I can be of further assistance, please do not hesitate to contact me.    Sincerely,    Dutch Estrada, NP

## 2025-04-14 NOTE — PROGRESS NOTES
The patient location is: [place], [place]  The chief complaint leading to consultation is:  Diarrhea, abdominal pain    Visit type: audiovisual    Face to Face time with patient: 18 minutes  30 minutes of total time spent on the encounter, which includes face to face time and non-face to face time preparing to see the patient (eg, review of tests), Obtaining and/or reviewing separately obtained history, Documenting clinical information in the electronic or other health record, Independently interpreting results (not separately reported) and communicating results to the patient/family/caregiver, or Care coordination (not separately reported).         Each patient to whom he or she provides medical services by telemedicine is:  (1) informed of the relationship between the physician and patient and the respective role of any other health care provider with respect to management of the patient; and (2) notified that he or she may decline to receive medical services by telemedicine and may withdraw from such care at any time.    Notes:     Patient Name: Naomy Armstrong    : 1965  MRN: [id number]    Chief Complaint:  Diarrhea, abdominal pain    Subjective:  Naomy is a 59 y.o. female who presents today for:    Diarrhea, abdominal pain - patient who is known to me with IBS-diarrhea, other medical problems as documented below reports today for evaluation.  For the last 3 weeks she reports that her IBS has been significantly worse.  She endorses persistent abdominal cramping, gas buildup, belching, and diarrhea without any fevers or chills or blood in the stool.  She notes that the diarrhea is so severe that she does get fecal urgency and has had a few episodes of fecal incontinence.  She has tried lomotil and Imodium for the diarrhea without benefit.  Dicyclomine p.r.n. does help with the cramping.  She has been trying to get in touch with her gastroenterologist for this.  She denies any recent sick  contacts.    Past Medical History  Past Medical History:   Diagnosis Date    Alcohol abuse     per chart, but patient denies today and cocaine per chart    Anxiety     Colon polyp     Depression     Hallucination     last couple of months started seeing someone standing in her room, saw boyfriend's reflection in glass    Headache     History of psychiatric hospitalization     age 13 [facility] for 2 months for acting out; 2018 for SI    Hx of psychiatric care     Hypertension     Psychiatric problem     Sleep difficulties     Suicide attempt     with knife by cutting stomach    Therapy        Family History  Family History   Problem Relation Name Age of Onset    Anxiety disorder Sister Bev     Depression Sister Bev     Alcohol abuse Maternal Uncle      Alcohol abuse Maternal Grandfather         Current Medications  Medications Ordered Prior to Encounter[1]    Allergies   Review of patient's allergies indicates:   Allergen Reactions    Effexor [venlafaxine]      Elevated her blood pressure    Zoloft [sertraline]     Paroxetine hcl      Made her feel drunk       Review of Systems (Pertinent positives)  Review of Systems   Constitutional:  Negative for chills, fever and weight loss.   HENT: Negative.     Respiratory: Negative.     Cardiovascular:  Negative for chest pain, palpitations and orthopnea.   Gastrointestinal:  Positive for abdominal pain and diarrhea. Negative for blood in stool, constipation and melena.   Skin: Negative.    Neurological: Negative.        LMP 09/15/2013     Physical Exam  Vitals reviewed.   Constitutional:       General: She is not in acute distress.     Appearance: Normal appearance. She is not ill-appearing, toxic-appearing or diaphoretic.   HENT:      Head: Normocephalic and atraumatic.   Pulmonary:      Effort: Pulmonary effort is normal. No respiratory distress.      Breath sounds: No wheezing.   Neurological:      Mental Status: She is alert and oriented to person, place, and time.    Psychiatric:         Mood and Affect: Mood normal.         Behavior: Behavior normal.            Assessment/Plan:  Naomy Armstrong is a 59 y.o. female who presents today for :    Diagnoses and all orders for this visit:    Irritable bowel syndrome with diarrhea  -     Pancreatic elastase, fecal; Future  -     Fecal fat, qualitative; Future  -     Occult blood x 1, stool; Future  -     WBC, Stool; Future  -     Rotavirus antigen, stool; Future  -     Adenovirus Molecular Detection, PCR, Non-Blood Stool; Future  -     Calprotectin, Stool; Future  -     Giardia / Cryptosporidum, EIA; Future  -     Stool Exam-Ova,Cysts,Parasites; Future  -     Clostridium difficile EIA; Future  -     Stool culture; Future  -     cholestyramine (QUESTRAN) 4 gram packet; Take 1 packet (4 g total) by mouth once daily.    Given acute worsening symptoms will check stool studies.  Order placed.  Patient is out of town so will present to the nearest lab if symptoms do not improve.  She has tried cholestyramine in the past but could not tolerated.  She is willing to try this again.  Order placed.  Can increase if needed.  All questions answered.  Recommended follow up with Gastroenterology as recommended.        This office note has been dictated.  This dictation has been generated using M-Modal Fluency Direct dictation; some phonetic errors may occur.          [1]   Current Outpatient Medications on File Prior to Visit   Medication Sig Dispense Refill    alendronate (FOSAMAX) 35 MG tablet Take 1 tablet (35 mg total) by mouth every 7 days. 12 tablet 1    alosetron (LOTRONEX) 0.5 MG tablet Take 1 tablet (0.5 mg total) by mouth 2 (two) times daily. 180 tablet 3    amLODIPine (NORVASC) 5 MG tablet Take 1 tablet by mouth once daily 90 tablet 1    atorvastatin (LIPITOR) 10 MG tablet Take 1 tablet (10 mg total) by mouth once daily. 90 tablet 3    azelastine (ASTELIN) 137 mcg (0.1 %) nasal spray  (Patient not taking: Reported on 2/18/2025)       benzonatate (TESSALON) 200 MG capsule Take 1 capsule (200 mg total) by mouth 3 (three) times daily as needed for Cough. 30 capsule 0    buPROPion (WELLBUTRIN XL) 150 MG TB24 tablet Take 1 tablet by mouth once daily.      calcium carb and citrate-vitD3 (CITRACAL-D3 SLOW RELEASE) 600 mg-12.5 mcg (500 unit) TbSR Take 2 tablets by mouth once daily. 180 tablet 3    cyclobenzaprine (FLEXERIL) 5 MG tablet Take 2 tablets (10 mg total) by mouth 3 (three) times daily as needed for muscle spasms 90 tablet 0    dicyclomine (BENTYL) 20 mg tablet TAKE 1 TABLET BY MOUTH EVERY 6 HOURS. 120 tablet 0    FLUoxetine 40 MG capsule Take 2 capsules (80 mg total) by mouth once daily. 180 capsule 3    gabapentin (NEURONTIN) 300 MG capsule Take 3 capsules (900 mg total) by mouth 3 (three) times daily. 270 capsule 11    hydrOXYzine HCL (ATARAX) 10 MG Tab Take 1 tablet (10 mg total) by mouth 2 (two) times daily as needed (anxiety). (Patient not taking: Reported on 4/10/2025) 60 tablet 3    ipratropium (ATROVENT) 42 mcg (0.06 %) nasal spray SRAY 2 SPRAYS BY EACH NOSTRIL ROUTE 4 (FOUR) TIMES DAILY. 45 mL 0    LIDOcaine (LIDODERM) 5 % Place 1 patch onto the skin once daily. Remove & Discard patch within 12 hours or as directed by MD 30 patch 0    lisinopriL (PRINIVIL,ZESTRIL) 20 MG tablet Take 1 tablet (20 mg total) by mouth nightly. 90 tablet 3    lurasidone (LATUDA) 20 mg Tab tablet Take 2 tablets (40 mg total) by mouth once daily. 60 tablet 11    meloxicam (MOBIC) 15 MG tablet TAKE 1 TABLET BY MOUTH EVERY DAY 30 tablet 1    methocarbamoL (ROBAXIN) 500 MG Tab Take 1 tablet by mouth 3 times daily as needed.      mometasone (ELOCON) 0.1 % ointment APPLY OINTMENT TOPICALLY TO LEGS TWICE DAILY FOR 2 WEEKS      multivitamin with minerals tablet Take 1 tablet by mouth once daily.      naloxone (NARCAN) 4 mg/actuation Spry 4mg by nasal route as needed for opioid overdose; may repeat every 2-3 minutes in alternating nostrils until medical help  arrives. Call 911 1 each 11    nitrofurantoin, macrocrystal-monohydrate, (MACROBID) 100 MG capsule Take 1 capsule (100 mg total) by mouth once daily. 30 capsule 5    omeprazole (PRILOSEC) 40 MG capsule Take 1 capsule by mouth every morning.      pregabalin 330 mg Tb24 Take 330 mg by mouth after dinner. 30 tablet 5    promethazine (PHENERGAN) 25 MG tablet Take 1 tablet (25 mg total) by mouth every 8 (eight) hours as needed. 30 tablet 0    QULIPTA 60 mg Tab Take 1 tablet by mouth once daily.      rimegepant (NURTEC) 75 mg odt DISSOLVE 1 TABLET BY MOUTH AS NEEDED FOR MIGRAINE HEADACHE      tiZANidine (ZANAFLEX) 6 mg capsule Take 6 mg by mouth 3 (three) times daily. (Patient not taking: Reported on 2/18/2025)       No current facility-administered medications on file prior to visit.

## 2025-04-15 ENCOUNTER — TELEPHONE (OUTPATIENT)
Dept: PAIN MEDICINE | Facility: CLINIC | Age: 60
End: 2025-04-15
Payer: COMMERCIAL

## 2025-04-15 RX ORDER — CYCLOBENZAPRINE HCL 5 MG
10 TABLET ORAL 3 TIMES DAILY PRN
Qty: 60 TABLET | Refills: 0 | Status: SHIPPED | OUTPATIENT
Start: 2025-04-15

## 2025-04-15 RX ORDER — DICYCLOMINE HYDROCHLORIDE 20 MG/1
20 TABLET ORAL EVERY 6 HOURS
Qty: 120 TABLET | Refills: 0 | Status: SHIPPED | OUTPATIENT
Start: 2025-04-15

## 2025-04-15 NOTE — TELEPHONE ENCOUNTER
Message left with review of pre-procedure instructions, as well as provided arrival time of 10:15a on 4/23/2025.  Information was also sent via [employer].  Asked that patient call clinic to confirm- phone number included.

## 2025-04-16 ENCOUNTER — OFFICE VISIT (OUTPATIENT)
Dept: GASTROENTEROLOGY | Facility: CLINIC | Age: 60
End: 2025-04-16
Payer: COMMERCIAL

## 2025-04-16 VITALS
BODY MASS INDEX: 28.91 KG/M2 | HEART RATE: 75 BPM | DIASTOLIC BLOOD PRESSURE: 94 MMHG | HEIGHT: 65 IN | WEIGHT: 173.5 LBS | SYSTOLIC BLOOD PRESSURE: 166 MMHG

## 2025-04-16 DIAGNOSIS — K58.0 IRRITABLE BOWEL SYNDROME WITH DIARRHEA: Primary | ICD-10-CM

## 2025-04-16 PROCEDURE — 3008F BODY MASS INDEX DOCD: CPT | Mod: CPTII,S$GLB,,

## 2025-04-16 PROCEDURE — 1160F RVW MEDS BY RX/DR IN RCRD: CPT | Mod: CPTII,S$GLB,,

## 2025-04-16 PROCEDURE — 99214 OFFICE O/P EST MOD 30 MIN: CPT | Mod: S$GLB,,,

## 2025-04-16 PROCEDURE — 3080F DIAST BP >= 90 MM HG: CPT | Mod: CPTII,S$GLB,,

## 2025-04-16 PROCEDURE — 3077F SYST BP >= 140 MM HG: CPT | Mod: CPTII,S$GLB,,

## 2025-04-16 PROCEDURE — 1159F MED LIST DOCD IN RCRD: CPT | Mod: CPTII,S$GLB,,

## 2025-04-16 PROCEDURE — 4010F ACE/ARB THERAPY RXD/TAKEN: CPT | Mod: CPTII,S$GLB,,

## 2025-04-16 PROCEDURE — 99999 PR PBB SHADOW E&M-EST. PATIENT-LVL III: CPT | Mod: PBBFAC,,,

## 2025-04-16 RX ORDER — ALOSETRON HYDROCHLORIDE 0.5 MG/1
1 TABLET ORAL 2 TIMES DAILY
Qty: 120 TABLET | Refills: 11 | Status: SHIPPED | OUTPATIENT
Start: 2025-04-16 | End: 2025-04-16 | Stop reason: SDUPTHER

## 2025-04-16 RX ORDER — ALOSETRON HYDROCHLORIDE 1 MG/1
1 TABLET ORAL 2 TIMES DAILY
Qty: 60 TABLET | Refills: 11 | Status: SHIPPED | OUTPATIENT
Start: 2025-04-16 | End: 2026-04-16

## 2025-04-16 NOTE — PROGRESS NOTES
Gastroenterology Clinic Consultation Note    Reason for Visit:  The encounter diagnosis was Irritable bowel syndrome with diarrhea.    PCP:   Eric Hernandes Jr.         Initial HPI   This is a 59 y.o. female presenting for IBS with diarrhea  Patient in clinic with exacerbation IBS symptoms.  She reports diarrhea has gotten worse in the last few months, but in the last 2 weeks has gotten uncontrollable with Imodium and Lomotil.  She is taking Lotronex twice daily, she reports the medication used to worked effectively but is no longer working the way it used to.  She reports multiple loose bowel movements a day.  She was last seen by Dr. Shepherd in June 2024 for same complaints.  She was unable to tolerate Questran.  She is not taking any daily fiber.  Last colonoscopy was in 2022 repeat in 7 years.  Last stool studies were done in 2022 were negative. Denies unintentional weight loss, fever, chills, nausea, vomiting, constipation, esophageal reflux, regurgitation, hematemesis, difficulties swallowing, changes in bowel habits, changes in stool caliber, blood in stool, and abdominal pain.        6/2024 with Dr Shepherd  I last saw her in November 2022 for the same.  At our last visit I started her on alosetron.  She has been doing very well on this medication over the last year and a half.  She initially started with 0.5 mg once daily, but titrated up to 0.5 mg twice daily.  Twice daily dosing significantly improved her symptoms.  She intermittently uses Bentyl for abdominal pain.  She no longer uses Imodium or Lomotil.  She denies any episodes of constipation.  She occasionally gets nausea that is worse with anxiety.  She uses promethazine about once a month.  She is using Prilosec as needed now, which is usually about once a month.  She has no other new symptoms or complaints.  We had been unable to obtain rifaximin or Viberzi.  She was not able to tolerate cholestyramine.  Has some response to Imodium and Lomotil, but  requiring large doses.  She has had significant improvement with use of Lotronex 0.5 mg twice daily.  She has had no adverse side effects related to alosetron.      ROS:  Review of Systems   Constitutional:  Negative for chills, fever, malaise/fatigue and weight loss.   Respiratory:  Negative for shortness of breath.    Cardiovascular:  Negative for chest pain.   Gastrointestinal:  Positive for diarrhea. Negative for abdominal pain, blood in stool, constipation, heartburn, melena, nausea and vomiting.   Genitourinary:  Negative for hematuria.   Neurological:  Negative for dizziness and weakness.        Medical History:  has a past medical history of Alcohol abuse, Anxiety, Colon polyp, Depression, Hallucination, Headache, History of psychiatric hospitalization, psychiatric care, Hypertension, Psychiatric problem, Sleep difficulties, Suicide attempt, and Therapy.    Surgical History:  has a past surgical history that includes Epidural steroid injection (Bilateral, 08/05/2020); Epidural steroid injection (Right, 08/19/2020); Epidural steroid injection (Left, 09/04/2020); Epidural steroid injection (Bilateral, 04/16/2021); Esophagogastroduodenoscopy (N/A, 05/07/2021); Colonoscopy (N/A, 05/07/2021); Esophageal manometry with measurement of impedance (N/A, 10/20/2021); Epidural steroid injection (Bilateral, 01/19/2022); Laparoscopic Toupet fundoplication (N/A, 02/14/2022); Colonoscopy (N/A, 06/06/2022); Epidural steroid injection (Bilateral, 07/28/2023); Epidural steroid injection (Bilateral, 08/11/2023); Eye surgery (6/6/2015); Hernia repair (2/14/2022); injection, facet joint, lumbosacral (Left, 06/14/2024); Ganglion cyst excision; Minimally invasive transforaminal lumbar interbody fusion (TLIF) (Left, 10/28/2024); and Injection of anesthetic agent around medial branch nerves innervating lumbar facet joint (Bilateral, 4/4/2025).    Family History: family history includes Alcohol abuse in her maternal grandfather and  "maternal uncle; Anxiety disorder in her sister; Depression in her sister..       Review of patient's allergies indicates:   Allergen Reactions    Effexor [venlafaxine]      Elevated her blood pressure    Zoloft [sertraline]     Paroxetine hcl      Made her feel drunk       Medications Ordered Prior to Encounter[1]      Objective Findings:    Vital Signs:  BP (!) 166/94 (BP Location: Left arm, Patient Position: Sitting)   Pulse 75   Ht 5' 5" (1.651 m)   Wt 78.7 kg (173 lb 8 oz)   LMP 09/15/2013   BMI 28.87 kg/m²   Body mass index is 28.87 kg/m².    Physical Exam:  Physical Exam  Vitals and nursing note reviewed.   Constitutional:       General: She is not in acute distress.     Appearance: Normal appearance. She is not ill-appearing.   HENT:      Head: Normocephalic and atraumatic.      Right Ear: External ear normal.      Left Ear: External ear normal.      Nose: Nose normal.   Eyes:      General: No scleral icterus.     Extraocular Movements: Extraocular movements intact.   Cardiovascular:      Rate and Rhythm: Normal rate.   Pulmonary:      Effort: Pulmonary effort is normal. No respiratory distress.   Abdominal:      General: There is no distension.      Palpations: Abdomen is soft.      Tenderness: There is no guarding.   Musculoskeletal:         General: Normal range of motion.      Cervical back: Normal range of motion.   Skin:     General: Skin is warm.   Neurological:      Mental Status: She is alert and oriented to person, place, and time.   Psychiatric:         Mood and Affect: Mood normal.         Behavior: Behavior is cooperative.         Thought Content: Thought content normal.             Labs:  Lab Results   Component Value Date    WBC 9.31 10/29/2024    HGB 11.3 (L) 10/29/2024    HCT 35.5 (L) 10/29/2024     10/29/2024    CRP 6.3 03/16/2021    CHOL 250 (H) 10/22/2024    TRIG 220 (H) 10/22/2024    HDL 71 10/22/2024    ALKPHOS 110 10/22/2024    ALT 13 10/22/2024    AST 11 10/22/2024    NA " 138 10/29/2024    K 4.4 10/29/2024     10/29/2024    CREATININE 0.7 10/29/2024    BUN 8 10/29/2024    CO2 25 10/29/2024    TSH 0.826 10/22/2024    INR 0.9 10/14/2024    HGBA1C 5.2 10/22/2024       Imaging reviewed: none pertinent      Endoscopy reviewed:   Findings:       The perianal and digital rectal examinations were normal.        A few small-mouthed diverticula were found in the sigmoid colon,        descending colon and ascending colon.        The exam was otherwise without abnormality on direct and        retroflexion views.        The terminal ileum appeared normal.        Biopsies for histology were taken with a cold forceps from the right        colon and left colon for evaluation of microscopic colitis.   Impression:            - Diverticulosis in the sigmoid colon, in the                          descending colon and in the ascending colon.                          - The examination was otherwise normal on direct                          and retroflexion views.                          - The examined portion of the ileum was normal.                          - Biopsies were taken with a cold forceps from the                          right colon and left colon for evaluation of                          microscopic colitis.   Recommendation:        - Discharge patient to home.                          - Patient has a contact number available for                          emergencies. The signs and symptoms of potential                          delayed complications were discussed with the                          patient. Return to normal activities tomorrow.                          Written discharge instructions were provided to                          the patient.                          - Resume previous diet.                          - Continue present medications.                          - Await pathology results.                          - Repeat colonoscopy in 7 years for surveillance.                           - Start cholestyramine 4 gm PO every evening.   Attending Participation:        I personally performed the entire procedure.   Fab Shepherd MD   6/6/2022 12:32:03 PM     RELIAPATH DIAGNOSIS:   COLON, RANDOM BIOPSY:   - Colonic mucosa with scattered intramucosal lymphoid aggregates and focal   edema within lamina propria.   - Negative for acute, chronic or microscopic colitis.   - Negative for dysplasia or malignancy.       Assessment:  1. Irritable bowel syndrome with diarrhea      Orders Placed This Encounter    rifAXIMin (XIFAXAN) 550 mg Tab    alosetron (LOTRONEX) 1 mg tablet       Plan:   Increase Lotronex to 1 mg twice per day  Trial of rifaximin for IBS D exacerbation  Return to clinic in 6 months    One teaspoonful of psyllium twice daily. It is used for diarrhea due to its water-holding effect in the intestines that may aid in bulking up the watery stool. It can also bind some toxins that may cause acute diarrhea. Psyllium products combined with laxatives should be avoided.   Documentation of stool output provides a baseline and helps direct replacement fluid therapy. Keep a diary of possible food triggers for diarrhea.   Its necessary to increase fluid intake, especially when experiencing diarrhea. Increased fluid intake and liquid meal replacements can replenish fluid loss.  You may use oral rehydration solutions or diluted juices, diluted sports drinks, clear broth, or decaffeinated tea to stay hydrated. Sugary, carbonated, caffeinated, or alcoholic drinks can worsen diarrhea.   BRAT diet of bananas, rice, applesauce, and toast is fine. However, return to normal diet as soon as you feel up to it.   Utilize Imodium AD as needed        Thank you for allowing me to participate in this patient's care.    Sincerely,     KYLEE MULLER, DEMARP-C  Gastroenterology Department  [facility] Office [phone]           [1]   Current Outpatient Medications on File  Prior to Visit   Medication Sig Dispense Refill    alendronate (FOSAMAX) 35 MG tablet Take 1 tablet (35 mg total) by mouth every 7 days. 12 tablet 1    amLODIPine (NORVASC) 5 MG tablet Take 1 tablet by mouth once daily 90 tablet 1    atorvastatin (LIPITOR) 10 MG tablet Take 1 tablet (10 mg total) by mouth once daily. 90 tablet 3    benzonatate (TESSALON) 200 MG capsule Take 1 capsule (200 mg total) by mouth 3 (three) times daily as needed for Cough. 30 capsule 0    buPROPion (WELLBUTRIN XL) 150 MG TB24 tablet Take 1 tablet by mouth once daily.      calcium carb and citrate-vitD3 (CITRACAL-D3 SLOW RELEASE) 600 mg-12.5 mcg (500 unit) TbSR Take 2 tablets by mouth once daily. 180 tablet 3    cholestyramine (QUESTRAN) 4 gram packet Take 1 packet (4 g total) by mouth once daily. 30 packet 0    cyclobenzaprine (FLEXERIL) 5 MG tablet Take 2 tablets (10 mg total) by mouth 3 (three) times daily as needed for muscle spasms 60 tablet 0    dicyclomine (BENTYL) 20 mg tablet Take 1 tablet (20 mg total) by mouth every 6 (six) hours. 120 tablet 0    FLUoxetine 40 MG capsule Take 2 capsules (80 mg total) by mouth once daily. 180 capsule 3    gabapentin (NEURONTIN) 300 MG capsule Take 3 capsules (900 mg total) by mouth 3 (three) times daily. 270 capsule 11    ipratropium (ATROVENT) 42 mcg (0.06 %) nasal spray SRAY 2 SPRAYS BY EACH NOSTRIL ROUTE 4 (FOUR) TIMES DAILY. 45 mL 0    LIDOcaine (LIDODERM) 5 % Place 1 patch onto the skin once daily. Remove & Discard patch within 12 hours or as directed by MD 30 patch 0    lisinopriL (PRINIVIL,ZESTRIL) 20 MG tablet Take 1 tablet (20 mg total) by mouth nightly. 90 tablet 3    lurasidone (LATUDA) 20 mg Tab tablet Take 2 tablets (40 mg total) by mouth once daily. 60 tablet 11    meloxicam (MOBIC) 15 MG tablet TAKE 1 TABLET BY MOUTH EVERY DAY 30 tablet 1    mometasone (ELOCON) 0.1 % ointment APPLY OINTMENT TOPICALLY TO LEGS TWICE DAILY FOR 2 WEEKS      multivitamin with minerals tablet Take 1  tablet by mouth once daily.      naloxone (NARCAN) 4 mg/actuation Spry 4mg by nasal route as needed for opioid overdose; may repeat every 2-3 minutes in alternating nostrils until medical help arrives. Call 911 1 each 11    nitrofurantoin, macrocrystal-monohydrate, (MACROBID) 100 MG capsule Take 1 capsule (100 mg total) by mouth once daily. 30 capsule 5    omeprazole (PRILOSEC) 40 MG capsule Take 1 capsule by mouth every morning.      pregabalin 330 mg Tb24 Take 330 mg by mouth after dinner. 30 tablet 5    promethazine (PHENERGAN) 25 MG tablet Take 1 tablet (25 mg total) by mouth every 8 (eight) hours as needed. 30 tablet 0    QULIPTA 60 mg Tab Take 1 tablet by mouth once daily.      rimegepant (NURTEC) 75 mg odt DISSOLVE 1 TABLET BY MOUTH AS NEEDED FOR MIGRAINE HEADACHE      [DISCONTINUED] alosetron (LOTRONEX) 0.5 MG tablet Take 1 tablet (0.5 mg total) by mouth 2 (two) times daily. 180 tablet 3    azelastine (ASTELIN) 137 mcg (0.1 %) nasal spray  (Patient not taking: Reported on 4/16/2025)      hydrOXYzine HCL (ATARAX) 10 MG Tab Take 1 tablet (10 mg total) by mouth 2 (two) times daily as needed (anxiety). (Patient not taking: Reported on 2/20/2025) 60 tablet 3     No current facility-administered medications on file prior to visit.

## 2025-04-16 NOTE — LETTER
April 16, 2025    Naomy Armstrong  [address]  [place] [address]             [facility] [address]  Gastroenterology  [address]  [facility] [address]  Phone: [phone]  Fax: [phone]   April 16, 2025     Patient: Naomy Armstrong   YOB: 1965   Date of Visit: 4/16/2025       To Whom it May Concern:    Naomy Armstrong was seen in my clinic on 4/16/2025. She may return to work on 4/17/2025 .    Please excuse her from any classes or work missed.    If you have any questions or concerns, please don't hesitate to call.    Sincerely,         Ivette Yee, KEITHC

## 2025-04-17 ENCOUNTER — TELEPHONE (OUTPATIENT)
Dept: PAIN MEDICINE | Facility: CLINIC | Age: 60
End: 2025-04-17
Payer: COMMERCIAL

## 2025-04-17 NOTE — TELEPHONE ENCOUNTER
Contacted pt to discuss Friday date options as she can more easily get transportation. Offered 5/16/2025- she accepted.

## (undated) DEVICE — Device

## (undated) DEVICE — EVACUATOR WOUND BULB 100CC

## (undated) DEVICE — LOOP VESSEL BLUE MAXI

## (undated) DEVICE — SUT VICRYL+ 27 UR-6 VIOL

## (undated) DEVICE — TRAY NEURO OMC

## (undated) DEVICE — PACK LAPSCP/PELVSCPY III TIBRN

## (undated) DEVICE — CANNULA ENDOPATH XCEL 5X100MM

## (undated) DEVICE — TRAY MINOR GEN SURG

## (undated) DEVICE — SUT ENDOLOOP PDSII 18 LIGA

## (undated) DEVICE — SPONGE COTTON TRAY 4X4IN

## (undated) DEVICE — GOWN B1 X-LG X-LONG

## (undated) DEVICE — DRAPE ABDOMINAL TIBURON 14X11

## (undated) DEVICE — GLOVE RADIATION SIZE 8

## (undated) DEVICE — DURAPREP SURG SCRUB 26ML

## (undated) DEVICE — TRAY CATH FOL SIL URIMTR 16FR

## (undated) DEVICE — SEE MEDLINE ITEM 157131

## (undated) DEVICE — SUT TICRON BLUE O SK

## (undated) DEVICE — TOWEL OR DISP STRL BLUE 4/PK

## (undated) DEVICE — SUT VICRYL PLUS 0 CT1 18IN

## (undated) DEVICE — SUT TICRON BLUE 2-0 48 SK

## (undated) DEVICE — DRAPE C-ARM ELAS CLIP 42X120IN

## (undated) DEVICE — ELECTRODE REM PLYHSV RETURN 9

## (undated) DEVICE — DRAPE STERI INSTRUMENT 1018

## (undated) DEVICE — SUT VICRYL+ 2-0 SH 18IN

## (undated) DEVICE — DRESSING TELFA N ADH 3X8

## (undated) DEVICE — BLADE ELECTRO EDGE INSULATED

## (undated) DEVICE — ADHESIVE DERMABOND ADVANCED

## (undated) DEVICE — APPLICATOR CHLORAPREP ORN 26ML

## (undated) DEVICE — GLOVE RADIATION SIZE 7 1/2

## (undated) DEVICE — DRAPE OPMI STERILE

## (undated) DEVICE — GLOVE SIGNATURE ESSNTL LTX 7.5

## (undated) DEVICE — DRAPE CORETEMP FLD WRM 56X62IN

## (undated) DEVICE — GLOVE SENSICARE PI GRN 8

## (undated) DEVICE — ADHESIVE MASTISOL VIAL 48/BX

## (undated) DEVICE — SYR 50CC LL

## (undated) DEVICE — DRAPE INCISE IOBAN 2 23X17IN

## (undated) DEVICE — PENCIL BAYONET BOVIE

## (undated) DEVICE — NDL HYPO REG 25G X 1 1/2

## (undated) DEVICE — SOL NS 1000CC

## (undated) DEVICE — SPONGE GAUZE 16PLY 4X4

## (undated) DEVICE — SHEARS HARMONIC 5CM 36CM

## (undated) DEVICE — DRAIN CHANNEL ROUND 10FR

## (undated) DEVICE — SUT GUT PL. 4-0 27 FS-2

## (undated) DEVICE — TROCAR ENDOPATH XCEL 12MM 10CM

## (undated) DEVICE — TUBE FRAZIER 5MM 2FT SOFT TIP

## (undated) DEVICE — BLADE SURG CARBON STEEL SZ11

## (undated) DEVICE — SCISSOR 5MMX35CM DIRECT DRIVE

## (undated) DEVICE — MARKER SKIN RULER STERILE

## (undated) DEVICE — DRAPE TOP 53X102IN

## (undated) DEVICE — CORD BIPOLAR 12 FOOT

## (undated) DEVICE — SUT MONOCRYL 4.0 PS2 CP496G

## (undated) DEVICE — TOBRA BONE BASKET

## (undated) DEVICE — KIT SURGIFLO HEMOSTATIC MATRIX

## (undated) DEVICE — LUBRICANT SURGILUBE 2 OZ

## (undated) DEVICE — TROCAR ENDOPATH XCEL 5X100MM